# Patient Record
Sex: MALE | Race: WHITE | Employment: OTHER | URBAN - METROPOLITAN AREA
[De-identification: names, ages, dates, MRNs, and addresses within clinical notes are randomized per-mention and may not be internally consistent; named-entity substitution may affect disease eponyms.]

---

## 2017-10-25 ENCOUNTER — HOSPITAL ENCOUNTER (OUTPATIENT)
Dept: CT IMAGING | Age: 67
Discharge: HOME | End: 2017-10-25
Attending: UROLOGY | Admitting: UROLOGY

## 2017-10-25 DIAGNOSIS — R97.20 ELEVATED PROSTATE SPECIFIC ANTIGEN (PSA): ICD-10-CM

## 2017-10-25 LAB
BUN BLDV-MCNC: 21 MG/DL (ref 7–20)
CREAT SERPL-MCNC: 1 MG/DL (ref 0.8–1.3)
GFR AFRICAN AMERICAN: >60
GFR NON-AFRICAN AMERICAN: >60

## 2018-09-23 ENCOUNTER — HOSPITAL ENCOUNTER (INPATIENT)
Age: 68
LOS: 7 days | Discharge: NURSING, REHAB OR LONG-TERM CARE | DRG: 871 | End: 2018-10-01
Attending: EMERGENCY MEDICINE | Admitting: HOSPITALIST
Payer: COMMERCIAL

## 2018-09-23 ENCOUNTER — APPOINTMENT (OUTPATIENT)
Dept: GENERAL RADIOLOGY | Age: 68
DRG: 871 | End: 2018-09-23
Payer: COMMERCIAL

## 2018-09-23 DIAGNOSIS — E86.0 DEHYDRATION: ICD-10-CM

## 2018-09-23 DIAGNOSIS — R79.1 SUBTHERAPEUTIC INTERNATIONAL NORMALIZED RATIO (INR): ICD-10-CM

## 2018-09-23 DIAGNOSIS — I50.9 CONGESTIVE HEART FAILURE, UNSPECIFIED HF CHRONICITY, UNSPECIFIED HEART FAILURE TYPE (HCC): ICD-10-CM

## 2018-09-23 DIAGNOSIS — N39.0 URINARY TRACT INFECTION WITHOUT HEMATURIA, SITE UNSPECIFIED: ICD-10-CM

## 2018-09-23 DIAGNOSIS — A41.9 SEPSIS, DUE TO UNSPECIFIED ORGANISM: Primary | ICD-10-CM

## 2018-09-23 DIAGNOSIS — R77.8 ELEVATED TROPONIN: ICD-10-CM

## 2018-09-23 DIAGNOSIS — D64.9 ANEMIA, UNSPECIFIED TYPE: ICD-10-CM

## 2018-09-23 DIAGNOSIS — E87.6 HYPOKALEMIA: ICD-10-CM

## 2018-09-23 DIAGNOSIS — E83.42 HYPOMAGNESEMIA: ICD-10-CM

## 2018-09-23 DIAGNOSIS — J96.01 ACUTE RESPIRATORY FAILURE WITH HYPOXIA (HCC): ICD-10-CM

## 2018-09-23 LAB
A/G RATIO: 0.8 (ref 1.1–2.2)
ALBUMIN SERPL-MCNC: 2.8 G/DL (ref 3.4–5)
ALP BLD-CCNC: 92 U/L (ref 40–129)
ALT SERPL-CCNC: 10 U/L (ref 10–40)
ANION GAP SERPL CALCULATED.3IONS-SCNC: 12 MMOL/L (ref 3–16)
ANISOCYTOSIS: ABNORMAL
AST SERPL-CCNC: 14 U/L (ref 15–37)
BACTERIA: ABNORMAL /HPF
BASOPHILS ABSOLUTE: 0.1 K/UL (ref 0–0.2)
BASOPHILS RELATIVE PERCENT: 1.2 %
BILIRUB SERPL-MCNC: 1.6 MG/DL (ref 0–1)
BILIRUBIN URINE: NEGATIVE
BLOOD, URINE: ABNORMAL
BUN BLDV-MCNC: 22 MG/DL (ref 7–20)
CALCIUM SERPL-MCNC: 8.2 MG/DL (ref 8.3–10.6)
CHLORIDE BLD-SCNC: 94 MMOL/L (ref 99–110)
CLARITY: CLEAR
CO2: 26 MMOL/L (ref 21–32)
COLOR: YELLOW
CREAT SERPL-MCNC: 1.4 MG/DL (ref 0.8–1.3)
EOSINOPHILS ABSOLUTE: 0 K/UL (ref 0–0.6)
EOSINOPHILS RELATIVE PERCENT: 0.2 %
EPITHELIAL CELLS, UA: ABNORMAL /HPF
GFR AFRICAN AMERICAN: >60
GFR NON-AFRICAN AMERICAN: 50
GLOBULIN: 3.7 G/DL
GLUCOSE BLD-MCNC: 109 MG/DL (ref 70–99)
GLUCOSE URINE: NEGATIVE MG/DL
HCT VFR BLD CALC: 26.4 % (ref 40.5–52.5)
HEMOGLOBIN: 7.9 G/DL (ref 13.5–17.5)
HYPOCHROMIA: ABNORMAL
INR BLD: 1.94 (ref 0.86–1.14)
KETONES, URINE: NEGATIVE MG/DL
LACTIC ACID: 2.1 MMOL/L (ref 0.4–2)
LEUKOCYTE ESTERASE, URINE: NEGATIVE
LIPASE: 12 U/L (ref 13–60)
LYMPHOCYTES ABSOLUTE: 0.1 K/UL (ref 1–5.1)
LYMPHOCYTES RELATIVE PERCENT: 1.1 %
MAGNESIUM: 1.3 MG/DL (ref 1.8–2.4)
MCH RBC QN AUTO: 21 PG (ref 26–34)
MCHC RBC AUTO-ENTMCNC: 29.9 G/DL (ref 31–36)
MCV RBC AUTO: 70.2 FL (ref 80–100)
MICROCYTES: ABNORMAL
MICROSCOPIC EXAMINATION: YES
MONOCYTES ABSOLUTE: 0.1 K/UL (ref 0–1.3)
MONOCYTES RELATIVE PERCENT: 0.7 %
NEUTROPHILS ABSOLUTE: 11.1 K/UL (ref 1.7–7.7)
NEUTROPHILS RELATIVE PERCENT: 96.8 %
NITRITE, URINE: NEGATIVE
PDW BLD-RTO: 22.9 % (ref 12.4–15.4)
PH UA: 5.5
PLATELET # BLD: 285 K/UL (ref 135–450)
PLATELET SLIDE REVIEW: ADEQUATE
PMV BLD AUTO: 8.3 FL (ref 5–10.5)
POTASSIUM SERPL-SCNC: 3.1 MMOL/L (ref 3.5–5.1)
PRO-BNP: 6351 PG/ML (ref 0–124)
PROTEIN UA: 30 MG/DL
PROTHROMBIN TIME: 22.1 SEC (ref 9.8–13)
RAPID INFLUENZA  B AGN: NEGATIVE
RAPID INFLUENZA A AGN: NEGATIVE
RBC # BLD: 3.76 M/UL (ref 4.2–5.9)
RBC UA: ABNORMAL /HPF (ref 0–2)
SLIDE REVIEW: ABNORMAL
SODIUM BLD-SCNC: 132 MMOL/L (ref 136–145)
SPECIFIC GRAVITY UA: 1.01
TOTAL PROTEIN: 6.5 G/DL (ref 6.4–8.2)
TROPONIN: 0.04 NG/ML
URINE TYPE: ABNORMAL
UROBILINOGEN, URINE: 0.2 E.U./DL
WBC # BLD: 11.5 K/UL (ref 4–11)
WBC UA: ABNORMAL /HPF (ref 0–5)

## 2018-09-23 PROCEDURE — 84133 ASSAY OF URINE POTASSIUM: CPT

## 2018-09-23 PROCEDURE — 82436 ASSAY OF URINE CHLORIDE: CPT

## 2018-09-23 PROCEDURE — 84540 ASSAY OF URINE/UREA-N: CPT

## 2018-09-23 PROCEDURE — 85610 PROTHROMBIN TIME: CPT

## 2018-09-23 PROCEDURE — 6370000000 HC RX 637 (ALT 250 FOR IP): Performed by: PHYSICIAN ASSISTANT

## 2018-09-23 PROCEDURE — 93005 ELECTROCARDIOGRAM TRACING: CPT | Performed by: EMERGENCY MEDICINE

## 2018-09-23 PROCEDURE — 6360000002 HC RX W HCPCS: Performed by: PHYSICIAN ASSISTANT

## 2018-09-23 PROCEDURE — 80053 COMPREHEN METABOLIC PANEL: CPT

## 2018-09-23 PROCEDURE — 71045 X-RAY EXAM CHEST 1 VIEW: CPT

## 2018-09-23 PROCEDURE — 36415 COLL VENOUS BLD VENIPUNCTURE: CPT

## 2018-09-23 PROCEDURE — 87040 BLOOD CULTURE FOR BACTERIA: CPT

## 2018-09-23 PROCEDURE — 83605 ASSAY OF LACTIC ACID: CPT

## 2018-09-23 PROCEDURE — 85025 COMPLETE CBC W/AUTO DIFF WBC: CPT

## 2018-09-23 PROCEDURE — 82043 UR ALBUMIN QUANTITATIVE: CPT

## 2018-09-23 PROCEDURE — 83690 ASSAY OF LIPASE: CPT

## 2018-09-23 PROCEDURE — 84300 ASSAY OF URINE SODIUM: CPT

## 2018-09-23 PROCEDURE — 51702 INSERT TEMP BLADDER CATH: CPT

## 2018-09-23 PROCEDURE — 87449 NOS EACH ORGANISM AG IA: CPT

## 2018-09-23 PROCEDURE — 81001 URINALYSIS AUTO W/SCOPE: CPT

## 2018-09-23 PROCEDURE — 83735 ASSAY OF MAGNESIUM: CPT

## 2018-09-23 PROCEDURE — 84484 ASSAY OF TROPONIN QUANT: CPT

## 2018-09-23 PROCEDURE — 99285 EMERGENCY DEPT VISIT HI MDM: CPT

## 2018-09-23 PROCEDURE — 83880 ASSAY OF NATRIURETIC PEPTIDE: CPT

## 2018-09-23 PROCEDURE — 87804 INFLUENZA ASSAY W/OPTIC: CPT

## 2018-09-23 PROCEDURE — 96365 THER/PROPH/DIAG IV INF INIT: CPT

## 2018-09-23 PROCEDURE — 82570 ASSAY OF URINE CREATININE: CPT

## 2018-09-23 PROCEDURE — 87086 URINE CULTURE/COLONY COUNT: CPT

## 2018-09-23 PROCEDURE — 2580000003 HC RX 258: Performed by: PHYSICIAN ASSISTANT

## 2018-09-23 PROCEDURE — 96375 TX/PRO/DX INJ NEW DRUG ADDON: CPT

## 2018-09-23 RX ORDER — ASPIRIN 81 MG/1
324 TABLET, CHEWABLE ORAL ONCE
Status: DISCONTINUED | OUTPATIENT
Start: 2018-09-23 | End: 2018-09-30

## 2018-09-23 RX ORDER — ACETAMINOPHEN 325 MG/1
650 TABLET ORAL ONCE
Status: COMPLETED | OUTPATIENT
Start: 2018-09-23 | End: 2018-09-23

## 2018-09-23 RX ORDER — PANTOPRAZOLE SODIUM 40 MG/1
TABLET, DELAYED RELEASE ORAL
Refills: 3 | COMMUNITY
Start: 2018-09-06 | End: 2019-04-06

## 2018-09-23 RX ORDER — FUROSEMIDE 10 MG/ML
20 INJECTION INTRAMUSCULAR; INTRAVENOUS ONCE
Status: COMPLETED | OUTPATIENT
Start: 2018-09-23 | End: 2018-09-23

## 2018-09-23 RX ORDER — DOXYCYCLINE HYCLATE 100 MG
100 TABLET ORAL
COMMUNITY
Start: 2018-09-13 | End: 2018-09-23 | Stop reason: ALTCHOICE

## 2018-09-23 RX ORDER — POTASSIUM CHLORIDE 750 MG/1
40 TABLET, FILM COATED, EXTENDED RELEASE ORAL ONCE
Status: COMPLETED | OUTPATIENT
Start: 2018-09-23 | End: 2018-09-23

## 2018-09-23 RX ORDER — PANTOPRAZOLE SODIUM 40 MG/1
40 TABLET, DELAYED RELEASE ORAL
COMMUNITY
Start: 2018-09-06 | End: 2018-09-23

## 2018-09-23 RX ORDER — LISINOPRIL AND HYDROCHLOROTHIAZIDE 25; 20 MG/1; MG/1
1 TABLET ORAL DAILY
Status: ON HOLD | COMMUNITY
End: 2018-09-30 | Stop reason: HOSPADM

## 2018-09-23 RX ORDER — DILTIAZEM HYDROCHLORIDE 5 MG/ML
10 INJECTION INTRAVENOUS ONCE
Status: DISCONTINUED | OUTPATIENT
Start: 2018-09-23 | End: 2018-09-23

## 2018-09-23 RX ORDER — 0.9 % SODIUM CHLORIDE 0.9 %
30 INTRAVENOUS SOLUTION INTRAVENOUS ONCE
Status: COMPLETED | OUTPATIENT
Start: 2018-09-23 | End: 2018-09-23

## 2018-09-23 RX ADMIN — SODIUM CHLORIDE 5457 ML: 9 INJECTION, SOLUTION INTRAVENOUS at 21:22

## 2018-09-23 RX ADMIN — POTASSIUM CHLORIDE 40 MEQ: 750 TABLET, EXTENDED RELEASE ORAL at 23:55

## 2018-09-23 RX ADMIN — FUROSEMIDE 20 MG: 10 INJECTION, SOLUTION INTRAMUSCULAR; INTRAVENOUS at 23:19

## 2018-09-23 RX ADMIN — CEFTRIAXONE 1 G: 1 INJECTION, POWDER, FOR SOLUTION INTRAMUSCULAR; INTRAVENOUS at 23:22

## 2018-09-23 RX ADMIN — ACETAMINOPHEN 650 MG: 325 TABLET ORAL at 21:22

## 2018-09-23 ASSESSMENT — PAIN SCALES - GENERAL: PAINLEVEL_OUTOF10: 0

## 2018-09-24 ENCOUNTER — APPOINTMENT (OUTPATIENT)
Dept: CT IMAGING | Age: 68
DRG: 871 | End: 2018-09-24
Payer: COMMERCIAL

## 2018-09-24 PROBLEM — K80.50 CHOLEDOCHOLITHIASIS: Chronic | Status: ACTIVE | Noted: 2018-09-24

## 2018-09-24 PROBLEM — A41.9 SEVERE SEPSIS (HCC): Status: ACTIVE | Noted: 2018-09-24

## 2018-09-24 PROBLEM — K43.9 VENTRAL HERNIA: Chronic | Status: ACTIVE | Noted: 2018-07-14

## 2018-09-24 PROBLEM — R65.20 SEVERE SEPSIS (HCC): Status: ACTIVE | Noted: 2018-09-24

## 2018-09-24 PROBLEM — E83.42 HYPOMAGNESEMIA: Status: ACTIVE | Noted: 2018-09-24

## 2018-09-24 PROBLEM — Z79.01 ANTICOAGULATION GOAL OF INR 1.5 TO 2.5: Chronic | Status: ACTIVE | Noted: 2017-03-09

## 2018-09-24 PROBLEM — D50.9 MICROCYTIC ANEMIA: Chronic | Status: ACTIVE | Noted: 2018-09-24

## 2018-09-24 PROBLEM — I10 BENIGN HYPERTENSION: Status: ACTIVE | Noted: 2018-09-24

## 2018-09-24 PROBLEM — Z86.718 PERSONAL HISTORY OF DVT (DEEP VEIN THROMBOSIS): Chronic | Status: ACTIVE | Noted: 2018-07-14

## 2018-09-24 PROBLEM — Z51.81 ANTICOAGULATION GOAL OF INR 1.5 TO 2.5: Chronic | Status: ACTIVE | Noted: 2017-03-09

## 2018-09-24 PROBLEM — N39.0 UTI (URINARY TRACT INFECTION): Status: ACTIVE | Noted: 2018-09-24

## 2018-09-24 PROBLEM — E87.8 HYPOCHLOREMIA: Status: ACTIVE | Noted: 2018-09-24

## 2018-09-24 PROBLEM — Z51.81 ANTICOAGULATION GOAL OF INR 1.5 TO 2.5: Status: ACTIVE | Noted: 2017-03-09

## 2018-09-24 PROBLEM — K92.2 GI BLEED: Status: ACTIVE | Noted: 2018-08-04

## 2018-09-24 PROBLEM — K81.0 ACUTE CHOLECYSTITIS: Status: ACTIVE | Noted: 2018-09-24

## 2018-09-24 PROBLEM — N17.9 AKI (ACUTE KIDNEY INJURY) (HCC): Status: ACTIVE | Noted: 2018-09-24

## 2018-09-24 PROBLEM — K43.9 VENTRAL HERNIA: Status: ACTIVE | Noted: 2018-07-14

## 2018-09-24 PROBLEM — R77.8 ELEVATED TROPONIN: Status: ACTIVE | Noted: 2018-09-24

## 2018-09-24 PROBLEM — Z86.718 PERSONAL HISTORY OF DVT (DEEP VEIN THROMBOSIS): Status: ACTIVE | Noted: 2018-07-14

## 2018-09-24 PROBLEM — R79.89 ELEVATED BRAIN NATRIURETIC PEPTIDE (BNP) LEVEL: Status: ACTIVE | Noted: 2018-09-24

## 2018-09-24 PROBLEM — E87.1 HYPONATREMIA: Status: ACTIVE | Noted: 2018-09-24

## 2018-09-24 PROBLEM — J96.01 ACUTE HYPOXEMIC RESPIRATORY FAILURE (HCC): Status: ACTIVE | Noted: 2018-09-24

## 2018-09-24 PROBLEM — I10 BENIGN HYPERTENSION: Chronic | Status: ACTIVE | Noted: 2018-09-24

## 2018-09-24 PROBLEM — E87.6 HYPOKALEMIA: Status: ACTIVE | Noted: 2018-09-24

## 2018-09-24 PROBLEM — R09.89 PULMONARY VASCULAR CONGESTION: Status: ACTIVE | Noted: 2018-09-24

## 2018-09-24 PROBLEM — D50.9 MICROCYTIC ANEMIA: Status: ACTIVE | Noted: 2018-09-24

## 2018-09-24 PROBLEM — K92.2 GI BLEED: Chronic | Status: ACTIVE | Noted: 2018-08-04

## 2018-09-24 PROBLEM — L30.4 INTERTRIGO: Status: ACTIVE | Noted: 2018-09-24

## 2018-09-24 PROBLEM — K80.50 CHOLEDOCHOLITHIASIS: Status: ACTIVE | Noted: 2018-09-24

## 2018-09-24 PROBLEM — Z79.01 ANTICOAGULATION GOAL OF INR 1.5 TO 2.5: Status: ACTIVE | Noted: 2017-03-09

## 2018-09-24 PROBLEM — R79.89 ELEVATED TROPONIN: Status: ACTIVE | Noted: 2018-09-24

## 2018-09-24 LAB
ABO/RH: NORMAL
ABO/RH: NORMAL
ALBUMIN SERPL-MCNC: 2.7 G/DL (ref 3.4–5)
ALP BLD-CCNC: 77 U/L (ref 40–129)
ALT SERPL-CCNC: 9 U/L (ref 10–40)
ANION GAP SERPL CALCULATED.3IONS-SCNC: 12 MMOL/L (ref 3–16)
ANTIBODY SCREEN: NORMAL
AST SERPL-CCNC: 13 U/L (ref 15–37)
BILIRUB SERPL-MCNC: 1.2 MG/DL (ref 0–1)
BILIRUBIN DIRECT: 0.5 MG/DL (ref 0–0.3)
BILIRUBIN, INDIRECT: 0.7 MG/DL (ref 0–1)
BLOOD SMEAR REVIEW: NORMAL
BUN BLDV-MCNC: 24 MG/DL (ref 7–20)
CALCIUM SERPL-MCNC: 7.9 MG/DL (ref 8.3–10.6)
CHLORIDE BLD-SCNC: 98 MMOL/L (ref 99–110)
CHLORIDE URINE RANDOM: 72 MMOL/L
CO2: 28 MMOL/L (ref 21–32)
CREAT SERPL-MCNC: 1.4 MG/DL (ref 0.8–1.3)
CREATININE URINE: 140.2 MG/DL (ref 39–259)
D DIMER: 542 NG/ML DDU (ref 0–229)
FERRITIN: 30.5 NG/ML (ref 30–400)
FOLATE: 8.95 NG/ML (ref 4.78–24.2)
GFR AFRICAN AMERICAN: >60
GFR NON-AFRICAN AMERICAN: 50
GLUCOSE BLD-MCNC: 111 MG/DL (ref 70–99)
HAPTOGLOBIN: 183 MG/DL (ref 30–200)
HCT VFR BLD CALC: 23.1 % (ref 40.5–52.5)
HCT VFR BLD CALC: 23.6 % (ref 40.5–52.5)
HCT VFR BLD CALC: 23.9 % (ref 40.5–52.5)
HEMOGLOBIN: 7.1 G/DL (ref 13.5–17.5)
HEMOGLOBIN: 7.2 G/DL (ref 13.5–17.5)
HEMOGLOBIN: 7.4 G/DL (ref 13.5–17.5)
HOMOCYSTEINE: 16 UMOL/L (ref 0–10)
IMMATURE RETIC FRACT: 0.49 (ref 0.21–0.37)
INR BLD: 2.1 (ref 0.86–1.14)
INR BLD: 2.18 (ref 0.86–1.14)
IRON SATURATION: 3 % (ref 20–50)
IRON: 8 UG/DL (ref 59–158)
L. PNEUMOPHILA SEROGP 1 UR AG: NORMAL
LACTATE DEHYDROGENASE: 136 U/L (ref 100–190)
LACTIC ACID: 1 MMOL/L (ref 0.4–2)
LACTIC ACID: 1.2 MMOL/L (ref 0.4–2)
LV EF: 58 %
LVEF MODALITY: NORMAL
MAGNESIUM URINE: 2.4 MG/DL (ref 4.1–13.8)
MICROALBUMIN UR-MCNC: 20.1 MG/DL
MICROALBUMIN/CREAT UR-RTO: 143.4 MG/G (ref 0–30)
POTASSIUM SERPL-SCNC: 3.4 MMOL/L (ref 3.5–5.1)
POTASSIUM, UR: 40.2 MMOL/L
PRO-BNP: ABNORMAL PG/ML (ref 0–124)
PROCALCITONIN: 8.66 NG/ML (ref 0–0.15)
PROTHROMBIN TIME: 23.9 SEC (ref 9.8–13)
PROTHROMBIN TIME: 24.9 SEC (ref 9.8–13)
REPORT: NORMAL
RESPIRATORY PANEL PCR: NORMAL
RETICULOCYTE ABSOLUTE COUNT: 0.08 M/UL
RETICULOCYTE COUNT PCT: 2.48 % (ref 0.5–2.18)
SODIUM BLD-SCNC: 138 MMOL/L (ref 136–145)
SODIUM URINE: 62 MMOL/L
STREP PNEUMONIAE ANTIGEN, URINE: NORMAL
TOTAL CK: 44 U/L (ref 39–308)
TOTAL CK: 47 U/L (ref 39–308)
TOTAL CK: 49 U/L (ref 39–308)
TOTAL IRON BINDING CAPACITY: 236 UG/DL (ref 260–445)
TOTAL PROTEIN: 6 G/DL (ref 6.4–8.2)
TROPONIN: 0.03 NG/ML
TROPONIN: 0.04 NG/ML
TROPONIN: 0.05 NG/ML
UREA NITROGEN, UR: 440.6 MG/DL (ref 800–1666)
VITAMIN B-12: 371 PG/ML (ref 211–911)

## 2018-09-24 PROCEDURE — 85379 FIBRIN DEGRADATION QUANT: CPT

## 2018-09-24 PROCEDURE — 80048 BASIC METABOLIC PNL TOTAL CA: CPT

## 2018-09-24 PROCEDURE — 84484 ASSAY OF TROPONIN QUANT: CPT

## 2018-09-24 PROCEDURE — 82607 VITAMIN B-12: CPT

## 2018-09-24 PROCEDURE — 36415 COLL VENOUS BLD VENIPUNCTURE: CPT

## 2018-09-24 PROCEDURE — 2580000003 HC RX 258: Performed by: NURSE PRACTITIONER

## 2018-09-24 PROCEDURE — 85014 HEMATOCRIT: CPT

## 2018-09-24 PROCEDURE — 86148 ANTI-PHOSPHOLIPID ANTIBODY: CPT

## 2018-09-24 PROCEDURE — 6360000002 HC RX W HCPCS: Performed by: HOSPITALIST

## 2018-09-24 PROCEDURE — 6360000002 HC RX W HCPCS: Performed by: PHYSICIAN ASSISTANT

## 2018-09-24 PROCEDURE — G8987 SELF CARE CURRENT STATUS: HCPCS

## 2018-09-24 PROCEDURE — 6370000000 HC RX 637 (ALT 250 FOR IP): Performed by: HOSPITALIST

## 2018-09-24 PROCEDURE — 85610 PROTHROMBIN TIME: CPT

## 2018-09-24 PROCEDURE — 86738 MYCOPLASMA ANTIBODY: CPT

## 2018-09-24 PROCEDURE — 99254 IP/OBS CNSLTJ NEW/EST MOD 60: CPT | Performed by: INTERNAL MEDICINE

## 2018-09-24 PROCEDURE — 97166 OT EVAL MOD COMPLEX 45 MIN: CPT

## 2018-09-24 PROCEDURE — 97535 SELF CARE MNGMENT TRAINING: CPT

## 2018-09-24 PROCEDURE — 81241 F5 GENE: CPT

## 2018-09-24 PROCEDURE — 87633 RESP VIRUS 12-25 TARGETS: CPT

## 2018-09-24 PROCEDURE — 83550 IRON BINDING TEST: CPT

## 2018-09-24 PROCEDURE — C8929 TTE W OR WO FOL WCON,DOPPLER: HCPCS | Performed by: INTERNAL MEDICINE

## 2018-09-24 PROCEDURE — 71250 CT THORAX DX C-: CPT

## 2018-09-24 PROCEDURE — 2060000000 HC ICU INTERMEDIATE R&B

## 2018-09-24 PROCEDURE — 83010 ASSAY OF HAPTOGLOBIN QUANT: CPT

## 2018-09-24 PROCEDURE — 80076 HEPATIC FUNCTION PANEL: CPT

## 2018-09-24 PROCEDURE — 83605 ASSAY OF LACTIC ACID: CPT

## 2018-09-24 PROCEDURE — 93010 ELECTROCARDIOGRAM REPORT: CPT | Performed by: INTERNAL MEDICINE

## 2018-09-24 PROCEDURE — 85302 CLOT INHIBIT PROT C ANTIGEN: CPT

## 2018-09-24 PROCEDURE — 86147 CARDIOLIPIN ANTIBODY EA IG: CPT

## 2018-09-24 PROCEDURE — 87486 CHLMYD PNEUM DNA AMP PROBE: CPT

## 2018-09-24 PROCEDURE — 85045 AUTOMATED RETICULOCYTE COUNT: CPT

## 2018-09-24 PROCEDURE — 86901 BLOOD TYPING SEROLOGIC RH(D): CPT

## 2018-09-24 PROCEDURE — 83090 ASSAY OF HOMOCYSTEINE: CPT

## 2018-09-24 PROCEDURE — 86900 BLOOD TYPING SEROLOGIC ABO: CPT

## 2018-09-24 PROCEDURE — 99223 1ST HOSP IP/OBS HIGH 75: CPT | Performed by: INTERNAL MEDICINE

## 2018-09-24 PROCEDURE — 82728 ASSAY OF FERRITIN: CPT

## 2018-09-24 PROCEDURE — 85306 CLOT INHIBIT PROT S FREE: CPT

## 2018-09-24 PROCEDURE — 74176 CT ABD & PELVIS W/O CONTRAST: CPT

## 2018-09-24 PROCEDURE — 82550 ASSAY OF CK (CPK): CPT

## 2018-09-24 PROCEDURE — 83615 LACTATE (LD) (LDH) ENZYME: CPT

## 2018-09-24 PROCEDURE — 81291 MTHFR GENE: CPT

## 2018-09-24 PROCEDURE — 97162 PT EVAL MOD COMPLEX 30 MIN: CPT

## 2018-09-24 PROCEDURE — 2500000003 HC RX 250 WO HCPCS: Performed by: HOSPITALIST

## 2018-09-24 PROCEDURE — 84145 PROCALCITONIN (PCT): CPT

## 2018-09-24 PROCEDURE — G8979 MOBILITY GOAL STATUS: HCPCS

## 2018-09-24 PROCEDURE — 2700000000 HC OXYGEN THERAPY PER DAY

## 2018-09-24 PROCEDURE — 82746 ASSAY OF FOLIC ACID SERUM: CPT

## 2018-09-24 PROCEDURE — 6370000000 HC RX 637 (ALT 250 FOR IP): Performed by: NURSE PRACTITIONER

## 2018-09-24 PROCEDURE — 94761 N-INVAS EAR/PLS OXIMETRY MLT: CPT

## 2018-09-24 PROCEDURE — 86146 BETA-2 GLYCOPROTEIN ANTIBODY: CPT

## 2018-09-24 PROCEDURE — 85305 CLOT INHIBIT PROT S TOTAL: CPT

## 2018-09-24 PROCEDURE — 99254 IP/OBS CNSLTJ NEW/EST MOD 60: CPT | Performed by: SURGERY

## 2018-09-24 PROCEDURE — 86850 RBC ANTIBODY SCREEN: CPT

## 2018-09-24 PROCEDURE — 6370000000 HC RX 637 (ALT 250 FOR IP): Performed by: INTERNAL MEDICINE

## 2018-09-24 PROCEDURE — G8988 SELF CARE GOAL STATUS: HCPCS

## 2018-09-24 PROCEDURE — 84238 ASSAY NONENDOCRINE RECEPTOR: CPT

## 2018-09-24 PROCEDURE — 87581 M.PNEUMON DNA AMP PROBE: CPT

## 2018-09-24 PROCEDURE — 2580000003 HC RX 258: Performed by: HOSPITALIST

## 2018-09-24 PROCEDURE — 97110 THERAPEUTIC EXERCISES: CPT

## 2018-09-24 PROCEDURE — 85303 CLOT INHIBIT PROT C ACTIVITY: CPT

## 2018-09-24 PROCEDURE — 83880 ASSAY OF NATRIURETIC PEPTIDE: CPT

## 2018-09-24 PROCEDURE — 85018 HEMOGLOBIN: CPT

## 2018-09-24 PROCEDURE — G8978 MOBILITY CURRENT STATUS: HCPCS

## 2018-09-24 PROCEDURE — 87798 DETECT AGENT NOS DNA AMP: CPT

## 2018-09-24 PROCEDURE — 83540 ASSAY OF IRON: CPT

## 2018-09-24 PROCEDURE — 87641 MR-STAPH DNA AMP PROBE: CPT

## 2018-09-24 RX ORDER — POTASSIUM CHLORIDE 20 MEQ/1
40 TABLET, EXTENDED RELEASE ORAL PRN
Status: DISCONTINUED | OUTPATIENT
Start: 2018-09-24 | End: 2018-09-24

## 2018-09-24 RX ORDER — MAGNESIUM SULFATE HEPTAHYDRATE 500 MG/ML
2 INJECTION, SOLUTION INTRAMUSCULAR; INTRAVENOUS ONCE
Status: DISCONTINUED | OUTPATIENT
Start: 2018-09-24 | End: 2018-09-24

## 2018-09-24 RX ORDER — POTASSIUM CHLORIDE 20MEQ/15ML
40 LIQUID (ML) ORAL PRN
Status: DISCONTINUED | OUTPATIENT
Start: 2018-09-24 | End: 2018-09-24

## 2018-09-24 RX ORDER — SODIUM CHLORIDE 0.9 % (FLUSH) 0.9 %
10 SYRINGE (ML) INJECTION EVERY 12 HOURS SCHEDULED
Status: DISCONTINUED | OUTPATIENT
Start: 2018-09-24 | End: 2018-10-01 | Stop reason: HOSPADM

## 2018-09-24 RX ORDER — ONDANSETRON 2 MG/ML
4 INJECTION INTRAMUSCULAR; INTRAVENOUS EVERY 6 HOURS PRN
Status: DISCONTINUED | OUTPATIENT
Start: 2018-09-24 | End: 2018-10-01 | Stop reason: HOSPADM

## 2018-09-24 RX ORDER — SODIUM CHLORIDE AND POTASSIUM CHLORIDE .9; .15 G/100ML; G/100ML
SOLUTION INTRAVENOUS CONTINUOUS
Status: DISCONTINUED | OUTPATIENT
Start: 2018-09-24 | End: 2018-09-24

## 2018-09-24 RX ORDER — FUROSEMIDE 10 MG/ML
40 INJECTION INTRAMUSCULAR; INTRAVENOUS ONCE
Status: DISCONTINUED | OUTPATIENT
Start: 2018-09-24 | End: 2018-09-24

## 2018-09-24 RX ORDER — POTASSIUM CHLORIDE 20 MEQ/1
40 TABLET, EXTENDED RELEASE ORAL ONCE
Status: COMPLETED | OUTPATIENT
Start: 2018-09-24 | End: 2018-09-24

## 2018-09-24 RX ORDER — MAGNESIUM SULFATE IN WATER 40 MG/ML
2 INJECTION, SOLUTION INTRAVENOUS ONCE
Status: COMPLETED | OUTPATIENT
Start: 2018-09-24 | End: 2018-09-24

## 2018-09-24 RX ORDER — POTASSIUM CHLORIDE 7.45 MG/ML
10 INJECTION INTRAVENOUS PRN
Status: DISCONTINUED | OUTPATIENT
Start: 2018-09-24 | End: 2018-09-24

## 2018-09-24 RX ORDER — MAGNESIUM SULFATE 1 G/100ML
1 INJECTION INTRAVENOUS PRN
Status: DISCONTINUED | OUTPATIENT
Start: 2018-09-24 | End: 2018-09-24

## 2018-09-24 RX ORDER — SODIUM CHLORIDE 0.9 % (FLUSH) 0.9 %
10 SYRINGE (ML) INJECTION PRN
Status: DISCONTINUED | OUTPATIENT
Start: 2018-09-24 | End: 2018-10-01 | Stop reason: HOSPADM

## 2018-09-24 RX ORDER — SODIUM CHLORIDE 9 MG/ML
INJECTION, SOLUTION INTRAVENOUS CONTINUOUS
Status: DISCONTINUED | OUTPATIENT
Start: 2018-09-24 | End: 2018-09-25

## 2018-09-24 RX ORDER — ACETAMINOPHEN 325 MG/1
650 TABLET ORAL EVERY 4 HOURS PRN
Status: DISCONTINUED | OUTPATIENT
Start: 2018-09-24 | End: 2018-10-01 | Stop reason: HOSPADM

## 2018-09-24 RX ORDER — PANTOPRAZOLE SODIUM 40 MG/1
40 TABLET, DELAYED RELEASE ORAL 2 TIMES DAILY
Status: DISCONTINUED | OUTPATIENT
Start: 2018-09-24 | End: 2018-09-25

## 2018-09-24 RX ADMIN — CEFEPIME HYDROCHLORIDE 2 G: 2 INJECTION, POWDER, FOR SOLUTION INTRAVENOUS at 15:43

## 2018-09-24 RX ADMIN — POTASSIUM CHLORIDE 40 MEQ: 20 TABLET, EXTENDED RELEASE ORAL at 11:54

## 2018-09-24 RX ADMIN — PANTOPRAZOLE SODIUM 40 MG: 40 TABLET, DELAYED RELEASE ORAL at 08:41

## 2018-09-24 RX ADMIN — MICONAZOLE NITRATE: 2 POWDER TOPICAL at 05:46

## 2018-09-24 RX ADMIN — WARFARIN SODIUM 3 MG: 2 TABLET ORAL at 17:53

## 2018-09-24 RX ADMIN — PANTOPRAZOLE SODIUM 40 MG: 40 TABLET, DELAYED RELEASE ORAL at 04:14

## 2018-09-24 RX ADMIN — POTASSIUM CHLORIDE AND SODIUM CHLORIDE: 900; 150 INJECTION, SOLUTION INTRAVENOUS at 04:14

## 2018-09-24 RX ADMIN — PANTOPRAZOLE SODIUM 40 MG: 40 TABLET, DELAYED RELEASE ORAL at 21:04

## 2018-09-24 RX ADMIN — MAGNESIUM SULFATE HEPTAHYDRATE 2 G: 40 INJECTION, SOLUTION INTRAVENOUS at 00:18

## 2018-09-24 RX ADMIN — MICONAZOLE NITRATE: 2 POWDER TOPICAL at 21:04

## 2018-09-24 RX ADMIN — CEFEPIME HYDROCHLORIDE 2 G: 2 INJECTION, POWDER, FOR SOLUTION INTRAVENOUS at 04:14

## 2018-09-24 RX ADMIN — ACETAMINOPHEN 650 MG: 325 TABLET ORAL at 15:39

## 2018-09-24 RX ADMIN — SODIUM CHLORIDE: 9 INJECTION, SOLUTION INTRAVENOUS at 11:54

## 2018-09-24 ASSESSMENT — ENCOUNTER SYMPTOMS
BLURRED VISION: 0
NAUSEA: 0
ABDOMINAL PAIN: 0
SHORTNESS OF BREATH: 1
BACK PAIN: 0
PHOTOPHOBIA: 0
DOUBLE VISION: 0
COUGH: 1
VOMITING: 0
HEARTBURN: 0
SPUTUM PRODUCTION: 0
ORTHOPNEA: 0

## 2018-09-24 ASSESSMENT — PAIN SCALES - GENERAL
PAINLEVEL_OUTOF10: 0

## 2018-09-24 NOTE — PROGRESS NOTES
10 reps of B digit, elbow, and shoulder flex/ext while supine after education. Pt and wife educated to complete at least 3x/day, 10 reps increasing resistance and repetitions as able. Pt and wife stated and demonstrated understanding. Written handout provided. Education: Role of OT, safe t/f training, safe use of DME, awareness of deficits, discharge planning, ADL as therapeutic exercise, importance of OOB, therex    Assessment   Performance deficits / Impairments: Decreased functional mobility ; Decreased ADL status; Decreased strength;Decreased endurance;Decreased safe awareness;Decreased balance;Decreased high-level IADLs  After evaluation, pt found to be presenting with the above mentioned deficits. Pt would benefit from continued skilled occupational therapy to address these deficits, increasing safety and independence with ADL and functional mobility. Pt has good potential to meet therapy goals. Will continue to assess for discharge needs. Rec SNF. Prognosis: Good  Decision Making: Medium Complexity  REQUIRES OT FOLLOW UP: Yes  Activity Tolerance  Activity Tolerance: Treatment limited secondary to medical complications (free text)  Activity Tolerance: Vitals supine at rest: 93%, 127-132bpm, SOB noted. RN aware. Safety Devices  Safety Devices in place: Yes  Type of devices: Bed alarm in place; Left in bed;Call light within reach;Nurse notified         Plan   Plan  Times per week: 3-5    G-Code  OT G-codes  Functional Assessment Tool Used: AM-PAC  Score: 11  Functional Limitation: Self care  Self Care Current Status (): At least 60 percent but less than 80 percent impaired, limited or restricted  Self Care Goal Status ():  At least 40 percent but less than 60 percent impaired, limited or restricted    AM-PAC Score   AM-[facility] Inpatient Daily Activity Raw Score: 11  AM-PAC Inpatient ADL T-Scale Score : 29.04  ADL Inpatient CMS 0-100% Score: 70.42  ADL Inpatient CMS G-Code Modifier : CL    Goals  Short term goals  Time Frame for Short term goals: 1 week unless otherwise specified  Short term goal 1: Tolerate 5 mins EOB with min A for sitting balance in prep for ADL  Short term goal 2: Supine to sit with min A x2  Short term goal 3: Complete 20 reps of BUE therex  Short term goal 4: Sit to  prep for functional t/f with max A x2. Patient Goals   Patient goals : \"I want to get moving again. \"       Therapy Time   Individual Concurrent Group Co-treatment   Time In 1320         Time Out 7492         Minutes 27         Timed Code Treatment Minutes: 16 Minutes     If patient is discharged prior to next treatment session, this note will serve as the discharge summary.   Chelo Hinson, OTR/L #[id number]

## 2018-09-24 NOTE — H&P
Hospital Medicine History & Physical      PCP: Ermelinda Sebastian MD    Date of Admission: 9/23/2018    Date of Service: Pt seen/examined on 9/24/18 and Admitted to Inpatient with expected LOS greater than two midnights due to medical therapy. Chief Complaint:  Chief Complaint   Patient presents with    Fever     fever 103; glucose 111; 72% RA upon EMS arrival     Shortness of Breath     History Of Present Illness:    79 y.o. male with a hx of morbid obesity, recent choledocholithiasis s/p ERCP in July 2018, with plans (per Dr. Rhiannon Mcgrath) for a cholecystectomy, which was apparently delayed by a recent bout of PNA as well as a GI bleed in Aug 2018 (EGD reportedly showed a duodenal ulcer), as well as a hx of a large ventral hernia, DVT, anticoagulated on Coumadin (apparent goal 1.5-2.5), and other comorbidities, who presents to the [facility], apparently from home, via EMS, due to a sudden-onset high fever (his wife said to 80 F), as well as SOB. EMS reportedly found him to be hypoxemic to 74% and placed him on supplemental O2 via NC. When he arrived to the ED he was satting 84%. Fever was 102.9 F. He was tachypneic to 25 and tachycardic to 139. He had mild leukocytosis to 11,500. CXR was read as pulmonary vascular congestion. UA was positive for a UTI. Assuming an infection, he met severe sepsis criteria. Other labs showed elevated troponin, elevated BNP, hypomagnesemia, hypokalemia, hyponatremia, hypochloremia, and apparent KEVIN. I ordered CT c/a/p without IV contrast, which showed apparent acute (or perhaps unresolved) cholecystitis, with a large gallstone in the neck of the gallbladder, as well as small bilateral pleural effusions. When I saw him on the floor, his RN showed me several skin lesions as well as intertrigo.     Past Medical History:          Diagnosis Date    Cancer [facility])     kidney    Edema of both legs     Hypertension        Past Surgical History:          Procedure Laterality Date    KIDNEY SURGERY  4/7/2014       Medications Prior to Admission:      Prior to Admission medications    Medication Sig Start Date End Date Taking? Authorizing Provider   lisinopril-hydrochlorothiazide (PRINZIDE;ZESTORETIC) 20-25 MG per tablet Take by mouth   Yes Historical Provider, MD   pantoprazole (PROTONIX) 40 MG tablet TAKE 1 TABLET BY MOUTH TWO TIMES A DAY 9/6/18  Yes Historical Provider, MD   warfarin (COUMADIN) 5 MG tablet  2/1/14  Yes Historical Provider, MD       Allergies:  Patient has no known allergies. Social History:      The patient currently lives at home. TOBACCO:   reports that he has never smoked. He has never used smokeless tobacco.  ETOH:   reports that he does not drink alcohol. Family History:      History reviewed. No pertinent family history. REVIEW OF SYSTEMS:   Pertinent positives as noted in the HPI. All other systems reviewed and negative. PHYSICAL EXAM PERFORMED:    BP 99/63   Pulse 96   Temp 98.5 °F (36.9 °C) (Oral)   Resp 20   Ht 5' 4\" (1.626 m)   Wt (!) 376 lb 1.6 oz (170.6 kg) Comment: minus 3.1lbs for sling  SpO2 97%   BMI 64.56 kg/m²     General appearance: Morbidly obese late-middle-aged WM sitting up in bed in NAD. Looks chronically unhealthy/unfit-appearing. HEENT:  Normal cephalic, atraumatic without obvious deformity. Pupils equal, round, and reactive to light. Extra ocular muscles intact. Conjunctivae/corneas clear. Neck: Supple, with full range of motion. No jugular venous distention. Trachea midline. Respiratory: On 2 L O2 NC. No respiratory distress. Cardiovascular:  Regular rate and rhythm with normal S1/S2 without murmurs, rubs or gallops. Abdomen: Very large abd fat pannus with ventral hernia as well as suprapubic fat pannus. Musculoskeletal/Skin:  Chronic-appearing lymphedema to bilateral LE's. Neurologic:  Neurovascularly intact without any focal sensory/motor deficits.  Cranial nerves: II-XII intact, grossly non-focal.  Psychiatric: Alert and oriented, thought content appropriate, normal insight  Capillary Refill: Brisk,< 3 seconds   Peripheral Pulses: +2 palpable, equal bilaterally       Labs:     Recent Labs      09/23/18 2113   WBC  11.5*   HGB  7.9*   HCT  26.4*   PLT  285     Recent Labs      09/23/18 2113   NA  132*   K  3.1*   CL  94*   CO2  26   BUN  22*   CREATININE  1.4*   CALCIUM  8.2*     Recent Labs      09/23/18 2113   AST  14*   ALT  10   BILITOT  1.6*   ALKPHOS  92     Recent Labs      09/23/18 2113   INR  1.94*     Recent Labs      09/23/18 2113   TROPONINI  0.04*       Urinalysis:      Lab Results   Component Value Date    NITRU Negative 09/23/2018    WBCUA 6-10 09/23/2018    BACTERIA Rare 09/23/2018    RBCUA None seen 09/23/2018    BLOODU SMALL 09/23/2018    SPECGRAV 1.015 09/23/2018    GLUCOSEU Negative 09/23/2018       Radiology:     CT ABDOMEN PELVIS WO CONTRAST Additional Contrast? None   Final Result   ABDOMEN/PELVIS      1. Hydropic gallbladder with large gallstone at the gallbladder neck and   features of acute cholecystitis. 2. Gas within the biliary system is presumably from reported recent ERCP. 3. Large ventral abdominal hernia containing intra-abdominal fat and   nonobstructed loops of bowel. Soft tissue edema along the inferior pannus. CHEST      1. Small bilateral pleural effusions. 2. 2 cm hypodensity in the right thyroid gland may be further evaluated with   ultrasound (reference: J Am Saul Radiol. 2015 Feb;12(2): 143-50). CT CHEST WO CONTRAST   Final Result   ABDOMEN/PELVIS      1. Hydropic gallbladder with large gallstone at the gallbladder neck and   features of acute cholecystitis. 2. Gas within the biliary system is presumably from reported recent ERCP. 3. Large ventral abdominal hernia containing intra-abdominal fat and   nonobstructed loops of bowel. Soft tissue edema along the inferior pannus. CHEST      1. Small bilateral pleural effusions.    2. 2 cm hypodensity in the

## 2018-09-24 NOTE — CONSULTS
Department of General Surgery Consult    PATIENT NAME: Lorne Gonzalez   YOB: 1950    ADMISSION DATE: 9/23/2018  9:00 PM      TODAY'S DATE: 9/24/2018    Reason for Consult:  cholecystitis    Chief Complaint: fevers    Requesting Physician:  Johan Pfeiffer    HISTORY OF PRESENT ILLNESS:              The patient is a 79 y.o. male who presents with fevers and chills. Also with some SOB. Denies any abdominal pain or nausea, but does have lower appetite. Past Medical History:        Diagnosis Date    Cancer [facility])     kidney    Edema of both legs     Hx of blood clots     DVT    Hypertension        Past Surgical History:        Procedure Laterality Date    ABDOMEN SURGERY      Right Kidney Cancer abd. surgery 2014    COLONOSCOPY      flexsigmoidoscopy 40yrs ago     ENDOSCOPY, COLON, DIAGNOSTIC      8/2018    KIDNEY SURGERY  4/7/2014       Current Medications:   Current Facility-Administered Medications: pantoprazole (PROTONIX) tablet 40 mg, 40 mg, Oral, BID  cefepime (MAXIPIME) 2 g IVPB minibag, 2 g, Intravenous, Q12H  sodium chloride flush 0.9 % injection 10 mL, 10 mL, Intravenous, 2 times per day  sodium chloride flush 0.9 % injection 10 mL, 10 mL, Intravenous, PRN  magnesium hydroxide (MILK OF MAGNESIA) 400 MG/5ML suspension 30 mL, 30 mL, Oral, Daily PRN  ondansetron (ZOFRAN) injection 4 mg, 4 mg, Intravenous, Q6H PRN  acetaminophen (TYLENOL) tablet 650 mg, 650 mg, Oral, Q4H PRN  warfarin (COUMADIN) daily dosing (placeholder), , Other, RX Placeholder  miconazole (MICOTIN) 2 % powder, , Topical, BID  warfarin (COUMADIN) tablet 3 mg, 3 mg, Oral, Daily  0.9 % sodium chloride infusion, , Intravenous, Continuous  aspirin chewable tablet 324 mg, 324 mg, Oral, Once  Prior to Admission medications    Medication Sig Start Date End Date Taking?  Authorizing Provider   vitamin D (CHOLECALCIFEROL) 1000 UNIT TABS tablet Take 1,000 Units by mouth 2 times daily at 0800 and 1400   Yes Historical Provider, MD

## 2018-09-24 NOTE — CONSULTS
Pharmacy to Dose Warfarin    Dx:   Hx DVT. Goal is apparently 1.5-2.5 (presumably due to recent upper GI              bleed from duodenal ulcer). Goal INR range 1.5 -2.5  Home Warfarin dose: 5 mg daily. Recent Labs      09/23/18 2113   INR  1.94*     Daily INR ordered. Rx will continue to manage therapy per consult order.   Cirilo Crowell, Pharm D.9/24/2018 2:34 AM

## 2018-09-24 NOTE — PLAN OF CARE
Problem: Musculor/Skeletal Functional Status  Intervention: PT Evaluation/treatment  Progress functional mobility

## 2018-09-24 NOTE — CONSULTS
Last Rate Last Dose    pantoprazole (PROTONIX) tablet 40 mg  40 mg Oral BID Martinez Thakur MD   40 mg at 09/24/18 0841    cefepime (MAXIPIME) 2 g IVPB minibag  2 g Intravenous Q12H Martinez Thakur MD   Stopped at 09/24/18 0444    sodium chloride flush 0.9 % injection 10 mL  10 mL Intravenous 2 times per day Martinez Thakur MD        sodium chloride flush 0.9 % injection 10 mL  10 mL Intravenous PRN Martinez Thakur MD        magnesium hydroxide (MILK OF MAGNESIA) 400 MG/5ML suspension 30 mL  30 mL Oral Daily PRN Martinez Thakur MD        ondansetron [facility]) injection 4 mg  4 mg Intravenous Q6H PRN Martinez Thakur MD        acetaminophen (TYLENOL) tablet 650 mg  650 mg Oral Q4H PRN Martinez Thakur MD        warfarin (COUMADIN) daily dosing (placeholder)   Other RX Placeholder Martinez Thakur MD        miconazole (MICOTIN) 2 % powder   Topical BID Martinez Thakur MD        warfarin (COUMADIN) tablet 3 mg  3 mg Oral Daily Lady Ceja MD        0.9 % sodium chloride infusion   Intravenous Continuous JOEY Abdullahi - CNP 75 mL/hr at 09/24/18 1154      aspirin chewable tablet 324 mg  324 mg Oral Once [facility]           Allergies:     No Known Allergies    Social History:     Social History     Social History    Marital status:      Spouse name: N/A    Number of children: N/A    Years of education: N/A     Occupational History    Not on file.      Social History Main Topics    Smoking status: Never Smoker    Smokeless tobacco: Never Used    Alcohol use No    Drug use: No    Sexual activity: Not on file     Other Topics Concern    Not on file     Social History Narrative    No narrative on file     History   Drug Use No     History   Alcohol Use No     History   Sexual Activity    Sexual activity: Not on file     History   Smoking Status    Never Smoker   Smokeless Tobacco    Never Used       Family History:     Family

## 2018-09-24 NOTE — CONSULTS
Nutrition Assessment    Type and Reason for Visit: Initial, Consult    Nutrition Recommendations:   · Continue clear liquids, advance as tolerated per MD  · Monitor and encourage PO intake  · Monitor need for ongoing education  · Monitor nutrition adequacy, weights, pertinent labs, BMs and clinical progress    Malnutrition Assessment:  · Malnutrition Status: No malnutrition    Nutrition Diagnosis:   · Problem: Inadequate oral intake  · Etiology: related to Early satiety     Signs and symptoms:  as evidenced by Diet history of poor intake, Patient report of, Intake 0-25% (clear liquids)    Nutrition Assessment:  · Subjective Assessment: Consult received for BMI. Patient is a 79 y.o. male admitted for sepsis. PMHx of kidney cancer and HTN. BMI is 67, CBW is 376 lb, no recent weights per EMR. Currently on clear liquids with carb control. Pt seen in room this date, wife at bedside. Pt reports decreased appetite the last few days. Denies N/V/D. Normal BMs. Encouraged clear liquids as able to promote adequate intake. Pt reports trying to lose weight for upcoming gallbladder surgery. He has been following a general healthy diet, educated on general healthful nutrition, low fat and warfarin education. Pt and wife verbalized understanding. No nutrition related questions at this time.   · Nutrition-Focused Physical Findings: open wounds- abdomen  · Wound Type: Open Wounds  · Current Nutrition Therapies:  · Oral Diet Orders: Clear Liquid, Carb Control 5 Carbs/Meal   · Oral Diet intake: Unable to assess  · Oral Nutrition Supplement (ONS) Orders: None  · Anthropometric Measures:  · Ht: 5' 4\" (162.6 cm)   · Current Body Wt: 376 lb (170.6 kg)  · Ideal Body Wt: 130 lb (59 kg)  · BMI Classification: BMI > or equal to 40.0 Obese Class III  · Comparative Standards (Estimated Nutrition Needs):  · Estimated Daily Total Kcal: 4154-3498  · Estimated Daily Protein (g): 71-83    Estimated Intake vs Estimated Needs: Intake Meets

## 2018-09-24 NOTE — CONSULTS
eval as outpt. R>>B for cath lab at this time  3. Anticoagulation per Hospital medicine  4. Replete K to >4  5. After acute medical issues resolved, suggest pt undergo eval and tx for severe lymphedema          Thank you for allowing us to participate in the care of Delia Cantu. Please call me with any questions [phone]. Brandon Boston MD, [address] [facility] Cardiologist  [place] [address]  [phone] [facility]  [phone] [address] [place] [address]  9/24/2018 10:43 AM    I will address the patient's cardiac risk factors and adjusted pharmacologic treatment as needed. In addition, I have reinforced the need for patient directed risk factor modification. Tobacco use was discussed with the patient and educated on the negative effects and was asked not to use. All questions and concerns were addressed to the patient/family. Alternatives to my treatment were discussed. The note was completed using EMR. Every effort was made to ensure accuracy; however, inadvertent computerized transcription errors may be present.

## 2018-09-24 NOTE — CARE COORDINATION
[facility]    Referral received from CM. Writer made aware pt's wife might want to use another home care agency. Will follow until wife has decided.     Abiola Cooley RN CTN with Kathi [address]  AOZ:[phone]

## 2018-09-24 NOTE — PROGRESS NOTES
Dr. Nadege Valenzuela notified that Radiology called and stated that pt's CT scan needed to be evaluated by Hospitalist, Mainly impression #1.

## 2018-09-24 NOTE — ED PROVIDER NOTES
Medications   Medication Dose Route Frequency Provider Last Rate Last Dose    0.9 % sodium chloride bolus  30 mL/kg Intravenous Once Eddie Nunez        acetaminophen (TYLENOL) tablet 650 mg  650 mg Oral Once Eddie Nunez         Current Outpatient Prescriptions   Medication Sig Dispense Refill    lisinopril-hydrochlorothiazide (PRINZIDE;ZESTORETIC) 20-25 MG per tablet Take by mouth      pantoprazole (PROTONIX) 40 MG tablet TAKE 1 TABLET BY MOUTH TWO TIMES A DAY  3    warfarin (COUMADIN) 5 MG tablet        No Known Allergies    REVIEW OF SYSTEMS  10 systems reviewed, pertinent positives per HPI otherwise noted to be negative    PHYSICAL EXAM  /63   Pulse 130   Temp 102.9 °F (39.4 °C) (Oral)   Resp (!) 31   Ht 5' 1\" (1.549 m)   Wt (!) 401 lb (181.9 kg)   SpO2 98%   BMI 75.77 kg/m²   GENERAL APPEARANCE: Awake and alert. Cooperative. No acute distress. HEAD: Normocephalic. Atraumatic. EYES: PERRL. EOM's grossly intact. ENT: Mucous membranes are moist.   NECK: Supple. No JVD. No tracheal tenderness or deviation. No crepitus. HEART: RRR. No murmurs. No chest wall tenderness. LUNGS: Respirations unlabored. CTAB. Good air exchange. Diminished lung sounds at bases. Rhonchi without wheezing or rales. ABDOMEN: Soft. Non-distended. Non-tender. No guarding or rebound. Extremities large body habitus without tenderness. No obvious midline pulsatile mass. EXTREMITIES: No peripheral edema. Patient has what appears to be chronic lymphedema. Without Erythema. No Swelling. No Palpable Cords or Masses. No Significant Pitting Edema. Moves all extremities equally. All extremities neurovascularly intact. SKIN: Warm and dry. No acute rashes. NEUROLOGICAL: Alert and oriented. CN's 2-12 intact. No gross facial drooping. Strength 5/5, sensation intact. PSYCHIATRIC: Normal mood and affect.     RADIOLOGY  Xr Chest Portable    Result Date: 9/23/2018  EXAMINATION: SINGLE XRAY VIEW OF THE

## 2018-09-24 NOTE — CONSULTS
(1.626 m)   Wt (!) 376 lb 1.6 oz (170.6 kg) Comment: minus 3.1lbs for sling  SpO2 97%   BMI 64.56 kg/m²    Physical Exam   Constitutional: He appears well-developed and well-nourished. No distress. HENT:   Head: Normocephalic and atraumatic. Mouth/Throat: Oropharynx is clear and moist. No oropharyngeal exudate. Eyes: Pupils are equal, round, and reactive to light. EOM are normal.   Neck: Neck supple. No JVD present. Cardiovascular: Normal heart sounds. Exam reveals no gallop and no friction rub. No murmur heard. Pulmonary/Chest: Effort normal. He has no wheezes. He has rales. Equal chest rise and expansion bilaterally   Abdominal: Soft. Bowel sounds are normal. He exhibits no distension. There is no tenderness. hernia   Musculoskeletal: Normal range of motion. He exhibits edema. Lymphadenopathy:     He has no cervical adenopathy. Neurological: He is alert. No cranial nerve deficit. CN 2-12 grossly intact   Skin: Skin is warm and dry. No rash noted. He is not diaphoretic. Data Reviewed:   LABS:  CBC:   Recent Labs      09/23/18 2113 09/24/18   0525   WBC  11.5*   --    HGB  7.9*  7.1*   HCT  26.4*  23.6*   MCV  70.2*   --    PLT  285   --      BMP:   Recent Labs      09/23/18 2113   NA  132*   K  3.1*   CL  94*   CO2  26   BUN  22*   CREATININE  1.4*     LIVER PROFILE:   Recent Labs      09/23/18 2113 09/24/18   0525   AST  14*  13*   ALT  10  9*   LIPASE  12.0*   --    BILIDIR   --   0.5*   BILITOT  1.6*  1.2*   ALKPHOS  92  77     PT/INR:   Recent Labs      09/23/18 2113 09/24/18   0525   PROTIME  22.1*  23.9*   INR  1.94*  2.10*     APTT: No results for input(s): APTT in the last 72 hours.   UA:  Recent Labs      09/23/18 2147   COLORU  Yellow   PHUR  5.5   WBCUA  6-10*   RBCUA  None seen   BACTERIA  Rare*   CLARITYU  Clear   SPECGRAV  1.015   LEUKOCYTESUR  Negative   UROBILINOGEN  0.2   BILIRUBINUR  Negative   BLOODU  SMALL*   GLUCOSEU  Negative     No results for input(s): PHART, TOB2AZT, PO2ART in the last 72 hours. CT chest personally reviewed, moderate bilateral pleural effusions. Some minor areas of basilar atelectasis but no focal infiltrate           Assessment:     1. Acute respiratory failure, hypoxic   -volume overload/pleural effusions  2. Sepsis of unclear source   -possible biliary infection, less likely pneumonia  3. Chronic anticoagulation for prior DVTs  4. Suspected acute CHF  5. KEVIN with hyponatremia/hypokalemia    Plan:      -Based on imaging I am less suspicious for an acute respiratory infection, would still continue empiric antibiotics with cefepime until the abdominal CT findings can be clarified and cultures return. Surgery was consulted to assist with this  -Check echo given elevated BNP and pleural effusions. The effusions are bilateral and symmetric and he is on warfarin so will hold off on pleural intervention for now and follow serial chest imaging with plan to pursue thoracentesis if worsening  -Trend renal function and electrolytes  -Support respiratory status with supplemental oxygen, minimize fluid intake since he is hemodynamically stable and lactate normalized.      Will follow, thank you for this consultation    Alberto Bashir MD

## 2018-09-25 ENCOUNTER — APPOINTMENT (OUTPATIENT)
Dept: GENERAL RADIOLOGY | Age: 68
DRG: 871 | End: 2018-09-25
Payer: COMMERCIAL

## 2018-09-25 ENCOUNTER — APPOINTMENT (OUTPATIENT)
Dept: ULTRASOUND IMAGING | Age: 68
DRG: 871 | End: 2018-09-25
Payer: COMMERCIAL

## 2018-09-25 DIAGNOSIS — R77.8 ELEVATED TROPONIN: Primary | ICD-10-CM

## 2018-09-25 LAB
ALBUMIN SERPL-MCNC: 2.4 G/DL (ref 3.4–5)
ALP BLD-CCNC: 82 U/L (ref 40–129)
ALT SERPL-CCNC: 10 U/L (ref 10–40)
ANION GAP SERPL CALCULATED.3IONS-SCNC: 8 MMOL/L (ref 3–16)
AST SERPL-CCNC: 15 U/L (ref 15–37)
BASOPHILS ABSOLUTE: 0.1 K/UL (ref 0–0.2)
BASOPHILS RELATIVE PERCENT: 0.5 %
BILIRUB SERPL-MCNC: 0.8 MG/DL (ref 0–1)
BILIRUBIN DIRECT: 0.4 MG/DL (ref 0–0.3)
BILIRUBIN, INDIRECT: 0.4 MG/DL (ref 0–1)
BLOOD BANK DISPENSE STATUS: NORMAL
BLOOD BANK DISPENSE STATUS: NORMAL
BLOOD BANK PRODUCT CODE: NORMAL
BLOOD BANK PRODUCT CODE: NORMAL
BPU ID: NORMAL
BPU ID: NORMAL
BUN BLDV-MCNC: 30 MG/DL (ref 7–20)
CALCIUM SERPL-MCNC: 7.6 MG/DL (ref 8.3–10.6)
CHLORIDE BLD-SCNC: 102 MMOL/L (ref 99–110)
CO2: 28 MMOL/L (ref 21–32)
CREAT SERPL-MCNC: 1.8 MG/DL (ref 0.8–1.3)
DESCRIPTION BLOOD BANK: NORMAL
DESCRIPTION BLOOD BANK: NORMAL
EOSINOPHILS ABSOLUTE: 0.1 K/UL (ref 0–0.6)
EOSINOPHILS RELATIVE PERCENT: 0.3 %
GFR AFRICAN AMERICAN: 46
GFR NON-AFRICAN AMERICAN: 38
GLUCOSE BLD-MCNC: 94 MG/DL (ref 70–99)
HCT VFR BLD CALC: 22.6 % (ref 40.5–52.5)
HEMOGLOBIN: 6.8 G/DL (ref 13.5–17.5)
INR BLD: 2.93 (ref 0.86–1.14)
LYMPHOCYTES ABSOLUTE: 0.4 K/UL (ref 1–5.1)
LYMPHOCYTES RELATIVE PERCENT: 2.3 %
MCH RBC QN AUTO: 21.4 PG (ref 26–34)
MCHC RBC AUTO-ENTMCNC: 30 G/DL (ref 31–36)
MCV RBC AUTO: 71.5 FL (ref 80–100)
MONOCYTES ABSOLUTE: 1.2 K/UL (ref 0–1.3)
MONOCYTES RELATIVE PERCENT: 7.8 %
MRSA SCREEN RT-PCR: NORMAL
NEUTROPHILS ABSOLUTE: 13.8 K/UL (ref 1.7–7.7)
NEUTROPHILS RELATIVE PERCENT: 89.1 %
PDW BLD-RTO: 22.5 % (ref 12.4–15.4)
PLATELET # BLD: 206 K/UL (ref 135–450)
PMV BLD AUTO: 8.8 FL (ref 5–10.5)
POTASSIUM SERPL-SCNC: 4.2 MMOL/L (ref 3.5–5.1)
PROTHROMBIN TIME: 33.4 SEC (ref 9.8–13)
RBC # BLD: 3.15 M/UL (ref 4.2–5.9)
SODIUM BLD-SCNC: 138 MMOL/L (ref 136–145)
SOLUBLE TRANSFERRIN RECEPT: 18 MG/L (ref 2.2–5)
TOTAL PROTEIN: 5.6 G/DL (ref 6.4–8.2)
URINE CULTURE, ROUTINE: NORMAL
WBC # BLD: 15.5 K/UL (ref 4–11)

## 2018-09-25 PROCEDURE — 85610 PROTHROMBIN TIME: CPT

## 2018-09-25 PROCEDURE — 99233 SBSQ HOSP IP/OBS HIGH 50: CPT | Performed by: INTERNAL MEDICINE

## 2018-09-25 PROCEDURE — 36415 COLL VENOUS BLD VENIPUNCTURE: CPT

## 2018-09-25 PROCEDURE — 86923 COMPATIBILITY TEST ELECTRIC: CPT

## 2018-09-25 PROCEDURE — 6370000000 HC RX 637 (ALT 250 FOR IP): Performed by: HOSPITALIST

## 2018-09-25 PROCEDURE — 36430 TRANSFUSION BLD/BLD COMPNT: CPT

## 2018-09-25 PROCEDURE — 6360000002 HC RX W HCPCS: Performed by: HOSPITALIST

## 2018-09-25 PROCEDURE — 80048 BASIC METABOLIC PNL TOTAL CA: CPT

## 2018-09-25 PROCEDURE — 2580000003 HC RX 258: Performed by: HOSPITALIST

## 2018-09-25 PROCEDURE — P9016 RBC LEUKOCYTES REDUCED: HCPCS

## 2018-09-25 PROCEDURE — 99232 SBSQ HOSP IP/OBS MODERATE 35: CPT | Performed by: SURGERY

## 2018-09-25 PROCEDURE — 2580000003 HC RX 258: Performed by: NURSE PRACTITIONER

## 2018-09-25 PROCEDURE — 76705 ECHO EXAM OF ABDOMEN: CPT

## 2018-09-25 PROCEDURE — 85025 COMPLETE CBC W/AUTO DIFF WBC: CPT

## 2018-09-25 PROCEDURE — 71045 X-RAY EXAM CHEST 1 VIEW: CPT

## 2018-09-25 PROCEDURE — 80076 HEPATIC FUNCTION PANEL: CPT

## 2018-09-25 PROCEDURE — 2060000000 HC ICU INTERMEDIATE R&B

## 2018-09-25 PROCEDURE — 94761 N-INVAS EAR/PLS OXIMETRY MLT: CPT

## 2018-09-25 PROCEDURE — 2700000000 HC OXYGEN THERAPY PER DAY

## 2018-09-25 RX ORDER — SODIUM CHLORIDE 9 MG/ML
INJECTION, SOLUTION INTRAVENOUS
Status: DISPENSED
Start: 2018-09-25 | End: 2018-09-26

## 2018-09-25 RX ORDER — 0.9 % SODIUM CHLORIDE 0.9 %
250 INTRAVENOUS SOLUTION INTRAVENOUS ONCE
Status: COMPLETED | OUTPATIENT
Start: 2018-09-25 | End: 2018-09-25

## 2018-09-25 RX ORDER — PANTOPRAZOLE SODIUM 40 MG/10ML
40 INJECTION, POWDER, LYOPHILIZED, FOR SOLUTION INTRAVENOUS 2 TIMES DAILY
Status: DISCONTINUED | OUTPATIENT
Start: 2018-09-25 | End: 2018-09-29

## 2018-09-25 RX ORDER — 0.9 % SODIUM CHLORIDE 0.9 %
1000 INTRAVENOUS SOLUTION INTRAVENOUS ONCE
Status: COMPLETED | OUTPATIENT
Start: 2018-09-25 | End: 2018-09-25

## 2018-09-25 RX ORDER — MIDODRINE HYDROCHLORIDE 5 MG/1
10 TABLET ORAL
Status: DISCONTINUED | OUTPATIENT
Start: 2018-09-25 | End: 2018-09-28

## 2018-09-25 RX ADMIN — SODIUM CHLORIDE 250 ML: 9 INJECTION, SOLUTION INTRAVENOUS at 08:00

## 2018-09-25 RX ADMIN — SODIUM CHLORIDE 1000 ML: 9 INJECTION, SOLUTION INTRAVENOUS at 03:58

## 2018-09-25 RX ADMIN — SODIUM CHLORIDE: 9 INJECTION, SOLUTION INTRAVENOUS at 03:01

## 2018-09-25 RX ADMIN — MICONAZOLE NITRATE: 2 POWDER TOPICAL at 21:00

## 2018-09-25 RX ADMIN — CALCIUM GLUCONATE 2 G: 98 INJECTION, SOLUTION INTRAVENOUS at 10:18

## 2018-09-25 RX ADMIN — PANTOPRAZOLE SODIUM 40 MG: 40 TABLET, DELAYED RELEASE ORAL at 11:20

## 2018-09-25 RX ADMIN — Medication 10 ML: at 11:20

## 2018-09-25 RX ADMIN — MICONAZOLE NITRATE: 2 POWDER TOPICAL at 10:18

## 2018-09-25 RX ADMIN — CEFEPIME HYDROCHLORIDE 2 G: 2 INJECTION, POWDER, FOR SOLUTION INTRAVENOUS at 03:01

## 2018-09-25 ASSESSMENT — PAIN SCALES - GENERAL
PAINLEVEL_OUTOF10: 0

## 2018-09-25 NOTE — CARE COORDINATION
[facility]    Referral received from CM, request for S3 Program.  I will continue to follow patient for needs, and arrange [facility] services prior to DC. Should pt dc over the weekend please fax facesheet, order, SOLITARIO, and H&P to [facility].    HOME HEALTH CARE: LEVEL 3 SAFETY     Home health agency to establish plan of care for patient over 60 day period   Nursing  Initial home SN evaluation visit to occur within 24-48 hours for:  medication management  VS and clinical assessment  S&S chronic disease exacerbation education + when to contact MD / NP  care coordination  Medication Reconciliation during 1st SN visit    PT/OT  Evaluations in home within 24-48 hours of discharge to include DME and home safety   Frontload therapy 5 days, then 3x a week   OT to evaluate if patient has [address] needs for personal care      evaluation within 24-48 hours to evaluate resources & insurance for potential [place], [place], LTC, and Medicaid options     PCP Visit scheduled within 3 - 7 days of hospital discharge        Binh Lockwood  [address] with [address]

## 2018-09-25 NOTE — PROGRESS NOTES
Occupational Therapy    Chart reviewed this date; pt on hold due to low H & H & getting blood transfusion.   Gen Foote

## 2018-09-25 NOTE — PROGRESS NOTES
ondansetron, acetaminophen      Intake/Output Summary (Last 24 hours) at 09/25/18 1736  Last data filed at 09/25/18 1717   Gross per 24 hour   Intake           4473.8 ml   Output              325 ml   Net           4148.8 ml       Physical Exam Performed:    /66   Pulse 89   Temp 99.5 °F (37.5 °C) (Oral)   Resp 14   Ht 5' 4\" (1.626 m)   Wt (!) 373 lb 14.4 oz (169.6 kg)   SpO2 98%   BMI 64.18 kg/m²     General appearance: No apparent distress, appears stated age and cooperative. HEENT: Pupils equal, round, and reactive to light. Conjunctivae/corneas clear. Neck: Supple, with full range of motion. No jugular venous distention. Trachea midline. Respiratory:  Normal respiratory effort. Decreased but clear, bilaterally without Rales/Wheezes/Rhonchi. On 2L. Cardiovascular: Regular rate and rhythm with normal S1/S2 without murmurs, rubs or gallops. Abdomen: Obese, soft, non-tender, non-distended with normal bowel sounds. Large ventral hernia noted. Musculoskeletal: No clubbing, cyanosis or edema bilaterally. Full range of motion without deformity. Limited mobility due to obesity. Skin: Skin color normal.  Severe lymphedema bilateral lower extremities. Neurologic:  Neurovascularly intact without any focal sensory/motor deficits. Cranial nerves: II-XII intact, grossly non-focal.  Psychiatric: Alert and oriented, thought content appropriate, normal insight  Capillary Refill: Brisk,< 3 seconds   Peripheral Pulses: +2 palpable, equal bilaterally       Labs:   Recent Labs      09/23/18 2113 09/24/18   1135  09/24/18   1746  09/25/18   0504   WBC  11.5*   --    --    --   15.5*   HGB  7.9*   < >  7.2*  7.4*  6.8*   HCT  26.4*   < >  23.9*  23.1*  22.6*   PLT  285   --    --    --   206    < > = values in this interval not displayed.      Recent Labs      09/23/18 2113 09/24/18   0525  09/25/18   0504   NA  132*  138  138   K  3.1*  3.4*  4.2   CL  94*  98*  102   CO2  26  28  28   BUN  22*  24*  30* good balance between the risk of bleeding and the risk of clotting.    - coumadin is the best drug of choice given morbid obesity. Elevated troponin and Pro-BNP. - likely due to sepsis. - ECHO completed - EF 55-60%.   Left ventricular diastolic filling pressure is normal.  - cardiology consulted - will need an outpatient stress test.    DVT Prophylaxis: warfarin  Diet: DIET LOW FAT;  Code Status: Full Code    PT/OT Eval Status: recommend SNF - patient and spouse prefer home care     Dispo - unclear - likely 2-3 days    JOEY Rubalcava - CNP

## 2018-09-25 NOTE — PROGRESS NOTES
 UTI (urinary tract infection)    Pulmonary vascular congestion on CXR    Sepsis ([address]    Hypomagnesemia    Hypokalemia    Hyponatremia    Hypochloremia    KEVIN (acute kidney injury) ([address]    Subacute microcytic anemia (July 2018)    Hx choledocholithiasis s/p ERCP (July 2018)    Acute hypoxemic respiratory failure (HCC)    Acute cholecystitis    Intertrigo    Lymphedema       ASSESSMENT AND PLAN    GI bleed:  -I agree with holding Coumadin for now  -Endoscopy did not reveal a definite source  -I do think he needs a colonoscopy discussed this with the patient and his wife    DVT ×2:  -Traditionally we continue anticoagulation for life  -Given that he is so sedentary, you can almost guarantee that he will have another blood clot. -We will also send a clotting workup, but this may not be back for about a week  -I think it is imperative that he has a full GI workup to make sure that we are not missing an area of bleed. -There is no good balance between bleeding and clotting in a patient with this type of morbidity. I explained this to the wife and patient at great length.  -Coumadin is still his safest drug.     Iron deficiency:  -He will need Venofer once he is not infected  -This will likely need to be completed as an outpatient    Shirline Frankel, MD  Please contact through [facility]

## 2018-09-25 NOTE — CONSULTS
[facility] Continence Nurse  Consult Note       NAME:  Eun Buckley  MEDICAL RECORD NUMBER:  [id number]  AGE: 79 y.o. GENDER: male  : 1950  TODAY'S DATE:  2018    Subjective   Reason for WOCN Evaluation and Assessment: Patient with multiple medical and skin issues. Wayne Dowling Sr. is a 79 y.o. male referred by:   [x] Physician  [x] Nursing  [] Other:     Wound Identification:  Wound Type: venous, lymphedema and Perirectal venous congestion from sitting most of time. Contributing Factors: edema, venous stasis, lymphedema, chronic pressure, decreased mobility, shear force, obesity, decreased tissue oxygenation, anticoagulation therapy, incontinence of stool, incontinence of urine and ventral hernia,cholecystitis, anemia    Wound History: Per wife, patient has had BLE venous stasis ulcerations that are currently resolved. Lymphedema - has not had treatment. Small scabbed areas,< 2cm diameter on abdomen/herniated area which appear sporadically, scab, and resolve. Perirectal and perineal areas are intact, purple, and blanchable. Most likely due to venous congestion/pooling as patient sits on this area majority of time. Abdominal pannus and groin folds with intact skin. Current Wound Care Treatment: BLE elevation with heels off-loaded. Foam dressings to open areas on Abdomen. Moisture barrier wipes and ointment to perirectal and perineal areas. InterDryAg and Nystatin to groin and pannus folds. Discussed importance of turning and repositioning off of sacral area, wedge positioner in room. Use of danielle Lift to position patient in bed. Pressure Injury Prevention and wound treatments in place per Protocols.     Patient Goal of Care:  [x] Wound Healing  [] Odor Control  [] Palliative Care  [] Pain Control   [x] Other: Maintenance of skin integrity, wound prevention        PAST MEDICAL HISTORY        Diagnosis Date    Cancer [facility])     kidney    Edema of both legs     Hx of blood with BMI of 60.0-69.9, adult ([facility] E66.01, Z68.44    Anticoagulation goal of INR 1.5 to 2.5 Z51.81, Z79.01    Benign hypertension I10    Hx GI bleed due to duodenal ulcer (Aug 2018) K92.2    History of DVT (deep vein thrombosis) Z86.718    Ventral hernia K43.9    Elevated brain natriuretic peptide (BNP) level R79.89    Elevated troponin R74.8    UTI (urinary tract infection) N39.0    Pulmonary vascular congestion on CXR R09.89    Sepsis (HCC) A41.9    Hypomagnesemia E83.42    Hypokalemia E87.6    Hyponatremia E87.1    Hypochloremia E87.8    KEVIN (acute kidney injury) ([facility] N17.9    Subacute microcytic anemia (July 2018) D50.9    Hx choledocholithiasis s/p ERCP (July 2018) K80.50    Acute hypoxemic respiratory failure (HCC) J96.01    Acute cholecystitis K81.0    Intertrigo L30.4    Lymphedema I89.0       Measurements:  Wound 09/24/18 Buttocks Left;Right DTI to bilat buttocks (Active)   Wound Image   9/25/2018  1:15 PM   Wound Type Wound 9/25/2018  1:15 PM   Wound Other 9/25/2018  1:15 PM   Dressing Status Other (Comment) 9/25/2018  1:15 PM   Dressing Changed Changed/New 9/24/2018  2:23 AM   Dressing/Treatment Foam 9/25/2018 12:05 PM   Wound Cleansed Wound cleanser 9/25/2018  1:15 PM   Wound Assessment Intact; Other (Comment) 9/25/2018  1:15 PM   Drainage Amount None 9/25/2018  1:15 PM   Jenna-wound Assessment Intact 9/25/2018  1:15 PM   Purple%Wound Bed 100 9/25/2018  1:15 PM   Number of days: 1       Wound 09/24/18 Other (Comment) Abdomen Left open wounds to abd. hernia area  (Active)   Wound Type Wound 9/25/2018 12:05 PM   Dressing Status Clean;Dry; Intact 9/25/2018 12:05 PM   Dressing Changed Changed/New 9/24/2018  2:23 AM   Dressing/Treatment Foam 9/25/2018 12:05 PM   Wound Cleansed Rinsed/Irrigated with saline 9/24/2018  2:23 AM   Number of days: 1       Response to treatment:  Well tolerated by patient.          Plan   Plan of Care: Wound 09/24/18 Buttocks Left;Right DTI to alma

## 2018-09-26 LAB
ALBUMIN SERPL-MCNC: 2.4 G/DL (ref 3.4–5)
ALP BLD-CCNC: 91 U/L (ref 40–129)
ALT SERPL-CCNC: 11 U/L (ref 10–40)
ANION GAP SERPL CALCULATED.3IONS-SCNC: 8 MMOL/L (ref 3–16)
AST SERPL-CCNC: 12 U/L (ref 15–37)
BASOPHILS ABSOLUTE: 0 K/UL (ref 0–0.2)
BASOPHILS RELATIVE PERCENT: 0.3 %
BETA-2 GLYCOPROTEIN 1 IGG ANTIBODY: 0 SGU (ref 0–20)
BETA-2 GLYCOPROTEIN 1 IGM ANTIBODY: 1 SMU (ref 0–20)
BILIRUB SERPL-MCNC: 0.5 MG/DL (ref 0–1)
BILIRUBIN DIRECT: 0.3 MG/DL (ref 0–0.3)
BILIRUBIN, INDIRECT: 0.2 MG/DL (ref 0–1)
BUN BLDV-MCNC: 32 MG/DL (ref 7–20)
CALCIUM SERPL-MCNC: 7.9 MG/DL (ref 8.3–10.6)
CHLORIDE BLD-SCNC: 101 MMOL/L (ref 99–110)
CO2: 28 MMOL/L (ref 21–32)
CREAT SERPL-MCNC: 1.7 MG/DL (ref 0.8–1.3)
EOSINOPHILS ABSOLUTE: 0.2 K/UL (ref 0–0.6)
EOSINOPHILS RELATIVE PERCENT: 2.1 %
FACTOR V LEIDEN: NEGATIVE
GFR AFRICAN AMERICAN: 49
GFR NON-AFRICAN AMERICAN: 40
GLUCOSE BLD-MCNC: 102 MG/DL (ref 70–99)
HCT VFR BLD CALC: 25.3 % (ref 40.5–52.5)
HCT VFR BLD CALC: 27 % (ref 40.5–52.5)
HCT VFR BLD CALC: 27.4 % (ref 40.5–52.5)
HEMOGLOBIN: 7.9 G/DL (ref 13.5–17.5)
HEMOGLOBIN: 8.1 G/DL (ref 13.5–17.5)
HEMOGLOBIN: 8.1 G/DL (ref 13.5–17.5)
INR BLD: 2.96 (ref 0.86–1.14)
LYMPHOCYTES ABSOLUTE: 0.4 K/UL (ref 1–5.1)
LYMPHOCYTES RELATIVE PERCENT: 3.8 %
MAGNESIUM: 1.9 MG/DL (ref 1.8–2.4)
MCH RBC QN AUTO: 21.8 PG (ref 26–34)
MCHC RBC AUTO-ENTMCNC: 29.9 G/DL (ref 31–36)
MCV RBC AUTO: 72.9 FL (ref 80–100)
MONOCYTES ABSOLUTE: 1 K/UL (ref 0–1.3)
MONOCYTES RELATIVE PERCENT: 10.2 %
NEUTROPHILS ABSOLUTE: 8.2 K/UL (ref 1.7–7.7)
NEUTROPHILS RELATIVE PERCENT: 83.6 %
PDW BLD-RTO: 22.5 % (ref 12.4–15.4)
PHOSPHATIDYLSERINE IGA ANTIBODY: 3 U/ML (ref 0–19)
PHOSPHATIDYLSERINE IGG ANTIBODY: 3 U/ML (ref 0–10)
PHOSPHATIDYLSERINE IGM ANTIBODY: 4 U/ML (ref 0–24)
PHOSPHORUS: 3.8 MG/DL (ref 2.5–4.9)
PLATELET # BLD: 215 K/UL (ref 135–450)
PMV BLD AUTO: 9.2 FL (ref 5–10.5)
POTASSIUM SERPL-SCNC: 4.2 MMOL/L (ref 3.5–5.1)
PROTEIN S ANTIGEN, TOTAL: 77 % (ref 84–134)
PROTHROMBIN TIME: 33.8 SEC (ref 9.8–13)
RBC # BLD: 3.71 M/UL (ref 4.2–5.9)
SODIUM BLD-SCNC: 137 MMOL/L (ref 136–145)
SPECIMEN: NORMAL
TOTAL PROTEIN: 5.8 G/DL (ref 6.4–8.2)
WBC # BLD: 9.8 K/UL (ref 4–11)

## 2018-09-26 PROCEDURE — 6370000000 HC RX 637 (ALT 250 FOR IP): Performed by: INTERNAL MEDICINE

## 2018-09-26 PROCEDURE — 99233 SBSQ HOSP IP/OBS HIGH 50: CPT | Performed by: INTERNAL MEDICINE

## 2018-09-26 PROCEDURE — 85014 HEMATOCRIT: CPT

## 2018-09-26 PROCEDURE — 97530 THERAPEUTIC ACTIVITIES: CPT

## 2018-09-26 PROCEDURE — 85025 COMPLETE CBC W/AUTO DIFF WBC: CPT

## 2018-09-26 PROCEDURE — C9113 INJ PANTOPRAZOLE SODIUM, VIA: HCPCS | Performed by: NURSE PRACTITIONER

## 2018-09-26 PROCEDURE — 2060000000 HC ICU INTERMEDIATE R&B

## 2018-09-26 PROCEDURE — 85610 PROTHROMBIN TIME: CPT

## 2018-09-26 PROCEDURE — 6360000002 HC RX W HCPCS: Performed by: NURSE PRACTITIONER

## 2018-09-26 PROCEDURE — 2700000000 HC OXYGEN THERAPY PER DAY

## 2018-09-26 PROCEDURE — 80076 HEPATIC FUNCTION PANEL: CPT

## 2018-09-26 PROCEDURE — 97110 THERAPEUTIC EXERCISES: CPT

## 2018-09-26 PROCEDURE — 80048 BASIC METABOLIC PNL TOTAL CA: CPT

## 2018-09-26 PROCEDURE — 6360000002 HC RX W HCPCS: Performed by: HOSPITALIST

## 2018-09-26 PROCEDURE — 6370000000 HC RX 637 (ALT 250 FOR IP): Performed by: NURSE PRACTITIONER

## 2018-09-26 PROCEDURE — G8987 SELF CARE CURRENT STATUS: HCPCS

## 2018-09-26 PROCEDURE — 99232 SBSQ HOSP IP/OBS MODERATE 35: CPT | Performed by: SURGERY

## 2018-09-26 PROCEDURE — 36415 COLL VENOUS BLD VENIPUNCTURE: CPT

## 2018-09-26 PROCEDURE — 85018 HEMOGLOBIN: CPT

## 2018-09-26 PROCEDURE — 94761 N-INVAS EAR/PLS OXIMETRY MLT: CPT

## 2018-09-26 PROCEDURE — 2580000003 HC RX 258: Performed by: HOSPITALIST

## 2018-09-26 PROCEDURE — 83735 ASSAY OF MAGNESIUM: CPT

## 2018-09-26 PROCEDURE — 84100 ASSAY OF PHOSPHORUS: CPT

## 2018-09-26 RX ORDER — FOLIC ACID 1 MG/1
1 TABLET ORAL DAILY
Status: DISCONTINUED | OUTPATIENT
Start: 2018-09-26 | End: 2018-10-01 | Stop reason: HOSPADM

## 2018-09-26 RX ADMIN — MIDODRINE HYDROCHLORIDE 10 MG: 5 TABLET ORAL at 16:44

## 2018-09-26 RX ADMIN — MICONAZOLE NITRATE: 2 POWDER TOPICAL at 10:21

## 2018-09-26 RX ADMIN — PANTOPRAZOLE SODIUM 40 MG: 40 INJECTION, POWDER, FOR SOLUTION INTRAVENOUS at 10:20

## 2018-09-26 RX ADMIN — Medication 10 ML: at 10:20

## 2018-09-26 RX ADMIN — MICONAZOLE NITRATE: 2 POWDER TOPICAL at 21:01

## 2018-09-26 RX ADMIN — PANTOPRAZOLE SODIUM 40 MG: 40 INJECTION, POWDER, FOR SOLUTION INTRAVENOUS at 20:50

## 2018-09-26 RX ADMIN — MIDODRINE HYDROCHLORIDE 10 MG: 5 TABLET ORAL at 08:35

## 2018-09-26 RX ADMIN — CEFEPIME HYDROCHLORIDE 2 G: 2 INJECTION, POWDER, FOR SOLUTION INTRAVENOUS at 14:34

## 2018-09-26 RX ADMIN — Medication 10 ML: at 20:50

## 2018-09-26 RX ADMIN — FOLIC ACID 1 MG: 1 TABLET ORAL at 10:20

## 2018-09-26 RX ADMIN — CEFEPIME HYDROCHLORIDE 2 G: 2 INJECTION, POWDER, FOR SOLUTION INTRAVENOUS at 20:50

## 2018-09-26 ASSESSMENT — PAIN SCALES - GENERAL: PAINLEVEL_OUTOF10: 0

## 2018-09-26 NOTE — PROGRESS NOTES
lymphadenopathy  LUNGS:  No increased work of breathing, good air exchange, clear to auscultation bilaterally, no crackles or wheezing  CARDIOVASCULAR:  , regular rate and rhythm, normal S1 and S2, no S3 or S4, and no murmur noted  ABDOMEN:  No scars, normal bowel sounds, soft, non-distended, non-tender, no masses palpated, no hepatosplenomegally  MUSCULOSKELETAL:  There is no redness, warmth, or swelling of the joints. EXTREMETIES: No clubbing cynosis or edema  NEUROLOGIC:  Awake, alert, oriented to name, place and time. Cranial nerves II-XII are grossly intact. Motor is 5 out of 5 bilaterally. SKIN:  no bruising or bleeding      Data      Recent Labs      09/23/18   2113   09/24/18   1746  09/25/18   0504  09/26/18   0507   WBC  11.5*   --    --   15.5*  9.8   HGB  7.9*   < >  7.4*  6.8*  8.1*   HCT  26.4*   < >  23.1*  22.6*  27.0*   PLT  285   --    --   206  215   MCV  70.2*   --    --   71.5*  72.9*    < > = values in this interval not displayed.         Recent Labs      09/24/18   0525  09/25/18   0504  09/26/18   0508   NA  138  138  137   K  3.4*  4.2  4.2   CL  98*  102  101   CO2  28  28  28   PHOS   --    --   3.8   BUN  24*  30*  32*   CREATININE  1.4*  1.8*  1.7*     Recent Labs      09/24/18   0525  09/25/18   0504  09/26/18   0508   AST  13*  15  12*   ALT  9*  10  11   BILIDIR  0.5*  0.4*  0.3   BILITOT  1.2*  0.8  0.5   ALKPHOS  77  82  91       Magnesium:    Lab Results   Component Value Date    MG 1.90 09/26/2018    MG 1.30 09/23/2018         Problem List  Patient Active Problem List   Diagnosis    Morbid obesity with BMI of 60.0-69.9, adult ([facility]    Anticoagulation goal of INR 1.5 to 2.5    Benign hypertension    Hx GI bleed due to duodenal ulcer (Aug 2018)    History of DVT (deep vein thrombosis)    Ventral hernia    Elevated brain natriuretic peptide (BNP) level    Elevated troponin    UTI (urinary tract infection)    Pulmonary vascular congestion on CXR    Sepsis ([address]   

## 2018-09-26 NOTE — PROGRESS NOTES
Progress Note    HISTORY     CC:  Fever      Subjective/     HPI:  S/p blood transfusion. Started on midodrine. BP stable. He seems to be doing better. He was able to eat. Urine output picked up and his Scr was 1.7.      ROS:  Constitutional:  No fevers, No Chills, + weakness  Cardiovascular:  No palpations, + edema  Respiratory:  No wheezing, no cough  Skin:  No rash, no itching  :  No hematuria, no dysuria, has devine and urine volume is picking up    Social Hx: Wife at bedside     Past Medical and Surgical History:  - Reviewed, no changes     EXAM       Objective/     Vitals:    09/26/18 0100 09/26/18 0450 09/26/18 0831 09/26/18 1328   BP: 102/61 118/72 105/62 104/65   Pulse: 95 92 87 76   Resp:  16 16 16   Temp:  98 °F (36.7 °C) 98.7 °F (37.1 °C) 98.5 °F (36.9 °C)   TempSrc:  Oral Oral Oral   SpO2:  97% 96% 97%   Weight:  (!) 381 lb 11.2 oz (173.1 kg)     Height:         24HR INTAKE/OUTPUT:    Intake/Output Summary (Last 24 hours) at 09/26/18 1519  Last data filed at 09/26/18 1410   Gross per 24 hour   Intake             1340 ml   Output             1025 ml   Net              315 ml     Constitutional:  Alert, awake, no apparent distress  Eyes:  Pupils reactive, sclera clear   Neck:  Normal thyroid, no masses   Cardiovascular:  Regular, no rub  Respiratory:  No distress, no wheezing  Psychiatry:  Appropriate mood/affect, alert  Abdomen: +bs, soft, nt, no masses   Musculoskeletal: ++ LE lymphedema, no clubbing   Lymphatics:  No LAD in neck, no supraclavicular nodes       MEDICAL DECISION MAKING       Data/  Recent Labs      09/23/18   2113   09/24/18   1746  09/25/18   0504  09/26/18   0507   WBC  11.5*   --    --   15.5*  9.8   HGB  7.9*   < >  7.4*  6.8*  8.1*   HCT  26.4*   < >  23.1*  22.6*  27.0*   MCV  70.2*   --    --   71.5*  72.9*   PLT  285   --    --   206  215    < > = values in this interval not displayed.      Recent Labs      09/23/18   2113  09/24/18   0525  09/25/18   4332

## 2018-09-26 NOTE — PROGRESS NOTES
09/25/18   0504  09/26/18   0507   WBC  11.5*   --    --   15.5*  9.8   HGB  7.9*   < >  7.4*  6.8*  8.1*   HCT  26.4*   < >  23.1*  22.6*  27.0*   MCV  70.2*   --    --   71.5*  72.9*   PLT  285   --    --   206  215    < > = values in this interval not displayed. BMP:   Recent Labs      09/24/18   0525  09/25/18   0504  09/26/18   0508   NA  138  138  137   K  3.4*  4.2  4.2   CL  98*  102  101   CO2  28  28  28   PHOS   --    --   3.8   BUN  24*  30*  32*   CREATININE  1.4*  1.8*  1.7*     LIVER PROFILE:   Recent Labs      09/23/18 2113 09/24/18   0525  09/25/18   0504  09/26/18   0508   AST  14*  13*  15  12*   ALT  10  9*  10  11   LIPASE  12.0*   --    --    --    BILIDIR   --   0.5*  0.4*  0.3   BILITOT  1.6*  1.2*  0.8  0.5   ALKPHOS  92  77  82  91     PT/INR:   Recent Labs      09/24/18   0955  09/25/18   0504  09/26/18   0508   PROTIME  24.9*  33.4*  33.8*   INR  2.18*  2.93*  2.96*     APTT: No results for input(s): APTT in the last 72 hours. UA:  Recent Labs      09/23/18 2147   COLORU  Yellow   PHUR  5.5   WBCUA  6-10*   RBCUA  None seen   BACTERIA  Rare*   CLARITYU  Clear   SPECGRAV  1.015   LEUKOCYTESUR  Negative   UROBILINOGEN  0.2   BILIRUBINUR  Negative   BLOODU  SMALL*   GLUCOSEU  Negative     No results for input(s): PHART, XPB2EBG, PO2ART in the last 72 hours. CXR personally reviewed, increased bilateral effusions and congestion          Assessment:     1. Acute respiratory failure, hypoxic   -mild pulm edema and some basilar atelectasis  2. Sepsis of unclear source              -possible biliary infection, less likely pneumonia  3. Chronic anticoagulation for prior DVTs  4. Suspected acute CHF  5. KEVIN improved  6. Anasarca due to hypoalbuminema/low oncotic pressure   7. Acute on chronic anemia, microcytic    Plan:      -Fortunately for his respiratory status fluid seems to be third spacing in his extremities instead of his lungs, unchanged oxygen requirements.  Anticipate

## 2018-09-26 NOTE — PROGRESS NOTES
Hospitalist Progress Note      PCP: Marco Matias MD    Date of Admission: 9/23/2018    Chief Complaint: Fever, SOB    Hospital Course: 79 y.o. male with a hx of morbid obesity, recent choledocholithiasis s/p ERCP in July 2018, with plans (per Dr. Stella Cummings) for a cholecystectomy, which was apparently delayed by a recent bout of PNA as well as a GI bleed in Aug 2018 (EGD reportedly showed a duodenal ulcer), as well as a hx of a large ventral hernia, DVT, anticoagulated on Coumadin (apparent goal 1.5-2.5), and other comorbidities, who presents to the [facility], apparently from home, via EMS, due to a sudden-onset high fever (his wife said to Magdi SPEAR), as well as SOB. EMS reportedly found him to be hypoxemic to 74% and placed him on supplemental O2 via NC. When he arrived to the ED he was satting 84%. Fever was 102.9 F. He was tachypneic to 25 and tachycardic to 139. He had mild leukocytosis to 11,500. CXR was read as pulmonary vascular congestion. UA was positive for a UTI. Assuming an infection, he met severe sepsis criteria. Other labs showed elevated troponin, elevated BNP, hypomagnesemia, hypokalemia, hyponatremia, hypochloremia, and apparent KEVIN. CT c/a/p without IV contrast, which showed apparent acute (or perhaps unresolved) cholecystitis, with a large gallstone in the neck of the gallbladder, as well as small bilateral pleural effusions. Subjective: No real complaints. BP still a little soft. S/p blood transfusion. Cr 1.7.  RA sat 87%. WBC normalized. Afebrile.     Medications:  Reviewed    Infusion Medications     Scheduled Medications    folic acid  1 mg Oral Daily    pantoprazole  40 mg Intravenous BID    midodrine  10 mg Oral TID WC    cefepime  2 g Intravenous Q12H    sodium chloride flush  10 mL Intravenous 2 times per day    warfarin (COUMADIN) daily dosing (placeholder)   Other RX Placeholder    miconazole   Topical BID    aspirin  324 mg Oral Once     PRN Meds: sodium chloride 28  28  28   BUN  24*  30*  32*   CREATININE  1.4*  1.8*  1.7*   CALCIUM  7.9*  7.6*  7.9*   PHOS   --    --   3.8     Recent Labs      09/24/18   0525  09/25/18   0504  09/26/18   0508   AST  13*  15  12*   ALT  9*  10  11   BILIDIR  0.5*  0.4*  0.3   BILITOT  1.2*  0.8  0.5   ALKPHOS  77  82  91     Recent Labs      09/24/18   0955  09/25/18   0504  09/26/18   0508   INR  2.18*  2.93*  2.96*     Recent Labs      09/24/18   0525  09/24/18   0955  09/24/18   1135   CKTOTAL  49  47  44   TROPONINI  0.05*  0.04*  0.03*       Urinalysis:      Lab Results   Component Value Date    NITRU Negative 09/23/2018    WBCUA 6-10 09/23/2018    BACTERIA Rare 09/23/2018    RBCUA None seen 09/23/2018    BLOODU SMALL 09/23/2018    SPECGRAV 1.015 09/23/2018    GLUCOSEU Negative 09/23/2018       Radiology:  US GALLBLADDER RUQ   Final Result   Gallbladder sludge and gallbladder distension. There is thickening of   portions of the gallbladder wall though ancillary findings of cholecystitis   are not otherwise seen. As developing cholecystitis cannot be excluded, HIDA   scan could be considered for further assessment as warranted. Mild prominence of common bile duct for patient age. XR CHEST PORTABLE   Final Result   Increased airspace disease at the lung bases         CT ABDOMEN PELVIS WO CONTRAST Additional Contrast? None   Final Result   ABDOMEN/PELVIS      1. Hydropic gallbladder with large gallstone at the gallbladder neck and   features of acute cholecystitis. 2. Gas within the biliary system is presumably from reported recent ERCP. 3. Large ventral abdominal hernia containing intra-abdominal fat and   nonobstructed loops of bowel. Soft tissue edema along the inferior pannus. CHEST      1. Small bilateral pleural effusions. 2. 2 cm hypodensity in the right thyroid gland may be further evaluated with   ultrasound (reference: J Am Saul Radiol. 2015 Feb;12(2): 143-50).          CT CHEST WO CONTRAST   Final Result

## 2018-09-26 NOTE — PROGRESS NOTES
BLE  Ankle Pumps: x15 BLE  Upper Extremity: shoulder flexion x10, elbow flex/ext x10, finger flex/ext x10      Assessment   Body structures, Functions, Activity limitations: Decreased functional mobility   Assessment: Pt demonstrating limited activity tolerance secondary to fatigue. Pt able to tolerate exercises with rest breaks. Deferred out of bed mobility secondary to fatigue. Will continue to progress mobility as pt tolerates. Treatment Diagnosis: decreased mobility   Prognosis: Good  Patient Education: Educated on ther ex and rolling/positioning technique  REQUIRES PT FOLLOW UP: Yes  Activity Tolerance  Activity Tolerance: Patient limited by fatigue;Patient limited by endurance  Activity Tolerance: SpO2 98% on 4L, HR 78   AM-PAC Score  AM-PAC Inpatient Mobility Raw Score : 7  AM-PAC Inpatient T-Scale Score : 26.42  Mobility Inpatient CMS 0-100% Score: 92.36  Mobility Inpatient CMS G-Code Modifier : CM        Goals  Short term goals  Time Frame for Short term goals: 1 week  Short term goal 1: Pt will roll with min assist  Short term goal 2: Pt will transfer to wit EOB with mod assist  Short term goal 3: Pt will SPT to chair with min assist and cane  Short term goal 4: Pt will ambulate to/from the bathroom with min assist and cane  Patient Goals   Patient goals : none expressed    Plan    Plan  Times per week: 3-5x/week   Current Treatment Recommendations: Strengthening, Balance Training, Functional Mobility Training, Endurance Training, Transfer Training, Gait Training, Home Exercise Program  Safety Devices  Type of devices:  All fall risk precautions in place, Bed alarm in place, Call light within reach, Nurse notified, Left in bed  Restraints  Initially in place: No     Therapy Time   Individual Concurrent Group Co-treatment   Time In 1139         Time Out 1203         Minutes 24         Timed Code Treatment Minutes: [address] [place],   [facility]

## 2018-09-27 ENCOUNTER — ANESTHESIA (OUTPATIENT)
Dept: ENDOSCOPY | Age: 68
DRG: 871 | End: 2018-09-27
Payer: COMMERCIAL

## 2018-09-27 ENCOUNTER — ANESTHESIA EVENT (OUTPATIENT)
Dept: ENDOSCOPY | Age: 68
DRG: 871 | End: 2018-09-27
Payer: COMMERCIAL

## 2018-09-27 LAB
ALBUMIN SERPL-MCNC: 2.2 G/DL (ref 3.4–5)
ALP BLD-CCNC: 84 U/L (ref 40–129)
ALT SERPL-CCNC: 9 U/L (ref 10–40)
ANION GAP SERPL CALCULATED.3IONS-SCNC: 7 MMOL/L (ref 3–16)
ANTICARDIOLIPIN IGG ANTIBODY: 4 GPL (ref 0–14)
AST SERPL-CCNC: 9 U/L (ref 15–37)
BASE EXCESS ARTERIAL: 0.3 MMOL/L (ref -3–3)
BASE EXCESS ARTERIAL: 1.8 MMOL/L (ref -3–3)
BASOPHILS ABSOLUTE: 0.1 K/UL (ref 0–0.2)
BASOPHILS RELATIVE PERCENT: 1 %
BILIRUB SERPL-MCNC: 0.4 MG/DL (ref 0–1)
BILIRUBIN DIRECT: <0.2 MG/DL (ref 0–0.3)
BILIRUBIN, INDIRECT: ABNORMAL MG/DL (ref 0–1)
BUN BLDV-MCNC: 33 MG/DL (ref 7–20)
CALCIUM SERPL-MCNC: 8.1 MG/DL (ref 8.3–10.6)
CARBOXYHEMOGLOBIN ARTERIAL: 0.3 % (ref 0–1.5)
CARBOXYHEMOGLOBIN ARTERIAL: 1.1 % (ref 0–1.5)
CARDIOLIPIN AB IGM: 0 MPL (ref 0–12)
CHLORIDE BLD-SCNC: 103 MMOL/L (ref 99–110)
CO2: 28 MMOL/L (ref 21–32)
CREAT SERPL-MCNC: 1.6 MG/DL (ref 0.8–1.3)
EOSINOPHILS ABSOLUTE: 0.2 K/UL (ref 0–0.6)
EOSINOPHILS RELATIVE PERCENT: 2.6 %
GFR AFRICAN AMERICAN: 52
GFR NON-AFRICAN AMERICAN: 43
GLUCOSE BLD-MCNC: 102 MG/DL (ref 70–99)
HCO3 ARTERIAL: 28.8 MMOL/L (ref 21–29)
HCO3 ARTERIAL: 29.7 MMOL/L (ref 21–29)
HCT VFR BLD CALC: 26.3 % (ref 40.5–52.5)
HEMOGLOBIN, ART, EXTENDED: 8.7 G/DL (ref 13.5–17.5)
HEMOGLOBIN, ART, EXTENDED: 9 G/DL (ref 13.5–17.5)
HEMOGLOBIN: 8 G/DL (ref 13.5–17.5)
INR BLD: 2.95 (ref 0.86–1.14)
LYMPHOCYTES ABSOLUTE: 0.4 K/UL (ref 1–5.1)
LYMPHOCYTES RELATIVE PERCENT: 5.9 %
MAGNESIUM: 2 MG/DL (ref 1.8–2.4)
MCH RBC QN AUTO: 22.4 PG (ref 26–34)
MCHC RBC AUTO-ENTMCNC: 30.5 G/DL (ref 31–36)
MCV RBC AUTO: 73.5 FL (ref 80–100)
METHEMOGLOBIN ARTERIAL: 0.6 %
METHEMOGLOBIN ARTERIAL: 0.8 %
MONOCYTES ABSOLUTE: 0.6 K/UL (ref 0–1.3)
MONOCYTES RELATIVE PERCENT: 9 %
MTHFR BY PCR SPECIMEN: ABNORMAL
MTHFR INTERPRETATION: ABNORMAL
MTHFR MUTATION A1298C: ABNORMAL
MTHFR MUTATION C677T: ABNORMAL
NEUTROPHILS ABSOLUTE: 5.5 K/UL (ref 1.7–7.7)
NEUTROPHILS RELATIVE PERCENT: 81.5 %
O2 CONTENT ARTERIAL: 12 ML/DL
O2 CONTENT ARTERIAL: 12 ML/DL
O2 SAT, ARTERIAL: 97.4 %
O2 SAT, ARTERIAL: 98.8 %
O2 THERAPY: ABNORMAL
O2 THERAPY: ABNORMAL
PCO2 ARTERIAL: 67.9 MMHG (ref 35–45)
PCO2 ARTERIAL: 71.1 MMHG (ref 35–45)
PDW BLD-RTO: 22.7 % (ref 12.4–15.4)
PH ARTERIAL: 7.22 (ref 7.35–7.45)
PH ARTERIAL: 7.26 (ref 7.35–7.45)
PHOSPHORUS: 3.4 MG/DL (ref 2.5–4.9)
PLATELET # BLD: 217 K/UL (ref 135–450)
PMV BLD AUTO: 9 FL (ref 5–10.5)
PO2 ARTERIAL: 112.9 MMHG (ref 75–108)
PO2 ARTERIAL: 162 MMHG (ref 75–108)
POTASSIUM SERPL-SCNC: 4.2 MMOL/L (ref 3.5–5.1)
PROTEIN C ANTIGEN: 41 % (ref 63–153)
PROTEIN C FUNCTIONAL: 26 % (ref 83–168)
PROTEIN S, FUNCTIONAL: 16 % (ref 66–143)
PROTHROMBIN TIME: 33.6 SEC (ref 9.8–13)
RBC # BLD: 3.57 M/UL (ref 4.2–5.9)
SODIUM BLD-SCNC: 138 MMOL/L (ref 136–145)
TCO2 ARTERIAL: 31 MMOL/L
TCO2 ARTERIAL: 31.8 MMOL/L
TOTAL PROTEIN: 5.7 G/DL (ref 6.4–8.2)
WBC # BLD: 6.8 K/UL (ref 4–11)

## 2018-09-27 PROCEDURE — C9113 INJ PANTOPRAZOLE SODIUM, VIA: HCPCS | Performed by: NURSE PRACTITIONER

## 2018-09-27 PROCEDURE — 94761 N-INVAS EAR/PLS OXIMETRY MLT: CPT

## 2018-09-27 PROCEDURE — 99233 SBSQ HOSP IP/OBS HIGH 50: CPT | Performed by: INTERNAL MEDICINE

## 2018-09-27 PROCEDURE — 85025 COMPLETE CBC W/AUTO DIFF WBC: CPT

## 2018-09-27 PROCEDURE — 94660 CPAP INITIATION&MGMT: CPT

## 2018-09-27 PROCEDURE — 2700000000 HC OXYGEN THERAPY PER DAY

## 2018-09-27 PROCEDURE — 6360000002 HC RX W HCPCS: Performed by: HOSPITALIST

## 2018-09-27 PROCEDURE — 2060000000 HC ICU INTERMEDIATE R&B

## 2018-09-27 PROCEDURE — 85610 PROTHROMBIN TIME: CPT

## 2018-09-27 PROCEDURE — 36415 COLL VENOUS BLD VENIPUNCTURE: CPT

## 2018-09-27 PROCEDURE — 80048 BASIC METABOLIC PNL TOTAL CA: CPT

## 2018-09-27 PROCEDURE — 6360000002 HC RX W HCPCS: Performed by: NURSE PRACTITIONER

## 2018-09-27 PROCEDURE — 2580000003 HC RX 258: Performed by: HOSPITALIST

## 2018-09-27 PROCEDURE — 97110 THERAPEUTIC EXERCISES: CPT

## 2018-09-27 PROCEDURE — 81240 F2 GENE: CPT

## 2018-09-27 PROCEDURE — 83735 ASSAY OF MAGNESIUM: CPT

## 2018-09-27 PROCEDURE — 82803 BLOOD GASES ANY COMBINATION: CPT

## 2018-09-27 PROCEDURE — 80076 HEPATIC FUNCTION PANEL: CPT

## 2018-09-27 PROCEDURE — 84100 ASSAY OF PHOSPHORUS: CPT

## 2018-09-27 PROCEDURE — 36600 WITHDRAWAL OF ARTERIAL BLOOD: CPT

## 2018-09-27 RX ADMIN — PANTOPRAZOLE SODIUM 40 MG: 40 INJECTION, POWDER, FOR SOLUTION INTRAVENOUS at 20:29

## 2018-09-27 RX ADMIN — MICONAZOLE NITRATE: 2 POWDER TOPICAL at 20:41

## 2018-09-27 RX ADMIN — Medication 10 ML: at 09:05

## 2018-09-27 RX ADMIN — MICONAZOLE NITRATE: 2 POWDER TOPICAL at 08:58

## 2018-09-27 RX ADMIN — PANTOPRAZOLE SODIUM 40 MG: 40 INJECTION, POWDER, FOR SOLUTION INTRAVENOUS at 08:58

## 2018-09-27 RX ADMIN — CEFEPIME HYDROCHLORIDE 2 G: 2 INJECTION, POWDER, FOR SOLUTION INTRAVENOUS at 08:58

## 2018-09-27 RX ADMIN — CEFEPIME HYDROCHLORIDE 2 G: 2 INJECTION, POWDER, FOR SOLUTION INTRAVENOUS at 20:29

## 2018-09-27 RX ADMIN — Medication 10 ML: at 20:30

## 2018-09-27 NOTE — PROGRESS NOTES
Pts status is much improved from this AM after use of bi-pap. Pt is alert and awake. Pt states that he is feeling much better at this time.

## 2018-09-27 NOTE — PROGRESS NOTES
kg)  · Ideal Body Wt: 130 lb (59 kg), % Ideal Body    · BMI Classification: BMI > or equal to 40.0 Obese Class III  · Comparative Standards (Estimated Nutrition Needs):  · Estimated Daily Total Kcal: 8517-0322  · Estimated Daily Protein (g): 71-83    Estimated Intake vs Estimated Needs: Intake Meets Needs    Nutrition Risk Level: Moderate    Nutrition Interventions:   Continue current diet, Start ONS  Continued Inpatient Monitoring    Nutrition Evaluation:   · Evaluation: Progressing toward goals   · Goals: Pt will have PO intakes of 50% or more this admission    · Monitoring: Meal Intake, Supplement Intake, Pertinent Labs, Patient/Family Education    See Adult Nutrition Doc Flowsheet for more detail.      Electronically signed by Kary Tobias, [address],  on 9/27/18 at 5:27 PM    Contact Number: Office: [phone]; [address]: [id number]

## 2018-09-27 NOTE — FLOWSHEET NOTE
Sacrum   Assessed this shift? Yes   Dressing Site Abdominal pannus   Treatment Pharmaceutical   Date applied? 09/26/18   Assessed this shift Red   Skin Integrity Site 2   Skin Integrity Location 2 Redness   Location 2 abd/leg folds   Skin Integrity Site 4   Skin Integrity Location 4 Bruising   Location 4 LUE   Musculoskeletal   Musculoskeletal (WDL) X   RUE Weakness   LUE Weakness   RL Extremity Weakness   LL Extremity Weakness   Genitourinary   Genitourinary (WDL) X  (devine)   Flank Tenderness No   Suprapubic Tenderness No   Dysuria POLI   Anus/Rectum   Anus/Rectum (WDL) WDL   Wound 09/24/18 Buttocks Left;Right DTI to bilat buttocks   Date First Assessed/Time First Assessed: 09/24/18 0230   Wound Event: Pressure  Location: Buttocks  Wound Location Orientation: Left;Right  Wound Description (Comments): DTI to bilat buttocks  Pre-existing: Yes  Photo Taken: No   Wound Type Wound   Dressing/Treatment Foam   Wound 09/24/18 Other (Comment) Abdomen Left open wounds to abd. hernia area    Date First Assessed/Time First Assessed: 09/24/18 0230   Wound Type: (c) Other (Comment)  Location: Abdomen  Wound Location Orientation: Left  Wound Description (Comments): open wounds to abd. hernia area   Pre-existing: Yes  Photo Taken: No   Wound Type Wound   Dressing Status Clean;Dry; Intact   Dressing Changed Dressing reinforced   Dressing/Treatment Foam   Urethral Catheter Temperature probe 16 fr   Placement Date/Time: 09/23/18 2148   Urethral Catheter Timeout: Patient;Procedure;Site/Side  Inserted by: BIBI  Catheter Type: Temperature probe  Tube Size (fr): 16 fr  Catheter Balloon Size: 10 mL  Collection Container: Standard  Securement Method: Sec. ..    Catheter Indications Need for fluid management in critically ill patients in a critical care setting not able to be managed by other means such as BSC with hat, bedpan, urinal, condom catheter, or short term intermittent urethral catherization   Psychosocial   Psychosocial (WDL) X

## 2018-09-28 ENCOUNTER — APPOINTMENT (OUTPATIENT)
Dept: GENERAL RADIOLOGY | Age: 68
DRG: 871 | End: 2018-09-28
Payer: COMMERCIAL

## 2018-09-28 VITALS — DIASTOLIC BLOOD PRESSURE: 75 MMHG | OXYGEN SATURATION: 100 % | SYSTOLIC BLOOD PRESSURE: 146 MMHG

## 2018-09-28 LAB
ALBUMIN SERPL-MCNC: 2.2 G/DL (ref 3.4–5)
ALP BLD-CCNC: 80 U/L (ref 40–129)
ALT SERPL-CCNC: 9 U/L (ref 10–40)
ANION GAP SERPL CALCULATED.3IONS-SCNC: 6 MMOL/L (ref 3–16)
AST SERPL-CCNC: 13 U/L (ref 15–37)
BASE EXCESS ARTERIAL: 2.2 MMOL/L (ref -3–3)
BASOPHILS ABSOLUTE: 0 K/UL (ref 0–0.2)
BASOPHILS RELATIVE PERCENT: 0.6 %
BILIRUB SERPL-MCNC: 0.4 MG/DL (ref 0–1)
BILIRUBIN DIRECT: <0.2 MG/DL (ref 0–0.3)
BILIRUBIN, INDIRECT: ABNORMAL MG/DL (ref 0–1)
BLOOD CULTURE, ROUTINE: NORMAL
BUN BLDV-MCNC: 33 MG/DL (ref 7–20)
CALCIUM SERPL-MCNC: 8.1 MG/DL (ref 8.3–10.6)
CARBOXYHEMOGLOBIN ARTERIAL: 0.1 % (ref 0–1.5)
CHLORIDE BLD-SCNC: 102 MMOL/L (ref 99–110)
CO2: 29 MMOL/L (ref 21–32)
CREAT SERPL-MCNC: 1.4 MG/DL (ref 0.8–1.3)
CULTURE, BLOOD 2: NORMAL
EOSINOPHILS ABSOLUTE: 0.2 K/UL (ref 0–0.6)
EOSINOPHILS RELATIVE PERCENT: 3.8 %
GFR AFRICAN AMERICAN: >60
GFR NON-AFRICAN AMERICAN: 50
GLUCOSE BLD-MCNC: 113 MG/DL (ref 70–99)
HCO3 ARTERIAL: 29.2 MMOL/L (ref 21–29)
HCT VFR BLD CALC: 24.9 % (ref 40.5–52.5)
HEMOGLOBIN, ART, EXTENDED: 7.9 G/DL (ref 13.5–17.5)
HEMOGLOBIN: 7.7 G/DL (ref 13.5–17.5)
INR BLD: 3.16 (ref 0.86–1.14)
LYMPHOCYTES ABSOLUTE: 0.3 K/UL (ref 1–5.1)
LYMPHOCYTES RELATIVE PERCENT: 6 %
MAGNESIUM: 1.9 MG/DL (ref 1.8–2.4)
MCH RBC QN AUTO: 22.2 PG (ref 26–34)
MCHC RBC AUTO-ENTMCNC: 30.7 G/DL (ref 31–36)
MCV RBC AUTO: 72.2 FL (ref 80–100)
METHEMOGLOBIN ARTERIAL: 0.7 %
MONOCYTES ABSOLUTE: 0.5 K/UL (ref 0–1.3)
MONOCYTES RELATIVE PERCENT: 9.3 %
MYCOPLASMA PNEUMONIAE IGM: 0.63
NEUTROPHILS ABSOLUTE: 4.5 K/UL (ref 1.7–7.7)
NEUTROPHILS RELATIVE PERCENT: 80.3 %
O2 CONTENT ARTERIAL: 11 ML/DL
O2 SAT, ARTERIAL: 98.1 %
O2 THERAPY: ABNORMAL
PCO2 ARTERIAL: 61 MMHG (ref 35–45)
PDW BLD-RTO: 22.1 % (ref 12.4–15.4)
PH ARTERIAL: 7.3 (ref 7.35–7.45)
PHOSPHORUS: 2.4 MG/DL (ref 2.5–4.9)
PLATELET # BLD: 174 K/UL (ref 135–450)
PMV BLD AUTO: 8.9 FL (ref 5–10.5)
PO2 ARTERIAL: 134.9 MMHG (ref 75–108)
POTASSIUM SERPL-SCNC: 4.3 MMOL/L (ref 3.5–5.1)
PROTHROMBIN TIME: 36 SEC (ref 9.8–13)
RBC # BLD: 3.45 M/UL (ref 4.2–5.9)
SODIUM BLD-SCNC: 137 MMOL/L (ref 136–145)
TCO2 ARTERIAL: 31.1 MMOL/L
TOTAL PROTEIN: 5.5 G/DL (ref 6.4–8.2)
WBC # BLD: 5.6 K/UL (ref 4–11)

## 2018-09-28 PROCEDURE — 84100 ASSAY OF PHOSPHORUS: CPT

## 2018-09-28 PROCEDURE — 80048 BASIC METABOLIC PNL TOTAL CA: CPT

## 2018-09-28 PROCEDURE — G8988 SELF CARE GOAL STATUS: HCPCS

## 2018-09-28 PROCEDURE — C9113 INJ PANTOPRAZOLE SODIUM, VIA: HCPCS | Performed by: NURSE PRACTITIONER

## 2018-09-28 PROCEDURE — 97110 THERAPEUTIC EXERCISES: CPT

## 2018-09-28 PROCEDURE — 6360000002 HC RX W HCPCS: Performed by: NURSE PRACTITIONER

## 2018-09-28 PROCEDURE — G8987 SELF CARE CURRENT STATUS: HCPCS

## 2018-09-28 PROCEDURE — 6360000002 HC RX W HCPCS: Performed by: NURSE ANESTHETIST, CERTIFIED REGISTERED

## 2018-09-28 PROCEDURE — 97530 THERAPEUTIC ACTIVITIES: CPT

## 2018-09-28 PROCEDURE — 82803 BLOOD GASES ANY COMBINATION: CPT

## 2018-09-28 PROCEDURE — 2060000000 HC ICU INTERMEDIATE R&B

## 2018-09-28 PROCEDURE — 0DB68ZX EXCISION OF STOMACH, VIA NATURAL OR ARTIFICIAL OPENING ENDOSCOPIC, DIAGNOSTIC: ICD-10-PCS | Performed by: INTERNAL MEDICINE

## 2018-09-28 PROCEDURE — 88305 TISSUE EXAM BY PATHOLOGIST: CPT

## 2018-09-28 PROCEDURE — 71045 X-RAY EXAM CHEST 1 VIEW: CPT

## 2018-09-28 PROCEDURE — 36600 WITHDRAWAL OF ARTERIAL BLOOD: CPT

## 2018-09-28 PROCEDURE — 99233 SBSQ HOSP IP/OBS HIGH 50: CPT | Performed by: INTERNAL MEDICINE

## 2018-09-28 PROCEDURE — 2580000003 HC RX 258: Performed by: INTERNAL MEDICINE

## 2018-09-28 PROCEDURE — 83735 ASSAY OF MAGNESIUM: CPT

## 2018-09-28 PROCEDURE — 85025 COMPLETE CBC W/AUTO DIFF WBC: CPT

## 2018-09-28 PROCEDURE — 6360000002 HC RX W HCPCS: Performed by: INTERNAL MEDICINE

## 2018-09-28 PROCEDURE — 3609012400 HC EGD TRANSORAL BIOPSY SINGLE/MULTIPLE: Performed by: INTERNAL MEDICINE

## 2018-09-28 PROCEDURE — 2580000003 HC RX 258: Performed by: HOSPITALIST

## 2018-09-28 PROCEDURE — 6360000002 HC RX W HCPCS: Performed by: HOSPITALIST

## 2018-09-28 PROCEDURE — 36415 COLL VENOUS BLD VENIPUNCTURE: CPT

## 2018-09-28 PROCEDURE — 85610 PROTHROMBIN TIME: CPT

## 2018-09-28 PROCEDURE — 2580000003 HC RX 258: Performed by: NURSE ANESTHETIST, CERTIFIED REGISTERED

## 2018-09-28 PROCEDURE — 7100000011 HC PHASE II RECOVERY - ADDTL 15 MIN: Performed by: INTERNAL MEDICINE

## 2018-09-28 PROCEDURE — 7100000010 HC PHASE II RECOVERY - FIRST 15 MIN: Performed by: INTERNAL MEDICINE

## 2018-09-28 PROCEDURE — 3700000000 HC ANESTHESIA ATTENDED CARE: Performed by: INTERNAL MEDICINE

## 2018-09-28 PROCEDURE — 2709999900 HC NON-CHARGEABLE SUPPLY: Performed by: INTERNAL MEDICINE

## 2018-09-28 PROCEDURE — 94660 CPAP INITIATION&MGMT: CPT

## 2018-09-28 PROCEDURE — 80076 HEPATIC FUNCTION PANEL: CPT

## 2018-09-28 RX ORDER — PROPOFOL 10 MG/ML
INJECTION, EMULSION INTRAVENOUS PRN
Status: DISCONTINUED | OUTPATIENT
Start: 2018-09-28 | End: 2018-09-28 | Stop reason: SDUPTHER

## 2018-09-28 RX ORDER — SODIUM CHLORIDE, SODIUM LACTATE, POTASSIUM CHLORIDE, CALCIUM CHLORIDE 600; 310; 30; 20 MG/100ML; MG/100ML; MG/100ML; MG/100ML
INJECTION, SOLUTION INTRAVENOUS CONTINUOUS PRN
Status: DISCONTINUED | OUTPATIENT
Start: 2018-09-28 | End: 2018-09-28 | Stop reason: SDUPTHER

## 2018-09-28 RX ORDER — SODIUM CHLORIDE 9 MG/ML
INJECTION, SOLUTION INTRAVENOUS
Status: DISPENSED
Start: 2018-09-28 | End: 2018-09-29

## 2018-09-28 RX ADMIN — SODIUM CHLORIDE, POTASSIUM CHLORIDE, SODIUM LACTATE AND CALCIUM CHLORIDE: 600; 310; 30; 20 INJECTION, SOLUTION INTRAVENOUS at 11:56

## 2018-09-28 RX ADMIN — PANTOPRAZOLE SODIUM 40 MG: 40 INJECTION, POWDER, FOR SOLUTION INTRAVENOUS at 09:13

## 2018-09-28 RX ADMIN — Medication 10 ML: at 22:50

## 2018-09-28 RX ADMIN — CEFEPIME HYDROCHLORIDE 2 G: 2 INJECTION, POWDER, FOR SOLUTION INTRAVENOUS at 09:13

## 2018-09-28 RX ADMIN — MICONAZOLE NITRATE: 2 POWDER TOPICAL at 09:17

## 2018-09-28 RX ADMIN — IRON SUCROSE 200 MG: 20 INJECTION, SOLUTION INTRAVENOUS at 16:14

## 2018-09-28 RX ADMIN — PANTOPRAZOLE SODIUM 40 MG: 40 INJECTION, POWDER, FOR SOLUTION INTRAVENOUS at 22:50

## 2018-09-28 RX ADMIN — MICONAZOLE NITRATE: 2 POWDER TOPICAL at 22:51

## 2018-09-28 RX ADMIN — PROPOFOL 80 MG: 10 INJECTION, EMULSION INTRAVENOUS at 12:01

## 2018-09-28 RX ADMIN — Medication 10 ML: at 09:13

## 2018-09-28 RX ADMIN — CEFEPIME HYDROCHLORIDE 2 G: 2 INJECTION, POWDER, FOR SOLUTION INTRAVENOUS at 22:50

## 2018-09-28 ASSESSMENT — PAIN SCALES - GENERAL
PAINLEVEL_OUTOF10: 0
PAINLEVEL_OUTOF10: 0

## 2018-09-28 NOTE — PLAN OF CARE
Problem: Falls - Risk of:  Goal: Will remain free from falls  Will remain free from falls   Outcome: Ongoing  Patient will be free of falls this shift. Instructed to use call light before getting out of bed. Call light within reach. Bed in low position. Bed alarm on. Will continue to monitor.

## 2018-09-28 NOTE — PROGRESS NOTES
Occupational Therapy  Facility/Department: [address] [place] [address] PCU  Daily Treatment Note  NAME: Jasmin Mcginnis  : 1950  MRN: [id number]    Date of Service: 2018    Discharge Recommendations:  [address]       Patient Diagnosis(es): The primary encounter diagnosis was Sepsis, due to unspecified organism [facility]). Diagnoses of Acute respiratory failure with hypoxia ([address] Urinary tract infection without hematuria, site unspecified, Anemia, unspecified type, Dehydration, Hypokalemia, Elevated troponin, Congestive heart failure, unspecified HF chronicity, unspecified heart failure type ([address] Hypomagnesemia, and Subtherapeutic international normalized ratio (INR) were also pertinent to this visit. has a past medical history of Cancer ([address] Edema of both legs; GI bleed; Hx of blood clots; and Hypertension. has a past surgical history that includes Kidney surgery (2014); Abdomen surgery; Colonoscopy; Endoscopy, colon, diagnostic; and pr egd transoral biopsy single/multiple (2018).     Restrictions  Restrictions/Precautions  Restrictions/Precautions: General Precautions, Fall Risk  Position Activity Restriction  Other position/activity restrictions: up as tolerated, devine  Subjective   General  Chart Reviewed: Yes  Patient assessed for rehabilitation services?: Yes  Family / Caregiver Present: Yes (wife)  Referring Practitioner: JOEY Almanzar CNP  Diagnosis: Acute cholecystitis and GI bleed  Subjective  Subjective: Pt resting in bed at OT arrival.   General Comment  Comments: pt resting in bed, mod encouragement to participate in EOB activity      Orientation  Orientation  Overall Orientation Status: Within Functional Limits  Objective    ADL  Feeding: Setup  Toileting: Dependent/Total (devine catheter)        Balance  Sitting Balance: Contact guard assistance (EOB 10 minutes)  Standing Balance: Unable to assess(comment) (Not safe to stand side of bed from air mattress & David. LE weakness)        Cognition  Overall Cognitive Status: Exceptions  Arousal/Alertness: Appropriate responses to stimuli  Following Commands: Follows one step commands consistently  Attention Span: Appears intact  Memory: Decreased recall of recent events  Safety Judgement: Decreased awareness of need for assistance;Decreased awareness of need for safety  Insights: Decreased awareness of deficits  Initiation: Requires cues for some  Sequencing: Does not require cues       Type of ROM/Therapeutic Exercise: BUE AROM (sitting EOB)  Hand flex/ext: x   12 Reps  Elbow flex/ext:  x  12  Reps  Forearm sup/pron:  x  12  Reps       Assessment   Performance deficits / Impairments: Decreased functional mobility ; Decreased ADL status; Decreased strength;Decreased endurance;Decreased safe awareness;Decreased balance;Decreased high-level IADLs  REQUIRES OT FOLLOW UP: Yes  Activity Tolerance  Activity Tolerance: Patient Tolerated treatment well  Safety Devices  Safety Devices in place: Yes  Type of devices: Call light within reach; Left in bed;Nurse notified          Plan   Plan  Times per week: 3-5  Current Treatment Recommendations: ROM, Strengthening, Balance Training, Safety Education & Training, Self-Care / ADL, Functional Mobility Training, Endurance Training, Positioning  G-Code  OT G-codes  Functional Assessment Tool Used: -PAC  Score: raw score = 11; G-code = CL  Functional Limitation: Self care  Self Care Current Status (): At least 60 percent but less than 80 percent impaired, limited or restricted  Self Care Goal Status ():  At least 40 percent but less than 60 percent impaired, limited or restricted    -PAC Score        AM-Located within [facility] Inpatient Daily Activity Raw Score: 11  AM-PAC Inpatient ADL T-Scale Score : 29.04  ADL Inpatient CMS 0-100% Score: 70.42  ADL Inpatient CMS G-Code Modifier : CL    Goals  Short term goals  Time Frame for Short term goals: 1 week unless otherwise specified  Short term goal 1: Tolerate 5

## 2018-09-28 NOTE — PROGRESS NOTES
[facility]  Progress Note      SUBJECTIVE:     The patient continues to have lack of motivation and refuses to get out of bed other than occasionally going to the chair or the bathroom. ROS:   He is morbidly obese    OBJECTIVE      Medications    Current Facility-Administered Medications: folic acid (FOLVITE) tablet 1 mg, 1 mg, Oral, Daily  pantoprazole (PROTONIX) injection 40 mg, 40 mg, Intravenous, BID  midodrine (PROAMATINE) tablet 10 mg, 10 mg, Oral, TID WC  cefepime (MAXIPIME) 2 g IVPB minibag, 2 g, Intravenous, Q12H  sodium chloride flush 0.9 % injection 10 mL, 10 mL, Intravenous, 2 times per day  sodium chloride flush 0.9 % injection 10 mL, 10 mL, Intravenous, PRN  magnesium hydroxide (MILK OF MAGNESIA) 400 MG/5ML suspension 30 mL, 30 mL, Oral, Daily PRN  ondansetron (ZOFRAN) injection 4 mg, 4 mg, Intravenous, Q6H PRN  acetaminophen (TYLENOL) tablet 650 mg, 650 mg, Oral, Q4H PRN  warfarin (COUMADIN) daily dosing (placeholder), , Other, RX Placeholder  miconazole (MICOTIN) 2 % powder, , Topical, BID  aspirin chewable tablet 324 mg, 324 mg, Oral, Once  Physical    VITALS:  BP (!) 144/79   Pulse 75   Temp 97.9 °F (36.6 °C) (Oral)   Resp 14   Ht 5' 4\" (1.626 m)   Wt (!) 385 lb (174.6 kg)   SpO2 98%   BMI 66.09 kg/m²   TEMPERATURE:  Current - Temp: 97.9 °F (36.6 °C); Max - Temp  Av.4 °F (36.9 °C)  Min: 97.9 °F (36.6 °C)  Max: 98.8 °F (37.1 °C)  PULSE OXIMETRY RANGE: SpO2  Av.2 %  Min: 76 %  Max: 99 %  24HR INTAKE/OUTPUT:      Intake/Output Summary (Last 24 hours) at 18 0989  Last data filed at 18 7067   Gross per 24 hour   Intake              360 ml   Output             1075 ml   Net             -715 ml       CONSTITUTIONAL:  He is morbidly obese.   HEMATOLOGIC/LYMPHATICS:  no cervical lymphadenopathy, no supraclavicular lymphadenopathy, no axillary lymphadenopathy and no inguinal lymphadenopathy  LUNGS:  No increased work of breathing, good air exchange, clear KEVIN (acute kidney injury) ([facility]    Subacute microcytic anemia (July 2018)    Hx choledocholithiasis s/p ERCP (July 2018)    Acute hypoxemic respiratory failure (HCC)    Acute cholecystitis    Intertrigo    Lymphedema       ASSESSMENT AND PLAN    GI bleed:  -Coumadin on hold for now, await repeat upper EGD results to rule out ulcer   - He is getting an endoscopy today  -His wife is not sure that he will consent to a colonoscopy  -I explained in detail why this is needed    DVT ×2:  -Traditionally we continue anticoagulation for life  -Given that he is so sedentary, you can almost guarantee that he will have another blood clot. -We will also send a clotting workup, but this may not be back for about a week  - Homocysteine is elevated (inflammation), start Folic acid 1 mg   -I think it is imperative that he has a full GI workup to make sure that we are not missing an area of bleed. If EGD and CSCOPE negative, consider pill cam study   -Coumadin is still his safest drug. Eliquis has less bleeding episodes than Coumadin, but the Eliquis is not particularly effective in patients with this high of a BMI. Iron deficiency:  -Because of his recurring transfusion needs, we will likely go ahead and start him on iron sucrose  -He can receive iron sucrose today and receive the rest as an outpatient. He does not need to stay in the hospital for this.         Beatriz Pillai MD  May be reached through Mirad[facility] Serve  Please contact through [facility]

## 2018-09-28 NOTE — PROGRESS NOTES
Dx: Hx DVT  Goal INR range 1.5-2.5  Home Warfarin dose: 5mg daily        Date                 INR                  Warfarin  9/23 2113       1.94  9/24                2.18                 3mg  9/25                2.93                 Hold  9/26                2.96                 Hold  9/27                2.95                 Hold  9/28                3.16                 Hold     Recommend Warfarin be held tonight x1, pt has tight INR goal range. Daily INR ordered. Rx will continue to manage therapy per consult order.     Urvashi Renteria, PharmD Candidate 2019  [facility] Intern  9/28/2018 8:23 AM      Shaquille Packer, PharmD, . [address]ier[address] St[address] [phone]  [place] [phone]

## 2018-09-28 NOTE — H&P
Pre-sedation Assessment    History and Physical / Pre-Sedation Assessment  Patient:  Reshma Light Sr.   :   1950     Intended Procedure: EGD      HPI: 79 y. o. male with a hx of morbid obesity, HTN, kidney CA, DVT on Couamdin and recent choledocholithiasis s/p ERCP in 2018, with plans for a cholecystectomy. He had a GI bleed in Aug 2018 (EGD at [facility] reportedly showed a duodenal ulcer)  Is admitted for a sudden high fever and resp failure w pl effusions and elevated Troponin. I was consulted for his Fe def anemia and to evaluate for a colonoscopy. H/H , MCV 72, Fe 3%.     Plan:   1. Before colonoscopy, he needs repeat EGD    Nurses notes reviewed and agreed. Medications reviewed  Allergies: No Known Allergies        Physical Exam:  Vital Signs: BP (!) 144/79   Pulse 75   Temp 97.9 °F (36.6 °C) (Oral)   Resp 14   Ht 5' 4\" (1.626 m)   Wt (!) 385 lb (174.6 kg)   SpO2 98%   BMI 66.09 kg/m²  Body mass index is 66.09 kg/m². Airway:Normal  Cardiac:Normal  Pulmonary:Normal  Abdomen:Normal  Specific to procedure: none      Pre-Procedure Assessment/Plan:  ASA 3 - Patient with moderate systemic disease with functional limitations  Malampati class III  Level of Sedation Plan:Deep sedation    Post Procedure plan: Return to same level of care    I assessed the patient and find that the patient is in satisfactory condition to proceed with the planned procedure and sedation plan. I have explained the risk, benefits, and alternatives to the procedure. The patient understands and agrees to proceed.   Yes    Andrew Bond MD       (O) [phone]        Andrew Bond  11:56 AM 2018

## 2018-09-28 NOTE — ANESTHESIA POSTPROCEDURE EVALUATION
RENAL  Lab Results       Component                Value               Date/Time                  NA                       137                 09/28/2018 04:48 AM        K                        4.3                 09/28/2018 04:48 AM        CL                       102                 09/28/2018 04:48 AM        CO2                      29                  09/28/2018 04:48 AM        BUN                      33 (H)              09/28/2018 04:48 AM        CREATININE               1.4 (H)             09/28/2018 04:48 AM        GLUCOSE                  113 (H)             09/28/2018 04:48 AM   COAGS  Lab Results       Component                Value               Date/Time                  PROTIME                  36.0 (H)            09/28/2018 04:48 AM        INR                      3.16 (H)            09/28/2018 04:48 AM     Intake & Output:  @19KHMW@    Nausea & Vomiting:  No    Level of Consciousness:  Awake    Pain Assessment:  Adequate analgesia    Anesthesia Complications:  No apparent anesthetic complications    SUMMARY      Vital signs stable  OK to discharge from Stage I post anesthesia care.   Care transferred from Anesthesiology department on discharge from perioperative area

## 2018-09-28 NOTE — PROGRESS NOTES
09/28/18 0425   NIV Type   Equipment Type v60   Mode Biphasic   Mask Type Full face mask   Mask Size Medium   Settings/Measurements   Comfort Level Good   Using Accessory Muscles No   IPAP 18 cmH20   EPAP 6 cmH2O   Rate Ordered 14   Resp 14   SpO2 98   FiO2  35 %   Vt Exhaled 594 mL   Mask Leak (lpm) 0 lpm   Skin Protection for O2 Device Yes   Breath Sounds   Right Upper Lobe Diminished   Right Middle Lobe Diminished   Right Lower Lobe Diminished   Left Upper Lobe Diminished   Left Lower Lobe Diminished   Alarm Settings   Alarms On Y

## 2018-09-28 NOTE — PROGRESS NOTES
Physical Therapy  Facility/Department: [facility]  PCU  Daily Treatment Note  NAME: Ro Jara  : 1950  MRN: [id number]    Date of Service: 2018    Discharge Recommendations:  Subacute/Skilled Nursing Facility   PT Equipment Recommendations  Equipment Needed:  (defer)    Patient Diagnosis(es): The primary encounter diagnosis was Sepsis, due to unspecified organism [facility]). Diagnoses of Acute respiratory failure with hypoxia ([facility] Urinary tract infection without hematuria, site unspecified, Anemia, unspecified type, Dehydration, Hypokalemia, Elevated troponin, Congestive heart failure, unspecified HF chronicity, unspecified heart failure type ([facility] Hypomagnesemia, and Subtherapeutic international normalized ratio (INR) were also pertinent to this visit. has a past medical history of Cancer ([facility] Edema of both legs; GI bleed; Hx of blood clots; and Hypertension. has a past surgical history that includes Kidney surgery (2014); Abdomen surgery; Colonoscopy; Endoscopy, colon, diagnostic; and pr egd transoral biopsy single/multiple (2018). Restrictions  Restrictions/Precautions  Restrictions/Precautions: General Precautions, Fall Risk  Position Activity Restriction  Other position/activity restrictions: up as tolerated, devine  Subjective   General  Chart Reviewed: Yes  Family / Caregiver Present: Yes (wife)  Referring Practitioner: JOEY Benítez CNP  Subjective  Subjective: Pt supine in bed. Requires max encouragement to participate. Agreeable to supine exercises & sitting at EOB. Refuses attempts at standing or transfer OOB. General Comment  Comments: OK to work with per RN. Returned from EGD & awake. Pain Screening  Patient Currently in Pain: Yes (\"I hurt all over. \" not specified or rated.)  Vital Signs  Patient Currently in Pain: Yes (\"I hurt all over. \" not specified or rated.)          Objective   Bed mobility  Supine to Sit: Dependent/Total (mod A x 2.  HOB elevated, pt using

## 2018-09-28 NOTE — PROGRESS NOTES
Hospitalist Progress Note      PCP: Wicho Luis MD    Date of Admission: 9/23/2018    Chief Complaint: Fever, SOB    Hospital Course: 79 y.o. male with a hx of morbid obesity, recent choledocholithiasis s/p ERCP in July 2018, with plans (per Dr. Frank Garcia) for a cholecystectomy, which was apparently delayed by a recent bout of PNA as well as a GI bleed in Aug 2018 (EGD reportedly showed a duodenal ulcer), as well as a hx of a large ventral hernia, DVT, anticoagulated on Coumadin (apparent goal 1.5-2.5), and other comorbidities, who presents to the [facility], apparently from home, via EMS, due to a sudden-onset high fever (his wife said to 80 F), as well as SOB. EMS reportedly found him to be hypoxemic to 74% and placed him on supplemental O2 via NC. When he arrived to the ED he was satting 84%. Fever was 102.9 F. He was tachypneic to 25 and tachycardic to 139. He had mild leukocytosis to 11,500. CXR was read as pulmonary vascular congestion. UA was positive for a UTI. Assuming an infection, he met severe sepsis criteria. Other labs showed elevated troponin, elevated BNP, hypomagnesemia, hypokalemia, hyponatremia, hypochloremia, and apparent KEVIN. CT c/a/p without IV contrast, which showed apparent acute (or perhaps unresolved) cholecystitis, with a large gallstone in the neck of the gallbladder, as well as small bilateral pleural effusions. Subjective: Looks better this AM.  Hypercarbia and mentation improved with BIPAP. Planning for EGD today. Cr and BP improved.       Medications:  Reviewed    Infusion Medications     Scheduled Medications    iron sucrose (VENOFER) iv piggyback 100 mL (Admin over 60 minutes)  200 mg Intravenous A21P    folic acid  1 mg Oral Daily    pantoprazole  40 mg Intravenous BID    midodrine  10 mg Oral TID WC    cefepime  2 g Intravenous Q12H    sodium chloride flush  10 mL Intravenous 2 times per day    warfarin (COUMADIN) daily dosing (placeholder)   Other RX

## 2018-09-28 NOTE — ANESTHESIA PRE PROCEDURE
injection 4 mg  4 mg Intravenous Q6H PRN Rakan Banerjee MD        acetaminophen (TYLENOL) tablet 650 mg  650 mg Oral Q4H PRN Rakan Banerjee MD   650 mg at 09/24/18 1539    warfarin (COUMADIN) daily dosing (placeholder)   Other RX Placeholder Rakan Banerjee MD        miconazole (MICOTIN) 2 % powder   Topical BID Rakan Banerjee MD        aspirin chewable tablet 324 mg  324 mg Oral Once Yenifer Bhandari           Allergies:  No Known Allergies    Problem List:    Patient Active Problem List   Diagnosis Code    Morbid obesity with BMI of 60.0-69.9, adult ([facility] E66.01, Z68.44    Anticoagulation goal of INR 1.5 to 2.5 Z51.81, Z79.01    Benign hypertension I10    Hx GI bleed due to duodenal ulcer (Aug 2018) K92.2    History of DVT (deep vein thrombosis) Z86.718    Ventral hernia K43.9    Elevated brain natriuretic peptide (BNP) level R79.89    Elevated troponin R74.8    UTI (urinary tract infection) N39.0    Pulmonary vascular congestion on CXR R09.89    Sepsis ([facility] A41.9    Hypomagnesemia E83.42    Hypokalemia E87.6    Hyponatremia E87.1    Hypochloremia E87.8    KEIVN (acute kidney injury) ([facility] N17.9    Subacute microcytic anemia (July 2018) D50.9    Hx choledocholithiasis s/p ERCP (July 2018) K80.50    Acute hypoxemic respiratory failure (HCC) J96.01    Acute cholecystitis K81.0    Intertrigo L30.4    Lymphedema I89.0       Past Medical History:        Diagnosis Date    Cancer ([facility]     kidney    Edema of both legs     GI bleed     Hx of blood clots     DVT    Hypertension        Past Surgical History:        Procedure Laterality Date    ABDOMEN SURGERY      Right Kidney Cancer abd. surgery 2014    COLONOSCOPY      flexsigmoidoscopy 40yrs ago     ENDOSCOPY, COLON, DIAGNOSTIC      8/2018    KIDNEY SURGERY  4/7/2014       Social History:    Social History   Substance Use Topics    Smoking status: Never Smoker    Smokeless tobacco: Never Used    Alcohol use No 09/28/2018    FYS5ABJ 61.0 09/28/2018    TYH7MNS 29.2 09/28/2018    BEART 2.2 09/28/2018    E1LQUGMG 98.1 09/28/2018        Type & Screen (If Applicable):  No results found for: LABABO, LABRH    Anesthesia Evaluation    Airway: Mallampati: IV  TM distance: >3 FB   Neck ROM: full  Mouth opening: > = 3 FB Dental:          Pulmonary:                              Cardiovascular:    (+) hypertension:,                   Neuro/Psych:               GI/Hepatic/Renal:             Endo/Other:                     Abdominal:           Vascular:                                        Anesthesia Plan      general     ASA 3     (I discussed with the patient the risks and benefits of PIV, general anesthesia, IV Narcotics, PACU. All questions were answered the patient agrees with the plan.)  Induction: intravenous. Anesthetic plan and risks discussed with patient.                       Lakeshia España MD   9/28/2018

## 2018-09-29 LAB
ALBUMIN SERPL-MCNC: 2.1 G/DL (ref 3.4–5)
ANION GAP SERPL CALCULATED.3IONS-SCNC: 7 MMOL/L (ref 3–16)
BUN BLDV-MCNC: 27 MG/DL (ref 7–20)
CALCIUM SERPL-MCNC: 8 MG/DL (ref 8.3–10.6)
CHLORIDE BLD-SCNC: 106 MMOL/L (ref 99–110)
CO2: 29 MMOL/L (ref 21–32)
CREAT SERPL-MCNC: 1.1 MG/DL (ref 0.8–1.3)
GFR AFRICAN AMERICAN: >60
GFR NON-AFRICAN AMERICAN: >60
GLUCOSE BLD-MCNC: 96 MG/DL (ref 70–99)
INR BLD: 3.04 (ref 0.86–1.14)
OCCULT BLOOD DIAGNOSTIC: NORMAL
PHOSPHORUS: 2 MG/DL (ref 2.5–4.9)
POTASSIUM SERPL-SCNC: 4.1 MMOL/L (ref 3.5–5.1)
PROTHROMBIN G20210A MUTATION: NEGATIVE
PROTHROMBIN TIME: 34.6 SEC (ref 9.8–13)
PT PCR SPECIMEN: NORMAL
SODIUM BLD-SCNC: 142 MMOL/L (ref 136–145)

## 2018-09-29 PROCEDURE — 94660 CPAP INITIATION&MGMT: CPT

## 2018-09-29 PROCEDURE — 2580000003 HC RX 258: Performed by: HOSPITALIST

## 2018-09-29 PROCEDURE — 97530 THERAPEUTIC ACTIVITIES: CPT

## 2018-09-29 PROCEDURE — 2060000000 HC ICU INTERMEDIATE R&B

## 2018-09-29 PROCEDURE — 6360000002 HC RX W HCPCS: Performed by: INTERNAL MEDICINE

## 2018-09-29 PROCEDURE — 80069 RENAL FUNCTION PANEL: CPT

## 2018-09-29 PROCEDURE — 6370000000 HC RX 637 (ALT 250 FOR IP): Performed by: NURSE PRACTITIONER

## 2018-09-29 PROCEDURE — 2700000000 HC OXYGEN THERAPY PER DAY

## 2018-09-29 PROCEDURE — 94761 N-INVAS EAR/PLS OXIMETRY MLT: CPT

## 2018-09-29 PROCEDURE — 99233 SBSQ HOSP IP/OBS HIGH 50: CPT | Performed by: INTERNAL MEDICINE

## 2018-09-29 PROCEDURE — 36415 COLL VENOUS BLD VENIPUNCTURE: CPT

## 2018-09-29 PROCEDURE — 2580000003 HC RX 258: Performed by: INTERNAL MEDICINE

## 2018-09-29 PROCEDURE — 85610 PROTHROMBIN TIME: CPT

## 2018-09-29 RX ORDER — SACCHAROMYCES BOULARDII 250 MG
250 CAPSULE ORAL 2 TIMES DAILY
Status: DISCONTINUED | OUTPATIENT
Start: 2018-09-29 | End: 2018-10-01 | Stop reason: HOSPADM

## 2018-09-29 RX ORDER — PANTOPRAZOLE SODIUM 40 MG/1
40 TABLET, DELAYED RELEASE ORAL
Status: DISCONTINUED | OUTPATIENT
Start: 2018-09-29 | End: 2018-10-01 | Stop reason: HOSPADM

## 2018-09-29 RX ORDER — HYDROCHLOROTHIAZIDE 25 MG/1
25 TABLET ORAL DAILY
Status: DISCONTINUED | OUTPATIENT
Start: 2018-09-30 | End: 2018-09-30

## 2018-09-29 RX ORDER — AMOXICILLIN AND CLAVULANATE POTASSIUM 875; 125 MG/1; MG/1
1 TABLET, FILM COATED ORAL EVERY 12 HOURS SCHEDULED
Status: DISCONTINUED | OUTPATIENT
Start: 2018-09-29 | End: 2018-10-01 | Stop reason: HOSPADM

## 2018-09-29 RX ORDER — FUROSEMIDE 10 MG/ML
40 INJECTION INTRAMUSCULAR; INTRAVENOUS ONCE
Status: COMPLETED | OUTPATIENT
Start: 2018-09-29 | End: 2018-09-29

## 2018-09-29 RX ADMIN — Medication 10 ML: at 10:10

## 2018-09-29 RX ADMIN — AMOXICILLIN AND CLAVULANATE POTASSIUM 1 TABLET: 875; 125 TABLET, FILM COATED ORAL at 22:17

## 2018-09-29 RX ADMIN — PANTOPRAZOLE SODIUM 40 MG: 40 TABLET, DELAYED RELEASE ORAL at 10:14

## 2018-09-29 RX ADMIN — MICONAZOLE NITRATE: 2 POWDER TOPICAL at 10:10

## 2018-09-29 RX ADMIN — FUROSEMIDE 40 MG: 10 INJECTION, SOLUTION INTRAMUSCULAR; INTRAVENOUS at 12:39

## 2018-09-29 RX ADMIN — Medication 250 MG: at 10:09

## 2018-09-29 RX ADMIN — AMOXICILLIN AND CLAVULANATE POTASSIUM 1 TABLET: 875; 125 TABLET, FILM COATED ORAL at 10:09

## 2018-09-29 RX ADMIN — Medication 10 ML: at 22:18

## 2018-09-29 RX ADMIN — PANTOPRAZOLE SODIUM 40 MG: 40 TABLET, DELAYED RELEASE ORAL at 17:51

## 2018-09-29 RX ADMIN — FOLIC ACID 1 MG: 1 TABLET ORAL at 10:09

## 2018-09-29 RX ADMIN — IRON SUCROSE 200 MG: 20 INJECTION, SOLUTION INTRAVENOUS at 13:04

## 2018-09-29 RX ADMIN — MICONAZOLE NITRATE: 2 POWDER TOPICAL at 22:20

## 2018-09-29 RX ADMIN — Medication 250 MG: at 22:17

## 2018-09-29 ASSESSMENT — PAIN SCALES - GENERAL: PAINLEVEL_OUTOF10: 0

## 2018-09-29 NOTE — PLAN OF CARE
Problem: Falls - Risk of:  Goal: Absence of physical injury  Absence of physical injury   Pt was out of bed today. Walked to room door and to the chair in the room. Pt was up for about 50 min. Pt was too short to get up into the bariatric bed. Took several staff members to assist. Pt did very well standing and walking. Problem: Risk for Impaired Skin Integrity  Goal: Tissue integrity - skin and mucous membranes  Structural intactness and normal physiological function of skin and  mucous membranes. Intervention: SKIN ASSESSMENT  Pt did get a scratch on his leg from the transfer. Pt has blood blisters on bilateral butt cheeks. And scabs and bruising throughout. Pt is kept clean dry and then creamed. Pt has nystatin powder to all folds. No irritation noted. Scrotum is a bit red. Cleansed and creamed. powdered      Problem: Pain:  Goal: Pain level will decrease  Pain level will decrease   Pt has not complained of pain today.

## 2018-09-29 NOTE — PROGRESS NOTES
Physical Therapy  Facility/Department: [place] [address] [facility]  Daily Treatment Note  NAME: Vicente Colón  : 1950  MRN: [id number]    Date of Service: 2018    Discharge Recommendations:  [address] spouse currently refusing placement. Therapist provided patient & spouse with extensive education in regards to PT recommendation. PT Equipment Recommendations  Equipment Needed: Yes  Mobility Devices: Anahi Lennox (Bariatric )    Patient Diagnosis(es): The primary encounter diagnosis was Sepsis, due to unspecified organism ([address] Diagnoses of Acute respiratory failure with hypoxia ([address] Urinary tract infection without hematuria, site unspecified, Anemia, unspecified type, Dehydration, Hypokalemia, Elevated troponin, Congestive heart failure, unspecified HF chronicity, unspecified heart failure type ([address] Hypomagnesemia, and Subtherapeutic international normalized ratio (INR) were also pertinent to this visit. has a past medical history of Cancer ([facility] Edema of both legs; GI bleed; Hx of blood clots; and Hypertension. has a past surgical history that includes Kidney surgery (2014); Abdomen surgery; Colonoscopy; Endoscopy, colon, diagnostic; and pr egd transoral biopsy single/multiple (2018). Restrictions  Restrictions/Precautions  Restrictions/Precautions: Fall Risk, General Precautions  Position Activity Restriction  Other position/activity restrictions: Up as tolerated, 2L O2 via nilson PLATA   Subjective   General  Chart Reviewed: Yes  Family / Caregiver Present: Yes  Referring Practitioner: JOEY Shook CNP  Subjective  Subjective: Pt supine in bed upon PT arrival with all lines intact & NAD. Pt agreeable to PT tx this date. General Comment  Comments: Pt's spouse present throughout tx & r/o no questions and/or concerns in regards to d/c home with some(NOT) 24 hr. S/A despite therapist's recommendation of SNF level of care with skilled PT services at d/c.    Pain Screening  Patient Currently in Pain: No  Vital Signs  Patient Currently in Pain: No  Oxygen Therapy  SpO2: 93 %  Pulse Oximeter Device Mode: Continuous  O2 Device: Nasal cannula (2L O2 )       Orientation  Orientation  Overall Orientation Status: Within Normal Limits  Objective   Bed mobility  Supine to Sit: Minimal assistance (with 2 person. +HOB elevated & bed railing use (pt r/o sleeping in lift chair at home) )  Transfers  Sit to Stand: Minimal Assistance (up to SW. +VC for safety & foot placement prior to transition. )  Stand to sit: Minimal Assistance (cues for positioning & maintaining a slow controlled descent to chair. )  Ambulation  Ambulation?: Yes  Ambulation 1  Surface: level tile  Device: Standard Walker (bariatric )  Assistance: Contact guard assistance  Quality of Gait: Pt ambulates with a wide STEFAN and significant dec'd B step length/stride length, dec'd B foot clearance with a slow step to gait pattern. No overt LOB. +frequent standing therapeutic rest breaks 2/2 fatigue. Distance: 13'   Comments: Spouse present during tx & r/o pt typically ambulates \"better\" at home. Stairs/Curb  Stairs?: No (deferred 2/2 safety concerns. )     Balance  Sitting - Static: Good  Sitting - Dynamic: Good  Standing - Static: Good  Standing - Dynamic: Good                           Assessment   Body structures, Functions, Activity limitations: Decreased functional mobility ; Decreased endurance;Decreased strength;Decreased safe awareness  Assessment: Pt with improved motivation/willingness to participate in therapy today however pt & spouse continue to lack insight to pt's current deficits in mobility, strength, activity tolerance & need for continued skilled PT servics at SNF level of care. Pt & pt's spouse were educated on PT d/c recommendation & safety concerns in regards to pt's declining placement.    Treatment Diagnosis: decreased mobility   Prognosis: Good  Decision Making: Medium Complexity  Patient Education: Pt & spouse educated on role of PT, importance of mobility, txf training, gait training, DME recommendations, d/c recommendation. Pt & pt's spouse v.u however continue to lack insight. Barriers to Learning: impaired safety awareness   REQUIRES PT FOLLOW UP: Yes  Activity Tolerance  Activity Tolerance: Patient limited by fatigue;Patient limited by endurance  Activity Tolerance: SpO2 stable throughout therapy session; however pt endorses fatigue while OOB. Requires frequent therapeutic rest breaks. G-Code     OutComes Score                                                    AM-PAC Score  AM-PAC Inpatient Mobility Raw Score : 16  AM-PAC Inpatient T-Scale Score : 40.78  Mobility Inpatient CMS 0-100% Score: 54.16  Mobility Inpatient CMS G-Code Modifier : CK          Goals  Short term goals  Time Frame for Short term goals: To be met in 1 week (10/6/18). Short term goal 1: Pt will roll with min assist.   Short term goal 2: Pt will transfer to wit EOB with mod assist--Goal met this date. New goal established-->Pt will txf sit<>stand with S up to SW with no cues for hand placement. Short term goal 3: Pt will SPT to chair with min assist and cane--Goal addressed this date. New goal established-->Pt will txf bed<>chair with S & SW with no LOB. Short term goal 4: Pt will ambulate to/from the bathroom with min assist and cane--Goal met this date. New goal established-->Pt will ambulate 27' with CGA, SW with x 1 standing therapeutic rest break. Long term goals  Time Frame for Long term goals : To be met in 2 weeks (10/13/18)  Long term goal 1: Pt will tolerate stair assessment. Patient Goals   Patient goals :  \"To go home\"     Plan    Plan  Times per week: 3-5x/week   Current Treatment Recommendations: Strengthening, Balance Training, Functional Mobility Training, Endurance Training, Transfer Training, Gait Training, Home Exercise Program, Patient/Caregiver Education & Training, Safety Education & Training, Equipment Evaluation, Education, & procurement, Stair training  Safety Devices  Type of devices: Left in chair, Nurse notified, Call light within reach, Gait belt (Spouse & RN present in room upon PT exit)  Restraints  Initially in place: No     Therapy Time   Individual Concurrent Group Co-treatment   Time In 1218         Time Out 1303         Minutes 45         Timed Code Treatment Minutes: [address] Ana De La Cruz, PT

## 2018-09-29 NOTE — PROGRESS NOTES
33*  33*  27*   CREATININE  1.6*  1.4*  1.1   CALCIUM  8.1*  8.1*  8.0*   PHOS  3.4  2.4*  2.0*     Recent Labs      09/27/18   0448  09/28/18   0448   AST  9*  13*   ALT  9*  9*   BILIDIR  <0.2  <0.2   BILITOT  0.4  0.4   ALKPHOS  84  80     Recent Labs      09/27/18   0447  09/28/18   0448  09/29/18   0532   INR  2.95*  3.16*  3.04*     No results for input(s): CKTOTAL, TROPONINI in the last 72 hours. Urinalysis:      Lab Results   Component Value Date    NITRU Negative 09/23/2018    WBCUA 6-10 09/23/2018    BACTERIA Rare 09/23/2018    RBCUA None seen 09/23/2018    BLOODU SMALL 09/23/2018    SPECGRAV 1.015 09/23/2018    GLUCOSEU Negative 09/23/2018       Radiology:  XR CHEST PORTABLE   Final Result   Improved aeration of the lungs         US GALLBLADDER RUQ   Final Result   Gallbladder sludge and gallbladder distension. There is thickening of   portions of the gallbladder wall though ancillary findings of cholecystitis   are not otherwise seen. As developing cholecystitis cannot be excluded, HIDA   scan could be considered for further assessment as warranted. Mild prominence of common bile duct for patient age. XR CHEST PORTABLE   Final Result   Increased airspace disease at the lung bases         CT ABDOMEN PELVIS WO CONTRAST Additional Contrast? None   Final Result   ABDOMEN/PELVIS      1. Hydropic gallbladder with large gallstone at the gallbladder neck and   features of acute cholecystitis. 2. Gas within the biliary system is presumably from reported recent ERCP. 3. Large ventral abdominal hernia containing intra-abdominal fat and   nonobstructed loops of bowel. Soft tissue edema along the inferior pannus. CHEST      1. Small bilateral pleural effusions. 2. 2 cm hypodensity in the right thyroid gland may be further evaluated with   ultrasound (reference: J Am Saul Radiol. 2015 Feb;12(2): 143-50). CT CHEST WO CONTRAST   Final Result   ABDOMEN/PELVIS      1.  Hydropic gallbladder

## 2018-09-29 NOTE — PROGRESS NOTES
natriuretic peptide (BNP) level    Elevated troponin    Pulmonary vascular congestion on CXR    Hypomagnesemia    Hypokalemia    Hyponatremia    Hypochloremia    KEVIN (acute kidney injury) ([address]    Subacute microcytic anemia (July 2018)    Acute hypoxemic respiratory failure (HCC)    Acute cholecystitis    Intertrigo    Lymphedema  Resolved Problems:    * No resolved hospital problems. *    Blood pressure (!) 151/77, pulse 89, temperature 97.9 °F (36.6 °C), resp. rate 18, height 5' 4\" (1.626 m), weight (!) 384 lb 4.8 oz (174.3 kg), SpO2 93 %. Subjective:  Symptoms:  Stable. Pain:  He reports no pain. Objective:  General Appearance: In no acute distress and not in pain. Vital signs: (most recent): Blood pressure (!) 151/77, pulse 89, temperature 97.9 °F (36.6 °C), resp. rate 18, height 5' 4\" (1.626 m), weight (!) 384 lb 4.8 oz (174.3 kg), SpO2 93 %. Abdomen: Abdomen is soft. Bowel sounds are normal.   There is no abdominal tenderness. Assessment & Plan  79 y. o. male with a hx of morbid obesity, HTN, kidney CA, DVT on Couamdin and recent choledocholithiasis s/p ERCP in July 2018, with plans for a cholecystectomy. He had a GI bleed in Aug 2018 (EGD at [facility] reportedly showed a duodenal ulcer). Is admitted for a sudden high fever and resp failure w pl effusions and elevated Troponin. I was consulted for his Fe def anemia and to evaluate for a colonoscopy. H/H 7/23, MCV 72, Fe 3%. Repeat EGD 9/28 w mild gastritis, biopsied. No ulcer.     Plan:   1. Needs outpatient colonoscopy at the hospital  2. F/U daily H/H  3.  Will follow   MD Iwona Adhikari MD  9/29/2018

## 2018-09-29 NOTE — PROGRESS NOTES
Called for pt several times. No answer. Called c5 to check on physical therapist. Reviewed last note from physical therapy and pt declined. Awaiting for pt to be located. No luck on c5. Will check the aru. Called over to aru and located the pt team. Updated them with md request to get back oob and up to the chair. Pt is to be discharged home tomorrow and we must be sure he can get up.

## 2018-09-29 NOTE — PROGRESS NOTES
Dx: Hx DVT  Goal INR range 1.5-2.5  Home Warfarin dose: 5mg daily        Date                 INR                  Warfarin  9/23 2113       1.94  9/24                2.18                 3mg  9/25                2.93                 Hold  9/26                2.96                 Hold  9/27                2.95                 Hold  9/28                3.16                 Hold  9/29            3.04            Hold     Recommend Warfarin be held tonight x1, pt has tight INR goal range. Daily INR ordered. Rx will continue to manage therapy per consult order.   Alicia Renae PharmD 9/29/2018 7:47 AM

## 2018-09-29 NOTE — PROGRESS NOTES
1. 6*  1.4*  1.1   LABGLOM  43*  50*  >60   GFRAA  52*  >60  >60       Assessment/     Acute Kidney Injury:  KDIGO stage 1  - Data:  CT with no hydronephrosis. Albumin was 2.8 upon presentation. U/P creatinine was 75. Low grade pyuria on UA. No hematuria. Spot ACR was 143 mg/g. Low BP. Home medications with lisinopril and HCTZ  - Etiology:  Pre-renal factors ( low BP, infection, and had been on lisinopril )  - Clinical:  Scr is better, down to 1.1     Anemia:  Hypoproliferative with severe iron deficiency. S/p blood transfusion. S/p EGD. On IV iron. Drifting with frequent lab draws. Also unclear if anticoagulation should be held      Hypoxia:  Mild pulmonary edema on CXR with small bilateral pleural effusions.        Fever:  Covering for cholecystitis with 2 weeks antibiotics and planning for surgery as outpatient. Looks better.   BP stable     Plan/     IV lasix 40 mg x 1  Ok to restart HCTZ tomorrow  Would hold on start of ACEi for the time being until after surgery  Follow labs while here  Discharge planning is ok from nephrology standpoint   -----------------------------  Forrest Sharpe M.D.   [facility]

## 2018-09-30 LAB
ALBUMIN SERPL-MCNC: 2.4 G/DL (ref 3.4–5)
ANION GAP SERPL CALCULATED.3IONS-SCNC: 6 MMOL/L (ref 3–16)
BANDED NEUTROPHILS RELATIVE PERCENT: 13 % (ref 0–7)
BASOPHILS ABSOLUTE: 0 K/UL (ref 0–0.2)
BASOPHILS RELATIVE PERCENT: 0 %
BILIRUBIN URINE: NEGATIVE
BLOOD, URINE: ABNORMAL
BUN BLDV-MCNC: 21 MG/DL (ref 7–20)
CALCIUM SERPL-MCNC: 8.2 MG/DL (ref 8.3–10.6)
CHLORIDE BLD-SCNC: 105 MMOL/L (ref 99–110)
CLARITY: CLEAR
CO2: 32 MMOL/L (ref 21–32)
COLOR: YELLOW
CREAT SERPL-MCNC: 1 MG/DL (ref 0.8–1.3)
CRENATED RBC'S: ABNORMAL
EOSINOPHILS ABSOLUTE: 0.1 K/UL (ref 0–0.6)
EOSINOPHILS RELATIVE PERCENT: 3 %
EPITHELIAL CELLS, UA: ABNORMAL /HPF
GFR AFRICAN AMERICAN: >60
GFR NON-AFRICAN AMERICAN: >60
GLUCOSE BLD-MCNC: 94 MG/DL (ref 70–99)
GLUCOSE URINE: NEGATIVE MG/DL
HCT VFR BLD CALC: 27.2 % (ref 40.5–52.5)
HEMOGLOBIN: 8.4 G/DL (ref 13.5–17.5)
HYPOCHROMIA: ABNORMAL
INR BLD: 2.91 (ref 0.86–1.14)
KETONES, URINE: NEGATIVE MG/DL
LEUKOCYTE ESTERASE, URINE: ABNORMAL
LYMPHOCYTES ABSOLUTE: 0.5 K/UL (ref 1–5.1)
LYMPHOCYTES RELATIVE PERCENT: 12 %
MCH RBC QN AUTO: 22.2 PG (ref 26–34)
MCHC RBC AUTO-ENTMCNC: 30.9 G/DL (ref 31–36)
MCV RBC AUTO: 72 FL (ref 80–100)
METAMYELOCYTES RELATIVE PERCENT: 3 %
MICROCYTES: ABNORMAL
MICROSCOPIC EXAMINATION: YES
MONOCYTES ABSOLUTE: 0.1 K/UL (ref 0–1.3)
MONOCYTES RELATIVE PERCENT: 2 %
MYELOCYTE PERCENT: 1 %
NEUTROPHILS ABSOLUTE: 3.3 K/UL (ref 1.7–7.7)
NEUTROPHILS RELATIVE PERCENT: 66 %
NITRITE, URINE: NEGATIVE
PDW BLD-RTO: 22.8 % (ref 12.4–15.4)
PH UA: 5.5
PHOSPHORUS: 1.9 MG/DL (ref 2.5–4.9)
PLATELET # BLD: 203 K/UL (ref 135–450)
PMV BLD AUTO: 8.6 FL (ref 5–10.5)
POTASSIUM SERPL-SCNC: 3.8 MMOL/L (ref 3.5–5.1)
PROTEIN UA: NEGATIVE MG/DL
PROTHROMBIN TIME: 33.2 SEC (ref 9.8–13)
RBC # BLD: 3.78 M/UL (ref 4.2–5.9)
RBC UA: >100 /HPF (ref 0–2)
SLIDE REVIEW: ABNORMAL
SODIUM BLD-SCNC: 143 MMOL/L (ref 136–145)
SPECIFIC GRAVITY UA: 1.01
SPHEROCYTES: ABNORMAL
URINE TYPE: ABNORMAL
UROBILINOGEN, URINE: 0.2 E.U./DL
WBC # BLD: 4 K/UL (ref 4–11)
WBC UA: ABNORMAL /HPF (ref 0–5)

## 2018-09-30 PROCEDURE — 81001 URINALYSIS AUTO W/SCOPE: CPT

## 2018-09-30 PROCEDURE — 6370000000 HC RX 637 (ALT 250 FOR IP): Performed by: NURSE PRACTITIONER

## 2018-09-30 PROCEDURE — 51798 US URINE CAPACITY MEASURE: CPT

## 2018-09-30 PROCEDURE — 87086 URINE CULTURE/COLONY COUNT: CPT

## 2018-09-30 PROCEDURE — 85025 COMPLETE CBC W/AUTO DIFF WBC: CPT

## 2018-09-30 PROCEDURE — 2580000003 HC RX 258: Performed by: HOSPITALIST

## 2018-09-30 PROCEDURE — 2060000000 HC ICU INTERMEDIATE R&B

## 2018-09-30 PROCEDURE — 36415 COLL VENOUS BLD VENIPUNCTURE: CPT

## 2018-09-30 PROCEDURE — 2500000003 HC RX 250 WO HCPCS: Performed by: INTERNAL MEDICINE

## 2018-09-30 PROCEDURE — 2580000003 HC RX 258: Performed by: INTERNAL MEDICINE

## 2018-09-30 PROCEDURE — 80069 RENAL FUNCTION PANEL: CPT

## 2018-09-30 PROCEDURE — 6360000002 HC RX W HCPCS: Performed by: INTERNAL MEDICINE

## 2018-09-30 PROCEDURE — 94660 CPAP INITIATION&MGMT: CPT

## 2018-09-30 PROCEDURE — 2700000000 HC OXYGEN THERAPY PER DAY

## 2018-09-30 PROCEDURE — 99232 SBSQ HOSP IP/OBS MODERATE 35: CPT | Performed by: INTERNAL MEDICINE

## 2018-09-30 PROCEDURE — 94761 N-INVAS EAR/PLS OXIMETRY MLT: CPT

## 2018-09-30 PROCEDURE — 85610 PROTHROMBIN TIME: CPT

## 2018-09-30 RX ORDER — AMOXICILLIN AND CLAVULANATE POTASSIUM 875; 125 MG/1; MG/1
1 TABLET, FILM COATED ORAL EVERY 12 HOURS SCHEDULED
Qty: 16 TABLET | Refills: 0 | Status: SHIPPED | OUTPATIENT
Start: 2018-09-30 | End: 2018-10-08

## 2018-09-30 RX ORDER — HYDROCHLOROTHIAZIDE 25 MG/1
25 TABLET ORAL DAILY
Qty: 30 TABLET | Refills: 3 | Status: SHIPPED | OUTPATIENT
Start: 2018-10-01 | End: 2019-04-06

## 2018-09-30 RX ORDER — TAMSULOSIN HYDROCHLORIDE 0.4 MG/1
0.4 CAPSULE ORAL DAILY
Status: DISCONTINUED | OUTPATIENT
Start: 2018-09-30 | End: 2018-10-01 | Stop reason: HOSPADM

## 2018-09-30 RX ORDER — SACCHAROMYCES BOULARDII 250 MG
250 CAPSULE ORAL 2 TIMES DAILY
Qty: 42 CAPSULE | Refills: 0 | Status: SHIPPED | OUTPATIENT
Start: 2018-09-30 | End: 2018-10-21

## 2018-09-30 RX ORDER — HYDROCHLOROTHIAZIDE 25 MG/1
25 TABLET ORAL DAILY
Status: DISCONTINUED | OUTPATIENT
Start: 2018-10-01 | End: 2018-10-01 | Stop reason: HOSPADM

## 2018-09-30 RX ORDER — FUROSEMIDE 10 MG/ML
60 INJECTION INTRAMUSCULAR; INTRAVENOUS ONCE
Status: COMPLETED | OUTPATIENT
Start: 2018-09-30 | End: 2018-09-30

## 2018-09-30 RX ORDER — FOLIC ACID 1 MG/1
1 TABLET ORAL DAILY
Qty: 30 TABLET | Refills: 3 | Status: SHIPPED | OUTPATIENT
Start: 2018-10-01

## 2018-09-30 RX ORDER — WARFARIN SODIUM 2.5 MG/1
2.5 TABLET ORAL
Status: DISCONTINUED | OUTPATIENT
Start: 2018-09-30 | End: 2018-09-30

## 2018-09-30 RX ADMIN — PANTOPRAZOLE SODIUM 40 MG: 40 TABLET, DELAYED RELEASE ORAL at 18:16

## 2018-09-30 RX ADMIN — PANTOPRAZOLE SODIUM 40 MG: 40 TABLET, DELAYED RELEASE ORAL at 06:24

## 2018-09-30 RX ADMIN — FUROSEMIDE 60 MG: 10 INJECTION, SOLUTION INTRAMUSCULAR; INTRAVENOUS at 12:12

## 2018-09-30 RX ADMIN — HYDROCHLOROTHIAZIDE 25 MG: 25 TABLET ORAL at 10:23

## 2018-09-30 RX ADMIN — SODIUM CHLORIDE, PRESERVATIVE FREE 10 ML: 5 INJECTION INTRAVENOUS at 12:13

## 2018-09-30 RX ADMIN — Medication 10 ML: at 22:21

## 2018-09-30 RX ADMIN — AMOXICILLIN AND CLAVULANATE POTASSIUM 1 TABLET: 875; 125 TABLET, FILM COATED ORAL at 09:23

## 2018-09-30 RX ADMIN — Medication 250 MG: at 09:23

## 2018-09-30 RX ADMIN — AMOXICILLIN AND CLAVULANATE POTASSIUM 1 TABLET: 875; 125 TABLET, FILM COATED ORAL at 20:00

## 2018-09-30 RX ADMIN — IRON SUCROSE 200 MG: 20 INJECTION, SOLUTION INTRAVENOUS at 09:23

## 2018-09-30 RX ADMIN — TAMSULOSIN HYDROCHLORIDE 0.4 MG: 0.4 CAPSULE ORAL at 18:16

## 2018-09-30 RX ADMIN — Medication 250 MG: at 20:00

## 2018-09-30 RX ADMIN — POTASSIUM PHOSPHATE, MONOBASIC AND POTASSIUM PHOSPHATE, DIBASIC 15 MMOL: 224; 236 INJECTION, SOLUTION, CONCENTRATE INTRAVENOUS at 12:12

## 2018-09-30 RX ADMIN — Medication 10 ML: at 09:23

## 2018-09-30 RX ADMIN — FOLIC ACID 1 MG: 1 TABLET ORAL at 09:28

## 2018-09-30 RX ADMIN — MICONAZOLE NITRATE: 2 POWDER TOPICAL at 09:29

## 2018-09-30 ASSESSMENT — PAIN SCALES - GENERAL: PAINLEVEL_OUTOF10: 0

## 2018-09-30 NOTE — PLAN OF CARE
Problem: Nutrition  Goal: Optimal nutrition therapy  Outcome: Ongoing  Pt appetite not adequate.  Refused dinner this evening and HS snack

## 2018-10-01 VITALS
HEART RATE: 79 BPM | TEMPERATURE: 98.4 F | OXYGEN SATURATION: 97 % | BODY MASS INDEX: 53.78 KG/M2 | SYSTOLIC BLOOD PRESSURE: 155 MMHG | DIASTOLIC BLOOD PRESSURE: 77 MMHG | RESPIRATION RATE: 14 BRPM | WEIGHT: 315 LBS | HEIGHT: 64 IN

## 2018-10-01 PROBLEM — K80.00 CALCULUS OF GALLBLADDER WITH ACUTE CHOLECYSTITIS WITHOUT OBSTRUCTION: Status: ACTIVE | Noted: 2018-09-24

## 2018-10-01 PROBLEM — J96.02 ACUTE RESPIRATORY FAILURE WITH HYPOXIA AND HYPERCAPNIA (HCC): Status: ACTIVE | Noted: 2018-09-24

## 2018-10-01 PROBLEM — I51.7 BIATRIAL ENLARGEMENT: Status: ACTIVE | Noted: 2018-10-01

## 2018-10-01 PROBLEM — R29.818 SUSPECTED SLEEP APNEA: Status: ACTIVE | Noted: 2018-10-01

## 2018-10-01 LAB
ALBUMIN SERPL-MCNC: 2.5 G/DL (ref 3.4–5)
ANION GAP SERPL CALCULATED.3IONS-SCNC: 6 MMOL/L (ref 3–16)
ANISOCYTOSIS: ABNORMAL
BANDED NEUTROPHILS RELATIVE PERCENT: 17 % (ref 0–7)
BASOPHILS ABSOLUTE: 0 K/UL (ref 0–0.2)
BASOPHILS RELATIVE PERCENT: 0 %
BUN BLDV-MCNC: 18 MG/DL (ref 7–20)
CALCIUM SERPL-MCNC: 8.5 MG/DL (ref 8.3–10.6)
CHLORIDE BLD-SCNC: 102 MMOL/L (ref 99–110)
CO2: 35 MMOL/L (ref 21–32)
CREAT SERPL-MCNC: 1.1 MG/DL (ref 0.8–1.3)
EOSINOPHILS ABSOLUTE: 0.2 K/UL (ref 0–0.6)
EOSINOPHILS RELATIVE PERCENT: 3 %
GFR AFRICAN AMERICAN: >60
GFR NON-AFRICAN AMERICAN: >60
GLUCOSE BLD-MCNC: 96 MG/DL (ref 70–99)
HCT VFR BLD CALC: 30.3 % (ref 40.5–52.5)
HEMOGLOBIN: 9.3 G/DL (ref 13.5–17.5)
HYPOCHROMIA: ABNORMAL
INR BLD: 2.96 (ref 0.86–1.14)
LYMPHOCYTES ABSOLUTE: 0.7 K/UL (ref 1–5.1)
LYMPHOCYTES RELATIVE PERCENT: 13 %
MCH RBC QN AUTO: 22.1 PG (ref 26–34)
MCHC RBC AUTO-ENTMCNC: 30.7 G/DL (ref 31–36)
MCV RBC AUTO: 71.9 FL (ref 80–100)
MONOCYTES ABSOLUTE: 0.3 K/UL (ref 0–1.3)
MONOCYTES RELATIVE PERCENT: 5 %
NEUTROPHILS ABSOLUTE: 4.4 K/UL (ref 1.7–7.7)
NEUTROPHILS RELATIVE PERCENT: 62 %
OVALOCYTES: ABNORMAL
PDW BLD-RTO: 23.3 % (ref 12.4–15.4)
PHOSPHORUS: 2.3 MG/DL (ref 2.5–4.9)
PLATELET # BLD: 244 K/UL (ref 135–450)
PMV BLD AUTO: 8.6 FL (ref 5–10.5)
POLYCHROMASIA: ABNORMAL
POTASSIUM SERPL-SCNC: 3.7 MMOL/L (ref 3.5–5.1)
PROTHROMBIN TIME: 33.7 SEC (ref 9.8–13)
RBC # BLD: 4.22 M/UL (ref 4.2–5.9)
SODIUM BLD-SCNC: 143 MMOL/L (ref 136–145)
WBC # BLD: 5.6 K/UL (ref 4–11)

## 2018-10-01 PROCEDURE — 85025 COMPLETE CBC W/AUTO DIFF WBC: CPT

## 2018-10-01 PROCEDURE — 80069 RENAL FUNCTION PANEL: CPT

## 2018-10-01 PROCEDURE — 36415 COLL VENOUS BLD VENIPUNCTURE: CPT

## 2018-10-01 PROCEDURE — 94660 CPAP INITIATION&MGMT: CPT

## 2018-10-01 PROCEDURE — 6370000000 HC RX 637 (ALT 250 FOR IP): Performed by: NURSE PRACTITIONER

## 2018-10-01 PROCEDURE — 2700000000 HC OXYGEN THERAPY PER DAY

## 2018-10-01 PROCEDURE — 99232 SBSQ HOSP IP/OBS MODERATE 35: CPT | Performed by: INTERNAL MEDICINE

## 2018-10-01 PROCEDURE — 94761 N-INVAS EAR/PLS OXIMETRY MLT: CPT

## 2018-10-01 PROCEDURE — 85610 PROTHROMBIN TIME: CPT

## 2018-10-01 PROCEDURE — 2580000003 HC RX 258: Performed by: HOSPITALIST

## 2018-10-01 RX ORDER — TAMSULOSIN HYDROCHLORIDE 0.4 MG/1
0.4 CAPSULE ORAL DAILY
Qty: 30 CAPSULE | Refills: 3 | Status: ON HOLD | OUTPATIENT
Start: 2018-10-02 | End: 2020-10-22 | Stop reason: HOSPADM

## 2018-10-01 RX ADMIN — Medication 10 ML: at 10:04

## 2018-10-01 RX ADMIN — FOLIC ACID 1 MG: 1 TABLET ORAL at 10:04

## 2018-10-01 RX ADMIN — AMOXICILLIN AND CLAVULANATE POTASSIUM 1 TABLET: 875; 125 TABLET, FILM COATED ORAL at 10:04

## 2018-10-01 RX ADMIN — Medication 250 MG: at 10:04

## 2018-10-01 RX ADMIN — TAMSULOSIN HYDROCHLORIDE 0.4 MG: 0.4 CAPSULE ORAL at 10:04

## 2018-10-01 RX ADMIN — HYDROCHLOROTHIAZIDE 25 MG: 25 TABLET ORAL at 10:03

## 2018-10-01 RX ADMIN — PANTOPRAZOLE SODIUM 40 MG: 40 TABLET, DELAYED RELEASE ORAL at 05:33

## 2018-10-01 ASSESSMENT — PAIN SCALES - GENERAL: PAINLEVEL_OUTOF10: 0

## 2018-10-01 NOTE — PROGRESS NOTES
monitor and replete.     Hypomagnesemia. Resolved. - monitor and replete.     Super Morbid Obesity.  - body mass index is 64.3 kg/m². Complicating assessment and treatment. Placing patient at risk for multiple co-morbidities as well as early death and contributing to the patient's presentation. Counseled on weight loss    Chronic Ventral Hernia. - general surgery consult. - no plans for surgical repair. Long term AC due to hx of DVT x 2.  - hypercoag work-up in process. - hematology consulted. - patient is high risk for repeat DVT given obesity and limited mobility. There is no good balance between the risk of bleeding and the risk of clotting.    - coumadin is the best drug of choice given morbid obesity.  - INR is still on the high side of therapeutic. Discussed with pharmacy. Planning to hold warfarin at discharge and have home care check INR Tuesday, 10/2. Elevated troponin and Pro-BNP. - likely due to sepsis. - ECHO completed - EF 55-60%. Left ventricular diastolic filling pressure is normal.  - cardiology consulted - will need an outpatient stress test.    Acute urinary retention following devine removal   - likely related to obesity and immobility. - replace devine 9/30. Send UA/UCx.  - start Flomax. - consult urology - pending.  - will likely need to be discharged with devine until mobility improves.     DVT Prophylaxis: warfarin - on hold last 6 days - INR 2.91  Diet: Dietary Nutrition Supplements: Low Calorie High Protein Supplement  DIET CARDIAC;  Code Status: Full Code    PT/OT Eval Status: recommend SNF - patient and spouse prefer home care     Dispo - hopefully tomorrow    JOEY Webb - CNP

## 2018-10-01 NOTE — PROGRESS NOTES
10/01/18 0054   NIV Type   $NIV $Daily Charge   Equipment Type V60   Mode BIPAP   Mask Type Full face mask   Mask Size Medium   Settings/Measurements   Comfort Level Good   Using Accessory Muscles No   IPAP 18 cmH20   EPAP 8 cmH2O   Resp 18   SpO2 96   FiO2  35 %   Vt Exhaled 627 mL   Mask Leak (lpm) 0 lpm   Skin Protection for O2 Device Yes   Breath Sounds   Right Upper Lobe Diminished   Right Middle Lobe Diminished   Right Lower Lobe Diminished   Left Upper Lobe Diminished   Left Lower Lobe Diminished   Alarm Settings   Alarms On Y

## 2018-10-01 NOTE — FLOWSHEET NOTE
Musculoskeletal   Musculoskeletal (WDL) X   RUE Full movement   LUE Full movement   RL Extremity Full movement;Weakness   LL Extremity Full movement;Weakness   Genitourinary   Genitourinary (WDL) X  (devine)   Flank Tenderness No   Suprapubic Tenderness No   Dysuria POLI   Anus/Rectum   Anus/Rectum (WDL) WDL   Wound 09/24/18 Buttocks Left;Right DTI to bilat buttocks   Date First Assessed/Time First Assessed: 09/24/18 0230   Wound Event: Pressure  Location: Buttocks  Wound Location Orientation: Left;Right  Wound Description (Comments): DTI to bilat buttocks  Pre-existing: Yes  Photo Taken: No   Wound Type Wound   Dressing Status Other (Comment)  (barrier wipes)   Wound 09/24/18 Other (Comment) Abdomen Left open wounds to abd. hernia area    Date First Assessed/Time First Assessed: 09/24/18 0230   Wound Type: (c) Other (Comment)  Location: Abdomen  Wound Location Orientation: Left  Wound Description (Comments): open wounds to abd. hernia area   Pre-existing: Yes  Photo Taken: No   Wound Type Wound   Dressing Status Clean;Dry; Intact   Dressing/Treatment Foam  (mepilex)   Psychosocial   Psychosocial (WDL) WDL   Patient Behaviors Appropriate for age   Rest/Sleep for Patient [address]

## 2018-10-01 NOTE — CARE COORDINATION
10/1/18 [address] [place] [address],[address] will accept this pt and obtain notification when we gets to their building. Transportation set up tentatively for 5 pm if okay with MD for pt to d/c today.  [facility],[address] aware that pt will need BiPap at the facility and that will not be an issue

## 2018-10-01 NOTE — PROGRESS NOTES
Report received from [facility]. Patient alert and oriented resting in bed. VSS and assessment completed. RN will continue to monitor.

## 2018-10-01 NOTE — DISCHARGE SUMMARY
at discharge. - pulmonary consulted and following.  - planning for outpatient sleep study. - BiPap at rehab     KEVIN - likely due to sepsis, hypotension. Resolved. - urine chemistry. - IVF stopped 9/25.  - trend renal panel.  - nephrology consulted - discussed case with Dr. Kwame Arreaga.  - support BP with midodrine. Avoid hypotension.  - planning to hold home lisinopril at discharge until after cholecystectomy.     Anemia, acute on chronic, in the setting of recent duodenal ulcer [facility] 8/2018). - serial H/H. Transfuse for Hgb < 7.  - s/p 2 units pRBC 9/25.  - GI consulted for possible endoscopy. - repeat EGD in AM 9/28 to re-evaluate previous duodenal ulcer noted in 8/2018 at [facility] - revealed gastritis. Patho pending.  - Continue Protonix PO BID. - Iron deficiency noted - received IV venofer x 3 doses. - will need outpatient colonoscopy.     Volume overload with bilateral pleural effusions. - likely multifactorial given poor nutritional status and hypoalbuminemia. - stop IVF 9/25.  - nephrology following.    - Lasix 40 mg IV x 1 9/29 and 60 mg IV x 1 9/30.  - needs repeat CXR in 2 weeks.     Hyponatremia and Hypochloremia. Resolved. - trend renal panel.     Hypokalemia. Resolved. - monitor and repleted.     Hypomagnesemia. Resolved. - monitor and repleted.     Super Morbid Obesity.  - body mass index is 93.2 kg/m². Complicating assessment and treatment. Placing patient at risk for multiple co-morbidities as well as early death and contributing to the patient's presentation. Counseled on weight loss     Chronic Ventral Hernia. - general surgery consult. - no plans for surgical repair.     Long term AC due to hx of DVT x 2.  - hypercoag work-up in process. - hematology consulted. - patient is high risk for repeat DVT given obesity and limited mobility.   There is no good balance between the risk of bleeding and the risk of clotting.    - coumadin is the best drug of choice given morbid obesity.  - INR is still on the high side of therapeutic. Discussed with pharmacy. Planning to hold warfarin at discharge and have SNF check INR Tuesday, 10/2.     Elevated troponin and Pro-BNP. - likely due to sepsis. - ECHO completed - EF 55-60%. Left ventricular diastolic filling pressure is normal.  - cardiology consulted - will need an outpatient stress test.     Acute urinary retention following devine removal   - likely related to obesity and immobility. - replace devine 9/30. Send UA/UCx.  - start Flomax. - consult urology - DC with devine and voiding trial as outpt. - no voiding trial until mobility improves.     DVT Prophylaxis: warfarin - on hold last 6 days - INR 2.9  Diet: Dietary Nutrition Supplements: Low Calorie High Protein Supplement  DIET CARDIAC;  Code Status: Full Code     [facility]     Physical Exam Performed:     BP (!) 155/77   Pulse 79   Temp 98.4 °F (36.9 °C) (Oral)   Resp 14   Ht 5' 4\" (1.626 m)   Wt (!) 364 lb 11.2 oz (165.4 kg)   SpO2 97%   BMI 62.60 kg/m²          General appearance: Pleasant male in no apparent distress, appears stated age and cooperative. HEENT: Pupils equal, round, and reactive to light. Conjunctivae/corneas clear. Neck: Supple, with full range of motion. No jugular venous distention. Trachea midline. Respiratory:  Normal respiratory effort. Decreased but clear, bilaterally without Rales/Wheezes/Rhonchi. Cardiovascular: Regular rate and rhythm with normal S1/S2 without murmurs, rubs or gallops. Abdomen: Obese, soft, non-tender, non-distended with normal bowel sounds. Large ventral hernia noted. Musculoskeletal: No clubbing, cyanosis bilaterally. Full range of motion without deformity. Limited mobility due to obesity. Skin: Skin color normal.  Severe lymphedema bilateral lower extremities. Neurologic:  Neurovascularly intact without any focal sensory/motor deficits. Cranial nerves: II-XII intact, grossly non-focal.  Psychiatric: Alert and oriented.   Normal insight. Capillary Refill: Brisk,< 3 seconds   Peripheral Pulses: +2 palpable, equal bilaterally     Labs: For convenience and continuity at follow-up the following most recent labs are provided:      CBC:    Lab Results   Component Value Date    WBC 5.6 10/01/2018    HGB 9.3 10/01/2018    HCT 30.3 10/01/2018     10/01/2018       Renal:    Lab Results   Component Value Date     10/01/2018    K 3.7 10/01/2018     10/01/2018    CO2 35 10/01/2018    BUN 18 10/01/2018    CREATININE 1.1 10/01/2018    CALCIUM 8.5 10/01/2018    PHOS 2.3 10/01/2018         Significant Diagnostic Studies    Radiology:   XR CHEST PORTABLE   Final Result   Improved aeration of the lungs         US GALLBLADDER RUQ   Final Result   Gallbladder sludge and gallbladder distension. There is thickening of   portions of the gallbladder wall though ancillary findings of cholecystitis   are not otherwise seen. As developing cholecystitis cannot be excluded, HIDA   scan could be considered for further assessment as warranted. Mild prominence of common bile duct for patient age. XR CHEST PORTABLE   Final Result   Increased airspace disease at the lung bases         CT ABDOMEN PELVIS WO CONTRAST Additional Contrast? None   Final Result   ABDOMEN/PELVIS      1. Hydropic gallbladder with large gallstone at the gallbladder neck and   features of acute cholecystitis. 2. Gas within the biliary system is presumably from reported recent ERCP. 3. Large ventral abdominal hernia containing intra-abdominal fat and   nonobstructed loops of bowel. Soft tissue edema along the inferior pannus. CHEST      1. Small bilateral pleural effusions. 2. 2 cm hypodensity in the right thyroid gland may be further evaluated with   ultrasound (reference: J Am Saul Radiol. 2015 Feb;12(2): 143-50). CT CHEST WO CONTRAST   Final Result   ABDOMEN/PELVIS      1.  Hydropic gallbladder with large gallstone at the gallbladder neck and MOUTH TWO TIMES A DAY  Refills: 3      warfarin (COUMADIN) 5 MG tablet              Time Spent on discharge is more than 30 minutes in the examination, evaluation, counseling and review of medications and discharge plan. Signed:    Teresita Rodriguez MD   10/1/2018      Thank you Renzo Steele MD for the opportunity to be involved in this patient's care. If you have any questions or concerns please feel free to contact me at [phone].

## 2018-10-02 LAB
EKG ATRIAL RATE: 131 BPM
EKG DIAGNOSIS: NORMAL
EKG P AXIS: 11 DEGREES
EKG P-R INTERVAL: 128 MS
EKG Q-T INTERVAL: 350 MS
EKG QRS DURATION: 134 MS
EKG QTC CALCULATION (BAZETT): 516 MS
EKG R AXIS: 83 DEGREES
EKG T AXIS: 16 DEGREES
EKG VENTRICULAR RATE: 131 BPM
ORGANISM: ABNORMAL
URINE CULTURE, ROUTINE: ABNORMAL
URINE CULTURE, ROUTINE: ABNORMAL

## 2018-10-24 PROBLEM — R79.89 ELEVATED TROPONIN: Status: RESOLVED | Noted: 2018-09-24 | Resolved: 2018-10-24

## 2018-10-24 PROBLEM — R77.8 ELEVATED TROPONIN: Status: RESOLVED | Noted: 2018-09-24 | Resolved: 2018-10-24

## 2018-10-25 PROBLEM — N39.0 UTI (URINARY TRACT INFECTION): Status: RESOLVED | Noted: 2018-09-24 | Resolved: 2018-10-25

## 2018-10-26 ENCOUNTER — HOSPITAL ENCOUNTER (OUTPATIENT)
Age: 68
Discharge: HOME | End: 2018-10-26
Payer: COMMERCIAL

## 2018-10-26 ENCOUNTER — HOSPITAL ENCOUNTER (OUTPATIENT)
Dept: CT IMAGING | Age: 68
Discharge: HOME | End: 2018-10-26
Payer: COMMERCIAL

## 2018-10-26 DIAGNOSIS — C64.1 MALIGNANT NEOPLASM OF RIGHT KIDNEY, EXCEPT RENAL PELVIS (HCC): ICD-10-CM

## 2018-10-26 PROCEDURE — 6360000004 HC RX CONTRAST MEDICATION: Performed by: UROLOGY

## 2018-10-26 PROCEDURE — 74170 CT ABD WO CNTRST FLWD CNTRST: CPT

## 2018-10-26 RX ADMIN — IOPAMIDOL 75 ML: 755 INJECTION, SOLUTION INTRAVENOUS at 14:27

## 2018-11-08 ENCOUNTER — HOSPITAL ENCOUNTER (OUTPATIENT)
Age: 68
Discharge: HOME | End: 2018-11-08
Payer: COMMERCIAL

## 2018-11-08 LAB
INR BLD: 1.51 (ref 0.86–1.14)
PROTHROMBIN TIME: 17.2 SEC (ref 9.8–13)

## 2018-11-08 PROCEDURE — 36415 COLL VENOUS BLD VENIPUNCTURE: CPT

## 2018-11-08 PROCEDURE — 85610 PROTHROMBIN TIME: CPT

## 2018-11-21 ENCOUNTER — HOSPITAL ENCOUNTER (OUTPATIENT)
Age: 68
Setting detail: SPECIMEN
Discharge: HOME | End: 2018-11-21
Payer: COMMERCIAL

## 2018-11-21 LAB
INR BLD: 2.13 (ref 0.86–1.14)
PROTHROMBIN TIME: 24.3 SEC (ref 9.8–13)

## 2018-11-21 PROCEDURE — 36415 COLL VENOUS BLD VENIPUNCTURE: CPT

## 2018-11-21 PROCEDURE — 85610 PROTHROMBIN TIME: CPT

## 2018-11-30 ENCOUNTER — HOSPITAL ENCOUNTER (OUTPATIENT)
Age: 68
Setting detail: SPECIMEN
Discharge: HOME | End: 2018-11-30
Payer: COMMERCIAL

## 2018-11-30 LAB
BASOPHILS ABSOLUTE: 0 K/UL (ref 0–0.2)
BASOPHILS RELATIVE PERCENT: 0.6 %
EOSINOPHILS ABSOLUTE: 0.2 K/UL (ref 0–0.6)
EOSINOPHILS RELATIVE PERCENT: 3.7 %
HCT VFR BLD CALC: 29.2 % (ref 40.5–52.5)
HEMOGLOBIN: 9.3 G/DL (ref 13.5–17.5)
LYMPHOCYTES ABSOLUTE: 0.5 K/UL (ref 1–5.1)
LYMPHOCYTES RELATIVE PERCENT: 8.9 %
MCH RBC QN AUTO: 24.3 PG (ref 26–34)
MCHC RBC AUTO-ENTMCNC: 31.7 G/DL (ref 31–36)
MCV RBC AUTO: 76.6 FL (ref 80–100)
MONOCYTES ABSOLUTE: 0.5 K/UL (ref 0–1.3)
MONOCYTES RELATIVE PERCENT: 8.2 %
NEUTROPHILS ABSOLUTE: 4.8 K/UL (ref 1.7–7.7)
NEUTROPHILS RELATIVE PERCENT: 78.6 %
PDW BLD-RTO: 21.1 % (ref 12.4–15.4)
PLATELET # BLD: 187 K/UL (ref 135–450)
PMV BLD AUTO: 9.1 FL (ref 5–10.5)
RBC # BLD: 3.81 M/UL (ref 4.2–5.9)
WBC # BLD: 6.1 K/UL (ref 4–11)

## 2018-11-30 PROCEDURE — 85025 COMPLETE CBC W/AUTO DIFF WBC: CPT

## 2018-11-30 PROCEDURE — 36415 COLL VENOUS BLD VENIPUNCTURE: CPT

## 2018-12-05 ENCOUNTER — HOSPITAL ENCOUNTER (OUTPATIENT)
Age: 68
Setting detail: SPECIMEN
Discharge: HOME | End: 2018-12-05
Payer: COMMERCIAL

## 2018-12-05 LAB
ANION GAP SERPL CALCULATED.3IONS-SCNC: 11 MMOL/L (ref 3–16)
BUN BLDV-MCNC: 22 MG/DL (ref 7–20)
CALCIUM SERPL-MCNC: 8.4 MG/DL (ref 8.3–10.6)
CHLORIDE BLD-SCNC: 106 MMOL/L (ref 99–110)
CO2: 29 MMOL/L (ref 21–32)
CREAT SERPL-MCNC: 1.2 MG/DL (ref 0.8–1.3)
GFR AFRICAN AMERICAN: >60
GFR NON-AFRICAN AMERICAN: >60
GLUCOSE BLD-MCNC: 88 MG/DL (ref 70–99)
INR BLD: 3.13 (ref 0.86–1.14)
POTASSIUM SERPL-SCNC: 3.7 MMOL/L (ref 3.5–5.1)
PROTHROMBIN TIME: 35.7 SEC (ref 9.8–13)
SODIUM BLD-SCNC: 146 MMOL/L (ref 136–145)

## 2018-12-05 PROCEDURE — 80048 BASIC METABOLIC PNL TOTAL CA: CPT

## 2018-12-05 PROCEDURE — 85610 PROTHROMBIN TIME: CPT

## 2018-12-05 PROCEDURE — 36415 COLL VENOUS BLD VENIPUNCTURE: CPT

## 2018-12-27 ENCOUNTER — HOSPITAL ENCOUNTER (OUTPATIENT)
Age: 68
Setting detail: SPECIMEN
Discharge: HOME | End: 2018-12-27
Payer: COMMERCIAL

## 2018-12-27 LAB
INR BLD: 2.63 (ref 0.86–1.14)
PROTHROMBIN TIME: 30 SEC (ref 9.8–13)

## 2018-12-27 PROCEDURE — 85610 PROTHROMBIN TIME: CPT

## 2018-12-27 PROCEDURE — 36415 COLL VENOUS BLD VENIPUNCTURE: CPT

## 2019-04-06 ENCOUNTER — APPOINTMENT (OUTPATIENT)
Dept: GENERAL RADIOLOGY | Age: 69
DRG: 813 | End: 2019-04-06
Payer: COMMERCIAL

## 2019-04-06 ENCOUNTER — HOSPITAL ENCOUNTER (INPATIENT)
Age: 69
LOS: 14 days | Discharge: NURSING, REHAB OR LONG-TERM CARE | DRG: 813 | End: 2019-04-20
Attending: EMERGENCY MEDICINE | Admitting: FAMILY MEDICINE
Payer: COMMERCIAL

## 2019-04-06 DIAGNOSIS — K92.2 GASTROINTESTINAL HEMORRHAGE, UNSPECIFIED GASTROINTESTINAL HEMORRHAGE TYPE: Primary | ICD-10-CM

## 2019-04-06 DIAGNOSIS — D64.9 ANEMIA REQUIRING TRANSFUSIONS: ICD-10-CM

## 2019-04-06 DIAGNOSIS — R79.1 SUPRATHERAPEUTIC INR: ICD-10-CM

## 2019-04-06 LAB
A/G RATIO: 1.1 (ref 1.1–2.2)
ABO/RH: NORMAL
ALBUMIN SERPL-MCNC: 2.9 G/DL (ref 3.4–5)
ALP BLD-CCNC: 59 U/L (ref 40–129)
ALT SERPL-CCNC: <5 U/L (ref 10–40)
ANION GAP SERPL CALCULATED.3IONS-SCNC: 10 MMOL/L (ref 3–16)
ANTIBODY SCREEN: NORMAL
AST SERPL-CCNC: 8 U/L (ref 15–37)
BASOPHILS ABSOLUTE: 0 K/UL (ref 0–0.2)
BASOPHILS RELATIVE PERCENT: 0.4 %
BILIRUB SERPL-MCNC: 0.5 MG/DL (ref 0–1)
BLOOD BANK DISPENSE STATUS: NORMAL
BLOOD BANK DISPENSE STATUS: NORMAL
BLOOD BANK PRODUCT CODE: NORMAL
BLOOD BANK PRODUCT CODE: NORMAL
BPU ID: NORMAL
BPU ID: NORMAL
BUN BLDV-MCNC: 44 MG/DL (ref 7–20)
C DIFFICILE TOXIN, EIA: NORMAL
CALCIUM SERPL-MCNC: 7.8 MG/DL (ref 8.3–10.6)
CHLORIDE BLD-SCNC: 108 MMOL/L (ref 99–110)
CO2: 25 MMOL/L (ref 21–32)
CREAT SERPL-MCNC: 1 MG/DL (ref 0.8–1.3)
DESCRIPTION BLOOD BANK: NORMAL
DESCRIPTION BLOOD BANK: NORMAL
EOSINOPHILS ABSOLUTE: 0.1 K/UL (ref 0–0.6)
EOSINOPHILS RELATIVE PERCENT: 0.9 %
GFR AFRICAN AMERICAN: >60
GFR NON-AFRICAN AMERICAN: >60
GLOBULIN: 2.7 G/DL
GLUCOSE BLD-MCNC: 107 MG/DL (ref 70–99)
HCT VFR BLD CALC: 21.4 % (ref 40.5–52.5)
HCT VFR BLD CALC: 22.8 % (ref 40.5–52.5)
HEMOGLOBIN: 6.5 G/DL (ref 13.5–17.5)
HEMOGLOBIN: 7.1 G/DL (ref 13.5–17.5)
INR BLD: 5.14 (ref 0.86–1.14)
LYMPHOCYTES ABSOLUTE: 0.6 K/UL (ref 1–5.1)
LYMPHOCYTES RELATIVE PERCENT: 5 %
MAGNESIUM: 1.6 MG/DL (ref 1.8–2.4)
MCH RBC QN AUTO: 21.8 PG (ref 26–34)
MCHC RBC AUTO-ENTMCNC: 31.1 G/DL (ref 31–36)
MCV RBC AUTO: 70.3 FL (ref 80–100)
MONOCYTES ABSOLUTE: 0.5 K/UL (ref 0–1.3)
MONOCYTES RELATIVE PERCENT: 4.7 %
NEUTROPHILS ABSOLUTE: 10 K/UL (ref 1.7–7.7)
NEUTROPHILS RELATIVE PERCENT: 89 %
OCCULT BLOOD SCREENING: ABNORMAL
PDW BLD-RTO: 18.9 % (ref 12.4–15.4)
PLATELET # BLD: 233 K/UL (ref 135–450)
PMV BLD AUTO: 7.6 FL (ref 5–10.5)
POTASSIUM SERPL-SCNC: 4.3 MMOL/L (ref 3.5–5.1)
PROTHROMBIN TIME: 58.6 SEC (ref 9.8–13)
RBC # BLD: 3.24 M/UL (ref 4.2–5.9)
SODIUM BLD-SCNC: 143 MMOL/L (ref 136–145)
TOTAL PROTEIN: 5.6 G/DL (ref 6.4–8.2)
WBC # BLD: 11.3 K/UL (ref 4–11)

## 2019-04-06 PROCEDURE — 80053 COMPREHEN METABOLIC PANEL: CPT

## 2019-04-06 PROCEDURE — 85610 PROTHROMBIN TIME: CPT

## 2019-04-06 PROCEDURE — 6360000002 HC RX W HCPCS: Performed by: FAMILY MEDICINE

## 2019-04-06 PROCEDURE — 6360000002 HC RX W HCPCS: Performed by: NURSE PRACTITIONER

## 2019-04-06 PROCEDURE — 87324 CLOSTRIDIUM AG IA: CPT

## 2019-04-06 PROCEDURE — 99284 EMERGENCY DEPT VISIT MOD MDM: CPT

## 2019-04-06 PROCEDURE — 96372 THER/PROPH/DIAG INJ SC/IM: CPT

## 2019-04-06 PROCEDURE — 71045 X-RAY EXAM CHEST 1 VIEW: CPT

## 2019-04-06 PROCEDURE — 36415 COLL VENOUS BLD VENIPUNCTURE: CPT

## 2019-04-06 PROCEDURE — 86850 RBC ANTIBODY SCREEN: CPT

## 2019-04-06 PROCEDURE — P9016 RBC LEUKOCYTES REDUCED: HCPCS

## 2019-04-06 PROCEDURE — 2580000003 HC RX 258: Performed by: NURSE PRACTITIONER

## 2019-04-06 PROCEDURE — 1200000000 HC SEMI PRIVATE

## 2019-04-06 PROCEDURE — G0328 FECAL BLOOD SCRN IMMUNOASSAY: HCPCS

## 2019-04-06 PROCEDURE — C9113 INJ PANTOPRAZOLE SODIUM, VIA: HCPCS | Performed by: FAMILY MEDICINE

## 2019-04-06 PROCEDURE — P9017 PLASMA 1 DONOR FRZ W/IN 8 HR: HCPCS

## 2019-04-06 PROCEDURE — 86923 COMPATIBILITY TEST ELECTRIC: CPT

## 2019-04-06 PROCEDURE — 36430 TRANSFUSION BLD/BLD COMPNT: CPT

## 2019-04-06 PROCEDURE — 85018 HEMOGLOBIN: CPT

## 2019-04-06 PROCEDURE — 2580000003 HC RX 258: Performed by: FAMILY MEDICINE

## 2019-04-06 PROCEDURE — 85014 HEMATOCRIT: CPT

## 2019-04-06 PROCEDURE — 86900 BLOOD TYPING SEROLOGIC ABO: CPT

## 2019-04-06 PROCEDURE — 87449 NOS EACH ORGANISM AG IA: CPT

## 2019-04-06 PROCEDURE — 86901 BLOOD TYPING SEROLOGIC RH(D): CPT

## 2019-04-06 PROCEDURE — 85025 COMPLETE CBC W/AUTO DIFF WBC: CPT

## 2019-04-06 PROCEDURE — 83735 ASSAY OF MAGNESIUM: CPT

## 2019-04-06 PROCEDURE — 6370000000 HC RX 637 (ALT 250 FOR IP): Performed by: FAMILY MEDICINE

## 2019-04-06 RX ORDER — ONDANSETRON 2 MG/ML
4 INJECTION INTRAMUSCULAR; INTRAVENOUS EVERY 6 HOURS PRN
Status: DISCONTINUED | OUTPATIENT
Start: 2019-04-06 | End: 2019-04-20 | Stop reason: HOSPADM

## 2019-04-06 RX ORDER — 0.9 % SODIUM CHLORIDE 0.9 %
250 INTRAVENOUS SOLUTION INTRAVENOUS ONCE
Status: COMPLETED | OUTPATIENT
Start: 2019-04-06 | End: 2019-04-07

## 2019-04-06 RX ORDER — 0.9 % SODIUM CHLORIDE 0.9 %
250 INTRAVENOUS SOLUTION INTRAVENOUS ONCE
Status: COMPLETED | OUTPATIENT
Start: 2019-04-06 | End: 2019-04-06

## 2019-04-06 RX ORDER — SODIUM CHLORIDE 9 MG/ML
INJECTION, SOLUTION INTRAVENOUS CONTINUOUS
Status: DISCONTINUED | OUTPATIENT
Start: 2019-04-06 | End: 2019-04-09

## 2019-04-06 RX ORDER — 0.9 % SODIUM CHLORIDE 0.9 %
250 INTRAVENOUS SOLUTION INTRAVENOUS ONCE
Status: DISCONTINUED | OUTPATIENT
Start: 2019-04-06 | End: 2019-04-08

## 2019-04-06 RX ORDER — SODIUM CHLORIDE 0.9 % (FLUSH) 0.9 %
10 SYRINGE (ML) INJECTION EVERY 12 HOURS SCHEDULED
Status: DISCONTINUED | OUTPATIENT
Start: 2019-04-06 | End: 2019-04-20 | Stop reason: HOSPADM

## 2019-04-06 RX ORDER — FOLIC ACID 1 MG/1
1 TABLET ORAL DAILY
Status: DISCONTINUED | OUTPATIENT
Start: 2019-04-06 | End: 2019-04-20 | Stop reason: HOSPADM

## 2019-04-06 RX ORDER — TAMSULOSIN HYDROCHLORIDE 0.4 MG/1
0.4 CAPSULE ORAL DAILY
Status: DISCONTINUED | OUTPATIENT
Start: 2019-04-06 | End: 2019-04-20 | Stop reason: HOSPADM

## 2019-04-06 RX ORDER — PANTOPRAZOLE SODIUM 40 MG/10ML
INJECTION, POWDER, LYOPHILIZED, FOR SOLUTION INTRAVENOUS
Status: DISPENSED
Start: 2019-04-06 | End: 2019-04-07

## 2019-04-06 RX ORDER — PANTOPRAZOLE SODIUM 40 MG/10ML
40 INJECTION, POWDER, LYOPHILIZED, FOR SOLUTION INTRAVENOUS 2 TIMES DAILY
Status: DISCONTINUED | OUTPATIENT
Start: 2019-04-06 | End: 2019-04-07

## 2019-04-06 RX ORDER — MAGNESIUM SULFATE IN WATER 40 MG/ML
2 INJECTION, SOLUTION INTRAVENOUS ONCE
Status: COMPLETED | OUTPATIENT
Start: 2019-04-06 | End: 2019-04-07

## 2019-04-06 RX ORDER — PHYTONADIONE 10 MG/ML
10 INJECTION, EMULSION INTRAMUSCULAR; INTRAVENOUS; SUBCUTANEOUS ONCE
Status: COMPLETED | OUTPATIENT
Start: 2019-04-06 | End: 2019-04-06

## 2019-04-06 RX ORDER — PANTOPRAZOLE SODIUM 40 MG/10ML
40 INJECTION, POWDER, LYOPHILIZED, FOR SOLUTION INTRAVENOUS DAILY
Status: DISCONTINUED | OUTPATIENT
Start: 2019-04-06 | End: 2019-04-06 | Stop reason: DRUGHIGH

## 2019-04-06 RX ORDER — SODIUM CHLORIDE 0.9 % (FLUSH) 0.9 %
10 SYRINGE (ML) INJECTION PRN
Status: DISCONTINUED | OUTPATIENT
Start: 2019-04-06 | End: 2019-04-20 | Stop reason: HOSPADM

## 2019-04-06 RX ADMIN — TAMSULOSIN HYDROCHLORIDE 0.4 MG: 0.4 CAPSULE ORAL at 20:10

## 2019-04-06 RX ADMIN — MAGNESIUM SULFATE HEPTAHYDRATE 2 G: 40 INJECTION, SOLUTION INTRAVENOUS at 22:20

## 2019-04-06 RX ADMIN — Medication 10 ML: at 20:10

## 2019-04-06 RX ADMIN — PHYTONADIONE 10 MG: 10 INJECTION, EMULSION INTRAMUSCULAR; INTRAVENOUS; SUBCUTANEOUS at 15:43

## 2019-04-06 RX ADMIN — SODIUM CHLORIDE: 9 INJECTION, SOLUTION INTRAVENOUS at 23:06

## 2019-04-06 RX ADMIN — SODIUM CHLORIDE: 9 INJECTION, SOLUTION INTRAVENOUS at 19:25

## 2019-04-06 RX ADMIN — PANTOPRAZOLE SODIUM 40 MG: 40 INJECTION, POWDER, FOR SOLUTION INTRAVENOUS at 20:10

## 2019-04-06 RX ADMIN — SODIUM CHLORIDE 250 ML: 9 INJECTION, SOLUTION INTRAVENOUS at 20:10

## 2019-04-06 RX ADMIN — SODIUM CHLORIDE 250 ML: 9 INJECTION, SOLUTION INTRAVENOUS at 21:28

## 2019-04-06 NOTE — H&P
Recent Labs     04/06/19  1443   WBC 11.3*   HGB 7.1*   HCT 22.8*        Recent Labs     04/06/19  1443      K 4.3      CO2 25   BUN 44*   CREATININE 1.0   CALCIUM 7.8*     Recent Labs     04/06/19  1443   AST 8*   ALT <5*   BILITOT 0.5   ALKPHOS 59     Recent Labs     04/06/19  1443   INR 5.14*     No results for input(s): Linda Lust in the last 72 hours. Urinalysis:      Lab Results   Component Value Date    NITRU Negative 09/30/2018    WBCUA 0-2 09/30/2018    BACTERIA Rare 09/23/2018    RBCUA >100 09/30/2018    BLOODU LARGE 09/30/2018    SPECGRAV 1.010 09/30/2018    GLUCOSEU Negative 09/30/2018       Radiology:     CXR: I have reviewed the CXR with the following interpretation: no acute changes   EKG:  I have reviewed the EKG with the following interpretation:     XR CHEST PORTABLE   Final Result   Low lung volume exam with no focal consolidation. Mild prominence of the heart size accentuated by portable technique. ASSESSMENT:    Active Hospital Problems    Diagnosis Date Noted    Hx GI bleed due to duodenal ulcer (Aug 2018) [K92.2] 08/04/2018         PLAN:    Acute blood loss anemia   GI bleed  Warfarin induced coagulopathy  H/o PUD, gastritis     INR is 5/1 and pt has received vit K, FFP in ED ,   Baseline hb is abt 9.3, anfd is 7.1 now . Pt is symptomatic , received 1 Unit PRBC   Cont IV PPI, f/u with serial hb and hcts  Cont NPO, IVF   GI consulted     BPH - cont flomax    HTN- holding HCTZ     Morbid Obesity -  With Body mass index is 62.48 kg/m². Complicating assessment and treatment. Placing patient at risk for multiple co-morbidities as well as early death and contributing to the patient's presentation. Counseled on weight loss.        DVT Prophylaxis: SCD   Diet: Diet NPO Effective Now  Code Status: Full Code    PT/OT Eval Status:     Dispo - 3 days        Traci Bolanos MD    Thank you Mark Sadler MD for the opportunity to be involved in

## 2019-04-07 PROBLEM — K92.2 GI BLEED: Status: ACTIVE | Noted: 2019-04-07

## 2019-04-07 LAB
ANION GAP SERPL CALCULATED.3IONS-SCNC: 10 MMOL/L (ref 3–16)
BASOPHILS ABSOLUTE: 0 K/UL (ref 0–0.2)
BASOPHILS RELATIVE PERCENT: 0.4 %
BLOOD BANK DISPENSE STATUS: NORMAL
BLOOD BANK PRODUCT CODE: NORMAL
BPU ID: NORMAL
BUN BLDV-MCNC: 42 MG/DL (ref 7–20)
CALCIUM SERPL-MCNC: 7.8 MG/DL (ref 8.3–10.6)
CHLORIDE BLD-SCNC: 111 MMOL/L (ref 99–110)
CO2: 24 MMOL/L (ref 21–32)
CREAT SERPL-MCNC: 1 MG/DL (ref 0.8–1.3)
DESCRIPTION BLOOD BANK: NORMAL
EOSINOPHILS ABSOLUTE: 0 K/UL (ref 0–0.6)
EOSINOPHILS RELATIVE PERCENT: 0.4 %
GFR AFRICAN AMERICAN: >60
GFR NON-AFRICAN AMERICAN: >60
GLUCOSE BLD-MCNC: 133 MG/DL (ref 70–99)
HCT VFR BLD CALC: 12.9 % (ref 40.5–52.5)
HCT VFR BLD CALC: 17.1 % (ref 40.5–52.5)
HCT VFR BLD CALC: 21.6 % (ref 40.5–52.5)
HCT VFR BLD CALC: 22.8 % (ref 40.5–52.5)
HCT VFR BLD CALC: 23.2 % (ref 40.5–52.5)
HEMOGLOBIN: 4.2 G/DL (ref 13.5–17.5)
HEMOGLOBIN: 5.4 G/DL (ref 13.5–17.5)
HEMOGLOBIN: 6.9 G/DL (ref 13.5–17.5)
HEMOGLOBIN: 7.5 G/DL (ref 13.5–17.5)
HEMOGLOBIN: 7.5 G/DL (ref 13.5–17.5)
INR BLD: 2.15 (ref 0.86–1.14)
INR BLD: 3.37 (ref 0.86–1.14)
LYMPHOCYTES ABSOLUTE: 0.5 K/UL (ref 1–5.1)
LYMPHOCYTES RELATIVE PERCENT: 3.6 %
MAGNESIUM: 1.7 MG/DL (ref 1.8–2.4)
MCH RBC QN AUTO: 24.2 PG (ref 26–34)
MCHC RBC AUTO-ENTMCNC: 32.1 G/DL (ref 31–36)
MCV RBC AUTO: 75.4 FL (ref 80–100)
MONOCYTES ABSOLUTE: 0.6 K/UL (ref 0–1.3)
MONOCYTES RELATIVE PERCENT: 4.5 %
NEUTROPHILS ABSOLUTE: 12 K/UL (ref 1.7–7.7)
NEUTROPHILS RELATIVE PERCENT: 91.1 %
PDW BLD-RTO: 22.4 % (ref 12.4–15.4)
PLATELET # BLD: 220 K/UL (ref 135–450)
PMV BLD AUTO: 8.3 FL (ref 5–10.5)
POTASSIUM REFLEX MAGNESIUM: 4.1 MMOL/L (ref 3.5–5.1)
PROTHROMBIN TIME: 24.5 SEC (ref 9.8–13)
PROTHROMBIN TIME: 38.4 SEC (ref 9.8–13)
RBC # BLD: 3.07 M/UL (ref 4.2–5.9)
SODIUM BLD-SCNC: 145 MMOL/L (ref 136–145)
SPECIMEN STATUS: NORMAL
WBC # BLD: 13.1 K/UL (ref 4–11)

## 2019-04-07 PROCEDURE — 83735 ASSAY OF MAGNESIUM: CPT

## 2019-04-07 PROCEDURE — 2580000003 HC RX 258: Performed by: NURSE PRACTITIONER

## 2019-04-07 PROCEDURE — 86923 COMPATIBILITY TEST ELECTRIC: CPT

## 2019-04-07 PROCEDURE — 80048 BASIC METABOLIC PNL TOTAL CA: CPT

## 2019-04-07 PROCEDURE — 36415 COLL VENOUS BLD VENIPUNCTURE: CPT

## 2019-04-07 PROCEDURE — 85018 HEMOGLOBIN: CPT

## 2019-04-07 PROCEDURE — C9113 INJ PANTOPRAZOLE SODIUM, VIA: HCPCS | Performed by: FAMILY MEDICINE

## 2019-04-07 PROCEDURE — 6370000000 HC RX 637 (ALT 250 FOR IP): Performed by: FAMILY MEDICINE

## 2019-04-07 PROCEDURE — P9017 PLASMA 1 DONOR FRZ W/IN 8 HR: HCPCS

## 2019-04-07 PROCEDURE — P9035 PLATELET PHERES LEUKOREDUCED: HCPCS

## 2019-04-07 PROCEDURE — P9016 RBC LEUKOCYTES REDUCED: HCPCS

## 2019-04-07 PROCEDURE — C9113 INJ PANTOPRAZOLE SODIUM, VIA: HCPCS | Performed by: NURSE PRACTITIONER

## 2019-04-07 PROCEDURE — 85610 PROTHROMBIN TIME: CPT

## 2019-04-07 PROCEDURE — 2000000000 HC ICU R&B

## 2019-04-07 PROCEDURE — 36430 TRANSFUSION BLD/BLD COMPNT: CPT

## 2019-04-07 PROCEDURE — 6360000002 HC RX W HCPCS: Performed by: NURSE PRACTITIONER

## 2019-04-07 PROCEDURE — 2580000003 HC RX 258: Performed by: FAMILY MEDICINE

## 2019-04-07 PROCEDURE — 94660 CPAP INITIATION&MGMT: CPT

## 2019-04-07 PROCEDURE — 85025 COMPLETE CBC W/AUTO DIFF WBC: CPT

## 2019-04-07 PROCEDURE — 51702 INSERT TEMP BLADDER CATH: CPT

## 2019-04-07 PROCEDURE — 6360000002 HC RX W HCPCS: Performed by: FAMILY MEDICINE

## 2019-04-07 PROCEDURE — 85014 HEMATOCRIT: CPT

## 2019-04-07 PROCEDURE — 99291 CRITICAL CARE FIRST HOUR: CPT | Performed by: INTERNAL MEDICINE

## 2019-04-07 RX ORDER — 0.9 % SODIUM CHLORIDE 0.9 %
250 INTRAVENOUS SOLUTION INTRAVENOUS ONCE
Status: DISCONTINUED | OUTPATIENT
Start: 2019-04-07 | End: 2019-04-08

## 2019-04-07 RX ORDER — 0.9 % SODIUM CHLORIDE 0.9 %
250 INTRAVENOUS SOLUTION INTRAVENOUS ONCE
Status: COMPLETED | OUTPATIENT
Start: 2019-04-07 | End: 2019-04-08

## 2019-04-07 RX ORDER — MAGNESIUM SULFATE IN WATER 40 MG/ML
2 INJECTION, SOLUTION INTRAVENOUS ONCE
Status: COMPLETED | OUTPATIENT
Start: 2019-04-07 | End: 2019-04-08

## 2019-04-07 RX ORDER — PHYTONADIONE 10 MG/ML
10 INJECTION, EMULSION INTRAMUSCULAR; INTRAVENOUS; SUBCUTANEOUS ONCE
Status: COMPLETED | OUTPATIENT
Start: 2019-04-07 | End: 2019-04-07

## 2019-04-07 RX ORDER — 0.9 % SODIUM CHLORIDE 0.9 %
250 INTRAVENOUS SOLUTION INTRAVENOUS ONCE
Status: COMPLETED | OUTPATIENT
Start: 2019-04-07 | End: 2019-04-07

## 2019-04-07 RX ADMIN — TAMSULOSIN HYDROCHLORIDE 0.4 MG: 0.4 CAPSULE ORAL at 10:13

## 2019-04-07 RX ADMIN — SODIUM CHLORIDE, PRESERVATIVE FREE 10 ML: 5 INJECTION INTRAVENOUS at 01:34

## 2019-04-07 RX ADMIN — Medication 10 ML: at 21:07

## 2019-04-07 RX ADMIN — PANTOPRAZOLE SODIUM 40 MG: 40 INJECTION, POWDER, FOR SOLUTION INTRAVENOUS at 10:13

## 2019-04-07 RX ADMIN — Medication 10 ML: at 10:14

## 2019-04-07 RX ADMIN — SODIUM CHLORIDE 250 ML: 9 INJECTION, SOLUTION INTRAVENOUS at 01:34

## 2019-04-07 RX ADMIN — SODIUM CHLORIDE 250 ML: 9 INJECTION, SOLUTION INTRAVENOUS at 13:34

## 2019-04-07 RX ADMIN — MAGNESIUM SULFATE HEPTAHYDRATE 2 G: 40 INJECTION, SOLUTION INTRAVENOUS at 22:38

## 2019-04-07 RX ADMIN — SODIUM CHLORIDE 8 MG/HR: 9 INJECTION, SOLUTION INTRAVENOUS at 21:31

## 2019-04-07 RX ADMIN — SODIUM CHLORIDE 8 MG/HR: 9 INJECTION, SOLUTION INTRAVENOUS at 14:45

## 2019-04-07 RX ADMIN — PHYTONADIONE 10 MG: 10 INJECTION, EMULSION INTRAMUSCULAR; INTRAVENOUS; SUBCUTANEOUS at 13:12

## 2019-04-07 RX ADMIN — SODIUM CHLORIDE 250 ML: 9 INJECTION, SOLUTION INTRAVENOUS at 16:01

## 2019-04-07 RX ADMIN — FOLIC ACID 1 MG: 1 TABLET ORAL at 10:13

## 2019-04-07 ASSESSMENT — PAIN SCALES - GENERAL
PAINLEVEL_OUTOF10: 0
PAINLEVEL_OUTOF10: 0

## 2019-04-07 NOTE — PROGRESS NOTES
.4 Eyes Skin Assessment     The patient is being assess for  Transfer to New Unit    I agree that 2 RN's have performed a thorough Head to Toe Skin Assessment on the patient. ALL assessment sites listed below have been assessed. Areas assessed by both nurses: Kymberly and Amparo  [x]   Head, Face, and Ears   [x]   Shoulders, Back, and Chest  [x]   Arms, Elbows, and Hands   [x]   Coccyx, Sacrum, and IschIum  [x]   Legs, Feet, and Heels        Does the Patient have Skin Breakdown? Yes LDA WOUND CARE was Initiated documentation include the Jenna-wound, Wound Assessment, Measurements, Dressing Treatment, Drainage, and Color\",         Gopi Prevention initiated:  Yes   Wound Care Orders initiated:  Yes      [id number] nurse consulted for Pressure Injury (Stage 3,4, Unstageable, DTI, NWPT, and Complex wounds), New and Established Ostomies:  Yes      Nurse 1 eSignature: . Giorgi Mcbride     **SHARE this note so that the co-signing nurse is able to place an eSignature**    Nurse 2 eSignatarnulfo

## 2019-04-07 NOTE — PROGRESS NOTES
PM NP made aware of Hgb 6/9 after 2 units PRBCs. 3rd unit of PRBCs ordered and started. Call light within reach, will continue to monitor.

## 2019-04-07 NOTE — CONSULTS
 sodium chloride  250 mL Intravenous Once    sodium chloride  250 mL Intravenous Once    sodium chloride  250 mL Intravenous Once    sodium chloride  250 mL Intravenous Once    folic acid  1 mg Oral Daily    tamsulosin  0.4 mg Oral Daily    sodium chloride flush  10 mL Intravenous 2 times per day       Continuous Infusions:   pantoprozole (PROTONIX) infusion 8 mg/hr (04/07/19 1445)    sodium chloride 100 mL/hr at 04/06/19 2306       PRN Meds:   sodium chloride flush, magnesium hydroxide, ondansetron   Inpatient consult to Critical Care  Consult performed by: Demetrius Lozaon MD  Consult ordered by: JOEY Warner - CNP        ALLERGIES:  Patient has No Known Allergies. REVIEW OF SYSTEMS:  Review of Systems   Unable to perform ROS: Acuity of condition       Objective:   PHYSICAL EXAM:  BP (!) 110/51   Pulse 89   Temp 98.2 °F (36.8 °C) (Oral)   Resp 24   Ht 5' 4\" (1.626 m)   Wt (!) 364 lb (165.1 kg)   SpO2 100%   BMI 62.48 kg/m²    Physical Exam   Constitutional: He appears well-developed. He appears distressed. HENT:   Head: Normocephalic and atraumatic. Mouth/Throat: Oropharynx is clear and moist. No oropharyngeal exudate. Eyes: Pupils are equal, round, and reactive to light. EOM are normal.   Neck: Neck supple. No JVD present. Cardiovascular: Normal heart sounds. Exam reveals no gallop and no friction rub. No murmur heard. Pulmonary/Chest: He has no wheezes. He has no rales. Equal chest rise and expansion bilaterally   Abdominal: Soft. Bowel sounds are normal. He exhibits no distension. There is no tenderness. Musculoskeletal: He exhibits no edema. Lymphadenopathy:     He has no cervical adenopathy. Neurological: He is alert. No cranial nerve deficit. CN 2-12 grossly intact   Skin: Skin is dry. No rash noted. He is not diaphoretic. There is pallor.           Data Reviewed:   LABS:  CBC:   Recent Labs     04/06/19  1443  04/07/19  0040 04/07/19  0518 04/07/19  1247   WBC

## 2019-04-07 NOTE — PROGRESS NOTES
motion without deformity. Skin: Skin color pale, texture, turgor normal.  No rashes or lesions. Neurologic:  Neurovascularly intact without any focal sensory/motor deficits. Cranial nerves: II-XII intact, grossly non-focal.  Psychiatric: Alert and oriented, thought content appropriate, normal insight  Capillary Refill: Brisk,< 3 seconds   Peripheral Pulses: +2 palpable, equal bilaterally       Labs:   Recent Labs     04/06/19  1443  04/07/19  0040 04/07/19  0518 04/07/19  1247   WBC 11.3*  --   --  13.1*  --    HGB 7.1*   < > 6.9* 7.5*  7.5* 4.2*   HCT 22.8*   < > 21.6* 23.2*  22.8* 12.9*     --   --  220  --     < > = values in this interval not displayed. Recent Labs     04/06/19  1443 04/07/19  0518    145   K 4.3 4.1    111*   CO2 25 24   BUN 44* 42*   CREATININE 1.0 1.0   CALCIUM 7.8* 7.8*     Recent Labs     04/06/19  1443   AST 8*   ALT <5*   BILITOT 0.5   ALKPHOS 59     Recent Labs     04/06/19  1443 04/07/19  0518   INR 5.14* 3.37*     No results for input(s): Saravanan Overton in the last 72 hours. Urinalysis:      Lab Results   Component Value Date    NITRU Negative 09/30/2018    WBCUA 0-2 09/30/2018    BACTERIA Rare 09/23/2018    RBCUA >100 09/30/2018    BLOODU LARGE 09/30/2018    SPECGRAV 1.010 09/30/2018    GLUCOSEU Negative 09/30/2018       Radiology:  XR CHEST PORTABLE   Final Result   Low lung volume exam with no focal consolidation. Mild prominence of the heart size accentuated by portable technique. Assessment/Plan:    Active Hospital Problems    Diagnosis Date Noted    GI bleed [K92.2] 04/07/2019    Suspected sleep apnea [R29.818] 10/01/2018    Benign hypertension [I10] 09/24/2018    History of DVT (deep vein thrombosis) [Z86.718] 07/14/2018    Morbid obesity with BMI of 60.0-69.9, adult ([facility] [address] [E66.01, Z68.44] 02/16/2014     Acute blood loss anemia - likely UGIB, in the setting of supratherapeutic INR. Hx of duodenal ulcer.   Hemoccult

## 2019-04-08 ENCOUNTER — APPOINTMENT (OUTPATIENT)
Dept: GENERAL RADIOLOGY | Age: 69
DRG: 813 | End: 2019-04-08
Payer: COMMERCIAL

## 2019-04-08 ENCOUNTER — APPOINTMENT (OUTPATIENT)
Dept: NUCLEAR MEDICINE | Age: 69
DRG: 813 | End: 2019-04-08
Payer: COMMERCIAL

## 2019-04-08 LAB
A/G RATIO: 1.2 (ref 1.1–2.2)
ALBUMIN SERPL-MCNC: 2.1 G/DL (ref 3.4–5)
ALP BLD-CCNC: 36 U/L (ref 40–129)
ALT SERPL-CCNC: <5 U/L (ref 10–40)
ANION GAP SERPL CALCULATED.3IONS-SCNC: 6 MMOL/L (ref 3–16)
AST SERPL-CCNC: 9 U/L (ref 15–37)
BILIRUB SERPL-MCNC: 1.5 MG/DL (ref 0–1)
BLOOD BANK DISPENSE STATUS: NORMAL
BLOOD BANK PRODUCT CODE: NORMAL
BPU ID: NORMAL
BUN BLDV-MCNC: 46 MG/DL (ref 7–20)
CALCIUM SERPL-MCNC: 7.1 MG/DL (ref 8.3–10.6)
CHLORIDE BLD-SCNC: 115 MMOL/L (ref 99–110)
CO2: 27 MMOL/L (ref 21–32)
CREAT SERPL-MCNC: 1.3 MG/DL (ref 0.8–1.3)
DESCRIPTION BLOOD BANK: NORMAL
GFR AFRICAN AMERICAN: >60
GFR NON-AFRICAN AMERICAN: 55
GLOBULIN: 1.7 G/DL
GLUCOSE BLD-MCNC: 108 MG/DL (ref 70–99)
HCT VFR BLD CALC: 16 % (ref 40.5–52.5)
HCT VFR BLD CALC: 19 % (ref 40.5–52.5)
HCT VFR BLD CALC: 22.2 % (ref 40.5–52.5)
HCT VFR BLD CALC: 25.1 % (ref 40.5–52.5)
HEMOGLOBIN: 5.2 G/DL (ref 13.5–17.5)
HEMOGLOBIN: 6.3 G/DL (ref 13.5–17.5)
HEMOGLOBIN: 7.3 G/DL (ref 13.5–17.5)
HEMOGLOBIN: 8.2 G/DL (ref 13.5–17.5)
INR BLD: 1.88 (ref 0.86–1.14)
MAGNESIUM: 2.2 MG/DL (ref 1.8–2.4)
MCH RBC QN AUTO: 27.7 PG (ref 26–34)
MCHC RBC AUTO-ENTMCNC: 33.4 G/DL (ref 31–36)
MCV RBC AUTO: 83 FL (ref 80–100)
PDW BLD-RTO: 19.2 % (ref 12.4–15.4)
PLATELET # BLD: 165 K/UL (ref 135–450)
PMV BLD AUTO: 8.3 FL (ref 5–10.5)
POTASSIUM SERPL-SCNC: 3.9 MMOL/L (ref 3.5–5.1)
PROTHROMBIN TIME: 21.4 SEC (ref 9.8–13)
RBC # BLD: 2.29 M/UL (ref 4.2–5.9)
SODIUM BLD-SCNC: 148 MMOL/L (ref 136–145)
TOTAL PROTEIN: 3.8 G/DL (ref 6.4–8.2)
WBC # BLD: 11.2 K/UL (ref 4–11)

## 2019-04-08 PROCEDURE — 6360000002 HC RX W HCPCS: Performed by: INTERNAL MEDICINE

## 2019-04-08 PROCEDURE — 2580000003 HC RX 258: Performed by: NURSE PRACTITIONER

## 2019-04-08 PROCEDURE — 6370000000 HC RX 637 (ALT 250 FOR IP): Performed by: INTERNAL MEDICINE

## 2019-04-08 PROCEDURE — 80053 COMPREHEN METABOLIC PANEL: CPT

## 2019-04-08 PROCEDURE — 3609017100 HC EGD: Performed by: INTERNAL MEDICINE

## 2019-04-08 PROCEDURE — 85027 COMPLETE CBC AUTOMATED: CPT

## 2019-04-08 PROCEDURE — 36430 TRANSFUSION BLD/BLD COMPNT: CPT

## 2019-04-08 PROCEDURE — P9016 RBC LEUKOCYTES REDUCED: HCPCS

## 2019-04-08 PROCEDURE — 85018 HEMOGLOBIN: CPT

## 2019-04-08 PROCEDURE — 94761 N-INVAS EAR/PLS OXIMETRY MLT: CPT

## 2019-04-08 PROCEDURE — 2700000000 HC OXYGEN THERAPY PER DAY

## 2019-04-08 PROCEDURE — 2060000000 HC ICU INTERMEDIATE R&B

## 2019-04-08 PROCEDURE — 99291 CRITICAL CARE FIRST HOUR: CPT | Performed by: INTERNAL MEDICINE

## 2019-04-08 PROCEDURE — 2580000003 HC RX 258: Performed by: FAMILY MEDICINE

## 2019-04-08 PROCEDURE — 3430000000 HC RX DIAGNOSTIC RADIOPHARMACEUTICAL: Performed by: INTERNAL MEDICINE

## 2019-04-08 PROCEDURE — 6360000002 HC RX W HCPCS: Performed by: NURSE PRACTITIONER

## 2019-04-08 PROCEDURE — 0DJ08ZZ INSPECTION OF UPPER INTESTINAL TRACT, VIA NATURAL OR ARTIFICIAL OPENING ENDOSCOPIC: ICD-10-PCS | Performed by: INTERNAL MEDICINE

## 2019-04-08 PROCEDURE — A9560 TC99M LABELED RBC: HCPCS | Performed by: INTERNAL MEDICINE

## 2019-04-08 PROCEDURE — 2580000003 HC RX 258: Performed by: INTERNAL MEDICINE

## 2019-04-08 PROCEDURE — 85014 HEMATOCRIT: CPT

## 2019-04-08 PROCEDURE — 6370000000 HC RX 637 (ALT 250 FOR IP): Performed by: FAMILY MEDICINE

## 2019-04-08 PROCEDURE — 71045 X-RAY EXAM CHEST 1 VIEW: CPT

## 2019-04-08 PROCEDURE — 85610 PROTHROMBIN TIME: CPT

## 2019-04-08 PROCEDURE — C9113 INJ PANTOPRAZOLE SODIUM, VIA: HCPCS | Performed by: NURSE PRACTITIONER

## 2019-04-08 PROCEDURE — 86923 COMPATIBILITY TEST ELECTRIC: CPT

## 2019-04-08 PROCEDURE — 83735 ASSAY OF MAGNESIUM: CPT

## 2019-04-08 PROCEDURE — 2709999900 HC NON-CHARGEABLE SUPPLY: Performed by: INTERNAL MEDICINE

## 2019-04-08 PROCEDURE — P9012 CRYOPRECIPITATE EACH UNIT: HCPCS

## 2019-04-08 PROCEDURE — 78278 ACUTE GI BLOOD LOSS IMAGING: CPT

## 2019-04-08 PROCEDURE — 36415 COLL VENOUS BLD VENIPUNCTURE: CPT

## 2019-04-08 RX ORDER — 0.9 % SODIUM CHLORIDE 0.9 %
250 INTRAVENOUS SOLUTION INTRAVENOUS ONCE
Status: COMPLETED | OUTPATIENT
Start: 2019-04-08 | End: 2019-04-08

## 2019-04-08 RX ORDER — SODIUM CHLORIDE 9 MG/ML
INJECTION, SOLUTION INTRAVENOUS
Status: DISPENSED
Start: 2019-04-08 | End: 2019-04-08

## 2019-04-08 RX ORDER — MIDAZOLAM HYDROCHLORIDE 5 MG/ML
INJECTION INTRAMUSCULAR; INTRAVENOUS PRN
Status: DISCONTINUED | OUTPATIENT
Start: 2019-04-08 | End: 2019-04-08 | Stop reason: ALTCHOICE

## 2019-04-08 RX ADMIN — POLYETHYLENE GLYCOL 3350, SODIUM SULFATE ANHYDROUS, SODIUM BICARBONATE, SODIUM CHLORIDE, POTASSIUM CHLORIDE 2000 ML: 236; 22.74; 6.74; 5.86; 2.97 POWDER, FOR SOLUTION ORAL at 19:30

## 2019-04-08 RX ADMIN — FOLIC ACID 1 MG: 1 TABLET ORAL at 13:21

## 2019-04-08 RX ADMIN — Medication 10 ML: at 09:00

## 2019-04-08 RX ADMIN — BISACODYL 20 MG: 5 TABLET, COATED ORAL at 13:21

## 2019-04-08 RX ADMIN — SODIUM CHLORIDE 250 ML: 9 INJECTION, SOLUTION INTRAVENOUS at 01:40

## 2019-04-08 RX ADMIN — Medication 25.6 MILLICURIE: at 16:06

## 2019-04-08 RX ADMIN — SODIUM CHLORIDE 250 ML: 9 INJECTION, SOLUTION INTRAVENOUS at 05:52

## 2019-04-08 RX ADMIN — MUPIROCIN: 20 OINTMENT TOPICAL at 09:00

## 2019-04-08 RX ADMIN — TAMSULOSIN HYDROCHLORIDE 0.4 MG: 0.4 CAPSULE ORAL at 13:22

## 2019-04-08 RX ADMIN — SODIUM CHLORIDE 8 MG/HR: 9 INJECTION, SOLUTION INTRAVENOUS at 21:17

## 2019-04-08 RX ADMIN — Medication 10 ML: at 20:34

## 2019-04-08 RX ADMIN — SODIUM CHLORIDE 250 ML: 9 INJECTION, SOLUTION INTRAVENOUS at 02:02

## 2019-04-08 RX ADMIN — SODIUM CHLORIDE 8 MG/HR: 9 INJECTION, SOLUTION INTRAVENOUS at 08:24

## 2019-04-08 ASSESSMENT — PAIN SCALES - GENERAL
PAINLEVEL_OUTOF10: 0

## 2019-04-08 NOTE — PROGRESS NOTES
Consult for DTI on buttocks. Patient is lying in bloody maroon with clots liquid stool. Patient having difficulty turning on side. Cleaning up patient with bath wipes and barrier wipes. Deep creavus in buttocks. Does not appear to be DTI, more briusing noted. Unable to photo due to bloody stool and paitent SOB with turning. Moisture associated skin damage under abd and groin skin folds. Large abd hernia. Bariatric bed with trapeze ordered. Clean after incontinence and apply moisture barrier. Wound care to sign off.       04/08/19 1420   Wound 04/07/19 Buttocks   Date First Assessed/Time First Assessed: 04/07/19 1000   Primary Wound Type: Pressure Injury  Location: Buttocks   Wound Image   (unable to photo)   Wound Other  (none bruising rignt and left buttocks)   Dressing Status Intact; Other (Comment)   Dressing/Treatment Moisture barrier   Drainage Amount None   Odor None   Purple%Wound Bed   (bruising)   Culture Taken No        04/08/19 1420   Wound 04/07/19 Buttocks   Date First Assessed/Time First Assessed: 04/07/19 1000   Primary Wound Type: Pressure Injury  Location: Buttocks   Wound Image   (unable to photo)   Wound Other  (none bruising rignt and left buttocks)   Dressing Status Intact; Other (Comment)   Dressing/Treatment Moisture barrier   Drainage Amount None   Odor None   Purple%Wound Bed   (bruising)   Culture Taken No

## 2019-04-08 NOTE — OP NOTE
Esophagogastroduodenoscopy Note    Patient:   Stone Fonseca    YOB: 1950    Facility:   [address] [place] [address] [Inpatient]   Referring/PCP: Sarahy Hoover MD    Procedure:   Esophagogastroduodenoscopy --diagnostic  Date:     4/8/2019   Endoscopist:  Alexandra Rodriguez     Preoperative Diagnosis: Melena and anemia H/H 4/13, INR 5    Postoperative Diagnosis:  normal    Anesthesia:  Versed 2 mg IV  Estimated blood loss: None  Complications: None    Description of Procedure:  Informed consent was obtained from the patient after explanation of the procedure including indications, description of the procedure,  benefits and possible risks and complications of the procedure, and alternatives. Questions were answered. The patient's history was reviewed and a directed physical examination was performed prior to the procedure. Patient was monitored throughout the procedure with pulse oximetry and periodic assessment of vital signs. Patient was sedated as noted above. The Nursing staff and I performed a time out. With the patient in the left lateral decubitus position, the Olympus videoendoscope was placed in the patient's mouth and under direct visualization passed into the esophagus. The scope was ultimately passed to the third portion of the duodenum. Visualization was performed during both introduction and withdrawal of the endoscope and retroflexed view of the proximal stomach was obtained. Findings[de-identified]   Esophagus: normal. The findings do not support a diagnosis of Willams's Esophagus.   Stomach: normal  Duodenum: normal    Recommendations: -Colonoscopy in     Alexandra Rodriguez MD       (O) [phone]        Alexandra Rodriguez MD

## 2019-04-08 NOTE — PROGRESS NOTES
Endo at bedside for EGD. Pt stable. 2 L02. BP stable. 2mg Versed given for sedation. EGD performed by Dr. Doe Arellano. Pt tolerated well. Will continue to monitor.

## 2019-04-09 ENCOUNTER — APPOINTMENT (OUTPATIENT)
Dept: CT IMAGING | Age: 69
DRG: 813 | End: 2019-04-09
Payer: COMMERCIAL

## 2019-04-09 LAB
ANION GAP SERPL CALCULATED.3IONS-SCNC: 6 MMOL/L (ref 3–16)
BASOPHILS ABSOLUTE: 0.1 K/UL (ref 0–0.2)
BASOPHILS RELATIVE PERCENT: 0.6 %
BUN BLDV-MCNC: 39 MG/DL (ref 7–20)
CALCIUM SERPL-MCNC: 7.4 MG/DL (ref 8.3–10.6)
CHLORIDE BLD-SCNC: 111 MMOL/L (ref 99–110)
CO2: 27 MMOL/L (ref 21–32)
CREAT SERPL-MCNC: 1.3 MG/DL (ref 0.8–1.3)
EOSINOPHILS ABSOLUTE: 0.2 K/UL (ref 0–0.6)
EOSINOPHILS RELATIVE PERCENT: 2.4 %
GFR AFRICAN AMERICAN: >60
GFR NON-AFRICAN AMERICAN: 55
GLUCOSE BLD-MCNC: 103 MG/DL (ref 70–99)
HCT VFR BLD CALC: 21.6 % (ref 40.5–52.5)
HCT VFR BLD CALC: 21.8 % (ref 40.5–52.5)
HCT VFR BLD CALC: 21.9 % (ref 40.5–52.5)
HCT VFR BLD CALC: 22 % (ref 40.5–52.5)
HCT VFR BLD CALC: 22.3 % (ref 40.5–52.5)
HEMOGLOBIN: 7.1 G/DL (ref 13.5–17.5)
HEMOGLOBIN: 7.2 G/DL (ref 13.5–17.5)
INR BLD: 1.5 (ref 0.86–1.14)
LYMPHOCYTES ABSOLUTE: 0.6 K/UL (ref 1–5.1)
LYMPHOCYTES RELATIVE PERCENT: 6 %
MAGNESIUM: 2.2 MG/DL (ref 1.8–2.4)
MCH RBC QN AUTO: 28.2 PG (ref 26–34)
MCHC RBC AUTO-ENTMCNC: 33 G/DL (ref 31–36)
MCV RBC AUTO: 85.4 FL (ref 80–100)
MONOCYTES ABSOLUTE: 0.6 K/UL (ref 0–1.3)
MONOCYTES RELATIVE PERCENT: 6.1 %
NEUTROPHILS ABSOLUTE: 8.5 K/UL (ref 1.7–7.7)
NEUTROPHILS RELATIVE PERCENT: 84.9 %
PDW BLD-RTO: 19.2 % (ref 12.4–15.4)
PLATELET # BLD: 153 K/UL (ref 135–450)
PMV BLD AUTO: 8.4 FL (ref 5–10.5)
POTASSIUM REFLEX MAGNESIUM: 3.4 MMOL/L (ref 3.5–5.1)
PROTHROMBIN TIME: 17.1 SEC (ref 9.8–13)
RBC # BLD: 2.56 M/UL (ref 4.2–5.9)
SODIUM BLD-SCNC: 144 MMOL/L (ref 136–145)
WBC # BLD: 10 K/UL (ref 4–11)

## 2019-04-09 PROCEDURE — 85025 COMPLETE CBC W/AUTO DIFF WBC: CPT

## 2019-04-09 PROCEDURE — 85014 HEMATOCRIT: CPT

## 2019-04-09 PROCEDURE — 2060000000 HC ICU INTERMEDIATE R&B

## 2019-04-09 PROCEDURE — 2709999900 HC NON-CHARGEABLE SUPPLY: Performed by: INTERNAL MEDICINE

## 2019-04-09 PROCEDURE — 88305 TISSUE EXAM BY PATHOLOGIST: CPT

## 2019-04-09 PROCEDURE — 94660 CPAP INITIATION&MGMT: CPT

## 2019-04-09 PROCEDURE — 3609010300 HC COLONOSCOPY W/BIOPSY SINGLE/MULTIPLE: Performed by: INTERNAL MEDICINE

## 2019-04-09 PROCEDURE — 85610 PROTHROMBIN TIME: CPT

## 2019-04-09 PROCEDURE — 0DBN8ZX EXCISION OF SIGMOID COLON, VIA NATURAL OR ARTIFICIAL OPENING ENDOSCOPIC, DIAGNOSTIC: ICD-10-PCS | Performed by: INTERNAL MEDICINE

## 2019-04-09 PROCEDURE — 99232 SBSQ HOSP IP/OBS MODERATE 35: CPT | Performed by: INTERNAL MEDICINE

## 2019-04-09 PROCEDURE — 80048 BASIC METABOLIC PNL TOTAL CA: CPT

## 2019-04-09 PROCEDURE — 83735 ASSAY OF MAGNESIUM: CPT

## 2019-04-09 PROCEDURE — 6360000002 HC RX W HCPCS: Performed by: INTERNAL MEDICINE

## 2019-04-09 PROCEDURE — 7100000010 HC PHASE II RECOVERY - FIRST 15 MIN: Performed by: INTERNAL MEDICINE

## 2019-04-09 PROCEDURE — 7100000011 HC PHASE II RECOVERY - ADDTL 15 MIN: Performed by: INTERNAL MEDICINE

## 2019-04-09 PROCEDURE — 36415 COLL VENOUS BLD VENIPUNCTURE: CPT

## 2019-04-09 PROCEDURE — 6360000004 HC RX CONTRAST MEDICATION: Performed by: HOSPITALIST

## 2019-04-09 PROCEDURE — 74178 CT ABD&PLV WO CNTR FLWD CNTR: CPT

## 2019-04-09 PROCEDURE — 99152 MOD SED SAME PHYS/QHP 5/>YRS: CPT | Performed by: INTERNAL MEDICINE

## 2019-04-09 PROCEDURE — 85018 HEMOGLOBIN: CPT

## 2019-04-09 RX ORDER — PANTOPRAZOLE SODIUM 40 MG/10ML
40 INJECTION, POWDER, LYOPHILIZED, FOR SOLUTION INTRAVENOUS 2 TIMES DAILY
Status: DISCONTINUED | OUTPATIENT
Start: 2019-04-09 | End: 2019-04-20 | Stop reason: HOSPADM

## 2019-04-09 RX ORDER — MIDAZOLAM HYDROCHLORIDE 5 MG/ML
INJECTION INTRAMUSCULAR; INTRAVENOUS PRN
Status: DISCONTINUED | OUTPATIENT
Start: 2019-04-09 | End: 2019-04-09 | Stop reason: ALTCHOICE

## 2019-04-09 RX ORDER — FENTANYL CITRATE 50 UG/ML
INJECTION, SOLUTION INTRAMUSCULAR; INTRAVENOUS PRN
Status: DISCONTINUED | OUTPATIENT
Start: 2019-04-09 | End: 2019-04-09 | Stop reason: ALTCHOICE

## 2019-04-09 RX ADMIN — IOPAMIDOL 75 ML: 755 INJECTION, SOLUTION INTRAVENOUS at 22:02

## 2019-04-09 ASSESSMENT — PAIN SCALES - GENERAL
PAINLEVEL_OUTOF10: 0

## 2019-04-09 NOTE — PROGRESS NOTES
non-focal.  Psychiatric: Alert and oriented, thought content appropriate, normal insight  Capillary Refill: Brisk,< 3 seconds   Peripheral Pulses: +2 palpable, equal bilaterally       Labs:   Recent Labs     04/07/19 0518 04/08/19 0411 04/08/19 1931 04/09/19 0121 04/09/19 0458   WBC 13.1*  --  11.2*  --   --   --  10.0   HGB 7.5*  7.5*   < > 6.3*   < > 8.2* 7.2* 7.2*   HCT 23.2*  22.8*   < > 19.0*   < > 25.1* 21.6* 21.9*     --  165  --   --   --  153    < > = values in this interval not displayed. Recent Labs     04/07/19 0518 04/08/19 0411 04/09/19 0458    148* 144   K 4.1 3.9 3.4*   * 115* 111*   CO2 24 27 27   BUN 42* 46* 39*   CREATININE 1.0 1.3 1.3   CALCIUM 7.8* 7.1* 7.4*     Recent Labs     04/06/19  1443 04/08/19  0411   AST 8* 9*   ALT <5* <5*   BILITOT 0.5 1.5*   ALKPHOS 59 36*     Recent Labs     04/07/19 1933 04/08/19 0411 04/09/19 0458   INR 2.15* 1.88* 1.50*     No results for input(s): Ardyce Cane in the last 72 hours. Urinalysis:      Lab Results   Component Value Date    NITRU Negative 09/30/2018    WBCUA 0-2 09/30/2018    BACTERIA Rare 09/23/2018    RBCUA >100 09/30/2018    BLOODU LARGE 09/30/2018    SPECGRAV 1.010 09/30/2018    GLUCOSEU Negative 09/30/2018       Radiology:  NM GI BLOOD LOSS   Final Result   No abnormal activity adequate to localize active bleeding. RECOMMENDATIONS:   If the patient shows hemodynamic signs of an active bleed in the next 20   hours, additional images can be acquired. XR CHEST PORTABLE   Final Result   No change in the appearance of the chest.  No evidence of acute   cardiopulmonary disease. XR CHEST PORTABLE   Final Result   Low lung volume exam with no focal consolidation. Mild prominence of the heart size accentuated by portable technique.                  Assessment/Plan:    Active Hospital Problems    Diagnosis Date Noted    GI bleed [K92.2] 04/07/2019    Suspected sleep apnea [R29.818]

## 2019-04-09 NOTE — CONSULTS
Relationships    Social connections:     Talks on phone: Not on file     Gets together: Not on file     Attends Baptism service: Not on file     Active member of club or organization: Not on file     Attends meetings of clubs or organizations: Not on file     Relationship status: Not on file    Intimate partner violence:     Fear of current or ex partner: Not on file     Emotionally abused: Not on file     Physically abused: Not on file     Forced sexual activity: Not on file   Other Topics Concern    Not on file   Social History Narrative    Not on file     Social History     Substance and Sexual Activity   Drug Use No     Social History     Substance and Sexual Activity   Alcohol Use No     Social History     Substance and Sexual Activity   Sexual Activity Not on file     Social History     Tobacco Use   Smoking Status Never Smoker   Smokeless Tobacco Never Used       Family History:     Family History   Problem Relation Age of Onset    Arthritis Mother     Atrial Fibrillation Mother     Breast Cancer Mother     High Blood Pressure Mother     Anemia Father     Arthritis Father     Cancer Father 80        Ear CA/ tumor removed     Hearing Loss Father     Arthritis Sister     Diabetes Maternal Aunt     Arthritis Maternal Aunt     Early Death Maternal Uncle          in early 42's     Depression Maternal Uncle     Other Maternal Uncle         Suicide    Heart Disease Paternal Aunt     Arthritis Maternal Grandmother        Review of Systems:     10 point ROS obtained and in HPI as above     Physical Exam:     BP (!) 107/56   Pulse 102   Temp 98.9 °F (37.2 °C) (Oral)   Resp 20   Ht 5' 4\" (1.626 m)   Wt (!) 386 lb 7.5 oz (175.3 kg)   SpO2 100%   BMI 66.34 kg/m²     CONSTITUTIONAL: awake, alert, cooperative, no apparent distress.  Super morbid obesity   EYES:  Pupils equal, round and reactive to light, sclera non-icteric, conjunctiva normal  ENT:  Normocephalic, without obvious found for: [employer] [facility]    Lab Results   Component Value Date    INR 1.50 04/09/2019       PTT:    No results found for: APTT    U/A:    Lab Results   Component Value Date    COLORU Yellow 09/30/2018    PHUR 5.5 09/30/2018    WBCUA 0-2 09/30/2018    RBCUA >100 09/30/2018    BACTERIA Rare 09/23/2018    CLARITYU Clear 09/30/2018    SPECGRAV 1.010 09/30/2018    LEUKOCYTESUR SMALL 09/30/2018    UROBILINOGEN 0.2 09/30/2018    BILIRUBINUR Negative 09/30/2018    BLOODU LARGE 09/30/2018    GLUCOSEU Negative 09/30/2018       Imaging:     10/26/19 CT KIDNEY     1. Unchanged 1.6 cm enhancing left renal mass concerning for renal cell   carcinoma. 2. Acute on chronic cholecystitis with new pneumobilia. 3. Trace right pleural effusion. Impression:     GI Bleed   Coagulopathy   DVT - on Coumadin lifelong due to numerous clots   Super Morbid Obesity     Plan:     GI bleed, anemia   - recurrent issue, was to get iron infusions in the office 6 months ago, did not follow up   -   Check iron levels   -   S/p colonoscopy 4-9 with path pending    - PPI   - Transfuse to keep above 7 (8 for cardiac concern)   -  Await EGD   - check epo levels   - defer pill cam need to GI     Coagulopathy   - secondary to Coumadin   - INR over 5 on admit   - INR now 1.5   - Resume coumadin when cleared by GI   - Needs lifelong AC- however, if he cont to have repeat GI bleeding, need to consider IVC filter     Probable L Renal Cell carcinoma  - CT from 10- with 1.6 cm mass   - urology to follow   - hx of right partial nephrectomy     DVT X 2, super morbid and sedentary   - Lifelong therapy, recurrent bleeds, consider IVC filter- will discuss with Dr. Roger Gibson   - Cont folic acid for elevated homocysteine   - Hyper coag work up from 9/24/18 reviewed. Protein S and C def- likely due to Coumadin on board. Factor 5 Leiden negative. MTHFR heterozygote is clinically insignificant.      Dr. Roger Gibson to see in the AM.       Thank you very much for allowing

## 2019-04-09 NOTE — DISCHARGE INSTR - COC
Continuity of Care Form    Patient Name: Taylor Cunningham   :  1950  MRN:  [id number]    Admit date:  2019  Discharge date:  19      Code Status Order: Full Code   Advance Directives:   [address] [facility] Documentation     Date/Time Healthcare Directive Type of Healthcare Directive Copy in [address] Agent's Name Healthcare Agent's Phone Number    19 [phone]  No, patient does not have an advance directive for healthcare treatment -- -- -- -- --    19 1730  No, patient does not have an advance directive for healthcare treatment -- -- -- -- --          Admitting Physician:  Willard Fisher MD  PCP: Chloé White MD    Discharging Nurse: [address] [place] [address] Unit/Room#: 8238/0258-01  Discharging Unit Phone Number: [phone]    Emergency Contact:   Extended Emergency Contact Information  Primary Emergency Contact: [employer]  Address: [address] [employer], [address] [facility] [facility] [address] [facility] Phone: [phone]  Mobile Phone: [phone]  Relation: Spouse    Past Surgical History:  Past Surgical History:   Procedure Laterality Date    ABDOMEN SURGERY      Right Kidney Cancer abd. surgery     COLONOSCOPY      flexsigmoidoscopy 40yrs ago     COLONOSCOPY N/A 2019    COLONOSCOPY WITH BIOPSY performed by Ivonne Hernandez MD at [address] [facility], COLON, DIAGNOSTIC      2018    KIDNEY SURGERY  2014    [place] EGD TRANSORAL BIOPSY SINGLE/MULTIPLE  2018    EGD BIOPSY performed by Ivonne Hernandez MD at [address] [facility] 2019    EGD WITH ANESTHESIA performed by Ivonne Hernandez MD at [address] [place] [address]       Immunization History: There is no immunization history on file for this patient.     Active Problems:  Patient Active Problem List   Diagnosis Code    Morbid obesity with BMI of 60.0-69.9, adult ([facility] [address] E66.01, Z68.44    Anticoagulation [facility]  · Address:  · Phone: [phone]  · Fax: [phone]    Dialysis Facility (if applicable)   · Name:  · Address:  · Dialysis Schedule:  · Phone:  · Fax:    / signature: Electronically signed by Frantz Oneil RN on 4/20/19 at 10:02 AM    PHYSICIAN SECTION    Prognosis: Good    Condition at Discharge: Stable    Rehab Potential (if transferring to Rehab): Good    Recommended Labs or Other Treatments After Discharge: PT/OT, Skilled nursing. On 1L NC continuously with BIPAP 18/8 30% FiO2 nightly    Physician Certification: I certify the above information and transfer of Florentin Vanegas  is necessary for the continuing treatment of the diagnosis listed and that he requires [facility] for less 30 days.      Update Admission H&P: No change in H&P    PHYSICIAN SIGNATURE:  Electronically signed by Luis Hobbs MD on 4/20/19 at 9:59 AM

## 2019-04-10 LAB
ABO/RH: NORMAL
ANTIBODY SCREEN: NORMAL
BLOOD BANK DISPENSE STATUS: NORMAL
BLOOD BANK PRODUCT CODE: NORMAL
BPU ID: NORMAL
DESCRIPTION BLOOD BANK: NORMAL
FERRITIN: 37.5 NG/ML (ref 30–400)
FERRITIN: 37.6 NG/ML (ref 30–400)
HCT VFR BLD CALC: 20.6 % (ref 40.5–52.5)
HCT VFR BLD CALC: 22.3 % (ref 40.5–52.5)
HCT VFR BLD CALC: 23.4 % (ref 40.5–52.5)
HEMOGLOBIN: 6.7 G/DL (ref 13.5–17.5)
HEMOGLOBIN: 7.2 G/DL (ref 13.5–17.5)
HEMOGLOBIN: 7.7 G/DL (ref 13.5–17.5)
INR BLD: 1.34 (ref 0.86–1.14)
IRON SATURATION: 5 % (ref 20–50)
IRON SATURATION: 7 % (ref 20–50)
IRON: 11 UG/DL (ref 59–158)
IRON: 15 UG/DL (ref 59–158)
PROTHROMBIN TIME: 15.3 SEC (ref 9.8–13)
TOTAL IRON BINDING CAPACITY: 213 UG/DL (ref 260–445)
TOTAL IRON BINDING CAPACITY: 219 UG/DL (ref 260–445)

## 2019-04-10 PROCEDURE — 6360000002 HC RX W HCPCS: Performed by: NURSE PRACTITIONER

## 2019-04-10 PROCEDURE — 86923 COMPATIBILITY TEST ELECTRIC: CPT

## 2019-04-10 PROCEDURE — 36415 COLL VENOUS BLD VENIPUNCTURE: CPT

## 2019-04-10 PROCEDURE — 85610 PROTHROMBIN TIME: CPT

## 2019-04-10 PROCEDURE — 85018 HEMOGLOBIN: CPT

## 2019-04-10 PROCEDURE — 2700000000 HC OXYGEN THERAPY PER DAY

## 2019-04-10 PROCEDURE — 85014 HEMATOCRIT: CPT

## 2019-04-10 PROCEDURE — 86901 BLOOD TYPING SEROLOGIC RH(D): CPT

## 2019-04-10 PROCEDURE — 94761 N-INVAS EAR/PLS OXIMETRY MLT: CPT

## 2019-04-10 PROCEDURE — 86850 RBC ANTIBODY SCREEN: CPT

## 2019-04-10 PROCEDURE — 84238 ASSAY NONENDOCRINE RECEPTOR: CPT

## 2019-04-10 PROCEDURE — 2580000003 HC RX 258: Performed by: INTERNAL MEDICINE

## 2019-04-10 PROCEDURE — 85303 CLOT INHIBIT PROT C ACTIVITY: CPT

## 2019-04-10 PROCEDURE — 86900 BLOOD TYPING SEROLOGIC ABO: CPT

## 2019-04-10 PROCEDURE — C9113 INJ PANTOPRAZOLE SODIUM, VIA: HCPCS | Performed by: NURSE PRACTITIONER

## 2019-04-10 PROCEDURE — P9016 RBC LEUKOCYTES REDUCED: HCPCS

## 2019-04-10 PROCEDURE — 2580000003 HC RX 258: Performed by: NURSE PRACTITIONER

## 2019-04-10 PROCEDURE — 82728 ASSAY OF FERRITIN: CPT

## 2019-04-10 PROCEDURE — 83550 IRON BINDING TEST: CPT

## 2019-04-10 PROCEDURE — 6370000000 HC RX 637 (ALT 250 FOR IP): Performed by: INTERNAL MEDICINE

## 2019-04-10 PROCEDURE — 6370000000 HC RX 637 (ALT 250 FOR IP): Performed by: NURSE PRACTITIONER

## 2019-04-10 PROCEDURE — 85306 CLOT INHIBIT PROT S FREE: CPT

## 2019-04-10 PROCEDURE — 83540 ASSAY OF IRON: CPT

## 2019-04-10 PROCEDURE — 82668 ASSAY OF ERYTHROPOIETIN: CPT

## 2019-04-10 PROCEDURE — 2060000000 HC ICU INTERMEDIATE R&B

## 2019-04-10 RX ORDER — POTASSIUM CHLORIDE 20 MEQ/1
40 TABLET, EXTENDED RELEASE ORAL ONCE
Status: COMPLETED | OUTPATIENT
Start: 2019-04-10 | End: 2019-04-10

## 2019-04-10 RX ORDER — GUAIFENESIN 600 MG/1
600 TABLET, EXTENDED RELEASE ORAL 2 TIMES DAILY
Status: DISCONTINUED | OUTPATIENT
Start: 2019-04-10 | End: 2019-04-20 | Stop reason: HOSPADM

## 2019-04-10 RX ORDER — WARFARIN SODIUM 1 MG/1
1.5 TABLET ORAL DAILY
Status: COMPLETED | OUTPATIENT
Start: 2019-04-10 | End: 2019-04-10

## 2019-04-10 RX ORDER — 0.9 % SODIUM CHLORIDE 0.9 %
250 INTRAVENOUS SOLUTION INTRAVENOUS ONCE
Status: COMPLETED | OUTPATIENT
Start: 2019-04-10 | End: 2019-04-10

## 2019-04-10 RX ADMIN — TAMSULOSIN HYDROCHLORIDE 0.4 MG: 0.4 CAPSULE ORAL at 13:46

## 2019-04-10 RX ADMIN — GUAIFENESIN 600 MG: 600 TABLET, EXTENDED RELEASE ORAL at 21:20

## 2019-04-10 RX ADMIN — Medication 10 ML: at 01:14

## 2019-04-10 RX ADMIN — PANTOPRAZOLE SODIUM 40 MG: 40 INJECTION, POWDER, FOR SOLUTION INTRAVENOUS at 10:40

## 2019-04-10 RX ADMIN — WARFARIN SODIUM 1.5 MG: 1 TABLET ORAL at 18:06

## 2019-04-10 RX ADMIN — SODIUM CHLORIDE 250 ML: 900 INJECTION, SOLUTION INTRAVENOUS at 09:25

## 2019-04-10 RX ADMIN — FOLIC ACID 1 MG: 1 TABLET ORAL at 10:40

## 2019-04-10 RX ADMIN — PANTOPRAZOLE SODIUM 40 MG: 40 INJECTION, POWDER, FOR SOLUTION INTRAVENOUS at 21:20

## 2019-04-10 RX ADMIN — IRON SUCROSE 100 MG: 20 INJECTION, SOLUTION INTRAVENOUS at 16:55

## 2019-04-10 RX ADMIN — POTASSIUM CHLORIDE 40 MEQ: 20 TABLET, EXTENDED RELEASE ORAL at 16:55

## 2019-04-10 RX ADMIN — Medication 10 ML: at 21:20

## 2019-04-10 RX ADMIN — Medication 10 ML: at 10:41

## 2019-04-10 RX ADMIN — GUAIFENESIN 600 MG: 600 TABLET, EXTENDED RELEASE ORAL at 16:57

## 2019-04-10 ASSESSMENT — PAIN SCALES - GENERAL
PAINLEVEL_OUTOF10: 0

## 2019-04-10 NOTE — PROGRESS NOTES
morbid obesity.   - No plans for filter per Dr. Carey Baumgarten- not a long term solution     Renal Cell CA hx   - 1.6 cm mass has been detected on scans and stable since 2017   - Urology on board - no surgical plans due to no change in size- he is high risk for surgery due to BMI of 66 , also tumors less than 2-3 cm are not excised due to LOW growth rate     Hx of DVT X 2   - last DVT was very remote   - We would recc stopping AC if it weren't for his weight and immobility   - Cont Coumadin- restart at half home dose and follow INR closely   - NOAC such as Eliquis and Xarelto will not be absorbed at his weight     Seen and examined with Dr. Carey Baumgarten       Thank you for asking me to see the patient. Morgan Mckee CNP   [facility]   Please Contact Through [employer]    Hem/Onc Dx: The patient was seen and examined. The patient's care was discussed with Morgan Mckee NP. I agree with the assessment and plan    . The patient is morbidly obese. HEENT: unremarkable  CHEST: Clear  CARD: rrr S2, S2, no murmur, gallop, or rub  ABDO: BS +, non-tender, no mass or organomegaly  EXTREMS: without cyanosis, clubbing, or edema.  Pulses 2+  Neuro: grossly normal      A/P:    Anemia:  -More than likely this is from a GI bleed  -If endoscopy and colonoscopy are negative he needs a capsule endoscopy  -If this is unremarkable he may need a nuclear medicine bleeding scan    History of iron deficiency:  -He did not follow-up in the office for iron  -Iron studies are pending    DVT:  -His wife states that he has not had a DVT for about 20 years  -He is at very high risk due to his previous DVTs and his morbid obesity  -I do not think a filter will serve him well for very long  -Coumadin is likely the safest choice, but he needs to get his INR checked at least every other week rather than once a month    Probable renal cell carcinoma  -Management per urology    Alfie Rico MD  May be reached through [employer]

## 2019-04-10 NOTE — CONSULTS
portion a midline large lower   abdominal wall hernia/ laxity.  Hernia of the abdominal wall contains   multiple small and large bowel loops as well as the appendix.  No bowel   obstruction is identified at this time.       Distal colonic diverticulosis without radiographic evidence of active   inflammation.       Diffuse subcutaneous edema of the visualized body wall, greatest of the left   perineum.  Correlation for cellulitis is recommended.       Unchanged anterior left renal cortical lesion which shows enhancement on   prior studies.          Impression/Plan:   No  source of anemia  Left renal lesion-probable RCCa, unchanged in size  He will need f/u imaging in a few months   Will sign off    Hien Lala PA-C

## 2019-04-10 NOTE — PROGRESS NOTES
Shon Brandt is a 76 y.o. male patient. Current Facility-Administered Medications   Medication Dose Route Frequency Provider Last Rate Last Dose    0.9 % sodium chloride bolus  250 mL Intravenous Once Teodora Mendieta MD 20 mL/hr at 04/10/19 0925 250 mL at 04/10/19 0925    pantoprazole (PROTONIX) injection 40 mg  40 mg Intravenous BID JOEY Alexander - CNP        iohexol (OMNIPAQUE 240) injection 50 mL  50 mL Oral ONCE PRN Alisia Gonzales MD        folic acid (FOLVITE) tablet 1 mg  1 mg Oral Daily Holden Mcmahon MD   1 mg at 04/08/19 1321    tamsulosin (FLOMAX) capsule 0.4 mg  0.4 mg Oral Daily Holden Mcmahon MD   0.4 mg at 04/08/19 1322    sodium chloride flush 0.9 % injection 10 mL  10 mL Intravenous 2 times per day Holden Mcmahon MD   10 mL at 04/10/19 0114    sodium chloride flush 0.9 % injection 10 mL  10 mL Intravenous PRN Holden Mcmahon MD   10 mL at 04/07/19 0134    magnesium hydroxide (MILK OF MAGNESIA) 400 MG/5ML suspension 30 mL  30 mL Oral Daily PRN Holden Mcmahon MD        ondansetron [facility]) injection 4 mg  4 mg Intravenous Q6H PRN Holden Mcmahon MD         No Known Allergies  Principal Problem:    GI bleed  Active Problems: Morbid obesity with BMI of 60.0-69.9, adult (HCC)    Benign hypertension    History of DVT (deep vein thrombosis)    Suspected sleep apnea  Resolved Problems:    * No resolved hospital problems. *    Blood pressure 108/63, pulse 88, temperature 99 °F (37.2 °C), temperature source Oral, resp. rate 16, height 5' 4\" (1.626 m), weight (!) 382 lb 8 oz (173.5 kg), SpO2 96 %. Subjective:  Symptoms:  Stable. Pain:  He reports no pain. Objective:  General Appearance: In no acute distress and not in pain. Vital signs: (most recent): Blood pressure 108/63, pulse 88, temperature 99 °F (37.2 °C), temperature source Oral, resp. rate 16, height 5' 4\" (1.626 m), weight (!) 382 lb 8 oz (173.5 kg), SpO2 96 %. Heart: Normal rate. Regular rhythm.   S1 normal and S2

## 2019-04-10 NOTE — PROGRESS NOTES
Vitals:    04/10/19 0733   BP: 107/63   Pulse: 86   Resp: 18   Temp: 99.1 °F (37.3 °C)   SpO2: 96%     Pt resting quietly in bed alert and oriented. Pt had no acute events overnight. Pt denies having pain at this time and denies having further needs. Patient on 2L O2 at this time. Rectal tube in place. Mills cathter in place. Bed locked in lowest position, call light within reach, bedside table within reach.  Will continue to monitor

## 2019-04-10 NOTE — PROGRESS NOTES
04/09/19 2323   NIV Type   Equipment Type V60   Mode BIPAP   Mask Type Full face mask   Mask Size Medium   Settings/Measurements   Comfort Level Good   Using Accessory Muscles No   IPAP 18 cmH20   EPAP 8 cmH2O   Resp 20   SpO2 96   FiO2  35 %   Vt Exhaled 401 mL   Mask Leak (lpm) 0 lpm   Skin Protection for O2 Device Yes

## 2019-04-10 NOTE — PROGRESS NOTES
Notified [facility] office, [place], [place] [address] 4/10/19 re: urology consult. -[address] [facility]

## 2019-04-11 LAB
A/G RATIO: 1 (ref 1.1–2.2)
ALBUMIN SERPL-MCNC: 2.4 G/DL (ref 3.4–5)
ALP BLD-CCNC: 45 U/L (ref 40–129)
ALT SERPL-CCNC: 6 U/L (ref 10–40)
ANION GAP SERPL CALCULATED.3IONS-SCNC: 6 MMOL/L (ref 3–16)
AST SERPL-CCNC: 7 U/L (ref 15–37)
BASOPHILS ABSOLUTE: 0 K/UL (ref 0–0.2)
BASOPHILS RELATIVE PERCENT: 0.9 %
BILIRUB SERPL-MCNC: 0.6 MG/DL (ref 0–1)
BUN BLDV-MCNC: 26 MG/DL (ref 7–20)
CALCIUM SERPL-MCNC: 7.3 MG/DL (ref 8.3–10.6)
CHLORIDE BLD-SCNC: 108 MMOL/L (ref 99–110)
CO2: 26 MMOL/L (ref 21–32)
CREAT SERPL-MCNC: 1.3 MG/DL (ref 0.8–1.3)
EOSINOPHILS ABSOLUTE: 0.1 K/UL (ref 0–0.6)
EOSINOPHILS RELATIVE PERCENT: 2.8 %
ERYTHROPOIETIN: 68 MU/ML (ref 4–27)
GFR AFRICAN AMERICAN: >60
GFR NON-AFRICAN AMERICAN: 55
GLOBULIN: 2.4 G/DL
GLUCOSE BLD-MCNC: 121 MG/DL (ref 70–99)
HCT VFR BLD CALC: 22.8 % (ref 40.5–52.5)
HCT VFR BLD CALC: 23.2 % (ref 40.5–52.5)
HCT VFR BLD CALC: 23.4 % (ref 40.5–52.5)
HCT VFR BLD CALC: 23.5 % (ref 40.5–52.5)
HEMOGLOBIN: 7.2 G/DL (ref 13.5–17.5)
HEMOGLOBIN: 7.3 G/DL (ref 13.5–17.5)
HEMOGLOBIN: 7.6 G/DL (ref 13.5–17.5)
HEMOGLOBIN: 7.6 G/DL (ref 13.5–17.5)
INR BLD: 1.21 (ref 0.86–1.14)
LYMPHOCYTES ABSOLUTE: 0.3 K/UL (ref 1–5.1)
LYMPHOCYTES RELATIVE PERCENT: 6.4 %
MCH RBC QN AUTO: 27.9 PG (ref 26–34)
MCHC RBC AUTO-ENTMCNC: 31.2 G/DL (ref 31–36)
MCV RBC AUTO: 89.4 FL (ref 80–100)
MONOCYTES ABSOLUTE: 0.6 K/UL (ref 0–1.3)
MONOCYTES RELATIVE PERCENT: 11.9 %
NEUTROPHILS ABSOLUTE: 4.1 K/UL (ref 1.7–7.7)
NEUTROPHILS RELATIVE PERCENT: 78 %
OCCULT BLOOD DIAGNOSTIC: NORMAL
PDW BLD-RTO: 19.5 % (ref 12.4–15.4)
PLATELET # BLD: 113 K/UL (ref 135–450)
PMV BLD AUTO: 8.5 FL (ref 5–10.5)
POTASSIUM SERPL-SCNC: 4 MMOL/L (ref 3.5–5.1)
PRO-BNP: 5466 PG/ML (ref 0–124)
PROTHROMBIN TIME: 13.8 SEC (ref 9.8–13)
RBC # BLD: 2.59 M/UL (ref 4.2–5.9)
SODIUM BLD-SCNC: 140 MMOL/L (ref 136–145)
SOLUBLE TRANSFERRIN RECEPT: 6.9 MG/L (ref 2.2–5)
TOTAL PROTEIN: 4.8 G/DL (ref 6.4–8.2)
WBC # BLD: 5.2 K/UL (ref 4–11)

## 2019-04-11 PROCEDURE — 85014 HEMATOCRIT: CPT

## 2019-04-11 PROCEDURE — 6360000002 HC RX W HCPCS: Performed by: NURSE PRACTITIONER

## 2019-04-11 PROCEDURE — 85025 COMPLETE CBC W/AUTO DIFF WBC: CPT

## 2019-04-11 PROCEDURE — 85610 PROTHROMBIN TIME: CPT

## 2019-04-11 PROCEDURE — C9113 INJ PANTOPRAZOLE SODIUM, VIA: HCPCS | Performed by: NURSE PRACTITIONER

## 2019-04-11 PROCEDURE — 94660 CPAP INITIATION&MGMT: CPT

## 2019-04-11 PROCEDURE — 6370000000 HC RX 637 (ALT 250 FOR IP): Performed by: NURSE PRACTITIONER

## 2019-04-11 PROCEDURE — 94761 N-INVAS EAR/PLS OXIMETRY MLT: CPT

## 2019-04-11 PROCEDURE — 80053 COMPREHEN METABOLIC PANEL: CPT

## 2019-04-11 PROCEDURE — 2580000003 HC RX 258: Performed by: NURSE PRACTITIONER

## 2019-04-11 PROCEDURE — 2700000000 HC OXYGEN THERAPY PER DAY

## 2019-04-11 PROCEDURE — 85018 HEMOGLOBIN: CPT

## 2019-04-11 PROCEDURE — 83880 ASSAY OF NATRIURETIC PEPTIDE: CPT

## 2019-04-11 PROCEDURE — 6370000000 HC RX 637 (ALT 250 FOR IP): Performed by: INTERNAL MEDICINE

## 2019-04-11 PROCEDURE — 2060000000 HC ICU INTERMEDIATE R&B

## 2019-04-11 PROCEDURE — 36415 COLL VENOUS BLD VENIPUNCTURE: CPT

## 2019-04-11 PROCEDURE — 2580000003 HC RX 258: Performed by: INTERNAL MEDICINE

## 2019-04-11 PROCEDURE — G0328 FECAL BLOOD SCRN IMMUNOASSAY: HCPCS

## 2019-04-11 RX ORDER — WARFARIN SODIUM 1 MG/1
1.5 TABLET ORAL DAILY
Status: COMPLETED | OUTPATIENT
Start: 2019-04-11 | End: 2019-04-11

## 2019-04-11 RX ADMIN — Medication 10 ML: at 10:09

## 2019-04-11 RX ADMIN — GUAIFENESIN 600 MG: 600 TABLET, EXTENDED RELEASE ORAL at 22:06

## 2019-04-11 RX ADMIN — PANTOPRAZOLE SODIUM 40 MG: 40 INJECTION, POWDER, FOR SOLUTION INTRAVENOUS at 10:09

## 2019-04-11 RX ADMIN — Medication 10 ML: at 22:06

## 2019-04-11 RX ADMIN — TAMSULOSIN HYDROCHLORIDE 0.4 MG: 0.4 CAPSULE ORAL at 10:09

## 2019-04-11 RX ADMIN — FOLIC ACID 1 MG: 1 TABLET ORAL at 10:09

## 2019-04-11 RX ADMIN — IRON SUCROSE 200 MG: 20 INJECTION, SOLUTION INTRAVENOUS at 10:10

## 2019-04-11 RX ADMIN — PANTOPRAZOLE SODIUM 40 MG: 40 INJECTION, POWDER, FOR SOLUTION INTRAVENOUS at 22:06

## 2019-04-11 RX ADMIN — GUAIFENESIN 600 MG: 600 TABLET, EXTENDED RELEASE ORAL at 10:08

## 2019-04-11 RX ADMIN — WARFARIN SODIUM 1.5 MG: 1 TABLET ORAL at 17:31

## 2019-04-11 ASSESSMENT — PAIN SCALES - GENERAL
PAINLEVEL_OUTOF10: 0

## 2019-04-11 NOTE — PROGRESS NOTES
distress and not in pain. Vital signs: (most recent): Blood pressure 113/64, pulse 96, temperature 98.8 °F (37.1 °C), temperature source Oral, resp. rate 19, height 5' 4\" (1.626 m), weight (!) 392 lb 3.2 oz (177.9 kg), SpO2 97 %. Heart: Normal rate. Regular rhythm. S1 normal and S2 normal.    Abdomen: Abdomen is soft. Bowel sounds are normal.   There is no abdominal tenderness. Assessment & Plan  75 yo w obesity, HTN, Fe def anemia, h/o DU bleed in 8/2019 at [facility] (repeat EGD by me in 9/2019 was neg. w neg gastric Bx), kidney CA, DVT on Couamdin and recent choledocholithiasis s/p ERCP in July 2018, presents with melena (black heme-pos diarrhea) and occ bright red blood in his stools associated w INR of 5 and H/H 6/21. CT, EGD and colonoscopy are neg. Except diverticulosis and polyp.     Plan:   1. Agree w PPI  2. F/U H/H and transfuse as needed  3. Would D/C Coumadin if possible. But if necessary, then would place on half dose, and will order a Capsule Endoscopy.   4. Will follow     Araseli Palm MD       (O) [phone]        Araesli Palm MD  4/11/2019

## 2019-04-11 NOTE — PROGRESS NOTES
[facility] AT [facility]) and 10/2018 ([facility]) with anemia/GIB. - hematology consulted and recommended patient stay on lifelong AC given morbid obesity and limited mobility. IVC filter is not a viable option.  - INR supratherapeutic on admission - 5.5  - received 3 units FFP and 20 mg of Vit K subq. - restarted warfarin 4/10 - at half dose per Oncology recommendation  - daily INR    HTN - BP soft in the setting of GIB. - hold home HCTZ for now. Suspected sleep apnea  - BiPAP 18/8 ordered nightly. Hypomagnesemia/hypokalemia - likely due to diarrhea. Replaced IV/PO. Follow BMP. Morbid obesity - body mass index is 62.6 kg/m². Complicating assessment and treatment. Placing patient at risk for multiple co-morbidities as well as early death and contributing to the patient's presentation. Counseled on weight loss    Left renal cell lesion - probable Renal Cell Carcinoma  - previously noted/known left 1.6 cm lesion on the left kidney   - hx of right partial nephrectomy for renal mass 2014  - urology consulted - recommend outpatient survielliance    Acute hypoxic respiratory failure - possibly due to immobility vs mild iatrogenic volume overload from multiple blood products  - supplemental O2 to keep sats > 92%. Wean as tolerated. - check BNP in AM - consider Lasix if hypoxia persists. Elevated bilirubin in the setting of known cholelithiasis  - cholelithiasis with possible edema noted on CT abd/pelvis. - follow bili.  - no clinical signs of cholecystitis, no abd pain. Hx of urinary retention post Devine cath  - continue Flomax  - remove devine when patient more mobile - possibly 4/11. DVT Prophylaxis: warfarin (NO BRIDGING due to GIB)  Diet: DIET LOW FAT;  Code Status: Full Code    PT/OT Eval Status: ordered    Dispo - ? 2 days pending further GI evaluation and stable Hb.      Toi Rivera MD

## 2019-04-11 NOTE — PROGRESS NOTES
Spoke with Barrera Cordon in endo about patients potential capsule study. Unable to do today, endo will contact GI for orders.

## 2019-04-12 LAB
HCT VFR BLD CALC: 23.4 % (ref 40.5–52.5)
HCT VFR BLD CALC: 24.3 % (ref 40.5–52.5)
HCT VFR BLD CALC: 24.8 % (ref 40.5–52.5)
HCT VFR BLD CALC: 25 % (ref 40.5–52.5)
HEMOGLOBIN: 7.6 G/DL (ref 13.5–17.5)
HEMOGLOBIN: 7.8 G/DL (ref 13.5–17.5)
HEMOGLOBIN: 7.8 G/DL (ref 13.5–17.5)
HEMOGLOBIN: 8 G/DL (ref 13.5–17.5)
INR BLD: 1.21 (ref 0.86–1.14)
PROTEIN C FUNCTIONAL: 53 % (ref 83–168)
PROTEIN S, FUNCTIONAL: 36 % (ref 66–143)
PROTHROMBIN TIME: 13.8 SEC (ref 9.8–13)

## 2019-04-12 PROCEDURE — C9113 INJ PANTOPRAZOLE SODIUM, VIA: HCPCS | Performed by: NURSE PRACTITIONER

## 2019-04-12 PROCEDURE — 3609019000 HC EGD CAPSULE ENDOSCOPY: Performed by: INTERNAL MEDICINE

## 2019-04-12 PROCEDURE — 6360000002 HC RX W HCPCS: Performed by: NURSE PRACTITIONER

## 2019-04-12 PROCEDURE — 94761 N-INVAS EAR/PLS OXIMETRY MLT: CPT

## 2019-04-12 PROCEDURE — 2580000003 HC RX 258: Performed by: INTERNAL MEDICINE

## 2019-04-12 PROCEDURE — 2060000000 HC ICU INTERMEDIATE R&B

## 2019-04-12 PROCEDURE — 85018 HEMOGLOBIN: CPT

## 2019-04-12 PROCEDURE — 2700000000 HC OXYGEN THERAPY PER DAY

## 2019-04-12 PROCEDURE — 85610 PROTHROMBIN TIME: CPT

## 2019-04-12 PROCEDURE — 2580000003 HC RX 258: Performed by: NURSE PRACTITIONER

## 2019-04-12 PROCEDURE — 6370000000 HC RX 637 (ALT 250 FOR IP): Performed by: INTERNAL MEDICINE

## 2019-04-12 PROCEDURE — 94660 CPAP INITIATION&MGMT: CPT

## 2019-04-12 PROCEDURE — 36415 COLL VENOUS BLD VENIPUNCTURE: CPT

## 2019-04-12 PROCEDURE — 2720000010 HC SURG SUPPLY STERILE: Performed by: INTERNAL MEDICINE

## 2019-04-12 PROCEDURE — 85014 HEMATOCRIT: CPT

## 2019-04-12 PROCEDURE — 0DJ07ZZ INSPECTION OF UPPER INTESTINAL TRACT, VIA NATURAL OR ARTIFICIAL OPENING: ICD-10-PCS | Performed by: INTERNAL MEDICINE

## 2019-04-12 PROCEDURE — 6370000000 HC RX 637 (ALT 250 FOR IP): Performed by: NURSE PRACTITIONER

## 2019-04-12 RX ORDER — WARFARIN SODIUM 1 MG/1
1.5 TABLET ORAL DAILY
Status: COMPLETED | OUTPATIENT
Start: 2019-04-12 | End: 2019-04-12

## 2019-04-12 RX ADMIN — GUAIFENESIN 600 MG: 600 TABLET, EXTENDED RELEASE ORAL at 09:37

## 2019-04-12 RX ADMIN — PANTOPRAZOLE SODIUM 40 MG: 40 INJECTION, POWDER, FOR SOLUTION INTRAVENOUS at 09:37

## 2019-04-12 RX ADMIN — WARFARIN SODIUM 1.5 MG: 1 TABLET ORAL at 18:57

## 2019-04-12 RX ADMIN — Medication 10 ML: at 09:37

## 2019-04-12 RX ADMIN — Medication 10 ML: at 21:04

## 2019-04-12 RX ADMIN — IRON SUCROSE 200 MG: 20 INJECTION, SOLUTION INTRAVENOUS at 09:37

## 2019-04-12 RX ADMIN — FOLIC ACID 1 MG: 1 TABLET ORAL at 09:37

## 2019-04-12 RX ADMIN — TAMSULOSIN HYDROCHLORIDE 0.4 MG: 0.4 CAPSULE ORAL at 09:37

## 2019-04-12 RX ADMIN — PANTOPRAZOLE SODIUM 40 MG: 40 INJECTION, POWDER, FOR SOLUTION INTRAVENOUS at 21:04

## 2019-04-12 RX ADMIN — GUAIFENESIN 600 MG: 600 TABLET, EXTENDED RELEASE ORAL at 21:04

## 2019-04-12 ASSESSMENT — PAIN SCALES - GENERAL
PAINLEVEL_OUTOF10: 0

## 2019-04-12 NOTE — PROGRESS NOTES
bowel   obstruction is identified at this time. Distal colonic diverticulosis without radiographic evidence of active   inflammation. Diffuse subcutaneous edema of the visualized body wall, greatest of the left   perineum. Correlation for cellulitis is recommended. Unchanged anterior left renal cortical lesion which shows enhancement on   prior studies. NM GI BLOOD LOSS   Final Result   No abnormal activity adequate to localize active bleeding. RECOMMENDATIONS:   If the patient shows hemodynamic signs of an active bleed in the next 20   hours, additional images can be acquired. XR CHEST PORTABLE   Final Result   No change in the appearance of the chest.  No evidence of acute   cardiopulmonary disease. XR CHEST PORTABLE   Final Result   Low lung volume exam with no focal consolidation. Mild prominence of the heart size accentuated by portable technique. Assessment/Plan:    Active Hospital Problems    Diagnosis Date Noted    GI bleed [K92.2] 04/07/2019    Suspected sleep apnea [R29.818] 10/01/2018    Benign hypertension [I10] 09/24/2018    History of DVT (deep vein thrombosis) [Z86.718] 07/14/2018    Morbid [facility] with BMI of 60.0-69.9, [facility] [E66.01, Z68.44] 02/16/2014     Acute blood loss anemia likely UGIB, in the setting of supratherapeutic INR and iron deficiency. Hx of duodenal ulcer. Hemoccult stool positive. Known hx of Fe def anemia that in the past he required iron infusion  - EGD/colonoscopy completed - neg for acute bleeding source. - NM bleeding scan completed and negative. - GI recommends capsule endoscopy (completed 4/12; results pending). - received 9 units pRBC, 3 units of FFP, 1 pack of platelets, 1 unit cryo thus far.   - transfuse for Hgb < 7.  - serial H/H   - Back on low dose Coumadin per Oncology recommendation; low threshold to hold if any further bleeding.   - Protonix IV BID  - Started IV Venofer x 5 doses

## 2019-04-12 NOTE — PROGRESS NOTES
Pharmacy to Dose Warfarin    Dx:  Hx DVT, Hx GIB and Renal Carcinoma (s/p nephrectomy)  Goal INR range 2-3   Home Warfarin dose:  3mg daily except 6mg Sunday. Date  INR  Warfarin  4/10                 1.34                 1.5mg (dosed at half dose per consult instructions)   4/11  1.21   1.5mg  4/12                 1.21                 1.5mg    Recommend Warfarin 1.5mg tonight x1. Daily INR ordered. Rx will continue to manage therapy per [facility] AT [facility] consult order. [facility] notes from 4/11  Coagulopathy   - Needs closer follow up of INR  - He was having INR checked only monthly. Needs at least weekly or every 2 week draws- patient and wife are aware. - The benefit of continued AC outweighs the risk. He is very high risk for deadly clot due to super morbid obesity.      Hx of DVT X 2   - last DVT was very remote   - We would recc stopping AC if it weren't for his weight and immobility   - Cont Coumadin- restart at half home dose and follow INR closely   - NOAC such as Eliquis and Xarelto will not be absorbed at his weight      Discussed with Jass Majano, HUSSAIN Ledesma, PharmD, [address] [employer] [phone]

## 2019-04-12 NOTE — PROGRESS NOTES
Pt and visitor educated on placement, purpose, and plan of care. Educated on diet while monitoring taking place.  Capsule [id number].

## 2019-04-12 NOTE — PROGRESS NOTES
Monitor placed in abdominal holster, blue light indicator flashing prior to pt swallowing capsule without difficulty. Pt, visitor, PCA, nurse educated on monitoring blue flashing indicator and to notify Endo nurse if any change. Nurse educated on diet restrictions during monitoring.

## 2019-04-12 NOTE — PROGRESS NOTES
118/75, pulse 81, temperature 97.8 °F (36.6 °C), temperature source Oral, resp. rate 17, height 5' 4\" (1.626 m), weight (!) 390 lb 10.5 oz (177.2 kg), SpO2 97 %. Abdomen: Abdomen is soft. Bowel sounds are normal.   There is no abdominal tenderness. Assessment & Plan  75 yo w obesity, HTN, Fe def anemia, h/o DU bleed in 8/2019 at [facility] (repeat EGD by me in 9/2019 was neg. w neg gastric Bx), kidney CA, DVT on Couamdin and recent choledocholithiasis s/p ERCP in July 2018, presents with melena (black heme-pos diarrhea) and occ bright red blood in his stools associated w INR of 5 and H/H 6/21. CT, EGD and colonoscopy are neg. Except diverticulosis and polyp.     Plan:   1. Agree w PPI  2. F/U H/H and transfuse as needed  3. Would D/C Coumadin if possible. But if necessary, would place on half dose, and will order a Capsule Endoscopy.   4. Will follow     Ector Rodas MD       (O) [phone]        Ector Rodas MD  4/12/2019

## 2019-04-13 LAB
HCT VFR BLD CALC: 23 % (ref 40.5–52.5)
HCT VFR BLD CALC: 23.8 % (ref 40.5–52.5)
HCT VFR BLD CALC: 25.1 % (ref 40.5–52.5)
HEMOGLOBIN: 7.5 G/DL (ref 13.5–17.5)
HEMOGLOBIN: 7.6 G/DL (ref 13.5–17.5)
HEMOGLOBIN: 8.1 G/DL (ref 13.5–17.5)
INR BLD: 1.25 (ref 0.86–1.14)
PROTHROMBIN TIME: 14.2 SEC (ref 9.8–13)

## 2019-04-13 PROCEDURE — 2060000000 HC ICU INTERMEDIATE R&B

## 2019-04-13 PROCEDURE — 6370000000 HC RX 637 (ALT 250 FOR IP): Performed by: INTERNAL MEDICINE

## 2019-04-13 PROCEDURE — 85610 PROTHROMBIN TIME: CPT

## 2019-04-13 PROCEDURE — 97162 PT EVAL MOD COMPLEX 30 MIN: CPT

## 2019-04-13 PROCEDURE — 94761 N-INVAS EAR/PLS OXIMETRY MLT: CPT

## 2019-04-13 PROCEDURE — 97166 OT EVAL MOD COMPLEX 45 MIN: CPT

## 2019-04-13 PROCEDURE — 94660 CPAP INITIATION&MGMT: CPT

## 2019-04-13 PROCEDURE — 2700000000 HC OXYGEN THERAPY PER DAY

## 2019-04-13 PROCEDURE — 2580000003 HC RX 258: Performed by: INTERNAL MEDICINE

## 2019-04-13 PROCEDURE — 85014 HEMATOCRIT: CPT

## 2019-04-13 PROCEDURE — 6360000002 HC RX W HCPCS: Performed by: NURSE PRACTITIONER

## 2019-04-13 PROCEDURE — 97530 THERAPEUTIC ACTIVITIES: CPT

## 2019-04-13 PROCEDURE — 85018 HEMOGLOBIN: CPT

## 2019-04-13 PROCEDURE — 51798 US URINE CAPACITY MEASURE: CPT

## 2019-04-13 PROCEDURE — C9113 INJ PANTOPRAZOLE SODIUM, VIA: HCPCS | Performed by: NURSE PRACTITIONER

## 2019-04-13 PROCEDURE — 6360000002 HC RX W HCPCS: Performed by: INTERNAL MEDICINE

## 2019-04-13 PROCEDURE — 36415 COLL VENOUS BLD VENIPUNCTURE: CPT

## 2019-04-13 PROCEDURE — 6370000000 HC RX 637 (ALT 250 FOR IP): Performed by: NURSE PRACTITIONER

## 2019-04-13 RX ORDER — WARFARIN SODIUM 1 MG/1
1.5 TABLET ORAL DAILY
Status: COMPLETED | OUTPATIENT
Start: 2019-04-13 | End: 2019-04-13

## 2019-04-13 RX ADMIN — TAMSULOSIN HYDROCHLORIDE 0.4 MG: 0.4 CAPSULE ORAL at 09:26

## 2019-04-13 RX ADMIN — GUAIFENESIN 600 MG: 600 TABLET, EXTENDED RELEASE ORAL at 09:26

## 2019-04-13 RX ADMIN — IRON SUCROSE 200 MG: 20 INJECTION, SOLUTION INTRAVENOUS at 16:43

## 2019-04-13 RX ADMIN — FOLIC ACID 1 MG: 1 TABLET ORAL at 09:26

## 2019-04-13 RX ADMIN — Medication 10 ML: at 20:31

## 2019-04-13 RX ADMIN — PANTOPRAZOLE SODIUM 40 MG: 40 INJECTION, POWDER, FOR SOLUTION INTRAVENOUS at 09:25

## 2019-04-13 RX ADMIN — GUAIFENESIN 600 MG: 600 TABLET, EXTENDED RELEASE ORAL at 20:31

## 2019-04-13 RX ADMIN — WARFARIN SODIUM 1.5 MG: 1 TABLET ORAL at 18:07

## 2019-04-13 RX ADMIN — Medication 10 ML: at 09:26

## 2019-04-13 RX ADMIN — PANTOPRAZOLE SODIUM 40 MG: 40 INJECTION, POWDER, FOR SOLUTION INTRAVENOUS at 20:31

## 2019-04-13 ASSESSMENT — PAIN SCALES - GENERAL
PAINLEVEL_OUTOF10: 0

## 2019-04-13 NOTE — PROGRESS NOTES
Physical Therapy    Facility/Department: [facility] C4 PCU  Initial Assessment    NAME: Trenton Neal  : 1950  MRN: [id number]    Date of Service: 2019    Discharge Recommendations:  Subacute/Skilled Nursing Facility   PT Equipment Recommendations  Equipment Needed: No(TBD at SNF)    Assessment   Body structures, Functions, Activity limitations: Decreased functional mobility ; Decreased endurance;Decreased ROM; Decreased strength;Decreased balance  Assessment: Pt referred for PT evaluation during current hospital stay with dx of GIB. Pt currently weak and deconditioned following prolonged bedrest while in hospital and ICU, today requiring mod-maxA x 2 for bed mobility and transfers. Despite weakness, vitals remained WNL with activity -- while on 4L O2. Pt will require SNF at [place] given current deficits and assist requirements. Treatment Diagnosis: Decreased endurance and (I) with mobility  Specific instructions for Next Treatment: Progress ther ex and mobility as tolerated, progress to amb when endurance/strength improve  Prognosis: Good  Decision Making: Medium Complexity  Patient Education: Role of PT, benefits of mobility, safety in transfers, monitoring of vitals with activity - pt verbalizes understanding  REQUIRES PT FOLLOW UP: Yes  Activity Tolerance: Patient Tolerated treatment well;Patient limited by fatigue;Patient limited by endurance  Activity Tolerance: O2 sats ranged from % during entire therapy session while on 4L O2. BP within normal range while seated EOB. Patient Diagnosis(es): The primary encounter diagnosis was Gastrointestinal hemorrhage, unspecified gastrointestinal hemorrhage type. Diagnoses of Anemia requiring transfusions and Supratherapeutic INR were also pertinent to this visit. has a past medical history of Cancer ([address] Edema of both legs, GI bleed, Hx of blood clots, and Hypertension. has a past surgical history that includes Kidney surgery (2014);  Abdomen surgery; Colonoscopy; Endoscopy, colon, diagnostic; pr egd transoral biopsy single/multiple (9/28/2018); Upper gastrointestinal endoscopy (N/A, 4/8/2019); and Colonoscopy (N/A, 4/9/2019). Restrictions  Restrictions/Precautions  Restrictions/Precautions: Up as Tolerated, General Precautions, Fall Risk  Position Activity Restriction  Other position/activity restrictions: Medium fall risk per nursing assessment, 4L O2  Vision/Hearing  Vision: Impaired  Vision Exceptions: Wears glasses for reading  Hearing: Exceptions to [facility]  Hearing Exceptions: Hard of hearing/hearing concerns     Subjective  General  Chart Reviewed: Yes  Patient assessed for rehabilitation services?: Yes  Family / Caregiver Present: Yes(wife)  Referring Practitioner: Dr. Florence Guerrero  Referral Date : 04/12/19  Diagnosis: Acute blood-loss anemia, likely UGIB  Follows Commands: Within Functional Limits  General Comment  Comments: Pt resting in bed upon entry of therapy staff  Subjective  Subjective: Pt agreeable to work with PT/OT this morning. States he hasn't been OOB during entire acute hospital stay. Willing to try to get into the chair with min encouragement.   Pain Screening  Patient Currently in Pain: Denies  Intervention List: Patient able to continue with treatment    Orientation  Orientation  Overall Orientation Status: Within Functional Limits  Social/Functional History  Social/Functional History  Lives With: Spouse  Type of Home: House  Home Layout: One level  Home Access: Stairs to enter without rails  Entrance Stairs - Number of Steps: 1  Bathroom Shower/Tub: Tub/Shower unit(completes sponge baths)  Bathroom Toilet: Standard(uses \"towel rack\" to pull up)  Home Equipment: Rolling walker, Cane  ADL Assistance: Independent  Homemaking Responsibilities: Yes(works with wife)  Ambulation Assistance: Independent(SPC)  Transfer Assistance: Independent  Active : No  Patient's  Info: has not driven since September 2018  Occupation:

## 2019-04-13 NOTE — PROGRESS NOTES
Precautions, Fall Risk  Position Activity Restriction  Other position/activity restrictions: Medium fall risk per nursing assessment, 4L O2    Subjective   General  Chart Reviewed: Yes  Patient assessed for rehabilitation services?: Yes  Family / Caregiver Present: Yes(spouse)  Referring Practitioner: Herminio Gonzalez MD  Subjective  Subjective: Pt asleep upon arrival, agreeable to therapy  General Comment  Comments: RN approved evaluation  Pain Assessment  Pain Assessment: 0-10  Pain Level: 0  Oxygen Therapy  SpO2: 100 %  Pulse Oximeter Device Mode: Intermittent  Pulse Oximeter Device Location: Right;Hand;Finger  O2 Device: Nasal cannula  O2 Flow Rate (L/min): 2 L/min  Social/Functional History  Social/Functional History  Lives With: Spouse  Type of Home: House  Home Layout: One level  Home Access: Stairs to enter without rails  Entrance Stairs - Number of Steps: 1  Bathroom Shower/Tub: Tub/Shower unit(completes sponge baths)  Bathroom Toilet: Standard(uses \"towel rack\" to pull up)  Home Equipment: Rolling walker, Cane  ADL Assistance: Independent  Homemaking Responsibilities: Yes(works with wife)  Ambulation Assistance: Independent(SPC)  Transfer Assistance: Independent  Active : No  Patient's  Info: has not driven since September 2018  Occupation: Retired  Type of occupation:        Objective   Vision: Impaired  Vision Exceptions: Wears glasses for reading  Hearing: Exceptions to [facility]  Hearing Exceptions: Hard of hearing/hearing concerns    Orientation  Overall Orientation Status: Impaired  Orientation Level: Disoriented to time;Oriented to situation;Oriented to person;Oriented to place  Observation/Palpation  Posture: Fair  Observation: Pt with lymphedema in BLE (pt's wife states this is present at baseline), protruding lower abdomen 2* hernia  Balance  Sitting Balance: Contact guard assistance  Standing Balance: Dependent/Total(mod A x2)  Standing Balance  Time: ~10, ~1-2 minutes

## 2019-04-14 LAB
ANION GAP SERPL CALCULATED.3IONS-SCNC: 4 MMOL/L (ref 3–16)
ANISOCYTOSIS: ABNORMAL
BACTERIA: ABNORMAL /HPF
BANDED NEUTROPHILS RELATIVE PERCENT: 19 % (ref 0–7)
BASOPHILIC STIPPLING: ABNORMAL
BASOPHILS ABSOLUTE: 0.1 K/UL (ref 0–0.2)
BASOPHILS RELATIVE PERCENT: 1 %
BILIRUBIN URINE: NEGATIVE
BLOOD, URINE: ABNORMAL
BUN BLDV-MCNC: 17 MG/DL (ref 7–20)
CALCIUM SERPL-MCNC: 7.6 MG/DL (ref 8.3–10.6)
CHLORIDE BLD-SCNC: 108 MMOL/L (ref 99–110)
CLARITY: ABNORMAL
CO2: 31 MMOL/L (ref 21–32)
COLOR: YELLOW
CREAT SERPL-MCNC: 1 MG/DL (ref 0.8–1.3)
CRENATED RBC'S: ABNORMAL
EOSINOPHILS ABSOLUTE: 0.1 K/UL (ref 0–0.6)
EOSINOPHILS RELATIVE PERCENT: 2 %
GFR AFRICAN AMERICAN: >60
GFR NON-AFRICAN AMERICAN: >60
GLUCOSE BLD-MCNC: 118 MG/DL (ref 70–99)
GLUCOSE URINE: NEGATIVE MG/DL
HCT VFR BLD CALC: 24.2 % (ref 40.5–52.5)
HEMOGLOBIN: 7.7 G/DL (ref 13.5–17.5)
HYPOCHROMIA: ABNORMAL
INR BLD: 1.3 (ref 0.86–1.14)
KETONES, URINE: NEGATIVE MG/DL
LEUKOCYTE ESTERASE, URINE: ABNORMAL
LYMPHOCYTES ABSOLUTE: 0.2 K/UL (ref 1–5.1)
LYMPHOCYTES RELATIVE PERCENT: 3 %
MACROCYTES: ABNORMAL
MCH RBC QN AUTO: 28.4 PG (ref 26–34)
MCHC RBC AUTO-ENTMCNC: 32.1 G/DL (ref 31–36)
MCV RBC AUTO: 88.6 FL (ref 80–100)
MICROCYTES: ABNORMAL
MICROSCOPIC EXAMINATION: YES
MONOCYTES ABSOLUTE: 0.6 K/UL (ref 0–1.3)
MONOCYTES RELATIVE PERCENT: 9 %
NEUTROPHILS ABSOLUTE: 5.5 K/UL (ref 1.7–7.7)
NEUTROPHILS RELATIVE PERCENT: 66 %
NITRITE, URINE: POSITIVE
OVALOCYTES: ABNORMAL
PDW BLD-RTO: 20.5 % (ref 12.4–15.4)
PH UA: 8.5 (ref 5–8)
PLATELET # BLD: 159 K/UL (ref 135–450)
PMV BLD AUTO: 8.4 FL (ref 5–10.5)
POIKILOCYTES: ABNORMAL
POLYCHROMASIA: ABNORMAL
POTASSIUM REFLEX MAGNESIUM: 4.1 MMOL/L (ref 3.5–5.1)
PROTEIN UA: 100 MG/DL
PROTHROMBIN TIME: 14.8 SEC (ref 9.8–13)
RBC # BLD: 2.73 M/UL (ref 4.2–5.9)
RBC UA: ABNORMAL /HPF (ref 0–2)
SODIUM BLD-SCNC: 143 MMOL/L (ref 136–145)
SPECIFIC GRAVITY UA: 1.01 (ref 1–1.03)
STOMATOCYTES: ABNORMAL
URINE TYPE: ABNORMAL
UROBILINOGEN, URINE: 0.2 E.U./DL
WBC # BLD: 6.5 K/UL (ref 4–11)
WBC UA: ABNORMAL /HPF (ref 0–5)

## 2019-04-14 PROCEDURE — 87086 URINE CULTURE/COLONY COUNT: CPT

## 2019-04-14 PROCEDURE — 2580000003 HC RX 258: Performed by: INTERNAL MEDICINE

## 2019-04-14 PROCEDURE — 2700000000 HC OXYGEN THERAPY PER DAY

## 2019-04-14 PROCEDURE — 85610 PROTHROMBIN TIME: CPT

## 2019-04-14 PROCEDURE — 94660 CPAP INITIATION&MGMT: CPT

## 2019-04-14 PROCEDURE — 87077 CULTURE AEROBIC IDENTIFY: CPT

## 2019-04-14 PROCEDURE — 85025 COMPLETE CBC W/AUTO DIFF WBC: CPT

## 2019-04-14 PROCEDURE — 87186 SC STD MICRODIL/AGAR DIL: CPT

## 2019-04-14 PROCEDURE — 36415 COLL VENOUS BLD VENIPUNCTURE: CPT

## 2019-04-14 PROCEDURE — 81001 URINALYSIS AUTO W/SCOPE: CPT

## 2019-04-14 PROCEDURE — 6360000002 HC RX W HCPCS: Performed by: NURSE PRACTITIONER

## 2019-04-14 PROCEDURE — 51702 INSERT TEMP BLADDER CATH: CPT

## 2019-04-14 PROCEDURE — 2060000000 HC ICU INTERMEDIATE R&B

## 2019-04-14 PROCEDURE — 80048 BASIC METABOLIC PNL TOTAL CA: CPT

## 2019-04-14 PROCEDURE — 51798 US URINE CAPACITY MEASURE: CPT

## 2019-04-14 PROCEDURE — 6370000000 HC RX 637 (ALT 250 FOR IP): Performed by: INTERNAL MEDICINE

## 2019-04-14 PROCEDURE — 6360000002 HC RX W HCPCS: Performed by: INTERNAL MEDICINE

## 2019-04-14 PROCEDURE — 6370000000 HC RX 637 (ALT 250 FOR IP): Performed by: NURSE PRACTITIONER

## 2019-04-14 PROCEDURE — 94761 N-INVAS EAR/PLS OXIMETRY MLT: CPT

## 2019-04-14 PROCEDURE — C9113 INJ PANTOPRAZOLE SODIUM, VIA: HCPCS | Performed by: NURSE PRACTITIONER

## 2019-04-14 RX ORDER — WARFARIN SODIUM 2 MG/1
2 TABLET ORAL
Status: COMPLETED | OUTPATIENT
Start: 2019-04-14 | End: 2019-04-14

## 2019-04-14 RX ADMIN — TAMSULOSIN HYDROCHLORIDE 0.4 MG: 0.4 CAPSULE ORAL at 09:49

## 2019-04-14 RX ADMIN — FOLIC ACID 1 MG: 1 TABLET ORAL at 09:50

## 2019-04-14 RX ADMIN — PANTOPRAZOLE SODIUM 40 MG: 40 INJECTION, POWDER, FOR SOLUTION INTRAVENOUS at 21:36

## 2019-04-14 RX ADMIN — WARFARIN SODIUM 2 MG: 2 TABLET ORAL at 18:07

## 2019-04-14 RX ADMIN — CEFTRIAXONE SODIUM 1 G: 1 INJECTION, POWDER, FOR SOLUTION INTRAMUSCULAR; INTRAVENOUS at 16:47

## 2019-04-14 RX ADMIN — Medication 10 ML: at 09:49

## 2019-04-14 RX ADMIN — GUAIFENESIN 600 MG: 600 TABLET, EXTENDED RELEASE ORAL at 09:49

## 2019-04-14 RX ADMIN — PANTOPRAZOLE SODIUM 40 MG: 40 INJECTION, POWDER, FOR SOLUTION INTRAVENOUS at 09:49

## 2019-04-14 RX ADMIN — GUAIFENESIN 600 MG: 600 TABLET, EXTENDED RELEASE ORAL at 21:36

## 2019-04-14 RX ADMIN — IRON SUCROSE 200 MG: 20 INJECTION, SOLUTION INTRAVENOUS at 15:51

## 2019-04-14 RX ADMIN — Medication 10 ML: at 21:36

## 2019-04-14 ASSESSMENT — PAIN SCALES - GENERAL
PAINLEVEL_OUTOF10: 0

## 2019-04-14 NOTE — PROGRESS NOTES
organomegaly  Extremities: very large amount of venous stasis with edema, neurovascular intact  Neurologic: No obvious focal neurologic deficits. Assessment and Plan:   1. Acute blood loss anemia due to upper GI bleed on supratherapeutic INR with need for continued warfarin. Required vitamin K and FFP to reverse. Stable hemoglobin per GI. 2. Acute hypoxic respirator failure due to iatrogenic volume overload in setting of immobility and morbid obesity. Supplemental oxygen   3. Iron deficiency (7% iron saturation):  IV Venofer 200 mg x 5 days (started 4/10). Low TIBC and iron level despite normal ferritin (acute phase reactant). 4. Coagulopathy and history of DVT on lifelong warfarin. Hematology consulted and confirms needs for warfarin given NOAC such as Eliquis and Xarelto will not be absorbed at patient's BMI. The benefit of anticoagulation outweighs the risk per Hem/Onc. He is very high risk for deadly clot due to class III morbid obesity. 5. History of Renal Cell Carcinoma:  1.6 cm mass has been detected on scans and stable since 2017. Urology following and no surgical plans. 6. Suspected sleep apnea:  BiPAP 19/8 ordered nightly. 7. Hypokalemia / hypomagnesemia secondary to diarrhea. Replaced and normal BMP. 8. History of urinary retention post Mills catheter continued on Flomax. Urinary catheter reinserted overnight with evidence of urinary tract infection. Treat with IV ceftriaxone. Urology also following.     Advance Directive: Full Code  DVT prophylaxis with warfarin (dose per pharmacy)  Discharge planning:  Next 1-2 days (initially planned for today). He planned to return home in [facility] with home health. However, PT/OT recommends skilled nursing facility. He was in [facility] in September 2018 after sepsis. That facility was amenable to the family. Uses walker and cane at home.         Gonzales Royal MD  Rounding Hospitalist

## 2019-04-14 NOTE — PLAN OF CARE
Problem: Falls - Risk of:  Goal: Will remain free from falls  Description  Will remain free from falls  4/14/2019 1115 by Danna Mascorro RN  Outcome: Ongoing  4/14/2019 1112 by Danna Mascorro RN  Outcome: Ongoing  4/14/2019 0432 by Bakari Tamez RN  Outcome: Met This Shift  Goal: Absence of physical injury  Description  Absence of physical injury  4/14/2019 1115 by Danna Mascorro RN  Outcome: Ongoing  4/14/2019 1112 by Danna Mascorro RN  Outcome: Ongoing  4/14/2019 0432 by Bakari Tamez RN  Outcome: Met This Shift     Problem: Risk for Impaired Skin Integrity  Goal: Tissue integrity - skin and mucous membranes  Description  Structural intactness and normal physiological function of skin and  mucous membranes.   4/14/2019 1115 by Danna Mascorro RN  Outcome: Ongoing  4/14/2019 1112 by Danna Mascorro RN  Outcome: Ongoing  4/14/2019 0432 by Bakari Tamez RN  Outcome: Ongoing     Problem: SAFETY  Goal: Free from accidental physical injury  4/14/2019 1115 by Danna Mascorro RN  Outcome: Ongoing  4/14/2019 1112 by Danna Mascorro RN  Outcome: Ongoing  4/14/2019 0432 by Bakari Tamez RN  Outcome: Ongoing  Goal: Free from intentional harm  4/14/2019 1115 by Danna Mascorro RN  Outcome: Ongoing  4/14/2019 1112 by Danna Mascorro RN  Outcome: Ongoing  4/14/2019 0432 by Bakari Tamez RN  Outcome: Ongoing     Problem: DAILY CARE  Goal: Daily care needs are met  4/14/2019 1115 by Danna Mascorro RN  Outcome: Ongoing  4/14/2019 1112 by Danna Mascorro RN  Outcome: Ongoing  4/14/2019 0432 by Bakari Tamez RN  Outcome: Ongoing     Problem: PAIN  Goal: Patient's pain/discomfort is manageable  4/14/2019 1115 by Danna Mascorro RN  Outcome: Ongoing  4/14/2019 1112 by Danna Mascorro RN  Outcome: Ongoing  4/14/2019 0432 by Bakari Tamez RN  Outcome: Ongoing     Problem: SKIN INTEGRITY  Goal: Skin integrity is maintained or improved  4/14/2019 1115 by Jorge Barba

## 2019-04-14 NOTE — PROGRESS NOTES
Keri Jackson is a 76 y.o. male patient. Current Facility-Administered Medications   Medication Dose Route Frequency Provider Last Rate Last Dose    warfarin (COUMADIN) tablet 2 mg  2 mg Oral Once JOEY Baltazar CNP        iron sucrose (VENOFER) 200 mg in sodium chloride 0.9 % 100 mL IVPB  200 mg Intravenous Q24H Laurel Galvez MD   Stopped at 04/13/19 1806    guaiFENesin ([address]) extended release tablet 600 mg  600 mg Oral BID Kathryn Laird APRN - CNP   600 mg at 04/14/19 4731    warfarin (COUMADIN) daily dosing (placeholder)   Other RX Placeholder Kathryn Sisi APRN - KEV        pantoprazole (PROTONIX) injection 40 mg  40 mg Intravenous BID Sanjana GiraldoJOEY CNP   40 mg at 04/14/19 0949    iohexol (OMNIPAQUE 240) injection 50 mL  50 mL Oral ONCE PRN Autumn Gosselin, MD        folic acid (FOLVITE) tablet 1 mg  1 mg Oral Daily Ivy Parekh MD   1 mg at 04/14/19 0950    tamsulosin (FLOMAX) capsule 0.4 mg  0.4 mg Oral Daily Ivy Parekh MD   0.4 mg at 04/14/19 0949    sodium chloride flush 0.9 % injection 10 mL  10 mL Intravenous 2 times per day Ivy Parekh MD   10 mL at 04/14/19 0949    sodium chloride flush 0.9 % injection 10 mL  10 mL Intravenous PRN Ivy Parekh MD   10 mL at 04/07/19 0134    magnesium hydroxide (MILK OF MAGNESIA) 400 MG/5ML suspension 30 mL  30 mL Oral Daily PRN Ivy Parekh MD        ondansetron [facility]) injection 4 mg  4 mg Intravenous Q6H PRN Ivy Parekh MD         No Known Allergies  Principal Problem:    GI bleed  Active Problems: Morbid obesity with BMI of 60.0-69.9, adult (HCC)    Benign hypertension    History of DVT (deep vein thrombosis)    Suspected sleep apnea  Resolved Problems:    * No resolved hospital problems. *    Blood pressure 119/66, pulse 82, temperature 98.9 °F (37.2 °C), temperature source Oral, resp. rate 18, height 5' 4\" (1.626 m), weight (!) 395 lb 11.6 oz (179.5 kg), SpO2 97 %. Subjective:  Symptoms:  Stable.

## 2019-04-15 LAB
ANION GAP SERPL CALCULATED.3IONS-SCNC: 4 MMOL/L (ref 3–16)
BASOPHILS ABSOLUTE: 0 K/UL (ref 0–0.2)
BASOPHILS RELATIVE PERCENT: 0.7 %
BUN BLDV-MCNC: 15 MG/DL (ref 7–20)
CALCIUM SERPL-MCNC: 7.9 MG/DL (ref 8.3–10.6)
CHLORIDE BLD-SCNC: 107 MMOL/L (ref 99–110)
CO2: 32 MMOL/L (ref 21–32)
CREAT SERPL-MCNC: 0.9 MG/DL (ref 0.8–1.3)
EOSINOPHILS ABSOLUTE: 0.2 K/UL (ref 0–0.6)
EOSINOPHILS RELATIVE PERCENT: 4.2 %
GFR AFRICAN AMERICAN: >60
GFR NON-AFRICAN AMERICAN: >60
GLUCOSE BLD-MCNC: 98 MG/DL (ref 70–99)
HCT VFR BLD CALC: 23 % (ref 40.5–52.5)
HEMOGLOBIN: 7.5 G/DL (ref 13.5–17.5)
INR BLD: 1.52 (ref 0.86–1.14)
LYMPHOCYTES ABSOLUTE: 0.4 K/UL (ref 1–5.1)
LYMPHOCYTES RELATIVE PERCENT: 8.2 %
MCH RBC QN AUTO: 28.9 PG (ref 26–34)
MCHC RBC AUTO-ENTMCNC: 32.5 G/DL (ref 31–36)
MCV RBC AUTO: 88.9 FL (ref 80–100)
MONOCYTES ABSOLUTE: 0.4 K/UL (ref 0–1.3)
MONOCYTES RELATIVE PERCENT: 9.5 %
NEUTROPHILS ABSOLUTE: 3.6 K/UL (ref 1.7–7.7)
NEUTROPHILS RELATIVE PERCENT: 77.4 %
PDW BLD-RTO: 21.3 % (ref 12.4–15.4)
PLATELET # BLD: 162 K/UL (ref 135–450)
PMV BLD AUTO: 8.7 FL (ref 5–10.5)
POTASSIUM REFLEX MAGNESIUM: 4.2 MMOL/L (ref 3.5–5.1)
PROTHROMBIN TIME: 17.3 SEC (ref 9.8–13)
RBC # BLD: 2.59 M/UL (ref 4.2–5.9)
SODIUM BLD-SCNC: 143 MMOL/L (ref 136–145)
WBC # BLD: 4.6 K/UL (ref 4–11)

## 2019-04-15 PROCEDURE — C9113 INJ PANTOPRAZOLE SODIUM, VIA: HCPCS | Performed by: NURSE PRACTITIONER

## 2019-04-15 PROCEDURE — 6360000002 HC RX W HCPCS: Performed by: INTERNAL MEDICINE

## 2019-04-15 PROCEDURE — 2580000003 HC RX 258: Performed by: INTERNAL MEDICINE

## 2019-04-15 PROCEDURE — 97110 THERAPEUTIC EXERCISES: CPT

## 2019-04-15 PROCEDURE — 97530 THERAPEUTIC ACTIVITIES: CPT

## 2019-04-15 PROCEDURE — 6370000000 HC RX 637 (ALT 250 FOR IP): Performed by: INTERNAL MEDICINE

## 2019-04-15 PROCEDURE — 2700000000 HC OXYGEN THERAPY PER DAY

## 2019-04-15 PROCEDURE — 94761 N-INVAS EAR/PLS OXIMETRY MLT: CPT

## 2019-04-15 PROCEDURE — 80048 BASIC METABOLIC PNL TOTAL CA: CPT

## 2019-04-15 PROCEDURE — 6360000002 HC RX W HCPCS: Performed by: NURSE PRACTITIONER

## 2019-04-15 PROCEDURE — 36415 COLL VENOUS BLD VENIPUNCTURE: CPT

## 2019-04-15 PROCEDURE — 94660 CPAP INITIATION&MGMT: CPT

## 2019-04-15 PROCEDURE — 85025 COMPLETE CBC W/AUTO DIFF WBC: CPT

## 2019-04-15 PROCEDURE — 85610 PROTHROMBIN TIME: CPT

## 2019-04-15 PROCEDURE — 6370000000 HC RX 637 (ALT 250 FOR IP): Performed by: NURSE PRACTITIONER

## 2019-04-15 PROCEDURE — 2060000000 HC ICU INTERMEDIATE R&B

## 2019-04-15 RX ORDER — WARFARIN SODIUM 2 MG/1
2 TABLET ORAL
Status: COMPLETED | OUTPATIENT
Start: 2019-04-15 | End: 2019-04-15

## 2019-04-15 RX ADMIN — GUAIFENESIN 600 MG: 600 TABLET, EXTENDED RELEASE ORAL at 08:24

## 2019-04-15 RX ADMIN — Medication 10 ML: at 08:25

## 2019-04-15 RX ADMIN — IRON SUCROSE 200 MG: 20 INJECTION, SOLUTION INTRAVENOUS at 15:39

## 2019-04-15 RX ADMIN — TAMSULOSIN HYDROCHLORIDE 0.4 MG: 0.4 CAPSULE ORAL at 08:24

## 2019-04-15 RX ADMIN — PANTOPRAZOLE SODIUM 40 MG: 40 INJECTION, POWDER, FOR SOLUTION INTRAVENOUS at 08:24

## 2019-04-15 RX ADMIN — FOLIC ACID 1 MG: 1 TABLET ORAL at 08:24

## 2019-04-15 RX ADMIN — GUAIFENESIN 600 MG: 600 TABLET, EXTENDED RELEASE ORAL at 20:57

## 2019-04-15 RX ADMIN — PANTOPRAZOLE SODIUM 40 MG: 40 INJECTION, POWDER, FOR SOLUTION INTRAVENOUS at 20:56

## 2019-04-15 RX ADMIN — WARFARIN SODIUM 2 MG: 2 TABLET ORAL at 18:00

## 2019-04-15 RX ADMIN — Medication 10 ML: at 22:33

## 2019-04-15 RX ADMIN — CEFTRIAXONE SODIUM 1 G: 1 INJECTION, POWDER, FOR SOLUTION INTRAMUSCULAR; INTRAVENOUS at 15:39

## 2019-04-15 ASSESSMENT — PAIN SCALES - GENERAL: PAINLEVEL_OUTOF10: 0

## 2019-04-15 NOTE — PLAN OF CARE
Problem: Falls - Risk of:  Goal: Will remain free from falls  Description  Will remain free from falls  4/15/2019 1327 by Marianela Dinero RN  Outcome: Ongoing  4/15/2019 0023 by Quincy Meehan RN  Outcome: Met This Shift  Goal: Absence of physical injury  Description  Absence of physical injury  4/15/2019 1327 by Marianela Dinreo RN  Outcome: Ongoing  4/15/2019 0023 by Quincy Meehan RN  Outcome: Met This Shift     Problem: Risk for Impaired Skin Integrity  Goal: Tissue integrity - skin and mucous membranes  Description  Structural intactness and normal physiological function of skin and  mucous membranes. 4/15/2019 1327 by Marianela Dinero RN  Outcome: Ongoing  4/15/2019 0023 by Quincy Meehan RN  Outcome: Ongoing     Problem: SAFETY  Goal: Free from accidental physical injury  4/15/2019 1327 by Marianela Dinero RN  Outcome: Ongoing  4/15/2019 0023 by Quincy Meehan RN  Outcome: Met This Shift  Goal: Free from intentional harm  4/15/2019 1327 by Marianela Dinero RN  Outcome: Ongoing  4/15/2019 0023 by Quincy Meehan RN  Outcome: Met This Shift     Problem: DAILY CARE  Goal: Daily care needs are met  4/15/2019 1327 by Marianela Dinero RN  Outcome: Ongoing  4/15/2019 0023 by Quincy Meehan RN  Outcome: Ongoing     Problem: PAIN  Goal: Patient's pain/discomfort is manageable  4/15/2019 1327 by Marianela Dinero RN  Outcome: Ongoing  4/15/2019 0023 by Quincy Meehan RN  Outcome: Met This Shift     Problem: SKIN INTEGRITY  Goal: Skin integrity is maintained or improved  4/15/2019 1327 by Marianela Dinero RN  Outcome: Ongoing  4/15/2019 0023 by Quincy Meehan RN  Outcome: Ongoing     Problem: KNOWLEDGE DEFICIT  Goal: Patient/S.O. demonstrates understanding of disease process, treatment plan, medications, and discharge instructions.   4/15/2019 1327 by Marianela Dinero RN  Outcome: Ongoing  4/15/2019 0023 by Quincy Meehan RN  Outcome: Ongoing     Problem: DISCHARGE BARRIERS  Goal: Patient's continuum of care needs are met  Outcome: Ongoing     Problem: Nutrition  Goal: Optimal nutrition therapy  4/15/2019 1327 by Rosita Zuñiga RN  Outcome: Ongoing  4/15/2019 0023 by Aleisha Rosales RN  Outcome: Ongoing

## 2019-04-15 NOTE — PROGRESS NOTES
Physical and Occupational Therapies: Attempted x 2 this morning. 1st attempt pt declined stating he wants to eat breakfast 1st (offered to get pt up in chair to eat but he declined). On 2nd attempt after breakfast pt declined stating he wanted to rest. Wife present. Will  Make 3rd attempt later if schedule permits.  Cary Chan PT, [facility] O[facility]/L

## 2019-04-15 NOTE — PROGRESS NOTES
attempts  Ambulation?: No        Exercises  Quad Sets: 15 x B  Heelslides: 10 x B  Gluteal Sets: 15 x B  Knee Passive Range of Motion: Hip ER/IR: 10 x B  Ankle Pumps: 10 x B   Ankle circles 10 x B clockwise and counterclockwise  Diaphragmatic breathing: practiced 15 x with visual/tactile/Auditory cues- very difficult for pt  Comments: Rest breaks between ex. This time used for detailed education regarding use of ex to promote circulation /decrease LE Edema. Wife present and asked questions, took notes regarding ex sequence and performance. Assessment   Body structures, Functions, Activity limitations: Decreased functional mobility ; Decreased endurance;Decreased ROM; Decreased strength;Decreased balance  Assessment: Most of todays session spent on LE ex to promote circulation and strength. Upon 4 attempts at supine to sit with assist of 2, we were unable to achieve sitting EOB. Pt educated on benefits of LE Ex to help decrease LE Edema as well as strengthen legs and imporve mobility. Pt would benefit from continued skilled PT to address deficits. Anticipate further need for PT at SNF post discharge from acute care. Treatment Diagnosis: Decreased endurance and (I) with mobility  Specific instructions for Next Treatment: Progress ther ex and mobility as tolerated, progress to amb when endurance/strength improve  Prognosis: Good  Patient Education: role of therapy, importance of ex and mobility , proper breathing, HEP .  Wife present and took notes during education  Barriers to Learning: pt and wife voice understanding  REQUIRES PT FOLLOW UP: Yes  Activity Tolerance: Patient Tolerated treatment well;Patient limited by fatigue;Patient limited by endurance    AM-PAC Score     AM-PAC Inpatient Mobility without Stair Climbing Raw Score : 8  AM-PAC Inpatient without Stair Climbing T-Scale Score : 30.65  Mobility Inpatient CMS 0-100% Score: 80.91  Mobility Inpatient without Stair CMS G-Code Modifier : CM       Goals  Short

## 2019-04-15 NOTE — PROGRESS NOTES
°F (37.1 °C), resp. rate 20, height 5' 4\" (1.626 m), weight (!) 389 lb 1.8 oz (176.5 kg), SpO2 95 %. Subjective:  Symptoms:  Stable. Pain:  He reports no pain. Objective:  General Appearance: In no acute distress and not in pain. Vital signs: (most recent): Blood pressure 127/70, pulse 81, temperature 98.8 °F (37.1 °C), resp. rate 20, height 5' 4\" (1.626 m), weight (!) 389 lb 1.8 oz (176.5 kg), SpO2 95 %. Abdomen: Abdomen is soft. Bowel sounds are normal.   There is no abdominal tenderness. Assessment & Plan  75 yo w obesity, HTN, Fe def anemia, h/o DU bleed in 8/2019 at [facility] (repeat EGD by me in 9/2019 was neg. w neg gastric Bx), kidney CA, DVT on Couamdin and recent choledocholithiasis s/p ERCP in July 2018, presents with melena (black heme-pos diarrhea) and occ bright red blood in his stools associated w INR of 5 and H/H 6/21. CT, EGD and colonoscopy are neg. Except diverticulosis and polyp. Capsule Endoscopy 4/14/19 was neg. So most likely bleeding was caused by Coumadin toxicity.     Plan:   1. Agree w PPI  2. F/U daily H/H   3. Is now on half dose Coumadin  4. OK to D/C from GI standpoint though.   5. Will follow     Bria Villegas MD       O) [phone]        Bria Villegas MD  4/15/2019

## 2019-04-15 NOTE — PROGRESS NOTES
04/15/19 0018   NIV Type   $NIV $Daily Charge   Equipment Type v60   Mode BIPAP   Mask Type Full face mask   Mask Size Medium   Settings/Measurements   Comfort Level Good   Using Accessory Muscles No   IPAP 18 cmH20   EPAP 8 cmH2O   Rate Ordered 12   Resp 19   SpO2 97   FiO2  35 %   Vt Exhaled 533 mL   Mask Leak (lpm) 0 lpm   Skin Protection for O2 Device Yes   Breath Sounds   Right Upper Lobe Diminished   Right Middle Lobe Diminished   Right Lower Lobe Diminished   Left Upper Lobe Diminished   Left Lower Lobe Diminished   Patient Observation   Observations mepilex on nose   Alarm Settings   Alarms On Y   Press Low Alarm 6 cmH2O   High Pressure Alarm 30 cmH2O   Apnea (secs) 20 secs   Resp Rate Low Alarm 6   High Respiratory Rate 45 br/min

## 2019-04-16 LAB
ANISOCYTOSIS: ABNORMAL
BANDED NEUTROPHILS RELATIVE PERCENT: 12 % (ref 0–7)
BASOPHILIC STIPPLING: ABNORMAL
BASOPHILS ABSOLUTE: 0 K/UL (ref 0–0.2)
BASOPHILS RELATIVE PERCENT: 0 %
EOSINOPHILS ABSOLUTE: 0.2 K/UL (ref 0–0.6)
EOSINOPHILS RELATIVE PERCENT: 4 %
HCT VFR BLD CALC: 25.8 % (ref 40.5–52.5)
HEMOGLOBIN: 8.1 G/DL (ref 13.5–17.5)
HYPOCHROMIA: ABNORMAL
INR BLD: 1.6 (ref 0.86–1.14)
LYMPHOCYTES ABSOLUTE: 0.2 K/UL (ref 1–5.1)
LYMPHOCYTES RELATIVE PERCENT: 4 %
MACROCYTES: ABNORMAL
MCH RBC QN AUTO: 28.5 PG (ref 26–34)
MCHC RBC AUTO-ENTMCNC: 31.4 G/DL (ref 31–36)
MCV RBC AUTO: 90.7 FL (ref 80–100)
MICROCYTES: ABNORMAL
MONOCYTES ABSOLUTE: 0.8 K/UL (ref 0–1.3)
MONOCYTES RELATIVE PERCENT: 16 %
NEUTROPHILS ABSOLUTE: 3.9 K/UL (ref 1.7–7.7)
NEUTROPHILS RELATIVE PERCENT: 64 %
ORGANISM: ABNORMAL
OVALOCYTES: ABNORMAL
PDW BLD-RTO: 21.7 % (ref 12.4–15.4)
PLATELET # BLD: 177 K/UL (ref 135–450)
PMV BLD AUTO: 9.2 FL (ref 5–10.5)
POIKILOCYTES: ABNORMAL
POLYCHROMASIA: ABNORMAL
PROTHROMBIN TIME: 18.2 SEC (ref 9.8–13)
RBC # BLD: 2.85 M/UL (ref 4.2–5.9)
TEAR DROP CELLS: ABNORMAL
URINE CULTURE, ROUTINE: ABNORMAL
URINE CULTURE, ROUTINE: ABNORMAL
WBC # BLD: 5.1 K/UL (ref 4–11)

## 2019-04-16 PROCEDURE — 2580000003 HC RX 258: Performed by: INTERNAL MEDICINE

## 2019-04-16 PROCEDURE — 6370000000 HC RX 637 (ALT 250 FOR IP): Performed by: INTERNAL MEDICINE

## 2019-04-16 PROCEDURE — 2060000000 HC ICU INTERMEDIATE R&B

## 2019-04-16 PROCEDURE — 6360000002 HC RX W HCPCS: Performed by: NURSE PRACTITIONER

## 2019-04-16 PROCEDURE — 6360000002 HC RX W HCPCS: Performed by: INTERNAL MEDICINE

## 2019-04-16 PROCEDURE — 97110 THERAPEUTIC EXERCISES: CPT

## 2019-04-16 PROCEDURE — C9113 INJ PANTOPRAZOLE SODIUM, VIA: HCPCS | Performed by: NURSE PRACTITIONER

## 2019-04-16 PROCEDURE — 85025 COMPLETE CBC W/AUTO DIFF WBC: CPT

## 2019-04-16 PROCEDURE — 6370000000 HC RX 637 (ALT 250 FOR IP)

## 2019-04-16 PROCEDURE — 36415 COLL VENOUS BLD VENIPUNCTURE: CPT

## 2019-04-16 PROCEDURE — 2580000003 HC RX 258

## 2019-04-16 PROCEDURE — 94761 N-INVAS EAR/PLS OXIMETRY MLT: CPT

## 2019-04-16 PROCEDURE — 85610 PROTHROMBIN TIME: CPT

## 2019-04-16 PROCEDURE — 94640 AIRWAY INHALATION TREATMENT: CPT

## 2019-04-16 PROCEDURE — 97535 SELF CARE MNGMENT TRAINING: CPT

## 2019-04-16 PROCEDURE — 93971 EXTREMITY STUDY: CPT

## 2019-04-16 PROCEDURE — 6370000000 HC RX 637 (ALT 250 FOR IP): Performed by: NURSE PRACTITIONER

## 2019-04-16 PROCEDURE — 2700000000 HC OXYGEN THERAPY PER DAY

## 2019-04-16 PROCEDURE — 94660 CPAP INITIATION&MGMT: CPT

## 2019-04-16 RX ORDER — IPRATROPIUM BROMIDE AND ALBUTEROL SULFATE 2.5; .5 MG/3ML; MG/3ML
1 SOLUTION RESPIRATORY (INHALATION)
Status: DISCONTINUED | OUTPATIENT
Start: 2019-04-16 | End: 2019-04-20 | Stop reason: HOSPADM

## 2019-04-16 RX ORDER — WARFARIN SODIUM 2 MG/1
2 TABLET ORAL
Status: COMPLETED | OUTPATIENT
Start: 2019-04-16 | End: 2019-04-16

## 2019-04-16 RX ORDER — SODIUM CHLORIDE 9 MG/ML
INJECTION, SOLUTION INTRAVENOUS
Status: COMPLETED
Start: 2019-04-16 | End: 2019-04-16

## 2019-04-16 RX ADMIN — Medication 10 ML: at 19:44

## 2019-04-16 RX ADMIN — IPRATROPIUM BROMIDE AND ALBUTEROL SULFATE 1 AMPULE: .5; 3 SOLUTION RESPIRATORY (INHALATION) at 16:27

## 2019-04-16 RX ADMIN — ENOXAPARIN SODIUM 180 MG: 100 INJECTION SUBCUTANEOUS at 19:58

## 2019-04-16 RX ADMIN — PANTOPRAZOLE SODIUM 40 MG: 40 INJECTION, POWDER, FOR SOLUTION INTRAVENOUS at 10:21

## 2019-04-16 RX ADMIN — SODIUM CHLORIDE: 900 INJECTION, SOLUTION INTRAVENOUS at 16:36

## 2019-04-16 RX ADMIN — FOLIC ACID 1 MG: 1 TABLET ORAL at 10:21

## 2019-04-16 RX ADMIN — CEFTRIAXONE SODIUM 1 G: 1 INJECTION, POWDER, FOR SOLUTION INTRAMUSCULAR; INTRAVENOUS at 16:34

## 2019-04-16 RX ADMIN — Medication 10 ML: at 10:22

## 2019-04-16 RX ADMIN — PANTOPRAZOLE SODIUM 40 MG: 40 INJECTION, POWDER, FOR SOLUTION INTRAVENOUS at 19:44

## 2019-04-16 RX ADMIN — IPRATROPIUM BROMIDE AND ALBUTEROL SULFATE 1 AMPULE: .5; 3 SOLUTION RESPIRATORY (INHALATION) at 20:39

## 2019-04-16 RX ADMIN — GUAIFENESIN 600 MG: 600 TABLET, EXTENDED RELEASE ORAL at 10:21

## 2019-04-16 RX ADMIN — WARFARIN SODIUM 2 MG: 2 TABLET ORAL at 18:28

## 2019-04-16 RX ADMIN — IRON SUCROSE 200 MG: 20 INJECTION, SOLUTION INTRAVENOUS at 16:35

## 2019-04-16 RX ADMIN — GUAIFENESIN 600 MG: 600 TABLET, EXTENDED RELEASE ORAL at 19:44

## 2019-04-16 RX ADMIN — IPRATROPIUM BROMIDE AND ALBUTEROL SULFATE 1 AMPULE: .5; 3 SOLUTION RESPIRATORY (INHALATION) at 12:35

## 2019-04-16 RX ADMIN — ENOXAPARIN SODIUM 180 MG: 100 INJECTION SUBCUTANEOUS at 12:28

## 2019-04-16 RX ADMIN — TAMSULOSIN HYDROCHLORIDE 0.4 MG: 0.4 CAPSULE ORAL at 10:21

## 2019-04-16 ASSESSMENT — PAIN SCALES - GENERAL: PAINLEVEL_OUTOF10: 0

## 2019-04-16 NOTE — PROGRESS NOTES
despite encouragement from PT)     Exercises  Quad Sets: x 15 BLE  Heelslides: x 15 BLE with Quyen (through minimal ROM 2* limb girth)  Gluteal Sets: x 20 bilat  Hip Abduction: x 15 BLE with Quyen  Knee Short Arc Quad: x 15 BLE with Quyen on RLE 2* knee pain  Ankle Pumps: x 20 BLE      Assessment   Body structures, Functions, Activity limitations: Decreased functional mobility ; Decreased endurance;Decreased ROM; Decreased strength;Decreased balance  Assessment: Pt only agreeable to bed exercise today 2* c/o fatigue and nausea, having just ate ~1 hr prior to therapy. Participated well in supine LE ther ex with assist needed for certain exercises 2* muscular weakness and decreased endurance. Pt requiring less supplemental O2 to maintain sats at or near 90% today vs. previous PT sessions. Recommend SNF at D/C given current deficits in strength and mobility. Treatment Diagnosis: Decreased endurance and (I) with mobility  Specific instructions for Next Treatment: Progress ther ex and mobility as tolerated, progress to amb when endurance/strength improve  Prognosis: Good;Fair  Decision Making: Medium Complexity  Patient Education: Role of therapy, importance of ex and mobility, proper breathing, HEP - pt verbalizes understanding  REQUIRES PT FOLLOW UP: Yes  Activity Tolerance: Patient Tolerated treatment well;Patient limited by fatigue  Activity Tolerance: Pt self-limited by c/o nausea in bed. O2 sat = 89% at rest on 1L O2, increasing to 91% with cues for pursed-lip breathing.     AM-PAC Score  AM-PAC Inpatient Mobility without Stair Climbing Raw Score : 8  AM-PAC Inpatient without Stair Climbing T-Scale Score : 30.65  Mobility Inpatient CMS 0-100% Score: 80.91  Mobility Inpatient without Stair CMS G-Code Modifier : CM    Goals  Short term goals  Time Frame for Short term goals: 1 week, 4/20/19 (unless otherwise specified)  Short term goal 1: Pt will transfer supine <-> sit with Quyen x 1  Short term goal 2: Pt will

## 2019-04-16 NOTE — PROGRESS NOTES
Recent Labs     04/14/19  0430 04/15/19  0437    143   K 4.1 4.2    107   CO2 31 32   BUN 17 15   CREATININE 1.0 0.9     No results for input(s): AST, ALT, ALB, BILIDIR, BILITOT, ALKPHOS in the last 72 hours. Magnesium:    Lab Results   Component Value Date    MG 2.20 04/09/2019    MG 2.20 04/08/2019    MG 1.70 04/07/2019         Problem List  Patient Active Problem List   Diagnosis    Morbid obesity with BMI of 60.0-69.9, adult ([facility]    Anticoagulation goal of INR 1.5 to 2.5    Benign hypertension    Hx GI bleed due to duodenal ulcer (Aug 2018)    History of DVT (deep vein thrombosis)    Ventral hernia    Elevated brain natriuretic peptide (BNP) level    Pulmonary vascular congestion on CXR    Sepsis (HCC)    Hypomagnesemia    Hypokalemia    Hyponatremia    Hypochloremia    KEVIN (acute kidney injury) ([facility]    Subacute microcytic anemia (July 2018)    Hx choledocholithiasis s/p ERCP (July 2018)    Acute respiratory failure with hypoxia and hypercapnia (HCC)    Calculus of gallbladder with acute cholecystitis without obstruction    Intertrigo    Lymphedema    Suspected sleep apnea    Biatrial enlargement    Congestive heart failure (HCC)    GI bleed       ASSESSMENT AND PLAN:    Remote hx of DVT/super morbid obesity/immobility   - Resumed Coumadin, now on 2 mg. Pharmacy dosing. INR is 1.6. Currently on 2 mg. - Add Lovenox 1 mg/kg BID until INR above 2. He is high risk for thrombosis due to low protein C and S levels. He needs dual AC until INR above 2. This will likely happen in the next 2 days. Creat stable at 0.9.   - Dr. Ruddy Engel to manage INR dosing outpatient.    - Protein C 53 ( percent) - off Coumadin results 4/10/19   - Protein S 36 ( percent) - off coumadin results 4/10     RUE swelling   - Doppler   - Do not elevated or wrap until results confirmed, rule out DVT     ANA  - GI work up neg  - Venofer s/p 5 doses   - Will likely need additional doses in the

## 2019-04-16 NOTE — PROGRESS NOTES
RESPIRATORY THERAPY ASSESSMENT    Name:  Jeffy Lopez Record Number:  [id number]  Age: 76 y.o. Gender: male  : 1950  Today's Date:  2019  Room:  [address] [place] [address]    Assessment     Is the patient being admitted for a COPD or Asthma exacerbation? No   (If yes the patient will be seen every 4 hours for the first 24 hours and then reassessed)    Patient Admission Diagnosis      Allergies  No Known Allergies    Minimum Predicted Vital Capacity:     n/a          Actual Vital Capacity:      n/a              Pulmonary History:No history  Home Oxygen Therapy:  room air  Home Respiratory Therapy:None   Current Respiratory Therapy:  duoneb q4h w/a          Respiratory Severity Index(RSI)   Patients with orders for inhalation medications, oxygen, or any therapeutic treatment modality will be placed on Respiratory Protocol. They will be assessed with the first treatment and at least every 72 hours thereafter. The following severity scale will be used to determine frequency of treatment intervention.     Smoking History: No Smoking History = 0    Social History  Social History     Tobacco Use    Smoking status: Never Smoker    Smokeless tobacco: Never Used   Substance Use Topics    Alcohol use: No    Drug use: No       Recent Surgical History: None = 0  Past Surgical History  Past Surgical History:   Procedure Laterality Date    ABDOMEN SURGERY      Right Kidney Cancer abd. surgery     COLONOSCOPY      flexsigmoidoscopy 40yrs ago     COLONOSCOPY N/A 2019    COLONOSCOPY WITH BIOPSY performed by Alexandria Gan MD at [address] [facility] [place], DIAGNOSTIC      2018    KIDNEY SURGERY  2014    MO EGD TRANSORAL BIOPSY SINGLE/MULTIPLE  2018    EGD BIOPSY performed by Alexandria Gan MD at [facility] N/A 2019    EGD WITH ANESTHESIA performed by Alexandria Gan MD at [address] [place] [address] N/A 2019

## 2019-04-16 NOTE — PROGRESS NOTES
97 %.    Subjective:  Symptoms:  Stable. Pain:  He reports no pain. Objective:  General Appearance: In no acute distress and not in pain. Vital signs: (most recent): Blood pressure 135/68, pulse 88, temperature 98.4 °F (36.9 °C), temperature source Oral, resp. rate 16, height 5' 4\" (1.626 m), weight (!) 389 lb 1.8 oz (176.5 kg), SpO2 97 %. Abdomen: Abdomen is soft. Bowel sounds are normal.   There is no abdominal tenderness. Assessment & Plan  77 yo w obesity, HTN, Fe def anemia, h/o DU bleed in 8/2019 at [facility] (repeat EGD by me in 9/2019 was neg. w neg gastric Bx), kidney CA, DVT on Couamdin and recent choledocholithiasis s/p ERCP in July 2018, presents with melena (black heme-pos diarrhea) and occ bright red blood in his stools associated w INR of 5 and H/H 6/21. CT, EGD and colonoscopy are neg. Except diverticulosis and polyp. Capsule Endoscopy 4/14/19 was neg. So most likely bleeding was caused by Coumadin toxicity.     Plan:   1. Agree w PPI  2. F/U daily H/H   3. Is now on half dose Coumadin  4. OK to D/C from GI standpoint though.   5. Will follow     Cece Zhong MD       (O) [phone]        Cece Zhong MD  4/16/2019

## 2019-04-16 NOTE — PROGRESS NOTES
04/15/19 2211   NIV Type   NIV Started Yes   Equipment Type v60   Mode BIPAP   Mask Type Full face mask   Mask Size Medium   Settings/Measurements   Comfort Level Good   Using Accessory Muscles No   IPAP 18 cmH20   EPAP 8 cmH2O   Rate Ordered 12   Resp 23   SpO2 98   FiO2  35 %   Vt Exhaled 511 mL   Mask Leak (lpm) 1 lpm   Skin Protection for O2 Device Yes   Breath Sounds   Right Upper Lobe Diminished   Right Middle Lobe Diminished   Right Lower Lobe Diminished   Left Upper Lobe Diminished   Left Lower Lobe Diminished   Patient Observation   Observations mepilex on nose   Alarm Settings   Alarms On Y   Press Low Alarm 6 cmH2O   High Pressure Alarm 30 cmH2O   Delay Alarm 20 sec(s)   Apnea (secs) 20 secs   Resp Rate Low Alarm 6   High Respiratory Rate 45 br/min

## 2019-04-16 NOTE — PROGRESS NOTES
Occupational Therapy  Facility/Department: [facility] C4 PCU  Daily Treatment Note  NAME: Ramon Larios  : 1950  MRN: [id number]    Date of Service: 2019    Discharge Recommendations:  Subacute/Skilled Nursing Facility  OT Equipment Recommendations  Equipment Needed: No  Other: defer to next level care    Assessment   Performance deficits / Impairments: Decreased functional mobility ; Decreased balance;Decreased coordination;Decreased endurance;Decreased strength;Decreased high-level IADLs  Assessment: Pt agreeable to therapy. Pt required total A for rolling in bed in prep for functional transfers and seated ADLs. Pt limited this session 2/2 deconditioning and stomach discomfort. Pt refused all EOB and OOB mobility this session despite maximum encouragement and education on importance of mobility. Pt educated on and demonstrated UE AROM at bed level. Continue skilled OT per POC. Prognosis: Fair  Decision Making: Medium Complexity  Patient Education: role of OT, discharge planning, importance of mobility, UE AROM,  and safety  REQUIRES OT FOLLOW UP: Yes  Activity Tolerance  Activity Tolerance: Patient Tolerated treatment well;Treatment limited secondary to medical complications (free text)  Activity Tolerance: pt limited by stomach discomfort following eating  Safety Devices  Safety Devices in place: Yes  Type of devices: Call light within reach; Left in chair;Chair alarm in place;Gait belt       Patient Diagnosis(es): The primary encounter diagnosis was Gastrointestinal hemorrhage, unspecified gastrointestinal hemorrhage type. Diagnoses of Anemia requiring transfusions and Supratherapeutic INR were also pertinent to this visit. has a past medical history of Cancer ([facility] [address] Edema of both legs, GI bleed, Hx of blood clots, and Hypertension. has a past surgical history that includes Kidney surgery (2014); Abdomen surgery;  Colonoscopy; Endoscopy, colon, diagnostic; pr egd transoral biopsy single/multiple

## 2019-04-17 LAB
HCT VFR BLD CALC: 23.3 % (ref 40.5–52.5)
HEMOGLOBIN: 7.4 G/DL (ref 13.5–17.5)
INR BLD: 1.7 (ref 0.86–1.14)
MCH RBC QN AUTO: 28.5 PG (ref 26–34)
MCHC RBC AUTO-ENTMCNC: 31.9 G/DL (ref 31–36)
MCV RBC AUTO: 89.3 FL (ref 80–100)
PDW BLD-RTO: 21.9 % (ref 12.4–15.4)
PLATELET # BLD: 167 K/UL (ref 135–450)
PMV BLD AUTO: 8 FL (ref 5–10.5)
PROTHROMBIN TIME: 19.4 SEC (ref 9.8–13)
RBC # BLD: 2.61 M/UL (ref 4.2–5.9)
WBC # BLD: 4.9 K/UL (ref 4–11)

## 2019-04-17 PROCEDURE — 85610 PROTHROMBIN TIME: CPT

## 2019-04-17 PROCEDURE — 6360000002 HC RX W HCPCS: Performed by: NURSE PRACTITIONER

## 2019-04-17 PROCEDURE — 6370000000 HC RX 637 (ALT 250 FOR IP): Performed by: INTERNAL MEDICINE

## 2019-04-17 PROCEDURE — 97110 THERAPEUTIC EXERCISES: CPT

## 2019-04-17 PROCEDURE — 6370000000 HC RX 637 (ALT 250 FOR IP): Performed by: NURSE PRACTITIONER

## 2019-04-17 PROCEDURE — 6360000002 HC RX W HCPCS: Performed by: INTERNAL MEDICINE

## 2019-04-17 PROCEDURE — 2580000003 HC RX 258: Performed by: INTERNAL MEDICINE

## 2019-04-17 PROCEDURE — 2580000003 HC RX 258

## 2019-04-17 PROCEDURE — 2060000000 HC ICU INTERMEDIATE R&B

## 2019-04-17 PROCEDURE — 6370000000 HC RX 637 (ALT 250 FOR IP)

## 2019-04-17 PROCEDURE — 85027 COMPLETE CBC AUTOMATED: CPT

## 2019-04-17 PROCEDURE — 2700000000 HC OXYGEN THERAPY PER DAY

## 2019-04-17 PROCEDURE — 94660 CPAP INITIATION&MGMT: CPT

## 2019-04-17 PROCEDURE — 94761 N-INVAS EAR/PLS OXIMETRY MLT: CPT

## 2019-04-17 PROCEDURE — 94640 AIRWAY INHALATION TREATMENT: CPT

## 2019-04-17 PROCEDURE — C9113 INJ PANTOPRAZOLE SODIUM, VIA: HCPCS | Performed by: NURSE PRACTITIONER

## 2019-04-17 PROCEDURE — 36415 COLL VENOUS BLD VENIPUNCTURE: CPT

## 2019-04-17 RX ORDER — ACETAMINOPHEN 325 MG/1
650 TABLET ORAL EVERY 4 HOURS PRN
Status: DISCONTINUED | OUTPATIENT
Start: 2019-04-17 | End: 2019-04-20 | Stop reason: HOSPADM

## 2019-04-17 RX ORDER — WARFARIN SODIUM 2 MG/1
2 TABLET ORAL
Status: COMPLETED | OUTPATIENT
Start: 2019-04-17 | End: 2019-04-17

## 2019-04-17 RX ORDER — SODIUM CHLORIDE 9 MG/ML
INJECTION, SOLUTION INTRAVENOUS
Status: COMPLETED
Start: 2019-04-17 | End: 2019-04-17

## 2019-04-17 RX ADMIN — ENOXAPARIN SODIUM 180 MG: 100 INJECTION SUBCUTANEOUS at 10:33

## 2019-04-17 RX ADMIN — CEFTRIAXONE SODIUM 1 G: 1 INJECTION, POWDER, FOR SOLUTION INTRAMUSCULAR; INTRAVENOUS at 17:04

## 2019-04-17 RX ADMIN — PANTOPRAZOLE SODIUM 40 MG: 40 INJECTION, POWDER, FOR SOLUTION INTRAVENOUS at 10:33

## 2019-04-17 RX ADMIN — IPRATROPIUM BROMIDE AND ALBUTEROL SULFATE 1 AMPULE: .5; 3 SOLUTION RESPIRATORY (INHALATION) at 20:01

## 2019-04-17 RX ADMIN — SODIUM CHLORIDE 250 ML: 9 INJECTION, SOLUTION INTRAVENOUS at 17:04

## 2019-04-17 RX ADMIN — IPRATROPIUM BROMIDE AND ALBUTEROL SULFATE 1 AMPULE: .5; 3 SOLUTION RESPIRATORY (INHALATION) at 16:47

## 2019-04-17 RX ADMIN — FOLIC ACID 1 MG: 1 TABLET ORAL at 10:34

## 2019-04-17 RX ADMIN — ENOXAPARIN SODIUM 180 MG: 100 INJECTION SUBCUTANEOUS at 22:17

## 2019-04-17 RX ADMIN — Medication 10 ML: at 10:33

## 2019-04-17 RX ADMIN — IRON SUCROSE 200 MG: 20 INJECTION, SOLUTION INTRAVENOUS at 18:10

## 2019-04-17 RX ADMIN — WARFARIN SODIUM 2 MG: 2 TABLET ORAL at 18:10

## 2019-04-17 RX ADMIN — Medication 10 ML: at 22:18

## 2019-04-17 RX ADMIN — IPRATROPIUM BROMIDE AND ALBUTEROL SULFATE 1 AMPULE: .5; 3 SOLUTION RESPIRATORY (INHALATION) at 08:13

## 2019-04-17 RX ADMIN — IPRATROPIUM BROMIDE AND ALBUTEROL SULFATE 1 AMPULE: .5; 3 SOLUTION RESPIRATORY (INHALATION) at 12:30

## 2019-04-17 RX ADMIN — GUAIFENESIN 600 MG: 600 TABLET, EXTENDED RELEASE ORAL at 10:34

## 2019-04-17 RX ADMIN — GUAIFENESIN 600 MG: 600 TABLET, EXTENDED RELEASE ORAL at 22:17

## 2019-04-17 RX ADMIN — TAMSULOSIN HYDROCHLORIDE 0.4 MG: 0.4 CAPSULE ORAL at 10:34

## 2019-04-17 RX ADMIN — PANTOPRAZOLE SODIUM 40 MG: 40 INJECTION, POWDER, FOR SOLUTION INTRAVENOUS at 22:17

## 2019-04-17 ASSESSMENT — PAIN SCALES - GENERAL
PAINLEVEL_OUTOF10: 0

## 2019-04-17 ASSESSMENT — PAIN DESCRIPTION - LOCATION: LOCATION: KNEE

## 2019-04-17 ASSESSMENT — PAIN DESCRIPTION - PAIN TYPE: TYPE: CHRONIC PAIN

## 2019-04-17 ASSESSMENT — PAIN DESCRIPTION - ORIENTATION: ORIENTATION: RIGHT

## 2019-04-17 NOTE — PROGRESS NOTES
Hospitalist Progress Note      PCP: Erin Pantoja MD    Date of Admission: 4/6/2019    Chief Complaint: Melena    Hospital Course:  76 y.o. male with PMH of GI bleed, PUD, DVT on AC with warfarin presents with c/o melena and occ bright red blood in his stool x 2 days. Subjective: Reportedly had some melena overnight according to nursing staff, although his wife reports his stool from last evening around 10 pm was brown. Capsule Endoscopy given. Medications:  Reviewed    Infusion Medications     Scheduled Medications    warfarin  2 mg Oral Once    ipratropium-albuterol  1 ampule Inhalation Q4H WA    enoxaparin  1 mg/kg Subcutaneous BID    cefTRIAXone (ROCEPHIN) IV  1 g Intravenous Q24H    iron sucrose (VENOFER) iv piggyback 100 mL (Admin over 60 minutes)  200 mg Intravenous Q24H    guaiFENesin  600 mg Oral BID    warfarin (COUMADIN) daily dosing (placeholder)   Other RX Placeholder    pantoprazole  40 mg Intravenous BID    folic acid  1 mg Oral Daily    tamsulosin  0.4 mg Oral Daily    sodium chloride flush  10 mL Intravenous 2 times per day     PRN Meds: acetaminophen, iohexol, sodium chloride flush, magnesium hydroxide, ondansetron      Intake/Output Summary (Last 24 hours) at 4/17/2019 1212  Last data filed at 4/17/2019 0534  Gross per 24 hour   Intake 530 ml   Output 1275 ml   Net -745 ml       Physical Exam Performed:    /77   Pulse 78   Temp 99 °F (37.2 °C) (Oral)   Resp 16   Ht 5' 4\" (1.626 m)   Wt (!) 389 lb 1.8 oz (176.5 kg)   SpO2 97%   BMI 66.79 kg/m²     General appearance: Pleasant male in no apparent distress, appears stated age and cooperative. HEENT: Pupils equal, round, and reactive to light. Conjunctivae/corneas clear. Neck: Supple, with full range of motion. No jugular venous distention. Trachea midline. Respiratory:  Normal respiratory effort. Clear to auscultation, bilaterally without Rales/Wheezes/Rhonchi.    Cardiovascular: Regular rate and rhythm

## 2019-04-17 NOTE — PROGRESS NOTES
04/16/19 2335   NIV Type   $NIV $Daily Charge   NIV Started Yes   Equipment Type v60   Mode BIPAP   Mask Type Full face mask   Mask Size Medium   Settings/Measurements   Comfort Level Good   Using Accessory Muscles No   IPAP 18 cmH20   EPAP 8 cmH2O   Rate Ordered 12   Resp 22   SpO2 97   FiO2  35 %   Vt Exhaled 666 mL   Mask Leak (lpm) 5 lpm   Skin Protection for O2 Device Yes   Breath Sounds   Right Upper Lobe Diminished   Right Middle Lobe Diminished   Right Lower Lobe Diminished   Left Upper Lobe Diminished   Left Lower Lobe Diminished   Patient Observation   Observations mepilex on nose   Alarm Settings   Alarms On Y   Press Low Alarm 6 cmH2O   High Pressure Alarm 30 cmH2O   Delay Alarm 20 sec(s)   Apnea (secs) 20 secs   Resp Rate Low Alarm 6   High Respiratory Rate 45 br/min

## 2019-04-17 NOTE — PLAN OF CARE
Patient educated on risk of falls. Call light within reach, bedside table within reach, bed in lowest position, wheels locked, 2/4 rails raised, phone within patient's reach, non-skid socks on. Patient instructed to call out if in need of assistance. Patient verbalized understanding. RN will continue to monitor.

## 2019-04-17 NOTE — PROGRESS NOTES
Physical Therapy  Facility/Department: [place] [address] PCU  Daily Treatment Note  NAME: Susan Vega  : 1950  MRN: [id number]    Date of Service: 2019    Discharge Recommendations:  Subacute/Skilled Nursing Facility   PT Equipment Recommendations  Equipment Needed: No    Patient Diagnosis(es): The primary encounter diagnosis was Gastrointestinal hemorrhage, unspecified gastrointestinal hemorrhage type. Diagnoses of Anemia requiring transfusions and Supratherapeutic INR were also pertinent to this visit. has a past medical history of Cancer ([address] Edema of both legs, GI bleed, Hx of blood clots, and Hypertension. has a past surgical history that includes Kidney surgery (2014); Abdomen surgery; Colonoscopy; Endoscopy, colon, diagnostic; pr egd transoral biopsy single/multiple (2018); Upper gastrointestinal endoscopy (N/A, 2019); Colonoscopy (N/A, 2019); and Upper gastrointestinal endoscopy (N/A, 2019). Restrictions  Restrictions/Precautions  Restrictions/Precautions: Up as Tolerated, General Precautions, Fall Risk  Position Activity Restriction  Other position/activity restrictions: Medium fall risk per nursing assessment, 1L O2  Subjective   General  Chart Reviewed: Yes  Response To Previous Treatment: Patient with no complaints from previous session.   Family / Caregiver Present: No  Referring Practitioner: Dr. Elizabeth Ontiveros: Pt agreeable to work with PT   General Comment  Comments: Pt resting in bed upon entry of therapy staff  Pain Screening  Patient Currently in Pain: Denies  Vital Signs  Patient Currently in Pain: Denies       Orientation  Orientation  Overall Orientation Status: Within Normal Limits  Cognition      Objective   Bed mobility  Comment: Unable to atempt   Exercises  Quad Sets: x 15 BLE  Heelslides: x 15 LLE with Quyen (through minimal ROM 2* limb girth) (Pt states his R knee is sore from movement done this am)  Gluteal Sets: x 20 bilat  Hip

## 2019-04-18 LAB
HCT VFR BLD CALC: 25.6 % (ref 40.5–52.5)
HEMOGLOBIN: 8.4 G/DL (ref 13.5–17.5)
INR BLD: 1.64 (ref 0.86–1.14)
MCH RBC QN AUTO: 29.1 PG (ref 26–34)
MCHC RBC AUTO-ENTMCNC: 32.7 G/DL (ref 31–36)
MCV RBC AUTO: 88.9 FL (ref 80–100)
PDW BLD-RTO: 21.9 % (ref 12.4–15.4)
PLATELET # BLD: 167 K/UL (ref 135–450)
PMV BLD AUTO: 8.5 FL (ref 5–10.5)
PROTHROMBIN TIME: 18.7 SEC (ref 9.8–13)
RBC # BLD: 2.88 M/UL (ref 4.2–5.9)
WBC # BLD: 5 K/UL (ref 4–11)

## 2019-04-18 PROCEDURE — 6360000002 HC RX W HCPCS: Performed by: NURSE PRACTITIONER

## 2019-04-18 PROCEDURE — 2580000003 HC RX 258: Performed by: INTERNAL MEDICINE

## 2019-04-18 PROCEDURE — 94761 N-INVAS EAR/PLS OXIMETRY MLT: CPT

## 2019-04-18 PROCEDURE — C9113 INJ PANTOPRAZOLE SODIUM, VIA: HCPCS | Performed by: NURSE PRACTITIONER

## 2019-04-18 PROCEDURE — 85027 COMPLETE CBC AUTOMATED: CPT

## 2019-04-18 PROCEDURE — 2580000003 HC RX 258

## 2019-04-18 PROCEDURE — 94660 CPAP INITIATION&MGMT: CPT

## 2019-04-18 PROCEDURE — 6370000000 HC RX 637 (ALT 250 FOR IP)

## 2019-04-18 PROCEDURE — 6360000002 HC RX W HCPCS: Performed by: INTERNAL MEDICINE

## 2019-04-18 PROCEDURE — 36415 COLL VENOUS BLD VENIPUNCTURE: CPT

## 2019-04-18 PROCEDURE — 6370000000 HC RX 637 (ALT 250 FOR IP): Performed by: NURSE PRACTITIONER

## 2019-04-18 PROCEDURE — 2060000000 HC ICU INTERMEDIATE R&B

## 2019-04-18 PROCEDURE — 6370000000 HC RX 637 (ALT 250 FOR IP): Performed by: INTERNAL MEDICINE

## 2019-04-18 PROCEDURE — 2700000000 HC OXYGEN THERAPY PER DAY

## 2019-04-18 PROCEDURE — 85610 PROTHROMBIN TIME: CPT

## 2019-04-18 PROCEDURE — 94640 AIRWAY INHALATION TREATMENT: CPT

## 2019-04-18 RX ORDER — WARFARIN SODIUM 2.5 MG/1
2.5 TABLET ORAL
Status: COMPLETED | OUTPATIENT
Start: 2019-04-18 | End: 2019-04-18

## 2019-04-18 RX ORDER — SODIUM CHLORIDE 9 MG/ML
INJECTION, SOLUTION INTRAVENOUS
Status: COMPLETED
Start: 2019-04-18 | End: 2019-04-18

## 2019-04-18 RX ADMIN — Medication 10 ML: at 09:56

## 2019-04-18 RX ADMIN — IPRATROPIUM BROMIDE AND ALBUTEROL SULFATE 1 AMPULE: .5; 3 SOLUTION RESPIRATORY (INHALATION) at 11:36

## 2019-04-18 RX ADMIN — ENOXAPARIN SODIUM 180 MG: 100 INJECTION SUBCUTANEOUS at 09:56

## 2019-04-18 RX ADMIN — Medication 10 ML: at 20:44

## 2019-04-18 RX ADMIN — TAMSULOSIN HYDROCHLORIDE 0.4 MG: 0.4 CAPSULE ORAL at 09:56

## 2019-04-18 RX ADMIN — IRON SUCROSE 200 MG: 20 INJECTION, SOLUTION INTRAVENOUS at 18:31

## 2019-04-18 RX ADMIN — CEFTRIAXONE SODIUM 1 G: 1 INJECTION, POWDER, FOR SOLUTION INTRAMUSCULAR; INTRAVENOUS at 17:31

## 2019-04-18 RX ADMIN — FOLIC ACID 1 MG: 1 TABLET ORAL at 09:56

## 2019-04-18 RX ADMIN — IPRATROPIUM BROMIDE AND ALBUTEROL SULFATE 1 AMPULE: .5; 3 SOLUTION RESPIRATORY (INHALATION) at 15:38

## 2019-04-18 RX ADMIN — SODIUM CHLORIDE 250 ML: 9 INJECTION, SOLUTION INTRAVENOUS at 17:34

## 2019-04-18 RX ADMIN — GUAIFENESIN 600 MG: 600 TABLET, EXTENDED RELEASE ORAL at 20:44

## 2019-04-18 RX ADMIN — GUAIFENESIN 600 MG: 600 TABLET, EXTENDED RELEASE ORAL at 09:56

## 2019-04-18 RX ADMIN — PANTOPRAZOLE SODIUM 40 MG: 40 INJECTION, POWDER, FOR SOLUTION INTRAVENOUS at 09:56

## 2019-04-18 RX ADMIN — IPRATROPIUM BROMIDE AND ALBUTEROL SULFATE 1 AMPULE: .5; 3 SOLUTION RESPIRATORY (INHALATION) at 19:14

## 2019-04-18 RX ADMIN — WARFARIN SODIUM 2.5 MG: 2.5 TABLET ORAL at 17:32

## 2019-04-18 RX ADMIN — ENOXAPARIN SODIUM 180 MG: 100 INJECTION SUBCUTANEOUS at 20:43

## 2019-04-18 RX ADMIN — IPRATROPIUM BROMIDE AND ALBUTEROL SULFATE 1 AMPULE: .5; 3 SOLUTION RESPIRATORY (INHALATION) at 08:00

## 2019-04-18 RX ADMIN — PANTOPRAZOLE SODIUM 40 MG: 40 INJECTION, POWDER, FOR SOLUTION INTRAVENOUS at 20:44

## 2019-04-18 ASSESSMENT — PAIN SCALES - GENERAL
PAINLEVEL_OUTOF10: 0

## 2019-04-18 NOTE — PROGRESS NOTES
Pharmacy to Dose Warfarin     Dx:  Hx DVT, Hx GIB and Renal Carcinoma (s/p nephrectomy)  Goal INR range 2-3   Home Warfarin dose:  3mg daily except 6mg Sunday. Date                 INR                  Warfarin  4/10                 1.34                 1.5 mg (dosed at half dose per consult instructions)   4/11                 1.21                  1.5 mg  4/12                 1.21                 1.5 mg  4/13                 1.25                 1.5 mg  4/14                 1.3                   2 mg  4/15                 1.52                 2mg  4/16                 1.60                 2mg  4/17                 1.70                 2mg + Lovenox 180mg BID bridge added by ONC NP  4/18                 1.64                2.5 mg + Lovenox 180mg BID bridge added by ONC NP     Recommend Warfarin 2 mg tonight x1. Daily INR ordered. Rx will continue to manage therapy per [facility] AT [address] [place] [address] consult order. [facility] notes from 4/11  Coagulopathy   - Needs closer follow up of INR  - He was having INR checked only monthly. Needs at least weekly or every 2 week draws- patient and wife are aware. - The benefit of continued AC outweighs the risk. He is very high risk for deadly clot due to super morbid obesity.       Hx of DVT X 2   - last DVT was very remote   - We would recc stopping AC if it weren't for his weight and immobility   - Cont Coumadin- restart at half home dose and follow INR closely   - NOAC such as Eliquis and Xarelto will not be absorbed at his weight      Discussed with HUSSAIN Cody, PharmD 4/18/2019 7:55 AM

## 2019-04-18 NOTE — PROGRESS NOTES
Cardiovascular: Regular rate and rhythm with normal S1/S2 without murmurs, rubs or gallops. Abdomen: Morbidly obese, soft, non-tender, non-distended with normal bowel sounds. Musculoskeletal: No clubbing, cyanosis bilaterally. Full range of motion without deformity. Skin: Skin color, texture, turgor normal.  No rashes or lesions. Neurologic:  Neurovascularly intact without any focal sensory/motor deficits. Cranial nerves: II-XII intact, grossly non-focal.  Psychiatric: Alert and oriented, thought content appropriate, normal insight  Capillary Refill: Brisk,< 3 seconds   Peripheral Pulses: +2 palpable, equal bilaterally       Labs:   Recent Labs     04/16/19  0422 04/17/19  0446 04/18/19  0458   WBC 5.1 4.9 5.0   HGB 8.1* 7.4* 8.4*   HCT 25.8* 23.3* 25.6*    167 167     No results for input(s): NA, K, CL, CO2, BUN, CREATININE, CALCIUM, PHOS in the last 72 hours. Invalid input(s): MAGNES  No results for input(s): AST, ALT, BILIDIR, BILITOT, ALKPHOS in the last 72 hours. Recent Labs     04/16/19  0423 04/17/19 0446 04/18/19  0458   INR 1.60* 1.70* 1.64*     No results for input(s): CKTOTAL, TROPONINI in the last 72 hours. Urinalysis:      Lab Results   Component Value Date    NITRU POSITIVE 04/14/2019    WBCUA 20-50 04/14/2019    BACTERIA 4+ 04/14/2019    RBCUA 10-20 04/14/2019    BLOODU LARGE 04/14/2019    SPECGRAV 1.010 04/14/2019    GLUCOSEU Negative 04/14/2019       Radiology:  VL Extremity Venous Right   Final Result      CT ABDOMEN PELVIS W WO CONTRAST Additional Contrast? Radiologist Recommendation   Final Result   Moderate bilateral pleural effusions with mostly dependent airspace disease,   atelectasis or pneumonia. Cholelithiasis. Edema is identified adjacent to a stone within the   gallbladder neck. Correlation for the possibility of inflammation is   recommended. The position of the gallbladder stone.       Mesenteric edema, fluid within the dependent portion a midline large

## 2019-04-18 NOTE — PROGRESS NOTES
04/18/19 0015   NIV Type   $NIV $Daily Charge   NIV Started Yes   Equipment Type v60   Mode BIPAP   Mask Type Full face mask   Mask Size Medium   Bonnet size Medium   Settings/Measurements   Comfort Level Good   Using Accessory Muscles No   IPAP 18 cmH20   EPAP 8 cmH2O   Rate Ordered 12   Resp 22   SpO2 97   FiO2  35 %   Vt Exhaled 625 mL   Mask Leak (lpm) 16 lpm   Skin Protection for O2 Device Yes   Breath Sounds   Right Upper Lobe Diminished   Right Middle Lobe Diminished   Right Lower Lobe Diminished   Left Upper Lobe Diminished   Left Lower Lobe Diminished   Patient Observation   Observations mepilex on nose   Alarm Settings   Alarms On Y   Press Low Alarm 6 cmH2O   High Pressure Alarm 30 cmH2O   Delay Alarm 20 sec(s)   Apnea (secs) 20 secs   Resp Rate Low Alarm 6   High Respiratory Rate 45 br/min

## 2019-04-19 LAB
HCT VFR BLD CALC: 24.5 % (ref 40.5–52.5)
HEMOGLOBIN: 7.8 G/DL (ref 13.5–17.5)
INR BLD: 1.79 (ref 0.86–1.14)
LV EF: 43 %
LVEF MODALITY: NORMAL
MCH RBC QN AUTO: 28.5 PG (ref 26–34)
MCHC RBC AUTO-ENTMCNC: 31.8 G/DL (ref 31–36)
MCV RBC AUTO: 89.7 FL (ref 80–100)
PDW BLD-RTO: 21.7 % (ref 12.4–15.4)
PLATELET # BLD: 152 K/UL (ref 135–450)
PMV BLD AUTO: 8.7 FL (ref 5–10.5)
PROTHROMBIN TIME: 20.4 SEC (ref 9.8–13)
RBC # BLD: 2.73 M/UL (ref 4.2–5.9)
WBC # BLD: 4 K/UL (ref 4–11)

## 2019-04-19 PROCEDURE — 97110 THERAPEUTIC EXERCISES: CPT

## 2019-04-19 PROCEDURE — 2580000003 HC RX 258: Performed by: INTERNAL MEDICINE

## 2019-04-19 PROCEDURE — 6360000002 HC RX W HCPCS: Performed by: NURSE PRACTITIONER

## 2019-04-19 PROCEDURE — 97530 THERAPEUTIC ACTIVITIES: CPT

## 2019-04-19 PROCEDURE — 6360000004 HC RX CONTRAST MEDICATION: Performed by: INTERNAL MEDICINE

## 2019-04-19 PROCEDURE — 6370000000 HC RX 637 (ALT 250 FOR IP): Performed by: NURSE PRACTITIONER

## 2019-04-19 PROCEDURE — C9113 INJ PANTOPRAZOLE SODIUM, VIA: HCPCS | Performed by: NURSE PRACTITIONER

## 2019-04-19 PROCEDURE — 6360000002 HC RX W HCPCS: Performed by: INTERNAL MEDICINE

## 2019-04-19 PROCEDURE — 2700000000 HC OXYGEN THERAPY PER DAY

## 2019-04-19 PROCEDURE — 6370000000 HC RX 637 (ALT 250 FOR IP): Performed by: INTERNAL MEDICINE

## 2019-04-19 PROCEDURE — 94660 CPAP INITIATION&MGMT: CPT

## 2019-04-19 PROCEDURE — 6370000000 HC RX 637 (ALT 250 FOR IP)

## 2019-04-19 PROCEDURE — 94640 AIRWAY INHALATION TREATMENT: CPT

## 2019-04-19 PROCEDURE — 85027 COMPLETE CBC AUTOMATED: CPT

## 2019-04-19 PROCEDURE — C8929 TTE W OR WO FOL WCON,DOPPLER: HCPCS

## 2019-04-19 PROCEDURE — 2060000000 HC ICU INTERMEDIATE R&B

## 2019-04-19 PROCEDURE — 85610 PROTHROMBIN TIME: CPT

## 2019-04-19 PROCEDURE — 94761 N-INVAS EAR/PLS OXIMETRY MLT: CPT

## 2019-04-19 PROCEDURE — 36415 COLL VENOUS BLD VENIPUNCTURE: CPT

## 2019-04-19 PROCEDURE — 94150 VITAL CAPACITY TEST: CPT

## 2019-04-19 RX ORDER — WARFARIN SODIUM 2.5 MG/1
2.5 TABLET ORAL
Status: COMPLETED | OUTPATIENT
Start: 2019-04-19 | End: 2019-04-19

## 2019-04-19 RX ORDER — CEPHALEXIN 250 MG/1
500 CAPSULE ORAL EVERY 6 HOURS SCHEDULED
Status: DISCONTINUED | OUTPATIENT
Start: 2019-04-19 | End: 2019-04-20 | Stop reason: HOSPADM

## 2019-04-19 RX ADMIN — ENOXAPARIN SODIUM 180 MG: 100 INJECTION SUBCUTANEOUS at 09:14

## 2019-04-19 RX ADMIN — ENOXAPARIN SODIUM 180 MG: 100 INJECTION SUBCUTANEOUS at 20:20

## 2019-04-19 RX ADMIN — IPRATROPIUM BROMIDE AND ALBUTEROL SULFATE 1 AMPULE: .5; 3 SOLUTION RESPIRATORY (INHALATION) at 11:52

## 2019-04-19 RX ADMIN — IPRATROPIUM BROMIDE AND ALBUTEROL SULFATE 1 AMPULE: .5; 3 SOLUTION RESPIRATORY (INHALATION) at 16:05

## 2019-04-19 RX ADMIN — TAMSULOSIN HYDROCHLORIDE 0.4 MG: 0.4 CAPSULE ORAL at 09:13

## 2019-04-19 RX ADMIN — GUAIFENESIN 600 MG: 600 TABLET, EXTENDED RELEASE ORAL at 09:13

## 2019-04-19 RX ADMIN — Medication 10 ML: at 09:17

## 2019-04-19 RX ADMIN — PERFLUTREN 2.2 MG: 6.52 INJECTION, SUSPENSION INTRAVENOUS at 15:46

## 2019-04-19 RX ADMIN — PANTOPRAZOLE SODIUM 40 MG: 40 INJECTION, POWDER, FOR SOLUTION INTRAVENOUS at 20:20

## 2019-04-19 RX ADMIN — CEPHALEXIN 500 MG: 250 CAPSULE ORAL at 11:45

## 2019-04-19 RX ADMIN — IRON SUCROSE 200 MG: 20 INJECTION, SOLUTION INTRAVENOUS at 16:13

## 2019-04-19 RX ADMIN — IPRATROPIUM BROMIDE AND ALBUTEROL SULFATE 1 AMPULE: .5; 3 SOLUTION RESPIRATORY (INHALATION) at 07:57

## 2019-04-19 RX ADMIN — WARFARIN SODIUM 2.5 MG: 2.5 TABLET ORAL at 17:11

## 2019-04-19 RX ADMIN — Medication 10 ML: at 20:30

## 2019-04-19 RX ADMIN — CEPHALEXIN 500 MG: 250 CAPSULE ORAL at 17:11

## 2019-04-19 RX ADMIN — PANTOPRAZOLE SODIUM 40 MG: 40 INJECTION, POWDER, FOR SOLUTION INTRAVENOUS at 09:17

## 2019-04-19 RX ADMIN — IPRATROPIUM BROMIDE AND ALBUTEROL SULFATE 1 AMPULE: .5; 3 SOLUTION RESPIRATORY (INHALATION) at 19:52

## 2019-04-19 RX ADMIN — CEPHALEXIN 500 MG: 250 CAPSULE ORAL at 23:53

## 2019-04-19 RX ADMIN — GUAIFENESIN 600 MG: 600 TABLET, EXTENDED RELEASE ORAL at 20:20

## 2019-04-19 RX ADMIN — FOLIC ACID 1 MG: 1 TABLET ORAL at 09:13

## 2019-04-19 ASSESSMENT — PAIN SCALES - GENERAL
PAINLEVEL_OUTOF10: 0

## 2019-04-19 NOTE — CARE COORDINATION
Pt will be able to transfer to facility when ready, per Loretta at [facility] [place]. Will arrange upon d/c orders.

## 2019-04-19 NOTE — PROGRESS NOTES
Tolerated, General Precautions, Fall Risk  Position Activity Restriction  Other position/activity restrictions: Medium fall risk per nursing assessment, 1L O2  Subjective   General  Chart Reviewed: Yes  Patient assessed for rehabilitation services?: Yes  Family / Caregiver Present: Yes  Referring Practitioner: Vernie Klinefelter, MD  Diagnosis: GI bleed  Subjective  Subjective: Pt resting in bed at OT arrival.   General Comment  Comments: RN approved therapy   Pain Assessment  Pain Assessment: 0-10  Pain Level: 0  Vital Signs  Patient Currently in Pain: Denies   Orientation  Orientation  Overall Orientation Status: Within Functional Limits  Objective    ADL  Toileting: Maximum assistance(pt max x2 to roll to side for pericare completion following BM)     Balance  Sitting Balance: Dependent/Total(maximove)  Standing Balance: Dependent/Total(maximove)  Standing Balance  Comment: maximove used for mobility this session  Functional Mobility  Functional - Mobility Device: Other(maximove)  Activity: Other(bed>chair)  Assist Level: Dependent/Total(maximove)  Functional Mobility Comments: maximove used for bed to chair transition  Bed mobility  Rolling to Left: Maximum assistance;2 Person assistance  Rolling to Right: Maximum assistance;2 Person assistance  Comment: maximove used for bed to chair transfer  Transfers  Transfer Comments: maximove used for bed to chair transfer     Cognition  Overall Cognitive Status: WFL      Type of ROM/Therapeutic Exercise  Type of ROM/Therapeutic Exercise: AROM  Exercises  Shoulder Depression: x20  Shoulder Elevation: x20  Shoulder Flexion: x20  Elbow Flexion: x20  Elbow Extension: x20  Supination: x20  Pronation: x20  Wrist Flexion: x20  Wrist Extension: x20  Grasp/Release: x20  LUE AROM (degrees)  LUE AROM : WFL  RUE AROM (degrees)  RUE AROM : WFL     Plan   Plan  Times per week: 3-5x/wk  Current Treatment Recommendations: Strengthening, Endurance Training, Patient/Caregiver Education &

## 2019-04-19 NOTE — PROGRESS NOTES
K, CL, CO2, PHOS, BUN, CREATININE in the last 72 hours. Invalid input(s): CA  No results for input(s): AST, ALT, ALB, BILIDIR, BILITOT, ALKPHOS in the last 72 hours. Magnesium:    Lab Results   Component Value Date    MG 2.20 04/09/2019    MG 2.20 04/08/2019    MG 1.70 04/07/2019         Problem List  Patient Active Problem List   Diagnosis    Morbid obesity with BMI of 60.0-69.9, adult ([facility] [address]    Anticoagulation goal of INR 1.5 to 2.5    Benign hypertension    Hx GI bleed due to duodenal ulcer (Aug 2018)    History of DVT (deep vein thrombosis)    Ventral hernia    Elevated brain natriuretic peptide (BNP) level    Pulmonary vascular congestion on CXR    Sepsis (HCC)    Hypomagnesemia    Hypokalemia    Hyponatremia    Hypochloremia    KEVIN (acute kidney injury) ([facility] [address]    Subacute microcytic anemia (July 2018)    Hx choledocholithiasis s/p ERCP (July 2018)    Acute respiratory failure with hypoxia and hypercapnia (HCC)    Calculus of gallbladder with acute cholecystitis without obstruction    Intertrigo    Lymphedema    Suspected sleep apnea    Biatrial enlargement    Congestive heart failure (HCC)    GI bleed       ASSESSMENT AND PLAN:    Remote hx of DVT/super morbid obesity/immobility   - Resumed Coumadin, now on 2.5 mg. Pharmacy dosing. INR is 1.6.   - Add Lovenox 1 mg/kg BID until INR above 2. He is high risk for thrombosis due to low protein C and S levels. He needs dual AC until INR above 2.   - Dr. Tramaine Fine to manage INR dosing outpatient.    - Protein C 53 ( percent) - off Coumadin results 4/10/19   - Protein S 36 ( percent) - off coumadin results 4/10   -His INR is 1.79 today    RUE swelling   - Doppler negative 4/16     ANA  - GI work up neg  - Venofer s/p 5 doses   - I explained to the patient that he definitely needs to follow-up in the office and needs to be compliant with his follow-ups    CHF hx  - BNP 5000   - No signs of exacerbation     Hypoxia  -Improved with

## 2019-04-19 NOTE — PROGRESS NOTES
some changes in medications pending completion. Hx of urinary retention post Devine cath  - continue Flomax  - Unable to remove devine. Will likely need to continue on discharge  - recommend follow up as outpatient if still unable to remove at SNF. UTI  - noted on UA, urine culture with Proteus  - completed ceftriaxone    Cellulitis  - noted on RUE  - started keflex    DVT Prophylaxis: warfarin, bridged with lovenox  Diet: DIET LOW FAT;  Code Status: Full Code    PT/OT Eval Status: SNF    Dispo - SNF following completion of lovenox bridge.     David Valderrama MD

## 2019-04-20 VITALS
TEMPERATURE: 98.3 F | OXYGEN SATURATION: 93 % | SYSTOLIC BLOOD PRESSURE: 128 MMHG | HEART RATE: 83 BPM | RESPIRATION RATE: 18 BRPM | DIASTOLIC BLOOD PRESSURE: 87 MMHG | WEIGHT: 315 LBS | HEIGHT: 64 IN | BODY MASS INDEX: 53.78 KG/M2

## 2019-04-20 LAB
HCT VFR BLD CALC: 25.1 % (ref 40.5–52.5)
HEMOGLOBIN: 7.8 G/DL (ref 13.5–17.5)
INR BLD: 1.96 (ref 0.86–1.14)
MCH RBC QN AUTO: 28.4 PG (ref 26–34)
MCHC RBC AUTO-ENTMCNC: 31.3 G/DL (ref 31–36)
MCV RBC AUTO: 90.7 FL (ref 80–100)
PDW BLD-RTO: 21.8 % (ref 12.4–15.4)
PLATELET # BLD: 159 K/UL (ref 135–450)
PMV BLD AUTO: 8.5 FL (ref 5–10.5)
PROTHROMBIN TIME: 22.4 SEC (ref 9.8–13)
RBC # BLD: 2.76 M/UL (ref 4.2–5.9)
WBC # BLD: 4.2 K/UL (ref 4–11)

## 2019-04-20 PROCEDURE — 94761 N-INVAS EAR/PLS OXIMETRY MLT: CPT

## 2019-04-20 PROCEDURE — 94640 AIRWAY INHALATION TREATMENT: CPT

## 2019-04-20 PROCEDURE — 6370000000 HC RX 637 (ALT 250 FOR IP): Performed by: INTERNAL MEDICINE

## 2019-04-20 PROCEDURE — 85610 PROTHROMBIN TIME: CPT

## 2019-04-20 PROCEDURE — 94660 CPAP INITIATION&MGMT: CPT

## 2019-04-20 PROCEDURE — 6360000002 HC RX W HCPCS: Performed by: NURSE PRACTITIONER

## 2019-04-20 PROCEDURE — 2700000000 HC OXYGEN THERAPY PER DAY

## 2019-04-20 PROCEDURE — 85027 COMPLETE CBC AUTOMATED: CPT

## 2019-04-20 PROCEDURE — 36415 COLL VENOUS BLD VENIPUNCTURE: CPT

## 2019-04-20 PROCEDURE — 6370000000 HC RX 637 (ALT 250 FOR IP): Performed by: NURSE PRACTITIONER

## 2019-04-20 PROCEDURE — 2580000003 HC RX 258: Performed by: INTERNAL MEDICINE

## 2019-04-20 PROCEDURE — C9113 INJ PANTOPRAZOLE SODIUM, VIA: HCPCS | Performed by: NURSE PRACTITIONER

## 2019-04-20 PROCEDURE — 6370000000 HC RX 637 (ALT 250 FOR IP)

## 2019-04-20 RX ORDER — CEPHALEXIN 500 MG/1
500 CAPSULE ORAL 4 TIMES DAILY
Qty: 20 CAPSULE | Refills: 0
Start: 2019-04-20 | End: 2019-04-25

## 2019-04-20 RX ORDER — WARFARIN SODIUM 2.5 MG/1
2.5 TABLET ORAL
Status: DISCONTINUED | OUTPATIENT
Start: 2019-04-20 | End: 2019-04-20 | Stop reason: HOSPADM

## 2019-04-20 RX ORDER — WARFARIN SODIUM 2.5 MG/1
2.5 TABLET ORAL DAILY
Qty: 30 TABLET | Refills: 0 | Status: ON HOLD
Start: 2019-04-20 | End: 2020-10-22 | Stop reason: HOSPADM

## 2019-04-20 RX ADMIN — CEPHALEXIN 500 MG: 250 CAPSULE ORAL at 11:17

## 2019-04-20 RX ADMIN — IPRATROPIUM BROMIDE AND ALBUTEROL SULFATE 1 AMPULE: .5; 3 SOLUTION RESPIRATORY (INHALATION) at 11:42

## 2019-04-20 RX ADMIN — Medication 10 ML: at 08:52

## 2019-04-20 RX ADMIN — TAMSULOSIN HYDROCHLORIDE 0.4 MG: 0.4 CAPSULE ORAL at 08:51

## 2019-04-20 RX ADMIN — FOLIC ACID 1 MG: 1 TABLET ORAL at 08:51

## 2019-04-20 RX ADMIN — IPRATROPIUM BROMIDE AND ALBUTEROL SULFATE 1 AMPULE: .5; 3 SOLUTION RESPIRATORY (INHALATION) at 08:04

## 2019-04-20 RX ADMIN — CEPHALEXIN 500 MG: 250 CAPSULE ORAL at 05:57

## 2019-04-20 RX ADMIN — ENOXAPARIN SODIUM 180 MG: 100 INJECTION SUBCUTANEOUS at 08:53

## 2019-04-20 RX ADMIN — PANTOPRAZOLE SODIUM 40 MG: 40 INJECTION, POWDER, FOR SOLUTION INTRAVENOUS at 08:52

## 2019-04-20 RX ADMIN — GUAIFENESIN 600 MG: 600 TABLET, EXTENDED RELEASE ORAL at 08:51

## 2019-04-20 ASSESSMENT — PAIN SCALES - GENERAL: PAINLEVEL_OUTOF10: 0

## 2019-04-20 NOTE — PROGRESS NOTES
04/19/19 2327   NIV Type   Equipment Type V60   Mode BIPAP   Mask Type Full face mask   Mask Size Medium   Settings/Measurements   Comfort Level Good   Using Accessory Muscles No   IPAP 18 cmH20   EPAP 8 cmH2O   Resp 24   SpO2 97   FiO2  30 %   Vt Exhaled 573 mL   Mask Leak (lpm) 0 lpm   Skin Protection for O2 Device Yes

## 2019-04-20 NOTE — PROGRESS NOTES
input(s): NA, K, CL, CO2, PHOS, BUN, CREATININE in the last 72 hours. Invalid input(s): CA  No results for input(s): AST, ALT, ALB, BILIDIR, BILITOT, ALKPHOS in the last 72 hours. Magnesium:    Lab Results   Component Value Date    MG 2.20 04/09/2019    MG 2.20 04/08/2019    MG 1.70 04/07/2019         Problem List  Patient Active Problem List   Diagnosis    Morbid obesity with BMI of 60.0-69.9, adult ([facility] [address]    Anticoagulation goal of INR 1.5 to 2.5    Benign hypertension    Hx GI bleed due to duodenal ulcer (Aug 2018)    History of DVT (deep vein thrombosis)    Ventral hernia    Elevated brain natriuretic peptide (BNP) level    Pulmonary vascular congestion on CXR    Sepsis (HCC)    Hypomagnesemia    Hypokalemia    Hyponatremia    Hypochloremia    KEVIN (acute kidney injury) ([facility] [address]    Subacute microcytic anemia (July 2018)    Hx choledocholithiasis s/p ERCP (July 2018)    Acute respiratory failure with hypoxia and hypercapnia (HCC)    Calculus of gallbladder with acute cholecystitis without obstruction    Intertrigo    Lymphedema    Suspected sleep apnea    Biatrial enlargement    Congestive heart failure (HCC)    GI bleed       ASSESSMENT AND PLAN:    Remote hx of DVT/super morbid obesity/immobility   -His INR is 1.96 today  -I think this is satisfactory for discharge  -He can have his morning Lovenox dose prior to discharge    RUE swelling   - Doppler negative 4/16     ANA  - GI work up neg  - Venofer s/p 5 doses   -His wife assures me that they have an appointment for the office and will follow up    CHF hx  - BNP 5000   - No signs of exacerbation     Hypoxia  -Improved with Duonebs   - Restrictive airway disease due to body habitus   - BNP over 5000 with known CHF. Does not need diuretics at present, diuresing well. He is down 5 liters since admit and weight at baseline at 388 lbs.       ONCOLOGIC DISPOSITION:   -He can be discharged today from the hematology/oncology

## 2019-04-20 NOTE — PROGRESS NOTES
PROGRESS NOTE  S:68 yrs Patient  admitted on 4/6/2019 with GI bleed [K92.2]  GI bleed [K92.2] . Today he feels oK. No further bleeding    Exam:   Vitals:    04/20/19 0832   BP: 128/87   Pulse: 83   Resp: 18   Temp: 98.3 °F (36.8 °C)   SpO2: 93%      General appearance: alert, appears stated age and cooperative  HEENT: Oropharynx clear, no lesions  Neck: no adenopathy and supple, symmetrical, trachea midline  Lungs: clear to auscultation bilaterally  Heart: regular rate and rhythm, S1, S2 normal, no murmur, click, rub or gallop  Abdomen: soft, non-tender; bowel sounds normal; no masses,  no organomegaly  Extremities: extremities normal, atraumatic, no cyanosis or edema     Medications: Reviewed    Labs:  CBC:   Recent Labs     04/18/19 0458 04/19/19 0457 04/20/19 0427   WBC 5.0 4.0 4.2   HGB 8.4* 7.8* 7.8*   HCT 25.6* 24.5* 25.1*   MCV 88.9 89.7 90.7    152 159     BMP: No results for input(s): NA, K, CL, CO2, PHOS, BUN, CREATININE in the last 72 hours. Invalid input(s): CA  LIVER PROFILE: No results for input(s): AST, ALT, LIPASE, PROT, BILIDIR, BILITOT, ALKPHOS in the last 72 hours. Invalid input(s): AMYLASE,  ALB  PT/INR:   Recent Labs     04/18/19 0458 04/19/19 0456 04/20/19 0427   INR 1.64* 1.79* 1.96*       IMAGING:      Impression:76year old male with history of HTn, HLD, CHF, DVT, admitted with anemia. Extensive workup with EGD/Colon and capsule negative    Recommendation:  1. Continue supportive care  2. Monitor Hgb  3. Resume anticoagulation with bridging  4. PPI daily  5. Observe for signs of bleeding  6.  Ok to d/c from [facility] Jessica Quesada MD  10:31 AM 4/20/2019

## 2019-05-10 ENCOUNTER — HOSPITAL ENCOUNTER (OUTPATIENT)
Age: 69
Setting detail: SPECIMEN
Discharge: HOME | End: 2019-05-10
Payer: COMMERCIAL

## 2019-05-10 LAB
INR BLD: 2.64 (ref 0.86–1.14)
PROTHROMBIN TIME: 30.1 SEC (ref 9.8–13)

## 2019-05-10 PROCEDURE — 85610 PROTHROMBIN TIME: CPT

## 2019-05-10 PROCEDURE — 36415 COLL VENOUS BLD VENIPUNCTURE: CPT

## 2019-05-15 NOTE — PROGRESS NOTES
[facility]   Cardiovascular Evaluation    PATIENT: Airam Elkins  DATE: 2019  MRN: [id number]  CSN: [id number]  : 1950      Primary Care Doctor: Syl Adams MD  Reason for evaluation:   Follow-up (Dr. Zoë Thompson saw pt in hospital 2018 for a consult. PAtient was admitted at that time for sepsis.) and Cardiomyopathy (Echo 19 EF 40-45%)      Subjective:   History of present illness on initial date of evaluation:   Airam Elkins is a 76 y.o. patient who presents for hospital follow up. He states he has had 3 GI bleeds over the last year. Most recent in 2019. Echo done at that time shows Mildly reduced ejection fraction visually estimated at 40-45%. He is taking warfarin for Hx of DVT's. He states they were not able to find the source of the GI bleed. Patient Active Problem List   Diagnosis    Morbid obesity with BMI of 60.0-69.9, adult ([address]    Anticoagulation goal of INR 1.5 to 2.5    Benign hypertension    Hx GI bleed due to duodenal ulcer (Aug 2018)    History of DVT (deep vein thrombosis)    Ventral hernia    Elevated brain natriuretic peptide (BNP) level    Pulmonary vascular congestion on CXR    Sepsis (HCC)    Hypomagnesemia    Hypokalemia    Hyponatremia    Hypochloremia    KEVIN (acute kidney injury) ([address]    Subacute microcytic anemia (2018)    Hx choledocholithiasis s/p ERCP (2018)    Acute respiratory failure with hypoxia and hypercapnia (HCC)    Calculus of gallbladder with acute cholecystitis without obstruction    Intertrigo    Lymphedema    Suspected sleep apnea    Biatrial enlargement    Congestive heart failure (HCC)    GI bleed         Past Medical History:   has a past medical history of Cancer ([address] Edema of both legs, GI bleed, Hx of blood clots, and Hypertension. Surgical History:   has a past surgical history that includes Kidney surgery (2014); Abdomen surgery;  Colonoscopy; Endoscopy, colon, diagnostic; pr egd transoral biopsy single/multiple (9/28/2018); Upper gastrointestinal endoscopy (N/A, 4/8/2019); Colonoscopy (N/A, 4/9/2019); and Upper gastrointestinal endoscopy (N/A, 4/12/2019). Social History:   reports that he has never smoked. He has never used smokeless tobacco. He reports that he does not drink alcohol or use drugs. Family History:  No evidence for sudden cardiac death or premature CAD    Home Medications:  Reviewed and are listed in nursing record. and/or listed below  Current Outpatient Medications   Medication Sig Dispense Refill    furosemide (LASIX) 40 MG tablet TK 1 T PO BID  0    potassium chloride (KLOR-CON M) 20 MEQ TBCR extended release tablet TK 1 T PO BID  0    warfarin (COUMADIN) 2.5 MG tablet Take 1 tablet by mouth daily (Patient taking differently: Take 5 mg by mouth daily ) 30 tablet 0    vitamin D (CHOLECALCIFEROL) 1000 UNIT TABS tablet Take 1,000 Units by mouth 2 times daily      tamsulosin (FLOMAX) 0.4 MG capsule Take 1 capsule by mouth daily 30 capsule 3    folic acid (FOLVITE) 1 MG tablet Take 1 tablet by mouth daily 30 tablet 3     No current facility-administered medications for this visit. Allergies:  Patient has no known allergies. Review of Systems:   A 14 point review of symptoms completed. Pertinent positives identified in the HPI, all other review of symptoms negative as below.     Objective:   PHYSICAL EXAM:    Vitals:    05/16/19 1124   BP: 114/62   Pulse: 78   SpO2: 98%    Weight: 299 lb (135.6 kg)(pt could not get on the scale, he insists this is the correc)     Wt Readings from Last 3 Encounters:   05/16/19 299 lb (135.6 kg)   04/20/19 (!) 383 lb 6.1 oz (173.9 kg)   10/01/18 (!) 364 lb 11.2 oz (165.4 kg)       General Appearance:  Alert, cooperative, no distress, appears stated age, morbidly obese   Head:  Normocephalic, atraumatic   Eyes:  PERRL, conjunctiva/corneas clear   Nose: Nares normal, no drainage or sinus tenderness Throat: Lips, mucosa, and tongue normal   Neck: Supple, symmetrical, trachea midline, NL thyroid no carotid bruit or JVD   Lungs:   CTAB, respirations unlabored   Chest Wall:  No tenderness or deformity   Heart:  Regular rhythm and normal rate; S1, S2 are normal;   no murmur noted; no rub or gallop   Abdomen:   Soft, non-tender, +BS x 4, no masses, no organomegaly   Extremities: Extremities normal, atraumatic, no cyanosis 4+ nonopitting edema to ankles   Pulses: 2+ and symmetric   Skin: Skin color, texture, turgor normal, no rashes or lesions   Pysch: Normal mood and affect   Neurologic: Normal gross motor and sensory exam.         LABS   CBC:      Lab Results   Component Value Date    WBC 4.2 2019    RBC 2.76 2019    HGB 7.8 2019    HCT 25.1 2019    MCV 90.7 2019    RDW 21.8 2019     2019     CMP:  Lab Results   Component Value Date     04/15/2019    K 4.2 04/15/2019     04/15/2019    CO2 32 04/15/2019    BUN 15 04/15/2019    CREATININE 0.9 04/15/2019    GFRAA >60 04/15/2019    AGRATIO 1.0 2019    LABGLOM >60 04/15/2019    GLUCOSE 98 04/15/2019    PROT 4.8 2019    CALCIUM 7.9 04/15/2019    BILITOT 0.6 2019    ALKPHOS 45 2019    AST 7 2019    ALT 6 2019     PT/INR:   No results found for: PTINR  Liver:  No components found for: CHLPL  Lab Results   Component Value Date    ALT 6 (L) 2019    AST 7 (L) 2019    ALKPHOS 45 2019    BILITOT 0.6 2019     No results found for: LABA1C  Lipids:       No results found for: TRIG       No results found for: HDL       No results found for: LDLCALC       No results found for: LABVLDL      CARDIAC DATA   EK2018 sinus tach with RBBB    ECHO/MUGA: 19  Summary   Technically difficult examination due to body habitus. Definity® used for   myocardial border enhancement.    Mildly reduced ejection fraction visually estimated at 40-45% with EF   calculated at 41% by Menendez's method. Hypokinesis infero-septal wall. The left ventricle is mildly dilated with normal wall thickness. There is a large left pleural effusion. IVC size is dilated (>2.1 cm) and collapses < 50% with respiration   consistent with elevated RA pressure (15 mmHg). Echo 9/23/18  Summary   Technically difficult study due to morbid obesity. Normal left ventricular systolic function with ejection fraction of 55-60%. No regional wall motion abnormalites are seen. Left ventricular diastolic filling pressure is normal.   Moderate bi-atrial enlargement. Right ventricle is moderately enlarged. STRESS TEST:    CARDIAC CATH:    VASCULAR/OTHER IMAGING:      Assessment and Plan   Annmarie Page is a 76 y.o. male who presents today for the following problems:      1. Cardiomyopathy: New acute drop in LVEF to 40-45% with + WMA  2. Hx of Elevated trops: in setting of KEVIN/anemia   - pt did not f/u for stress testing  3. Lymphedema  4. Hx of DVT: on anticoagulation for life per Hem/onc   - numerous GIB recently w/o source identification             - Hematoma of left psoas region to anticoagulant therapy 5/28/09   5. Hx of Renal cell carcinoma   - s/p partial RT nephrectomy  6. Microcytic anemia   - resolved per patient       MD PLAN:  1. Pt with no CP (but not active) but new drop in LVEF with + wall motion. Due to pt GIB hx, very concerned about cardiac cath and need for DAPT in pt already on DOAC in additional pt needing lifelong anticoagulation   - plan for luc-myoview to eval ischemia burden  2. Start low dose coreg to assess tolerability   -  HOLD ACE/ARB for now but start in future as BP allows  3. Continue home lasix and KCL for now. Weight good at 288lbs (was 383lbs) earlier this month   - given body habitus, hard to tell fluid status   - Renal and BNP today  4. Refer to Wound clinic for lymphedema- obesity and hx of renal cancer  5.  Update echo in 3-6 months           Patient Active Problem List   Diagnosis    Morbid obesity with BMI of 60.0-69.9, adult ([facility] [address]    Anticoagulation goal of INR 1.5 to 2.5    Benign hypertension    Hx GI bleed due to duodenal ulcer (Aug 2018)    History of DVT (deep vein thrombosis)    Ventral hernia    Elevated brain natriuretic peptide (BNP) level    Pulmonary vascular congestion on CXR    Sepsis (HCC)    Hypomagnesemia    Hypokalemia    Hyponatremia    Hypochloremia    KEVIN (acute kidney injury) ([facility] [address]    Subacute microcytic anemia (July 2018)    Hx choledocholithiasis s/p ERCP (July 2018)    Acute respiratory failure with hypoxia and hypercapnia (HCC)    Calculus of gallbladder with acute cholecystitis without obstruction    Intertrigo    Lymphedema    Suspected sleep apnea    Biatrial enlargement    Congestive heart failure ([facility] [address]    GI bleed       Patient Plan:  1. Start Coreg (carvedilol) 3.125 mg twice a day  2. Let us know if this does not work for you  3. Lexiscan stress test   4. We will call you with the results  5. Referral to [address] [place] [address] for lymphedema  6. Labs - BNP and BMP  7. Follow up in 4-6 weeks       It is a pleasure to assist in the care of Aspen Gore. Please call with any questions. This note was scribed in the presence of Dr. Camila Rodriguez MD by Cameron Rondon RN. The scribes documentation has been prepared under my direction and personally reviewed by me in its entirety. I confirm that the note above accurately reflects all work, treatment, procedures, and medical decision making performed by me. I, Dr. Katherine Pardo MD, personally performed the services described in this documentation as scribed by Lisa Taylor in my presence, and it is both accurate and complete to the best of our ability.        Katherine Pardo MD, [address] [facility] Cardiologist  [facility]  [phone] [facility]  [phone] [address] [place] [address]

## 2019-05-16 ENCOUNTER — OFFICE VISIT (OUTPATIENT)
Dept: CARDIOLOGY CLINIC | Age: 69
End: 2019-05-16
Payer: COMMERCIAL

## 2019-05-16 VITALS
BODY MASS INDEX: 51.04 KG/M2 | HEART RATE: 78 BPM | OXYGEN SATURATION: 98 % | DIASTOLIC BLOOD PRESSURE: 62 MMHG | WEIGHT: 299 LBS | HEIGHT: 64 IN | SYSTOLIC BLOOD PRESSURE: 114 MMHG

## 2019-05-16 DIAGNOSIS — R93.1 ABNORMAL ECHOCARDIOGRAM: ICD-10-CM

## 2019-05-16 DIAGNOSIS — I42.2 CARDIOMYOPATHIC MITOCHONDRIAL DNA DEPLETION SYNDROME TYPE 10 (HCC): ICD-10-CM

## 2019-05-16 DIAGNOSIS — R06.02 SOB (SHORTNESS OF BREATH): ICD-10-CM

## 2019-05-16 DIAGNOSIS — Q87.89 CARDIOMYOPATHIC MITOCHONDRIAL DNA DEPLETION SYNDROME TYPE 10 (HCC): ICD-10-CM

## 2019-05-16 DIAGNOSIS — Z86.718 HISTORY OF DVT (DEEP VEIN THROMBOSIS): ICD-10-CM

## 2019-05-16 DIAGNOSIS — I89.0 LYMPHEDEMA: ICD-10-CM

## 2019-05-16 DIAGNOSIS — Q12.0 CARDIOMYOPATHIC MITOCHONDRIAL DNA DEPLETION SYNDROME TYPE 10 (HCC): ICD-10-CM

## 2019-05-16 DIAGNOSIS — G71.3 CARDIOMYOPATHIC MITOCHONDRIAL DNA DEPLETION SYNDROME TYPE 10 (HCC): ICD-10-CM

## 2019-05-16 DIAGNOSIS — I42.9 CARDIOMYOPATHY, UNSPECIFIED TYPE (HCC): Primary | ICD-10-CM

## 2019-05-16 PROCEDURE — 99214 OFFICE O/P EST MOD 30 MIN: CPT | Performed by: INTERNAL MEDICINE

## 2019-05-16 RX ORDER — FUROSEMIDE 40 MG/1
TABLET ORAL
Refills: 0 | COMMUNITY
Start: 2019-05-14 | End: 2019-08-13 | Stop reason: SDUPTHER

## 2019-05-16 RX ORDER — CARVEDILOL 3.12 MG/1
3.12 TABLET ORAL 2 TIMES DAILY
Qty: 60 TABLET | Refills: 5 | Status: SHIPPED | OUTPATIENT
Start: 2019-05-16 | End: 2019-05-16 | Stop reason: SDUPTHER

## 2019-05-16 RX ORDER — POTASSIUM CHLORIDE 1500 MG/1
TABLET, FILM COATED, EXTENDED RELEASE ORAL
Refills: 0 | Status: ON HOLD | COMMUNITY
Start: 2019-05-14 | End: 2020-10-22 | Stop reason: HOSPADM

## 2019-05-16 NOTE — PATIENT INSTRUCTIONS
Patient Plan:  1. Start Coreg (carvedilol) 3.125 mg twice a day  2. Let us know if this does not work for you  3. Lexiscan stress test   4. We will call you with the results  5. Referral to [address] [place] [address] for lymphedema  6. Labs - BNP and BMP  7.  Follow up in 4-6 weeks

## 2019-05-16 NOTE — LETTER
[facility]   Cardiovascular Evaluation    PATIENT: Nikky Poe  DATE: 2019  MRN: [id number]  CSN: [id number]  : 1950      Primary Care Doctor: Jose Eduardo Che MD  Reason for evaluation:   Follow-up (Dr. Colin Kim saw pt in hospital 2018 for a consult. PAtient was admitted at that time for sepsis.) and Cardiomyopathy (Echo 19 EF 40-45%)      Subjective:   History of present illness on initial date of evaluation:   Nikky Poe is a 76 y.o. patient who presents for hospital follow up. He states he has had 3 GI bleeds over the last year. Most recent in 2019. Echo done at that time shows Mildly reduced ejection fraction visually estimated at 40-45%. He is taking warfarin for Hx of DVT's. He states they were not able to find the source of the GI bleed. Patient Active Problem List   Diagnosis    Morbid obesity with BMI of 60.0-69.9, adult ([address]    Anticoagulation goal of INR 1.5 to 2.5    Benign hypertension    Hx GI bleed due to duodenal ulcer (Aug 2018)    History of DVT (deep vein thrombosis)    Ventral hernia    Elevated brain natriuretic peptide (BNP) level    Pulmonary vascular congestion on CXR    Sepsis (HCC)    Hypomagnesemia    Hypokalemia    Hyponatremia    Hypochloremia    KEVIN (acute kidney injury) ([address]    Subacute microcytic anemia (2018)    Hx choledocholithiasis s/p ERCP (2018)    Acute respiratory failure with hypoxia and hypercapnia (HCC)    Calculus of gallbladder with acute cholecystitis without obstruction    Intertrigo    Lymphedema    Suspected sleep apnea    Biatrial enlargement    Congestive heart failure (HCC)    GI bleed         Past Medical History:   has a past medical history of Cancer ([address] Edema of both legs, GI bleed, Hx of blood clots, and Hypertension.     Surgical History:   has a past surgical history that includes Kidney surgery (2014); Abdomen surgery; Colonoscopy; Endoscopy, colon, diagnostic; pr egd transoral biopsy single/multiple (9/28/2018); Upper gastrointestinal endoscopy (N/A, 4/8/2019); Colonoscopy (N/A, 4/9/2019); and Upper gastrointestinal endoscopy (N/A, 4/12/2019). Social History:   reports that he has never smoked. He has never used smokeless tobacco. He reports that he does not drink alcohol or use drugs. Family History:  No evidence for sudden cardiac death or premature CAD    Home Medications:  Reviewed and are listed in nursing record. and/or listed below  Current Outpatient Medications   Medication Sig Dispense Refill    furosemide (LASIX) 40 MG tablet TK 1 T PO BID  0    potassium chloride (KLOR-CON M) 20 MEQ TBCR extended release tablet TK 1 T PO BID  0    warfarin (COUMADIN) 2.5 MG tablet Take 1 tablet by mouth daily (Patient taking differently: Take 5 mg by mouth daily ) 30 tablet 0    vitamin D (CHOLECALCIFEROL) 1000 UNIT TABS tablet Take 1,000 Units by mouth 2 times daily      tamsulosin (FLOMAX) 0.4 MG capsule Take 1 capsule by mouth daily 30 capsule 3    folic acid (FOLVITE) 1 MG tablet Take 1 tablet by mouth daily 30 tablet 3     No current facility-administered medications for this visit. Allergies:  Patient has no known allergies. Review of Systems:   A 14 point review of symptoms completed. Pertinent positives identified in the HPI, all other review of symptoms negative as below.     Objective:   PHYSICAL EXAM:    Vitals:    05/16/19 1124   BP: 114/62   Pulse: 78   SpO2: 98%    Weight: 299 lb (135.6 kg)(pt could not get on the scale, he insists this is the correc)     Wt Readings from Last 3 Encounters:   05/16/19 299 lb (135.6 kg)   04/20/19 (!) 383 lb 6.1 oz (173.9 kg)   10/01/18 (!) 364 lb 11.2 oz (165.4 kg)       General Appearance:  Alert, cooperative, no distress, appears stated age, morbidly obese   Head:  Normocephalic, atraumatic   Eyes:  PERRL, conjunctiva/corneas clear Nose: Nares normal, no drainage or sinus tenderness   Throat: Lips, mucosa, and tongue normal   Neck: Supple, symmetrical, trachea midline, NL thyroid no carotid bruit or JVD   Lungs:   CTAB, respirations unlabored   Chest Wall:  No tenderness or deformity   Heart:  Regular rhythm and normal rate; S1, S2 are normal;   no murmur noted; no rub or gallop   Abdomen:   Soft, non-tender, +BS x 4, no masses, no organomegaly   Extremities: Extremities normal, atraumatic, no cyanosis 4+ nonopitting edema to ankles   Pulses: 2+ and symmetric   Skin: Skin color, texture, turgor normal, no rashes or lesions   Pysch: Normal mood and affect   Neurologic: Normal gross motor and sensory exam.         LABS   CBC:      Lab Results   Component Value Date    WBC 4.2 2019    RBC 2.76 2019    HGB 7.8 2019    HCT 25.1 2019    MCV 90.7 2019    RDW 21.8 2019     2019     CMP:  Lab Results   Component Value Date     04/15/2019    K 4.2 04/15/2019     04/15/2019    CO2 32 04/15/2019    BUN 15 04/15/2019    CREATININE 0.9 04/15/2019    GFRAA >60 04/15/2019    AGRATIO 1.0 2019    LABGLOM >60 04/15/2019    GLUCOSE 98 04/15/2019    PROT 4.8 2019    CALCIUM 7.9 04/15/2019    BILITOT 0.6 2019    ALKPHOS 45 2019    AST 7 2019    ALT 6 2019     PT/INR:   No results found for: PTINR  Liver:  No components found for: CHLPL  Lab Results   Component Value Date    ALT 6 (L) 2019    AST 7 (L) 2019    ALKPHOS 45 2019    BILITOT 0.6 2019     No results found for: LABA1C  Lipids:       No results found for: TRIG       No results found for: HDL       No results found for: LDLCALC       No results found for: LABVLDL      CARDIAC DATA   EK2018 sinus tach with RBBB    ECHO/MUGA: 19  Summary   Technically difficult examination due to body habitus. Definity® used for   myocardial border enhancement. Mildly reduced ejection fraction visually estimated at 40-45% with EF   calculated at 41% by Menendez's method. Hypokinesis infero-septal wall. The left ventricle is mildly dilated with normal wall thickness. There is a large left pleural effusion. IVC size is dilated (>2.1 cm) and collapses < 50% with respiration   consistent with elevated RA pressure (15 mmHg). Echo 9/23/18  Summary   Technically difficult study due to morbid obesity. Normal left ventricular systolic function with ejection fraction of 55-60%. No regional wall motion abnormalites are seen. Left ventricular diastolic filling pressure is normal.   Moderate bi-atrial enlargement. Right ventricle is moderately enlarged. STRESS TEST:    CARDIAC CATH:    VASCULAR/OTHER IMAGING:      Assessment and Plan   Pete Ramos is a 76 y.o. male who presents today for the following problems:      1. Cardiomyopathy: New acute drop in LVEF to 40-45% with + WMA  2. Hx of Elevated trops: in setting of KEVIN/anemia   - pt did not f/u for stress testing  3. Lymphedema  4. Hx of DVT: on anticoagulation for life per Hem/onc   - numerous GIB recently w/o source identification             - Hematoma of left psoas region to anticoagulant therapy 5/28/09   5. Hx of Renal cell carcinoma   - s/p partial RT nephrectomy  6. Microcytic anemia   - resolved per patient       MD PLAN:  1. Pt with no CP (but not active) but new drop in LVEF with + wall motion. Due to pt GIB hx, very concerned about cardiac cath and need for DAPT in pt already on DOAC in additional pt needing lifelong anticoagulation   - plan for luc-myoview to eval ischemia burden  2. Start low dose coreg to assess tolerability   -  HOLD ACE/ARB for now but start in future as BP allows  3. Continue home lasix and KCL for now.  Weight good at 288lbs (was 383lbs) earlier this month   - given body habitus, hard to tell fluid status   - Renal and BNP today 4. Refer to Wound clinic for lymphedema- obesity and hx of renal cancer  5. Update echo in 3-6 months           Patient Active Problem List   Diagnosis    Morbid obesity with BMI of 60.0-69.9, adult ([facility]    Anticoagulation goal of INR 1.5 to 2.5    Benign hypertension    Hx GI bleed due to duodenal ulcer (Aug 2018)    History of DVT (deep vein thrombosis)    Ventral hernia    Elevated brain natriuretic peptide (BNP) level    Pulmonary vascular congestion on CXR    Sepsis (HCC)    Hypomagnesemia    Hypokalemia    Hyponatremia    Hypochloremia    KEVIN (acute kidney injury) ([facility]    Subacute microcytic anemia (July 2018)    Hx choledocholithiasis s/p ERCP (July 2018)    Acute respiratory failure with hypoxia and hypercapnia (HCC)    Calculus of gallbladder with acute cholecystitis without obstruction    Intertrigo    Lymphedema    Suspected sleep apnea    Biatrial enlargement    Congestive heart failure ([facility]    GI bleed       Patient Plan:  1. Start Coreg (carvedilol) 3.125 mg twice a day  2. Let us know if this does not work for you  3. Lexiscan stress test   4. We will call you with the results  5. Referral to [address] [place] [address] for lymphedema  6. Labs - BNP and BMP  7. Follow up in 4-6 weeks       It is a pleasure to assist in the care of Langlois Cools. Please call with any questions. This note was scribed in the presence of Dr. Juan A Bettencourt MD by Philip Stevenson RN. The scribes documentation has been prepared under my direction and personally reviewed by me in its entirety. I confirm that the note above accurately reflects all work, treatment, procedures, and medical decision making performed by me. I, Dr. Siddhartha Gill MD, personally performed the services described in this documentation as scribed by Alberta Worthington in my presence, and it is both accurate and complete to the best of our ability.        Siddhartha Gill MD, [address] [facility] Cardiologist  [facility] [phone] [facility]  [phone] [address] [place] [address]

## 2019-05-17 RX ORDER — CARVEDILOL 3.12 MG/1
TABLET ORAL
Qty: 180 TABLET | Refills: 1 | Status: SHIPPED | OUTPATIENT
Start: 2019-05-17 | End: 2020-01-13

## 2019-05-17 NOTE — TELEPHONE ENCOUNTER
5/16/19 Dr. Jcarlos Warren  Patient Plan:  1. Start Coreg (carvedilol) 3.125 mg twice a day  2. Let us know if this does not work for you  3. Lexiscan stress test   4. We will call you with the results  5. Referral to [address] [place] [address] for lymphedema  6. Labs - BNP and BMP  7.  Follow up in 4-6 weeks       Need auth for 90 day supply

## 2019-06-17 ENCOUNTER — HOSPITAL ENCOUNTER (OUTPATIENT)
Age: 69
Discharge: HOME | End: 2019-06-17
Payer: COMMERCIAL

## 2019-06-17 DIAGNOSIS — R06.02 SOB (SHORTNESS OF BREATH): ICD-10-CM

## 2019-06-17 DIAGNOSIS — I89.0 LYMPHEDEMA: ICD-10-CM

## 2019-06-17 DIAGNOSIS — R93.1 ABNORMAL ECHOCARDIOGRAM: ICD-10-CM

## 2019-06-17 LAB
ANION GAP SERPL CALCULATED.3IONS-SCNC: 13 MMOL/L (ref 3–16)
BUN BLDV-MCNC: 36 MG/DL (ref 7–20)
CALCIUM SERPL-MCNC: 9.2 MG/DL (ref 8.3–10.6)
CHLORIDE BLD-SCNC: 103 MMOL/L (ref 99–110)
CO2: 27 MMOL/L (ref 21–32)
CREAT SERPL-MCNC: 1.4 MG/DL (ref 0.8–1.3)
GFR AFRICAN AMERICAN: >60
GFR NON-AFRICAN AMERICAN: 50
GLUCOSE BLD-MCNC: 90 MG/DL (ref 70–99)
POTASSIUM SERPL-SCNC: 4.3 MMOL/L (ref 3.5–5.1)
PRO-BNP: 1067 PG/ML (ref 0–124)
SODIUM BLD-SCNC: 143 MMOL/L (ref 136–145)

## 2019-06-17 PROCEDURE — 83880 ASSAY OF NATRIURETIC PEPTIDE: CPT

## 2019-06-17 PROCEDURE — 36415 COLL VENOUS BLD VENIPUNCTURE: CPT

## 2019-06-17 PROCEDURE — 80048 BASIC METABOLIC PNL TOTAL CA: CPT

## 2019-06-20 ENCOUNTER — TELEPHONE (OUTPATIENT)
Dept: CARDIOLOGY CLINIC | Age: 69
End: 2019-06-20

## 2019-06-20 NOTE — TELEPHONE ENCOUNTER
----- Message from Mavis Kern MD sent at 6/19/2019  5:37 PM EDT -----  Pt got labs 1 month after appt. Showing that CHf appears better but renal fxn slightly worse. I would decrease lasix by 1/2 and continue for now.  Only go back if weight goes back up or more SOB

## 2019-06-20 NOTE — TELEPHONE ENCOUNTER
Created telephone encounter. [facility] requesting a call back to the office. Will relay lab results per Isai Gilmore once pt returns call.

## 2019-06-21 ENCOUNTER — HOSPITAL ENCOUNTER (OUTPATIENT)
Age: 69
Setting detail: SPECIMEN
Discharge: HOME | End: 2019-06-21
Payer: COMMERCIAL

## 2019-06-21 LAB
INR BLD: 3.76 (ref 0.86–1.14)
PROTHROMBIN TIME: 42.9 SEC (ref 9.8–13)

## 2019-06-21 PROCEDURE — 85610 PROTHROMBIN TIME: CPT

## 2019-06-21 PROCEDURE — 36415 COLL VENOUS BLD VENIPUNCTURE: CPT

## 2019-06-28 ENCOUNTER — HOSPITAL ENCOUNTER (OUTPATIENT)
Age: 69
Setting detail: SPECIMEN
Discharge: HOME | End: 2019-06-28
Payer: COMMERCIAL

## 2019-06-28 LAB
INR BLD: 1.29 (ref 0.86–1.14)
PROTHROMBIN TIME: 14.7 SEC (ref 9.8–13)

## 2019-06-28 PROCEDURE — 85610 PROTHROMBIN TIME: CPT

## 2019-06-28 PROCEDURE — 36415 COLL VENOUS BLD VENIPUNCTURE: CPT

## 2019-07-05 ENCOUNTER — HOSPITAL ENCOUNTER (OUTPATIENT)
Age: 69
Setting detail: SPECIMEN
Discharge: HOME | End: 2019-07-05
Payer: COMMERCIAL

## 2019-07-05 LAB
INR BLD: 1.28 (ref 0.86–1.14)
PROTHROMBIN TIME: 14.6 SEC (ref 9.8–13)

## 2019-07-05 PROCEDURE — 85610 PROTHROMBIN TIME: CPT

## 2019-07-05 PROCEDURE — 36415 COLL VENOUS BLD VENIPUNCTURE: CPT

## 2019-07-08 ENCOUNTER — TELEPHONE (OUTPATIENT)
Dept: CARDIOLOGY CLINIC | Age: 69
End: 2019-07-08

## 2019-07-08 NOTE — TELEPHONE ENCOUNTER
Spoke with Rosalina Tavera,  Reviewed codes with her. States she figured it out. Typo on the coding end. All fixed and they will get patient scheduled.

## 2019-07-12 ENCOUNTER — HOSPITAL ENCOUNTER (OUTPATIENT)
Age: 69
Setting detail: SPECIMEN
Discharge: HOME | End: 2019-07-12
Payer: COMMERCIAL

## 2019-07-12 LAB
INR BLD: 1.44 (ref 0.86–1.14)
PROTHROMBIN TIME: 16.4 SEC (ref 9.8–13)

## 2019-07-12 PROCEDURE — 85610 PROTHROMBIN TIME: CPT

## 2019-07-12 PROCEDURE — 36415 COLL VENOUS BLD VENIPUNCTURE: CPT

## 2019-07-19 ENCOUNTER — HOSPITAL ENCOUNTER (OUTPATIENT)
Age: 69
Setting detail: SPECIMEN
Discharge: HOME | End: 2019-07-19
Payer: COMMERCIAL

## 2019-07-19 LAB
INR BLD: 1.56 (ref 0.86–1.14)
PROTHROMBIN TIME: 17.8 SEC (ref 9.8–13)

## 2019-07-19 PROCEDURE — 85610 PROTHROMBIN TIME: CPT

## 2019-07-19 PROCEDURE — 36415 COLL VENOUS BLD VENIPUNCTURE: CPT

## 2019-07-23 ENCOUNTER — HOSPITAL ENCOUNTER (OUTPATIENT)
Dept: CARDIOLOGY | Age: 69
Discharge: HOME | End: 2019-07-23
Payer: COMMERCIAL

## 2019-07-23 DIAGNOSIS — R93.1 ABNORMAL ECHOCARDIOGRAM: ICD-10-CM

## 2019-07-23 LAB
LV EF: 51 %
LVEF MODALITY: NORMAL

## 2019-07-23 PROCEDURE — 93017 CV STRESS TEST TRACING ONLY: CPT

## 2019-07-23 PROCEDURE — A9502 TC99M TETROFOSMIN: HCPCS | Performed by: INTERNAL MEDICINE

## 2019-07-23 PROCEDURE — 6360000002 HC RX W HCPCS: Performed by: INTERNAL MEDICINE

## 2019-07-23 PROCEDURE — 78452 HT MUSCLE IMAGE SPECT MULT: CPT

## 2019-07-23 PROCEDURE — 3430000000 HC RX DIAGNOSTIC RADIOPHARMACEUTICAL: Performed by: INTERNAL MEDICINE

## 2019-07-23 RX ADMIN — REGADENOSON 0.4 MG: 0.08 INJECTION, SOLUTION INTRAVENOUS at 08:30

## 2019-07-23 RX ADMIN — TETROFOSMIN 34 MILLICURIE: 1.38 INJECTION, POWDER, LYOPHILIZED, FOR SOLUTION INTRAVENOUS at 08:30

## 2019-07-24 ENCOUNTER — HOSPITAL ENCOUNTER (OUTPATIENT)
Dept: CARDIOLOGY | Age: 69
Discharge: HOME | End: 2019-07-24
Payer: COMMERCIAL

## 2019-07-24 PROCEDURE — 3430000000 HC RX DIAGNOSTIC RADIOPHARMACEUTICAL: Performed by: INTERNAL MEDICINE

## 2019-07-24 PROCEDURE — A9502 TC99M TETROFOSMIN: HCPCS | Performed by: INTERNAL MEDICINE

## 2019-07-24 RX ADMIN — TETROFOSMIN 30.6 MILLICURIE: 1.38 INJECTION, POWDER, LYOPHILIZED, FOR SOLUTION INTRAVENOUS at 08:10

## 2019-07-25 ENCOUNTER — TELEPHONE (OUTPATIENT)
Dept: CARDIOLOGY CLINIC | Age: 69
End: 2019-07-25

## 2019-07-25 NOTE — TELEPHONE ENCOUNTER
Result Notes for NM Cardiac Stress Test Nuclear Imaging     Notes recorded by Lacy Lanza on 7/25/2019 at 2:13 PM EDT  LMOVM please return call for results. ------    Notes recorded by Monique Reddy MD on 7/24/2019 at 8:30 PM EDT  Let patient know their stress test is abnormal, schedule this if patient willing to proceed, would schedule w/ dr Lavonia Alpers when he is available. Thanks. Inform patient that stress test abn and SRJ would like patient to proceed with C if patient is willing per Patrick Scott.

## 2019-07-30 ENCOUNTER — HOSPITAL ENCOUNTER (OUTPATIENT)
Age: 69
Setting detail: SPECIMEN
Discharge: HOME | End: 2019-07-30
Payer: COMMERCIAL

## 2019-07-30 LAB
INR BLD: 2.84 (ref 0.86–1.14)
PROTHROMBIN TIME: 32.4 SEC (ref 9.8–13)

## 2019-07-30 PROCEDURE — 85610 PROTHROMBIN TIME: CPT

## 2019-07-30 PROCEDURE — 36415 COLL VENOUS BLD VENIPUNCTURE: CPT

## 2019-08-14 RX ORDER — FUROSEMIDE 40 MG/1
TABLET ORAL
Qty: 180 TABLET | Refills: 0 | Status: SHIPPED | OUTPATIENT
Start: 2019-08-14 | End: 2019-11-07 | Stop reason: SDUPTHER

## 2019-08-20 ENCOUNTER — TELEPHONE (OUTPATIENT)
Dept: CARDIOLOGY CLINIC | Age: 69
End: 2019-08-20

## 2019-08-20 NOTE — TELEPHONE ENCOUNTER
Patient called in to schedule LHC. Patient is scheduled with Dr. Marcy Ferreira for Left Heart Cath on 9/6/19 at [facility], arrival time of 6:30am to the Cath Lab. Please have patient arrive to the main entrance of [facility] at 6:15am and check in with the registration desk. Please call patient regarding medication instructions. Remind patient to be NPO after midnight (8 hours prior). Do not apply lotions/creams on skin the day of procedure.

## 2019-08-20 NOTE — TELEPHONE ENCOUNTER
Tried calling patient. Just rang. Patient will need to hold lasix the morning of his procedure. Any other meds may be taken with sip of water the morning of the procedure.

## 2019-08-30 ENCOUNTER — HOSPITAL ENCOUNTER (OUTPATIENT)
Age: 69
Setting detail: SPECIMEN
Discharge: HOME | End: 2019-08-30
Payer: COMMERCIAL

## 2019-08-30 LAB
INR BLD: 1.82 (ref 0.86–1.14)
POTASSIUM SERPL-SCNC: 3.8 MMOL/L (ref 3.5–5.1)
PROTHROMBIN TIME: 20.8 SEC (ref 9.8–13)

## 2019-08-30 PROCEDURE — 84132 ASSAY OF SERUM POTASSIUM: CPT

## 2019-08-30 PROCEDURE — 85610 PROTHROMBIN TIME: CPT

## 2019-08-30 PROCEDURE — 36415 COLL VENOUS BLD VENIPUNCTURE: CPT

## 2019-09-05 ENCOUNTER — TELEPHONE (OUTPATIENT)
Dept: CARDIOLOGY CLINIC | Age: 69
End: 2019-09-05

## 2019-09-05 NOTE — TELEPHONE ENCOUNTER
Patient called stating he received a sheet in the mail regarding his procedure with luiz on 9/6 but still has questions

## 2019-09-06 ENCOUNTER — HOSPITAL ENCOUNTER (OUTPATIENT)
Dept: CARDIAC CATH/INVASIVE PROCEDURES | Age: 69
Discharge: HOME | End: 2019-09-06
Attending: INTERNAL MEDICINE | Admitting: INTERNAL MEDICINE
Payer: COMMERCIAL

## 2019-09-06 VITALS — WEIGHT: 299 LBS | BODY MASS INDEX: 51.04 KG/M2 | HEIGHT: 64 IN

## 2019-09-06 LAB
ANION GAP SERPL CALCULATED.3IONS-SCNC: 11 MMOL/L (ref 3–16)
BUN BLDV-MCNC: 29 MG/DL (ref 7–20)
CALCIUM SERPL-MCNC: 9 MG/DL (ref 8.3–10.6)
CHLORIDE BLD-SCNC: 102 MMOL/L (ref 99–110)
CO2: 28 MMOL/L (ref 21–32)
CREAT SERPL-MCNC: 1.1 MG/DL (ref 0.8–1.3)
EKG ATRIAL RATE: 78 BPM
EKG DIAGNOSIS: NORMAL
EKG P AXIS: -16 DEGREES
EKG P-R INTERVAL: 230 MS
EKG Q-T INTERVAL: 424 MS
EKG QRS DURATION: 126 MS
EKG QTC CALCULATION (BAZETT): 483 MS
EKG R AXIS: 65 DEGREES
EKG T AXIS: -2 DEGREES
EKG VENTRICULAR RATE: 78 BPM
FERRITIN: 85.2 NG/ML (ref 30–400)
GFR AFRICAN AMERICAN: >60
GFR NON-AFRICAN AMERICAN: >60
GLUCOSE BLD-MCNC: 109 MG/DL (ref 70–99)
HCT VFR BLD CALC: 40.6 % (ref 40.5–52.5)
HEMOGLOBIN: 14 G/DL (ref 13.5–17.5)
INR BLD: 1.77 (ref 0.86–1.14)
IRON SATURATION: 22 % (ref 20–50)
IRON: 57 UG/DL (ref 59–158)
MCH RBC QN AUTO: 30.1 PG (ref 26–34)
MCHC RBC AUTO-ENTMCNC: 34.5 G/DL (ref 31–36)
MCV RBC AUTO: 87.4 FL (ref 80–100)
PDW BLD-RTO: 16.1 % (ref 12.4–15.4)
PLATELET # BLD: 153 K/UL (ref 135–450)
PMV BLD AUTO: 8.4 FL (ref 5–10.5)
POC ACT LR: 296 SEC
POTASSIUM SERPL-SCNC: 3.8 MMOL/L (ref 3.5–5.1)
PROTHROMBIN TIME: 20.2 SEC (ref 9.8–13)
RBC # BLD: 4.64 M/UL (ref 4.2–5.9)
SODIUM BLD-SCNC: 141 MMOL/L (ref 136–145)
TOTAL IRON BINDING CAPACITY: 262 UG/DL (ref 260–445)
TSH REFLEX FT4: 4.05 UIU/ML (ref 0.27–4.2)
WBC # BLD: 4.9 K/UL (ref 4–11)

## 2019-09-06 PROCEDURE — 93005 ELECTROCARDIOGRAM TRACING: CPT | Performed by: INTERNAL MEDICINE

## 2019-09-06 PROCEDURE — 84443 ASSAY THYROID STIM HORMONE: CPT

## 2019-09-06 PROCEDURE — 6360000002 HC RX W HCPCS

## 2019-09-06 PROCEDURE — 93458 L HRT ARTERY/VENTRICLE ANGIO: CPT

## 2019-09-06 PROCEDURE — 80048 BASIC METABOLIC PNL TOTAL CA: CPT

## 2019-09-06 PROCEDURE — 86038 ANTINUCLEAR ANTIBODIES: CPT

## 2019-09-06 PROCEDURE — 84155 ASSAY OF PROTEIN SERUM: CPT

## 2019-09-06 PROCEDURE — 6360000004 HC RX CONTRAST MEDICATION

## 2019-09-06 PROCEDURE — 84425 ASSAY OF VITAMIN B-1: CPT

## 2019-09-06 PROCEDURE — C1894 INTRO/SHEATH, NON-LASER: HCPCS

## 2019-09-06 PROCEDURE — 99152 MOD SED SAME PHYS/QHP 5/>YRS: CPT

## 2019-09-06 PROCEDURE — 2580000003 HC RX 258

## 2019-09-06 PROCEDURE — 83540 ASSAY OF IRON: CPT

## 2019-09-06 PROCEDURE — 99152 MOD SED SAME PHYS/QHP 5/>YRS: CPT | Performed by: INTERNAL MEDICINE

## 2019-09-06 PROCEDURE — 83883 ASSAY NEPHELOMETRY NOT SPEC: CPT

## 2019-09-06 PROCEDURE — 83550 IRON BINDING TEST: CPT

## 2019-09-06 PROCEDURE — 86658 ENTEROVIRUS ANTIBODY: CPT

## 2019-09-06 PROCEDURE — 93458 L HRT ARTERY/VENTRICLE ANGIO: CPT | Performed by: INTERNAL MEDICINE

## 2019-09-06 PROCEDURE — 2500000003 HC RX 250 WO HCPCS

## 2019-09-06 PROCEDURE — 85610 PROTHROMBIN TIME: CPT

## 2019-09-06 PROCEDURE — 85347 COAGULATION TIME ACTIVATED: CPT

## 2019-09-06 PROCEDURE — 93010 ELECTROCARDIOGRAM REPORT: CPT | Performed by: INTERNAL MEDICINE

## 2019-09-06 PROCEDURE — C1769 GUIDE WIRE: HCPCS

## 2019-09-06 PROCEDURE — 86255 FLUORESCENT ANTIBODY SCREEN: CPT

## 2019-09-06 PROCEDURE — 85027 COMPLETE CBC AUTOMATED: CPT

## 2019-09-06 PROCEDURE — 84165 PROTEIN E-PHORESIS SERUM: CPT

## 2019-09-06 PROCEDURE — 82728 ASSAY OF FERRITIN: CPT

## 2019-09-06 RX ORDER — FENTANYL CITRATE 50 UG/ML
INJECTION, SOLUTION INTRAMUSCULAR; INTRAVENOUS
Status: COMPLETED | OUTPATIENT
Start: 2019-09-06 | End: 2019-09-06

## 2019-09-06 RX ORDER — SODIUM CHLORIDE 9 MG/ML
1000 INJECTION, SOLUTION INTRAVENOUS CONTINUOUS
Status: DISCONTINUED | OUTPATIENT
Start: 2019-09-06 | End: 2019-09-06 | Stop reason: HOSPADM

## 2019-09-06 RX ORDER — MIDAZOLAM HYDROCHLORIDE 5 MG/ML
INJECTION INTRAMUSCULAR; INTRAVENOUS
Status: COMPLETED | OUTPATIENT
Start: 2019-09-06 | End: 2019-09-06

## 2019-09-06 RX ORDER — HEPARIN SODIUM 1000 [USP'U]/ML
INJECTION, SOLUTION INTRAVENOUS; SUBCUTANEOUS
Status: COMPLETED | OUTPATIENT
Start: 2019-09-06 | End: 2019-09-06

## 2019-09-06 RX ADMIN — SODIUM CHLORIDE 1000 ML: 9 INJECTION, SOLUTION INTRAVENOUS at 07:28

## 2019-09-06 RX ADMIN — MIDAZOLAM HYDROCHLORIDE 1 MG: 5 INJECTION INTRAMUSCULAR; INTRAVENOUS at 08:29

## 2019-09-06 RX ADMIN — HEPARIN SODIUM 6800 UNITS: 1000 INJECTION, SOLUTION INTRAVENOUS; SUBCUTANEOUS at 08:41

## 2019-09-06 RX ADMIN — FENTANYL CITRATE 25 MCG: 50 INJECTION, SOLUTION INTRAMUSCULAR; INTRAVENOUS at 08:29

## 2019-09-06 RX ADMIN — MIDAZOLAM HYDROCHLORIDE 1 MG: 5 INJECTION INTRAMUSCULAR; INTRAVENOUS at 08:35

## 2019-09-06 NOTE — H&P
Brief Pre-Op Note/Sedation Assessment      Diana Gonzalez  1950  Cath Pool Rm/NONE      [id number]  7:49 AM    Planned Procedure: Cardiac Catheterization Procedure    Post Procedure Plan: Return to same level of care    Consent: I have discussed with the patient and/or the patient representative the indication, alternatives, and the possible risks and/or complications of the planned procedure and the anesthesia methods. The patient and/or patient representative appear to understand and agree to proceed. Chief Complaint: Anginal Equivalent      Indications for Cath Procedure:  Cardiomyopathy  Anginal Classification within 2 weeks:  No symptoms  NYHA Heart Failure Class within 2 weeks: Class III - Symptoms of HF on less-than-ordinary exertion, Newly Diagnosed?  Yes, Heart Failure Type: Systolic  Is Cath Lab Visit Valve-related?: No  Surgical Risk: Low  Functional Type: < 4 METS    Anti- Anginal Meds within 2 weeks:   Yes: Beta Blockers and Aspirin    Stress or Imaging Studies Performed:  Stress Test with SPECT Result: Positive:  lateral Risk/Extent of Ischemia:  Intermediate     Vital Signs:  Ht 5' 4\" (1.626 m)   Wt 299 lb (135.6 kg)   BMI 51.32 kg/m²     Allergies:  No Known Allergies    Past Medical History:  Past Medical History:   Diagnosis Date    Cancer ([address]     kidney    Edema of both legs     GI bleed     Hx of blood clots     DVT    Hypertension          Surgical History:  Past Surgical History:   Procedure Laterality Date    ABDOMEN SURGERY      Right Kidney Cancer abd. surgery 2014    COLONOSCOPY      flexsigmoidoscopy 40yrs ago     COLONOSCOPY N/A 4/9/2019    COLONOSCOPY WITH BIOPSY performed by Nancy Acosta MD at [address], COLON, DIAGNOSTIC      8/2018    KIDNEY SURGERY  4/7/2014    TX EGD TRANSORAL BIOPSY SINGLE/MULTIPLE  9/28/2018    EGD BIOPSY performed by Nancy Acosta MD at [address] 4/8/2019    EGD WITH ANESTHESIA performed by Ivonne Hernandez MD at [address] N/A 4/12/2019    ESOPHAGEAL CAPSULE ENDOSCOPY performed by Ivonne Hernandez MD at [facility]         Medications:  Current Facility-Administered Medications   Medication Dose Route Frequency Provider Last Rate Last Dose    0.9 % sodium chloride infusion  1,000 mL Intravenous Continuous Ryley Dias MD 30 mL/hr at 09/06/19 0728 1,000 mL at 09/06/19 9491           Pre-Sedation:    Pre-Sedation Documentation and Exam:  I have assessed the patient and agree with the H&P present on the chart. Prior History of Anesthesia Complications:   none    Modified Mallampati:  II (soft palate, uvula, fauces visible)    ASA Classification:  Class 3 - A patient with severe systemic disease that limits activity but is not incapacitating      Monroe Scale: Activity:  2 - Able to move 4 extremities voluntarily on command  Respiration:  2 - Able to breathe deeply and cough freely  Circulation:  2 - BP+/- 20mmHg of normal  Consciousness:  2 - Fully awake  Oxygen Saturation (color):  2 - Able to maintain oxygen saturation >92% on room air    Sedation/Anesthesia Plan:  Guard the patient's safety and welfare. Minimize physical discomfort and pain. Minimize negative psychological responses to treatment by providing sedation and analgesia and maximize the potential amnesia. Patient to meet pre-procedure discharge plan.     Medication Planned:  midazolam intravenously and fentanyl intravenously    Patient is an appropriate candidate for plan of sedation: yes      Electronically signed by Linda Bauer MD on 9/6/2019 at 7:49 AM

## 2019-09-07 LAB
ANTI-NUCLEAR ANTIBODY (ANA): NEGATIVE
KAPPA, FREE LIGHT CHAINS, SERUM: 61.05 MG/L (ref 3.3–19.4)
KAPPA/LAMBDA RATIO: 3.4 (ref 0.26–1.65)
KAPPA/LAMBDA TEST COMMENT: ABNORMAL
LAMBDA, FREE LIGHT CHAINS, SERUM: 17.93 MG/L (ref 5.71–26.3)

## 2019-09-08 LAB — ANCA IFA: NORMAL

## 2019-09-09 LAB
ALBUMIN SERPL-MCNC: 3.4 G/DL (ref 3.1–4.9)
ALPHA-1-GLOBULIN: 0.2 G/DL (ref 0.2–0.4)
ALPHA-2-GLOBULIN: 0.5 G/DL (ref 0.4–1.1)
BETA GLOBULIN: 1.5 G/DL (ref 0.9–1.6)
GAMMA GLOBULIN: 0.9 G/DL (ref 0.6–1.8)
SPE/IFE INTERPRETATION: NORMAL
TOTAL PROTEIN: 6.6 G/DL (ref 6.4–8.2)
VITAMIN B1, PLASMA: 8 NMOL/L (ref 4–15)

## 2019-09-14 LAB
COXSACKIE B1 ANTIBODY: NORMAL
COXSACKIE B2 ANTIBODY: NORMAL
COXSACKIE B3 ANTIBODY: NORMAL
COXSACKIE B4 ANTIBODY: NORMAL
COXSACKIE B5 ANTIBODY: NORMAL
COXSACKIE B6 ANTIBODY: NORMAL

## 2019-09-17 ENCOUNTER — TELEPHONE (OUTPATIENT)
Dept: CARDIOLOGY CLINIC | Age: 69
End: 2019-09-17

## 2019-10-15 ENCOUNTER — OFFICE VISIT (OUTPATIENT)
Dept: CARDIOLOGY CLINIC | Age: 69
End: 2019-10-15
Payer: COMMERCIAL

## 2019-10-15 VITALS
SYSTOLIC BLOOD PRESSURE: 130 MMHG | HEART RATE: 82 BPM | BODY MASS INDEX: 53.78 KG/M2 | OXYGEN SATURATION: 92 % | DIASTOLIC BLOOD PRESSURE: 64 MMHG | WEIGHT: 315 LBS | HEIGHT: 64 IN

## 2019-10-15 DIAGNOSIS — I42.8 NONISCHEMIC CARDIOMYOPATHY (HCC): Primary | ICD-10-CM

## 2019-10-15 PROCEDURE — 99215 OFFICE O/P EST HI 40 MIN: CPT | Performed by: NURSE PRACTITIONER

## 2019-10-15 RX ORDER — LISINOPRIL 2.5 MG/1
2.5 TABLET ORAL DAILY
Qty: 30 TABLET | Refills: 6 | Status: SHIPPED | OUTPATIENT
Start: 2019-10-15 | End: 2019-10-15 | Stop reason: SDUPTHER

## 2019-10-16 RX ORDER — LISINOPRIL 2.5 MG/1
2.5 TABLET ORAL DAILY
Qty: 90 TABLET | Refills: 3 | Status: SHIPPED | OUTPATIENT
Start: 2019-10-16 | End: 2020-04-27

## 2019-10-16 ASSESSMENT — ENCOUNTER SYMPTOMS
RESPIRATORY NEGATIVE: 1
GASTROINTESTINAL NEGATIVE: 1

## 2019-11-01 ENCOUNTER — HOSPITAL ENCOUNTER (OUTPATIENT)
Age: 69
Discharge: HOME | End: 2019-11-01
Payer: COMMERCIAL

## 2019-11-01 DIAGNOSIS — I42.8 NONISCHEMIC CARDIOMYOPATHY (HCC): ICD-10-CM

## 2019-11-01 LAB
ANION GAP SERPL CALCULATED.3IONS-SCNC: 13 MMOL/L (ref 3–16)
BUN BLDV-MCNC: 26 MG/DL (ref 7–20)
CALCIUM SERPL-MCNC: 9.4 MG/DL (ref 8.3–10.6)
CHLORIDE BLD-SCNC: 101 MMOL/L (ref 99–110)
CO2: 27 MMOL/L (ref 21–32)
CREAT SERPL-MCNC: 1 MG/DL (ref 0.8–1.3)
GFR AFRICAN AMERICAN: >60
GFR NON-AFRICAN AMERICAN: >60
GLUCOSE BLD-MCNC: 103 MG/DL (ref 70–99)
POTASSIUM SERPL-SCNC: 4.3 MMOL/L (ref 3.5–5.1)
SODIUM BLD-SCNC: 141 MMOL/L (ref 136–145)

## 2019-11-01 PROCEDURE — 80048 BASIC METABOLIC PNL TOTAL CA: CPT

## 2019-11-01 PROCEDURE — 36415 COLL VENOUS BLD VENIPUNCTURE: CPT

## 2019-11-05 ENCOUNTER — TELEPHONE (OUTPATIENT)
Dept: CARDIOLOGY CLINIC | Age: 69
End: 2019-11-05

## 2019-11-07 RX ORDER — FUROSEMIDE 40 MG/1
TABLET ORAL
Qty: 180 TABLET | Refills: 1 | Status: SHIPPED | OUTPATIENT
Start: 2019-11-07 | End: 2020-05-04

## 2020-01-06 ENCOUNTER — OFFICE VISIT (OUTPATIENT)
Dept: CARDIOLOGY CLINIC | Age: 70
End: 2020-01-06

## 2020-01-06 VITALS
BODY MASS INDEX: 53.78 KG/M2 | HEART RATE: 71 BPM | OXYGEN SATURATION: 97 % | HEIGHT: 64 IN | WEIGHT: 315 LBS | DIASTOLIC BLOOD PRESSURE: 56 MMHG | SYSTOLIC BLOOD PRESSURE: 108 MMHG

## 2020-01-06 PROCEDURE — 99214 OFFICE O/P EST MOD 30 MIN: CPT | Performed by: NURSE PRACTITIONER

## 2020-01-06 ASSESSMENT — ENCOUNTER SYMPTOMS
GASTROINTESTINAL NEGATIVE: 1
RESPIRATORY NEGATIVE: 1

## 2020-01-06 NOTE — LETTER
[facility]   Cardiology Note              Date:  January 6, 2020  Patientname: Andrew Reynoso  YOB: 1950    Primary Care physician: Nyasia Camarillo MD    HISTORY OF PRESENT ILLNESS: Andrew Reynoso is a 71 y.o. male with a history of nonischemic cardiomyopathy, HTN, DVT, GI bleed. He has a history of DVTs 20 years ago and is on coumadin lifelong per hematology. He was admitted in 4/2019 with INR 5, anemia. EGD and colonoscopy normal. Echo showed EF 40-45%. Stress test abnormal. [facility] 9/6/2019 showed no significant CAD. Today he presents for follow up for cardiomyopathy. He has no complaints. Denies chest pain, shortness of breath, palpitations and dizziness. He thinks he gained weight over the holidays. Does not check weight or BP at home. He is sedentary. Past Medical History:   has a past medical history of Cancer ([facility] Cardiomyopathy ([facility] CHF (congestive heart failure) ([facility] Edema of both legs, GI bleed, Hx of blood clots, and Hypertension. Past Surgical History:   has a past surgical history that includes Kidney surgery (4/7/2014); Abdomen surgery; Colonoscopy; Endoscopy, colon, diagnostic; pr egd transoral biopsy single/multiple (9/28/2018); Upper gastrointestinal endoscopy (N/A, 4/8/2019); Colonoscopy (N/A, 4/9/2019); Upper gastrointestinal endoscopy (N/A, 4/12/2019); and Cardiac catheterization (09/06/2019). Home Medications:    Prior to Admission medications    Medication Sig Start Date End Date Taking?  Authorizing Provider   furosemide (LASIX) 40 MG tablet TAKE 1 TABLET BY MOUTH TWICE DAILY 11/7/19   Jennifer Rocha APRN - CNP   lisinopril (PRINIVIL;ZESTRIL) 2.5 MG tablet TAKE 1 TABLET BY MOUTH DAILY 10/16/19   JOEY Duenas - CNP   carvedilol (COREG) 3.125 MG tablet TAKE 1 TABLET BY MOUTH TWICE DAILY 5/17/19   Supa Whipple MD   potassium chloride (KLOR-CON M) 20 MEQ TBCR extended release tablet TK 1 T PO BID 5/14/19   Historical Provider, MD Neuro: alert and oriented, moves legs and arms equally, normal mood and affect    Data Reviewed:      Echo 4/19/2019:  Technically difficult examination due to body habitus. Definity® used for   myocardial border enhancement.   Mildly reduced ejection fraction visually estimated at 40-45% with EF   calculated at 41% by Menendez's method.   Hypokinesis infero-septal wall.   The left ventricle is mildly dilated with normal wall thickness.   There is a large left pleural effusion.  IVC size is dilated (>2.1 cm) and collapses < 50% with respiration   consistent with elevated RA pressure (15 mmHg). Coronary angiogram 9/6/2019:  Due to large pt habitus- unable to position on table to fully pan camera  LM: luminals  LAD: luminals  LCX: large vessel, luminals  RCA: dominant, luminals with sluggish MALORIE-2 flow  LVEDP: 19  LVEF: 35%  1. Nonischemic cardiomyopathy: LVEF 35%  2. No significant CAD                - eval for other causes of cardiomyopathy    Cardiology Labs Reviewed:   CBC: No results for input(s): WBC, HGB, HCT, PLT in the last 72 hours. BMP:No results for input(s): NA, K, CO2, BUN, CREATININE, LABGLOM, GLUCOSE in the last 72 hours. PT/INR: No results for input(s): PROTIME, INR in the last 72 hours. APTT:No results for input(s): APTT in the last 72 hours. FASTING LIPID PANEL:No results found for: HDL, LDLDIRECT, LDLCALC, TRIG  LIVER PROFILE:No results for input(s): AST, ALT, ALB in the last 72 hours.   BNP:   Lab Results   Component Value Date    PROBNP 1,067 06/17/2019    PROBNP 5,466 04/11/2019    PROBNP 10,255 09/24/2018    PROBNP 6,351 09/23/2018     Reviewed all labs and imaging today    Assessment:   Nonischemic cardiomyopathy: EF 40-45% on echo 4/2019  CAD: stable, no angina; luminal irregularities on angiogram 9/6/2019  HTN: controlled  Lymphedema  History of renal cancer: s/p right partial nephrectomy, left renal lesion noted on CT 2019  History of DVT: lifelong coumadin due to numerous clots History of GI bleed  Morbid obesity    Plan:   1. Continue carvedilol and lisinopril, unable to up titrate today due to lower BP  2. Repeat limited echo to re assess EF  3. Stay active along with a healthy diet  4. Follow up with PCP  5.  Follow up in 2 months with Dr. Wilfredo Downing, APRN-CNP  [facility]  [phone]

## 2020-01-06 NOTE — PROGRESS NOTES
[address]   Cardiology Note              Date:  January 6, 2020  Patientname: All Fisher  YOB: 1950    Primary Care physician: Ervin Munoz MD    HISTORY OF PRESENT ILLNESS: All Fisher is a 71 y.o. male with a history of nonischemic cardiomyopathy, HTN, DVT, GI bleed. He has a history of DVTs 20 years ago and is on coumadin lifelong per hematology. He was admitted in 4/2019 with INR 5, anemia. EGD and colonoscopy normal. Echo showed EF 40-45%. Stress test abnormal. [facility] 9/6/2019 showed no significant CAD. Today he presents for follow up for cardiomyopathy. He has no complaints. Denies chest pain, shortness of breath, palpitations and dizziness. He thinks he gained weight over the holidays. Does not check weight or BP at home. He is sedentary. Past Medical History:   has a past medical history of Cancer ([facility] Cardiomyopathy ([facility] CHF (congestive heart failure) ([facility] Edema of both legs, GI bleed, Hx of blood clots, and Hypertension. Past Surgical History:   has a past surgical history that includes Kidney surgery (4/7/2014); Abdomen surgery; Colonoscopy; Endoscopy, colon, diagnostic; pr egd transoral biopsy single/multiple (9/28/2018); Upper gastrointestinal endoscopy (N/A, 4/8/2019); Colonoscopy (N/A, 4/9/2019); Upper gastrointestinal endoscopy (N/A, 4/12/2019); and Cardiac catheterization (09/06/2019). Home Medications:    Prior to Admission medications    Medication Sig Start Date End Date Taking?  Authorizing Provider   furosemide (LASIX) 40 MG tablet TAKE 1 TABLET BY MOUTH TWICE DAILY 11/7/19   JOEY Mayer CNP   lisinopril (PRINIVIL;ZESTRIL) 2.5 MG tablet TAKE 1 TABLET BY MOUTH DAILY 10/16/19   JOEY Mayer CNP   carvedilol (COREG) 3.125 MG tablet TAKE 1 TABLET BY MOUTH TWICE DAILY 5/17/19   Alexsandra Islas MD   potassium chloride (KLOR-CON M) 20 MEQ TBCR extended release tablet TK 1 T PO BID 5/14/19   Historical Provider, MD   warfarin Continue carvedilol and lisinopril, unable to up titrate today due to lower BP  2. Repeat limited echo to re assess EF  3. Stay active along with a healthy diet  4. Follow up with PCP  5.  Follow up in 2 months with Dr. Issa , APRN-CNP  [address]  [phone]

## 2020-01-06 NOTE — PATIENT INSTRUCTIONS
Continue current medications  Stay active along with a healthy diet  Follow up with Dr. Jerry Narvaez and a heart ultrasound in 2 months  Call [address] [place] [address] to schedule ultrasound

## 2020-01-13 RX ORDER — CARVEDILOL 3.12 MG/1
TABLET ORAL
Qty: 180 TABLET | Refills: 1 | Status: SHIPPED | OUTPATIENT
Start: 2020-01-13 | End: 2020-06-29

## 2020-03-25 PROBLEM — K92.2 GI BLEED: Status: RESOLVED | Noted: 2018-08-04 | Resolved: 2020-03-24

## 2020-04-27 RX ORDER — LISINOPRIL 2.5 MG/1
TABLET ORAL
Qty: 90 TABLET | Refills: 1 | Status: SHIPPED | OUTPATIENT
Start: 2020-04-27 | End: 2020-04-30

## 2020-04-30 RX ORDER — LISINOPRIL 2.5 MG/1
TABLET ORAL
Qty: 90 TABLET | Refills: 1 | Status: ON HOLD
Start: 2020-04-30 | End: 2020-10-22 | Stop reason: HOSPADM

## 2020-04-30 NOTE — TELEPHONE ENCOUNTER
1/6/2020    Instructions     Continue current medications  Stay active along with a healthy diet  Follow up with Dr. Whit Starks and a heart ultrasound in 2 months  Call [address] [place] [address] to schedule ultrasound

## 2020-06-29 RX ORDER — CARVEDILOL 3.12 MG/1
TABLET ORAL
Qty: 180 TABLET | Refills: 1 | Status: ON HOLD
Start: 2020-06-29 | End: 2020-10-22 | Stop reason: HOSPADM

## 2020-09-25 ENCOUNTER — HOSPITAL ENCOUNTER (EMERGENCY)
Age: 70
Discharge: HOME | End: 2020-09-25
Payer: MEDICARE

## 2020-09-25 VITALS
HEART RATE: 98 BPM | OXYGEN SATURATION: 97 % | TEMPERATURE: 98.2 F | RESPIRATION RATE: 20 BRPM | SYSTOLIC BLOOD PRESSURE: 167 MMHG | DIASTOLIC BLOOD PRESSURE: 115 MMHG

## 2020-09-25 LAB
A/G RATIO: 1.1 (ref 1.1–2.2)
ALBUMIN SERPL-MCNC: 3.8 G/DL (ref 3.4–5)
ALP BLD-CCNC: 90 U/L (ref 40–129)
ALT SERPL-CCNC: 11 U/L (ref 10–40)
ANION GAP SERPL CALCULATED.3IONS-SCNC: 10 MMOL/L (ref 3–16)
AST SERPL-CCNC: 14 U/L (ref 15–37)
BASOPHILS ABSOLUTE: 0 K/UL (ref 0–0.2)
BASOPHILS RELATIVE PERCENT: 0.5 %
BILIRUB SERPL-MCNC: 1.3 MG/DL (ref 0–1)
BUN BLDV-MCNC: 23 MG/DL (ref 7–20)
CALCIUM SERPL-MCNC: 9 MG/DL (ref 8.3–10.6)
CHLORIDE BLD-SCNC: 98 MMOL/L (ref 99–110)
CO2: 29 MMOL/L (ref 21–32)
CREAT SERPL-MCNC: 1 MG/DL (ref 0.8–1.3)
EOSINOPHILS ABSOLUTE: 0.2 K/UL (ref 0–0.6)
EOSINOPHILS RELATIVE PERCENT: 3.1 %
GFR AFRICAN AMERICAN: >60
GFR NON-AFRICAN AMERICAN: >60
GLOBULIN: 3.4 G/DL
GLUCOSE BLD-MCNC: 128 MG/DL (ref 70–99)
HCT VFR BLD CALC: 46.9 % (ref 40.5–52.5)
HEMOGLOBIN: 15.9 G/DL (ref 13.5–17.5)
INR BLD: 2.34 (ref 0.86–1.14)
LYMPHOCYTES ABSOLUTE: 0.7 K/UL (ref 1–5.1)
LYMPHOCYTES RELATIVE PERCENT: 9.2 %
MCH RBC QN AUTO: 29.8 PG (ref 26–34)
MCHC RBC AUTO-ENTMCNC: 34 G/DL (ref 31–36)
MCV RBC AUTO: 87.8 FL (ref 80–100)
MONOCYTES ABSOLUTE: 0.5 K/UL (ref 0–1.3)
MONOCYTES RELATIVE PERCENT: 6.7 %
NEUTROPHILS ABSOLUTE: 6.2 K/UL (ref 1.7–7.7)
NEUTROPHILS RELATIVE PERCENT: 80.5 %
PDW BLD-RTO: 15.1 % (ref 12.4–15.4)
PLATELET # BLD: 175 K/UL (ref 135–450)
PMV BLD AUTO: 8.9 FL (ref 5–10.5)
POTASSIUM SERPL-SCNC: 3.8 MMOL/L (ref 3.5–5.1)
PROTHROMBIN TIME: 27.4 SEC (ref 10–13.2)
RBC # BLD: 5.34 M/UL (ref 4.2–5.9)
SODIUM BLD-SCNC: 137 MMOL/L (ref 136–145)
SPECIMEN STATUS: NORMAL
TOTAL PROTEIN: 7.2 G/DL (ref 6.4–8.2)
WBC # BLD: 7.7 K/UL (ref 4–11)

## 2020-09-25 PROCEDURE — 85025 COMPLETE CBC W/AUTO DIFF WBC: CPT

## 2020-09-25 PROCEDURE — 99283 EMERGENCY DEPT VISIT LOW MDM: CPT

## 2020-09-25 PROCEDURE — 85610 PROTHROMBIN TIME: CPT

## 2020-09-25 PROCEDURE — 80053 COMPREHEN METABOLIC PANEL: CPT

## 2020-09-25 NOTE — ED NOTES
PT WENT HOME WITH IV IN ARM DUE TO RN NOT SEEING IV WHEN PT LEFT. RN DID NOT SEE THE IV DUE TO PT DRESSING HIMSELF AND RN DID NOT SEE THE IV WHEN PT LEFT. RN ATTEMPTED TO CALL PT X 5. LEFT VOICEMAIL.  [address] NOTIFIED AND THEY ARE TO CALL BACK AFTER LOCATING PT.      Winston Callejas RN  09/25/20 0369

## 2020-09-25 NOTE — ED NOTES
Fall risk screening completed.  Fall risk bracelet applied to patient.  Non-skid socks provided and placed on patient. Elio Hernadez fall risk is indicated using  dome light .  Based on score, a bed alarm was indicated and applied.  The call light is within the patient's reach, and instructions for use were provided.  The bed is in the lowest position with wheels locked.  The patient has been advised to notify staff, using the call light, if there is a need to get up or use restroom.  The patient verbalized understanding of safety precautions and how to contact staff for assistance.          Eve Huitron RN  09/25/20 [id number]

## 2020-09-25 NOTE — ED PROVIDER NOTES
Evaluated by [id number] [facility] Provider    [address] [facility]  ED    CHIEF COMPLAINT  Wound Check (\"thing on butt, i itched it and now it wont stop bleeding\")    HISTORY OF PRESENT ILLNESS  Mago Sierra Sr. is a 71 y.o. male who presents to the ED complaining of a blister on his buttocks that he scratched this morning and it won't stop bleeding. Has been bleeding for about 2 hours and wife can't get it to stop. He is on coumadin for history of blood clots. Patient denies any other complaints. No CP, SOB, abdominal pain, nausea, vomiting, diarrhea. No fevers, chills, sweats. The patient is currently rating their pain as 0/10. The patient denies other complaints, modifying factors or associated symptoms. The patient arrived to the ED via private car. Patient is accompanied by wife who is/are bedside for the visit. Nursing notes reviewed.    Past Medical History:   Diagnosis Date    Cancer ([address]     kidney    Cardiomyopathy ([address] 08/2019    CHF (congestive heart failure) ([address] 08/2019    Edema of both legs     GI bleed     Hx of blood clots     DVT    Hypertension      Past Surgical History:   Procedure Laterality Date    ABDOMEN SURGERY      Right Kidney Cancer abd. surgery 2014    CARDIAC CATHETERIZATION  09/06/2019    COLONOSCOPY      flexsigmoidoscopy 40yrs ago     COLONOSCOPY N/A 4/9/2019    COLONOSCOPY WITH BIOPSY performed by Agnes Solano MD at [facility], COLON, DIAGNOSTIC      8/2018    KIDNEY SURGERY  4/7/2014    NH EGD TRANSORAL BIOPSY SINGLE/MULTIPLE  9/28/2018    EGD [place] [facility] [place] [facility] N/A 4/8/2019    EGD WITH ANESTHESIA performed by Agnes Solano MD [place] [facility] [place] [facility] N/A 4/12/2019    ESOPHAGEAL CAPSULE ENDOSCOPY performed by Agnes Solano MD at [address] [place] [address]     Family History   Problem Relation Age of Onset    Arthritis Sig Dispense Refill    carvedilol (COREG) 3.125 MG tablet TAKE ONE TABLET BY MOUTH TWICE A  tablet 1    furosemide (LASIX) 40 MG tablet TAKE 1 TABLET BY MOUTH TWICE DAILY 180 tablet 0    lisinopril (PRINIVIL;ZESTRIL) 2.5 MG tablet TAKE ONE TABLET BY MOUTH DAILY 90 tablet 1    potassium chloride (KLOR-CON M) 20 MEQ TBCR extended release tablet TK 1 T PO BID  0    warfarin (COUMADIN) 2.5 MG tablet Take 1 tablet by mouth daily (Patient taking differently: Take 5 mg by mouth daily ) 30 tablet 0    vitamin D (CHOLECALCIFEROL) 1000 UNIT TABS tablet Take 1,000 Units by mouth 2 times daily      tamsulosin (FLOMAX) 0.4 MG capsule Take 1 capsule by mouth daily 30 capsule 3    folic acid (FOLVITE) 1 MG tablet Take 1 tablet by mouth daily 30 tablet 3     No Known Allergies    REVIEW OF SYSTEMS  10 systems reviewed, pertinent positives per HPI otherwise noted to be negative    PHYSICAL EXAM  BP (!) 167/115   Pulse 98   Temp 98.2 °F (36.8 °C) (Oral)   Resp 20   SpO2 97%     GENERAL APPEARANCE: Well developed, morbidly obese. Awake and alert. Cooperative. Observed resting in bed. No acute distress. HEAD: Normocephalic. Atraumatic. EYES: Sclera is non-icteric. Conjunctiva normal.  ENT: External ears are normal. Mucous membranes are moist.   NECK: Supple. Normal ROM. Trachea mid-line. Cardiac: Regular rate and rhythm. Capillary refill is brisk in bilateral upper extremities. LUNGS: Breathing is unlabored. Speaking comfortably in full sentences. Equal and symmetric chest rise. Abdomen: Large abdominal hernia. Musculoskeletal: Normal ROM. No gross deformities or trauma noted. Moving all extremities equally and appropriately. NEUROLOGICAL: Alert and oriented x3. Strength is normal. No focal motor or sensory deficits. SKIN: Warm and dry. Skin is with good color. To right buttock there are 2 blisters that appear fluid filled with a small abrasion proximal to the blisters that appears to be bleeding.  No surrounding erythema, induration or fluctuance. To the left buttock there are two similar blisters that are not weeping or open. Psychiatric: Mood and affect appropriate. Speech is clear and articulate. PROCEDURES  None    RADIOLOGY  None      LABS  Results for orders placed or performed during the hospital encounter of 09/25/20   CBC auto differential   Result Value Ref Range    WBC 7.7 4.0 - 11.0 K/uL    RBC 5.34 4.20 - 5.90 M/uL    Hemoglobin 15.9 13.5 - 17.5 g/dL    Hematocrit 46.9 40.5 - 52.5 %    MCV 87.8 80.0 - 100.0 fL    MCH 29.8 26.0 - 34.0 pg    MCHC 34.0 31.0 - 36.0 g/dL    RDW 15.1 12.4 - 15.4 %    Platelets 966 922 - 244 K/uL    MPV 8.9 5.0 - 10.5 fL    Neutrophils % 80.5 %    Lymphocytes % 9.2 %    Monocytes % 6.7 %    Eosinophils % 3.1 %    Basophils % 0.5 %    Neutrophils Absolute 6.2 1.7 - 7.7 K/uL    Lymphocytes Absolute 0.7 (L) 1.0 - 5.1 K/uL    Monocytes Absolute 0.5 0.0 - 1.3 K/uL    Eosinophils Absolute 0.2 0.0 - 0.6 K/uL    Basophils Absolute 0.0 0.0 - 0.2 K/uL   Comprehensive metabolic panel   Result Value Ref Range    Sodium 137 136 - 145 mmol/L    Potassium 3.8 3.5 - 5.1 mmol/L    Chloride 98 (L) 99 - 110 mmol/L    CO2 29 21 - 32 mmol/L    Anion Gap 10 3 - 16    Glucose 128 (H) 70 - 99 mg/dL    BUN 23 (H) 7 - 20 mg/dL    CREATININE 1.0 0.8 - 1.3 mg/dL    GFR Non-African American >60 >60    GFR African American >60 >60    Calcium 9.0 8.3 - 10.6 mg/dL    Total Protein 7.2 6.4 - 8.2 g/dL    Alb 3.8 3.4 - 5.0 g/dL    Albumin/Globulin Ratio 1.1 1.1 - 2.2    Total Bilirubin 1.3 (H) 0.0 - 1.0 mg/dL    Alkaline Phosphatase 90 40 - 129 U/L    ALT 11 10 - 40 U/L    AST 14 (L) 15 - 37 U/L    Globulin 3.4 g/dL   Protime-INR   Result Value Ref Range    Protime 27.4 (H) 10.0 - 13.2 sec    INR 2.34 (H) 0.86 - 1.14   Sample possible blood bank testing   Result Value Ref Range    Specimen Status ALBERTO      ED COURSE/MDM  Patient seen and evaluated. Old records reviewed.   Diagnostic testing results reviewed and discussed. I have evaluated this patient. My supervising physician was available for consultation. Jaylin Copeland Sr. presented to the ED today with above noted complaints. Physical exam did reveal a small area to the buttock where there is an abrasion that was bleeding. Patient also has what appears to be blood blisters to the bilateral buttocks. He is on Coumadin for history of blood clots. They are reporting that they cannot get the bleeding to stop for approximately 2 hours prior to arrival.  Surgifoam was placed to the area with a Mepilex dressing over top. This did get the bleeding to stop. Patient's INR is therapeutic at 2.34. As his bleeding is resolved I will discharge the patient. At this point I do not feel the patient requires further work up and it is reasonable to discharge the patient. Please refer to AVS for further details regarding discharge instructions. A discussion was had with the patient regarding diagnosis, diagnostic testing results, treatment/ plan of care, and follow up. All questions were answered. Patient will follow up as directed for further evaluation/treatment. The patient was given strict return precautions as we discussed symptoms that would necessitate return to the ED. Patient will return to ED for new/worsening symptoms. The patient verbalized their understanding and agreement with the above plan. I estimate there is LOW risk for CELLULITIS, COMPARTMENT SYNDROME, NECROTIZING FASCIITIS, TENDON OR NEUROVASCULAR INJURY, or FOREIGN BODY, thus I consider the discharge disposition reasonable. Also, there is no evidence or peritonitis, sepsis, or toxicity. Jaylin Copeland Sr. and I have discussed the diagnosis and risks, and we agree with discharging home to follow-up with their primary doctor. We also discussed returning to the Emergency Department immediately if new or worsening symptoms occur.  We have discussed the symptoms which are most concerning (e.g., changing or worsening pain, fever, numbness, weakness, cool or painful digits) that necessitate immediate return. Final Impression    1. Abrasion of buttock, initial encounter    2. Chronic anticoagulation        Discharge Vital Signs:  Blood pressure (!) 167/115, pulse 98, temperature 98.2 °F (36.8 °C), temperature source Oral, resp. rate 20, SpO2 97 %. Patient was sent home with a prescription for below medication/s. I did [place] patient on appropriate use of these medication. New Prescriptions    No medications on file       FOLLOW [address], [place]  [address]  [phone]    Call   As needed    [facility]  ED  [facility]  Go to   If symptoms worsen      DISPOSITION  Patient was discharged to home in good condition. Comment: Please note this report has been produced using speech recognition software and may contain errors related to that system including errors in grammar, punctuation, and spelling, as well as words and phrases that may be inappropriate. If there are any questions or concerns please feel free to contact the dictating provider for clarification.         JOEY San - CNP  09/25/20 9301

## 2020-10-09 ENCOUNTER — HOSPITAL ENCOUNTER (INPATIENT)
Age: 70
LOS: 13 days | Discharge: NURSING, REHAB OR LONG-TERM CARE | DRG: 853 | End: 2020-10-22
Attending: EMERGENCY MEDICINE | Admitting: INTERNAL MEDICINE
Payer: MEDICARE

## 2020-10-09 ENCOUNTER — APPOINTMENT (OUTPATIENT)
Dept: CT IMAGING | Age: 70
DRG: 853 | End: 2020-10-09
Payer: MEDICARE

## 2020-10-09 ENCOUNTER — APPOINTMENT (OUTPATIENT)
Dept: GENERAL RADIOLOGY | Age: 70
DRG: 853 | End: 2020-10-09
Payer: MEDICARE

## 2020-10-09 PROBLEM — R58 RETROPERITONEAL HEMORRHAGE: Status: ACTIVE | Noted: 2020-10-09

## 2020-10-09 PROBLEM — R57.9 SHOCK (HCC): Status: ACTIVE | Noted: 2020-10-09

## 2020-10-09 PROBLEM — R79.1 SUPRATHERAPEUTIC INR: Status: ACTIVE | Noted: 2020-10-09

## 2020-10-09 PROBLEM — I42.8 NON-ISCHEMIC CARDIOMYOPATHY (HCC): Status: ACTIVE | Noted: 2020-10-09

## 2020-10-09 LAB
A/G RATIO: 0.9 (ref 1.1–2.2)
ABO/RH: NORMAL
ALBUMIN SERPL-MCNC: 2.9 G/DL (ref 3.4–5)
ALP BLD-CCNC: 87 U/L (ref 40–129)
ALT SERPL-CCNC: 13 U/L (ref 10–40)
AMORPHOUS: ABNORMAL /HPF
ANION GAP SERPL CALCULATED.3IONS-SCNC: 15 MMOL/L (ref 3–16)
ANTIBODY SCREEN: NORMAL
AST SERPL-CCNC: 18 U/L (ref 15–37)
BACTERIA: ABNORMAL /HPF
BASOPHILS ABSOLUTE: 0.1 K/UL (ref 0–0.2)
BASOPHILS RELATIVE PERCENT: 0.4 %
BILIRUB SERPL-MCNC: 0.9 MG/DL (ref 0–1)
BILIRUBIN URINE: NEGATIVE
BLOOD, URINE: ABNORMAL
BUN BLDV-MCNC: 77 MG/DL (ref 7–20)
CALCIUM SERPL-MCNC: 8.4 MG/DL (ref 8.3–10.6)
CHLORIDE BLD-SCNC: 101 MMOL/L (ref 99–110)
CLARITY: CLEAR
CO2: 19 MMOL/L (ref 21–32)
COLOR: YELLOW
CREAT SERPL-MCNC: 2.7 MG/DL (ref 0.8–1.3)
EKG ATRIAL RATE: 84 BPM
EKG DIAGNOSIS: NORMAL
EKG P AXIS: 68 DEGREES
EKG P-R INTERVAL: 216 MS
EKG Q-T INTERVAL: 418 MS
EKG QRS DURATION: 138 MS
EKG QTC CALCULATION (BAZETT): 493 MS
EKG R AXIS: 102 DEGREES
EKG T AXIS: -10 DEGREES
EKG VENTRICULAR RATE: 84 BPM
EOSINOPHILS ABSOLUTE: 0.3 K/UL (ref 0–0.6)
EOSINOPHILS RELATIVE PERCENT: 1.3 %
EPITHELIAL CELLS, UA: ABNORMAL /HPF (ref 0–5)
GFR AFRICAN AMERICAN: 28
GFR NON-AFRICAN AMERICAN: 23
GLOBULIN: 3.4 G/DL
GLUCOSE BLD-MCNC: 152 MG/DL (ref 70–99)
GLUCOSE URINE: NEGATIVE MG/DL
HCT VFR BLD CALC: 26.5 % (ref 40.5–52.5)
HCT VFR BLD CALC: 36.4 % (ref 40.5–52.5)
HEMOGLOBIN: 12.3 G/DL (ref 13.5–17.5)
HEMOGLOBIN: 8.5 G/DL (ref 13.5–17.5)
INR BLD: 1.93 (ref 0.86–1.14)
INR BLD: 9.24 (ref 0.86–1.14)
KETONES, URINE: NEGATIVE MG/DL
LACTIC ACID: 1.6 MMOL/L (ref 0.4–2)
LACTIC ACID: 2.3 MMOL/L (ref 0.4–2)
LEUKOCYTE ESTERASE, URINE: ABNORMAL
LYMPHOCYTES ABSOLUTE: 0.5 K/UL (ref 1–5.1)
LYMPHOCYTES RELATIVE PERCENT: 2.2 %
MCH RBC QN AUTO: 29.4 PG (ref 26–34)
MCHC RBC AUTO-ENTMCNC: 33.7 G/DL (ref 31–36)
MCV RBC AUTO: 87.3 FL (ref 80–100)
MICROSCOPIC EXAMINATION: YES
MONOCYTES ABSOLUTE: 0.8 K/UL (ref 0–1.3)
MONOCYTES RELATIVE PERCENT: 4.1 %
NEUTROPHILS ABSOLUTE: 19 K/UL (ref 1.7–7.7)
NEUTROPHILS RELATIVE PERCENT: 92 %
NITRITE, URINE: NEGATIVE
OCCULT BLOOD SCREENING: ABNORMAL
PDW BLD-RTO: 15.2 % (ref 12.4–15.4)
PH UA: 6 (ref 5–8)
PLATELET # BLD: 425 K/UL (ref 135–450)
PMV BLD AUTO: 8.3 FL (ref 5–10.5)
POTASSIUM SERPL-SCNC: 5.1 MMOL/L (ref 3.5–5.1)
PRO-BNP: 5869 PG/ML (ref 0–124)
PROTEIN UA: ABNORMAL MG/DL
PROTHROMBIN TIME: 109.6 SEC (ref 10–13.2)
PROTHROMBIN TIME: 22.5 SEC (ref 10–13.2)
RBC # BLD: 4.17 M/UL (ref 4.2–5.9)
RBC UA: ABNORMAL /HPF (ref 0–4)
SODIUM BLD-SCNC: 135 MMOL/L (ref 136–145)
SPECIFIC GRAVITY UA: 1.01 (ref 1–1.03)
SPECIMEN STATUS: NORMAL
TOTAL PROTEIN: 6.3 G/DL (ref 6.4–8.2)
TROPONIN: 0.05 NG/ML
URINE REFLEX TO CULTURE: YES
URINE TYPE: ABNORMAL
UROBILINOGEN, URINE: 0.2 E.U./DL
WBC # BLD: 20.6 K/UL (ref 4–11)
WBC UA: ABNORMAL /HPF (ref 0–5)

## 2020-10-09 PROCEDURE — P9016 RBC LEUKOCYTES REDUCED: HCPCS

## 2020-10-09 PROCEDURE — 96366 THER/PROPH/DIAG IV INF ADDON: CPT

## 2020-10-09 PROCEDURE — 70450 CT HEAD/BRAIN W/O DYE: CPT

## 2020-10-09 PROCEDURE — 87077 CULTURE AEROBIC IDENTIFY: CPT

## 2020-10-09 PROCEDURE — 83605 ASSAY OF LACTIC ACID: CPT

## 2020-10-09 PROCEDURE — C9132 KCENTRA, PER I.U.: HCPCS | Performed by: EMERGENCY MEDICINE

## 2020-10-09 PROCEDURE — 96368 THER/DIAG CONCURRENT INF: CPT

## 2020-10-09 PROCEDURE — 2580000003 HC RX 258: Performed by: EMERGENCY MEDICINE

## 2020-10-09 PROCEDURE — 96367 TX/PROPH/DG ADDL SEQ IV INF: CPT

## 2020-10-09 PROCEDURE — 86900 BLOOD TYPING SEROLOGIC ABO: CPT

## 2020-10-09 PROCEDURE — 85610 PROTHROMBIN TIME: CPT

## 2020-10-09 PROCEDURE — 02HV33Z INSERTION OF INFUSION DEVICE INTO SUPERIOR VENA CAVA, PERCUTANEOUS APPROACH: ICD-10-PCS | Performed by: EMERGENCY MEDICINE

## 2020-10-09 PROCEDURE — 80053 COMPREHEN METABOLIC PANEL: CPT

## 2020-10-09 PROCEDURE — 85014 HEMATOCRIT: CPT

## 2020-10-09 PROCEDURE — 6360000002 HC RX W HCPCS: Performed by: PHYSICIAN ASSISTANT

## 2020-10-09 PROCEDURE — 2500000003 HC RX 250 WO HCPCS: Performed by: EMERGENCY MEDICINE

## 2020-10-09 PROCEDURE — 6360000002 HC RX W HCPCS: Performed by: EMERGENCY MEDICINE

## 2020-10-09 PROCEDURE — 96375 TX/PRO/DX INJ NEW DRUG ADDON: CPT

## 2020-10-09 PROCEDURE — 74176 CT ABD & PELVIS W/O CONTRAST: CPT

## 2020-10-09 PROCEDURE — 86901 BLOOD TYPING SEROLOGIC RH(D): CPT

## 2020-10-09 PROCEDURE — 81001 URINALYSIS AUTO W/SCOPE: CPT

## 2020-10-09 PROCEDURE — 83880 ASSAY OF NATRIURETIC PEPTIDE: CPT

## 2020-10-09 PROCEDURE — 87040 BLOOD CULTURE FOR BACTERIA: CPT

## 2020-10-09 PROCEDURE — 36415 COLL VENOUS BLD VENIPUNCTURE: CPT

## 2020-10-09 PROCEDURE — 99291 CRITICAL CARE FIRST HOUR: CPT

## 2020-10-09 PROCEDURE — 85025 COMPLETE CBC W/AUTO DIFF WBC: CPT

## 2020-10-09 PROCEDURE — 6360000002 HC RX W HCPCS: Performed by: INTERNAL MEDICINE

## 2020-10-09 PROCEDURE — 87186 SC STD MICRODIL/AGAR DIL: CPT

## 2020-10-09 PROCEDURE — 2580000003 HC RX 258: Performed by: PHYSICIAN ASSISTANT

## 2020-10-09 PROCEDURE — 1200000000 HC SEMI PRIVATE

## 2020-10-09 PROCEDURE — 86923 COMPATIBILITY TEST ELECTRIC: CPT

## 2020-10-09 PROCEDURE — 36430 TRANSFUSION BLD/BLD COMPNT: CPT

## 2020-10-09 PROCEDURE — 2580000003 HC RX 258: Performed by: INTERNAL MEDICINE

## 2020-10-09 PROCEDURE — 85018 HEMOGLOBIN: CPT

## 2020-10-09 PROCEDURE — 87086 URINE CULTURE/COLONY COUNT: CPT

## 2020-10-09 PROCEDURE — G0328 FECAL BLOOD SCRN IMMUNOASSAY: HCPCS

## 2020-10-09 PROCEDURE — 87150 DNA/RNA AMPLIFIED PROBE: CPT

## 2020-10-09 PROCEDURE — 96365 THER/PROPH/DIAG IV INF INIT: CPT

## 2020-10-09 PROCEDURE — 93010 ELECTROCARDIOGRAM REPORT: CPT | Performed by: INTERNAL MEDICINE

## 2020-10-09 PROCEDURE — 84484 ASSAY OF TROPONIN QUANT: CPT

## 2020-10-09 PROCEDURE — 71045 X-RAY EXAM CHEST 1 VIEW: CPT

## 2020-10-09 PROCEDURE — 87088 URINE BACTERIA CULTURE: CPT

## 2020-10-09 PROCEDURE — 93005 ELECTROCARDIOGRAM TRACING: CPT | Performed by: EMERGENCY MEDICINE

## 2020-10-09 PROCEDURE — C9113 INJ PANTOPRAZOLE SODIUM, VIA: HCPCS | Performed by: INTERNAL MEDICINE

## 2020-10-09 PROCEDURE — 86850 RBC ANTIBODY SCREEN: CPT

## 2020-10-09 RX ORDER — 0.9 % SODIUM CHLORIDE 0.9 %
1000 INTRAVENOUS SOLUTION INTRAVENOUS ONCE
Status: COMPLETED | OUTPATIENT
Start: 2020-10-09 | End: 2020-10-09

## 2020-10-09 RX ORDER — POLYETHYLENE GLYCOL 3350 17 G/17G
17 POWDER, FOR SOLUTION ORAL DAILY PRN
Status: DISCONTINUED | OUTPATIENT
Start: 2020-10-09 | End: 2020-10-19

## 2020-10-09 RX ORDER — 0.9 % SODIUM CHLORIDE 0.9 %
30 INTRAVENOUS SOLUTION INTRAVENOUS ONCE
Status: COMPLETED | OUTPATIENT
Start: 2020-10-09 | End: 2020-10-09

## 2020-10-09 RX ORDER — SODIUM CHLORIDE 0.9 % (FLUSH) 0.9 %
10 SYRINGE (ML) INJECTION EVERY 12 HOURS SCHEDULED
Status: DISCONTINUED | OUTPATIENT
Start: 2020-10-09 | End: 2020-10-22 | Stop reason: HOSPADM

## 2020-10-09 RX ORDER — POTASSIUM CHLORIDE 29.8 MG/ML
20 INJECTION INTRAVENOUS PRN
Status: DISCONTINUED | OUTPATIENT
Start: 2020-10-09 | End: 2020-10-10

## 2020-10-09 RX ORDER — 0.9 % SODIUM CHLORIDE 0.9 %
250 INTRAVENOUS SOLUTION INTRAVENOUS ONCE
Status: COMPLETED | OUTPATIENT
Start: 2020-10-09 | End: 2020-10-09

## 2020-10-09 RX ORDER — SODIUM CHLORIDE 0.9 % (FLUSH) 0.9 %
10 SYRINGE (ML) INJECTION PRN
Status: DISCONTINUED | OUTPATIENT
Start: 2020-10-09 | End: 2020-10-22 | Stop reason: HOSPADM

## 2020-10-09 RX ORDER — FOLIC ACID 1 MG/1
1 TABLET ORAL DAILY
Status: DISCONTINUED | OUTPATIENT
Start: 2020-10-10 | End: 2020-10-22 | Stop reason: HOSPADM

## 2020-10-09 RX ORDER — MAGNESIUM SULFATE IN WATER 40 MG/ML
2 INJECTION, SOLUTION INTRAVENOUS PRN
Status: DISCONTINUED | OUTPATIENT
Start: 2020-10-09 | End: 2020-10-10

## 2020-10-09 RX ORDER — PANTOPRAZOLE SODIUM 40 MG/10ML
40 INJECTION, POWDER, LYOPHILIZED, FOR SOLUTION INTRAVENOUS 2 TIMES DAILY
Status: DISCONTINUED | OUTPATIENT
Start: 2020-10-09 | End: 2020-10-22 | Stop reason: HOSPADM

## 2020-10-09 RX ORDER — PROMETHAZINE HYDROCHLORIDE 25 MG/1
12.5 TABLET ORAL EVERY 6 HOURS PRN
Status: DISCONTINUED | OUTPATIENT
Start: 2020-10-09 | End: 2020-10-22 | Stop reason: HOSPADM

## 2020-10-09 RX ORDER — ACETAMINOPHEN 325 MG/1
650 TABLET ORAL EVERY 6 HOURS PRN
Status: DISCONTINUED | OUTPATIENT
Start: 2020-10-09 | End: 2020-10-22 | Stop reason: HOSPADM

## 2020-10-09 RX ORDER — 0.9 % SODIUM CHLORIDE 0.9 %
1000 INTRAVENOUS SOLUTION INTRAVENOUS ONCE
Status: DISCONTINUED | OUTPATIENT
Start: 2020-10-09 | End: 2020-10-09

## 2020-10-09 RX ORDER — SODIUM CHLORIDE 9 MG/ML
50 INJECTION, SOLUTION INTRAVENOUS ONCE
Status: COMPLETED | OUTPATIENT
Start: 2020-10-09 | End: 2020-10-09

## 2020-10-09 RX ORDER — ONDANSETRON 2 MG/ML
4 INJECTION INTRAMUSCULAR; INTRAVENOUS EVERY 6 HOURS PRN
Status: DISCONTINUED | OUTPATIENT
Start: 2020-10-09 | End: 2020-10-22 | Stop reason: HOSPADM

## 2020-10-09 RX ORDER — ACETAMINOPHEN 650 MG/1
650 SUPPOSITORY RECTAL EVERY 6 HOURS PRN
Status: DISCONTINUED | OUTPATIENT
Start: 2020-10-09 | End: 2020-10-22 | Stop reason: HOSPADM

## 2020-10-09 RX ADMIN — PANTOPRAZOLE SODIUM 40 MG: 40 INJECTION, POWDER, FOR SOLUTION INTRAVENOUS at 22:39

## 2020-10-09 RX ADMIN — PROTHROMBIN, COAGULATION FACTOR VII HUMAN, COAGULATION FACTOR IX HUMAN, COAGULATION FACTOR X HUMAN, PROTEIN C, PROTEIN S HUMAN, AND WATER 1000 UNITS: KIT at 17:27

## 2020-10-09 RX ADMIN — DEXTROSE MONOHYDRATE 2 MCG/MIN: 50 INJECTION, SOLUTION INTRAVENOUS at 21:12

## 2020-10-09 RX ADMIN — SODIUM CHLORIDE 1000 ML: 9 INJECTION, SOLUTION INTRAVENOUS at 13:02

## 2020-10-09 RX ADMIN — SODIUM CHLORIDE 50 ML: 9 INJECTION, SOLUTION INTRAVENOUS at 17:31

## 2020-10-09 RX ADMIN — SODIUM CHLORIDE 250 ML: 9 INJECTION, SOLUTION INTRAVENOUS at 22:11

## 2020-10-09 RX ADMIN — VANCOMYCIN HYDROCHLORIDE 3000 MG: 1 INJECTION, POWDER, LYOPHILIZED, FOR SOLUTION INTRAVENOUS at 16:25

## 2020-10-09 RX ADMIN — SODIUM CHLORIDE 1776 ML: 9 INJECTION, SOLUTION INTRAVENOUS at 14:37

## 2020-10-09 RX ADMIN — PHYTONADIONE 10 MG: 10 INJECTION, EMULSION INTRAMUSCULAR; INTRAVENOUS; SUBCUTANEOUS at 17:07

## 2020-10-09 RX ADMIN — CEFEPIME HYDROCHLORIDE 2 G: 2 INJECTION, POWDER, FOR SOLUTION INTRAVENOUS at 14:37

## 2020-10-09 RX ADMIN — SODIUM CHLORIDE, PRESERVATIVE FREE 10 ML: 5 INJECTION INTRAVENOUS at 22:16

## 2020-10-09 ASSESSMENT — PAIN SCALES - GENERAL: PAINLEVEL_OUTOF10: 0

## 2020-10-09 NOTE — ED NOTES
Blood culture set #1 drawn from [facility] with angio. Bottle tops scrubbed with alcohol pads. Site prepped with Prevantics swab, 15 seconds per side, and allowed to dry for 30 seconds prior to venipuncture. Red waste tube drawn prior to collection of specimen.          Gerardo Tirado RN  10/09/20 3757

## 2020-10-09 NOTE — ED PROVIDER NOTES
I independently evaluated and obtained a history and physical on [address]. .    All diagnostic, treatment, and disposition assistants were made to myself in conjunction the advanced practice provider. For further details of this patient's emergency department encounter, please see the advanced practice provider's documentation. History: 71 y.o. M wheel chair bound at baseline who presents for fatigue for the past 2 weeks, decreased appetite for 1 week, and lightheadedness causing him to fall out of his wheelchair today. Of note the patient is on chronic anticoagulation with Coumadin. He also has morbid obesity, CHF, and a known hernia. Physician Exam: Chronically ill-appearing elderly  male. Regular rate and rhythm. Intact distal pulses. A large inferior abdominal hernia. Diffuse abdominal tenderness. Patient is alert and oriented to person, place, time. Patient intact all 4 extremities. No facial droop. MDM:    The Ekg interpreted by me shows  First-degree AV block with normal sinus rhythm, rate of 84  Axis is   Normal  QTc is  493ms  Intervals and Durations are unremarkable. ST Segments: nonspecific changes to lateral leads  Worsening of right bundle branch block and new changes to lateral leads from previous EKG on 9/6/2019    Patient is noted to be hypotensive. Work-up is concerning for sepsis with leukocytosis and urinalysis concerning for infection. Patient is given 30 mL/kg of IV fluid and broad-spectrum antibiotics after cultures are obtained. The patient does have a very supratherapeutic INR of 9.24. Prior to consultations the patient is given vitamin K and Kcentra. Repeat hemoglobin is concerning for a four-point drop from earlier today. The patient is given emergent blood. Given the patient's abdominal tenderness a CT of the abdomen is obtained and shows a retroperitoneal hematoma. I have consulted general surgery, urology, and interventional radiology. The patient initially responds to IV fluids however becomes hypotensive again and therefore central line is placed. He is started on pressors. He is admitted to the ICU for further care with multiple specialty services consulting on. The patient is wife expressed understanding and agreement with this plan. Critical Care  Performed by: Jem Kemp MD  Authorized by: Jem Kemp MD     Critical care provider statement:     Critical care time (minutes):  75    Critical care time was exclusive of:  Separately billable procedures and treating other patients and teaching time    Critical care was necessary to treat or prevent imminent or life-threatening deterioration of the following conditions:  Sepsis, shock, circulatory failure and renal failure    Critical care was time spent personally by me on the following activities:  Ordering and performing treatments and interventions, development of treatment plan with patient or surrogate, ordering and review of laboratory studies, discussions with consultants, ordering and review of radiographic studies, evaluation of patient's response to treatment, re-evaluation of patient's condition, examination of patient and obtaining history from patient or surrogate  Central Line    Date/Time: 10/9/2020 7:10 PM  Performed by: Jem Kemp MD  Authorized by: Jem Kemp MD     Consent:     Consent obtained:  Verbal    Consent given by:  Patient and spouse    Risks discussed:  Incorrect placement, nerve damage, arterial puncture, bleeding, infection and pneumothorax    Alternatives discussed:  Observation and referral  Pre-procedure details:     Hand hygiene: Hand hygiene performed prior to insertion      Sterile barrier technique:  All elements of maximal sterile technique followed      Skin preparation:  2% chlorhexidine    Skin preparation agent: Skin preparation agent completely dried prior to procedure    Anesthesia (see MAR for exact dosages):

## 2020-10-09 NOTE — ED NOTES
Called radiology consult @[phone]  Re: interventional radiology consult per Angel Carlin spoke to [facility] Williemae Saunas Naegele  10/09/20 [phone]

## 2020-10-09 NOTE — ED NOTES
Dr. Maurice Ledesma in to re-evaluate patient and speak with wife regarding plan of care.       Kalli Polk RN  10/09/20 [id number]

## 2020-10-09 NOTE — ED NOTES
Blood culture set #2 drawn from [facility] with angio. Bottle tops scrubbed with alcohol pads. Site prepped with Prevantics swab, 15 seconds per side, and allowed to dry for 30 seconds prior to venipuncture. Red waste tube drawn prior to collection of specimen.          Esperanza Loyd RN  10/09/20 MAILE Raymundo  10/09/20 [id number]

## 2020-10-09 NOTE — ED NOTES
Dr. Rocio Shankar in, rolled patient, dark, tarry stool noted. Stool GUIAC collected and sent to lab.       Leonard Goltz, RN  10/09/20 5366

## 2020-10-09 NOTE — ED NOTES
PS UROLOGY @ 1906  Per Quique Mckeon  RE  Renal hemorrhage    @0050       Itzel Connor  10/09/20 1916

## 2020-10-09 NOTE — ED NOTES
Called lab to run Type and Screen specimens that were sent down when patient first arrived to ED. Spoke to [employer], , running now.       Viri Vásquez RN  10/09/20 1192

## 2020-10-09 NOTE — ED PROVIDER NOTES
for level 5)     Review of Systems   Unable to perform ROS: Acuity of condition       Positives and Pertinent negatives as per HPI. Except as noted above in the ROS, all other systems were reviewed and negative.        PAST MEDICAL HISTORY     Past Medical History:   Diagnosis Date    Cancer [facility])     kidney    Cardiomyopathy ([facility] 08/2019    CHF (congestive heart failure) ([facility] 08/2019    Edema of both legs     GI bleed     Hx of blood clots     DVT    Hypertension          SURGICAL HISTORY     Past Surgical History:   Procedure Laterality Date    ABDOMEN SURGERY      Right Kidney Cancer abd. surgery 2014    CARDIAC CATHETERIZATION  09/06/2019    COLONOSCOPY      flexsigmoidoscopy 40yrs ago     COLONOSCOPY N/A 4/9/2019    COLONOSCOPY WITH BIOPSY performed by Vickie Bartlett MD at [facility]      8/2018    KIDNEY SURGERY  4/7/2014    CT EGD TRANSORAL BIOPSY SINGLE/MULTIPLE  9/28/2018    EGD BIOPSY performed by Vickie Bartlett MD at [address] [place] [address] 4/8/2019    EGD WITH ANESTHESIA performed by Vickie Bartlett MD at [address] [place] [address] 4/12/2019    ESOPHAGEAL CAPSULE ENDOSCOPY performed by Vickie Bartlett MD at [address] [place] [address]       Previous Medications    CARVEDILOL (COREG) 3.125 MG TABLET    TAKE ONE TABLET BY MOUTH TWICE A DAY    FOLIC ACID (FOLVITE) 1 MG TABLET    Take 1 tablet by mouth daily    FUROSEMIDE (LASIX) 40 MG TABLET    TAKE 1 TABLET BY MOUTH TWICE DAILY    LISINOPRIL (PRINIVIL;ZESTRIL) 2.5 MG TABLET    TAKE ONE TABLET BY MOUTH DAILY    POTASSIUM CHLORIDE (KLOR-CON M) 20 MEQ TBCR EXTENDED RELEASE TABLET    TK 1 T PO BID    TAMSULOSIN (FLOMAX) 0.4 MG CAPSULE    Take 1 capsule by mouth daily    VITAMIN D (CHOLECALCIFEROL) 1000 UNIT TABS TABLET    Take 1,000 Units by mouth 2 times daily    WARFARIN (COUMADIN) 2.5 MG TABLET    Take 1 tablet by mouth daily ALLERGIES     Patient has no known allergies. FAMILYHISTORY       Family History   Problem Relation Age of Onset    Arthritis Mother     Atrial Fibrillation Mother     Breast Cancer Mother     High Blood Pressure Mother     Anemia Father     Arthritis Father     Cancer Father 80        Ear CA/ tumor removed     Hearing Loss Father     Arthritis Sister     Diabetes Maternal Aunt     Arthritis Maternal Aunt     Early Death Maternal Uncle          in early 44's     Depression Maternal Uncle     Other Maternal Uncle         Suicide    Heart Disease Paternal Aunt     Arthritis Maternal Grandmother           SOCIAL HISTORY       Social History     Tobacco Use    Smoking status: Never Smoker    Smokeless tobacco: Never Used   Substance Use Topics    Alcohol use: No    Drug use: No       SCREENINGS             PHYSICAL EXAM    (up to 7 for level 4, 8 or more for level 5)     ED Triage Vitals [10/09/20 1232]   BP Temp Temp Source Pulse Resp SpO2 Height Weight   (!) 60/49 97.5 °F (36.4 °C) Oral 88 22 -- 5' 4\" (1.626 m) (!) 350 lb (158.8 kg)       Physical Exam  Vitals signs and nursing note reviewed. Constitutional:       General: He is awake. He is not in acute distress. Appearance: He is well-developed. He is morbidly obese. He is ill-appearing and toxic-appearing. He is not diaphoretic. HENT:      Head: Normocephalic and atraumatic. Nose: Nose normal.   Eyes:      General:         Right eye: No discharge. Left eye: No discharge. Neck:      Musculoskeletal: Normal range of motion and neck supple. Cardiovascular:      Rate and Rhythm: Normal rate and regular rhythm. Pulses:           Radial pulses are 2+ on the right side and 2+ on the left side. Heart sounds: Normal heart sounds. No murmur. No gallop. Pulmonary:      Effort: Pulmonary effort is normal. No respiratory distress. Breath sounds: Normal breath sounds.  No decreased breath sounds, wheezing, rhonchi or rales. Chest:      Chest wall: No tenderness. Abdominal:      General: Abdomen is flat. Bowel sounds are normal.      Palpations: Abdomen is soft. Tenderness: There is generalized abdominal tenderness. There is no right CVA tenderness, left CVA tenderness, guarding or rebound. Genitourinary:     Rectum: Guaiac result positive. Musculoskeletal: Normal range of motion. General: No deformity. Skin:     General: Skin is warm and dry. Neurological:      General: No focal deficit present. Mental Status: He is alert and oriented to person, place, and time. GCS: GCS eye subscore is 4. GCS verbal subscore is 5. GCS motor subscore is 6. Psychiatric:         Behavior: Behavior normal. Behavior is cooperative.          DIAGNOSTIC RESULTS   LABS:    Labs Reviewed   CBC WITH AUTO DIFFERENTIAL - Abnormal; Notable for the following components:       Result Value    WBC 20.6 (*)     RBC 4.17 (*)     Hemoglobin 12.3 (*)     Hematocrit 36.4 (*)     Neutrophils Absolute 19.0 (*)     Lymphocytes Absolute 0.5 (*)     All other components within normal limits    Narrative:     Performed at:  [address],  [place], [address]   Phone [phone]   COMPREHENSIVE METABOLIC PANEL - Abnormal; Notable for the following components:    Sodium 135 (*)     CO2 19 (*)     Glucose 152 (*)     BUN 77 (*)     CREATININE 2.7 (*)     GFR Non- 23 (*)     GFR  28 (*)     Total Protein 6.3 (*)     Alb 2.9 (*)     Albumin/Globulin Ratio 0.9 (*)     All other components within normal limits    Narrative:     Performed at:  [address],  [place], [address]   Phone [phone]   TROPONIN - Abnormal; Notable for the following components:    Troponin 0.05 (*)     All other components within normal limits    Narrative:     Performed at:  [facility] Phone [phone] - Abnormal; Notable for the following components:    Protime 22.5 (*)     INR 1.93 (*)     All other components within normal limits    Narrative:     Performed at:  [address],  [address] [place] [address] Drive, [address]   Phone [phone]   MICROSCOPIC URINALYSIS - Abnormal; Notable for the following components:    WBC, UA 21-50 (*)     RBC, UA 11-20 (*)     Bacteria, UA 2+ (*)     All other components within normal limits    Narrative:     Performed at:  [address],  [address] [place] [address] Drive, [address]   Phone [phone]   HEMOGLOBIN AND HEMATOCRIT, BLOOD - Abnormal; Notable for the following components:    Hemoglobin 8.5 (*)     Hematocrit 26.5 (*)     All other components within normal limits    Narrative:     Performed at:  [address],  [address] [place] [address], [address]   Phone [phone]   CULTURE, BLOOD 1   CULTURE, BLOOD 2   CULTURE, URINE   SAMPLE POSSIBLE BLOOD BANK TESTING    Narrative:     Performed at:  [address],  [address] [place] [address] Drive, [address]   Phone [phone]   LACTIC ACID, PLASMA    Narrative:     Performed at:  [address],  [address] [place] [address] Drive, [address]   Phone [phone] (CROSSMATCH)   TYPE AND SCREEN    Narrative:     Performed at:  [address],  [address] [place] [address] Drive, [address]   Phone [phone]   TYPE AND SCREEN       All other labs were within normal range or not returned as of this dictation. EKG: All EKG's are interpreted by the Emergency Department Physician in the absence of a cardiologist.  Please see their note for interpretation of EKG.       RADIOLOGY:   Non-plain film images such as CT, Ultrasound and MRI are read by the radiologist. Plain radiographic images are visualized and preliminarily interpreted by the ED Provider with the below findings:        Interpretation per the Radiologist below, if available at the time of this note:    XR CHEST PORTABLE   Final Result   No acute process. CT ABDOMEN PELVIS WO CONTRAST Additional Contrast? None   Final Result   Large left retroperitoneal hematoma from presumed spontaneous hemorrhage   particularly given the elevated INR. Prior left nephrectomy. Pneumobilia with apparent moderate contraction of the gallbladder surrounding   the gallstone. This could be the result of prior stone passage and resulting   in incompetence of the ampulla of Vater. No ductal stones seen currently. Large panniculus and abdominal wall hernia. CT HEAD WO CONTRAST   Final Result   1. No acute intracranial abnormality. XR CHEST PORTABLE   Final Result   No acute cardiopulmonary disease. No results found. PROCEDURES   Unless otherwise noted below, none     Procedures    CRITICAL CARE TIME   Total Critical Care time was 30 minutes, excluding separately reportable procedures. There was a high probability of clinically significant/life threatening deterioration in the patient's condition which required my urgent intervention.         CONSULTS:  PHARMACY TO DOSE VANCOMYCIN  IP CONSULT TO RADIOLOGY  IP CONSULT TO GENERAL SURGERY  IP CONSULT TO UROLOGY  IP CONSULT TO HOSPITALIST      EMERGENCY DEPARTMENT COURSE and DIFFERENTIAL DIAGNOSIS/MDM:   Vitals:    Vitals:    10/09/20 1905 10/09/20 1921 10/09/20 1934 10/09/20 1949   BP: (!) 79/58 (!) 81/54  129/81   Pulse: 87 92 86 83   Resp: (!) 32 (!) 36 28 (!) 32   Temp:    99.1 °F (37.3 °C)   TempSrc:    Oral   SpO2: 90% (!) 83%  95%   Weight:       Height:           Patient was given the following medications:  Medications   cefepime (MAXIPIME) 2 g IVPB minibag (0 g Intravenous Stopped 10/9/20 8029)   0.9 % sodium chloride infusion 250 mL (has no administration in time range) norepinephrine (LEVOPHED) 16 mg in dextrose 5 % 250 mL infusion (0 mcg/min Intravenous Held 10/9/20 1951)   0.9 % sodium chloride bolus (0 mLs Intravenous Stopped 10/9/20 1434)   0.9 % sodium chloride IV bolus 1,776 mL (0 mL/kg × 59.2 kg (Ideal) Intravenous Stopped 10/9/20 1700)   vancomycin (VANCOCIN) 3,000 mg in dextrose 5 % 500 mL IVPB (0 mg Intravenous Stopped 10/9/20 1943)   prothrombin complex concentrate (human) (KCENTRA) infusion 1,000 Units (1,000 Units Intravenous Given 10/9/20 1727)   0.9 % sodium chloride infusion (0 mLs Intravenous Stopped 10/9/20 1738)   phytonadione (ADULT) (VITAMIN K) 10 mg in dextrose 5 % 100 mL IVPB (0 mg Intravenous Stopped 10/9/20 1731)           Patient brought in today for evaluation of increased fatigue and lightheadedness. Patient is a rather poor historian therefore history obtained by the wife at bedside. Patient appears ill and toxic. Hypotensive and afebrile breathing on room air satting at 95%. Patient received fluids upon arrival given hypotension. NAD. Patient seen by myself and my attending Dr. Stacy Ceballos. Old labs and records reviewed. CBC reveals a white count of 20.6. Hemoglobin of 12.3. BUN of 77 with a creatinine of 2.7. Patient does have chronic kidney disease. No electrolyte abnormalities. Troponin of 0.05. However this appears to be baseline. Lactic acid of 2.3. Patient received fluids and prophylactic antibiotics at 1408. Patient received cefepime as well as vancomycin. At this time sepsis was initiated. Sepis started at 1408. INR of 9.24. Patient also received Kcentra as well as vitamin K. BNP of 5869. Hemoglobin dropped while here in the ED at 8.5. Given the drop of hemoglobin patient also received packed red blood cells while here in the ED. Urine reveals a urinary tract infection. Blood occult stool is positive. Troponin of 0.05. EKG was reviewed by my attending please see note for dictation. Chest x-ray is unremarkable.   CT abdomen and pelvis reveals a large left retroperitoneal hematoma from presumed spontaneous hemorrhage given elevated INR. Plan at this time will be to consult IR, surgery and urology given CT abdomen findings. My attending consulted the various specialties please see his note for his discussion with various specialties. CT head reveals no acute intracranial abnormality. Patient also received a central line while here in the ED. Please see my attendings note for description of procedure. Plan at this time will be to admit to the ICU. My attending spoke to the hospitalist and patient was admitted at this time. FINAL IMPRESSION      1. Retroperitoneal bleed    2. Elevated INR    3. Septic shock [facility])          DISPOSITION/PLAN   DISPOSITION Admitted 10/09/2020 07:46:50 PM      PATIENT REFERREDTO:  No follow-up provider specified.     DISCHARGE MEDICATIONS:  New Prescriptions    No medications on file       DISCONTINUED MEDICATIONS:  Discontinued Medications    No medications on file              (Please note that portions of this note were completed with a voice recognition program.  Efforts were made to edit the dictations but occasionally words are mis-transcribed.)    Roberto Marvin PA-C (electronically signed)            Roberto Marvin PA-C  10/09/20 2034

## 2020-10-09 NOTE — ED NOTES
Pt c/o dizziness and feeling lightheaded. Pt is NSR on monitor, HR 84. Pt c/o left abdominal pain, heat packs given per request due to no pain medication being used due to hypotension.       Segundo Hallman RN  10/09/20 8723

## 2020-10-09 NOTE — ED NOTES
Ps general surgery consult @0036  Re: admit.  retroperitoneal hematoma per Uche Sample  Dr. Neli Vilchis [address] [place] [address]  10/09/20 2017

## 2020-10-09 NOTE — ED NOTES
HOSP [id number]  Per   RE  retroperitoneal bleed, elevated INR, Sepsis, General surgery, urology and general surgery all consulted  Dr. Alpa Pena  10/09/20 1957

## 2020-10-09 NOTE — ED NOTES
Dr. Didi Villarreal at bedside to re-evaluate patient and speak to wife regarding CT results and plan of care. Consent obtained for central line placement.       Barbara Ocampo RN  10/09/20 0457

## 2020-10-09 NOTE — ED NOTES

## 2020-10-09 NOTE — ED NOTES
[facility] looking for urinalysis results, states she will run now.       Frantz Pickett RN  10/09/20 8322

## 2020-10-10 ENCOUNTER — APPOINTMENT (OUTPATIENT)
Dept: GENERAL RADIOLOGY | Age: 70
DRG: 853 | End: 2020-10-10
Payer: MEDICARE

## 2020-10-10 LAB
A/G RATIO: 0.8 (ref 1.1–2.2)
ALBUMIN SERPL-MCNC: 2.3 G/DL (ref 3.4–5)
ALBUMIN SERPL-MCNC: 2.5 G/DL (ref 3.4–5)
ALP BLD-CCNC: 67 U/L (ref 40–129)
ALT SERPL-CCNC: 20 U/L (ref 10–40)
ANION GAP SERPL CALCULATED.3IONS-SCNC: 11 MMOL/L (ref 3–16)
ANION GAP SERPL CALCULATED.3IONS-SCNC: 12 MMOL/L (ref 3–16)
ANION GAP SERPL CALCULATED.3IONS-SCNC: 12 MMOL/L (ref 3–16)
AST SERPL-CCNC: 26 U/L (ref 15–37)
BANDED NEUTROPHILS RELATIVE PERCENT: 16 % (ref 0–7)
BANDED NEUTROPHILS RELATIVE PERCENT: 19 % (ref 0–7)
BASOPHILS ABSOLUTE: 0 K/UL (ref 0–0.2)
BASOPHILS ABSOLUTE: 0 K/UL (ref 0–0.2)
BASOPHILS RELATIVE PERCENT: 0 %
BASOPHILS RELATIVE PERCENT: 0 %
BILIRUB SERPL-MCNC: 1.6 MG/DL (ref 0–1)
BUN BLDV-MCNC: 70 MG/DL (ref 7–20)
BUN BLDV-MCNC: 84 MG/DL (ref 7–20)
BUN BLDV-MCNC: 86 MG/DL (ref 7–20)
CALCIUM IONIZED: 1.03 MMOL/L (ref 1.12–1.32)
CALCIUM SERPL-MCNC: 7.4 MG/DL (ref 8.3–10.6)
CALCIUM SERPL-MCNC: 7.5 MG/DL (ref 8.3–10.6)
CALCIUM SERPL-MCNC: 7.6 MG/DL (ref 8.3–10.6)
CHLORIDE BLD-SCNC: 105 MMOL/L (ref 99–110)
CHLORIDE BLD-SCNC: 109 MMOL/L (ref 99–110)
CHLORIDE BLD-SCNC: 109 MMOL/L (ref 99–110)
CO2: 16 MMOL/L (ref 21–32)
CO2: 17 MMOL/L (ref 21–32)
CO2: 18 MMOL/L (ref 21–32)
CREAT SERPL-MCNC: 3.3 MG/DL (ref 0.8–1.3)
CREAT SERPL-MCNC: 3.5 MG/DL (ref 0.8–1.3)
CREAT SERPL-MCNC: 4.1 MG/DL (ref 0.8–1.3)
EOSINOPHILS ABSOLUTE: 0 K/UL (ref 0–0.6)
EOSINOPHILS ABSOLUTE: 0 K/UL (ref 0–0.6)
EOSINOPHILS RELATIVE PERCENT: 0 %
EOSINOPHILS RELATIVE PERCENT: 0 %
GFR AFRICAN AMERICAN: 18
GFR AFRICAN AMERICAN: 21
GFR AFRICAN AMERICAN: 23
GFR NON-AFRICAN AMERICAN: 15
GFR NON-AFRICAN AMERICAN: 17
GFR NON-AFRICAN AMERICAN: 19
GLOBULIN: 2.8 G/DL
GLUCOSE BLD-MCNC: 143 MG/DL (ref 70–99)
GLUCOSE BLD-MCNC: 159 MG/DL (ref 70–99)
GLUCOSE BLD-MCNC: 163 MG/DL (ref 70–99)
HCT VFR BLD CALC: 23.4 % (ref 40.5–52.5)
HCT VFR BLD CALC: 24.5 % (ref 40.5–52.5)
HCT VFR BLD CALC: 25.2 % (ref 40.5–52.5)
HCT VFR BLD CALC: 26.8 % (ref 40.5–52.5)
HEMOGLOBIN: 7.5 G/DL (ref 13.5–17.5)
HEMOGLOBIN: 8.2 G/DL (ref 13.5–17.5)
HEMOGLOBIN: 8.5 G/DL (ref 13.5–17.5)
HEMOGLOBIN: 9 G/DL (ref 13.5–17.5)
INR BLD: 1.42 (ref 0.86–1.14)
LACTIC ACID: 1.1 MMOL/L (ref 0.4–2)
LYMPHOCYTES ABSOLUTE: 0.4 K/UL (ref 1–5.1)
LYMPHOCYTES ABSOLUTE: 1.5 K/UL (ref 1–5.1)
LYMPHOCYTES RELATIVE PERCENT: 1 %
LYMPHOCYTES RELATIVE PERCENT: 4 %
MAGNESIUM: 1.9 MG/DL (ref 1.8–2.4)
MAGNESIUM: 1.9 MG/DL (ref 1.8–2.4)
MCH RBC QN AUTO: 29.4 PG (ref 26–34)
MCH RBC QN AUTO: 29.6 PG (ref 26–34)
MCHC RBC AUTO-ENTMCNC: 33.5 G/DL (ref 31–36)
MCHC RBC AUTO-ENTMCNC: 33.6 G/DL (ref 31–36)
MCV RBC AUTO: 87.7 FL (ref 80–100)
MCV RBC AUTO: 88.2 FL (ref 80–100)
MONOCYTES ABSOLUTE: 0.7 K/UL (ref 0–1.3)
MONOCYTES ABSOLUTE: 2.2 K/UL (ref 0–1.3)
MONOCYTES RELATIVE PERCENT: 2 %
MONOCYTES RELATIVE PERCENT: 6 %
MYELOCYTE PERCENT: 1 %
NEUTROPHILS ABSOLUTE: 33.2 K/UL (ref 1.7–7.7)
NEUTROPHILS ABSOLUTE: 35.4 K/UL (ref 1.7–7.7)
NEUTROPHILS RELATIVE PERCENT: 71 %
NEUTROPHILS RELATIVE PERCENT: 80 %
PDW BLD-RTO: 14.8 % (ref 12.4–15.4)
PDW BLD-RTO: 15 % (ref 12.4–15.4)
PH VENOUS: 7.32 (ref 7.35–7.45)
PHOSPHORUS: 4.5 MG/DL (ref 2.5–4.9)
PHOSPHORUS: 4.8 MG/DL (ref 2.5–4.9)
PHOSPHORUS: 5.4 MG/DL (ref 2.5–4.9)
PLATELET # BLD: 308 K/UL (ref 135–450)
PLATELET # BLD: 354 K/UL (ref 135–450)
PMV BLD AUTO: 8.3 FL (ref 5–10.5)
PMV BLD AUTO: 8.6 FL (ref 5–10.5)
POLYCHROMASIA: ABNORMAL
POTASSIUM REFLEX MAGNESIUM: 5 MMOL/L (ref 3.5–5.1)
POTASSIUM SERPL-SCNC: 4.7 MMOL/L (ref 3.5–5.1)
POTASSIUM SERPL-SCNC: 5 MMOL/L (ref 3.5–5.1)
PROCALCITONIN: 1 NG/ML (ref 0–0.15)
PROTHROMBIN TIME: 16.5 SEC (ref 10–13.2)
RBC # BLD: 2.86 M/UL (ref 4.2–5.9)
RBC # BLD: 3.05 M/UL (ref 4.2–5.9)
RBC # BLD: NORMAL 10*6/UL
SODIUM BLD-SCNC: 135 MMOL/L (ref 136–145)
SODIUM BLD-SCNC: 137 MMOL/L (ref 136–145)
SODIUM BLD-SCNC: 137 MMOL/L (ref 136–145)
TOTAL PROTEIN: 5.1 G/DL (ref 6.4–8.2)
TOXIC GRANULATION: PRESENT
TOXIC GRANULATION: PRESENT
WBC # BLD: 36.5 K/UL (ref 4–11)
WBC # BLD: 36.9 K/UL (ref 4–11)

## 2020-10-10 PROCEDURE — 6360000002 HC RX W HCPCS: Performed by: INTERNAL MEDICINE

## 2020-10-10 PROCEDURE — 99222 1ST HOSP IP/OBS MODERATE 55: CPT | Performed by: SURGERY

## 2020-10-10 PROCEDURE — 2580000003 HC RX 258: Performed by: INTERNAL MEDICINE

## 2020-10-10 PROCEDURE — 2500000003 HC RX 250 WO HCPCS: Performed by: INTERNAL MEDICINE

## 2020-10-10 PROCEDURE — 36415 COLL VENOUS BLD VENIPUNCTURE: CPT

## 2020-10-10 PROCEDURE — 3E1M39Z IRRIGATION OF PERITONEAL CAVITY USING DIALYSATE, PERCUTANEOUS APPROACH: ICD-10-PCS | Performed by: INTERNAL MEDICINE

## 2020-10-10 PROCEDURE — 71045 X-RAY EXAM CHEST 1 VIEW: CPT

## 2020-10-10 PROCEDURE — 6360000002 HC RX W HCPCS

## 2020-10-10 PROCEDURE — 83605 ASSAY OF LACTIC ACID: CPT

## 2020-10-10 PROCEDURE — 85610 PROTHROMBIN TIME: CPT

## 2020-10-10 PROCEDURE — 80053 COMPREHEN METABOLIC PANEL: CPT

## 2020-10-10 PROCEDURE — 6360000002 HC RX W HCPCS: Performed by: NURSE PRACTITIONER

## 2020-10-10 PROCEDURE — 99292 CRITICAL CARE ADDL 30 MIN: CPT | Performed by: INTERNAL MEDICINE

## 2020-10-10 PROCEDURE — 36556 INSERT NON-TUNNEL CV CATH: CPT | Performed by: INTERNAL MEDICINE

## 2020-10-10 PROCEDURE — 84145 PROCALCITONIN (PCT): CPT

## 2020-10-10 PROCEDURE — 82330 ASSAY OF CALCIUM: CPT

## 2020-10-10 PROCEDURE — 36556 INSERT NON-TUNNEL CV CATH: CPT

## 2020-10-10 PROCEDURE — 05HN33Z INSERTION OF INFUSION DEVICE INTO LEFT INTERNAL JUGULAR VEIN, PERCUTANEOUS APPROACH: ICD-10-PCS | Performed by: INTERNAL MEDICINE

## 2020-10-10 PROCEDURE — 85014 HEMATOCRIT: CPT

## 2020-10-10 PROCEDURE — C9113 INJ PANTOPRAZOLE SODIUM, VIA: HCPCS | Performed by: INTERNAL MEDICINE

## 2020-10-10 PROCEDURE — 83735 ASSAY OF MAGNESIUM: CPT

## 2020-10-10 PROCEDURE — 2000000000 HC ICU R&B

## 2020-10-10 PROCEDURE — B544ZZA ULTRASONOGRAPHY OF LEFT JUGULAR VEINS, GUIDANCE: ICD-10-PCS | Performed by: INTERNAL MEDICINE

## 2020-10-10 PROCEDURE — 36592 COLLECT BLOOD FROM PICC: CPT

## 2020-10-10 PROCEDURE — 2700000000 HC OXYGEN THERAPY PER DAY

## 2020-10-10 PROCEDURE — 36620 INSERTION CATHETER ARTERY: CPT

## 2020-10-10 PROCEDURE — 84100 ASSAY OF PHOSPHORUS: CPT

## 2020-10-10 PROCEDURE — 94761 N-INVAS EAR/PLS OXIMETRY MLT: CPT

## 2020-10-10 PROCEDURE — 85025 COMPLETE CBC W/AUTO DIFF WBC: CPT

## 2020-10-10 PROCEDURE — 2580000003 HC RX 258

## 2020-10-10 PROCEDURE — 85018 HEMOGLOBIN: CPT

## 2020-10-10 PROCEDURE — 37799 UNLISTED PX VASCULAR SURGERY: CPT

## 2020-10-10 PROCEDURE — 90945 DIALYSIS ONE EVALUATION: CPT

## 2020-10-10 PROCEDURE — 99291 CRITICAL CARE FIRST HOUR: CPT | Performed by: INTERNAL MEDICINE

## 2020-10-10 RX ORDER — MORPHINE SULFATE 2 MG/ML
2 INJECTION, SOLUTION INTRAMUSCULAR; INTRAVENOUS
Status: DISCONTINUED | OUTPATIENT
Start: 2020-10-10 | End: 2020-10-16

## 2020-10-10 RX ORDER — CALCIUM GLUCONATE 20 MG/ML
1 INJECTION, SOLUTION INTRAVENOUS PRN
Status: DISCONTINUED | OUTPATIENT
Start: 2020-10-10 | End: 2020-10-16

## 2020-10-10 RX ORDER — SODIUM CHLORIDE 9 MG/ML
INJECTION INTRAVENOUS
Status: COMPLETED
Start: 2020-10-10 | End: 2020-10-10

## 2020-10-10 RX ORDER — 0.9 % SODIUM CHLORIDE 0.9 %
1000 INTRAVENOUS SOLUTION INTRAVENOUS ONCE
Status: COMPLETED | OUTPATIENT
Start: 2020-10-10 | End: 2020-10-10

## 2020-10-10 RX ORDER — MAGNESIUM SULFATE 1 G/100ML
1 INJECTION INTRAVENOUS PRN
Status: DISCONTINUED | OUTPATIENT
Start: 2020-10-10 | End: 2020-10-16

## 2020-10-10 RX ORDER — MORPHINE SULFATE 2 MG/ML
INJECTION, SOLUTION INTRAMUSCULAR; INTRAVENOUS
Status: COMPLETED
Start: 2020-10-10 | End: 2020-10-10

## 2020-10-10 RX ADMIN — Medication 2000 ML/HR: at 16:27

## 2020-10-10 RX ADMIN — SODIUM CHLORIDE 1000 ML: 9 INJECTION, SOLUTION INTRAVENOUS at 11:31

## 2020-10-10 RX ADMIN — Medication 500 ML/HR: at 16:27

## 2020-10-10 RX ADMIN — CALCIUM GLUCONATE 1 G: 20 INJECTION, SOLUTION INTRAVENOUS at 20:25

## 2020-10-10 RX ADMIN — CEFEPIME HYDROCHLORIDE 2 G: 2 INJECTION, POWDER, FOR SOLUTION INTRAVENOUS at 14:16

## 2020-10-10 RX ADMIN — DEXTROSE MONOHYDRATE 2 MCG/MIN: 50 INJECTION, SOLUTION INTRAVENOUS at 13:08

## 2020-10-10 RX ADMIN — PANTOPRAZOLE SODIUM 40 MG: 40 INJECTION, POWDER, FOR SOLUTION INTRAVENOUS at 08:21

## 2020-10-10 RX ADMIN — Medication 2000 ML/HR: at 21:24

## 2020-10-10 RX ADMIN — SODIUM CHLORIDE, PRESERVATIVE FREE 10 ML: 5 INJECTION INTRAVENOUS at 08:23

## 2020-10-10 RX ADMIN — Medication 1000 ML/HR: at 16:27

## 2020-10-10 RX ADMIN — SODIUM BICARBONATE: 84 INJECTION, SOLUTION INTRAVENOUS at 12:42

## 2020-10-10 RX ADMIN — DEXTROSE MONOHYDRATE 35 MCG/MIN: 50 INJECTION, SOLUTION INTRAVENOUS at 21:58

## 2020-10-10 RX ADMIN — MORPHINE SULFATE 2 MG: 2 INJECTION, SOLUTION INTRAMUSCULAR; INTRAVENOUS at 09:17

## 2020-10-10 RX ADMIN — MORPHINE SULFATE 2 MG: 2 INJECTION, SOLUTION INTRAMUSCULAR; INTRAVENOUS at 19:47

## 2020-10-10 RX ADMIN — Medication 1000 ML/HR: at 21:25

## 2020-10-10 RX ADMIN — SODIUM CHLORIDE 20 ML: 9 INJECTION, SOLUTION INTRAMUSCULAR; INTRAVENOUS; SUBCUTANEOUS at 16:27

## 2020-10-10 RX ADMIN — SODIUM BICARBONATE: 84 INJECTION, SOLUTION INTRAVENOUS at 22:59

## 2020-10-10 RX ADMIN — CEFEPIME HYDROCHLORIDE 2 G: 2 INJECTION, POWDER, FOR SOLUTION INTRAVENOUS at 02:03

## 2020-10-10 RX ADMIN — Medication 2000 ML/HR: at 18:56

## 2020-10-10 RX ADMIN — PANTOPRAZOLE SODIUM 40 MG: 40 INJECTION, POWDER, FOR SOLUTION INTRAVENOUS at 19:47

## 2020-10-10 RX ADMIN — SODIUM CHLORIDE 1000 ML: 9 INJECTION, SOLUTION INTRAVENOUS at 09:28

## 2020-10-10 ASSESSMENT — PAIN SCALES - GENERAL
PAINLEVEL_OUTOF10: 10
PAINLEVEL_OUTOF10: 4
PAINLEVEL_OUTOF10: 0
PAINLEVEL_OUTOF10: 5
PAINLEVEL_OUTOF10: 7

## 2020-10-10 ASSESSMENT — ENCOUNTER SYMPTOMS
ALLERGIC/IMMUNOLOGIC NEGATIVE: 1
ABDOMINAL DISTENTION: 1
EYES NEGATIVE: 1
SHORTNESS OF BREATH: 1

## 2020-10-10 NOTE — CONSULTS
Department of General Surgery Consult    PATIENT NAME: Astrid Kauffman Sr. YOB: 1950    ADMISSION DATE: 10/9/2020 12:23 PM      TODAY'S DATE: 10/10/2020    Reason for Consult:  Left retroperitoneal bleed    Chief Complaint: pain    Requesting Physician:  Matias Stoner    HISTORY OF PRESENT ILLNESS:              The patient is a 71 y.o. male who presents with abd pain and dizziness. Reports several weeks of low appetite and loose bowel movements. Yesterday developed left sided abd pain and dizziness. Denies fall or LOC. In ER was hypotensive and anemic with hypovolemic shock and left sided retroperitoneal bleeding on CT. Patient with prior history of partial right nephrectomy. Denies left nephrectomy.     Past Medical History:        Diagnosis Date    Cancer ([address]     kidney    Cardiomyopathy ([address] 08/2019    CHF (congestive heart failure) ([address] 08/2019    Edema of both legs     GI bleed     Hx of blood clots     DVT    Hypertension        Past Surgical History:        Procedure Laterality Date    ABDOMEN SURGERY      Right Kidney Cancer abd. surgery 2014    CARDIAC CATHETERIZATION  09/06/2019    COLONOSCOPY      flexsigmoidoscopy 40yrs ago     COLONOSCOPY N/A 4/9/2019    COL[facility]OPSY [facility]      8/2018    KIDNEY SURGERY  4/7/2014    IN EGD TRANSORAL BIOPSY SINGLE/MULTIPLE  9/28/2018    EGD BIOPSY performed by Estephania Ha MD at [facility] 4/8/2019    EGD WITH ANESTHESIA performed by Estephania Ha MD at [address] N/A 4/12/2019    ESOPHAGEAL CAPSULE ENDOSCOPY performed by Estephania Ha MD at [facility]       Current Medications:   Current Facility-Administered Medications: morphine (PF) injection 2 mg, 2 mg, Intravenous, Q3H PRN  0.9 % sodium chloride bolus, 1,000 mL, Intravenous, Once  0.9 % sodium chloride bolus, 1,000 mL, Intravenous, Once  cefepime (MAXIPIME) 2 g IVPB minibag, 2 g, Intravenous, Q12H  norepinephrine (LEVOPHED) 16 mg in dextrose 5 % 250 mL infusion, 5 mcg/min, Intravenous, Continuous  folic acid (FOLVITE) tablet 1 mg, 1 mg, Oral, Daily  sodium chloride flush 0.9 % injection 10 mL, 10 mL, Intravenous, 2 times per day  sodium chloride flush 0.9 % injection 10 mL, 10 mL, Intravenous, PRN  acetaminophen (TYLENOL) tablet 650 mg, 650 mg, Oral, Q6H PRN **OR** acetaminophen (TYLENOL) suppository 650 mg, 650 mg, Rectal, Q6H PRN  polyethylene glycol (GLYCOLAX) packet 17 g, 17 g, Oral, Daily PRN  promethazine (PHENERGAN) tablet 12.5 mg, 12.5 mg, Oral, Q6H PRN **OR** ondansetron (ZOFRAN) injection 4 mg, 4 mg, Intravenous, Q6H PRN  perflutren lipid microspheres (DEFINITY) injection 1.65 mg, 1.5 mL, Intravenous, ONCE PRN  pantoprazole (PROTONIX) injection 40 mg, 40 mg, Intravenous, BID  Prior to Admission medications    Medication Sig Start Date End Date Taking?  Authorizing Provider   carvedilol (COREG) 3.125 MG tablet TAKE ONE TABLET BY MOUTH TWICE A DAY 6/29/20  Yes JOEY Ramos CNP   furosemide (LASIX) 40 MG tablet TAKE 1 TABLET BY MOUTH TWICE DAILY 5/4/20  Yes JOEY Romo CNP   lisinopril (PRINIVIL;ZESTRIL) 2.5 MG tablet TAKE ONE TABLET BY MOUTH DAILY 4/30/20  Yes JOEY Ramos CNP   potassium chloride (KLOR-CON M) 20 MEQ TBCR extended release tablet TK 1 T PO BID 5/14/19  Yes Historical Provider, MD   warfarin (COUMADIN) 2.5 MG tablet Take 1 tablet by mouth daily  Patient taking differently: Take 5 mg by mouth daily  4/20/19  Yes Norah Hewitt MD   vitamin D (CHOLECALCIFEROL) 1000 UNIT TABS tablet Take 1,000 Units by mouth 2 times daily   Yes Historical Provider, MD   tamsulosin (FLOMAX) 0.4 MG capsule Take 1 capsule by mouth daily 10/2/18  Yes Genevieve Amezcua MD   folic acid (FOLVITE) 1 MG tablet Take 1 tablet by mouth daily 10/1/18  Yes JOEY Nuñez - CNP Allergies:  Patient has no known allergies. Social History:   TOBACCO:  no  ETOH:  no    Family History:        Problem Relation Age of Onset    Arthritis Mother     Atrial Fibrillation Mother     Breast Cancer Mother     High Blood Pressure Mother     Anemia Father     Arthritis Father     Cancer Father 80        Ear CA/ tumor removed     Hearing Loss Father     Arthritis Sister     Diabetes Maternal Aunt     Arthritis Maternal Aunt     Early Death Maternal Uncle          in early 44's     Depression Maternal Uncle     Other Maternal Uncle         Suicide    Heart Disease Paternal Aunt     Arthritis Maternal Grandmother        REVIEW OF SYSTEMS:  CONSTITUTIONAL:  positive for  fatigue  HEENT:  negative  RESPIRATORY:  negative  CARDIOVASCULAR:  negative  GASTROINTESTINAL:  negative except for diarrhea and abdominal pain  GENITOURINARY:  negative  HEMATOLOGIC/LYMPHATIC:  negative  NEUROLOGICAL:  Negative  * All other ROS reviewed and negative. PHYSICAL EXAM:  VITALS:  BP (!) 124/98   Pulse 88   Temp 99.4 °F (37.4 °C) (Bladder)   Resp (!) 36   Ht 5' 4\" (1.626 m)   Wt (!) 350 lb 1.5 oz (158.8 kg)   SpO2 95%   BMI 60.09 kg/m²   24HR INTAKE/OUTPUT:    I/O last 3 completed shifts: In: 5570 [I.V.:2754; Blood:301; IV Piggyback:1650]  Out: 1155 [PABEK:8845]  I/O this shift:   In: 0   Out: 10 [Urine:10]    CONSTITUTIONAL:  alert, no apparent distress and morbidly obese  EYES:  PERRL, sclera clear  ENT:  Normocephalic,atraumatic, without obvious abnormality  NECK:  supple, symmetrical, trachea midline  LUNGS: Resp effort easy and unlabored, no crackles or wheezing  CARDIOVASCULAR:  NO JVD, regular rate   ABDOMEN:  Large incisional hernia, normal bowel sounds, soft, non-distended, tender left side and flank, voluntary guarding absent  MUSCULOSKELETAL: No clubbing or cyanosis, lymphedema lower extremities  NEUROLOGIC:  Mental Status Exam:  Level of Alertness:   awake  PSYCHIATRIC: person, place, time  SKIN:  no rashes    DATA:    CBC:   Recent Labs     10/09/20  1247 10/09/20  1707 10/09/20  2350 10/10/20  0528   WBC 20.6*  --  36.5* 36.9*   HGB 12.3* 8.5* 9.0* 8.5*   HCT 36.4* 26.5* 26.8* 25.2*     --  354 308     BMP:    Recent Labs     10/09/20  1247 10/10/20  0528   * 137   K 5.1 5.0    109   CO2 19* 17*   BUN 77* 84*   CREATININE 2.7* 3.5*   GLUCOSE 152* 163*     Hepatic:   Recent Labs     10/09/20  1247 10/10/20  0528   AST 18 26   ALT 13 20   BILITOT 0.9 1.6*   ALKPHOS 87 67     Mag:      Recent Labs     10/10/20  0528   MG 1.90      Phos:     Recent Labs     10/10/20  0528   PHOS 4.8      INR:   Recent Labs     10/09/20  1247 10/09/20  1811 10/10/20  0528   INR 9.24* 1.93* 1.42*       Radiology Review: Images personally reviewed by me. CT -   Large left retroperitoneal hematoma from presumed spontaneous hemorrhage    particularly given the elevated INR.         Prior left nephrectomy.         Pneumobilia with apparent moderate contraction of the gallbladder surrounding    the gallstone.  This could be the result of prior stone passage and resulting    in incompetence of the ampulla of Vater.  No ductal stones seen currently.         Large panniculus and abdominal wall hernia. IMPRESSION/RECOMMENDATIONS:    70 yo with left retroperitoneal bleed  1. No prior left nephrectomy. Reviewed with Radiology and source of bleeding likely from left kidney. 2.  Hgb stable since reversing INR. Continue serial labs.   3.  Pneumobilia normal given history of ERCP    Electronically signed by Poonam Hurd MD     [address] [place] [phone]

## 2020-10-10 NOTE — CONSULTS
medications on file prior to encounter. Current Outpatient Medications on File Prior to Encounter   Medication Sig Dispense Refill    carvedilol (COREG) 3.125 MG tablet TAKE ONE TABLET BY MOUTH TWICE A  tablet 1    furosemide (LASIX) 40 MG tablet TAKE 1 TABLET BY MOUTH TWICE DAILY 180 tablet 0    lisinopril (PRINIVIL;ZESTRIL) 2.5 MG tablet TAKE ONE TABLET BY MOUTH DAILY 90 tablet 1    potassium chloride (KLOR-CON M) 20 MEQ TBCR extended release tablet TK 1 T PO BID  0    warfarin (COUMADIN) 2.5 MG tablet Take 1 tablet by mouth daily (Patient taking differently: Take 5 mg by mouth daily ) 30 tablet 0    vitamin D (CHOLECALCIFEROL) 1000 UNIT TABS tablet Take 1,000 Units by mouth 2 times daily      tamsulosin (FLOMAX) 0.4 MG capsule Take 1 capsule by mouth daily 30 capsule 3    folic acid (FOLVITE) 1 MG tablet Take 1 tablet by mouth daily 30 tablet 3       Allergies:  Patient has no known allergies.     Social History:    Social History     Socioeconomic History    Marital status:      Spouse name: Not on file    Number of children: Not on file    Years of education: Not on file    Highest education level: Not on file   Occupational History    Not on file   Social Needs    Financial resource strain: Not on file    Food insecurity     Worry: Not on file     Inability: Not on file    Transportation needs     Medical: Not on file     Non-medical: Not on file   Tobacco Use    Smoking status: Never Smoker    Smokeless tobacco: Never Used   Substance and Sexual Activity    Alcohol use: No    Drug use: No    Sexual activity: Not on file   Lifestyle    Physical activity     Days per week: Not on file     Minutes per session: Not on file    Stress: Not on file   Relationships    Social connections     Talks on phone: Not on file     Gets together: Not on file     Attends Mu-ism service: Not on file     Active member of club or organization: Not on file     Attends meetings of clubs 10/09/20  1247 10/10/20  0528   * 137   K 5.1 5.0    109   CO2 19* 17*   GLUCOSE 152* 163*   PHOS  --  4.8   MG  --  1.90   BUN 77* 84*   CREATININE 2.7* 3.5*   LABGLOM 23* 17*   GFRAA 28* 21*     CT scan a/p wo contrast  Impression    Large left retroperitoneal hematoma from presumed spontaneous hemorrhage    particularly given the elevated INR.         Prior left nephrectomy.         Pneumobilia with apparent moderate contraction of the gallbladder surrounding    the gallstone.  This could be the result of prior stone passage and resulting    in incompetence of the ampulla of Vater.  No ductal stones seen currently.         Large panniculus and abdominal wall hernia. UA w micro w trace protein , 21-50 wbcs, 11-20 rbcs  Stool occult blood +    Assessment  -oliguric ATN from profound hypotension, possible sepsis  -left retroperitoneal hematoma; h/o open partial nephrectomy   High risk for page kd   -GI bleed w occult blood stool +  -Shock- hemorrhagic and likely sepsis  w elevated WBCs  -supra therapeutic INR   H/o DVT on coumadin  -CHFr EF    Plan  -will give additional 2l NS bolus over 2h to bring CVP to 8 and keep SBP>100  -MIVF w bicarb as ordered then after  -if no improvement in uop after above, will likely need CRRT  - follow pan cultures  - broad spectrum abx, renally dosed  -Serial renal panel  -daily wts and strict i/o  -renal dose medications   -avoid nephrotoxins    32 min of critical care time used reviewing the chart, and managing/coordinating the care of this patient. 1415-patient has developed hypotension back on Levophed currently at 24 mics per minute, urine output has not improved despite volume resuscitation. We will start CRRT. Appreciate Dr Lindsey Ontiveros  assistance for Vas-Cath    Thank you for the consultation. Please do not hesitate to call with questions.     Julieta Alex  [facility]  Office: [phone]  Fax:    [phone]

## 2020-10-10 NOTE — PLAN OF CARE
Despite being over 5 liters + patient's urine output and kidney labs have worsened.   CRRT started with goal of + 100 ml/hr

## 2020-10-10 NOTE — PROGRESS NOTES
Started CRRT via [address] with access & return reversed since unable to withdraw from red port. Both easily flushed.

## 2020-10-10 NOTE — CONSULTS
Gastroenterology Consult Note    Patient:   Connor Chappell Sr.   :    1950   Facility:     [facility] CARD TELEMETRY  [address] [address]   Referring/PCP: Esequiel Zhou MD  Date:     10/10/2020  Consultant:   Georgeanne Leventhal, DO    Subjective: This 71 y.o. male was admitted 10/9/2020 with a diagnosis of \"Retroperitoneal hemorrhage [R58]\" and is seen in consultation regarding gi bleeding    72 yo male patient of Dr. Jose Hussein with nonischemic cardiomyopathy, HTN, history of DVT on coumadin, history of GI bleeding who presented with fatigue and dizziness. Reports of black stool. Patient was coagulopathic on exam with an inr of 9.24. Had a bleeding episode in 2019. Colonoscopy relativel unremarkable at that time except for a tiny sigmoid polyp. EGD was normal.  CT demonstrated a 13x9 cm retroperitoneal bleed (thought combination of spontaneous hematoma and kidney tissue). Has had decreased appetite. Discussed risks and benefits of repeat endoscopy in light of clinical history. Family would like to hold for now.     Past Medical History:   Diagnosis Date    Cancer ([address]     kidney    Cardiomyopathy ([address] 2019    CHF (congestive heart failure) ([address] 2019    Edema of both legs     GI bleed     Hx of blood clots     DVT    Hypertension      Past Surgical History:   Procedure Laterality Date    ABDOMEN SURGERY      Right Kidney Cancer abd. surgery 2014    CARDIAC CATHETERIZATION  2019    COLONOSCOPY      flexsigmoidoscopy 40yrs ago     COLONOSCOPY N/A 2019    COLONOSCOPY WITH BIOPSY performed [facility] Stefano [facility]uit[facility]      2018    KIDNEY SURGERY  2014    IN EGD TRANSORAL BIOPSY SINGLE/MULTIPLE  2018    EGD BIOPSY performed by Stefano Huitron MD at [address] [place] [address] 2019    EGD WITH ANESTHESIA performed by Stefano Huitron MD at [facility] ENDOSCOPY    UPPER GASTROINTESTINAL ENDOSCOPY N/A 2019    ESOPHAGEAL CAPSULE ENDOSCOPY performed by Doroteo Brar MD at [address]:   Social History     Tobacco Use    Smoking status: Never Smoker    Smokeless tobacco: Never Used   Substance Use Topics    Alcohol use: No     Family:   Family History   Problem Relation Age of Onset    Arthritis Mother     Atrial Fibrillation Mother     Breast Cancer Mother     High Blood Pressure Mother     Anemia Father     Arthritis Father     Cancer Father 80        Ear CA/ tumor removed     Hearing Loss Father     Arthritis Sister     Diabetes Maternal Aunt     Arthritis Maternal Aunt     Early Death Maternal Uncle          in early 42's     Depression Maternal Uncle     Other Maternal Uncle         Suicide    Heart Disease Paternal Aunt     Arthritis Maternal Grandmother        Scheduled Medications:    cefepime  2 g Intravenous J45X    folic acid  1 mg Oral Daily    sodium chloride flush  10 mL Intravenous 2 times per day    pantoprazole  40 mg Intravenous BID     Infusions:    IV infusion builder 100 mL/hr at 10/10/20 1242    prismaSATE BGK 4/2.5 2,000 mL/hr (10/10/20 1856)    prismaSATE BGK 4/2.5 1,000 mL/hr (10/10/20 162)    prismaSATE BGK 4/2.5 500 mL/hr (10/10/20 1627)    norepinephrine 35 mcg/min (10/10/20 1644)     PRN Medications: morphine, magnesium sulfate, calcium gluconate **OR** calcium gluconate **OR** calcium gluconate **OR** calcium gluconate, sodium phosphate IVPB **OR** sodium phosphate IVPB **OR** sodium phosphate IVPB **OR** sodium phosphate IVPB, sodium chloride flush, acetaminophen **OR** acetaminophen, polyethylene glycol, promethazine **OR** ondansetron, perflutren lipid microspheres  Allergies: No Known Allergies    ROS:   Review of Systems   Constitutional: Negative. HENT: Negative. Eyes: Negative. Respiratory: Positive for shortness of breath. Cardiovascular: Negative. Gastrointestinal: Positive for abdominal distention. Endocrine: Negative. Genitourinary: Negative. Musculoskeletal: Negative. Skin: Negative. Allergic/Immunologic: Negative. Neurological: Negative. Hematological: Negative. Psychiatric/Behavioral: Negative. Objective:     Physical Exam:   Vitals:    10/10/20 1900   BP:    Pulse: 96   Resp: 25   Temp:    SpO2: 96%     I/O last 3 completed shifts: In: 46 [I.V.:4949; Blood:301; IV Piggyback:600]  Out: 1185 [Urine:1185]   General appearance: alert, appears stated age and cooperative  HEENT: PERRLA  Neck: no adenopathy, no carotid bruit, no JVD, supple, symmetrical, trachea midline and thyroid not enlarged, symmetric, no tenderness/mass/nodules  Lungs: clear to auscultation bilaterally  Heart: regular rate and rhythm, S1, S2 normal, no murmur, click, rub or gallop  Abdomen: soft, non-tender; bowel sounds normal; no masses,  no organomegaly  Extremities: extremities normal, atraumatic, no cyanosis or edema     Lab and Imaging Review   Labs:  CBC:   Recent Labs     10/09/20  1247  10/09/20  2350 10/10/20  0528 10/10/20  1525   WBC 20.6*  --  36.5* 36.9*  --    HGB 12.3*   < > 9.0* 8.5* 8.2*   HCT 36.4*   < > 26.8* 25.2* 24.5*   MCV 87.3  --  87.7 88.2  --      --  354 308  --     < > = values in this interval not displayed.      BMP:   Recent Labs     10/09/20  1247 10/10/20  0528 10/10/20  1325   * 137 137   K 5.1 5.0 5.0    109 109   CO2 19* 17* 16*   PHOS  --  4.8 5.4*   BUN 77* 84* 86*   CREATININE 2.7* 3.5* 4.1*     LIVER PROFILE:   Recent Labs     10/09/20  1247 10/10/20  0528   AST 18 26   ALT 13 20   PROT 6.3* 5.1*   BILITOT 0.9 1.6*   ALKPHOS 87 67     PT/INR:   Recent Labs     10/09/20  1247 10/09/20  1811 10/10/20  0528   INR 9.24* 1.93* 1.42*       IMAGING:  Ct Abdomen Pelvis Wo Contrast Additional Contrast? None    Addendum Date: 10/10/2020    ADDENDUM: Additional information, corrected report: HISTORY: The initial history was incorrect, patient did not have left side nephrectomy but partial nephrectomy on the right. Therefore the hematoma seen retroperitoneal on the left in the renal fossa likely obscures the entire left kidney. There is no normal renal parenchyma identified. The measured area, 13 x 9 cm therefore presumably include spontaneous hematoma and kidney tissue combined. This would also therefore indicate a lesser degree of hemorrhage present, since some of the measured area being occupied by the obscured kidney. Again the primary etiology is spontaneous hemorrhage, given the patient's grossly elevated INR, and likely arising within the obscured left kidney and extending left pararenal retroperitoneal space. Result Date: 10/10/2020  EXAMINATION: CT OF THE ABDOMEN AND PELVIS WITHOUT CONTRAST 10/9/2020 3:17 pm TECHNIQUE: CT of the abdomen and pelvis was performed without the administration of intravenous contrast. Multiplanar reformatted images are provided for review. Dose modulation, iterative reconstruction, and/or weight based adjustment of the mA/kV was utilized to reduce the radiation dose to as low as reasonably achievable. COMPARISON: Prior study(s) most recent 04/09/2019. HISTORY: ORDERING SYSTEM PROVIDED HISTORY: sepsis, left sided abdominal pain, hernia TECHNOLOGIST PROVIDED HISTORY: Reason for exam:->sepsis, left sided abdominal pain, hernia Additional Contrast?->None Reason for Exam: hx-renal ca with no tx   c/o left sided abd pain x 2 hrs  - known gallstones/hernia Acuity: Acute Type of Exam: Initial Relevant Medical/Surgical History: renal mass removed Lightheaded, chronic anticoagulation on Coumadin. INR 9.24 FINDINGS: Lower Chest: Lung bases are clear. Incidental calcified granuloma. Extrapleural fat seen posteriorly. Organs; 1. Liver: The density is unremarkable with no focal suspicious liver lesions on noncontrast images. Pneumobilia is seen new from the prior study.  2. Gallbladder: The gallbladder appears to be entirely contracted around a dense stone near the neck of the gallbladder which is also seen on prior study. However there is air within the small residual portion of fluid content non dependently. Air is seen in the common bile duct which is nondilated. Again pneumobilia is seen centrally. There is no biliary dilation. 3. Spleen: Normal. 4. Pancreas: Unremarkable on noncontrast imaging. 5. Kidneys: The left kidney has been removed. The left renal fossa is occupied by a large hematoma described below. The right kidney is unremarkable with cortical scarring. There is curvilinear density projecting from the lower anterior aspect of the right kidney with suspected prior surgery. This is unchanged. Some of the in capsulated fat could represent fat necrosis. Again this is unchanged. No hydronephrosis. 6. Adrenal glands: Normal. GI/Bowel: There is no distention, obstruction or significant inflammatory change. The bowel is mostly empty with no distention whatsoever and near complete contraction of small bowel and colon with only trace amount of stool in the rectosigmoid. There is a large panniculus containing protrusion of fat into the panniculus inferiorly containing unremarkable mesentery and nondistended bowel loops. Pelvis: Urinary bladder is unremarkable. There is no free pelvic fluid. Peritoneum/Retroperitoneum: A large hematoma is seen retroperitoneal on the left extending anterior from the left empty renal fossa. The more confined portion of the hematoma measures 9 x 13 cm transversely an almost 14 cm longitudinally. There is surrounding fat infiltration and or extension of blood inferiorly along the conal fascia. Medial extension is towards the psoas muscle and lower diaphragm jorge luis. The majority of the density is typical of recent hematoma, 64 Hounsfield units. Some of the density is lower, 35 Hounsfield units.   This could represent intermittent or stuttering Last Modified POA    * (Principal) Retroperitoneal bleed 10/10/2020 Yes    Morbid obesity with BMI of 60.0-69.9, adult ([address] (Chronic) 10/9/2020 Yes    Benign hypertension (Chronic) 10/9/2020 Yes    History of DVT (deep vein thrombosis) (Chronic) 10/9/2020 Yes    Supratherapeutic INR 10/9/2020 Yes    Shock ([address] 10/9/2020 Yes    Non-ischemic cardiomyopathy ([address] EF 40% 10/9/2020 Yes        Assessment:   72 yo male with morbid obesity, supratherapeutic INR complicated by retroperitoneal bleeding and reported melena. Plan:   1. Could consider upper endosocopy to rule out PUD given decreased appetite and reported dark stools but recent endoscopy in the past was normal and has several comorbidities which would increase his risk. Could consider using pediatric upper endsocope unsedated. Dr. Melissa Goodwin to reevaluate on Monday. Continue conservative therapy for now. Supratherapeutic inr can cause mucosal oozing resulting in melena.           Swapna López DO  7:12 PM 10/10/2020            [facility]    [address]      [address] [facility]     Phone: [phone]     Fax: [phone]

## 2020-10-10 NOTE — PLAN OF CARE
Nutrition Problem #1: Overweight/Obese  Intervention: Food and/or Nutrient Delivery: Continue NPO(Initiate oral diet when medically appropriate)  Nutritional Goals:  Tolerate diet and consume greater than 50% of meals and ONS this admisssion

## 2020-10-10 NOTE — PROGRESS NOTES
Admission assessment complete and documented on flowsheets. Four eyes skin assessment completed. Repositioned for comfort. Call light and over bed table within reach. Will continue to monitor.

## 2020-10-10 NOTE — CONSULTS
LE's   Lymph nodes are not palpated in the inguinal, neck, or axillary area. Male :   Penis appears normal and circumcised with indwelling devine   Urethral meatus is normal in size and location   Scrotum appears normal and both testicles appear normal in size and location      Labs:  WBC:    Lab Results   Component Value Date    WBC 36.9 10/10/2020     Hemoglobin/Hematocrit:    Lab Results   Component Value Date    HGB 8.5 10/10/2020    HCT 25.2 10/10/2020     BMP:    Lab Results   Component Value Date     10/10/2020    K 5.0 10/10/2020     10/10/2020    CO2 17 10/10/2020    BUN 84 10/10/2020    LABALBU 2.3 10/10/2020    CREATININE 3.5 10/10/2020    CALCIUM 7.6 10/10/2020    GFRAA 21 10/10/2020    LABGLOM 17 10/10/2020     PT/INR:    Lab Results   Component Value Date    PROTIME 16.5 10/10/2020    INR 1.42 10/10/2020     PTT:  No results found for: APTT[APTT      Imaging: CT scan:  Impression    Large left retroperitoneal hematoma from presumed spontaneous hemorrhage    particularly given the elevated INR.         Prior left nephrectomy.         Pneumobilia with apparent moderate contraction of the gallbladder surrounding    the gallstone.  This could be the result of prior stone passage and resulting    in incompetence of the ampulla of Vater.  No ductal stones seen currently.         Large panniculus and abdominal wall hernia.       Images were reviewed and compared with 2019 images      Impression/Plan: Coagulopathy with large left retroperitoneal bleed   ATN from hypotensive shock    Would ask nephrology to see  Will need repeat imaging down the road to follow hematoma and make sure left kidney does not have a new renal mass  Will follow      Abigail Hawkins MD

## 2020-10-10 NOTE — PROGRESS NOTES
Nutrition Assessment     Type and Reason for Visit: Initial, Positive Nutrition Screen(MST 3)    Nutrition Recommendations/Plan:   1. NPO for now. Monitor MD POC regarding oral diet progression. GI consult pending. 2. Recommend ongoing education on low calorie/ high protein diet. May also need further education on renal diet if needed long term. 3. Monitor nutrition adequacy, pertinent labs, bowel habits, wt changes, and clinical progress        Nutrition Assessment:  70 y/o male admitted with abd pain, found to have L sided retroperitoneal bleed. GI, Gen surg, Nephro, Pulm, and Urology consults placed. He remains NPO at this time. Pt seen in room with wife present. Pt with significant swelling to BLE and poor skin integrity. Also with large hernia present. BMI 60.09 per EMR. D/w wife regarding pts baseline diet. States he follows a low sodium diet at home. Appetite decreased lately, but usually has strong appetite. Discussed NPO status for now. Will monitor MD POC regarding oral diet progression. Discussed with pt and wife that pt may need renal diet along with low kcal/high protein to promote wound healing and fat loss after hospital admission. Will monitor. Malnutrition Assessment:  Malnutrition Status: No malnutrition    Estimated Daily Nutrient Needs:  Energy (kcal): 5586-4652; Weight Used for Energy Requirements:  Ideal(59 kg)     Protein (g):  g; Weight Used for Protein Requirements:  Ideal(1.2-2)        Fluid (ml/day): 1 mL/kcal; Weight Used for Fluid Requirements:  Current      Nutrition Related Findings: Large hernia, Swelling to BLE with poor skin integrity. Wound care consult pending.       Current Nutrition Therapies:    Diet NPO Effective Now    Anthropometric Measures:  · Height: 5' 4\" (162.6 cm)  · Current Body Wt: 350 lb (158.8 kg)   · BMI: 60    Nutrition Diagnosis:   · Overweight/Obese related to excessive energy intake as evidenced by BMI      Nutrition Interventions:   Food and/or Nutrient Delivery:  Continue NPO(Initiate oral diet when medically appropriate)  Nutrition Education/Counseling:  No recommendation at this time   Coordination of Nutrition Care:  No recommendation at this time    Goals: Tolerate diet and consume greater than 50% of meals and ONS this admisssion       Nutrition Monitoring and Evaluation:   Food/Nutrient Intake Outcomes:  Food and Nutrient Intake  Physical Signs/Symptoms Outcomes:  Weight, Skin, Biochemical Data     Discharge Planning: Too soon to determine     Electronically signed by Uzma Dunham.  Leanna Ghosh, RD, LD on 10/10/20 at 12:34 PM EDT    Contact: [phone]

## 2020-10-10 NOTE — PROGRESS NOTES
Received consult earlier for left retroperitoneal hematoma. Chart reviewed and discussed with ER physician. CT read as prior left nephrectomy but chart reveals only prior partial right nephrectomy several years ago. CT from 2019 showed left kidney with mass. ER physician asked patient if he had his left kidney removed which he denied. Given possibility of this actually being a bleeding left kidney I recommended Urology consult.     Segundo Ferguson MD

## 2020-10-10 NOTE — OP NOTE
Preoperative Diagnosis:  1. Acute renal failure  2. Shock    Postoperative Diagnosis:  1. Acute renal failure  2. Shock    Procedure Performed:  Insertion of left internal jugular vein central venous dialysis catheter with ultrasound guideance. Indications:  Need for dialysis access. Procedure Details: The patient was correctly identified as Priscila Degroot, a safety timeout was performed. After the site was prepped and draped using sterile technique, the jugular vein was identified on ultrasound imaging. 1% lidocaine solution was infiltrated into the subcutaneous tissues. Using a finder needle the vein was accessed, a guidewire was inserted. Using seldinger technique dilation was performed and a triple lumen central venous catheter was inserted. Guidewire was withdrawn. All three ports aspirated venous blood and flushed easily. The catheter was sutured in place. Dressing was applied. Drape was removed and the procedure was terminated. There was no immediate complications, the patient tolerated the procedure well. Estimated blood loss was less than 5 cc.

## 2020-10-10 NOTE — PROGRESS NOTES
4 Eyes Skin Assessment     The patient is being assess for   Shift Handoff    I agree that 2 RN's have performed a thorough Head to Toe Skin Assessment on the patient. ALL assessment sites listed below have been assessed. Areas assessed by both nurses:   [x]   Head, Face, and Ears   [x]   Shoulders, Back, and Chest, Abdomen  [x]   Arms, Elbows, and Hands   [x]   Coccyx, Sacrum, and Ischium  [x]   Legs, Feet, and Heels        Open area rt buttocks covered with thrombix covered with foam. BLE bindu. Interdry to abd panus. **SHARE this note so that the co-signing nurse is able to place an eSignature**    Co-signer eSignature: Electronically signed by Cris Dow RN on 10/10/20 at 10:17 PM EDT    Does the Patient have Skin Breakdown?   Yes [facility] WOUND CARE was Initiated documentation include the Jenna-wound, Wound Assessment, Measurements, Dressing Treatment, Drainage, and Color\",          Gopi Prevention initiated:  Yes   Wound Care Orders initiated:        [id number]  nurse consulted for Pressure Injury (Stage 3,4, Unstageable, DTI, NWPT, Complex wounds)and New or Established Ostomies:  Yes      Primary Nurse eSignature: Electronically signed by Jonh Smith RN on 10/10/20 at 6:25 PM EDT

## 2020-10-10 NOTE — CONSULTS
Pulmonary & Critical Care Consultation Note   Chaya Tan MD    REASONFOR CONSULTATION/CC: critical care management    Consult at request of  Reza Pichardo MD  PCP: Zena Serrano MD    HISTORYOF PRESENT ILLNESS:    71y.o. year old male on chronic anticoagulation for VTE, he presented to the ED with near syncope. Workup identified acute blood loss anemia from a RP bleed/hematoma. He had elevated INR which was reversed  Acute anemia and received transfusions  Required central line placement and initiation of vasopressor medications  Was admitted to the ICU  Urology, surgery, nephrology all involved in his care.         Past Medical History:   Diagnosis Date    Cancer ([address]     kidney    Cardiomyopathy ([address] 08/2019    CHF (congestive heart failure) ([address] 08/2019    Edema of both legs     GI bleed     Hx of blood clots     DVT    Hypertension        Past Surgical History:   Procedure Laterality Date    ABDOMEN SURGERY      Right Kidney Cancer abd. surgery 2014    CARDIAC CATHETERIZATION  09/06/2019    COLONOSCOPY      flexsigmoidoscopy 40yrs ago     COLONOSCOPY N/A 4/9/2019    COLONOSCOPY WITH BIOPSY performed by Terrence Aragon MD at [facility]      8/2018    KIDNEY SURGERY  4/7/2014    [place] EGD TRANSORAL BIOPSY SINGLE/MULTIPLE  9/28/2018    EGD BIOPSY performed by Terrence Aragon MD at  [facility] 4/8/2019    EGD WITH ANESTHESIA performed by Terrence Aragon MD at  [facility] N/A 4/12/2019    ESOPHAGEAL CAPSULE ENDOSCOPY performed by Terrence Aragon MD at [address] [place] [address]       family history includes Anemia in his father; Arthritis in his father, maternal aunt, maternal grandmother, mother, and sister; Atrial Fibrillation in his mother; Breast Cancer in his mother; Cancer (age of onset: 80) in his father; Depression in his maternal uncle; Diabetes in his maternal aunt; Early Death in his maternal uncle; Hearing Loss in his father; Heart Disease in his paternal aunt; High Blood Pressure in his mother; Other in his maternal uncle. Social History     Tobacco Use    Smoking status: Never Smoker    Smokeless tobacco: Never Used   Substance Use Topics    Alcohol use: No        Scheduled Meds:   sodium chloride  1,000 mL Intravenous Once    cefepime  2 g Intravenous K84D    folic acid  1 mg Oral Daily    sodium chloride flush  10 mL Intravenous 2 times per day    pantoprazole  40 mg Intravenous BID       Continuous Infusions:   IV infusion builder      norepinephrine Stopped (10/10/20 0732)       PRN Meds:   morphine, sodium chloride flush, acetaminophen **OR** acetaminophen, polyethylene glycol, promethazine **OR** ondansetron, perflutren lipid microspheres   Inpatient consult to Critical Care  Consult performed by: Rebeca Tsai MD  Consult ordered by: JOEY Almazan CNP        ALLERGIES:  Patient has No Known Allergies. REVIEW OF SYSTEMS:  Review of Systems   Unable to perform ROS: Acuity of condition       Objective:   PHYSICAL EXAM:  BP 99/77   Pulse 88   Temp 98.6 °F (37 °C) (Oral)   Resp 28   Ht 5' 4\" (1.626 m)   Wt (!) 350 lb 1.5 oz (158.8 kg)   SpO2 96%   BMI 60.09 kg/m²    Physical Exam  Constitutional:       General: He is not in acute distress. Appearance: He is well-developed. He is not diaphoretic. HENT:      Head: Normocephalic and atraumatic. Mouth/Throat:      Pharynx: No oropharyngeal exudate. Eyes:      Pupils: Pupils are equal, round, and reactive to light. Neck:      Musculoskeletal: Neck supple. Vascular: No JVD. Cardiovascular:      Heart sounds: Normal heart sounds. No murmur. No friction rub. No gallop. Pulmonary:      Effort: Pulmonary effort is normal.      Breath sounds: No wheezing or rales. Abdominal:      General: Bowel sounds are normal. There is no distension.       Palpations: Abdomen is soft.      Tenderness: There is no abdominal tenderness. Musculoskeletal: Normal range of motion. Lymphadenopathy:      Cervical: No cervical adenopathy. Skin:     General: Skin is warm and dry. Findings: No rash. Neurological:      Mental Status: He is alert. Cranial Nerves: No cranial nerve deficit. Comments: CN 2-12 grossly intact            Data Reviewed:   LABS:  CBC:   Recent Labs     10/09/20  1247 10/09/20  1707 10/09/20  2350 10/10/20  0528   WBC 20.6*  --  36.5* 36.9*   HGB 12.3* 8.5* 9.0* 8.5*   HCT 36.4* 26.5* 26.8* 25.2*   MCV 87.3  --  87.7 88.2     --  354 308     BMP:   Recent Labs     10/09/20  1247 10/10/20  0528   * 137   K 5.1 5.0    109   CO2 19* 17*   PHOS  --  4.8   BUN 77* 84*   CREATININE 2.7* 3.5*     LIVER PROFILE:   Recent Labs     10/09/20  1247 10/10/20  0528   AST 18 26   ALT 13 20   BILITOT 0.9 1.6*   ALKPHOS 87 67     PT/INR:   Recent Labs     10/09/20  1247 10/09/20  1811 10/10/20  0528   PROTIME 109.6* 22.5* 16.5*   INR 9.24* 1.93* 1.42*     APTT:No results for input(s): APTT in the last 72 hours. UA:  Recent Labs     10/09/20  1635   COLORU Yellow   PHUR 6.0   WBCUA 21-50*   RBCUA 11-20*   BACTERIA 2+*   CLARITYU Clear   SPECGRAV 1.010   LEUKOCYTESUR LARGE*   UROBILINOGEN 0.2   BILIRUBINUR Negative   BLOODU LARGE*   GLUCOSEU Negative   AMORPHOUS Rare     No results for input(s): PHART, MVV2RNO, PO2ART in the last 72 hours. Vent Information  SpO2: 96 %    CXR personally reviewed, bibasilar atelectasis           Assessment:     1. Acute RP hematoma, spontaneous  2. Acute blood loss anemia  3. Hemorrhagic shock  4. Elevated INR  5. Chronic warfarin anticoagulation for VTE  6.  KEVIN with acidosis    Plan:      -Monitor hemodynamics, titrate levophed for MAP>65  -Serial H/Hs, transfuse PRN  -Nephrology involved, requested HD catheter; will place at bedside later today  -Monitor I/O balance, at risk for fluid overload with CHF history  -Wean

## 2020-10-10 NOTE — PROGRESS NOTES
Notified physician of poor urine output. No new orders at this time d/t amount of fluid already given, EF 40%. Will continue to monitor.

## 2020-10-11 LAB
A/G RATIO: 1 (ref 1.1–2.2)
ALBUMIN SERPL-MCNC: 2.5 G/DL (ref 3.4–5)
ALP BLD-CCNC: 78 U/L (ref 40–129)
ALT SERPL-CCNC: 27 U/L (ref 10–40)
ANION GAP SERPL CALCULATED.3IONS-SCNC: 10 MMOL/L (ref 3–16)
ANION GAP SERPL CALCULATED.3IONS-SCNC: 6 MMOL/L (ref 3–16)
ANION GAP SERPL CALCULATED.3IONS-SCNC: 8 MMOL/L (ref 3–16)
ANION GAP SERPL CALCULATED.3IONS-SCNC: 8 MMOL/L (ref 3–16)
ANION GAP SERPL CALCULATED.3IONS-SCNC: 9 MMOL/L (ref 3–16)
ANISOCYTOSIS: ABNORMAL
AST SERPL-CCNC: 32 U/L (ref 15–37)
BANDED NEUTROPHILS RELATIVE PERCENT: 37 % (ref 0–7)
BASOPHILS ABSOLUTE: 0 K/UL (ref 0–0.2)
BASOPHILS RELATIVE PERCENT: 0 %
BILIRUB SERPL-MCNC: 0.9 MG/DL (ref 0–1)
BLOOD BANK DISPENSE STATUS: NORMAL
BLOOD BANK DISPENSE STATUS: NORMAL
BLOOD BANK PRODUCT CODE: NORMAL
BLOOD BANK PRODUCT CODE: NORMAL
BPU ID: NORMAL
BPU ID: NORMAL
BUN BLDV-MCNC: 27 MG/DL (ref 7–20)
BUN BLDV-MCNC: 35 MG/DL (ref 7–20)
BUN BLDV-MCNC: 40 MG/DL (ref 7–20)
BUN BLDV-MCNC: 47 MG/DL (ref 7–20)
BUN BLDV-MCNC: 54 MG/DL (ref 7–20)
CALCIUM IONIZED: 1.02 MMOL/L (ref 1.12–1.32)
CALCIUM IONIZED: 1.06 MMOL/L (ref 1.12–1.32)
CALCIUM IONIZED: 1.09 MMOL/L (ref 1.12–1.32)
CALCIUM IONIZED: 1.1 MMOL/L (ref 1.12–1.32)
CALCIUM SERPL-MCNC: 7.2 MG/DL (ref 8.3–10.6)
CALCIUM SERPL-MCNC: 7.4 MG/DL (ref 8.3–10.6)
CALCIUM SERPL-MCNC: 7.6 MG/DL (ref 8.3–10.6)
CALCIUM SERPL-MCNC: 7.7 MG/DL (ref 8.3–10.6)
CALCIUM SERPL-MCNC: 7.7 MG/DL (ref 8.3–10.6)
CHLORIDE BLD-SCNC: 103 MMOL/L (ref 99–110)
CHLORIDE BLD-SCNC: 104 MMOL/L (ref 99–110)
CHLORIDE BLD-SCNC: 104 MMOL/L (ref 99–110)
CHLORIDE BLD-SCNC: 105 MMOL/L (ref 99–110)
CHLORIDE BLD-SCNC: 106 MMOL/L (ref 99–110)
CO2: 21 MMOL/L (ref 21–32)
CO2: 23 MMOL/L (ref 21–32)
CO2: 24 MMOL/L (ref 21–32)
CO2: 24 MMOL/L (ref 21–32)
CO2: 26 MMOL/L (ref 21–32)
CREAT SERPL-MCNC: 1.4 MG/DL (ref 0.8–1.3)
CREAT SERPL-MCNC: 1.9 MG/DL (ref 0.8–1.3)
CREAT SERPL-MCNC: 2 MG/DL (ref 0.8–1.3)
CREAT SERPL-MCNC: 2.3 MG/DL (ref 0.8–1.3)
CREAT SERPL-MCNC: 2.5 MG/DL (ref 0.8–1.3)
DESCRIPTION BLOOD BANK: NORMAL
DESCRIPTION BLOOD BANK: NORMAL
EOSINOPHILS ABSOLUTE: 0 K/UL (ref 0–0.6)
EOSINOPHILS RELATIVE PERCENT: 0 %
GFR AFRICAN AMERICAN: 31
GFR AFRICAN AMERICAN: 34
GFR AFRICAN AMERICAN: 40
GFR AFRICAN AMERICAN: 43
GFR AFRICAN AMERICAN: >60
GFR NON-AFRICAN AMERICAN: 26
GFR NON-AFRICAN AMERICAN: 28
GFR NON-AFRICAN AMERICAN: 33
GFR NON-AFRICAN AMERICAN: 35
GFR NON-AFRICAN AMERICAN: 50
GLOBULIN: 2.6 G/DL
GLUCOSE BLD-MCNC: 148 MG/DL (ref 70–99)
GLUCOSE BLD-MCNC: 150 MG/DL (ref 70–99)
GLUCOSE BLD-MCNC: 153 MG/DL (ref 70–99)
GLUCOSE BLD-MCNC: 155 MG/DL (ref 70–99)
GLUCOSE BLD-MCNC: 159 MG/DL (ref 70–99)
HCT VFR BLD CALC: 20.3 % (ref 40.5–52.5)
HCT VFR BLD CALC: 21.3 % (ref 40.5–52.5)
HCT VFR BLD CALC: 21.6 % (ref 40.5–52.5)
HCT VFR BLD CALC: 22 % (ref 40.5–52.5)
HCT VFR BLD CALC: 22.1 % (ref 40.5–52.5)
HEMOGLOBIN: 6.7 G/DL (ref 13.5–17.5)
HEMOGLOBIN: 7 G/DL (ref 13.5–17.5)
HEMOGLOBIN: 7.1 G/DL (ref 13.5–17.5)
HEMOGLOBIN: 7.2 G/DL (ref 13.5–17.5)
HEMOGLOBIN: 7.3 G/DL (ref 13.5–17.5)
LACTIC ACID: 1.1 MMOL/L (ref 0.4–2)
LYMPHOCYTES ABSOLUTE: 1.7 K/UL (ref 1–5.1)
LYMPHOCYTES RELATIVE PERCENT: 4 %
MAGNESIUM: 2.1 MG/DL (ref 1.8–2.4)
MAGNESIUM: 2.3 MG/DL (ref 1.8–2.4)
MCH RBC QN AUTO: 29.3 PG (ref 26–34)
MCHC RBC AUTO-ENTMCNC: 33.2 G/DL (ref 31–36)
MCV RBC AUTO: 88.2 FL (ref 80–100)
METAMYELOCYTES RELATIVE PERCENT: 1 %
MONOCYTES ABSOLUTE: 3.1 K/UL (ref 0–1.3)
MONOCYTES RELATIVE PERCENT: 7 %
NEUTROPHILS ABSOLUTE: 38.9 K/UL (ref 1.7–7.7)
NEUTROPHILS RELATIVE PERCENT: 51 %
ORGANISM: ABNORMAL
PDW BLD-RTO: 15.2 % (ref 12.4–15.4)
PH VENOUS: 7.26 (ref 7.35–7.45)
PH VENOUS: 7.28 (ref 7.35–7.45)
PH VENOUS: 7.31 (ref 7.35–7.45)
PH VENOUS: 7.33 (ref 7.35–7.45)
PHOSPHORUS: 2.9 MG/DL (ref 2.5–4.9)
PHOSPHORUS: 3.2 MG/DL (ref 2.5–4.9)
PHOSPHORUS: 3.4 MG/DL (ref 2.5–4.9)
PHOSPHORUS: 3.7 MG/DL (ref 2.5–4.9)
PLATELET # BLD: 281 K/UL (ref 135–450)
PMV BLD AUTO: 8.2 FL (ref 5–10.5)
POLYCHROMASIA: ABNORMAL
POTASSIUM REFLEX MAGNESIUM: 4.9 MMOL/L (ref 3.5–5.1)
POTASSIUM SERPL-SCNC: 4.6 MMOL/L (ref 3.5–5.1)
POTASSIUM SERPL-SCNC: 4.6 MMOL/L (ref 3.5–5.1)
POTASSIUM SERPL-SCNC: 4.9 MMOL/L (ref 3.5–5.1)
POTASSIUM SERPL-SCNC: 5.1 MMOL/L (ref 3.5–5.1)
RBC # BLD: 2.41 M/UL (ref 4.2–5.9)
REPORT: NORMAL
SODIUM BLD-SCNC: 135 MMOL/L (ref 136–145)
SODIUM BLD-SCNC: 136 MMOL/L (ref 136–145)
SODIUM BLD-SCNC: 136 MMOL/L (ref 136–145)
SODIUM BLD-SCNC: 137 MMOL/L (ref 136–145)
SODIUM BLD-SCNC: 137 MMOL/L (ref 136–145)
TOTAL PROTEIN: 5.1 G/DL (ref 6.4–8.2)
TOXIC GRANULATION: PRESENT
URINE CULTURE, ROUTINE: ABNORMAL
WBC # BLD: 43.7 K/UL (ref 4–11)

## 2020-10-11 PROCEDURE — 36430 TRANSFUSION BLD/BLD COMPNT: CPT

## 2020-10-11 PROCEDURE — 80053 COMPREHEN METABOLIC PANEL: CPT

## 2020-10-11 PROCEDURE — 85014 HEMATOCRIT: CPT

## 2020-10-11 PROCEDURE — 83605 ASSAY OF LACTIC ACID: CPT

## 2020-10-11 PROCEDURE — 2000000000 HC ICU R&B

## 2020-10-11 PROCEDURE — 2500000003 HC RX 250 WO HCPCS: Performed by: INTERNAL MEDICINE

## 2020-10-11 PROCEDURE — C9113 INJ PANTOPRAZOLE SODIUM, VIA: HCPCS | Performed by: INTERNAL MEDICINE

## 2020-10-11 PROCEDURE — 84100 ASSAY OF PHOSPHORUS: CPT

## 2020-10-11 PROCEDURE — 2700000000 HC OXYGEN THERAPY PER DAY

## 2020-10-11 PROCEDURE — 94761 N-INVAS EAR/PLS OXIMETRY MLT: CPT

## 2020-10-11 PROCEDURE — 2580000003 HC RX 258: Performed by: INTERNAL MEDICINE

## 2020-10-11 PROCEDURE — 99292 CRITICAL CARE ADDL 30 MIN: CPT | Performed by: INTERNAL MEDICINE

## 2020-10-11 PROCEDURE — 99232 SBSQ HOSP IP/OBS MODERATE 35: CPT | Performed by: SURGERY

## 2020-10-11 PROCEDURE — 36592 COLLECT BLOOD FROM PICC: CPT

## 2020-10-11 PROCEDURE — 85025 COMPLETE CBC W/AUTO DIFF WBC: CPT

## 2020-10-11 PROCEDURE — 85018 HEMOGLOBIN: CPT

## 2020-10-11 PROCEDURE — 2580000003 HC RX 258: Performed by: NURSE PRACTITIONER

## 2020-10-11 PROCEDURE — P9016 RBC LEUKOCYTES REDUCED: HCPCS

## 2020-10-11 PROCEDURE — 90945 DIALYSIS ONE EVALUATION: CPT

## 2020-10-11 PROCEDURE — 82330 ASSAY OF CALCIUM: CPT

## 2020-10-11 PROCEDURE — 99291 CRITICAL CARE FIRST HOUR: CPT | Performed by: INTERNAL MEDICINE

## 2020-10-11 PROCEDURE — 6360000002 HC RX W HCPCS: Performed by: INTERNAL MEDICINE

## 2020-10-11 PROCEDURE — 83735 ASSAY OF MAGNESIUM: CPT

## 2020-10-11 RX ORDER — 0.9 % SODIUM CHLORIDE 0.9 %
20 INTRAVENOUS SOLUTION INTRAVENOUS ONCE
Status: COMPLETED | OUTPATIENT
Start: 2020-10-11 | End: 2020-10-11

## 2020-10-11 RX ADMIN — CEFEPIME HYDROCHLORIDE 2 G: 2 INJECTION, POWDER, FOR SOLUTION INTRAVENOUS at 15:24

## 2020-10-11 RX ADMIN — PANTOPRAZOLE SODIUM 40 MG: 40 INJECTION, POWDER, FOR SOLUTION INTRAVENOUS at 20:31

## 2020-10-11 RX ADMIN — CALCIUM GLUCONATE 1 G: 20 INJECTION, SOLUTION INTRAVENOUS at 09:38

## 2020-10-11 RX ADMIN — Medication 1000 ML/HR: at 07:59

## 2020-10-11 RX ADMIN — CEFEPIME HYDROCHLORIDE 2 G: 2 INJECTION, POWDER, FOR SOLUTION INTRAVENOUS at 02:07

## 2020-10-11 RX ADMIN — Medication 500 ML/HR: at 03:05

## 2020-10-11 RX ADMIN — Medication 2000 ML/HR: at 13:04

## 2020-10-11 RX ADMIN — DEXTROSE MONOHYDRATE 18 MCG/MIN: 50 INJECTION, SOLUTION INTRAVENOUS at 19:09

## 2020-10-11 RX ADMIN — Medication 2000 ML/HR: at 19:09

## 2020-10-11 RX ADMIN — Medication 1000 ML/HR: at 18:39

## 2020-10-11 RX ADMIN — SODIUM CHLORIDE, PRESERVATIVE FREE 10 ML: 5 INJECTION INTRAVENOUS at 20:31

## 2020-10-11 RX ADMIN — Medication 2000 ML/HR: at 03:06

## 2020-10-11 RX ADMIN — VASOPRESSIN 0.04 UNITS/MIN: 20 INJECTION INTRAVENOUS at 07:59

## 2020-10-11 RX ADMIN — Medication 2000 ML/HR: at 09:00

## 2020-10-11 RX ADMIN — SODIUM CHLORIDE, PRESERVATIVE FREE 10 ML: 5 INJECTION INTRAVENOUS at 09:39

## 2020-10-11 RX ADMIN — Medication 1000 ML/HR: at 13:04

## 2020-10-11 RX ADMIN — CALCIUM GLUCONATE 1 G: 20 INJECTION, SOLUTION INTRAVENOUS at 21:10

## 2020-10-11 RX ADMIN — DEXTROSE MONOHYDRATE 32 MCG/MIN: 50 INJECTION, SOLUTION INTRAVENOUS at 05:39

## 2020-10-11 RX ADMIN — Medication 2000 ML/HR: at 10:59

## 2020-10-11 RX ADMIN — SODIUM CHLORIDE: 9 INJECTION, SOLUTION INTRAVENOUS at 11:00

## 2020-10-11 RX ADMIN — Medication 2000 ML/HR: at 21:44

## 2020-10-11 RX ADMIN — Medication 500 ML/HR: at 18:39

## 2020-10-11 RX ADMIN — Medication 2000 ML/HR: at 15:30

## 2020-10-11 RX ADMIN — VASOPRESSIN 0.04 UNITS/MIN: 20 INJECTION INTRAVENOUS at 02:26

## 2020-10-11 RX ADMIN — Medication 2000 ML/HR: at 00:42

## 2020-10-11 RX ADMIN — CALCIUM GLUCONATE 1 G: 20 INJECTION, SOLUTION INTRAVENOUS at 03:12

## 2020-10-11 RX ADMIN — VASOPRESSIN 0.04 UNITS/MIN: 20 INJECTION INTRAVENOUS at 16:20

## 2020-10-11 RX ADMIN — Medication 1000 ML/HR: at 03:05

## 2020-10-11 RX ADMIN — Medication 500 ML/HR: at 13:04

## 2020-10-11 RX ADMIN — Medication 2000 ML/HR: at 05:45

## 2020-10-11 RX ADMIN — PANTOPRAZOLE SODIUM 40 MG: 40 INJECTION, POWDER, FOR SOLUTION INTRAVENOUS at 09:39

## 2020-10-11 RX ADMIN — CALCIUM GLUCONATE 1 G: 20 INJECTION, SOLUTION INTRAVENOUS at 16:30

## 2020-10-11 ASSESSMENT — PAIN SCALES - GENERAL
PAINLEVEL_OUTOF10: 0
PAINLEVEL_OUTOF10: 0

## 2020-10-11 NOTE — PROGRESS NOTES
Vascular    Consulted for IVC filter  Case discussed with Dr Ivonne Morales. IVC filter can be placed in future when more stable. CT reviewed- this is not a classic retroperitoneal bleed- it appears to be a left renal bleed. There is no history of left kidney surgery, per NP he has had a partial RIGHT nephrectomy. ? Possible bleeding renal tumor  If bleeding does not stop can consider angiogram with possible embolization.

## 2020-10-11 NOTE — PROGRESS NOTES
ID consult received. Chart reviewed. 71-year-old man, on chronic anticoagulation, admitted with retroperitoneal bleed, bandemia, hemorrhagic/septic shock, requiring vasopressors. Blood and urine culture growing E. coli. Urine culture E. coli isolate susceptibility reviewed. Patient currently on IV cefepime, continue IV cefepime at a renally adjusted dose. Can stop IV vancomycin. Leukocytosis may also be secondary to retroperitoneal bleed and related stress. Continue to watch for new fever. General surgery and urology following. Nephrology following for acute renal failure, patient on CRRT. Patient to be seen tomorrow by Dr. Hemant Raymond. Full ID consult to follow. Please call in the meanwhile with any questions.       Sofía Greenberg MD, MPH  10/11/2020, 11:14 AM  [facility] Infectious Disease   [address] [place] [address]  Office: [phone]  Fax: [phone]  Clinic days:  Tuesday & Thursday

## 2020-10-11 NOTE — PROGRESS NOTES
[facility]    PATIENT NAME: Daryl Alexander Sr.     TODAY'S DATE: 10/11/2020    CHIEF COMPLAINT: abd pain    INTERVAL HISTORY/HPI:    Pt with improved left sided pain, no nausea or emesis, no fevers, decreased hgb overnight. REVIEW OF SYSTEMS:  Pertinent positives and negatives as per interval history section    OBJECTIVE:  VITALS:  BP (!) 90/56   Pulse 97   Temp 97.7 °F (36.5 °C) (Bladder)   Resp 28   Ht 5' 4\" (1.626 m)   Wt (!) 355 lb 9.6 oz (161.3 kg)   SpO2 95%   BMI 61.04 kg/m²     INTAKE/OUTPUT:    I/O last 3 completed shifts: In: 9974 [I.V.:2471]  Out: 33 [Urine:33]  No intake/output data recorded. CONSTITUTIONAL:  awake and alert  LUNGS:  Respirations easy and unlabored,  CARD:  regular rate   ABDOMEN:  normal bowel sounds, soft, non-distended, minimal tender left side     Data:  CBC:   Recent Labs     10/09/20  2350 10/10/20  0528  10/10/20  1955 10/11/20  0220 10/11/20  0605   WBC 36.5* 36.9*  --   --   --  43.7*   HGB 9.0* 8.5*   < > 7.5* 7.0* 7.1*   HCT 26.8* 25.2*   < > 23.4* 21.6* 21.3*    308  --   --   --  281    < > = values in this interval not displayed. BMP:    Recent Labs     10/10/20  1955 10/11/20  0220 10/11/20  0605   * 137 137   K 4.7 4.6 4.9    106 105   CO2 18* 21 23   BUN 70* 54* 47*   CREATININE 3.3* 2.5* 2.3*   GLUCOSE 159* 159* 155*     Hepatic:   Recent Labs     10/09/20  1247 10/10/20  0528 10/11/20  0605   AST 18 26 32   ALT 13 20 27   BILITOT 0.9 1.6* 0.9   ALKPHOS 87 67 78     Mag:      Recent Labs     10/10/20  1955 10/11/20  0220 10/11/20  0605   MG 1.90 2.10 2.10      Phos:     Recent Labs     10/10/20  1325 10/10/20  1955 10/11/20  0220   PHOS 5.4* 4.5 3.7      INR:   Recent Labs     10/09/20  1247 10/09/20  1811 10/10/20  0528   INR 9.24* 1.93* 1.42*       Radiology Review:  *Imaging personally reviewed by me. NA      ASSESSMENT AND PLAN:  70 yo with left retroperitoneal bleed, likely from kidney  1.   Decreased hgb but also had clotting of CRRT filter overnight. Pressor support stable. 2.  Transfuse as needed and trend Hgb.      Electronically signed by Bernardino Lau, [address]

## 2020-10-11 NOTE — PLAN OF CARE
Problem: Infection - Central Venous Catheter-Associated Bloodstream Infection:  Goal: Will show no infection signs and symptoms  Will show no infection signs and symptoms   Outcome: Ongoing  Pt w/ RIJ CVC and LIJ vascath. Both dressings CDI, biopatch in place, no s/s of infection to site at this time. Will con't to monitor CVC sites closely. Problem: Falls - Risk of  Goal: Absence of falls  Outcome: Ongoing  Pt remains a fall risk. See Samra Flatten Fall Score. Fall risk bundle in place. Pt bed is in low position with bed alarm on, side rails up, fall risk bracelet and non-skid footwear in use. Will continue to monitor and reassess [place] fall risk. Problem: Risk for Impaired Skin Integrity  Goal: Tissue integrity - skin and mucous membranes  Structural intactness and normal physiological function of skin and  mucous membranes. Outcome: Ongoing  Pt remains at risk for skin breakdown- see Gopi score. Pt turned q2h, preventative sacral heart mepilex in place, and heels elevated off bed. Skin is kept clean and dry. Pt has devine and  in place to prevent skin breakdown.  Will continue to assess skin and monitor for any signs of breakdown.

## 2020-10-11 NOTE — PROGRESS NOTES
Hospitalist Progress Note      PCP: Justine Sanchez MD    Date of Admission: 10/9/2020    Chief Complaint: Fatigue, dizziness    Hospital Course: 71 y.o. male who presented to [facility] with complaints as stated above  Patient with PMH of nonischemic cardiomyopathy EF 40 to 45%, HTN, history of DVT on Coumadin, GI bleed in April 2019, presented to the ED today with complaints of fatigue and dizziness. Patient is pretty much bedbound at home. Wife reports when patient stood up he got lightheaded, and fell back in bed. Also reports patient has not been eating or drinking much. Wife also reports patient stool has been black this past week. Patient is on Coumadin for past history of DVT/PE. She contacted patient's PCP about this, who ordered stool sample for blood, they had trouble getting the test kit until the seventh. Today she called patient's PCP about the patient being very weak, unable to get out of bed, and they were advised to come to the ED. patient also reports diffuse dull aching abdominal pain that is intermittent.        Subjective: BP supported on Levophed. Hgb stable. INR down to 1.42. Cr worsening and urine output minimal.  Wife at bedside. Discussed plan of care.       Medications:  Reviewed    Infusion Medications    IV infusion builder 100 mL/hr at 10/10/20 1242    prismaSATE BGK 4/2.5 2,000 mL/hr (10/10/20 2124)    prismaSATE BGK 4/2.5 1,000 mL/hr (10/10/20 2125)    prismaSATE BGK 4/2.5 500 mL/hr (10/10/20 1627)    norepinephrine 35 mcg/min (10/10/20 2158)     Scheduled Medications    cefepime  2 g Intravenous B03S    folic acid  1 mg Oral Daily    sodium chloride flush  10 mL Intravenous 2 times per day    pantoprazole  40 mg Intravenous BID     PRN Meds: morphine, magnesium sulfate, calcium gluconate **OR** calcium gluconate **OR** calcium gluconate **OR** calcium gluconate, sodium phosphate IVPB **OR** sodium phosphate IVPB **OR** sodium phosphate IVPB **OR** sodium phosphate IVPB, sodium chloride flush, acetaminophen **OR** acetaminophen, polyethylene glycol, promethazine **OR** ondansetron, perflutren lipid microspheres      Intake/Output Summary (Last 24 hours) at 10/10/2020 2223  Last data filed at 10/10/2020 1629  Gross per 24 hour   Intake 2721 ml   Output 188 ml   Net 2533 ml       Physical Exam Performed:    BP (!) 121/54   Pulse 97   Temp 96.5 °F (35.8 °C) (Bladder)   Resp 27   Ht 5' 4\" (1.626 m)   Wt (!) 355 lb 9.6 oz (161.3 kg)   SpO2 (!) 84%   BMI 61.04 kg/m²     General appearance: No apparent distress, appears stated age and cooperative. HEENT: Pupils equal, round, and reactive to light. Conjunctivae/corneas clear. Neck: Supple, with full range of motion. No jugular venous distention. Trachea midline. Respiratory:  Normal respiratory effort. Clear to auscultation, bilaterally without Rales/Wheezes/Rhonchi. Cardiovascular: Regular rate and rhythm with normal S1/S2 without murmurs, rubs or gallops. Abdomen: Soft, non-tender, non-distended with normal bowel sounds. Large ventral hernia. Musculoskeletal: No clubbing, cyanosis or edema bilaterally. Full range of motion without deformity. Skin: Skin color, texture, turgor normal.  No rashes or lesions. Neurologic:  Neurovascularly intact without any focal sensory/motor deficits. Cranial nerves: II-XII intact, grossly non-focal.  Psychiatric: Alert and oriented, thought content appropriate, normal insight  Capillary Refill: Brisk,< 3 seconds   Peripheral Pulses: +2 palpable, equal bilaterally       Labs:   Recent Labs     10/09/20  1247  10/09/20  2350 10/10/20  0528 10/10/20  1525 10/10/20  1955   WBC 20.6*  --  36.5* 36.9*  --   --    HGB 12.3*   < > 9.0* 8.5* 8.2* 7.5*   HCT 36.4*   < > 26.8* 25.2* 24.5* 23.4*     --  354 308  --   --     < > = values in this interval not displayed.      Recent Labs     10/10/20  0528 10/10/20  1325 10/10/20  1955    137 135*   K 5.0 5.0 4.7   CL 109 109 105   CO2 17* 16* 18*   BUN 84* 86* 70*   CREATININE 3.5* 4.1* 3.3*   CALCIUM 7.6* 7.4* 7.5*   PHOS 4.8 5.4* 4.5     Recent Labs     10/09/20  1247 10/10/20  0528   AST 18 26   ALT 13 20   BILITOT 0.9 1.6*   ALKPHOS 87 67     Recent Labs     10/09/20  1247 10/09/20  1811 10/10/20  0528   INR 9.24* 1.93* 1.42*     Recent Labs     10/09/20  1247   TROPONINI 0.05*       Urinalysis:      Lab Results   Component Value Date    NITRU Negative 10/09/2020    WBCUA 21-50 10/09/2020    BACTERIA 2+ 10/09/2020    RBCUA 11-20 10/09/2020    BLOODU LARGE 10/09/2020    SPECGRAV 1.010 10/09/2020    GLUCOSEU Negative 10/09/2020       Radiology:  XR CHEST PORTABLE   Final Result   No evidence of pneumothorax following left IJ catheter placement. Improved   aeration in the left lung base. XR CHEST PORTABLE   Final Result   No acute process. CT ABDOMEN PELVIS WO CONTRAST Additional Contrast? None   Final Result   Addendum 1 of 1   ADDENDUM:   Additional information, corrected report:      HISTORY:   The initial history was incorrect, patient did not have left side    nephrectomy   but partial nephrectomy on the right. Therefore the hematoma seen retroperitoneal on the left in the renal fossa   likely obscures the entire left kidney. There is no normal renal    parenchyma   identified. The measured area, 13 x 9 cm therefore presumably include   spontaneous hematoma and kidney tissue combined. This would also    therefore   indicate a lesser degree of hemorrhage present, since some of the measured   area being occupied by the obscured kidney. Again the primary etiology is spontaneous hemorrhage, given the patient's   grossly elevated INR, and likely arising within the obscured left kidney    and   extending left pararenal retroperitoneal space. Final      CT HEAD WO CONTRAST   Final Result   1. No acute intracranial abnormality.          XR CHEST PORTABLE   Final Result   No acute cardiopulmonary disease. Assessment/Plan:    Active Hospital Problems    Diagnosis    Retroperitoneal bleed [R58]    Supratherapeutic INR [R79.1]    Shock ([address] [R57.9]    Non-ischemic cardiomyopathy (HCC) EF 40% [I42.8]    Benign hypertension [I10]    History of DVT (deep vein thrombosis) [Z86.718]    Morbid obesity with BMI of 60.0-69.9, adult (HCC) [E66.01, Z68.44]     Spontaneous retroperitoneal hemorrhage  Likely 2/2 supratherapeutic INR of 9.24. With reversal of coagulopathy, and with time, RPH usually tamponades and stops bleeding with conservative therapy not requiring intervention. CT abdomen/pelvis showed no active extravasation (no active bleeder)  -Reverse Coumadin with K Centra, vitamin K until INR <1.5  -Serial H&H, transfuse if Hb <7 or rapidly progressing bleed  -General surgery/interventional radiology consulted and aware - no surgical intervention at this time.  -serial CT to assess size of [facility]  -urology consulted and following.     Shock   Combination of hemorrhagic due to RPH/GI bleed and/or sepsis due to UTI  -Given IV fluid boluses without adequate response, continue MIVF  -CVC placed in ED, placed on Levophed to keep map >65  -We will hookup CBC to CVP to assess intravascular volume deficit  -s/p 1 unit PRBC 10/9.     Supratherapeutic INR   INR 9 with active hemorrhage.  -Reversal with vitamin K IV and KCentra  -INR improved.     -Hold Coumadin.  -consult Vascular surgery for possible IVC filter given this the patient's 3rd life threatening bleed.       Acute cystitis  -IV cefepime initiated, follow-up urine cultures     GI bleed - melena x 1 wk  Worsened due to supratherapeutic INR  -Hb dropped from 12 to 8.5 with fluids revealing true degree of anemia  -Hold Coumadin and antiplatelet agents  -Consult GI for possible EGD.  -continue IV PPI twice daily  -Reversal of Coumadin completed  -H&H every 6 hours and transfuse if Hb <7 or large-volume melena.     KEVIN  Baseline Cr 1.0, now 2.7. Likely ATN due to hypotension and hypovolemia. Avoid nephrotoxic meds  Volume replacement with IV crystalline, PRBC as indicated  Maintain map >65  Serial renal panel. Cr continues to worsen - nephrology consulted. Non-ischemic cardiomyopathy  EF 40%, CXR clear. Follow I&O closely and monitor for fluid overload.     Benign hypertension   - held bp meds due to shock     Remote history of DVT (deep vein thrombosis)   -coumadin held  -Follows up with heme-onc  -may need to consult hematology given recurrent bleed.     Morbid obesity with BMI of 58.3-12.7, adult   Complicating assessment and treatment, placing patient at high risk for multiple co-morbidities as well as early death and contributing to the patient's presentation.  Counseled on weight loss.       DVT Prophylaxis: SCDs  Diet: Diet NPO Effective Now  Code Status: Full Code    PT/OT Eval Status: hold for now    Dispo - likely inpatient for several days    JOEY Almazan - CNP

## 2020-10-11 NOTE — PROGRESS NOTES
Pt tolerated full turn. Able to visualize buttock wound. 3-4 areas of DTI Pt was unable to stay on side foe length of time for measure or pictures, 4 eye completed with Alhaji Parnell RN.

## 2020-10-11 NOTE — PROGRESS NOTES
Patient with hemoglobin drop. Discussed with nursing. No bowel movements or melena noted. Does not appear to be gastrointestinal source. Will continue to follow. Dr. Pedro Flaherty to evaluate on Monday. Please call if changes in clinical status.

## 2020-10-11 NOTE — PROGRESS NOTES
CRRT set changed and restarted at this time. Previous filter clotted at 2330. Blue an red lines flipped.

## 2020-10-11 NOTE — PROGRESS NOTES
[facility]    F/u Note           Reason for Consult:   MARS  Requesting Physician:  Dr. Romi Manjarrez history  CRRT started on 10/10   Seen and examined and pt tolerating well  Levo at 20 and coming down  Vaso  Hb dropped to 6.7     uop 15ml/h for the few hrs this am    ROS: No chest pain/shortness of breath/fever/nausea/vomiting  PSFH: + visitor    Scheduled Meds:   cefepime  2 g Intravenous K43A    folic acid  1 mg Oral Daily    sodium chloride flush  10 mL Intravenous 2 times per day    pantoprazole  40 mg Intravenous BID     Continuous Infusions:   vasopressin (Septic Shock) infusion 0.04 Units/min (10/11/20 0759)    IV infusion builder 100 mL/hr at 10/10/20 2259    prismaSATE BGK 4/2.5 2,000 mL/hr (10/11/20 0545)    prismaSATE BGK 4/2.5 1,000 mL/hr (10/11/20 0759)    prismaSATE BGK 4/2.5 500 mL/hr (10/11/20 0305)    norepinephrine 20 mcg/min (10/11/20 0745)     PRN Meds:.morphine, magnesium sulfate, calcium gluconate **OR** calcium gluconate **OR** calcium gluconate **OR** calcium gluconate, sodium phosphate IVPB **OR** sodium phosphate IVPB **OR** sodium phosphate IVPB **OR** sodium phosphate IVPB, sodium chloride flush, acetaminophen **OR** acetaminophen, polyethylene glycol, promethazine **OR** ondansetron, perflutren lipid microspheres    History of Present Illness on 10/10/20:    71 y.o. yo male with PMH of Morbid obesity, DVT on coumadin who is admitted for hemorrhagic shock and MARS after his INr was noted to be over 9  Pt was dizzy and fell back in bed when trying to get up this am  He was noted to have BP in the 60s and occasionally in 50s in the Ed; received 3.5 l NS bolus ; 1 unit of PRBC, 1000 units of kcentra; he was on levo which has been warned off this am  Nephrology consulted for oliguric Mars    Physical exam:   Constitutional:  VITALS:  BP (!) 90/56   Pulse 97   Temp 97.7 °F (36.5 °C) (Bladder)   Resp 28   Ht 5' 4\" (1.626 m)   Wt (!) 355 lb 9.6 oz likely sepsis  w elevated WBCs  -supra therapeutic INR   H/o DVT on coumadin  -CHFr EF  -Anemia    Plan  -cont CRRT - UF even from 10/11   - broad spectrum abx, renally dosed   Agree with blood transfusion on 10/11  -Serial renal panel  -daily wts and strict i/o  -renal dose medications   -avoid nephrotoxins    Thank you for the consultation. Please do not hesitate to call with questions.     Jose Rushing  [facility]  Office: [phone]  Fax:    [phone]

## 2020-10-12 PROBLEM — B96.20 BACTEREMIA DUE TO ESCHERICHIA COLI: Status: ACTIVE | Noted: 2020-10-12

## 2020-10-12 PROBLEM — D72.825 BANDEMIA: Status: ACTIVE | Noted: 2020-10-12

## 2020-10-12 PROBLEM — D62 ANEMIA DUE TO BLOOD LOSS, ACUTE: Status: ACTIVE | Noted: 2020-10-12

## 2020-10-12 PROBLEM — R82.81 PYURIA: Status: ACTIVE | Noted: 2020-10-12

## 2020-10-12 PROBLEM — R78.81 BACTEREMIA DUE TO ESCHERICHIA COLI: Status: ACTIVE | Noted: 2020-10-12

## 2020-10-12 PROBLEM — J98.11 ATELECTASIS: Status: ACTIVE | Noted: 2020-10-12

## 2020-10-12 LAB
A/G RATIO: 1 (ref 1.1–2.2)
ALBUMIN SERPL-MCNC: 2.5 G/DL (ref 3.4–5)
ALP BLD-CCNC: 79 U/L (ref 40–129)
ALT SERPL-CCNC: 25 U/L (ref 10–40)
ANION GAP SERPL CALCULATED.3IONS-SCNC: 5 MMOL/L (ref 3–16)
ANION GAP SERPL CALCULATED.3IONS-SCNC: 6 MMOL/L (ref 3–16)
ANION GAP SERPL CALCULATED.3IONS-SCNC: 6 MMOL/L (ref 3–16)
ANION GAP SERPL CALCULATED.3IONS-SCNC: 7 MMOL/L (ref 3–16)
AST SERPL-CCNC: 19 U/L (ref 15–37)
BASOPHILS ABSOLUTE: 0 K/UL (ref 0–0.2)
BASOPHILS RELATIVE PERCENT: 0.1 %
BILIRUB SERPL-MCNC: 0.9 MG/DL (ref 0–1)
BLOOD BANK DISPENSE STATUS: NORMAL
BLOOD BANK PRODUCT CODE: NORMAL
BPU ID: NORMAL
BUN BLDV-MCNC: 14 MG/DL (ref 7–20)
BUN BLDV-MCNC: 16 MG/DL (ref 7–20)
BUN BLDV-MCNC: 19 MG/DL (ref 7–20)
BUN BLDV-MCNC: 20 MG/DL (ref 7–20)
CALCIUM IONIZED: 1.09 MMOL/L (ref 1.12–1.32)
CALCIUM IONIZED: 1.11 MMOL/L (ref 1.12–1.32)
CALCIUM SERPL-MCNC: 4.9 MG/DL (ref 8.3–10.6)
CALCIUM SERPL-MCNC: 7.3 MG/DL (ref 8.3–10.6)
CALCIUM SERPL-MCNC: 7.6 MG/DL (ref 8.3–10.6)
CALCIUM SERPL-MCNC: 7.6 MG/DL (ref 8.3–10.6)
CHLORIDE BLD-SCNC: 101 MMOL/L (ref 99–110)
CHLORIDE BLD-SCNC: 102 MMOL/L (ref 99–110)
CHLORIDE BLD-SCNC: 105 MMOL/L (ref 99–110)
CHLORIDE BLD-SCNC: 115 MMOL/L (ref 99–110)
CO2: 17 MMOL/L (ref 21–32)
CO2: 26 MMOL/L (ref 21–32)
CO2: 26 MMOL/L (ref 21–32)
CO2: 27 MMOL/L (ref 21–32)
CREAT SERPL-MCNC: 0.8 MG/DL (ref 0.8–1.3)
CREAT SERPL-MCNC: 1.1 MG/DL (ref 0.8–1.3)
CREAT SERPL-MCNC: 1.2 MG/DL (ref 0.8–1.3)
CREAT SERPL-MCNC: 1.3 MG/DL (ref 0.8–1.3)
DESCRIPTION BLOOD BANK: NORMAL
EOSINOPHILS ABSOLUTE: 0.1 K/UL (ref 0–0.6)
EOSINOPHILS RELATIVE PERCENT: 0.5 %
FERRITIN: 631.5 NG/ML (ref 30–400)
GFR AFRICAN AMERICAN: >60
GFR NON-AFRICAN AMERICAN: 55
GFR NON-AFRICAN AMERICAN: 60
GFR NON-AFRICAN AMERICAN: >60
GFR NON-AFRICAN AMERICAN: >60
GLOBULIN: 2.6 G/DL
GLUCOSE BLD-MCNC: 114 MG/DL (ref 70–99)
GLUCOSE BLD-MCNC: 133 MG/DL (ref 70–99)
GLUCOSE BLD-MCNC: 143 MG/DL (ref 70–99)
GLUCOSE BLD-MCNC: 82 MG/DL (ref 70–99)
HCT VFR BLD CALC: 20.5 % (ref 40.5–52.5)
HCT VFR BLD CALC: 21.1 % (ref 40.5–52.5)
HCT VFR BLD CALC: 21.9 % (ref 40.5–52.5)
HCT VFR BLD CALC: 22 % (ref 40.5–52.5)
HCT VFR BLD CALC: 22.9 % (ref 40.5–52.5)
HEMOGLOBIN: 6.7 G/DL (ref 13.5–17.5)
HEMOGLOBIN: 7 G/DL (ref 13.5–17.5)
HEMOGLOBIN: 7.1 G/DL (ref 13.5–17.5)
HEMOGLOBIN: 7.4 G/DL (ref 13.5–17.5)
HEMOGLOBIN: 7.5 G/DL (ref 13.5–17.5)
IRON SATURATION: 36 % (ref 20–50)
IRON: 50 UG/DL (ref 59–158)
LACTIC ACID: 1 MMOL/L (ref 0.4–2)
LV EF: 50 %
LVEF MODALITY: NORMAL
LYMPHOCYTES ABSOLUTE: 0.6 K/UL (ref 1–5.1)
LYMPHOCYTES RELATIVE PERCENT: 2.3 %
MAGNESIUM: 1.6 MG/DL (ref 1.8–2.4)
MAGNESIUM: 2.3 MG/DL (ref 1.8–2.4)
MAGNESIUM: 2.3 MG/DL (ref 1.8–2.4)
MCH RBC QN AUTO: 28.8 PG (ref 26–34)
MCHC RBC AUTO-ENTMCNC: 32.6 G/DL (ref 31–36)
MCV RBC AUTO: 88.3 FL (ref 80–100)
MONOCYTES ABSOLUTE: 1.6 K/UL (ref 0–1.3)
MONOCYTES RELATIVE PERCENT: 6 %
NEUTROPHILS ABSOLUTE: 24.8 K/UL (ref 1.7–7.7)
NEUTROPHILS RELATIVE PERCENT: 91.1 %
PDW BLD-RTO: 15.2 % (ref 12.4–15.4)
PH VENOUS: 7.26 (ref 7.35–7.45)
PH VENOUS: 7.32 (ref 7.35–7.45)
PHOSPHORUS: 1.4 MG/DL (ref 2.5–4.9)
PHOSPHORUS: 1.8 MG/DL (ref 2.5–4.9)
PHOSPHORUS: 2.4 MG/DL (ref 2.5–4.9)
PLATELET # BLD: 163 K/UL (ref 135–450)
PMV BLD AUTO: 7.9 FL (ref 5–10.5)
POTASSIUM REFLEX MAGNESIUM: 4.9 MMOL/L (ref 3.5–5.1)
POTASSIUM SERPL-SCNC: 3.1 MMOL/L (ref 3.5–5.1)
POTASSIUM SERPL-SCNC: 4.4 MMOL/L (ref 3.5–5.1)
POTASSIUM SERPL-SCNC: 4.8 MMOL/L (ref 3.5–5.1)
RBC # BLD: 2.6 M/UL (ref 4.2–5.9)
REPORT: NORMAL
SODIUM BLD-SCNC: 133 MMOL/L (ref 136–145)
SODIUM BLD-SCNC: 134 MMOL/L (ref 136–145)
SODIUM BLD-SCNC: 137 MMOL/L (ref 136–145)
SODIUM BLD-SCNC: 139 MMOL/L (ref 136–145)
TOTAL IRON BINDING CAPACITY: 137 UG/DL (ref 260–445)
TOTAL PROTEIN: 5.1 G/DL (ref 6.4–8.2)
WBC # BLD: 27.2 K/UL (ref 4–11)

## 2020-10-12 PROCEDURE — 2500000003 HC RX 250 WO HCPCS: Performed by: INTERNAL MEDICINE

## 2020-10-12 PROCEDURE — 99231 SBSQ HOSP IP/OBS SF/LOW 25: CPT | Performed by: SURGERY

## 2020-10-12 PROCEDURE — 6360000002 HC RX W HCPCS: Performed by: INTERNAL MEDICINE

## 2020-10-12 PROCEDURE — 6370000000 HC RX 637 (ALT 250 FOR IP): Performed by: INTERNAL MEDICINE

## 2020-10-12 PROCEDURE — 2580000003 HC RX 258: Performed by: INTERNAL MEDICINE

## 2020-10-12 PROCEDURE — 83540 ASSAY OF IRON: CPT

## 2020-10-12 PROCEDURE — 36430 TRANSFUSION BLD/BLD COMPNT: CPT

## 2020-10-12 PROCEDURE — C9113 INJ PANTOPRAZOLE SODIUM, VIA: HCPCS | Performed by: INTERNAL MEDICINE

## 2020-10-12 PROCEDURE — P9016 RBC LEUKOCYTES REDUCED: HCPCS

## 2020-10-12 PROCEDURE — 83550 IRON BINDING TEST: CPT

## 2020-10-12 PROCEDURE — 85018 HEMOGLOBIN: CPT

## 2020-10-12 PROCEDURE — 84238 ASSAY NONENDOCRINE RECEPTOR: CPT

## 2020-10-12 PROCEDURE — 82330 ASSAY OF CALCIUM: CPT

## 2020-10-12 PROCEDURE — 99222 1ST HOSP IP/OBS MODERATE 55: CPT | Performed by: INTERNAL MEDICINE

## 2020-10-12 PROCEDURE — 94761 N-INVAS EAR/PLS OXIMETRY MLT: CPT

## 2020-10-12 PROCEDURE — 2000000000 HC ICU R&B

## 2020-10-12 PROCEDURE — 83735 ASSAY OF MAGNESIUM: CPT

## 2020-10-12 PROCEDURE — 90945 DIALYSIS ONE EVALUATION: CPT

## 2020-10-12 PROCEDURE — 83605 ASSAY OF LACTIC ACID: CPT

## 2020-10-12 PROCEDURE — C8929 TTE W OR WO FOL WCON,DOPPLER: HCPCS

## 2020-10-12 PROCEDURE — 84100 ASSAY OF PHOSPHORUS: CPT

## 2020-10-12 PROCEDURE — 80053 COMPREHEN METABOLIC PANEL: CPT

## 2020-10-12 PROCEDURE — 2700000000 HC OXYGEN THERAPY PER DAY

## 2020-10-12 PROCEDURE — 85025 COMPLETE CBC W/AUTO DIFF WBC: CPT

## 2020-10-12 PROCEDURE — 36592 COLLECT BLOOD FROM PICC: CPT

## 2020-10-12 PROCEDURE — 99291 CRITICAL CARE FIRST HOUR: CPT | Performed by: INTERNAL MEDICINE

## 2020-10-12 PROCEDURE — 85014 HEMATOCRIT: CPT

## 2020-10-12 PROCEDURE — 82728 ASSAY OF FERRITIN: CPT

## 2020-10-12 RX ADMIN — PANTOPRAZOLE SODIUM 40 MG: 40 INJECTION, POWDER, FOR SOLUTION INTRAVENOUS at 21:00

## 2020-10-12 RX ADMIN — DEXTROSE MONOHYDRATE 10 MCG/MIN: 50 INJECTION, SOLUTION INTRAVENOUS at 23:29

## 2020-10-12 RX ADMIN — Medication 2000 ML/HR: at 02:49

## 2020-10-12 RX ADMIN — CALCIUM GLUCONATE 1 G: 20 INJECTION, SOLUTION INTRAVENOUS at 19:47

## 2020-10-12 RX ADMIN — Medication 2000 ML/HR: at 23:04

## 2020-10-12 RX ADMIN — Medication 1000 ML/HR: at 00:04

## 2020-10-12 RX ADMIN — SODIUM CHLORIDE, PRESERVATIVE FREE 10 ML: 5 INJECTION INTRAVENOUS at 10:02

## 2020-10-12 RX ADMIN — Medication 500 ML/HR: at 00:05

## 2020-10-12 RX ADMIN — PANTOPRAZOLE SODIUM 40 MG: 40 INJECTION, POWDER, FOR SOLUTION INTRAVENOUS at 10:01

## 2020-10-12 RX ADMIN — Medication 1000 ML/HR: at 05:17

## 2020-10-12 RX ADMIN — Medication 2000 ML/HR: at 05:18

## 2020-10-12 RX ADMIN — Medication 2000 ML/HR: at 00:13

## 2020-10-12 RX ADMIN — Medication 1000 ML/HR: at 23:04

## 2020-10-12 RX ADMIN — SODIUM CHLORIDE, PRESERVATIVE FREE 10 ML: 5 INJECTION INTRAVENOUS at 21:01

## 2020-10-12 RX ADMIN — Medication 500 ML/HR: at 23:04

## 2020-10-12 RX ADMIN — Medication 2000 ML/HR: at 20:27

## 2020-10-12 RX ADMIN — VASOPRESSIN 0.04 UNITS/MIN: 20 INJECTION INTRAVENOUS at 01:02

## 2020-10-12 RX ADMIN — FOLIC ACID 1 MG: 1 TABLET ORAL at 10:01

## 2020-10-12 RX ADMIN — CEFEPIME HYDROCHLORIDE 2 G: 2 INJECTION, POWDER, FOR SOLUTION INTRAVENOUS at 02:09

## 2020-10-12 ASSESSMENT — PAIN SCALES - GENERAL
PAINLEVEL_OUTOF10: 0
PAINLEVEL_OUTOF10: 0

## 2020-10-12 NOTE — CONSULTS
Vascular Surgery Consultation    Date of Admission:  10/9/2020 12:23 PM  Date of Consultation:  10/12/2020    PCP:  Cynthia Olmstead MD       Reason for Consultation:  Kidney bleed and IVC filter     History of Present Illness: We are asked to see this patient in consultation regarding bleed for left kidney and need for IVC filter. Astrid Kauffman Sr. is a 71 y.o. male who has a history of DVT maintained on chronic Coumadin. He has had a GI bleed in past.  Now found to have a \"retroperitoneal\" bleed, which actually is a bleed from left kidney. It is unclear if there is an underlying renal tumor. Nevertheless patients anticoagulation has been held and no plans to restart. His INR was supra therapeutic on admission at 9. He has received several units of blood since admission- last being one unit last nigh for Hgb 6.7.        Past Medical History:  Past Medical History:   Diagnosis Date    Cancer ([address]     kidney    Cardiomyopathy ([address] 08/2019    CHF (congestive heart failure) ([address] 08/2019    Edema of both legs     GI bleed     Hx of blood clots     DVT    Hypertension        Past Surgical History:  Past Surgical History:   Procedure Laterality Date    ABDOMEN SURGERY      Right Kidney Cancer abd. surgery 2014    CARDIAC CATHETERIZATION  09/06/2019    COLONOSCOPY      flexsigmoidoscopy 40yrs ago     COLONOSCOPY N/A 4/9/2019    COLONOSCOPY WITH BIOPSY performed [facility] Ha [facility] at [facility]      8/2018    KIDNEY SURGERY  4/7/2014    GA EGD TRANSORAL BIOPSY SINGLE/MULTIPLE  9/28/2018    EGD BIOPSY performed by Estephania Ha MD at [facility] [place] 4/8/2019    EGD WITH ANESTHESIA performed by Estephania Ha MD at [address] N/A 4/12/2019    ESOPHAGEAL CAPSULE ENDOSCOPY performed by Estephania Ha MD at [address] Medications:   Prior to Admission medications    Medication Sig Start Date End Date Taking? Authorizing Provider   carvedilol (COREG) 3.125 MG tablet TAKE ONE TABLET BY MOUTH TWICE A DAY 6/29/20  Yes JOEY Silva CNP   furosemide (LASIX) 40 MG tablet TAKE 1 TABLET BY MOUTH TWICE DAILY 5/4/20  Yes JOEY Curry CNP   lisinopril (PRINIVIL;ZESTRIL) 2.5 MG tablet TAKE ONE TABLET BY MOUTH DAILY 4/30/20  Yes JOEY Silva CNP   potassium chloride (KLOR-CON M) 20 MEQ TBCR extended release tablet TK 1 T PO BID 5/14/19  Yes Historical Provider, MD   warfarin (COUMADIN) 2.5 MG tablet Take 1 tablet by mouth daily  Patient taking differently: Take 5 mg by mouth daily  4/20/19  Yes Crystal Navarrete MD   vitamin D (CHOLECALCIFEROL) 1000 UNIT TABS tablet Take 1,000 Units by mouth 2 times daily   Yes Historical Provider, MD   tamsulosin (FLOMAX) 0.4 MG capsule Take 1 capsule by mouth daily 10/2/18  Yes Pietro Liz MD   folic acid (FOLVITE) 1 MG tablet Take 1 tablet by mouth daily 10/1/18  Yes JOEY Watts CNP        Facility Administered Medications:    cefepime  2 g Intravenous L86I    folic acid  1 mg Oral Daily    sodium chloride flush  10 mL Intravenous 2 times per day    pantoprazole  40 mg Intravenous BID       Allergies:  Patient has no known allergies.      Social History:      Social History     Socioeconomic History    Marital status:      Spouse name: Not on file    Number of children: Not on file    Years of education: Not on file    Highest education level: Not on file   Occupational History    Not on file   Social Needs    Financial resource strain: Not on file    Food insecurity     Worry: Not on file     Inability: Not on file    Transportation needs     Medical: Not on file     Non-medical: Not on file   Tobacco Use    Smoking status: Never Smoker    Smokeless tobacco: Never Used   Substance and Sexual Activity    Alcohol use: No    Drug use: No    Sexual activity: Not on file Lifestyle    Physical activity     Days per week: Not on file     Minutes per session: Not on file    Stress: Not on file   Relationships    Social connections     Talks on phone: Not on file     Gets together: Not on file     Attends Denominational service: Not on file     Active member of club or organization: Not on file     Attends meetings of clubs or organizations: Not on file     Relationship status: Not on file    Intimate partner violence     Fear of current or ex partner: Not on file     Emotionally abused: Not on file     Physically abused: Not on file     Forced sexual activity: Not on file   Other Topics Concern    Not on file   Social History Narrative    Not on file       Family History:        Problem Relation Age of Onset    Arthritis Mother     Atrial Fibrillation Mother     Breast Cancer Mother     High Blood Pressure Mother     Anemia Father     Arthritis Father     Cancer Father 80        Ear CA/ tumor removed     Hearing Loss Father     Arthritis Sister     Diabetes Maternal Aunt     Arthritis Maternal Aunt     Early Death Maternal Uncle          in early 42's     Depression Maternal Uncle     Other Maternal Uncle         Suicide    Heart Disease Paternal Aunt     Arthritis Maternal Grandmother        Review of Systems:  A 14 point review of systems was completed. Pertinent positives identified in the HPI, all other review of systems negative. Physical Examination:    BP (!) 155/55   Pulse 96   Temp 97.6 °F (36.4 °C) (Bladder)   Resp (!) 31   Ht 5' 4\" (1.626 m)   Wt (!) 355 lb 9.6 oz (161.3 kg)   SpO2 94%   BMI 61.04 kg/m²        Admission Weight: (!) 350 lb (158.8 kg)       General appearance: NAD, morbidly obese  Eyes: PERRLA  Neck: no JVD, no lymphadenopathy. Respiratory: effort is unlabored, no crackles, wheezes or rubs. Cardiovascular: regular, no murmur. No carotid bruits. GI: abdomen soft, nondistended, no organomegaly.  Large ventral hernia  Musculoskeletal: strength and tone normal.  Extremities: edema  Neuro/psychiatric: grossly intact. MEDICAL DECISION MAKING/TESTING        CT: images personally reviewed. Large bleed mostly contained in Gerota's fascia on left. Labs:   CBC:   Recent Labs     10/10/20  0528  10/11/20  0605  10/12/20  0218 10/12/20  0620 10/12/20  1100   WBC 36.9*  --  43.7*  --   --  27.2*  --    HGB 8.5*   < > 7.1*   < > 6.7* 7.5* 7.4*   HCT 25.2*   < > 21.3*   < > 20.5* 22.9* 22.0*   MCV 88.2  --  88.2  --   --  88.3  --      --  281  --   --  163  --     < > = values in this interval not displayed. BMP:   Recent Labs     10/11/20  1430 10/11/20  2002 10/12/20  0218 10/12/20  0620    136 133* 137   K 4.9 5.1 4.8 4.9    104 101 105   CO2 24 26 26 27   PHOS 3.4 2.9 2.4*  --    BUN 35* 27* 20 19   CREATININE 1.9* 1.4* 1.3 1.2   CALCIUM 7.7* 7.6* 7.6* 7.6*   MG 2.10 2.30 2.30 2.30     Cardiac Enzymes: No results for input(s): CKTOTAL, CKMB, CKMBINDEX, TROPONINI in the last 72 hours. PT/INR:   Recent Labs     10/09/20  1811 10/10/20  0528   PROTIME 22.5* 16.5*   INR 1.93* 1.42*     APTT: No results for input(s): APTT in the last 72 hours.   Liver Profile:  Lab Results   Component Value Date    AST 19 10/12/2020    ALT 25 10/12/2020    BILIDIR <0.2 09/28/2018    BILITOT 0.9 10/12/2020    ALKPHOS 79 10/12/2020   No results found for: CHOL, HDL, TRIG  TSH:  No results found for: TSH  UA:   Lab Results   Component Value Date    COLORU Yellow 10/09/2020    PHUR 6.0 10/09/2020    WBCUA 21-50 10/09/2020    RBCUA 11-20 10/09/2020    BACTERIA 2+ 10/09/2020    CLARITYU Clear 10/09/2020    SPECGRAV 1.010 10/09/2020    LEUKOCYTESUR LARGE 10/09/2020    UROBILINOGEN 0.2 10/09/2020    BILIRUBINUR Negative 10/09/2020    BLOODU LARGE 10/09/2020    GLUCOSEU Negative 10/09/2020    AMORPHOUS Rare 10/09/2020           Assessment/Plan:  Bleed from left kidney- this appears to have at least slowed down with correction of coagulapathy. If continued evidence of ongoing bleeding could perform renal angio with partial vs complete embolization. Will place IVC filter in am, via Jugular approach, to prevent PE. Procedure, risks, benefits, and alternatives discussed with patient and wife. Risks of filter fracture, penetration, migration, embolization specifically discussed. They appear to understand and consent to proceed.        ASA 4 - Patient with severe systemic disease that is a constant threat to life    Mallampati Airway Assessment:  Mallampati Class III - (soft palate & base of uvula are visible)

## 2020-10-12 NOTE — PROGRESS NOTES
Hospitalist Progress Note      PCP: Destiny Thomas MD    Date of Admission: 10/9/2020    Chief Complaint: Fatigue, dizziness    Hospital Course:  \"Patient with PMH of nonischemic cardiomyopathy EF 40 to 45%, HTN, history of DVT on Coumadin, GI bleed in April 2019, presented to the ED today with complaints of fatigue and dizziness. Patient is pretty much bedbound at home. Wife reports when patient stood up he got lightheaded, and fell back in bed. Also reports patient has not been eating or drinking much. Wife also reports patient stool has been black this past week. Patient is on Coumadin for past history of DVT/PE. She contacted patient's PCP about this, who ordered stool sample for blood, they had trouble getting the test kit until the seventh. Today she called patient's PCP about the patient being very weak, unable to get out of bed, and they were advised to come to the ED. patient also reports diffuse dull aching abdominal pain that is intermittent. \"       Subjective:  Patient denies any further pain. In good spirits. Suprisingly alert, oriented, and energetic. Remains on pressor support. No UOP documented, little in devine. E.coli sensitivities have resulted, ID reviewing chart so will defer to them on abx changes.         Medications:  Reviewed    Infusion Medications    prismaSATE BGK 4/2.5 2,000 mL/hr (10/12/20 0518)    prismaSATE BGK 4/2.5 1,000 mL/hr (10/12/20 0517)    prismaSATE BGK 4/2.5 500 mL/hr (10/12/20 0005)    norepinephrine 5 mcg/min (10/12/20 0615)     Scheduled Medications    cefepime  2 g Intravenous O91K    folic acid  1 mg Oral Daily    sodium chloride flush  10 mL Intravenous 2 times per day    pantoprazole  40 mg Intravenous BID     PRN Meds: morphine, magnesium sulfate, calcium gluconate **OR** calcium gluconate **OR** calcium gluconate **OR** calcium gluconate, sodium phosphate IVPB **OR** sodium phosphate IVPB **OR** sodium phosphate IVPB **OR** sodium phosphate IVPB, sodium chloride flush, acetaminophen **OR** acetaminophen, polyethylene glycol, promethazine **OR** ondansetron, perflutren lipid microspheres      Intake/Output Summary (Last 24 hours) at 10/12/2020 1046  Last data filed at 10/11/2020 1600  Gross per 24 hour   Intake 2029 ml   Output --   Net 2029 ml       Physical Exam Performed:    BP (!) 155/55   Pulse 96   Temp 97.6 °F (36.4 °C) (Bladder)   Resp (!) 31   Ht 5' 4\" (1.626 m)   Wt (!) 355 lb 9.6 oz (161.3 kg)   SpO2 94%   BMI 61.04 kg/m²       General appearance: Pleasant male in no apparent distress, appears stated age and cooperative. HEENT: Pupils equal, round, and reactive to light. Conjunctivae/corneas clear. Neck: Supple, with full range of motion. No jugular venous distention. Trachea midline. Respiratory:  Normal respiratory effort. Clear to auscultation, bilaterally without Rales/Wheezes/Rhonchi. Cardiovascular: Regular rate and rhythm with normal S1/S2 without murmurs, rubs or gallops. Abdomen: Soft, non-tender, non-distended with normal bowel sounds. Large ventral hernia noted. Musculoskeletal: No clubbing, cyanosis or edema bilaterally. Full range of motion without deformity. Skin: Skin color with PVD changes BLE. No rashes or lesions. Neurologic:  Neurovascularly intact without any focal sensory/motor deficits. Cranial nerves: II-XII intact, grossly non-focal.  Psychiatric: Alert and oriented, thought content appropriate, normal insight. Capillary Refill: Brisk,< 3 seconds   Peripheral Pulses: +2 palpable, equal bilaterally       Labs:   Recent Labs     10/10/20  0528  10/11/20  0605  10/11/20  2002 10/12/20  0218 10/12/20  0620   WBC 36.9*  --  43.7*  --   --   --  27.2*   HGB 8.5*   < > 7.1*   < > 7.2* 6.7* 7.5*   HCT 25.2*   < > 21.3*   < > 22.1* 20.5* 22.9*     --  281  --   --   --  163    < > = values in this interval not displayed.      Recent Labs     10/11/20  1430 10/11/20  2002 10/12/20  0529 10/12/20  0620    136 133* 137   K 4.9 5.1 4.8 4.9    104 101 105   CO2 24 26 26 27   BUN 35* 27* 20 19   CREATININE 1.9* 1.4* 1.3 1.2   CALCIUM 7.7* 7.6* 7.6* 7.6*   PHOS 3.4 2.9 2.4*  --      Recent Labs     10/10/20  0528 10/11/20  0605 10/12/20  0620   AST 26 32 19   ALT 20 27 25   BILITOT 1.6* 0.9 0.9   ALKPHOS 67 78 79     Recent Labs     10/09/20  1247 10/09/20  1811 10/10/20  0528   INR 9.24* 1.93* 1.42*     Recent Labs     10/09/20  1247   TROPONINI 0.05*       Urinalysis:      Lab Results   Component Value Date    NITRU Negative 10/09/2020    WBCUA 21-50 10/09/2020    BACTERIA 2+ 10/09/2020    RBCUA 11-20 10/09/2020    BLOODU LARGE 10/09/2020    SPECGRAV 1.010 10/09/2020    GLUCOSEU Negative 10/09/2020       Radiology:  XR CHEST PORTABLE   Final Result   No evidence of pneumothorax following left IJ catheter placement. Improved   aeration in the left lung base. XR CHEST PORTABLE   Final Result   No acute process. CT ABDOMEN PELVIS WO CONTRAST Additional Contrast? None   Final Result   Addendum 1 of 1   ADDENDUM:   Additional information, corrected report:      HISTORY:   The initial history was incorrect, patient did not have left side    nephrectomy   but partial nephrectomy on the right. Therefore the hematoma seen retroperitoneal on the left in the renal fossa   likely obscures the entire left kidney. There is no normal renal    parenchyma   identified. The measured area, 13 x 9 cm therefore presumably include   spontaneous hematoma and kidney tissue combined. This would also    therefore   indicate a lesser degree of hemorrhage present, since some of the measured   area being occupied by the obscured kidney. Again the primary etiology is spontaneous hemorrhage, given the patient's   grossly elevated INR, and likely arising within the obscured left kidney    and   extending left pararenal retroperitoneal space.          Final      CT HEAD WO CONTRAST   Final Result   1. No acute intracranial abnormality. XR CHEST PORTABLE   Final Result   No acute cardiopulmonary disease. Assessment/Plan:    Active Hospital Problems    Diagnosis    Retroperitoneal bleed [R58]    Supratherapeutic INR [R79.1]    Shock ([address] [R57.9]    Non-ischemic cardiomyopathy (HCC) EF 40% [I42.8]    Benign hypertension [I10]    History of DVT (deep vein thrombosis) [Z86.718]    Morbid obesity with BMI of 60.0-69.9, adult (HCC) [E66.01, Z68.44]       Spontaneous L perinephric hemorrhage  Likely 2/2 supratherapeutic INR of 9.24, also possible underlying renal mass  -Discussed with Dr. Juan Pablo Foy and Dr. Joli Collet - suspect bleed could be from left renal lesion. CT of abd read as though patient had a left nephrectomy, but this in NOT accurate. He still has his left kidney. Per urology: \"possibly bleed from left renal tumor but had imaging a year ago only showing a stable 2 cm renal cortical lesion. Will ideally need repeat imaging with contrast when creatinine stable in a few weeks. \"  -Reversed Coumadin with K Centra, vitamin K.  - with ABLA present on admission. Received 1 unit pRBC on 10/9, 10/11, and 10/12.  - vascular surgery is considering embolization     Shock   Combination of hemorrhagic due to above issue and GI bleed, also suspect sepsis due to UTI present on arrival  -Given IV fluid boluses without adequate response, required pressor support  - held anithypertensives     H/o DVT  - Vascular surgery considering IVC filter in the future when he is more stable. This the patient's 3rd life threatening bleed. Last in 4/2019 - at that time had a GIB, which required 9 units of pRBC. - In 4/2019, hematology was consulted regarding possible cessation of warfarin, but given patient's morbid obesity and immobility, he was determined to be too high risk for recurrent DVT, so continued AC was recommended.   Unable to switch to DOAC, as they have not been adequately studied in morbidly obese patients. Patient follows with Dr. Lucero De Paz as outpatient, appreciate hematology consult.     E. Coli Bacteremia - secondary to acute cystitis.  -treated with IV cefepime. ID input appreciated. Suspected upper GI bleed   Worsened due to supratherapeutic INR  -Hb dropped from 12 to 8.5 with fluids revealing true degree of anemia  -Hold Coumadin and antiplatelet agents  -Consulted GI, who deferred EGD at this time due to patient's instability. They will reconsider if he has melena here.   -continue IV PPI twice daily     KEVIN  -Baseline Cr 1.0. Likely ATN due to hypotension and hypovolemia  - nephrology consulted, started on CRRT 10/9.  - he does have h/o partial RIGHT nephrectomy in 2015 due to renal cancer. Non-ischemic cardiomyopathy  -EF 40%, CXR clear. -f/u TTE.     Morbid obesity with BMI of 33.0-09.6, adult   Complicating assessment and treatment, placing patient at high risk for multiple co-morbidities as well as early death and contributing to the patient's presentation. Counseled on weight loss.       DVT Prophylaxis: SCDs  Diet: DIET RENAL;  Code Status: Full Code    PT/OT Eval Status:  Order when more stable. Dispo -unclear - perhaps 10/19. He came from home.        Reji Altman MD

## 2020-10-12 NOTE — PLAN OF CARE
LAST [facility] OFFICE NOTE FROM 2020       Patient Name: Mesfin Shepard  Patient : 1950  Patient MRN: [id number]    [facility]  Referring Physician: Jacques Schneider    Date of Service: 2020      Chief Complaint  History of DVT    Problem List  History of DVT  Hypokalemia  ANA  Intestinal malabsorption (disorder)  Morbid obesity    HPI       Previous Therapies    Current Therapy  Ferumoxytol (Feraheme) D1,8 Q14D (Intravenous Iron) Cycle Length: 14 Number Cycles: 2 Start: C1D1 on 2019 Assoc Dx: ANA LOT: Other 2019 C2 D8  Ferumoxytol IV,  mg    Interval History  Kylee Lehman returns for a follow-up visit. He is feeling well overall, has no new concerns. Denies shortness of breath, chest pain, headaches or bleeding. Review of Systems  Per interval history; otherwise 10 point ROS is negative     Vital Signs  Blood pressure: 128/78, R arm, Regular, Pulse: 90, Temperature: 97.8 F, Respirations: 18, O2 sat: , Pain Scale: 0, Height: 62 in, Weight: 357.2 lb, BSA: 2.66, BMI: 65.33 kg/m2    Physical Exam    CONSTITUTIONAL: He is morbidly obese. EYES: pupils equal, round and reactive to light, sclera clear and conjunctiva normal  ENT: Normocephalic, without obvious abnormality, atraumatic  NECK: supple, symmetrical, no jugular venous distension and no carotid bruits  HEMATOLOGIC/LYMPHATIC: no cervical, supraclavicular or axillary lymphadenopathy  LUNGS: no increased work of breathing and clear to auscultation  CARDIOVASCULAR: regular rate and rhythm, normal S1 and S2, no murmur noted  ABDOMEN: normal bowel sounds x 4, soft, non-distended, non-tender, no masses palpated, no hepatosplenomegaly. MUSCULOSKELETAL: full range of motion noted, tone is normal  NEUROLOGIC: awake, alert, oriented to name, place and time. Motor skills grossly intact. SKIN: Normal skin color, texture, turgor and no jaundice.  appears intact  EXTREMITIES: 1+ edema looks improved.           Labs  CBC  Lab Results 06/29/2020 06/01/2020 05/18/2020 05/04/2020 04/20/2020 04/06/2020    CBC                 WBC x 10^3/uL 7.4               RBC x 10^6/uL 5.38               HGB g/dL 15.8               HCT % 46.7               MCV fL 86.8               MCH pg 29.4               MCHC g/dL 33.8               RDW-CV, % 15.0 (H)               PLT x 10^3/uL 186.0               Mushtaq % 80.4 (H)               LY % 8.8 (L)               MO % 5.8               EO % 4.6               BA % 0.4               Mushtaq # (ANC) x 10^3/uL 5.95 (H)               LY # x 10^3/uL 0.65 (L)               MO # x 10^3/uL 0.43               EO # x 10^3/uL 0.34               BA # x 10^3/uL 0.03           CMP  Lab Results 06/29/2020 06/01/2020 05/18/2020 05/04/2020 04/20/2020 04/06/2020    Chemistries                 Glucose mg/dL 123 (H)               BUN mg/dL 26 (H)               Creatinine mg/dL 0.88               BUN/Creatinine ratio 29.5 (H)               Sodium mmol/L 141               Potassium mmol/L 3.7               Chloride mmol/L 104               CO2 mmol/L 26               Anion gap, mmol/L 11.0               Calcium mg/dL 9.0               Albumin g/dL 4.1               Total protein g/dL 7.9               A/G ratio 1.1               Bilirubin, total mg/dL 1.0               Alkaline phosphatase U/L 88               AST/SGOT U/L 24               ALT/SGPT U/L 14               GFR non-African American, estimated mL/min/1.73m2 >60.0               GFR African American, estimated mL/min/1.73m2 >60.0                Lab Results 06/29/2020 06/01/2020 05/18/2020 05/04/2020 04/20/2020 04/06/2020    Coags                 INR 2.1 (H) 2.1 (H) 1.8 (H) 1.7 (H) 2.0 (H) 3.0 (H)      Current Coumadin dose 7.5 MG ON TUE/THUR/SAT 5 MG ALL OTHER DAYS 5 mg for four days 7.5 for three days repeat 5 mg x5 days 7.5 mg x2 days 5 mg for 5 days 7.5 mg for 2 days 5 mg for 5 days 7.5 for 2 days 7.5 mg x 2 days then 5 mg the rest      New Coumadin dose . No change 7.5 mg M/W/F, 5 mg the rest 7.5 mg M/W/F, 5 mg the rest no change No change      Coags Lab-related dose change . PT INR lab result note . Repeat INR in one month Repeat INR in 2 weeks Repeat INR in 2 weeks Repeat INR in 2 weeks Repeat INR in 2 weeks      Coags Specimen source . . .        Lab Results 06/29/2020 06/01/2020 05/18/2020 05/04/2020 04/20/2020 04/06/2020    Anemia Labs               Imaging      OCM - Patient Care Management         ECOG Status 2 In bed <50% of the time. Ambulatory and capable of all self-care, but unable to carry out any work activities. Up and about more than 50% of waking hours. (Date: 06/29/2020)  Depression Status Was screened; Outcome positive: No; Screening Date: 03/09/2020; Screening Tool: Patient Health Questionnaire (PHQ9)  Psycho-social PHQ-9 Follow-up Plan (if applicable):     Research    Would you like this patient screened for clinical trial eligibility? If yes, please enter the order for \"Research: Screen for Eligibility\"    Assessment & Plan  DVT:  -He is maintained on Coumadin  -His INR is 2.1, he has been very stable  -We will recheck this in 4 weeks    History of GI bleeding:  -His hematocrit is stable  -There is been no bleeding for the last several months     Iron deficiency:  -Previous iron studies were stable  -These are drawn again today and will be repeated in 3 months  -His wife will call for results    Morbid obesity:  -I would not take him off Coumadin until he was down to a normal weight    Return to clinic in 3 months  -He will check his INR in 4 weeks      . Recent imaging and labs were reviewed and discussed with the patient. I have recommended that the patient follow CDC guidelines for prevention of COVID-19 infection. Con Cintron CNP    Note Recipient:           Electronically signed by [employer].  Nafisa NASH 06/29/2020 20:08 EDT

## 2020-10-12 NOTE — PROGRESS NOTES
Progress Note  Date:10/12/2020       Room:0234/0234-01  Patient Loree Copeland Sr. YOB: 1950     Age:69 y.o. Subjective    Subjective:  Symptoms:  Stable. Pain:  He reports no pain. Review of Systems  Objective         Vitals Last 24 Hours:  TEMPERATURE:  Temp  Av.7 °F (36.5 °C)  Min: 97.6 °F (36.4 °C)  Max: 98.7 °F (37.1 °C)  RESPIRATIONS RANGE: Resp  Av.1  Min: 18  Max: 34  PULSE OXIMETRY RANGE: SpO2  Av.9 %  Min: 91 %  Max: 97 %  PULSE RANGE: Pulse  Av.9  Min: 85  Max: 104  BLOOD PRESSURE RANGE: No data recorded.  ; No data recorded. I/O (24Hr): Intake/Output Summary (Last 24 hours) at 10/12/2020 1210  Last data filed at 10/11/2020 1600  Gross per 24 hour   Intake  ml   Output --   Net  ml     Objective:  General Appearance:  Not in pain. Vital signs: (most recent): Blood pressure (!) 155/55, pulse 96, temperature 97.6 °F (36.4 °C), temperature source Bladder, resp. rate (!) 31, height 5' 4\" (1.626 m), weight (!) 355 lb 9.6 oz (161.3 kg), SpO2 94 %. Heart: Normal rate. Regular rhythm. S1 normal and S2 normal.    Abdomen: Abdomen is soft. Bowel sounds are normal.   There is no abdominal tenderness. Labs/Imaging/Diagnostics    Labs:  CBC:  Recent Labs     10/10/20  0528  10/11/20  0605  10/12/20  0218 10/12/20  0620 10/12/20  1100   WBC 36.9*  --  43.7*  --   --  27.2*  --    RBC 2.86*  --  2.41*  --   --  2.60*  --    HGB 8.5*   < > 7.1*   < > 6.7* 7.5* 7.4*   HCT 25.2*   < > 21.3*   < > 20.5* 22.9* 22.0*   MCV 88.2  --  88.2  --   --  88.3  --    RDW 15.0  --  15.2  --   --  15.2  --      --  281  --   --  163  --     < > = values in this interval not displayed.      CHEMISTRIES:  Recent Labs     10/11/20  1430 10/11/20  2002 10/12/20  0218 10/12/20  0620    136 133* 137   K 4.9 5.1 4.8 4.9    104 101 105   CO2 24 26 26 27   BUN 35* 27* 20 19   CREATININE 1.9* 1.4* 1.3 1.2   GLUCOSE 153* 150* 143* 133*   PHOS 3.4 2.9 2.4*  --    MG 2.10 2.30 2.30 2.30     PT/INR:  Recent Labs     10/09/20  1247 10/09/20  1811 10/10/20  0528   PROTIME 109.6* 22.5* 16.5*   INR 9.24* 1.93* 1.42*     APTT:No results for input(s): APTT in the last 72 hours. LIVER PROFILE:  Recent Labs     10/10/20  0528 10/11/20  0605 10/12/20  0620   AST 26 32 19   ALT 20 27 25   BILITOT 1.6* 0.9 0.9   ALKPHOS 67 78 79       Imaging Last 24 Hours:  No results found. Assessment//Plan           Hospital Problems           Last Modified POA    * (Principal) Retroperitoneal bleed 10/10/2020 Yes    Morbid obesity with BMI of 60.0-69.9, adult ([facility] [address] (Chronic) 10/9/2020 Yes    Benign hypertension (Chronic) 10/9/2020 Yes    History of DVT (deep vein thrombosis) (Chronic) 10/9/2020 Yes    Supratherapeutic INR 10/9/2020 Yes    Shock ([facility] [address] 10/9/2020 Yes    Non-ischemic cardiomyopathy ([facility] [address] EF 40% 10/9/2020 Yes        Assessment & Plan   70 yo w obesity, HTN, Fe def anemia, h/o DU bleed in 8/2019 at [facility] (repeat EGD by me in 9/2019 was neg.), kidney CA, DVT on Couamdin and recent choledocholithiasis s/p ERCP in July 2018, presents with supratherapeutic INR complicated by retroperitoneal bleeding and reported melena that has now resolved. INR of 9.2 and H/H 6/20.   EGD and colonoscopy in 4/2019 are neg. Except diverticulosis and polyp. Capsule Endoscopy 4/2019 was neg. So most likely blood loss is due to retroperitoneal rather than GI bleed.     Plan:   1. Supportive care and agree w holding [facility] and plans for [employer]   2. F/U H/H  3. Consider EGD at some point  4. The ERCP in 2018 could explain his asymptomatic pneumobilia  5. Will follow    Tiburcio Bustillo MD       (O) [phone]        6.

## 2020-10-12 NOTE — PROGRESS NOTES
INPATIENT PULMONARY CRITICAL CARE PROGRESS NOTE      Reason for visit    Shock     SUBJECTIVE: Patient when seen this morning continues to be critically ill, patient continues to be on IV pressors to maintain hemodynamics, patient continues to be on CRRT, patient was not complaining of any increasing shortness of breath, patient's abdominal pain and tenderness has decreased as compared to the previous time, patient is making 10 to 15 mL/h of urine as per nursing, patient was alert and communicative when seen, patient was afebrile, patient had sinus rhythm on the monitor, patient was on 2 L of nasal cannula oxygen with saturation of 94% when seen, patient's glycemic control was acceptable, no other pertinent review of system of concern           Physical Exam:  Blood pressure (!) 155/55, pulse 96, temperature 97.6 °F (36.4 °C), temperature source Bladder, resp. rate (!) 31, height 5' 4\" (1.626 m), weight (!) 355 lb 9.6 oz (161.3 kg), SpO2 94 %.'   Constitutional:  No acute distress. HENT:  Oropharynx is clear and moist. No thyromegaly. Eyes:  Conjunctivae are normal. Pupils equal, round, and reactive to light. No scleral icterus. Neck: . No tracheal deviation present. No obvious thyroid mass. Short/enlarged neck   Cardiovascular: Normal rate, regular rhythm, normal heart sounds. No right ventricular heave. (+) lower extremity edema. Pulmonary/Chest: No wheezes. No rales. Chest wall is not dull to percussion. No accessory muscle usage or stridor. Abdominal: Soft. Bowel sounds present. No distension or hernia. No tenderness. Obese    Musculoskeletal: No cyanosis. No clubbing. No obvious joint deformity. Lymphadenopathy: No cervical or supraclavicular adenopathy. Skin: Skin is warm and dry. No rash or nodules on the exposed extremities. statis (+)  Psychiatric: Normal mood and affect. Behavior is normal.  No anxiety. Neurologic: Alert, awake and oriented. PERRL.   Speech fluent        Results:  CBC: therefore presumably include   spontaneous hematoma and kidney tissue combined. This would also    therefore   indicate a lesser degree of hemorrhage present, since some of the measured   area being occupied by the obscured kidney. Again the primary etiology is spontaneous hemorrhage, given the patient's   grossly elevated INR, and likely arising within the obscured left kidney    and   extending left pararenal retroperitoneal space. Final      CT HEAD WO CONTRAST   Final Result   1. No acute intracranial abnormality. XR CHEST PORTABLE   Final Result   No acute cardiopulmonary disease. Ct Abdomen Pelvis Wo Contrast Additional Contrast? None    Addendum Date: 10/10/2020    ADDENDUM: Additional information, corrected report: HISTORY: The initial history was incorrect, patient did not have left side nephrectomy but partial nephrectomy on the right. Therefore the hematoma seen retroperitoneal on the left in the renal fossa likely obscures the entire left kidney. There is no normal renal parenchyma identified. The measured area, 13 x 9 cm therefore presumably include spontaneous hematoma and kidney tissue combined. This would also therefore indicate a lesser degree of hemorrhage present, since some of the measured area being occupied by the obscured kidney. Again the primary etiology is spontaneous hemorrhage, given the patient's grossly elevated INR, and likely arising within the obscured left kidney and extending left pararenal retroperitoneal space. Result Date: 10/10/2020  EXAMINATION: CT OF THE ABDOMEN AND PELVIS WITHOUT CONTRAST 10/9/2020 3:17 pm TECHNIQUE: CT of the abdomen and pelvis was performed without the administration of intravenous contrast. Multiplanar reformatted images are provided for review. Dose modulation, iterative reconstruction, and/or weight based adjustment of the mA/kV was utilized to reduce the radiation dose to as low as reasonably achievable. COMPARISON: Prior study(s) most recent 04/09/2019. HISTORY: ORDERING SYSTEM PROVIDED HISTORY: sepsis, left sided abdominal pain, hernia TECHNOLOGIST PROVIDED HISTORY: Reason for exam:->sepsis, left sided abdominal pain, hernia Additional Contrast?->None Reason for Exam: hx-renal ca with no tx   c/o left sided abd pain x 2 hrs  - known gallstones/hernia Acuity: Acute Type of Exam: Initial Relevant Medical/Surgical History: renal mass removed Lightheaded, chronic anticoagulation on Coumadin. INR 9.24 FINDINGS: Lower Chest: Lung bases are clear. Incidental calcified granuloma. Extrapleural fat seen posteriorly. Organs; 1. Liver: The density is unremarkable with no focal suspicious liver lesions on noncontrast images. Pneumobilia is seen new from the prior study. 2. Gallbladder: The gallbladder appears to be entirely contracted around a dense stone near the neck of the gallbladder which is also seen on prior study. However there is air within the small residual portion of fluid content non dependently. Air is seen in the common bile duct which is nondilated. Again pneumobilia is seen centrally. There is no biliary dilation. 3. Spleen: Normal. 4. Pancreas: Unremarkable on noncontrast imaging. 5. Kidneys: The left kidney has been removed. The left renal fossa is occupied by a large hematoma described below. The right kidney is unremarkable with cortical scarring. There is curvilinear density projecting from the lower anterior aspect of the right kidney with suspected prior surgery. This is unchanged. Some of the in capsulated fat could represent fat necrosis. Again this is unchanged. No hydronephrosis. 6. Adrenal glands: Normal. GI/Bowel: There is no distention, obstruction or significant inflammatory change. The bowel is mostly empty with no distention whatsoever and near complete contraction of small bowel and colon with only trace amount of stool in the rectosigmoid.   There is a large panniculus containing protrusion of fat into the panniculus inferiorly containing unremarkable mesentery and nondistended bowel loops. Pelvis: Urinary bladder is unremarkable. There is no free pelvic fluid. Peritoneum/Retroperitoneum: A large hematoma is seen retroperitoneal on the left extending anterior from the left empty renal fossa. The more confined portion of the hematoma measures 9 x 13 cm transversely an almost 14 cm longitudinally. There is surrounding fat infiltration and or extension of blood inferiorly along the conal fascia. Medial extension is towards the psoas muscle and lower diaphragm jorge luis. The majority of the density is typical of recent hematoma, 64 Hounsfield units. Some of the density is lower, 35 Hounsfield units. This could represent intermittent or stuttering hemorrhage given the density measurements. Source not identified. Vasculature: Unremarkable with noncontrast imaging. Soft Tissues/Bones: Again large panniculus is seen with protrusion or herniation into the panniculus inferiorly. Large left retroperitoneal hematoma from presumed spontaneous hemorrhage particularly given the elevated INR. Prior left nephrectomy. Pneumobilia with apparent moderate contraction of the gallbladder surrounding the gallstone. This could be the result of prior stone passage and resulting in incompetence of the ampulla of Vater. No ductal stones seen currently. Large panniculus and abdominal wall hernia. Results for Tati Carlisle (MRN [id number]) as of 10/12/2020 13:02   Ref.  Range 10/11/2020 14:30 10/11/2020 20:02 10/12/2020 02:18 10/12/2020 06:20 10/12/2020 11:00   Sodium Latest Ref Range: 136 - 145 mmol/L 136 136 133 (L) 137    Potassium Latest Ref Range: 3.5 - 5.1 mmol/L 4.9 5.1 4.8 4.9    Chloride Latest Ref Range: 99 - 110 mmol/L 104 104 101 105    CO2 Latest Ref Range: 21 - 32 mmol/L 24 26 26 27    BUN Latest Ref Range: 7 - 20 mg/dL 35 (H) 27 (H) 20 19    Creatinine Latest Ref Range: 0.8 - 1.3 mg/dL 1.9 (H) 1.4 (H) 1.3 1.2    Anion Gap Latest Ref Range: 3 - 16  8 6 6 5    Calcium, Ion Latest Ref Range: 1.12 - 1.32 mmol/L 1.10 (L) 1.09 (L) 1.11 (L)     GFR Non- Latest Ref Range: >60  35 (A) 50 (A) 55 (A) 60 (A)    GFR  Latest Ref Range: >60  43 (A) >60 >60 >60    Magnesium Latest Ref Range: 1.80 - 2.40 mg/dL 2.10 2.30 2.30 2.30    Lactic Acid Latest Ref Range: 0.4 - 2.0 mmol/L    1.0    Glucose Latest Ref Range: 70 - 99 mg/dL 153 (H) 150 (H) 143 (H) 133 (H)    Calcium Latest Ref Range: 8.3 - 10.6 mg/dL 7.7 (L) 7.6 (L) 7.6 (L) 7.6 (L)    Phosphorus Latest Ref Range: 2.5 - 4.9 mg/dL 3.4 2.9 2.4 (L)     Total Protein Latest Ref Range: 6.4 - 8.2 g/dL    5.1 (L)    Albumin Latest Ref Range: 3.4 - 5.0 g/dL    2.5 (L)    Globulin Latest Units: g/dL    2.6    Albumin/Globulin Ratio Latest Ref Range: 1.1 - 2.2     1.0 (L)    Alk Phos Latest Ref Range: 40 - 129 U/L    79    ALT Latest Ref Range: 10 - 40 U/L    25    AST Latest Ref Range: 15 - 37 U/L    19    Bilirubin Latest Ref Range: 0.0 - 1.0 mg/dL    0.9    WBC Latest Ref Range: 4.0 - 11.0 K/uL    27.2 (H)    RBC Latest Ref Range: 4.20 - 5.90 M/uL    2.60 (L)    Hemoglobin Quant Latest Ref Range: 13.5 - 17.5 g/dL 7.3 (L) 7.2 (L) 6.7 (LL) 7.5 (L) 7.4 (L)   Hematocrit Latest Ref Range: 40.5 - 52.5 % 22.0 (L) 22.1 (L) 20.5 (LL) 22.9 (L) 22.0 (L)     Results for Kaur Cullen SR. (MRN [id number]) as of 10/12/2020 13:02   Ref.  Range 9/25/2020 11:30 10/9/2020 12:47 10/9/2020 18:11 10/10/2020 05:28   Prothrombin Time Latest Ref Range: 10.0 - 13.2 sec 27.4 (H) 109.6 (H) 22.5 (H) 16.5 (H)   INR Latest Ref Range: 0.86 - 1.14  2.34 (H) 9.24 (HH) 1.93 (H) 1.42 (H)     Value:  Escherichia coliAbnormal       *Additional information available - narrative    10/12/2020 10:42 AM - Thu Solomon Incoming Lab Results From Soft (Epic Adt)     Specimen Information:  Blood          Component  Collected  Lab    Blood Culture, Routine Abnormal    10/09/2020 12:47 PM [address] [facility]    Gram stain Anaerobic bottle:   Gram negative rods   Information to follow    Organism Abnormal    10/09/2020 12:47 PM  [address] [facility]    Escherichia coli DNA Detected    Blood Culture, Routine  10/09/2020 12:47 PM  [address] [facility]    See additional report for complete BCID panel. Organism Abnormal    10/09/2020 12:47 PM  [address] [facility]    Escherichia coli    Blood Culture, Routine  10/09/2020 12:47 PM  [address] for   Isolated one of two sets   Sensitivity to follow      ONE XRAY VIEW OF THE CHEST         10/10/2020 11:35 am         COMPARISON:    10/09/2020 radiograph         HISTORY:    ORDERING SYSTEM PROVIDED HISTORY: Consuelo gutierrez    TECHNOLOGIST PROVIDED HISTORY:    Reason for exam:->LJ VasCath placed    Reason for Exam: Consuelo Wolf placed         FINDINGS:    Right IJ central venous catheter stable.  Interval placement of left IJ    vascular catheter with tip at the level of the right atrium.  Imaging is    overpenetrated, but there is no obvious pneumothorax.  Heart is enlarged. Mild bibasilar atelectasis with improved aeration at the left base and    decreased left pleural fluid.              Impression    No evidence of pneumothorax following left IJ catheter placement.  Improved    aeration in the left lung base. Echocardiogram in 2019-Conclusions      Summary   Technically difficult examination due to body habitus. Definity® used for   myocardial border enhancement. Mildly reduced ejection fraction visually estimated at 40-45% with EF   calculated at 41% by Menendez's method. Hypokinesis infero-septal wall. The left ventricle is mildly dilated with normal wall thickness. There is a large left pleural effusion. IVC size is dilated (>2.1 cm) and collapses < 50% with respiration   consistent with elevated RA pressure (15 mmHg).          Assessment:  Principal Problem:    Retroperitoneal bleed  Active Problems: Morbid obesity with BMI of 60.0-69.9, adult (HCC)    Benign hypertension    History of DVT (deep vein thrombosis)    Suspected sleep apnea    Supratherapeutic INR    Shock ([address]    Non-ischemic cardiomyopathy (HCC) EF 40%    Bacteremia due to Escherichia coli    Bandemia    Pyuria    Anemia due to blood loss, acute    Atelectasis  Resolved Problems:    * No resolved hospital problems. *          Plan:   · Oxygen supplementation to keep saturation between 90 to 94%  · Patient clinically has COLT-may require CPAP/BiPAP on intermittent basis  · Patient continues to be on IV pressors to maintain mean arterial pressure more than 65 mmHg  · CRRT as per nephrology continue  · PRBC transfusions on intermittent basis  · IV cefepime for bacteremia/pyuria at this time-titration as per clinical status and cultures  · Vancomycin has been discontinued  · Trend WBC count  · No need for any bronchodilators on regular basis  · Incentive spirometry  · Not be given Coreg or lisinopril because of hypotension and shock  · No surgical intervention at this time  · Monitor for any hypoventilation and hypercarbia  · Correct electrolytes on whenever necessary basis  · Patient vascular surgery and there is a question of possible IVC filter placement  · SCDs  · PUD prophylaxis    Case discussed with patient, family and ICU team    Due to life threatening hemodynamic instability, renal failure, acute bleed with associated acute anemia this patient is critically ill.  Total critical care time involved in his care was 35 minutes        Electronically signed by:  Otoniel Strauss MD    10/12/2020    1:07 PM.

## 2020-10-12 NOTE — PROGRESS NOTES
Hospitalist Progress Note      PCP: Eugenia Braga MD    Date of Admission: 10/9/2020    Chief Complaint: Fatigue, dizziness    Hospital Course: 71 y.o. male who presented to [facility] with complaints as stated above  Patient with PMH of nonischemic cardiomyopathy EF 40 to 45%, HTN, history of DVT on Coumadin, GI bleed in April 2019, presented to the ED today with complaints of fatigue and dizziness. Patient is pretty much bedbound at home. Wife reports when patient stood up he got lightheaded, and fell back in bed. Also reports patient has not been eating or drinking much. Wife also reports patient stool has been black this past week. Patient is on Coumadin for past history of DVT/PE. She contacted patient's PCP about this, who ordered stool sample for blood, they had trouble getting the test kit until the seventh. Today she called patient's PCP about the patient being very weak, unable to get out of bed, and they were advised to come to the ED. patient also reports diffuse dull aching abdominal pain that is intermittent.        Subjective: Now on CRRT. UOP picked up ~ 15 cc/hr. Remains on Levophed. Hgb dropped to 6.7. Leukocytosis worsening despite the use of abx.       Medications:  Reviewed    Infusion Medications    vasopressin (Septic Shock) infusion 0.04 Units/min (10/11/20 1620)    prismaSATE BGK 4/2.5 2,000 mL/hr (10/11/20 1909)    prismaSATE BGK 4/2.5 1,000 mL/hr (10/11/20 1839)    prismaSATE BGK 4/2.5 500 mL/hr (10/11/20 1839)    norepinephrine 18 mcg/min (10/11/20 1909)     Scheduled Medications    cefepime  2 g Intravenous K41I    folic acid  1 mg Oral Daily    sodium chloride flush  10 mL Intravenous 2 times per day    pantoprazole  40 mg Intravenous BID     PRN Meds: morphine, magnesium sulfate, calcium gluconate **OR** calcium gluconate **OR** calcium gluconate **OR** calcium gluconate, sodium phosphate IVPB **OR** sodium phosphate IVPB **OR** sodium phosphate IVPB **OR** sodium phosphate IVPB, sodium chloride flush, acetaminophen **OR** acetaminophen, polyethylene glycol, promethazine **OR** ondansetron, perflutren lipid microspheres      Intake/Output Summary (Last 24 hours) at 10/11/2020 2020  Last data filed at 10/11/2020 1600  Gross per 24 hour   Intake 2029 ml   Output --   Net 2029 ml       Physical Exam Performed:    BP (!) 155/55   Pulse 85   Temp 98.7 °F (37.1 °C) (Bladder)   Resp 22   Ht 5' 4\" (1.626 m)   Wt (!) 355 lb 9.6 oz (161.3 kg)   SpO2 91%   BMI 61.04 kg/m²     General appearance: Pleasant male in no apparent distress, appears stated age and cooperative. HEENT: Pupils equal, round, and reactive to light. Conjunctivae/corneas clear. Neck: Supple, with full range of motion. No jugular venous distention. Trachea midline. Respiratory:  Normal respiratory effort. Clear to auscultation, bilaterally without Rales/Wheezes/Rhonchi. On 3L NC. Cardiovascular: Regular rate and rhythm with normal S1/S2 without murmurs, rubs or gallops. Right wrist arterial line. Left IJ vascath. RIJ TLC. Abdomen: Soft, left sided tenderness, non-distended with normal bowel sounds. Large ventral hernia noted. Musculoskeletal: No clubbing, cyanosis or edema bilaterally. Full range of motion without deformity. Skin: Skin color with PVD changes BLE. No rashes or lesions. Neurologic:  Neurovascularly intact without any focal sensory/motor deficits. Cranial nerves: II-XII intact, grossly non-focal.  Psychiatric: Alert and oriented, thought content appropriate, normal insight.   Capillary Refill: Brisk,< 3 seconds   Peripheral Pulses: +2 palpable, equal bilaterally       Labs:   Recent Labs     10/09/20  2350 10/10/20  0528  10/11/20  0605 10/11/20  0850 10/11/20  1430 10/11/20  2002   WBC 36.5* 36.9*  --  43.7*  --   --   --    HGB 9.0* 8.5*   < > 7.1* 6.7* 7.3* 7.2*   HCT 26.8* 25.2*   < > 21.3* 20.3* 22.0* 22.1*    308  --  281  --   --   --     < > = values in this interval not displayed. Recent Labs     10/11/20  0220 10/11/20  0605 10/11/20  0850 10/11/20  1430    137 135* 136   K 4.6 4.9 4.6 4.9    105 103 104   CO2 21 23 24 24   BUN 54* 47* 40* 35*   CREATININE 2.5* 2.3* 2.0* 1.9*   CALCIUM 7.2* 7.7* 7.4* 7.7*   PHOS 3.7  --  3.2 3.4     Recent Labs     10/09/20  1247 10/10/20  0528 10/11/20  0605   AST 18 26 32   ALT 13 20 27   BILITOT 0.9 1.6* 0.9   ALKPHOS 87 67 78     Recent Labs     10/09/20  1247 10/09/20  1811 10/10/20  0528   INR 9.24* 1.93* 1.42*     Recent Labs     10/09/20  1247   TROPONINI 0.05*       Urinalysis:      Lab Results   Component Value Date    NITRU Negative 10/09/2020    WBCUA 21-50 10/09/2020    BACTERIA 2+ 10/09/2020    RBCUA 11-20 10/09/2020    BLOODU LARGE 10/09/2020    SPECGRAV 1.010 10/09/2020    GLUCOSEU Negative 10/09/2020       Radiology:  XR CHEST PORTABLE   Final Result   No evidence of pneumothorax following left IJ catheter placement. Improved   aeration in the left lung base. XR CHEST PORTABLE   Final Result   No acute process. CT ABDOMEN PELVIS WO CONTRAST Additional Contrast? None   Final Result   Addendum 1 of 1   ADDENDUM:   Additional information, corrected report:      HISTORY:   The initial history was incorrect, patient did not have left side    nephrectomy   but partial nephrectomy on the right. Therefore the hematoma seen retroperitoneal on the left in the renal fossa   likely obscures the entire left kidney. There is no normal renal    parenchyma   identified. The measured area, 13 x 9 cm therefore presumably include   spontaneous hematoma and kidney tissue combined. This would also    therefore   indicate a lesser degree of hemorrhage present, since some of the measured   area being occupied by the obscured kidney.       Again the primary etiology is spontaneous hemorrhage, given the patient's   grossly elevated INR, and likely arising within the obscured left kidney and   extending left pararenal retroperitoneal space. Final      CT HEAD WO CONTRAST   Final Result   1. No acute intracranial abnormality. XR CHEST PORTABLE   Final Result   No acute cardiopulmonary disease. Assessment/Plan:    Active Hospital Problems    Diagnosis    Retroperitoneal bleed [R58]    Supratherapeutic INR [R79.1]    Shock ([address] [R57.9]    Non-ischemic cardiomyopathy (HCC) EF 40% [I42.8]    Benign hypertension [I10]    History of DVT (deep vein thrombosis) [Z86.718]    Morbid obesity with BMI of 60.0-69.9, adult (HCC) [E66.01, Z68.44]     Spontaneous retroperitoneal hemorrhage  Likely 2/2 supratherapeutic INR of 9.24.   -Discussed with Dr. Meghana Darnell and Dr. Kate Cuevas - suspect bleed could be from left renal lesion. CT of abd read as though patient had a left nephrectomy, but this in NOT accurate. He still has his left kidney.  -Reversed Coumadin with K Centra, vitamin K.  -Serial H&H, transfuse if Hb <7   -received 1 unit pRBC on 10/9 and 10/11.  -will need repeat CT to further evaluate left kidney, but Dr. Kate Ceuvas states that will be weeks from now.  -if suspect patient is still bleeding, could consider CT angio with embolization per vascular surgery.  -urology, general surgery, and vascular consulted.     Shock   Combination of hemorrhagic due to RPH/GI bleed and/or sepsis due to UTI  -Given IV fluid boluses without adequate response, continue MIVF and vasopressors.  -arterial line and TLC in place.     Supratherapeutic INR   INR 9 with active hemorrhage on arrival.  -Reversed with vitamin K IV and KCentra  -INR improved. -Hold Coumadin.  -consult Vascular surgery for possible IVC filter given this the patient's 3rd life threatening bleed. Last in 4/2019 - at that time had a GIB, which required 9 units of pRBC. -patient's wife would also like Dr. Cheng Waters consulted as patient follows with him as outpatient for recurrent DVT.      E.  Coli Bacteremia -

## 2020-10-12 NOTE — FLOWSHEET NOTE
2000 Pt assessed, see note for details. Pt verbalized understanding of plan of care. Bed in low locked positioned, SR up x2, call light in reach. 3110 Dr. Taylor Cruz paged the lab results. 7159 1 unit of PRBC's obtained from lab per MD order, see flow sheet for details.

## 2020-10-12 NOTE — CARE COORDINATION
CM called into patient room for initial assessment. No answer. CM attempted to contact wife Ryan Barrett at [phone] without answer. LM and awaiting returned call. Chart review completed and patient appears to be bedbound at home and it appears that wife provides much of patient care. CM will continue to attempt to reach out to patient/family for initial assessment information.   Jose Narayanan RN

## 2020-10-12 NOTE — PROGRESS NOTES
Nephrology Progress Note   [email or web address]      This patient is a 71year old male whom we are following for KEVIN. Subjective: The patient was seen and examined. CRRT down this morning. Good solute clearance. On low dose Levophed. Tolerated UF goal of even. Received 1 unit of pRBC last night. Family History: Family at bedside and questions were answered  ROS: (+) SOB no chest pain      Vitals:  BP (!) 155/55   Pulse 96   Temp 97.6 °F (36.4 °C) (Bladder)   Resp (!) 31   Ht 5' 4\" (1.626 m)   Wt (!) 355 lb 9.6 oz (161.3 kg)   SpO2 94%   BMI 61.04 kg/m²   I/O last 3 completed shifts: In: 2029 [Other:2029]  Out: -   No intake/output data recorded. Physical Exam:  Physical Exam  Vitals signs reviewed. Constitutional:       Appearance: Normal appearance. HENT:      Head: Normocephalic and atraumatic. Mouth/Throat:      Mouth: Mucous membranes are moist.   Eyes:      General: No scleral icterus. Conjunctiva/sclera: Conjunctivae normal.   Cardiovascular:      Rate and Rhythm: Normal rate. Heart sounds: No friction rub. Pulmonary:      Effort: No respiratory distress. Breath sounds: Rales present. No wheezing. Abdominal:      General: Bowel sounds are normal. There is no distension. Tenderness: There is no abdominal tenderness. Musculoskeletal:      Right lower leg: Edema present. Left lower leg: Edema present. Neurological:      Mental Status: He is alert.        Access: [facility] HD cath      Medications:   cefepime  2 g Intravenous I70R    folic acid  1 mg Oral Daily    sodium chloride flush  10 mL Intravenous 2 times per day    pantoprazole  40 mg Intravenous BID         Labs:  Recent Labs     10/10/20  0528  10/11/20  0605  10/11/20  2002 10/12/20  0218 10/12/20  0620   WBC 36.9*  --  43.7*  --   --   --  27.2*   HGB 8.5*   < > 7.1*   < > 7.2* 6.7* 7.5*   HCT 25.2*   < > 21.3*   < > 22.1* 20.5* 22.9*   MCV 88.2  --  88.2  --   --   --  88.3     -- 281  --   --   --  163    < > = values in this interval not displayed. Recent Labs     10/11/20  1430 10/11/20  2002 10/12/20  0218 10/12/20  0620    136 133* 137   K 4.9 5.1 4.8 4.9    104 101 105   CO2 24 26 26 27   GLUCOSE 153* 150* 143* 133*   PHOS 3.4 2.9 2.4*  --    MG 2.10 2.30 2.30 2.30   BUN 35* 27* 20 19   CREATININE 1.9* 1.4* 1.3 1.2   LABGLOM 35* 50* 55* 60*   GFRAA 43* >60 >60 >60       Assessment  -oliguric ATN from profound hypotension, sepsis  -left retroperitoneal hematoma; h/o open partial nephrectomy              High risk for page kidney   -GI bleed w occult blood stool +  -Shock- hemorrhagic and likely sepsis  w elevated WBCs  -supra therapeutic INR              H/o DVT on coumadin  -CHFr EF  -Anemia     Plan  -cont CRRT - UF goal 50-100cc/hr as tolerated  -broad spectrum abx, renally dosed  -Serial renal panel  -daily wts and strict i/o  -renal dose medications   -avoid nephrotoxins      Please do not hesitate to contact me at [phone] if with questions. Thank you!     Sarahy Melgar MD  10/12/2020  [facility]

## 2020-10-12 NOTE — CONSULTS
Infectious Diseases   Consult Note      Reason for Consult:  Sepsis, E coli UTI and bacteremia, retroperitoneal hemorrhage   Requesting Physician:  JOEY Thompson       Date of Admission: 10/9/2020  Subjective:   CHIEF COMPLAINT:  None given       HPI:    Aaliyah Lutz is a 63VMH with history of NICM, HTN, DVT on warfarin, GIB, kidney cancer s/p partial R nephrectomy                ED 10/9/20 - weakness, melena. INR was 9.2. KEVIN noted. Hgb was 8.5. CT with large retroperitoneal hemorrhage, ?from L kidney; kidney was obscured by the blood. Has been hypotensive on levophed. Has required CRRT since 10/10/20, now with /hour. Today he is afebrile on 2L NC. On levophed. WBC remains quite elevated, today is 27, down from peak of 43 on 10/11/20. No melena. He is without pain. Current abx:  Cefepime 2g q12       Past Surgical History:       Diagnosis Date    Cancer ([address]     kidney    Cardiomyopathy ([address] 08/2019    CHF (congestive heart failure) ([address] 08/2019    Edema of both legs     GI bleed     Hx of blood clots     DVT    Hypertension          Procedure Laterality Date    ABDOMEN SURGERY      Right Kidney Cancer abd. surgery 2014    CARDIAC CATHETERIZATION  09/06/2019    COLONOSCOPY      flexsigmoidoscopy 40yrs ago     COLONOSCOPY N/A 4/9/2019    COLONOSCOPY WITH BIOPSY performed by Keke Badillo MD at [facility], COLON, DIAGNOSTIC      8/2018    KIDNEY SURGERY  4/7/2014    MT EGD TRANSORAL BIOPSY SINGLE/MULTIPLE  9/28/2018    EGD BIOPSY performed by Keke Badillo MD at [place] [address] 4/8/2019    EGD WITH ANESTHESIA performed by Keke Badillo MD at [place] [address] N/A 4/12/2019    ESOPHAGEAL CAPSULE ENDOSCOPY performed by Keke Badillo MD at [facility]. [address] History:    TOBACCO:   reports that he has never smoked.  He has never used smokeless tobacco.  ETOH: reports no history of alcohol use. There is no history of illicit drug use or other significant epidemiologic exposures.       Family History:       Problem Relation Age of Onset    Arthritis Mother     Atrial Fibrillation Mother     Breast Cancer Mother     High Blood Pressure Mother     Anemia Father     Arthritis Father     Cancer Father 80        Ear CA/ tumor removed     Hearing Loss Father     Arthritis Sister     Diabetes Maternal Aunt     Arthritis Maternal Aunt     Early Death Maternal Uncle          in early 42's     Depression Maternal Uncle     Other Maternal Uncle         Suicide    Heart Disease Paternal Aunt     Arthritis Maternal Grandmother        Current Medications:    Current Facility-Administered Medications: morphine (PF) injection 2 mg, 2 mg, Intravenous, Q3H PRN  prismaSATE BGK 4/2.5 dialysis solution, , Dialysis, Continuous  prismaSATE BGK 4/2.5 dialysis solution, , Dialysis, Continuous  prismaSATE BGK 4/2.5 dialysis solution, , Dialysis, Continuous  magnesium sulfate 1 g in dextrose 5% 100 mL IVPB, 1 g, Intravenous, PRN  calcium gluconate 1 g in sodium chloride 50 mL, 1 g, Intravenous, PRN **OR** calcium gluconate 2 g in dextrose 5 % 100 mL IVPB, 2 g, Intravenous, PRN **OR** calcium gluconate 3 g in dextrose 5 % 100 mL IVPB, 3 g, Intravenous, PRN **OR** calcium gluconate 4 g in dextrose 5 % 100 mL IVPB, 4 g, Intravenous, PRN  sodium phosphate 6 mmol in dextrose 5 % 250 mL IVPB, 6 mmol, Intravenous, PRN **OR** sodium phosphate 12 mmol in dextrose 5 % 250 mL IVPB, 12 mmol, Intravenous, PRN **OR** sodium phosphate 18 mmol in dextrose 5 % 500 mL IVPB, 18 mmol, Intravenous, PRN **OR** sodium phosphate 24 mmol in dextrose 5 % 500 mL IVPB, 24 mmol, Intravenous, PRN  cefepime (MAXIPIME) 2 g IVPB minibag, 2 g, Intravenous, Q12H  norepinephrine (LEVOPHED) 16 mg in dextrose 5 % 250 mL infusion, 5 mcg/min, Intravenous, Continuous  folic acid (FOLVITE) tablet 1 mg, 1 mg, Oral, Objective:   PHYSICAL EXAM:      VITALS:  BP (!) 155/55   Pulse 96   Temp 97.6 °F (36.4 °C) (Bladder)   Resp (!) 31   Ht 5' 4\" (1.626 m)   Wt (!) 355 lb 9.6 oz (161.3 kg)   SpO2 94%   BMI 61.04 kg/m²      24HR INTAKE/OUTPUT:      Intake/Output Summary (Last 24 hours) at 10/12/2020 1039  Last data filed at 10/11/2020 1600  Gross per 24 hour   Intake 2029 ml   Output --   Net 2029 ml     CONSTITUTIONAL:  Awake, alert, cooperative, no apparent distress, and appears stated age  Morbidly obese. Dyspneic. Flat affect  HEENT: NCAT, PERRL, EOMI. Sclera white, conjunctiva pale. OP with moist mucosal membranes, no thrush, tongue protrudes midline  NECK:  Supple, symmetrical, trachea midline, no adenopathy  LUNGS:  no increased work of breathing, CTA upper lobes david   CARDIOVASCULAR:  Tachycardic, regular   ABDOMEN:  normal bowel sounds, soft, NT.  Large ventral hernia   MUSCULOSKELETAL: No obvious misalignment or effusion of the joints. No clubbing, cyanosis of the digits. SKIN:  normal skin color, texture, turgor and no redness, warmth, or swelling. No palpable nodules or stigmata of embolic phenomenon  Candidal intertrigo noted   NEUROLOGIC: nonfocal exam  ACCESS:  David IJ central lines       DATA:    Old records have been reviewed    CBC:  Recent Labs     10/09/20  2350 10/10/20  0528  10/11/20  0605  10/11/20  2002 10/12/20  0218 10/12/20  0620   WBC 36.5* 36.9*  --  43.7*  --   --   --  27.2*   RBC 3.05* 2.86*  --  2.41*  --   --   --  2.60*   HGB 9.0* 8.5*   < > 7.1*   < > 7.2* 6.7* 7.5*   HCT 26.8* 25.2*   < > 21.3*   < > 22.1* 20.5* 22.9*    308  --  281  --   --   --  163   MCV 87.7 88.2  --  88.2  --   --   --  88.3   MCH 29.4 29.6  --  29.3  --   --   --  28.8   MCHC 33.5 33.6  --  33.2  --   --   --  32.6   RDW 14.8 15.0  --  15.2  --   --   --  15.2   BANDSPCT 16* 19*  --  37*  --   --   --   --     < > = values in this interval not displayed.       BMP:  Recent Labs     10/11/20  2002 10/12/20  0218 10/12/20  0620    133* 137   K 5.1 4.8 4.9    101 105   CO2 26 26 27   BUN 27* 20 19   CREATININE 1.4* 1.3 1.2   CALCIUM 7.6* 7.6* 7.6*   GLUCOSE 150* 143* 133*        Cultures:   10/9 BC #1 E coli, JANE pending    BC #2 neg    UC Escherichia coli UA 21-50wbc   Antibiotic  Interpretation  JANE  Status     ampicillin  Resistant  >=32  mcg/mL      ceFAZolin  Sensitive  <=4  mcg/mL       NOTE: Cefazolin should only be used for uncomplicated UTI         for E.coli or Klebsiella pneumoniae. cefepime  Sensitive  <=0.12  mcg/mL      cefTRIAXone  Sensitive  <=0.25  mcg/mL      ciprofloxacin  Sensitive  <=0.25  mcg/mL      ertapenem  Sensitive  <=0.12  mcg/mL      gentamicin  Sensitive  <=1  mcg/mL      levofloxacin  Sensitive  <=0.12  mcg/mL      nitrofurantoin  Sensitive  <=16  mcg/mL      piperacillin-tazobactam  Sensitive  16  mcg/mL      trimethoprim-sulfamethoxazole  Resistant  >=320  mcg/mL            Radiology Review:  All pertinent images / reports were reviewed as a part of this visit.        Impression    Large left retroperitoneal hematoma from presumed spontaneous hemorrhage    particularly given the elevated INR.                 Pneumobilia with apparent moderate contraction of the gallbladder surrounding    the gallstone.  This could be the result of prior stone passage and resulting    in incompetence of the ampulla of Vater.  No ductal stones seen currently.         Large panniculus and abdominal wall hernia. ADDENDUM:  Additional information, corrected report:   HISTORY:  The initial history was incorrect, patient did not have left side nephrectomy  but partial nephrectomy on the right.   Therefore the hematoma seen retroperitoneal on the left in the renal fossa  likely obscures the entire left kidney. There is no normal renal parenchyma  identified. The measured area, 13 x 9 cm therefore presumably include  spontaneous hematoma and kidney tissue combined.   This would also therefore  indicate a lesser degree of hemorrhage present, since some of the measured  area being occupied by the obscured kidney.   Again the primary etiology is spontaneous hemorrhage, given the patient's  grossly elevated INR, and likely arising within the obscured left kidney and  extending left pararenal retroperitoneal space. Assessment:     Patient Active Problem List   Diagnosis    Morbid obesity with BMI of 60.0-69.9, adult ([address]    Anticoagulation goal of INR 1.5 to 2.5    Benign hypertension    History of DVT (deep vein thrombosis)    Ventral hernia    Elevated brain natriuretic peptide (BNP) level    Pulmonary vascular congestion on CXR    Sepsis ([address]    Hypomagnesemia    Hypokalemia    Hyponatremia    Hypochloremia    KEVIN (acute kidney injury) ([address]    Subacute microcytic anemia (July 2018)    Hx choledocholithiasis s/p ERCP (July 2018)    Acute respiratory failure with hypoxia and hypercapnia (HCC)    Calculus of gallbladder with acute cholecystitis without obstruction    Intertrigo    Lymphedema    Suspected sleep apnea    Biatrial enlargement    Congestive heart failure (HCC)    GI bleed    Retroperitoneal bleed    Supratherapeutic INR    Shock ([address]    Non-ischemic cardiomyopathy ([address] EF 40%       Autumn Velasco is a 49OGU who is evaluated for the following:    History of multiple medical problems, including DVT on warfarin, history of GIB  Renal carcinoma s/p partial R nephrectomy    Admitted 10/9/20 with coagulopathy and retroperitoneal hemorrhage   ? Hemorrhage from L kidney incompletely characterized by CT scan     Sepsis     E coli in blood and urine cultures, suspected primary kidney infection with bacteremia.     The isolate in urine is sensitive to cefepime currently prescribed     Marked leukocytosis   WBC is coming down     KEVIN on CRRT    Pneumobilia noted on CT  LFTs are wnl and abd exam is reassuring   Secondary to prior ERCP per GI       Recs:  Continue cefepime, change to 2g q24 for CRRT dosing   Follow trend of WBC. If it fails to continue to decline, would change abx to include anaerobic coverage such as with meropenem or Zosyn and get repeat [facility]     D/w patient and wife, all questions answered  D/w RN   We will follow        Grazyna Rivera M.D. Thank you for the opportunity to participate in the care of your patient.     Please do not hesitate to contact me:   [phone] office  [phone] mobile

## 2020-10-12 NOTE — PROGRESS NOTES
Attempted to assess pt for possible pressure injury; pt was having an ECHO done; will attempt to see pt later today or tomorrow. Updated primary RN.

## 2020-10-12 NOTE — CONSULTS
Hematology/Oncology Consultation         Patient:   Deven Hackett Sr.       Reason for Consult:  Anemia, hx of blood clots, now with bleed/coaguloathy and possible need for filter   Requesting Physician: No ref. provider found    Chief Complaint:     Chief Complaint   Patient presents with    Fatigue     in hospital for buttock wound x2 weeks ago, started feeling weakness, no appetite x1 week, gradually getting worse    Dizziness     started today when ambulating with cane, walker at home                  History Obtained from:     patient, electronic medical record    History of Present Illness:     Rox Rod is a 71 y.o. male  with significant past medical history of DVT/PE, GI bleed, morbid obesity, ANA who presents with anorexia and CT scans showing a large retroperitoneal bleed. He was on Coumadin PTA. INR was over 9 at that time. INR is now 1.4 post reversal. Hgb today is 7.5. Wife at bedside. He feels well. Now on CRRT. We are asked to see if patient would benefit from an IVC filter and indefinite hold of his AC.      Past Medical History:         Diagnosis Date    Cancer ([address]     kidney    Cardiomyopathy ([address] 08/2019    CHF (congestive heart failure) ([address] 08/2019    Edema of both legs     GI bleed     Hx of blood clots     DVT    Hypertension        Past Surgical History:         Procedure Laterality Date    ABDOMEN SURGERY      Right Kidney Cancer abd. surgery 2014    CARDIAC CATHETERIZATION  09/06/2019    COLONOSCOPY      flexsigmoidoscopy 40yrs ago     COLONOSCOPY N/A 4/9/2019    COLONOSCOPY WITH BIOPSY performed by Bronwyn Hartley MD at [facility]      8/2018    KIDNEY SURGERY  4/7/2014    [place] EGD TRANSORAL BIOPSY SINGLE/MULTIPLE  9/28/2018    EGD BIOPSY performed by Bronwyn Hartley MD at [address] [facility] 4/8/2019    EGD WITH ANESTHESIA performed by Bronwyn Hartley MD at [address] [place] [facility] [place] UPPER GASTROINTESTINAL ENDOSCOPY N/A 4/12/2019    ESOPHAGEAL CAPSULE ENDOSCOPY performed by Dominique Moore MD at [address] [place] [address]       Current Medications:     Current Facility-Administered Medications   Medication Dose Route Frequency Provider Last Rate Last Dose    morphine (PF) injection 2 mg  2 mg Intravenous Q3H PRN JOEY Gomez - CNP   2 mg at 10/10/20 1947    Stephanie Presume 4/2.5 dialysis solution   Dialysis Continuous Elana River MD 2,000 mL/hr at 10/12/20 0518 2,000 mL/hr at 10/12/20 0518    Stephanie Presumsharonda 4/2.5 dialysis solution   Dialysis Continuous Elana River MD 1,000 mL/hr at 10/12/20 0517 1,000 mL/hr at 10/12/20 [address] 4/2.5 dialysis solution   Dialysis Continuous Elana River  mL/hr at 10/12/20 0005 500 mL/hr at 10/12/20 0005    magnesium sulfate 1 g in dextrose 5% 100 mL IVPB  1 g Intravenous PRN Elana River MD        calcium gluconate 1 g in sodium chloride 50 mL  1 g Intravenous PRN Elana River MD   1 g at 10/11/20 2110    Or    calcium gluconate 2 g in dextrose 5 % 100 mL IVPB  2 g Intravenous PRN Elana River MD        Or    calcium gluconate 3 g in dextrose 5 % 100 mL IVPB  3 g Intravenous PRN Elana River MD        Or    calcium gluconate 4 g in dextrose 5 % 100 mL IVPB  4 g Intravenous PRN Elana River MD        sodium phosphate 6 mmol in dextrose 5 % 250 mL IVPB  6 mmol Intravenous PRN Elana River MD        Or    sodium phosphate 12 mmol in dextrose 5 % 250 mL IVPB  12 mmol Intravenous PRN Elana River MD        Or    sodium phosphate 18 mmol in dextrose 5 % 500 mL IVPB  18 mmol Intravenous PRN Elana River MD        Or    sodium phosphate 24 mmol in dextrose 5 % 500 mL IVPB  24 mmol Intravenous PRN Elana River MD        cefepime (MAXIPIME) 2 g IVPB minibag  2 g Intravenous Q12H Dayana Salinas MD   Stopped at 10/12/20 0239    norepinephrine (LEVOPHED) 16 mg in dextrose 5 % 250 mL infusion  5 session: Not on file    Stress: Not on file   Relationships    Social connections     Talks on phone: Not on file     Gets together: Not on file     Attends [employer] service: Not on file     Active member of club or organization: Not on file     Attends meetings of clubs or organizations: Not on file     Relationship status: Not on file    Intimate partner violence     Fear of current or ex partner: Not on file     Emotionally abused: Not on file     Physically abused: Not on file     Forced sexual activity: Not on file   Other Topics Concern    Not on file   Social History Narrative    Not on file     Social History     Substance and Sexual Activity   Drug Use No     Social History     Substance and Sexual Activity   Alcohol Use No     Social History     Substance and Sexual Activity   Sexual Activity Not on file     Social History     Tobacco Use   Smoking Status Never Smoker   Smokeless Tobacco Never Used       Family History:     Family History   Problem Relation Age of Onset    Arthritis Mother     Atrial Fibrillation Mother     Breast Cancer Mother     High Blood Pressure Mother     Anemia Father     Arthritis Father     Cancer Father 80        Ear CA/ tumor removed     Hearing Loss Father     Arthritis Sister     Diabetes Maternal Aunt     Arthritis Maternal Aunt     Early Death Maternal Uncle          in early 42's     Depression Maternal Uncle     Other Maternal Uncle         Suicide    Heart Disease Paternal Aunt     Arthritis Maternal Grandmother        Review of Systems:     Constitutional: Denies fever, sweats, weight loss. .     Eyes: No visual changes or diplopia. No scleral icterus. ENT: No Headaches, no hearing loss, no  vertigo. No mouth sores or sore throat. Cardiovascular: No chest pain, no dyspnea on exertion, no palpitations, no  loss of consciousness. Respiratory: No cough, no  wheezing, no dyspnea, no sputum production. No hemoptysis. .    Gastrointestinal: No abdominal pain, no appetite loss, no blood in stools. No change in bowel habits. Genitourinary: No dysuria, trouble voiding, or hematuria. Musculoskeletal:  Generalized weakness. No joint complaints. Integumentary: No rash or pruritis. Neurological: No headache, diplopia. No change in gait, balance, or coordination. No paresthesias. Endocrine: No temperature intolerance. No excessive thirst, fluid intake, or urination. Hematologic/Lymphatic: No abnormal bruising or ecchymoses, no blood clots or swollen lymph nodes. Allergic/Immunologic: No nasal congestion or hives. Physical Exam:     BP (!) 155/55   Pulse 96   Temp 97.6 °F (36.4 °C) (Bladder)   Resp (!) 31   Ht 5' 4\" (1.626 m)   Wt (!) 355 lb 9.6 oz (161.3 kg)   SpO2 94%   BMI 61.04 kg/m²     CONSTITUTIONAL: awake, alert, cooperative, no apparent distress. EYES:  Pupils equal, round and reactive to light, sclera non-icteric, conjunctiva normal  ENT:  Normocephalic, without obvious abnormality, atraumatic, sinuses nontender on palpation, external ears without lesions, oral pharynx with moist mucus membranes, no mucositis. NECK:  Supple, symmetrical, trachea midline, no adenopathy, thyroid symmetric, not enlarged and no tenderness, skin normal  HEMATOLOGIC/LYMPHATICS:  no cervical lymphadenopathy, no supraclavicular lymphadenopathy, no axillary lymphadenopathy and no inguinal lymphadenopathy  BACK:  Symmetric, no curvature, spinous processes are non-tender on palpation, paraspinous muscles are non-tender on palpation, no costal vertebral tenderness  LUNGS:  Clear to auscultation bilaterally, no crackles or wheezing  CARDIOVASCULAR:  Regular rate and rhythm, normal S1 and S2, no S3 or S4, and no murmur noted  ABDOMEN:  Normal bowel sounds, soft, non-distended, non-tender, no masses palpated, no hepatosplenomegally  MUSCULOSKELETAL:  There is no redness, warmth, or swelling of the joints  NEUROLOGIC:   No focal findings.    SKIN:  Scattered bruising and ecchymoses. EXT: without clubbing, cyanosis or edema. Data:     CBC:  Recent Labs     10/12/20  0620 10/12/20  0218 10/11/20  2002  10/11/20  0605  10/10/20  0528   WBC 27.2*  --   --   --  43.7*  --  36.9*   HGB 7.5* 6.7* 7.2*   < > 7.1*   < > 8.5*   HCT 22.9* 20.5* 22.1*   < > 21.3*   < > 25.2*   MCV 88.3  --   --   --  88.2  --  88.2     --   --   --  281  --  308    < > = values in this interval not displayed. BMP:  Recent Labs     10/12/20  0620 10/12/20  0218 10/11/20  2002    133* 136   K 4.9 4.8 5.1   CO2 27 26 26   BUN 19 20 27*   CREATININE 1.2 1.3 1.4*   MG 2.30 2.30 2.30       HEPATIC:  Recent Labs     10/12/20  0620 10/11/20  0605 10/10/20  0528   AST 19 32 26   ALT 25 27 20   ALKPHOS 79 78 67   PROT 5.1* 5.1* 5.1*   BILITOT 0.9 0.9 1.6*       TUMOR MARKERS:  No results for input(s): PSA, CEA, , UQ4038,  in the last 720 hours.       MAGNESIUM:    Lab Results   Component Value Date    MG 2.30 10/12/2020       PT/INR:    No results found for: PTINR    Lab Results   Component Value Date    INR 1.42 10/10/2020       PTT:    No results found for: APTT    U/A:    Lab Results   Component Value Date    COLORU Yellow 10/09/2020    PHUR 6.0 10/09/2020    WBCUA 21-50 10/09/2020    RBCUA 11-20 10/09/2020    BACTERIA 2+ 10/09/2020    CLARITYU Clear 10/09/2020    SPECGRAV 1.010 10/09/2020    LEUKOCYTESUR LARGE 10/09/2020    UROBILINOGEN 0.2 10/09/2020    BILIRUBINUR Negative 10/09/2020    BLOODU LARGE 10/09/2020    GLUCOSEU Negative 10/09/2020    AMORPHOUS Rare 10/09/2020       Imaging:     CT A/P     Large left retroperitoneal hematoma from presumed spontaneous hemorrhage    particularly given the elevated INR.         Prior left nephrectomy.         Pneumobilia with apparent moderate contraction of the gallbladder surrounding    the gallstone.  This could be the result of prior stone passage and resulting    in incompetence of the ampulla of Vater.  No ductal stones seen currently.         Large panniculus and abdominal wall hernia.               Impression:     Hx of DVT/PE   Morbid Obesity  Hx of ANA  Retroperitoneal Bleed     Plan:     Ask Vascular surgery to see patient for IVC filter placement given second major bleeding event. Hold all AC. Coagulopathy- INR now reversed and 1.42. Check iron stores and replace as needed. Discussed with Dr. Crescencio Alvarez this AM.     No longer bleeding- does not need additional FFP. Thank you very much for allowing me to participate in the care of this patient.     Stone Grajeda CNP   Medical Oncology/Hematology    [facility] [place] - [address] [place]   Phone: [phone]  Fax: [phone]

## 2020-10-12 NOTE — PROGRESS NOTES
Urology Attending Progress Note      Subjective: Patient states \"feels ok\"    71 y.o.male with hx of right RCC with partial nephrectomy, urinary retention, and with 2 cm renal cortical lesion a year ago. Consulted for left retroperitoneal bleed. Patient denies abdominal pain today. Patient has seen Dr. Lorna Helm in the past.     Vitals:  BP (!) 155/55   Pulse 96   Temp 97.6 °F (36.4 °C) (Bladder)   Resp (!) 31   Ht 5' 4\" (1.626 m)   Wt (!) 355 lb 9.6 oz (161.3 kg)   SpO2 94%   BMI 61.04 kg/m²   Temp  Av.8 °F (36.6 °C)  Min: 97.6 °F (36.4 °C)  Max: 99 °F (37.2 °C)    Intake/Output Summary (Last 24 hours) at 10/12/2020 1029  Last data filed at 10/11/2020 1600  Gross per 24 hour   Intake  ml   Output --   Net  ml       Exam:    Well developed, well nourished in no acute distress   Orientated to time and place   Neck is supple, trachea is midline   Respiratory effort is normal without distress   Cardiovascular show no extremity swelling   Abdomen distended, no guarding.    Skin warm and dry, exposed skin shows no abnormal lesions, rashes   Musculoskeletal no digital cyanosis, head normocephalic    Psych shows normal mood and affect, alert and appropriately answers questions    Dark, brown urine in devine catheter    Labs:  WBC:    Lab Results   Component Value Date    WBC 27.2 10/12/2020     Hemoglobin/Hematocrit:    Lab Results   Component Value Date    HGB 7.5 10/12/2020    HCT 22.9 10/12/2020     BMP:    Lab Results   Component Value Date     10/12/2020    K 4.9 10/12/2020     10/12/2020    CO2 27 10/12/2020    BUN 19 10/12/2020    LABALBU 2.5 10/12/2020    CREATININE 1.2 10/12/2020    CALCIUM 7.6 10/12/2020    GFRAA >60 10/12/2020    LABGLOM 60 10/12/2020     PT/INR:    Lab Results   Component Value Date    PROTIME 16.5 10/10/2020    INR 1.42 10/10/2020     PTT:  No results found for: APTT[APTT      Imaging: CT abdomen/pelvis--10/10/20  Impression    Large left retroperitoneal hematoma from presumed spontaneous hemorrhage    particularly given the elevated INR.         Prior left nephrectomy.         Pneumobilia with apparent moderate contraction of the gallbladder surrounding    the gallstone.  This could be the result of prior stone passage and resulting    in incompetence of the ampulla of Vater.  No ductal stones seen currently.         Large panniculus and abdominal wall hernia.           *Patient had right partial nephrectomy in the past, RCCa, with Dr. Lorna Helm*    Impression/Plan:   71 y.o.male with retroperitoneal bleed causing large left retroperitoneal hematoma. Patient had IR of 9 at admission. Patient had 2 cm renal cortical lesion seen about a year ago. He denies pain today. Urine output is stable, urine is dark brown today. His creatinine is 1.2 today. INR is 1.42.     -Will need repeat imaging w/iv contrast in 1-2 weeks, to assess previous 2 cm renal cortical lesion.        Odalys Luna PA-C

## 2020-10-12 NOTE — PLAN OF CARE
Problem: Falls - Risk of:  Goal: Will remain free from falls  Description: Will remain free from falls  Outcome: Ongoing     Problem: Falls - Risk of:  Goal: Absence of physical injury  Description: Absence of physical injury  Outcome: Ongoing     Problem: Skin Integrity:  Goal: Will show no infection signs and symptoms  Description: Will show no infection signs and symptoms  Outcome: Ongoing     Problem: Skin Integrity:  Goal: Absence of new skin breakdown  Description: Absence of new skin breakdown  Outcome: Ongoing     Problem: Activity:  Goal: Risk for activity intolerance will decrease  Description: Risk for activity intolerance will decrease  Outcome: Ongoing     Problem:  Bowel/Gastric:  Goal: Bowel function will improve  Description: Bowel function will improve  Outcome: Ongoing     Problem: Fluid Volume:  Goal: Maintenance of adequate hydration will improve  Description: Maintenance of adequate hydration will improve  Outcome: Ongoing

## 2020-10-13 ENCOUNTER — APPOINTMENT (OUTPATIENT)
Dept: CARDIAC CATH/INVASIVE PROCEDURES | Age: 70
DRG: 853 | End: 2020-10-13
Payer: MEDICARE

## 2020-10-13 LAB
A/G RATIO: 0.9 (ref 1.1–2.2)
ALBUMIN SERPL-MCNC: 2.4 G/DL (ref 3.4–5)
ALP BLD-CCNC: 76 U/L (ref 40–129)
ALT SERPL-CCNC: 24 U/L (ref 10–40)
ANION GAP SERPL CALCULATED.3IONS-SCNC: 5 MMOL/L (ref 3–16)
ANION GAP SERPL CALCULATED.3IONS-SCNC: 6 MMOL/L (ref 3–16)
ANION GAP SERPL CALCULATED.3IONS-SCNC: 6 MMOL/L (ref 3–16)
ANISOCYTOSIS: ABNORMAL
AST SERPL-CCNC: 14 U/L (ref 15–37)
BANDED NEUTROPHILS RELATIVE PERCENT: 18 % (ref 0–7)
BASOPHILIC STIPPLING: ABNORMAL
BASOPHILS ABSOLUTE: 0 K/UL (ref 0–0.2)
BASOPHILS RELATIVE PERCENT: 0 %
BILIRUB SERPL-MCNC: 0.7 MG/DL (ref 0–1)
BLOOD CULTURE, ROUTINE: ABNORMAL
BLOOD CULTURE, ROUTINE: ABNORMAL
BUN BLDV-MCNC: 10 MG/DL (ref 7–20)
BUN BLDV-MCNC: 12 MG/DL (ref 7–20)
BUN BLDV-MCNC: 12 MG/DL (ref 7–20)
CALCIUM IONIZED: 0.99 MMOL/L (ref 1.12–1.32)
CALCIUM IONIZED: 1.07 MMOL/L (ref 1.12–1.32)
CALCIUM IONIZED: 1.08 MMOL/L (ref 1.12–1.32)
CALCIUM SERPL-MCNC: 7.3 MG/DL (ref 8.3–10.6)
CALCIUM SERPL-MCNC: 7.5 MG/DL (ref 8.3–10.6)
CALCIUM SERPL-MCNC: 7.5 MG/DL (ref 8.3–10.6)
CHLORIDE BLD-SCNC: 101 MMOL/L (ref 99–110)
CHLORIDE BLD-SCNC: 101 MMOL/L (ref 99–110)
CHLORIDE BLD-SCNC: 103 MMOL/L (ref 99–110)
CO2: 26 MMOL/L (ref 21–32)
CO2: 27 MMOL/L (ref 21–32)
CO2: 28 MMOL/L (ref 21–32)
CREAT SERPL-MCNC: 1 MG/DL (ref 0.8–1.3)
CREAT SERPL-MCNC: 1.1 MG/DL (ref 0.8–1.3)
CREAT SERPL-MCNC: 1.1 MG/DL (ref 0.8–1.3)
CULTURE, BLOOD 2: ABNORMAL
EOSINOPHILS ABSOLUTE: 0.4 K/UL (ref 0–0.6)
EOSINOPHILS RELATIVE PERCENT: 2 %
GFR AFRICAN AMERICAN: >60
GFR NON-AFRICAN AMERICAN: >60
GLOBULIN: 2.6 G/DL
GLUCOSE BLD-MCNC: 111 MG/DL (ref 70–99)
GLUCOSE BLD-MCNC: 113 MG/DL (ref 70–99)
GLUCOSE BLD-MCNC: 129 MG/DL (ref 70–99)
HCT VFR BLD CALC: 22.1 % (ref 40.5–52.5)
HCT VFR BLD CALC: 22.2 % (ref 40.5–52.5)
HEMOGLOBIN: 7.2 G/DL (ref 13.5–17.5)
HEMOGLOBIN: 7.3 G/DL (ref 13.5–17.5)
LACTIC ACID: 0.7 MMOL/L (ref 0.4–2)
LYMPHOCYTES ABSOLUTE: 0.9 K/UL (ref 1–5.1)
LYMPHOCYTES RELATIVE PERCENT: 4 %
MACROCYTES: ABNORMAL
MAGNESIUM: 2.4 MG/DL (ref 1.8–2.4)
MCH RBC QN AUTO: 29.2 PG (ref 26–34)
MCHC RBC AUTO-ENTMCNC: 32.5 G/DL (ref 31–36)
MCV RBC AUTO: 89.7 FL (ref 80–100)
METAMYELOCYTES RELATIVE PERCENT: 1 %
MICROCYTES: ABNORMAL
MONOCYTES ABSOLUTE: 0.9 K/UL (ref 0–1.3)
MONOCYTES RELATIVE PERCENT: 4 %
NEUTROPHILS ABSOLUTE: 20 K/UL (ref 1.7–7.7)
NEUTROPHILS RELATIVE PERCENT: 71 %
ORGANISM: ABNORMAL
PDW BLD-RTO: 15.4 % (ref 12.4–15.4)
PH VENOUS: 7.27 (ref 7.35–7.45)
PH VENOUS: 7.29 (ref 7.35–7.45)
PH VENOUS: 7.35 (ref 7.35–7.45)
PHOSPHORUS: 1.6 MG/DL (ref 2.5–4.9)
PHOSPHORUS: 1.8 MG/DL (ref 2.5–4.9)
PHOSPHORUS: 1.8 MG/DL (ref 2.5–4.9)
PLATELET # BLD: 166 K/UL (ref 135–450)
PLATELET SLIDE REVIEW: ADEQUATE
PMV BLD AUTO: 8.3 FL (ref 5–10.5)
POLYCHROMASIA: ABNORMAL
POTASSIUM SERPL-SCNC: 4.4 MMOL/L (ref 3.5–5.1)
POTASSIUM SERPL-SCNC: 4.5 MMOL/L (ref 3.5–5.1)
POTASSIUM SERPL-SCNC: 4.5 MMOL/L (ref 3.5–5.1)
RBC # BLD: 2.47 M/UL (ref 4.2–5.9)
SLIDE REVIEW: ABNORMAL
SODIUM BLD-SCNC: 133 MMOL/L (ref 136–145)
SODIUM BLD-SCNC: 134 MMOL/L (ref 136–145)
SODIUM BLD-SCNC: 136 MMOL/L (ref 136–145)
SOLUBLE TRANSFERRIN RECEPT: 1.1 MG/L (ref 2.2–5)
TOTAL PROTEIN: 5 G/DL (ref 6.4–8.2)
TOXIC GRANULATION: PRESENT
WBC # BLD: 22.2 K/UL (ref 4–11)

## 2020-10-13 PROCEDURE — 2700000000 HC OXYGEN THERAPY PER DAY

## 2020-10-13 PROCEDURE — 99152 MOD SED SAME PHYS/QHP 5/>YRS: CPT

## 2020-10-13 PROCEDURE — B5191ZZ FLUOROSCOPY OF INFERIOR VENA CAVA USING LOW OSMOLAR CONTRAST: ICD-10-PCS | Performed by: SURGERY

## 2020-10-13 PROCEDURE — 2580000003 HC RX 258: Performed by: INTERNAL MEDICINE

## 2020-10-13 PROCEDURE — 6360000002 HC RX W HCPCS: Performed by: INTERNAL MEDICINE

## 2020-10-13 PROCEDURE — 85025 COMPLETE CBC W/AUTO DIFF WBC: CPT

## 2020-10-13 PROCEDURE — 6360000002 HC RX W HCPCS

## 2020-10-13 PROCEDURE — C1769 GUIDE WIRE: HCPCS

## 2020-10-13 PROCEDURE — 85014 HEMATOCRIT: CPT

## 2020-10-13 PROCEDURE — 82330 ASSAY OF CALCIUM: CPT

## 2020-10-13 PROCEDURE — 85018 HEMOGLOBIN: CPT

## 2020-10-13 PROCEDURE — 37191 INS ENDOVAS VENA CAVA FILTR: CPT

## 2020-10-13 PROCEDURE — 94761 N-INVAS EAR/PLS OXIMETRY MLT: CPT

## 2020-10-13 PROCEDURE — 90945 DIALYSIS ONE EVALUATION: CPT

## 2020-10-13 PROCEDURE — 2500000003 HC RX 250 WO HCPCS: Performed by: INTERNAL MEDICINE

## 2020-10-13 PROCEDURE — 83605 ASSAY OF LACTIC ACID: CPT

## 2020-10-13 PROCEDURE — 80053 COMPREHEN METABOLIC PANEL: CPT

## 2020-10-13 PROCEDURE — C9113 INJ PANTOPRAZOLE SODIUM, VIA: HCPCS | Performed by: INTERNAL MEDICINE

## 2020-10-13 PROCEDURE — 6370000000 HC RX 637 (ALT 250 FOR IP): Performed by: INTERNAL MEDICINE

## 2020-10-13 PROCEDURE — 37799 UNLISTED PX VASCULAR SURGERY: CPT

## 2020-10-13 PROCEDURE — 2500000003 HC RX 250 WO HCPCS

## 2020-10-13 PROCEDURE — 2000000000 HC ICU R&B

## 2020-10-13 PROCEDURE — C1880 VENA CAVA FILTER: HCPCS

## 2020-10-13 PROCEDURE — 6360000004 HC RX CONTRAST MEDICATION

## 2020-10-13 PROCEDURE — 84100 ASSAY OF PHOSPHORUS: CPT

## 2020-10-13 PROCEDURE — 99291 CRITICAL CARE FIRST HOUR: CPT | Performed by: INTERNAL MEDICINE

## 2020-10-13 PROCEDURE — 37191 INS ENDOVAS VENA CAVA FILTR: CPT | Performed by: SURGERY

## 2020-10-13 PROCEDURE — 06H03DZ INSERTION OF INTRALUMINAL DEVICE INTO INFERIOR VENA CAVA, PERCUTANEOUS APPROACH: ICD-10-PCS | Performed by: SURGERY

## 2020-10-13 PROCEDURE — 99152 MOD SED SAME PHYS/QHP 5/>YRS: CPT | Performed by: SURGERY

## 2020-10-13 PROCEDURE — 83735 ASSAY OF MAGNESIUM: CPT

## 2020-10-13 RX ORDER — FUROSEMIDE 10 MG/ML
80 INJECTION INTRAMUSCULAR; INTRAVENOUS ONCE
Status: COMPLETED | OUTPATIENT
Start: 2020-10-13 | End: 2020-10-13

## 2020-10-13 RX ORDER — CASTOR OIL AND BALSAM, PERU 788; 87 MG/G; MG/G
OINTMENT TOPICAL 2 TIMES DAILY
Status: DISCONTINUED | OUTPATIENT
Start: 2020-10-13 | End: 2020-10-22 | Stop reason: HOSPADM

## 2020-10-13 RX ORDER — FENTANYL CITRATE 50 UG/ML
INJECTION, SOLUTION INTRAMUSCULAR; INTRAVENOUS
Status: COMPLETED | OUTPATIENT
Start: 2020-10-13 | End: 2020-10-13

## 2020-10-13 RX ORDER — MIDAZOLAM HYDROCHLORIDE 5 MG/ML
INJECTION INTRAMUSCULAR; INTRAVENOUS
Status: COMPLETED | OUTPATIENT
Start: 2020-10-13 | End: 2020-10-13

## 2020-10-13 RX ADMIN — Medication 500 ML/HR: at 21:53

## 2020-10-13 RX ADMIN — Medication 2000 ML/HR: at 21:53

## 2020-10-13 RX ADMIN — CALCIUM GLUCONATE 1 G: 20 INJECTION, SOLUTION INTRAVENOUS at 02:54

## 2020-10-13 RX ADMIN — Medication 2000 ML/HR: at 06:33

## 2020-10-13 RX ADMIN — Medication 1000 ML/HR: at 21:53

## 2020-10-13 RX ADMIN — Medication 2000 ML/HR: at 01:39

## 2020-10-13 RX ADMIN — Medication 1000 ML/HR: at 04:10

## 2020-10-13 RX ADMIN — FENTANYL CITRATE 25 MCG: 50 INJECTION, SOLUTION INTRAMUSCULAR; INTRAVENOUS at 10:48

## 2020-10-13 RX ADMIN — CALCIUM GLUCONATE 1 G: 20 INJECTION, SOLUTION INTRAVENOUS at 19:23

## 2020-10-13 RX ADMIN — PANTOPRAZOLE SODIUM 40 MG: 40 INJECTION, POWDER, FOR SOLUTION INTRAVENOUS at 21:04

## 2020-10-13 RX ADMIN — CASTOR OIL AND BALSAM, PERU: 788; 87 OINTMENT TOPICAL at 21:03

## 2020-10-13 RX ADMIN — MUPIROCIN: 20 OINTMENT TOPICAL at 21:03

## 2020-10-13 RX ADMIN — MICONAZOLE NITRATE: 20 POWDER TOPICAL at 21:03

## 2020-10-13 RX ADMIN — MIDAZOLAM HYDROCHLORIDE 1 MG: 5 INJECTION INTRAMUSCULAR; INTRAVENOUS at 10:48

## 2020-10-13 RX ADMIN — SODIUM PHOSPHATE, MONOBASIC, MONOHYDRATE 12 MMOL: 276; 142 INJECTION, SOLUTION INTRAVENOUS at 20:44

## 2020-10-13 RX ADMIN — Medication 2000 ML/HR: at 04:09

## 2020-10-13 RX ADMIN — CEFEPIME HYDROCHLORIDE 2 G: 2 INJECTION, POWDER, FOR SOLUTION INTRAVENOUS at 15:16

## 2020-10-13 RX ADMIN — FUROSEMIDE 80 MG: 10 INJECTION, SOLUTION INTRAMUSCULAR; INTRAVENOUS at 15:16

## 2020-10-13 RX ADMIN — Medication 2000 ML/HR: at 19:20

## 2020-10-13 RX ADMIN — SODIUM CHLORIDE, PRESERVATIVE FREE 10 ML: 5 INJECTION INTRAVENOUS at 21:04

## 2020-10-13 RX ADMIN — SODIUM PHOSPHATE, MONOBASIC, MONOHYDRATE 12 MMOL: 276; 142 INJECTION, SOLUTION INTRAVENOUS at 05:20

## 2020-10-13 NOTE — PROGRESS NOTES
Urology Attending Progress Note      Subjective: Patient resting at time of visit    71 y.o.male with hx of right RCC with partial nephrectomy, urinary retention and 2 cm renal cortical lesion a year ago. Consulted for left flank pain, left retroperitoneal hematoma. Patient will have IVC filter placement today. Urology group doctors will discuss possible left radical nephrectomy for bleed from left renal mass, down the line. Vitals:  BP (!) 155/55   Pulse 88   Temp 97.5 °F (36.4 °C) (Bladder)   Resp 24   Ht 5' 4\" (1.626 m)   Wt (!) 357 lb 1.6 oz (162 kg)   SpO2 96%   BMI 61.30 kg/m²   Temp  Av.7 °F (36.5 °C)  Min: 97.2 °F (36.2 °C)  Max: 97.9 °F (36.6 °C)    Intake/Output Summary (Last 24 hours) at 10/13/2020 0801  Last data filed at 10/12/2020 1600  Gross per 24 hour   Intake --   Output 680 ml   Net -680 ml       Exam:    Well developed, well nourished   Neck is supple, trachea is midline   Respiratory effort is normal without distress   Abdomen  no guarding. No masses or hernias are palpated.     Skin warm and dry, exposed skin shows no abnormal lesions, rashes   Musculoskeletal no digital cyanosis, head normocephalic    Dark, brown urine in devine catheter    Labs:  WBC:    Lab Results   Component Value Date    WBC 22.2 10/13/2020     Hemoglobin/Hematocrit:    Lab Results   Component Value Date    HGB 7.3 10/13/2020    HCT 22.1 10/13/2020     BMP:    Lab Results   Component Value Date     10/13/2020    K 4.5 10/13/2020    K 4.9 10/12/2020     10/13/2020    CO2 26 10/13/2020    BUN 12 10/13/2020    LABALBU 2.4 10/13/2020    CREATININE 1.0 10/13/2020    CALCIUM 7.5 10/13/2020    GFRAA >60 10/13/2020    LABGLOM >60 10/13/2020     PT/INR:    Lab Results   Component Value Date    PROTIME 16.5 10/10/2020    INR 1.42 10/10/2020     PTT:  No results found for: APTT[APTT      Impression/Plan:   69 y.o.male hx of right partial nephrectomy/RCC, with retroperitoneal bleed causing large left RP hematoma. Patient had IR of 9 at admission. Pt had 2 cm renal cortical lesion seen about a year ago. Urine is dark, brown today. His creatinine is 1.0 today. Will evaluate for renal mass in future.  Having IVC filter placed today.     -Continue to follow    Darlene Truong PA-C

## 2020-10-13 NOTE — PROGRESS NOTES
ONCOLOGY HEMATOLOGY CARE PROGRESS NOTE      SUBJECTIVE:     The patient states that he feels a little better this morning. ROS:   The remaining 10 point review of symptoms is unremarkable. OBJECTIVE        Physical    VITALS:  BP (!) 155/55   Pulse 79   Temp 98.1 °F (36.7 °C) (Bladder)   Resp 20   Ht 5' 4\" (1.626 m)   Wt (!) 357 lb 1.6 oz (162 kg)   SpO2 92%   BMI 61.30 kg/m²   TEMPERATURE:  Current - Temp: 98.1 °F (36.7 °C); Max - Temp  Av.7 °F (36.5 °C)  Min: 97.2 °F (36.2 °C)  Max: 98.1 °F (36.7 °C)  PULSE OXIMETRY RANGE: SpO2  Av.9 %  Min: 88 %  Max: 100 %  24HR INTAKE/OUTPUT:      Intake/Output Summary (Last 24 hours) at 10/13/2020 0912  Last data filed at 10/12/2020 1600  Gross per 24 hour   Intake --   Output 680 ml   Net -680 ml       CONSTITUTIONAL:  He is morbidly obese  , HEENT oral pharynx , no scleral icterus  HEMATOLOGIC/LYMPHATICS:  no cervical lymphadenopathy, no supraclavicular lymphadenopathy, no axillary lymphadenopathy and no inguinal lymphadenopathy  LUNGS:  No increased work of breathing, good air exchange, clear to auscultation bilaterally, no crackles or wheezing  CARDIOVASCULAR:  , regular rate and rhythm, normal S1 and S2, no S3 or S4, and no murmur noted  ABDOMEN:  No scars, normal bowel sounds, soft, non-distended, non-tender, no masses palpated, no hepatosplenomegally  MUSCULOSKELETAL:  There is no redness, warmth, or swelling of the joints. EXTREMETIES: No clubbing cynosis or edema  NEUROLOGIC:  Awake, alert, oriented to name, place and time. Cranial nerves II-XII are grossly intact. Motor is 5 out of 5 bilaterally.    SKIN:  no bruising or bleeding      Data      Recent Labs     10/11/20  0605  10/12/20  0620  10/12/20  1730 10/12/20  1951 10/13/20  0228   WBC 43.7*  --  27.2*  --   --   --  22.2*   HGB 7.1*   < > 7.5*   < > 7.0* 7.1* 7.3*   HCT 21.3*   < > 22.9*   < > 21.1* 21.9* 22.1*     --  163  --   --   --  166

## 2020-10-13 NOTE — CONSULTS
[address] [address] Continence Nurse  Consult Note       NAME:  Manisha Tomlinson.  MEDICAL RECORD NUMBER:  [id number]  AGE: 71 y.o. GENDER: male  : 1950  TODAY'S DATE:  10/13/2020    Subjective   Reason for WOCN Evaluation and Assessment: wounds on buttocks      Yvette Gamez Sr. is a 71 y.o. male referred by:   [] Physician  [x] Nursing  [] Other:     Wound Identification:  Wound Type: pressure  Contributing Factors: edema, chronic pressure, decreased mobility, shear force, obesity and poor hygiene    Wound History: 71 y.o. male who presented to [facility] with complaints as stated above  Patient with PMH of nonischemic cardiomyopathy EF 40 to 45%, HTN, history of DVT on Coumadin, GI bleed in 2019, presented to the ED today with complaints of fatigue and dizziness. Patient is pretty much bedbound at home. Wife reports when patient stood up he got lightheaded, and fell back in bed. Also reports patient has not been eating or drinking much. Wife also reports patient stool has been black this past week. Patient is on Coumadin for past history of DVT/PE. She contacted patient's PCP about this, who ordered stool sample for blood, they had trouble getting the test kit until the seventh.   Today she called patient's PCP about the patient being very weak, unable to get out of bed, and they were advised to come to the ED. patient also reports diffuse dull aching abdominal pain that is intermittent  Current Wound Care Treatment: Sacrum, L and R Buttocks - Venelex    Patient Goal of Care:  [x] Wound Healing  [] Odor Control  [] Palliative Care  [] Pain Control   [] Other:         PAST MEDICAL HISTORY        Diagnosis Date    Cancer [facility])     kidney    Cardiomyopathy [facility] 2019    [facility]    Edema of both legs     GI bleed     Hx of blood clots     DVT    Hypertension        PAST SURGICAL HISTORY    Past Surgical History:   Procedure Laterality Date  ABDOMEN SURGERY      Right Kidney Cancer abd. surgery 2014    CARDIAC CATHETERIZATION  2019    COLONOSCOPY      flexsigmoidoscopy 40yrs ago     COLONOSCOPY N/A 2019    COLONOSCOPY WITH BIOPSY performed by Keke Badillo MD at [address] , COLON, DIAGNOSTIC      2018    KIDNEY SURGERY  2014    [place] EGD TRANSORAL BIOPSY SINGLE/MULTIPLE  2018    EGD BIOPSY performed by Keke Badillo MD at [address] N/A 2019    EGD WITH ANESTHESIA performed by Keke Badillo MD at [address] N/A 2019    ESOPHAGEAL CAPSULE ENDOSCOPY performed by Keke Badillo MD at [address]    Family History   Problem Relation Age of Onset    Arthritis Mother     Atrial Fibrillation Mother     Breast Cancer Mother     High Blood Pressure Mother     Anemia Father     Arthritis Father     Cancer Father 80        Ear CA/ tumor removed     Hearing Loss Father     Arthritis Sister     Diabetes Maternal Aunt     Arthritis Maternal Aunt     Early Death Maternal Uncle          in early 42's     Depression Maternal Uncle     Other Maternal Uncle         Suicide    Heart Disease Paternal Aunt     Arthritis Maternal Grandmother        SOCIAL HISTORY    Social History     Tobacco Use    Smoking status: Never Smoker    Smokeless tobacco: Never Used   Substance Use Topics    Alcohol use: No    Drug use: No       ALLERGIES    No Known Allergies    MEDICATIONS    No current facility-administered medications on file prior to encounter.       Current Outpatient Medications on File Prior to Encounter   Medication Sig Dispense Refill    carvedilol (COREG) 3.125 MG tablet TAKE ONE TABLET BY MOUTH TWICE A  tablet 1    furosemide (LASIX) 40 MG tablet TAKE 1 TABLET BY MOUTH TWICE DAILY 180 tablet 0    lisinopril (PRINIVIL;ZESTRIL) 2.5 MG tablet TAKE ONE TABLET BY MOUTH DAILY 90 tablet 1    potassium chloride (KLOR-CON M) 20 MEQ TBCR extended release tablet TK 1 T PO BID  0    warfarin (COUMADIN) 2.5 MG tablet Take 1 tablet by mouth daily (Patient taking differently: Take 5 mg by mouth daily ) 30 tablet 0    vitamin D (CHOLECALCIFEROL) 1000 UNIT TABS tablet Take 1,000 Units by mouth 2 times daily      tamsulosin (FLOMAX) 0.4 MG capsule Take 1 capsule by mouth daily 30 capsule 3    folic acid (FOLVITE) 1 MG tablet Take 1 tablet by mouth daily 30 tablet 3       Objective: CRRT; F/C; A/O    BP (!) 155/55   Pulse 79   Temp 98.1 °F (36.7 °C) (Bladder)   Resp 20   Ht 5' 4\" (1.626 m)   Wt (!) 357 lb 1.6 oz (162 kg)   SpO2 92%   BMI 61.30 kg/m²     LABS:  WBC:    Lab Results   Component Value Date    WBC 22.2 10/13/2020     H/H:    Lab Results   Component Value Date    HGB 7.3 10/13/2020    HCT 22.1 10/13/2020     PTT:  No results found for: APTT, PTT[APTT}  PT/INR:    Lab Results   Component Value Date    PROTIME 16.5 10/10/2020    INR 1.42 10/10/2020     HgBA1c:  No results found for: LABA1C    Assessment: Sacrum - small non blanching purple area  R Buttock - non blanching purple area and a blood filled bulla  L Buttock - non blanching purple area   Gopi Risk Score: Gopi Scale Score: 13    Patient Active Problem List   Diagnosis Code    Morbid obesity with BMI of 60.0-69.9, adult ([facility]) E66.01, Z68.44    Anticoagulation goal of INR 1.5 to 2.5 Z51.81, Z79.01    Benign hypertension I10    History of DVT (deep vein thrombosis) Z86.718    Ventral hernia K43.9    Elevated brain natriuretic peptide (BNP) level R79.89    Pulmonary vascular congestion on CXR R09.89    Sepsis ([facility]) A41.9    Hypomagnesemia E83.42    Hypokalemia E87.6    Hyponatremia E87.1    Hypochloremia E87.8    KEVIN (acute kidney injury) ([facility]) N17.9    Subacute microcytic anemia (July 2018) D50.9    Hx choledocholithiasis s/p ERCP (July 2018) K80.50    Acute respiratory failure with hypoxia and hypercapnia (HCC) J96.01, J96.02    Calculus of gallbladder with acute cholecystitis without obstruction K80.00    Intertrigo L30.4    Lymphedema I89.0    Suspected sleep apnea R29.818    Biatrial enlargement I51.7    Congestive heart failure (HCC) I50.9    GI bleed K92.2    Retroperitoneal bleed R58    Supratherapeutic INR R79.1    Shock ([address] R57.9    Non-ischemic cardiomyopathy (HCC) EF 40% I42.8    Bacteremia due to Escherichia coli R78.81, B96.20    Bandemia D72.825    Pyuria R82.81    Anemia due to blood loss, acute D62    Atelectasis J98.11       Measurements:  Wound 04/07/19 Buttocks Right (Active)   Wound Image   10/13/20 1546   Wound Etiology Deep tissue/Injury 10/13/20 1546   Dressing Status Dry; Intact 10/13/20 1546   Wound Cleansed Other (Comment) 10/13/20 1546   Dressing/Treatment Pharmaceutical agent (see MAR); Foam 10/13/20 1546   Dressing Change Due 10/13/20 10/13/20 1546   Wound Length (cm) 5.5 cm 10/13/20 1546   Wound Width (cm) 3 cm 10/13/20 1546   Wound Depth (cm) 0 cm 10/13/20 1546   Wound Surface Area (cm^2) 16.5 cm^2 10/13/20 1546   Wound Volume (cm^3) 0 cm^3 10/13/20 1546   Wound Assessment Blood filled blister 10/13/20 1546   Drainage Amount None 10/13/20 1546   Odor None 10/13/20 1546   Jenna-wound Assessment Fragile; Intact; Blanchable erythema 10/13/20 1546   Margins Attached edges 10/13/20 1546   Number of days: 555    R Buttocks:         Wound 10/13/20 Buttocks Left (Active)   Wound Image   10/13/20 1546   Wound Etiology Deep tissue/Injury 10/13/20 1546   Dressing Status Intact 10/13/20 1546   Wound Cleansed Other (Comment) 10/13/20 1546   Dressing/Treatment Pharmaceutical agent (see MAR); Foam 10/13/20 1546   Dressing Change Due 10/13/20 10/13/20 1546   Wound Length (cm) 3.5 cm 10/13/20 1546   Wound Width (cm) 2 cm 10/13/20 1546   Wound Depth (cm) 0 cm 10/13/20 1546   Wound Surface Area (cm^2) 7 cm^2 10/13/20 1546   Wound Volume (cm^3) 0 cm^3 10/13/20 1546   Wound Assessment Air Loss   [x] Pressure Redistribution  [] Fluid Immersion  [] Bariatric  [] Total Pressure Relief  [] Other:     Current Diet: DIET RENAL;  Dietician consult:  Yes    Discharge Plan:  Placement for patient upon discharge: unknown at this time    Patient appropriate for Outpatient [address]: No    Referrals:  [x]  following  [] [address] [facility]  [] Supplies  [] Other    Patient/Caregiver Teaching:  Level of patient/caregiver understanding able to:   [] Indicates understanding       [] Needs reinforcement  [] Unsuccessful      [] Verbal Understanding  [] Demonstrated understanding       [] No evidence of learning  [] Refused teaching         [] N/A       Electronically signed by Fiordaliza Miranda RN, Gaudencio Garcia on 10/13/2020 at 3:54 PM

## 2020-10-13 NOTE — CARE COORDINATION
Writer attempted to meet with patient to complete initial assessment at cath lab. Daryl Calvillo [facility]- Call placed to patient wife to attempt to complete initial assessment VM left at home ph number, cell phone with no voicemail set up. Will ct to attempt.  Daryl Calvillo [facility]

## 2020-10-13 NOTE — OP NOTE
[address] [address]                                OPERATIVE REPORT    PATIENT NAME: Delia Garcia                      :        1950  MED REC NO:   [id number]                          ROOM:       0234  ACCOUNT NO:   [de-identified]                           ADMIT DATE: 10/09/2020  PROVIDER:     Rogue Holstein, MD    DATE OF PROCEDURE:  10/13/2020    ANGIOGRAM PROCEDURE REPORT    PREOPERATIVE DIAGNOSES:  1. History of DVT. 2.  Bleeding left kidney with contraindication to anticoagulation. PROCEDURES PERFORMED:  1. Ultrasound-guided right internal jugular venous access. 2.  Inferior venacavogram with placement of Celect inferior vena cava  filter. SURGEON:  Rogue Holstein, MD    ANESTHESIA:  Local with moderate monitored sedation. INDICATIONS:  The patient is a 58-year-old male with a history of DVT  and pulmonary emboli on chronic anticoagulation. He came in with his  INR supratherapeutic and was noted to have bleeding in the bed of the  left kidney. Anticoagulation has been held and the patient obviously  cannot restart this, therefore, an IVC filter is being placed to prevent  future pulmonary emboli. PROCEDURE:  The patient was brought to the angio suite, placed in the  supine position. Under my direct supervision, Versed and fentanyl were  administered for moderate sedation. The patient was monitored by an  independent trained nurse observer using continuous blood pressure, EKG  and pulse oximetry. I spent 15 minutes face-to-face with the patient  providing and directing sedation. The right neck was prepped and draped  in sterile fashion. Ultrasound was used to identify the jugular vein. This was patent and compressible. Under direct visualization this was  accessed and a 5-St Helenian micropuncture sheath was placed. A digital copy  of the ultrasound image was saved and placed in the patient's record. Through the micropuncture sheath, Bentson wire was advanced into the  superior vena cava and then directed into the inferior vena cava. The  micropuncture sheath was removed. The dilator and introducer sheath for  the Celect inferior vena cava filter were then advanced into the  inferior vena cava. Inferior venacavogram was performed identifying the  level of renal veins. The diameter of the inferior vena cava was within  the IFU for placement of the filter, therefore, a Celect inferior vena  cava filter was placed immediately below the lower right renal vein. The dilator and introducer sheath were then removed. Pressure was held  in the right neck for approximately 10 minutes achieving excellent  hemostasis. The patient was then transferred to the recovery area and  the intensive care unit in stable condition.   Estimated blood loss minimal.        Nando Pearce MD    D: 10/13/2020 11:26:15       T: 10/13/2020 11:32:13     [email or web address]  Job#: [id number]     Doc#: [id number]    CC:

## 2020-10-13 NOTE — PROGRESS NOTES
Hospitalist Progress Note      PCP: Eugenia Braga MD    Date of Admission: 10/9/2020    Chief Complaint: Fatigue, dizziness    Hospital Course:  \"Patient with PMH of nonischemic cardiomyopathy EF 40 to 45%, HTN, history of DVT on Coumadin, GI bleed in April 2019, presented to the ED today with complaints of fatigue and dizziness. Patient is pretty much bedbound at home. Wife reports when patient stood up he got lightheaded, and fell back in bed. Also reports patient has not been eating or drinking much. Wife also reports patient stool has been black this past week. Patient is on Coumadin for past history of DVT/PE. She contacted patient's PCP about this, who ordered stool sample for blood, they had trouble getting the test kit until the seventh. Today she called patient's PCP about the patient being very weak, unable to get out of bed, and they were advised to come to the ED. patient also reports diffuse dull aching abdominal pain that is intermittent. \"       Subjective:  He remains on pressors. Hb seems to have stabilized. He feels fine, denies pain.       Medications:  Reviewed    Infusion Medications    prismaSATE BGK 4/2.5 2,000 mL/hr (10/13/20 9710)    prismaSATE BGK 4/2.5 1,000 mL/hr (10/13/20 9494)    prismaSATE BGK 4/2.5 500 mL/hr (10/12/20 1486)    norepinephrine 5 mcg/min (10/13/20 4449)     Scheduled Medications    mupirocin   Nasal BID    cefepime  2 g Intravenous F65V    folic acid  1 mg Oral Daily    sodium chloride flush  10 mL Intravenous 2 times per day    pantoprazole  40 mg Intravenous BID     PRN Meds: morphine, magnesium sulfate, calcium gluconate **OR** calcium gluconate **OR** calcium gluconate **OR** calcium gluconate, sodium phosphate IVPB **OR** sodium phosphate IVPB **OR** sodium phosphate IVPB **OR** sodium phosphate IVPB, sodium chloride flush, acetaminophen **OR** acetaminophen, polyethylene glycol, promethazine **OR** ondansetron, perflutren lipid microspheres      Intake/Output Summary (Last 24 hours) at 10/13/2020 1203  Last data filed at 10/12/2020 1600  Gross per 24 hour   Intake --   Output 680 ml   Net -680 ml       Physical Exam Performed:    BP (!) 155/55   Pulse 79   Temp 98.1 °F (36.7 °C) (Bladder)   Resp 20   Ht 5' 4\" (1.626 m)   Wt (!) 357 lb 1.6 oz (162 kg)   SpO2 92%   BMI 61.30 kg/m²       General appearance: Pleasant male in no apparent distress, appears stated age and cooperative. HEENT: Pupils equal, round, and reactive to light. Conjunctivae/corneas clear. Neck: Supple, with full range of motion. No jugular venous distention. Trachea midline. Respiratory:  Normal respiratory effort. Clear to auscultation, bilaterally without Rales/Wheezes/Rhonchi. Cardiovascular: Regular rate and rhythm with normal S1/S2 without murmurs, rubs or gallops. Abdomen: Soft, non-tender, non-distended with normal bowel sounds. Large ventral hernia noted. Musculoskeletal: No clubbing, cyanosis or edema bilaterally. Full range of motion without deformity. Skin: Skin color with PVD changes BLE. No rashes or lesions. Neurologic:  Neurovascularly intact without any focal sensory/motor deficits. Cranial nerves: II-XII intact, grossly non-focal.  Psychiatric: Alert and oriented, thought content appropriate, normal insight. Capillary Refill: Brisk,< 3 seconds   Peripheral Pulses: +2 palpable, equal bilaterally       Labs:   Recent Labs     10/11/20  0605  10/12/20  0620  10/12/20  1730 10/12/20  1951 10/13/20  0228   WBC 43.7*  --  27.2*  --   --   --  22.2*   HGB 7.1*   < > 7.5*   < > 7.0* 7.1* 7.3*   HCT 21.3*   < > 22.9*   < > 21.1* 21.9* 22.1*     --  163  --   --   --  166    < > = values in this interval not displayed.      Recent Labs     10/12/20  1100 10/12/20  1730 10/13/20  0228    134* 133*   K 3.1* 4.4 4.5   * 102 101   CO2 17* 26 26   BUN 14 16 12   CREATININE 0.8 1.1 1.0   CALCIUM 4.9* 7.3* 7.5*   PHOS 1.4* 1.8* 1.8*     Recent Labs     10/11/20  0605 10/12/20  0620 10/13/20  0228   AST 32 19 14*   ALT 27 25 24   BILITOT 0.9 0.9 0.7   ALKPHOS 78 79 76     No results for input(s): INR in the last 72 hours. No results for input(s): Brady Ric in the last 72 hours. Urinalysis:      Lab Results   Component Value Date    NITRU Negative 10/09/2020    WBCUA 21-50 10/09/2020    BACTERIA 2+ 10/09/2020    RBCUA 11-20 10/09/2020    BLOODU LARGE 10/09/2020    SPECGRAV 1.010 10/09/2020    GLUCOSEU Negative 10/09/2020       Radiology:  XR CHEST PORTABLE   Final Result   No evidence of pneumothorax following left IJ catheter placement. Improved   aeration in the left lung base. XR CHEST PORTABLE   Final Result   No acute process. CT ABDOMEN PELVIS WO CONTRAST Additional Contrast? None   Final Result   Addendum 1 of 1   ADDENDUM:   Additional information, corrected report:      HISTORY:   The initial history was incorrect, patient did not have left side    nephrectomy   but partial nephrectomy on the right. Therefore the hematoma seen retroperitoneal on the left in the renal fossa   likely obscures the entire left kidney. There is no normal renal    parenchyma   identified. The measured area, 13 x 9 cm therefore presumably include   spontaneous hematoma and kidney tissue combined. This would also    therefore   indicate a lesser degree of hemorrhage present, since some of the measured   area being occupied by the obscured kidney. Again the primary etiology is spontaneous hemorrhage, given the patient's   grossly elevated INR, and likely arising within the obscured left kidney    and   extending left pararenal retroperitoneal space. Final      CT HEAD WO CONTRAST   Final Result   1. No acute intracranial abnormality. XR CHEST PORTABLE   Final Result   No acute cardiopulmonary disease.                  Assessment/Plan:    Active Hospital Problems    Diagnosis    Bacteremia due to adequately studied in morbidly obese patients. Patient follows with Dr. Liliana Mansfield as outpatient, appreciate hematology consult.     E. Coli Bacteremia - secondary to acute cystitis.  -treated with IV cefepime. ID input appreciated. Suspected upper GI bleed   Worsened due to supratherapeutic INR  -Hb dropped from 12 to 8.5 with fluids revealing true degree of anemia  -Hold Coumadin and antiplatelet agents  -Consulted GI, who deferred EGD at this time due to patient's instability. They will reconsider if he has melena here.   -continue IV PPI twice daily     KEVIN  -Baseline Cr 1.0. Likely ATN due to hypotension and hypovolemia  - nephrology consulted, started on CRRT 10/9.  - he does have h/o partial RIGHT nephrectomy in 2015 due to renal cancer. Non-ischemic cardiomyopathy  - TTE showed EF 50%, with RWMA's. Will need ACE/ARB/ARNI when he is more stable, also BB.     Morbid obesity with BMI of 25.1-63.5, adult   Complicating assessment and treatment, placing patient at high risk for multiple co-morbidities as well as early death and contributing to the patient's presentation. Counseled on weight loss.       DVT Prophylaxis: SCDs  Diet: Diet NPO, After Midnight  Code Status: Full Code    PT/OT Eval Status:  Order when more stable. Dispo -unclear - perhaps 10/20. He came from home.        Verónica Gibbons MD

## 2020-10-13 NOTE — PROGRESS NOTES
Nephrology Progress Note   [email or web address]      This patient is a 71year old male whom we are following for KEVIN. Subjective: The patient was seen and examined on CRRT. Tolerating goal UF of 50-100cc/hr, on low dose Levophed. IVC filter placed earlier today by Dr. Dalton Hanson. Denies any pain, breathing seems better. Family History: Family at bedside and questions were answered  ROS: no nausea or vomiting     Vitals:  BP (!) 155/55   Pulse 79   Temp 98.1 °F (36.7 °C) (Bladder)   Resp 20   Ht 5' 4\" (1.626 m)   Wt (!) 357 lb 1.6 oz (162 kg)   SpO2 92%   BMI 61.30 kg/m²   I/O last 3 completed shifts:  In: -   Out: 680 [Other:680]  No intake/output data recorded. Physical Exam:  Physical Exam  Vitals signs reviewed. Constitutional:       Appearance: Normal appearance. HENT:      Head: Normocephalic and atraumatic. Mouth/Throat:      Mouth: Mucous membranes are moist.   Eyes:      General: No scleral icterus. Conjunctiva/sclera: Conjunctivae normal.   Cardiovascular:      Rate and Rhythm: Normal rate. Heart sounds: No friction rub. Pulmonary:      Effort: No respiratory distress. Breath sounds: Rales present. No wheezing. Abdominal:      General: Bowel sounds are normal. There is no distension. Tenderness: There is no abdominal tenderness. Musculoskeletal:      Right lower leg: Edema present. Left lower leg: Edema present. Neurological:      Mental Status: He is alert.        Access: LIJ temp HD cath      Medications:   mupirocin   Nasal BID    furosemide  80 mg Intravenous Once    cefepime  2 g Intravenous K35M    folic acid  1 mg Oral Daily    sodium chloride flush  10 mL Intravenous 2 times per day    pantoprazole  40 mg Intravenous BID         Labs:  Recent Labs     10/11/20  0605  10/12/20  0620  10/12/20  1730 10/12/20  1951 10/13/20  0228   WBC 43.7*  --  27.2*  --   --   --  22.2*   HGB 7.1*   < > 7.5*   < > 7.0* 7.1* 7.3*   HCT 21.3*   < > 22.9*   < > 21.1* 21.9* 22.1*   MCV 88.2  --  88.3  --   --   --  89.7     --  163  --   --   --  166    < > = values in this interval not displayed. Recent Labs     10/12/20  0620 10/12/20  1100 10/12/20  1730 10/13/20  0228 10/13/20  1230    139 134* 133* 136   K 4.9 3.1* 4.4 4.5 4.5    115* 102 101 103   CO2 27 17* 26 26 27   GLUCOSE 133* 82 114* 129* 111*   PHOS  --  1.4* 1.8* 1.8* 1.8*   MG 2.30 1.60*  --  2.40  --    BUN 19 14 16 12 12   CREATININE 1.2 0.8 1.1 1.0 1.1   LABGLOM 60* >60 >60 >60 >60   GFRAA >60 >60 >60 >60 >60       Assessment/Plan:    Acute Kidney Injury.  - Likely ATN from profound hypotension, sepsis. - CRRT started on 10/10/20. LIJ temp HD cath as access. - UF goal of 50-100cc/hr as tolerated. - Lasix 80mg IV x 1 today. - Electrolytes monitoring and replacement per CRRT protocol.  - Please avoid any nephrotoxic agents such as NSAIDs or IV contrast unless deemed necessary. Left retroperitoneal hematoma  - H/o open R partial nephrectomy  - High risk for page kidney   - Urology following. Shock.  - Hemorrhagic and septic. E. Coli in the blood and urine. Antibiotics per ID.  - Supra therapeutic INR on admission. H/o DVT, now off coumadin. S/P IVC filter placement on 10/13/20. Anemia. - PRN blood transfusion per primary service.     Please do not hesitate to contact me at [phone] if with questions. Thank you!     Alex Cifuentes MD  10/13/2020  [facility]

## 2020-10-13 NOTE — PROGRESS NOTES
Progress Note  Date:10/13/2020       Room:0234/0234-01  Patient Sy Alvarenga Sr. YOB: 1950     Age:69 y.o. Subjective    Subjective:  Symptoms:  Stable. Pain:  He reports no pain. Review of Systems  Objective         Vitals Last 24 Hours:  TEMPERATURE:  Temp  Av.7 °F (36.5 °C)  Min: 97.2 °F (36.2 °C)  Max: 98.1 °F (36.7 °C)  RESPIRATIONS RANGE: Resp  Av.6  Min: 14  Max: 31  PULSE OXIMETRY RANGE: SpO2  Av.1 %  Min: 88 %  Max: 100 %  PULSE RANGE: Pulse  Av.6  Min: 69  Max: 94  BLOOD PRESSURE RANGE: No data recorded.  ; No data recorded. I/O (24Hr): Intake/Output Summary (Last 24 hours) at 10/13/2020 1135  Last data filed at 10/12/2020 1600  Gross per 24 hour   Intake --   Output 680 ml   Net -680 ml     Objective:  General Appearance: In no acute distress and not in pain. Vital signs: (most recent): Blood pressure (!) 155/55, pulse 79, temperature 98.1 °F (36.7 °C), temperature source Bladder, resp. rate 20, height 5' 4\" (1.626 m), weight (!) 357 lb 1.6 oz (162 kg), SpO2 92 %. Heart: Normal rate. Regular rhythm. S1 normal and S2 normal.    Abdomen: Abdomen is soft. Bowel sounds are normal.   There is no abdominal tenderness. Labs/Imaging/Diagnostics    Labs:  CBC:  Recent Labs     10/11/20  0605  10/12/20  0620  10/12/20  1730 10/12/20  1951 10/13/20  0228   WBC 43.7*  --  27.2*  --   --   --  22.2*   RBC 2.41*  --  2.60*  --   --   --  2.47*   HGB 7.1*   < > 7.5*   < > 7.0* 7.1* 7.3*   HCT 21.3*   < > 22.9*   < > 21.1* 21.9* 22.1*   MCV 88.2  --  88.3  --   --   --  89.7   RDW 15.2  --  15.2  --   --   --  15.4     --  163  --   --   --  166    < > = values in this interval not displayed.      CHEMISTRIES:  Recent Labs     10/12/20  0620 10/12/20  1100 10/12/20  1730 10/13/20  0228    139 134* 133*   K 4.9 3.1* 4.4 4.5    115* 102 101   CO2 27 17* 26 26   BUN 19 14 16 12   CREATININE 1.2 0.8 1.1 1.0   GLUCOSE 133* 82 114* 129*   PHOS  --  1.4* 1.8* 1.8*   MG 2.30 1.60*  --  2.40     PT/INR:No results for input(s): PROTIME, INR in the last 72 hours. APTT:No results for input(s): APTT in the last 72 hours. LIVER PROFILE:  Recent Labs     10/11/20  0605 10/12/20  0620 10/13/20  0228   AST 32 19 14*   ALT 27 25 24   BILITOT 0.9 0.9 0.7   ALKPHOS 78 79 76       Imaging Last 24 Hours:  No results found. Assessment//Plan           Hospital Problems           Last Modified POA    * (Principal) Retroperitoneal bleed 10/10/2020 Yes    Morbid obesity with BMI of 60.0-69.9, adult ([address] (Chronic) 10/9/2020 Yes    Benign hypertension (Chronic) 10/9/2020 Yes    History of DVT (deep vein thrombosis) (Chronic) 10/9/2020 Yes    Suspected sleep apnea 10/12/2020 Yes    Supratherapeutic INR 10/9/2020 Yes    Shock ([address] 10/9/2020 Yes    Non-ischemic cardiomyopathy ([address] EF 40% 10/9/2020 Yes    Bacteremia due to Escherichia coli 10/12/2020 Yes    Bandemia 10/12/2020 Yes    Pyuria 10/12/2020 Yes    Anemia due to blood loss, acute 10/12/2020 Yes    Atelectasis 10/12/2020 Yes        Assessment & Plan  72 yo w obesity, HTN, Fe def anemia, h/o DU bleed in 8/2019 at [facility] (repeat EGD by me in 9/2019 was neg.), kidney CA, DVT on Couamdin and recent choledocholithiasis s/p ERCP in July 2018, presents with supratherapeutic INR complicated by retroperitoneal bleeding and reported melena that has now resolved. INR of 9.2 and H/H 6/20.   EGD and colonoscopy in 4/2019 are neg. Except diverticulosis and polyp. Capsule Endoscopy 4/2019 was neg. So most likely blood loss is due to retroperitoneal rather than GI bleed.     Plan:   1. Supportive care and agree w holding AC   2. Awaiting Greenfilter placement   3. F/U H/H  4. Consider EGD soon  5. The ERCP in 2018 could explain his asymptomatic pneumobilia  6.  Will follow     Carla London MD       ([phone]) [phone]    Electronically signed by Carla London MD on 10/13/20 at 11:35 AM EDT

## 2020-10-14 LAB
A/G RATIO: 0.9 (ref 1.1–2.2)
ABO/RH: NORMAL
ALBUMIN SERPL-MCNC: 2.5 G/DL (ref 3.4–5)
ALP BLD-CCNC: 77 U/L (ref 40–129)
ALT SERPL-CCNC: 20 U/L (ref 10–40)
ANION GAP SERPL CALCULATED.3IONS-SCNC: 5 MMOL/L (ref 3–16)
ANION GAP SERPL CALCULATED.3IONS-SCNC: 6 MMOL/L (ref 3–16)
ANISOCYTOSIS: ABNORMAL
ANTIBODY SCREEN: NORMAL
AST SERPL-CCNC: 10 U/L (ref 15–37)
BANDED NEUTROPHILS RELATIVE PERCENT: 4 % (ref 0–7)
BASOPHILIC STIPPLING: ABNORMAL
BASOPHILS ABSOLUTE: 0.2 K/UL (ref 0–0.2)
BASOPHILS RELATIVE PERCENT: 1 %
BILIRUB SERPL-MCNC: 0.8 MG/DL (ref 0–1)
BLOOD BANK DISPENSE STATUS: NORMAL
BLOOD BANK PRODUCT CODE: NORMAL
BPU ID: NORMAL
BUN BLDV-MCNC: 7 MG/DL (ref 7–20)
BUN BLDV-MCNC: 8 MG/DL (ref 7–20)
BUN BLDV-MCNC: 8 MG/DL (ref 7–20)
BUN BLDV-MCNC: 9 MG/DL (ref 7–20)
CALCIUM IONIZED: 1.07 MMOL/L (ref 1.12–1.32)
CALCIUM IONIZED: 1.07 MMOL/L (ref 1.12–1.32)
CALCIUM IONIZED: 1.1 MMOL/L (ref 1.12–1.32)
CALCIUM SERPL-MCNC: 7.3 MG/DL (ref 8.3–10.6)
CALCIUM SERPL-MCNC: 7.4 MG/DL (ref 8.3–10.6)
CALCIUM SERPL-MCNC: 7.5 MG/DL (ref 8.3–10.6)
CALCIUM SERPL-MCNC: 7.7 MG/DL (ref 8.3–10.6)
CHLORIDE BLD-SCNC: 102 MMOL/L (ref 99–110)
CHLORIDE BLD-SCNC: 103 MMOL/L (ref 99–110)
CHLORIDE BLD-SCNC: 104 MMOL/L (ref 99–110)
CHLORIDE BLD-SCNC: 105 MMOL/L (ref 99–110)
CO2: 27 MMOL/L (ref 21–32)
CO2: 28 MMOL/L (ref 21–32)
CREAT SERPL-MCNC: 0.9 MG/DL (ref 0.8–1.3)
CREAT SERPL-MCNC: 0.9 MG/DL (ref 0.8–1.3)
CREAT SERPL-MCNC: 1 MG/DL (ref 0.8–1.3)
CREAT SERPL-MCNC: 1 MG/DL (ref 0.8–1.3)
DESCRIPTION BLOOD BANK: NORMAL
EOSINOPHILS ABSOLUTE: 0.3 K/UL (ref 0–0.6)
EOSINOPHILS RELATIVE PERCENT: 2 %
GFR AFRICAN AMERICAN: >60
GFR NON-AFRICAN AMERICAN: >60
GLOBULIN: 2.7 G/DL
GLUCOSE BLD-MCNC: 121 MG/DL (ref 70–99)
GLUCOSE BLD-MCNC: 129 MG/DL (ref 70–99)
GLUCOSE BLD-MCNC: 140 MG/DL (ref 70–99)
GLUCOSE BLD-MCNC: 148 MG/DL (ref 70–99)
HCT VFR BLD CALC: 21.3 % (ref 40.5–52.5)
HEMOGLOBIN: 6.9 G/DL (ref 13.5–17.5)
LACTIC ACID: 0.6 MMOL/L (ref 0.4–2)
LYMPHOCYTES ABSOLUTE: 0.6 K/UL (ref 1–5.1)
LYMPHOCYTES RELATIVE PERCENT: 4 %
MACROCYTES: ABNORMAL
MAGNESIUM: 2.2 MG/DL (ref 1.8–2.4)
MCH RBC QN AUTO: 29.4 PG (ref 26–34)
MCHC RBC AUTO-ENTMCNC: 32.5 G/DL (ref 31–36)
MCV RBC AUTO: 90.6 FL (ref 80–100)
MICROCYTES: ABNORMAL
MONOCYTES ABSOLUTE: 1.1 K/UL (ref 0–1.3)
MONOCYTES RELATIVE PERCENT: 7 %
NEUTROPHILS ABSOLUTE: 13.1 K/UL (ref 1.7–7.7)
NEUTROPHILS RELATIVE PERCENT: 82 %
PDW BLD-RTO: 14.9 % (ref 12.4–15.4)
PH VENOUS: 7.27 (ref 7.35–7.45)
PH VENOUS: 7.28 (ref 7.35–7.45)
PH VENOUS: 7.31 (ref 7.35–7.45)
PHOSPHORUS: 1.5 MG/DL (ref 2.5–4.9)
PHOSPHORUS: 1.8 MG/DL (ref 2.5–4.9)
PHOSPHORUS: 2 MG/DL (ref 2.5–4.9)
PLATELET # BLD: 161 K/UL (ref 135–450)
PMV BLD AUTO: 7.6 FL (ref 5–10.5)
POLYCHROMASIA: ABNORMAL
POTASSIUM REFLEX MAGNESIUM: 4.4 MMOL/L (ref 3.5–5.1)
POTASSIUM SERPL-SCNC: 4.2 MMOL/L (ref 3.5–5.1)
POTASSIUM SERPL-SCNC: 4.3 MMOL/L (ref 3.5–5.1)
POTASSIUM SERPL-SCNC: 4.4 MMOL/L (ref 3.5–5.1)
RBC # BLD: 2.35 M/UL (ref 4.2–5.9)
SODIUM BLD-SCNC: 135 MMOL/L (ref 136–145)
SODIUM BLD-SCNC: 136 MMOL/L (ref 136–145)
SODIUM BLD-SCNC: 137 MMOL/L (ref 136–145)
SODIUM BLD-SCNC: 138 MMOL/L (ref 136–145)
TOTAL PROTEIN: 5.2 G/DL (ref 6.4–8.2)
WBC # BLD: 15.2 K/UL (ref 4–11)

## 2020-10-14 PROCEDURE — C9113 INJ PANTOPRAZOLE SODIUM, VIA: HCPCS | Performed by: INTERNAL MEDICINE

## 2020-10-14 PROCEDURE — 6360000002 HC RX W HCPCS: Performed by: NURSE PRACTITIONER

## 2020-10-14 PROCEDURE — 2500000003 HC RX 250 WO HCPCS: Performed by: INTERNAL MEDICINE

## 2020-10-14 PROCEDURE — 2580000003 HC RX 258: Performed by: INTERNAL MEDICINE

## 2020-10-14 PROCEDURE — 86901 BLOOD TYPING SEROLOGIC RH(D): CPT

## 2020-10-14 PROCEDURE — 90945 DIALYSIS ONE EVALUATION: CPT

## 2020-10-14 PROCEDURE — 36415 COLL VENOUS BLD VENIPUNCTURE: CPT

## 2020-10-14 PROCEDURE — 97530 THERAPEUTIC ACTIVITIES: CPT

## 2020-10-14 PROCEDURE — 2580000003 HC RX 258: Performed by: NURSE PRACTITIONER

## 2020-10-14 PROCEDURE — 2700000000 HC OXYGEN THERAPY PER DAY

## 2020-10-14 PROCEDURE — P9016 RBC LEUKOCYTES REDUCED: HCPCS

## 2020-10-14 PROCEDURE — 6370000000 HC RX 637 (ALT 250 FOR IP): Performed by: INTERNAL MEDICINE

## 2020-10-14 PROCEDURE — 84100 ASSAY OF PHOSPHORUS: CPT

## 2020-10-14 PROCEDURE — 82330 ASSAY OF CALCIUM: CPT

## 2020-10-14 PROCEDURE — 2000000000 HC ICU R&B

## 2020-10-14 PROCEDURE — 36592 COLLECT BLOOD FROM PICC: CPT

## 2020-10-14 PROCEDURE — 94761 N-INVAS EAR/PLS OXIMETRY MLT: CPT

## 2020-10-14 PROCEDURE — 97166 OT EVAL MOD COMPLEX 45 MIN: CPT

## 2020-10-14 PROCEDURE — 83735 ASSAY OF MAGNESIUM: CPT

## 2020-10-14 PROCEDURE — 99233 SBSQ HOSP IP/OBS HIGH 50: CPT | Performed by: INTERNAL MEDICINE

## 2020-10-14 PROCEDURE — 86923 COMPATIBILITY TEST ELECTRIC: CPT

## 2020-10-14 PROCEDURE — 6360000002 HC RX W HCPCS: Performed by: INTERNAL MEDICINE

## 2020-10-14 PROCEDURE — 85025 COMPLETE CBC W/AUTO DIFF WBC: CPT

## 2020-10-14 PROCEDURE — 36430 TRANSFUSION BLD/BLD COMPNT: CPT

## 2020-10-14 PROCEDURE — 80053 COMPREHEN METABOLIC PANEL: CPT

## 2020-10-14 PROCEDURE — 97110 THERAPEUTIC EXERCISES: CPT

## 2020-10-14 PROCEDURE — 83605 ASSAY OF LACTIC ACID: CPT

## 2020-10-14 PROCEDURE — 86850 RBC ANTIBODY SCREEN: CPT

## 2020-10-14 PROCEDURE — 99232 SBSQ HOSP IP/OBS MODERATE 35: CPT | Performed by: INTERNAL MEDICINE

## 2020-10-14 PROCEDURE — 97163 PT EVAL HIGH COMPLEX 45 MIN: CPT

## 2020-10-14 PROCEDURE — 86900 BLOOD TYPING SEROLOGIC ABO: CPT

## 2020-10-14 RX ORDER — MIDODRINE HYDROCHLORIDE 5 MG/1
10 TABLET ORAL
Status: DISCONTINUED | OUTPATIENT
Start: 2020-10-14 | End: 2020-10-22 | Stop reason: HOSPADM

## 2020-10-14 RX ORDER — LEVOFLOXACIN 5 MG/ML
750 INJECTION, SOLUTION INTRAVENOUS ONCE
Status: COMPLETED | OUTPATIENT
Start: 2020-10-15 | End: 2020-10-15

## 2020-10-14 RX ORDER — LEVOFLOXACIN 5 MG/ML
500 INJECTION, SOLUTION INTRAVENOUS
Status: DISCONTINUED | OUTPATIENT
Start: 2020-10-17 | End: 2020-10-21

## 2020-10-14 RX ORDER — 0.9 % SODIUM CHLORIDE 0.9 %
20 INTRAVENOUS SOLUTION INTRAVENOUS ONCE
Status: COMPLETED | OUTPATIENT
Start: 2020-10-14 | End: 2020-10-14

## 2020-10-14 RX ADMIN — SODIUM PHOSPHATE, MONOBASIC, MONOHYDRATE 12 MMOL: 276; 142 INJECTION, SOLUTION INTRAVENOUS at 03:57

## 2020-10-14 RX ADMIN — Medication 2000 ML/HR: at 03:01

## 2020-10-14 RX ADMIN — Medication 1500 ML/HR: at 08:29

## 2020-10-14 RX ADMIN — CASTOR OIL AND BALSAM, PERU: 788; 87 OINTMENT TOPICAL at 21:02

## 2020-10-14 RX ADMIN — MICONAZOLE NITRATE: 20 POWDER TOPICAL at 21:02

## 2020-10-14 RX ADMIN — MORPHINE SULFATE 2 MG: 2 INJECTION, SOLUTION INTRAMUSCULAR; INTRAVENOUS at 15:12

## 2020-10-14 RX ADMIN — Medication 500 ML/HR: at 08:30

## 2020-10-14 RX ADMIN — Medication 1000 ML/HR: at 08:30

## 2020-10-14 RX ADMIN — MIDODRINE HYDROCHLORIDE 10 MG: 5 TABLET ORAL at 10:37

## 2020-10-14 RX ADMIN — MIDODRINE HYDROCHLORIDE 10 MG: 5 TABLET ORAL at 19:10

## 2020-10-14 RX ADMIN — PANTOPRAZOLE SODIUM 40 MG: 40 INJECTION, POWDER, FOR SOLUTION INTRAVENOUS at 10:39

## 2020-10-14 RX ADMIN — Medication 1000 ML/HR: at 03:03

## 2020-10-14 RX ADMIN — CASTOR OIL AND BALSAM, PERU: 788; 87 OINTMENT TOPICAL at 10:37

## 2020-10-14 RX ADMIN — Medication 1500 ML/HR: at 14:15

## 2020-10-14 RX ADMIN — MUPIROCIN: 20 OINTMENT TOPICAL at 21:02

## 2020-10-14 RX ADMIN — MUPIROCIN: 20 OINTMENT TOPICAL at 10:37

## 2020-10-14 RX ADMIN — SODIUM CHLORIDE 20 ML: 9 INJECTION, SOLUTION INTRAVENOUS at 10:53

## 2020-10-14 RX ADMIN — Medication 2000 ML/HR: at 05:33

## 2020-10-14 RX ADMIN — SODIUM PHOSPHATE, MONOBASIC, MONOHYDRATE 12 MMOL: 276; 142 INJECTION, SOLUTION INTRAVENOUS at 16:15

## 2020-10-14 RX ADMIN — Medication 1000 ML/HR: at 23:20

## 2020-10-14 RX ADMIN — MICONAZOLE NITRATE: 20 POWDER TOPICAL at 10:37

## 2020-10-14 RX ADMIN — SODIUM CHLORIDE, PRESERVATIVE FREE 10 ML: 5 INJECTION INTRAVENOUS at 10:52

## 2020-10-14 RX ADMIN — FOLIC ACID 1 MG: 1 TABLET ORAL at 10:37

## 2020-10-14 RX ADMIN — CEFEPIME HYDROCHLORIDE 2 G: 2 INJECTION, POWDER, FOR SOLUTION INTRAVENOUS at 14:16

## 2020-10-14 RX ADMIN — Medication 2000 ML/HR: at 00:20

## 2020-10-14 RX ADMIN — CALCIUM GLUCONATE 1 G: 20 INJECTION, SOLUTION INTRAVENOUS at 21:03

## 2020-10-14 RX ADMIN — SODIUM PHOSPHATE, MONOBASIC, MONOHYDRATE 6 MMOL: 276; 142 INJECTION, SOLUTION INTRAVENOUS at 22:16

## 2020-10-14 RX ADMIN — CALCIUM GLUCONATE 1 G: 20 INJECTION, SOLUTION INTRAVENOUS at 14:16

## 2020-10-14 RX ADMIN — CALCIUM GLUCONATE 1 G: 20 INJECTION, SOLUTION INTRAVENOUS at 02:45

## 2020-10-14 RX ADMIN — SODIUM CHLORIDE, PRESERVATIVE FREE 10 ML: 5 INJECTION INTRAVENOUS at 21:02

## 2020-10-14 RX ADMIN — PANTOPRAZOLE SODIUM 40 MG: 40 INJECTION, POWDER, FOR SOLUTION INTRAVENOUS at 21:02

## 2020-10-14 ASSESSMENT — PAIN SCALES - GENERAL: PAINLEVEL_OUTOF10: 10

## 2020-10-14 NOTE — PROGRESS NOTES
Nutrition Assessment     Type and Reason for Visit: Reassess    Nutrition Recommendations/Plan:   1. Continue renal diet, add 2L FR  2. Add frozen ONS with meals  3. Encourage nutrition  4. Monitor nutrition adequacy, pertinent labs, bowel habits, wt changes, and clinical progress     Nutrition Assessment:  Follow up: Pt remains in the ICU ordered on CRRT. Diet advanced to renal. PO intakes minimal- consuming 50% or less of meals. UOP poor and +5.4L since admission. Will add FR to orders. Discussed diet with pt. Encouraged nutrition with pt and consuming small/frequent meals. He is favorable to magic cups, but not favorable to any other nutrition drink. Malnutrition Assessment:  Malnutrition Status: No malnutrition    Estimated Daily Nutrient Needs:  Energy (kcal): 7876-8166; Weight Used for Energy Requirements:  Ideal(59 kg)     Protein (g):  g; Weight Used for Protein Requirements:  Ideal(1.2-2)        Fluid (ml/day): 1 mL/kcal; Weight Used for Fluid Requirements:  Current      Nutrition Related Findings: Large hernia, Swelling to BLE with poor skin integrity. Wound care consult pending. Current Nutrition Therapies:    Dietary Nutrition Supplements: Frozen Oral Supplement  DIET RENAL; Daily Fluid Restriction: 2000 ml    Anthropometric Measures:  · Height: 5' 4\" (162.6 cm)  · Current Body Wt: 352 lb (159.7 kg)   · BMI: 60.4    Nutrition Diagnosis:   · Overweight/Obese related to excessive energy intake as evidenced by BMI      Nutrition Interventions:   Food and/or Nutrient Delivery:  Continue Current Diet, Start Oral Nutrition Supplement  Nutrition Education/Counseling:  Education initiated   Coordination of Nutrition Care:  Continued Inpatient Monitoring    Goals:   Tolerate diet and consume greater than 50% of meals and ONS this admisssion       Nutrition Monitoring and Evaluation:   Food/Nutrient Intake Outcomes:  Food and Nutrient Intake  Physical Signs/Symptoms Outcomes:  Weight, Skin, Biochemical Data     Discharge Planning: Too soon to determine     Electronically signed by Khushboo Cantu.  Melanie Escobar, RAY, LD on 10/14/20 at 11:15 AM EDT    Contact: [phone]

## 2020-10-14 NOTE — PROGRESS NOTES
Infectious Disease Follow up Notes    CC :  Hemorrhagic shock, E coli bacteremia      Antibiotics:   Cefepime 2g q24    Admit Date:   10/9/2020  Hospital Day: 6    Subjective:   Patient is resting, wife at bedside  On CRRT tolerating volume removal.  Now off vasopressors. Hypothermic from CRRT with warming blanket.      Objective:     Patient Vitals for the past 8 hrs:   BP Temp Temp src Pulse Resp SpO2   10/14/20 1042 -- 98.4 °F (36.9 °C) -- -- -- --   10/14/20 1000 (!) 105/57 -- -- 90 24 --   10/14/20 0900 (!) 100/47 -- -- 87 23 --   10/14/20 0800 (!) 113/55 97.8 °F (36.6 °C) Bladder 86 22 92 %       EXAM:  General:    HEENT:    NECK:       LUNGS:    CV:     ABD:     EXT:     SKIN:     Wound:      LINE:         Scheduled Meds:   midodrine  10 mg Oral TID WC    mupirocin   Nasal BID    Venelex   Topical BID    miconazole   Topical BID    cefepime  2 g Intravenous Z25M    folic acid  1 mg Oral Daily    sodium chloride flush  10 mL Intravenous 2 times per day    pantoprazole  40 mg Intravenous BID       Continuous Infusions:   prismaSATE BGK 4/2.5 1,500 mL/hr (10/14/20 0829)    prismaSATE BGK 4/2.5 1,000 mL/hr (10/14/20 0830)    prismaSATE BGK 4/2.5 500 mL/hr (10/14/20 0830)    norepinephrine 3 mcg/min (10/14/20 0831)          Data Review:    Lab Results   Component Value Date    WBC 15.2 (H) 10/14/2020    HGB 6.9 (LL) 10/14/2020    HCT 21.3 (L) 10/14/2020    MCV 90.6 10/14/2020     10/14/2020     Lab Results   Component Value Date    CREATININE 0.9 10/14/2020    BUN 7 10/14/2020     10/14/2020    K 4.2 10/14/2020     10/14/2020    CO2 28 10/14/2020       Hepatic Function Panel:   Lab Results   Component Value Date    ALKPHOS 77 10/14/2020    ALT 20 10/14/2020    AST 10 10/14/2020    PROT 5.2 10/14/2020    BILITOT 0.8 10/14/2020    BILIDIR <0.2 09/28/2018    IBILI see below 09/28/2018    LABALBU 2.5 10/14/2020         MICRO:  10/9 BC x2, UC Escherichia coli   Antibiotic  Interpretation  JANE  Status     ampicillin  Resistant  >=32  mcg/mL      ceFAZolin  Sensitive  <=4  mcg/mL      cefepime  Sensitive  <=0.12  mcg/mL      cefTRIAXone  Sensitive  <=0.25  mcg/mL      ciprofloxacin  Sensitive  <=0.25  mcg/mL      ertapenem  Sensitive  <=0.12  mcg/mL      gentamicin  Sensitive  <=1  mcg/mL      levofloxacin  Sensitive  <=0.12  mcg/mL      piperacillin-tazobactam  Sensitive  <=4  mcg/mL      trimethoprim-sulfamethoxazole  Resistant  >=320  mcg/mL            IMAGING:  CT a/p 10/9/20       Impression    Large left retroperitoneal hematoma from presumed spontaneous hemorrhage    particularly given the elevated INR.               Pneumobilia with apparent moderate contraction of the gallbladder surrounding    the gallstone.  This could be the result of prior stone passage and resulting    in incompetence of the ampulla of Vater.  No ductal stones seen currently.         Large panniculus and abdominal wall hernia.     ADDENDUM:  Additional information, corrected report:   HISTORY:  The initial history was incorrect, patient did not have left side nephrectomy  but partial nephrectomy on the right.   Therefore the hematoma seen retroperitoneal on the left in the renal fossa  likely obscures the entire left kidney.  There is no normal renal parenchyma  identified.  The measured area, 13 x 9 cm therefore presumably include  spontaneous hematoma and kidney tissue combined.  This would also therefore  indicate a lesser degree of hemorrhage present, since some of the measured  area being occupied by the obscured kidney.   Again the primary etiology is spontaneous hemorrhage, given the patient's  grossly elevated INR, and likely arising within the obscured left kidney and  extending left pararenal retroperitoneal space      Assessment:     Patient Active Problem List    Diagnosis Date Noted    Bacteremia due to Escherichia coli 10/12/2020    Bandemia 10/12/2020    Pyuria 10/12/2020    Anemia due to blood loss, acute 10/12/2020    Atelectasis 10/12/2020    Retroperitoneal bleed 10/09/2020    Supratherapeutic INR 10/09/2020    Shock ([facility] 10/09/2020    Non-ischemic cardiomyopathy ([facility] EF 40% 10/09/2020    GI bleed 04/07/2019    Suspected sleep apnea 10/01/2018    Biatrial enlargement 10/01/2018    Congestive heart failure (HCC)     Benign hypertension 09/24/2018    Elevated brain natriuretic peptide (BNP) level 09/24/2018    Pulmonary vascular congestion on CXR 09/24/2018    Sepsis ([facility] 09/24/2018    Hypomagnesemia 09/24/2018    Hypokalemia 09/24/2018    Hyponatremia 09/24/2018    Hypochloremia 09/24/2018    KEVIN (acute kidney injury) ([facility] 09/24/2018    Subacute microcytic anemia (July 2018) 09/24/2018    Hx choledocholithiasis s/p ERCP (July 2018) 09/24/2018     Overview Note:     Reportedly awaiting cholecystectomy per Dr. Kellie Josue. 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     The isolate in urine is sensitive to cefepime currently prescribed   Interval decline in WBC is reassuring   -change to levaquin, dosed q48 for CRRT.  Can change to po route when improved and taking po well.   Plan to continue through 10/20/20      KEVIN on CRRT     Pneumobilia noted on CT  LFTs are wnl and abd exam is reassuring   Secondary to remote ERCP per GI     S/p IVC filter placement 10/13/20       Discussed with wife, all questions answered    Little more to add, please call with questions or if clinical picture changes   D/kenneth Argueta MD  Phone: [phone]   Fax : [phone]

## 2020-10-14 NOTE — PROGRESS NOTES
INPATIENT PULMONARY CRITICAL CARE PROGRESS NOTE      Reason for visit    Shock     SUBJECTIVE: Patient when seen this morning continues to be critically ill, patient continues to be on IV pressors to maintain hemodynamics and the pressors are being tapered at this time, patient continues to be on CRRT as per nephrology, patient was not complaining of any increasing shortness of breath, patient does not have any abdominal pain nausea vomiting and has been tolerating oral diet t, patient was alert and communicative when seen, patient was afebrile, patient had sinus rhythm on the monitor, patient was on 3 L of nasal cannula oxygen with saturation of 95% when seen, patient's glycemic control was acceptable, patient is patient is status post IVC filter placement, no other pertinent review of system of concern           Physical Exam:  Blood pressure (!) 105/57, pulse 90, temperature 98.4 °F (36.9 °C), resp. rate 24, height 5' 4\" (1.626 m), weight (!) 352 lb 3.2 oz (159.8 kg), SpO2 92 %.'   Constitutional:  No acute distress. HENT:  Oropharynx is clear and moist. No thyromegaly. Eyes:  Conjunctivae are normal. Pupils equal, round, and reactive to light. No scleral icterus. Neck: . No tracheal deviation present. No obvious thyroid mass. Short/enlarged neck   Cardiovascular: Normal rate, regular rhythm, normal heart sounds. No right ventricular heave. (+) lower extremity edema. Pulmonary/Chest: No wheezes. No rales. Chest wall is not dull to percussion. No accessory muscle usage or stridor. Abdominal: Soft. Bowel sounds present. No distension or hernia. No tenderness. Obese    Musculoskeletal: No cyanosis. No clubbing. No obvious joint deformity. Lymphadenopathy: No cervical or supraclavicular adenopathy. Skin: Skin is warm and dry. No rash or nodules on the exposed extremities. statis (+)  Psychiatric: Normal mood and affect. Behavior is normal.  No anxiety. Neurologic: Alert, awake and oriented. PERRL. Speech fluent        Results:  CBC:   Recent Labs     10/12/20  0620  10/13/20  0228 10/13/20  1820 10/14/20  0603   WBC 27.2*  --  22.2*  --  15.2*   HGB 7.5*   < > 7.3* 7.2* 6.9*   HCT 22.9*   < > 22.1* 22.2* 21.3*   MCV 88.3  --  89.7  --  90.6     --  166  --  161    < > = values in this interval not displayed. BMP:   Recent Labs     10/13/20  1230 10/13/20  1820 10/14/20  0147 10/14/20  0603    134* 135* 137   K 4.5 4.4 4.4 4.4    101 102 104   CO2 27 28 28 28   PHOS 1.8* 1.6* 1.8*  --    BUN 12 10 9 8   CREATININE 1.1 1.1 1.0 0.9     LIVER PROFILE:   Recent Labs     10/12/20  0620 10/13/20  0228 10/14/20  0603   AST 19 14* 10*   ALT 25 24 20   BILITOT 0.9 0.7 0.8   ALKPHOS 79 76 77       Imaging:  I have reviewed radiology images personally. XR CHEST PORTABLE   Final Result   No evidence of pneumothorax following left IJ catheter placement. Improved   aeration in the left lung base. XR CHEST PORTABLE   Final Result   No acute process. CT ABDOMEN PELVIS WO CONTRAST Additional Contrast? None   Final Result   Addendum 1 of 1   ADDENDUM:   Additional information, corrected report:      HISTORY:   The initial history was incorrect, patient did not have left side    nephrectomy   but partial nephrectomy on the right. Therefore the hematoma seen retroperitoneal on the left in the renal fossa   likely obscures the entire left kidney. There is no normal renal    parenchyma   identified. The measured area, 13 x 9 cm therefore presumably include   spontaneous hematoma and kidney tissue combined. This would also    therefore   indicate a lesser degree of hemorrhage present, since some of the measured   area being occupied by the obscured kidney. Again the primary etiology is spontaneous hemorrhage, given the patient's   grossly elevated INR, and likely arising within the obscured left kidney    and   extending left pararenal retroperitoneal space.          Final CT HEAD WO CONTRAST   Final Result   1. No acute intracranial abnormality. XR CHEST PORTABLE   Final Result   No acute cardiopulmonary disease. Echocardiogram in 2019-Conclusions      Summary   Technically difficult examination due to body habitus. Definity® used for   myocardial border enhancement. Mildly reduced ejection fraction visually estimated at 40-45% with EF   calculated at 41% by Menendez's method. Hypokinesis infero-septal wall. The left ventricle is mildly dilated with normal wall thickness. There is a large left pleural effusion. IVC size is dilated (>2.1 cm) and collapses < 50% with respiration   consistent with elevated RA pressure (15 mmHg). Results for Richard Hernandez (MRN [id number]) as of 10/14/2020 11:02   Ref.  Range 10/13/2020 12:30 10/13/2020 18:20 10/14/2020 01:47 10/14/2020 06:03   Sodium Latest Ref Range: 136 - 145 mmol/L 136 134 (L) 135 (L) 137   Potassium Latest Ref Range: 3.5 - 5.1 mmol/L 4.5 4.4 4.4 4.4   Chloride Latest Ref Range: 99 - 110 mmol/L 103 101 102 104   CO2 Latest Ref Range: 21 - 32 mmol/L 27 28 28 28   BUN Latest Ref Range: 7 - 20 mg/dL 12 10 9 8   Creatinine Latest Ref Range: 0.8 - 1.3 mg/dL 1.1 1.1 1.0 0.9   Anion Gap Latest Ref Range: 3 - 16  6 5 5 5   Calcium, Ion Latest Ref Range: 1.12 - 1.32 mmol/L 0.99 (L) 1.08 (L) 1.07 (L)    GFR Non- Latest Ref Range: >60  >60 >60 >60 >60   GFR  Latest Ref Range: >60  >60 >60 >60 >60   Magnesium Latest Ref Range: 1.80 - 2.40 mg/dL    2.20   Lactic Acid Latest Ref Range: 0.4 - 2.0 mmol/L 0.7   0.6   Glucose Latest Ref Range: 70 - 99 mg/dL 111 (H) 113 (H) 121 (H) 129 (H)   Calcium Latest Ref Range: 8.3 - 10.6 mg/dL 7.3 (L) 7.5 (L) 7.3 (L) 7.4 (L)   Phosphorus Latest Ref Range: 2.5 - 4.9 mg/dL 1.8 (L) 1.6 (L) 1.8 (L)    Total Protein Latest Ref Range: 6.4 - 8.2 g/dL    5.2 (L)   Albumin Latest Ref Range: 3.4 - 5.0 g/dL    2.5 (L)   Globulin Latest Units: g/dL 2.7   Albumin/Globulin Ratio Latest Ref Range: 1.1 - 2.2     0.9 (L)   Alk Phos Latest Ref Range: 40 - 129 U/L    77   ALT Latest Ref Range: 10 - 40 U/L    20   AST Latest Ref Range: 15 - 37 U/L    10 (L)   Bilirubin Latest Ref Range: 0.0 - 1.0 mg/dL    0.8     Results for Emma Grady (MRN [id number]) as of 10/14/2020 11:02   Ref. Range 10/12/2020 17:30 10/12/2020 19:51 10/13/2020 02:28 10/13/2020 18:20 10/14/2020 06:03   WBC Latest Ref Range: 4.0 - 11.0 K/uL   22.2 (H)  15.2 (H)   RBC Latest Ref Range: 4.20 - 5.90 M/uL   2.47 (L)  2.35 (L)   Hemoglobin Quant Latest Ref Range: 13.5 - 17.5 g/dL 7.0 (L) 7.1 (L) 7.3 (L) 7.2 (L) 6.9 (LL)   Hematocrit Latest Ref Range: 40.5 - 52.5 % 21.1 (L) 21.9 (L) 22.1 (L) 22.2 (L) 21.3 (L)   MCV Latest Ref Range: 80.0 - 100.0 fL   89.7  90.6   MCH Latest Ref Range: 26.0 - 34.0 pg   29.2  29.4   MCHC Latest Ref Range: 31.0 - 36.0 g/dL   32.5  32.5   MPV Latest Ref Range: 5.0 - 10.5 fL   8.3  7.6   RDW Latest Ref Range: 12.4 - 15.4 %   15.4  14.9   Platelet Count Latest Ref Range: 135 - 450 K/uL   166  161   Neutrophils % Latest Units: %   71.0  82.0   Lymphocyte % Latest Units: %   4.0  4.0   Monocytes % Latest Units: %   4.0  7.0   Eosinophils % Latest Units: %   2.0  2.0     Assessment:  Principal Problem:    Retroperitoneal bleed  Active Problems: Morbid obesity with BMI of 60.0-69.9, adult (HCC)    Benign hypertension    History of DVT (deep vein thrombosis)    Suspected sleep apnea    Supratherapeutic INR    Shock ([address]    Non-ischemic cardiomyopathy (HCC) EF 40%    Bacteremia due to Escherichia coli    Bandemia    Pyuria    Anemia due to blood loss, acute    Atelectasis  Resolved Problems:    * No resolved hospital problems.  *          Plan:   · Oxygen supplementation to keep saturation between 90 to 94%  · Please titrate the oxygen as per these parameters  · Patient clinically has COLT-may require CPAP/BiPAP on intermittent basis  · Patient continues to be on IV

## 2020-10-14 NOTE — PROGRESS NOTES
Urology Attending Progress Note      Subjective: Feels better. No pain except \"butt\" from being in bed    Vitals:  BP (!) 113/55   Pulse 86   Temp 97.8 °F (36.6 °C) (Bladder)   Resp 22   Ht 5' 4\" (1.626 m)   Wt (!) 352 lb 3.2 oz (159.8 kg)   SpO2 92%   BMI 60.45 kg/m²   Temp  Av.7 °F (36.5 °C)  Min: 97.3 °F (36.3 °C)  Max: 97.9 °F (36.6 °C)    Intake/Output Summary (Last 24 hours) at 10/14/2020 1018  Last data filed at 10/13/2020 1600  Gross per 24 hour   Intake --   Output 1792 ml   Net -1792 ml       Exam: Look good  Still on Levo and CRRT  Wife at bedside  Abdomen no longer tender  [place]ills with low output (about 20 cc/hour)    Labs:  WBC:    Lab Results   Component Value Date    WBC 15.2 10/14/2020     Hemoglobin/Hematocrit:    Lab Results   Component Value Date    HGB 6.9 10/14/2020    HCT 21.3 10/14/2020     BMP:    Lab Results   Component Value Date     10/14/2020    K 4.4 10/14/2020     10/14/2020    CO2 28 10/14/2020    BUN 8 10/14/2020    LABALBU 2.5 10/14/2020    CREATININE 0.9 10/14/2020    CALCIUM 7.4 10/14/2020    GFRAA >60 10/14/2020    LABGLOM >60 10/14/2020     PT/INR:    Lab Results   Component Value Date    PROTIME 16.5 10/10/2020    INR 1.42 10/10/2020     PTT:  No results found for: APTT[APTT      Impression/Plan: Discussed plan with patient and wife  Will need imaging (ideally with contrast) in a month or two to see status of left kidney  No longer seems to be actively bleeding (although getting 4th unit of PRBC's today).    Will follow    Ruben Salgado MD

## 2020-10-14 NOTE — PROGRESS NOTES
Occupational Therapy   Occupational Therapy Initial Assessment  Date: 10/14/2020   Patient Name: Chaka Parmar. MRN: [id number]     : 1950    Date of Service: 10/14/2020    Discharge Recommendations:  Subacute/Skilled Nursing Facility       Assessment   Performance deficits / Impairments: Decreased functional mobility ; Decreased ADL status; Decreased balance;Decreased endurance;Decreased strength  After evaluation, pt found to be presenting with the above mentioned occupational performance deficits which are affecting participation in daily living skills. Patient currently on CRRT this date, patient okay for bed level activities. Per patient used cane PTA though sat around a lot. Today patient totalA of 2 for mandie-care and repositioning. Pt would benefit from continued skilled occupational therapy to address ADLs, functional mobility, and safety while in acute care. Prognosis: Fair  Decision Making: Medium Complexity  OT Education: OT Role;ADL Adaptive Strategies; Plan of Care;Family Education;Home Exercise Program  Patient Education: Disease specific: Pt educated on benefits of OOB activity to decrease risks associated with prolonged bedrest while in hospital.  REQUIRES OT FOLLOW UP: Yes  Activity Tolerance  Activity Tolerance: Patient Tolerated treatment well  Activity Tolerance: Vitals stable throughout session, patient on CRRT during session, in bed activity only per RN. Safety Devices  Safety Devices in place: Yes  Type of devices: Nurse notified; Left in bed           Patient Diagnosis(es): The primary encounter diagnosis was Retroperitoneal bleed. Diagnoses of Elevated INR, Septic shock (HCC), Hypovolemic shock ([address] Blood loss anemia, and KEVIN (acute kidney injury) ([address] were also pertinent to this visit. has a past medical history of Cancer ([address] Cardiomyopathy ([address] CHF (congestive heart failure) ([address] Edema of both legs, GI bleed, Hx of blood clots, and Hypertension.    has a past surgical history that includes Kidney surgery (4/7/2014); Abdomen surgery; Colonoscopy; Endoscopy, colon, diagnostic; pr egd transoral biopsy single/multiple (9/28/2018); Upper gastrointestinal endoscopy (N/A, 4/8/2019); Colonoscopy (N/A, 4/9/2019); Upper gastrointestinal endoscopy (N/A, 4/12/2019); and Cardiac catheterization (09/06/2019). Restrictions  Restrictions/Precautions  Restrictions/Precautions: Fall Risk  Position Activity Restriction  Other position/activity restrictions: dialysis catheter, devine, central line, O2 3L, telemetry    Subjective   General  Chart Reviewed: Yes  Patient assessed for rehabilitation services?: Yes  Additional Pertinent Hx: Pt on CRRT during evaluation, RN asking for bed level activity only at this time. Family / Caregiver Present: Yes  Referring Practitioner: JOEY Suarez CNP 10/1/2020  Diagnosis: L perinephric hemorrhage  Subjective  Subjective: Pt pleasant and agreeable to bed level exercises. Per RN hold OOB as pt on CRRT. Pain: denies  Social/Functional History  Social/Functional History  Lives With: Spouse  Type of Home: House  Home Layout: One level  Home Access: Stairs to enter without rails  Entrance Stairs - Number of Steps: 1 ASHANTI  Bathroom Shower/Tub: Tub/Shower unit(does sponge bathes)  Bathroom Toilet: Standard  Home Equipment: Cane, Rolling walker, Lift chair, Reacher, Sock aid, Long-handled shoehorn, Leg (sleeps in lift chair)  ADL Assistance: Needs assistance  Bath: Moderate assistance  Dressing:  Moderate assistance  Grooming: Independent  Feeding: Independent  Toileting: Independent  Homemaking Assistance: Independent  Homemaking Responsibilities: Yes  Meal Prep Responsibility: Secondary  Laundry Responsibility: Primary  Cleaning Responsibility: Secondary  Ambulation Assistance: Independent(RW in the house; cane in community)  Transfer Assistance: Independent  Active : No  Occupation: Retired  Type of occupation: computer   Leisure & Hobbies: \"sit in my lift chair, watch sports and talks to my children and grandchildren\"       Objective   Vision: Impaired    Orientation  Overall Orientation Status: Within Functional Limits     Balance  Sitting Balance: Unable to assess(comment)(pt on CRRT during session, RN asked for bed level activity only.)  ADL  UE Dressing:  Moderate assistance  LE Dressing: Dependent/Total  Tone RUE  RUE Tone: Normotonic  Tone LUE  LUE Tone: Normotonic  Coordination  Movements Are Fluid And Coordinated: Yes     Bed mobility  Rolling to Left: 2 Person assistance;Dependent/Total  Rolling to Right: Dependent/Total;2 Person assistance        Cognition  Overall Cognitive Status: WFL           Type of ROM/Therapeutic Exercise  Type of ROM/Therapeutic Exercise: AROM  Exercises  Shoulder Elevation: x 10 reps  Shoulder Flexion: x 10 reps  Elbow Flexion: x 10 reps  Elbow Extension: x 10 reps  Supination: x 10 reps  Pronation: x 10 reps  Wrist Flexion: x 10 reps  Wrist Extension: x 10 reps  Finger Flexion: x 10 reps  Finger Extension: x 10 reps              Plan    3-5x's a week while in acute care             AM-PAC Score        AM-[facility] Inpatient Daily Activity Raw Score: 11 (10/14/20 1602)  AM-PAC Inpatient ADL T-Scale Score : 29.04 (10/14/20 1602)  ADL Inpatient CMS 0-100% Score: 70.42 (10/14/20 1602)  ADL Inpatient CMS G-Code Modifier : CL (10/14/20 1602)    Goals  Short term goals  Time Frame for Short term goals: 1 week unless otherwise specified 10/21/2020  Short term goal 1: Pt will complete 15-20 reps of BUE AROM exercises to increase strength for ADLs/transfers by 10/19  Short term goal 2: Pt will complete functional transfers with modA of 2  Short term goal 3: Pt will complete EOB sitting <5 mins for prep for transfers with SBA for balance  Patient Goals   Patient goals : \"to get stronger\"       Therapy Time   Individual Concurrent Group Co-treatment   Time In 4694         Time Out 1449         Minutes 34 Timed Code Treatment Minutes: 24 Minutes       Diana Shelton OTR/L  If pt is unable to be seen after this session, please let this note serve as discharge summary. Please see case management note for discharge disposition. Thank you.

## 2020-10-14 NOTE — PROGRESS NOTES
ONCOLOGY HEMATOLOGY CARE PROGRESS NOTE      SUBJECTIVE:     Patient reports a sore bottom. Plans to move to HD tomorrow. Wife with many questions about the filter placed yesterday. Hgb is 6.9 and 1 units PRBC was ordered. ROS:   The remaining 10 point review of symptoms is unremarkable. OBJECTIVE        Physical    VITALS:  BP (!) 105/57   Pulse 90   Temp 98.4 °F (36.9 °C)   Resp 24   Ht 5' 4\" (1.626 m)   Wt (!) 352 lb 3.2 oz (159.8 kg)   SpO2 92%   BMI 60.45 kg/m²   TEMPERATURE:  Current - Temp: 98.4 °F (36.9 °C); Max - Temp  Av.8 °F (36.6 °C)  Min: 97.3 °F (36.3 °C)  Max: 98.4 °F (36.9 °C)  PULSE OXIMETRY RANGE: SpO2  Av.4 %  Min: 86 %  Max: 100 %  24HR INTAKE/OUTPUT:      Intake/Output Summary (Last 24 hours) at 10/14/2020 1043  Last data filed at 10/13/2020 1600  Gross per 24 hour   Intake --   Output 1792 ml   Net -1792 ml       CONSTITUTIONAL:  He is morbidly obese  , HEENT oral pharynx , no scleral icterus  HEMATOLOGIC/LYMPHATICS:  no cervical lymphadenopathy, no supraclavicular lymphadenopathy, no axillary lymphadenopathy and no inguinal lymphadenopathy  LUNGS:  No increased work of breathing, good air exchange, clear to auscultation bilaterally, no crackles or wheezing  CARDIOVASCULAR:  , regular rate and rhythm, normal S1 and S2, no S3 or S4, and no murmur noted  ABDOMEN:  No scars, normal bowel sounds, soft, non-distended, non-tender, no masses palpated, no hepatosplenomegally  MUSCULOSKELETAL:  There is no redness, warmth, or swelling of the joints. EXTREMETIES: No clubbing cynosis or edema  NEUROLOGIC:  Awake, alert, oriented to name, place and time. Cranial nerves II-XII are grossly intact. Motor is 5 out of 5 bilaterally.    SKIN:  no bruising or bleeding      Data      Recent Labs     10/12/20  0620  10/13/20  0228 10/13/20  1820 10/14/20  0603   WBC 27.2*  --  22.2*  --  15.2*   HGB 7.5*   < > 7.3* 7.2* 6.9*   HCT 22.9*   < > 22.1* in the office.  -Interestingly the majority of bleeding on Coumadin is with a therapeutic INR and it is questionable whether a high INR actually Increases someone's bleeding leg risk or just makes it harder to control once they start bleeding.  -I discussed this with the patient and he is amenable to stopping Coumadin  -He will have a filter placed.  -There is no perfect solution to this problem and he can have problems no matter what we do. This was also explained to the patient. I do think this is the safest solution.  - Filter placed 10-14- defer mgmt and retrieval to Dr. Priyank Marques outpatient- wife is aware. He will need a filter life long.      Anemia:  -Likely Due to bleeding  -No evidence of iron deficiency  - Transfuse 1 unit PRBC today for Hgb 6.9     History of renal cell carcinoma  -No clinical or radiographic evidence of recurrence          ONCOLOGIC DISPOSITION:    -When CBC is stable- per renal and urology and ICU teams   - can dc from heme/onc standpoint     Stone Grajeda, CNP  Please contact through [facility]

## 2020-10-14 NOTE — PROGRESS NOTES
Received bedside report from off going RN. Pt currently sedated and on vent. Pts skin was assessed. Turned and repositioned. IJ  infusing. Mills in place. Vas Cath with CRRT currently running. Orders verified. Pt able to follow commands. Call light in reach, bed in lowest position, will continue to monitor.

## 2020-10-14 NOTE — PROGRESS NOTES
Hospitalist Progress Note      PCP: Brittany Gottlieb MD    Date of Admission: 10/9/2020    Chief Complaint: Fatigue, dizziness    Hospital Course:  \"Patient with PMH of nonischemic cardiomyopathy EF 40 to 45%, HTN, history of DVT on Coumadin, GI bleed in April 2019, presented to the ED today with complaints of fatigue and dizziness. Patient is pretty much bedbound at home. Wife reports when patient stood up he got lightheaded, and fell back in bed. Also reports patient has not been eating or drinking much. Wife also reports patient stool has been black this past week. Patient is on Coumadin for past history of DVT/PE. She contacted patient's PCP about this, who ordered stool sample for blood, they had trouble getting the test kit until the seventh. Today she called patient's PCP about the patient being very weak, unable to get out of bed, and they were advised to come to the ED. patient also reports diffuse dull aching abdominal pain that is intermittent. \"       Subjective:  He denies complaints, no significant pain, in good spirits.        Medications:  Reviewed    Infusion Medications    prismaSATE BGK 4/2.5 1,500 mL/hr (10/14/20 0829)    prismaSATE BGK 4/2.5 1,000 mL/hr (10/14/20 0830)    prismaSATE BGK 4/2.5 500 mL/hr (10/14/20 0830)    norepinephrine 3 mcg/min (10/14/20 0831)     Scheduled Medications    sodium chloride  20 mL Intravenous Once    midodrine  10 mg Oral TID WC    mupirocin   Nasal BID    Venelex   Topical BID    miconazole   Topical BID    cefepime  2 g Intravenous Q10S    folic acid  1 mg Oral Daily    sodium chloride flush  10 mL Intravenous 2 times per day    pantoprazole  40 mg Intravenous BID     PRN Meds: morphine, magnesium sulfate, calcium gluconate **OR** calcium gluconate **OR** calcium gluconate **OR** calcium gluconate, sodium phosphate IVPB **OR** sodium phosphate IVPB **OR** sodium phosphate IVPB **OR** sodium phosphate IVPB, sodium chloride flush, acetaminophen **OR** acetaminophen, polyethylene glycol, promethazine **OR** ondansetron, perflutren lipid microspheres      Intake/Output Summary (Last 24 hours) at 10/14/2020 1006  Last data filed at 10/13/2020 1600  Gross per 24 hour   Intake --   Output 1792 ml   Net -1792 ml       Physical Exam Performed:    BP (!) 113/55   Pulse 86   Temp 97.8 °F (36.6 °C) (Bladder)   Resp 22   Ht 5' 4\" (1.626 m)   Wt (!) 352 lb 3.2 oz (159.8 kg)   SpO2 92%   BMI 60.45 kg/m²       General appearance: Pleasant male in no apparent distress, appears stated age and cooperative. HEENT: Pupils equal, round, and reactive to light. Conjunctivae/corneas clear. Neck: Supple, with full range of motion. No jugular venous distention. Trachea midline. Respiratory:  Normal respiratory effort. Clear to auscultation, bilaterally without Rales/Wheezes/Rhonchi. Cardiovascular: Regular rate and rhythm with normal S1/S2 without murmurs, rubs or gallops. Abdomen: Soft, non-tender, non-distended with normal bowel sounds. Large ventral hernia noted. Musculoskeletal: No clubbing, cyanosis or edema bilaterally. Full range of motion without deformity. Skin: Skin color with PVD changes BLE. No rashes or lesions. Neurologic:  Neurovascularly intact without any focal sensory/motor deficits. Cranial nerves: II-XII intact, grossly non-focal.  Psychiatric: Alert and oriented, thought content appropriate, normal insight. Capillary Refill: Brisk,< 3 seconds   Peripheral Pulses: +2 palpable, equal bilaterally       Labs:   Recent Labs     10/12/20  0620  10/13/20  0228 10/13/20  1820 10/14/20  0603   WBC 27.2*  --  22.2*  --  15.2*   HGB 7.5*   < > 7.3* 7.2* 6.9*   HCT 22.9*   < > 22.1* 22.2* 21.3*     --  166  --  161    < > = values in this interval not displayed.      Recent Labs     10/13/20  1230 10/13/20  1820 10/14/20  0147 10/14/20  0603    134* 135* 137   K 4.5 4.4 4.4 4.4    101 102 104   CO2 27 28 28 28   BUN 12 10 9 8   CREATININE 1.1 1.1 1.0 0.9   CALCIUM 7.3* 7.5* 7.3* 7.4*   PHOS 1.8* 1.6* 1.8*  --      Recent Labs     10/12/20  0620 10/13/20  0228 10/14/20  0603   AST 19 14* 10*   ALT 25 24 20   BILITOT 0.9 0.7 0.8   ALKPHOS 79 76 77     No results for input(s): INR in the last 72 hours. No results for input(s): Reyne Macedonian in the last 72 hours. Urinalysis:      Lab Results   Component Value Date    NITRU Negative 10/09/2020    WBCUA 21-50 10/09/2020    BACTERIA 2+ 10/09/2020    RBCUA 11-20 10/09/2020    BLOODU LARGE 10/09/2020    SPECGRAV 1.010 10/09/2020    GLUCOSEU Negative 10/09/2020       Radiology:  XR CHEST PORTABLE   Final Result   No evidence of pneumothorax following left IJ catheter placement. Improved   aeration in the left lung base. XR CHEST PORTABLE   Final Result   No acute process. CT ABDOMEN PELVIS WO CONTRAST Additional Contrast? None   Final Result   Addendum 1 of 1   ADDENDUM:   Additional information, corrected report:      HISTORY:   The initial history was incorrect, patient did not have left side    nephrectomy   but partial nephrectomy on the right. Therefore the hematoma seen retroperitoneal on the left in the renal fossa   likely obscures the entire left kidney. There is no normal renal    parenchyma   identified. The measured area, 13 x 9 cm therefore presumably include   spontaneous hematoma and kidney tissue combined. This would also    therefore   indicate a lesser degree of hemorrhage present, since some of the measured   area being occupied by the obscured kidney. Again the primary etiology is spontaneous hemorrhage, given the patient's   grossly elevated INR, and likely arising within the obscured left kidney    and   extending left pararenal retroperitoneal space. Final      CT HEAD WO CONTRAST   Final Result   1. No acute intracranial abnormality. XR CHEST PORTABLE   Final Result   No acute cardiopulmonary disease. Assessment/Plan:    Active Hospital Problems    Diagnosis    Bacteremia due to Escherichia coli [R78.81, B96.20]    Bandemia [D72.825]    Pyuria [R82.81]    Anemia due to blood loss, acute [D62]    Atelectasis [J98.11]    Retroperitoneal bleed [R58]    Supratherapeutic INR [R79.1]    Shock ([address] [R57.9]    Non-ischemic cardiomyopathy (HCC) EF 40% [I42.8]    Suspected sleep apnea [R29.818]    Benign hypertension [I10]    History of DVT (deep vein thrombosis) [Z86.718]    Morbid obesity with BMI of 60.0-69.9, adult (HCC) [E66.01, Z68.44]       L perinephric hemorrhage  Likely 2/2 supratherapeutic INR of 9.24, also possible underlying renal mass  -Discussed with Dr. Gloria Huitron and Dr. Jonel Walker - suspect bleed could be from left renal lesion. CT of abd read as though patient had a left nephrectomy, but this in NOT accurate. He still has his left kidney. Per urology: \"possibly bleed from left renal tumor but had imaging a year ago only showing a stable 2 cm renal cortical lesion. Will ideally need repeat imaging with contrast when creatinine stable in a few weeks. \"  Also, \"Down the line, may require a left radical nephrectomy. \"  -Reversed Coumadin with K Centra, vitamin K.  - with ABLA present on admission. Received 1 unit pRBC on 10/9, 10/11, 10/12, and 10/14.  - vascular surgery is considering embolization     Shock   Combination of hemorrhagic due to above issue and GI bleed, also suspect sepsis due to UTI present on arrival  - Given IV fluid boluses without adequate response, required pressor support  - held anithypertensives     H/o DVT  - Vascular surgery consulted, placed IVC filter on 10/13. This the patient's 3rd life threatening bleed. Last in 4/2019 - at that time had a GIB, which required 9 units of pRBC.   - In 4/2019, hematology was consulted regarding possible cessation of warfarin, but given patient's morbid obesity and immobility, he was determined to be too high risk for recurrent DVT, so continued AC was recommended. Unable to switch to DOAC, as they have not been adequately studied in morbidly obese patients. Patient follows with Dr. Hue Coley as outpatient, appreciate hematology consult.     E. Coli Bacteremia - secondary to acute cystitis.  -treated with IV cefepime. ID input appreciated. Suspected upper GI bleed   Worsened due to supratherapeutic INR  -Hb dropped from 12 to 8.5 with fluids revealing true degree of anemia  -Hold Coumadin and antiplatelet agents  -Consulted GI, who deferred EGD at this time due to patient's instability. They will reconsider if he has melena here.   -continue IV PPI twice daily     KEVIN  -Baseline Cr 1.0. Likely ATN due to hypotension and hypovolemia  - nephrology consulted, started on CRRT 10/9.  - he does have h/o partial RIGHT nephrectomy in 2015 due to renal cancer. Non-ischemic cardiomyopathy  - TTE showed EF 50%, with RWMA's. Will need ACE/ARB/ARNI when he is more stable, also BB.     Morbid obesity with BMI of 19.2-86.4, adult   Complicating assessment and treatment, placing patient at high risk for multiple co-morbidities as well as early death and contributing to the patient's presentation. Counseled on weight loss.       DVT Prophylaxis: SCDs  Diet: DIET RENAL;  Code Status: Full Code    PT/OT Eval Status:  eval ordered    Dispo - unclear - perhaps 10/20. He came from home. Will probably have new HD requirement.       Aleksey Rosado MD

## 2020-10-14 NOTE — PROGRESS NOTES
Progress Note  Date:10/14/2020       Room:0234/0234-01  Patient Vertis Mortimer Sr. YOB: 1950     Age:69 y.o. Subjective    Subjective:  Symptoms:  Stable. Pain:  He reports no pain. Review of Systems  Objective         Vitals Last 24 Hours:  TEMPERATURE:  Temp  Av.8 °F (36.6 °C)  Min: 97.3 °F (36.3 °C)  Max: 98.4 °F (36.9 °C)  RESPIRATIONS RANGE: Resp  Av.7  Min: 16  Max: 32  PULSE OXIMETRY RANGE: SpO2  Av.4 %  Min: 86 %  Max: 100 %  PULSE RANGE: Pulse  Av.1  Min: 72  Max: 98  BLOOD PRESSURE RANGE: Systolic (16PFO), IS , Min:80 , NKH:462   ; Diastolic (00JIR), DZZ:02, Min:42, Max:165    I/O (24Hr): Intake/Output Summary (Last 24 hours) at 10/14/2020 1135  Last data filed at 10/13/2020 1600  Gross per 24 hour   Intake --   Output 1792 ml   Net -1792 ml     Objective:  General Appearance: In no acute distress and not in pain. Vital signs: (most recent): Blood pressure (!) 105/57, pulse 90, temperature 98.4 °F (36.9 °C), resp. rate 24, height 5' 4\" (1.626 m), weight (!) 352 lb 3.2 oz (159.8 kg), SpO2 92 %. Abdomen: Abdomen is soft. Bowel sounds are normal.   There is no abdominal tenderness. Labs/Imaging/Diagnostics    Labs:  CBC:  Recent Labs     10/12/20  0620  10/13/20  0228 10/13/20  1820 10/14/20  0603   WBC 27.2*  --  22.2*  --  15.2*   RBC 2.60*  --  2.47*  --  2.35*   HGB 7.5*   < > 7.3* 7.2* 6.9*   HCT 22.9*   < > 22.1* 22.2* 21.3*   MCV 88.3  --  89.7  --  90.6   RDW 15.2  --  15.4  --  14.9     --  166  --  161    < > = values in this interval not displayed.      CHEMISTRIES:  Recent Labs     10/12/20  1100  10/13/20  0228  10/13/20  1820 10/14/20  0147 10/14/20  0603 10/14/20  1005      < > 133*   < > 134* 135* 137 138   K 3.1*   < > 4.5   < > 4.4 4.4 4.4 4.2   *   < > 101   < > 101 102 104 105   CO2 17*   < > 26   < > 28 28 28 28   BUN 14   < > 12   < > 10 9 8 7   CREATININE 0.8   < > 1.0   < > 1.1 1.0 0.9 0.9 pneumobilia  6.  Will follow  MD Seamus Eason Prior) [phone]    Electronically signed by Tyesha Means MD on 10/14/20 at 11:35 AM EDT

## 2020-10-14 NOTE — CARE COORDINATION
CASE MANAGEMENT INITIAL ASSESSMENT      Reviewed chart and completed assessment with: patient and wife  Explained Case Management role/services. Primary contact information:Moni Nick [phone]    Health Care Decision Maker:  Who do you trust or have selected to make healthcare decisions for you? Name:   Scotty Landau  Phone  Number: [phone]  Can this person be reached and be able to respond quickly, such as within a few minutes or hours? Yes    Admit date/status:10/9/20  Diagnosis:retroperitoneal hemorrhage   Is this a Readmission?:  No      Insurance:Medicare    Precert required for SNF: No       3 night stay required: wiaved    Living arrangements, Adls, care needs, prior to admission: patient states he lives in a single story house with his wife. Transportation:private     Durable Medical Equipment at home:  Walker_X_Cane_X_RTS__ BSC__Shower Chair__  02__ HHN__ CPAP__  BiPap__  Hospital Bed__ W/C___ Other__________    Services in the home and/or outpatient, prior to admission: none    Dialysis Facility (if applicable) none currently    PT/OT recs:none    Hospital Exemption Notification (HEN): not initiated    Barriers to discharge:none    Plan/comments:  Spoke to patient and wife at bedside. Per patient he ambulates with a walker, also has a cane. States he helps with all laundry and dishes but is mostly sedentary at home. CM awaiting therapy to evaluate but anticipate a rehab need. Also of note currently on CRRT. Per nephrology discussions may need ongoing HD for a short time at discharge. Call placed to [facility] to have them start working and following. Spoke to Kym with [facility] who will begin process.      [facility] on chart for MD christa Alexis, RN

## 2020-10-14 NOTE — PROGRESS NOTES
--  89.7  --  90.6     --  166  --  161    < > = values in this interval not displayed. Recent Labs     10/12/20  1100  10/13/20  0228 10/13/20  1230 10/13/20  1820 10/14/20  0147 10/14/20  0603      < > 133* 136 134* 135* 137   K 3.1*   < > 4.5 4.5 4.4 4.4 4.4   *   < > 101 103 101 102 104   CO2 17*   < > 26 27 28 28 28   GLUCOSE 82   < > 129* 111* 113* 121* 129*   PHOS 1.4*   < > 1.8* 1.8* 1.6* 1.8*  --    MG 1.60*  --  2.40  --   --   --  2.20   BUN 14   < > 12 12 10 9 8   CREATININE 0.8   < > 1.0 1.1 1.1 1.0 0.9   LABGLOM >60   < > >60 >60 >60 >60 >60   GFRAA >60   < > >60 >60 >60 >60 >60    < > = values in this interval not displayed. Assessment/Plan:    Acute Kidney Injury.  - Likely ATN from profound hypotension, sepsis. - CRRT started on 10/10/20. LIJ temp HD cath as access. - Good solute clearance. UF goal of 50-100cc/hr as tolerated. - Plan to transition to iHD once off Levophed. - Electrolytes monitoring and replacement per CRRT protocol.  - Please avoid any nephrotoxic agents such as NSAIDs or IV contrast unless deemed necessary. Left retroperitoneal hematoma  - H/o open R partial nephrectomy  - High risk for page kidney   - Urology following. Shock.  - Hemorrhagic and septic. E. Coli in the blood and urine. Antibiotics per ID.  - Supra therapeutic INR on admission. H/o DVT, now off coumadin. S/P IVC filter placement on 10/13/20.  - Ok to start Midodrine 10mg 3x/day to help wean Levophed off. Anemia. - PRN blood transfusion per primary service. Discussed with ICU team.     Please do not hesitate to contact me at [phone] if with questions. Thank you!     Odalys Pride MD  10/14/2020  [facility]

## 2020-10-14 NOTE — PLAN OF CARE
Problem: Nutrition  Goal: Optimal nutrition therapy  Description: Nutrition Problem #1: Overweight/Obese  Intervention: Food and/or Nutrient Delivery: Continue NPO(Initiate oral diet when medically appropriate)  Nutritional Goals: Tolerate diet and consume greater than 50% of meals and ONS this admisssion      Outcome: Ongoing  Note: Nutrition Problem #1: Overweight/Obese  Intervention: Food and/or Nutrient Delivery: Continue Current Diet, Start Oral Nutrition Supplement  Nutritional Goals:  Tolerate diet and consume greater than 50% of meals and ONS this admisssion

## 2020-10-14 NOTE — PROGRESS NOTES
Patient with audible wheezing from upper airway after turning, laying flat in bed briefly. Lung sounds clear, diminished, vitals stable. Able to cough, clear secretions. No acute distress noted at this time. Will monitor closely.

## 2020-10-14 NOTE — PROGRESS NOTES
INPATIENT PULMONARY CRITICAL CARE PROGRESS NOTE      Reason for visit    Shock     SUBJECTIVE: Patient when seen this morning continues to be critically ill, patient continues to be on IV pressors to maintain hemodynamics, patient continues to be on CRRT as per nephrology, patient was not complaining of any increasing shortness of breath, patient's abdominal pain and tenderness has decreased as compared to the previous time, patient does not have any increasing urine output, patient was alert and communicative when seen, patient was afebrile, patient had sinus rhythm on the monitor, patient was on 2 L of nasal cannula oxygen with saturation of 96% when seen, patient's glycemic control was acceptable, patient is supposed to go for IVC filter placement today no other pertinent review of system of concern           Physical Exam:  Blood pressure 104/87, pulse 85, temperature 97.7 °F (36.5 °C), temperature source Bladder, resp. rate 21, height 5' 4\" (1.626 m), weight (!) 352 lb 3.2 oz (159.8 kg), SpO2 (!) 86 %.'   Constitutional:  No acute distress. HENT:  Oropharynx is clear and moist. No thyromegaly. Eyes:  Conjunctivae are normal. Pupils equal, round, and reactive to light. No scleral icterus. Neck: . No tracheal deviation present. No obvious thyroid mass. Short/enlarged neck   Cardiovascular: Normal rate, regular rhythm, normal heart sounds. No right ventricular heave. (+) lower extremity edema. Pulmonary/Chest: No wheezes. No rales. Chest wall is not dull to percussion. No accessory muscle usage or stridor. Abdominal: Soft. Bowel sounds present. No distension or hernia. No tenderness. Obese    Musculoskeletal: No cyanosis. No clubbing. No obvious joint deformity. Lymphadenopathy: No cervical or supraclavicular adenopathy. Skin: Skin is warm and dry. No rash or nodules on the exposed extremities. statis (+)  Psychiatric: Normal mood and affect. Behavior is normal.  No anxiety.    Neurologic: Alert, awake and oriented. PERRL. Speech fluent        Results:  CBC:   Recent Labs     10/11/20  0605  10/12/20  0620  10/12/20  1951 10/13/20  0228 10/13/20  1820   WBC 43.7*  --  27.2*  --   --  22.2*  --    HGB 7.1*   < > 7.5*   < > 7.1* 7.3* 7.2*   HCT 21.3*   < > 22.9*   < > 21.9* 22.1* 22.2*   MCV 88.2  --  88.3  --   --  89.7  --      --  163  --   --  166  --     < > = values in this interval not displayed. BMP:   Recent Labs     10/13/20  0228 10/13/20  1230 10/13/20  1820   * 136 134*   K 4.5 4.5 4.4    103 101   CO2 26 27 28   PHOS 1.8* 1.8* 1.6*   BUN 12 12 10   CREATININE 1.0 1.1 1.1     LIVER PROFILE:   Recent Labs     10/11/20  0605 10/12/20  0620 10/13/20  0228   AST 32 19 14*   ALT 27 25 24   BILITOT 0.9 0.9 0.7   ALKPHOS 78 79 76       Imaging:  I have reviewed radiology images personally. XR CHEST PORTABLE   Final Result   No evidence of pneumothorax following left IJ catheter placement. Improved   aeration in the left lung base. XR CHEST PORTABLE   Final Result   No acute process. CT ABDOMEN PELVIS WO CONTRAST Additional Contrast? None   Final Result   Addendum 1 of 1   ADDENDUM:   Additional information, corrected report:      HISTORY:   The initial history was incorrect, patient did not have left side    nephrectomy   but partial nephrectomy on the right. Therefore the hematoma seen retroperitoneal on the left in the renal fossa   likely obscures the entire left kidney. There is no normal renal    parenchyma   identified. The measured area, 13 x 9 cm therefore presumably include   spontaneous hematoma and kidney tissue combined. This would also    therefore   indicate a lesser degree of hemorrhage present, since some of the measured   area being occupied by the obscured kidney.       Again the primary etiology is spontaneous hemorrhage, given the patient's   grossly elevated INR, and likely arising within the obscured left kidney    and   extending left pararenal retroperitoneal space. Final      CT HEAD WO CONTRAST   Final Result   1. No acute intracranial abnormality. XR CHEST PORTABLE   Final Result   No acute cardiopulmonary disease. Ct Abdomen Pelvis Wo Contrast Additional Contrast? None    Addendum Date: 10/10/2020    ADDENDUM: Additional information, corrected report: HISTORY: The initial history was incorrect, patient did not have left side nephrectomy but partial nephrectomy on the right. Therefore the hematoma seen retroperitoneal on the left in the renal fossa likely obscures the entire left kidney. There is no normal renal parenchyma identified. The measured area, 13 x 9 cm therefore presumably include spontaneous hematoma and kidney tissue combined. This would also therefore indicate a lesser degree of hemorrhage present, since some of the measured area being occupied by the obscured kidney. Again the primary etiology is spontaneous hemorrhage, given the patient's grossly elevated INR, and likely arising within the obscured left kidney and extending left pararenal retroperitoneal space. Result Date: 10/10/2020  EXAMINATION: CT OF THE ABDOMEN AND PELVIS WITHOUT CONTRAST 10/9/2020 3:17 pm TECHNIQUE: CT of the abdomen and pelvis was performed without the administration of intravenous contrast. Multiplanar reformatted images are provided for review. Dose modulation, iterative reconstruction, and/or weight based adjustment of the mA/kV was utilized to reduce the radiation dose to as low as reasonably achievable. COMPARISON: Prior study(s) most recent 04/09/2019.  HISTORY: ORDERING SYSTEM PROVIDED HISTORY: sepsis, left sided abdominal pain, hernia TECHNOLOGIST PROVIDED HISTORY: Reason for exam:->sepsis, left sided abdominal pain, hernia Additional Contrast?->None Reason for Exam: hx-renal ca with no tx   c/o left sided abd pain x 2 hrs  - known gallstones/hernia Acuity: Acute Type of Exam: Initial Relevant Medical/Surgical History: renal mass removed Lightheaded, chronic anticoagulation on Coumadin. INR 9.24 FINDINGS: Lower Chest: Lung bases are clear. Incidental calcified granuloma. Extrapleural fat seen posteriorly. Organs; 1. Liver: The density is unremarkable with no focal suspicious liver lesions on noncontrast images. Pneumobilia is seen new from the prior study. 2. Gallbladder: The gallbladder appears to be entirely contracted around a dense stone near the neck of the gallbladder which is also seen on prior study. However there is air within the small residual portion of fluid content non dependently. Air is seen in the common bile duct which is nondilated. Again pneumobilia is seen centrally. There is no biliary dilation. 3. Spleen: Normal. 4. Pancreas: Unremarkable on noncontrast imaging. 5. Kidneys: The left kidney has been removed. The left renal fossa is occupied by a large hematoma described below. The right kidney is unremarkable with cortical scarring. There is curvilinear density projecting from the lower anterior aspect of the right kidney with suspected prior surgery. This is unchanged. Some of the in capsulated fat could represent fat necrosis. Again this is unchanged. No hydronephrosis. 6. Adrenal glands: Normal. GI/Bowel: There is no distention, obstruction or significant inflammatory change. The bowel is mostly empty with no distention whatsoever and near complete contraction of small bowel and colon with only trace amount of stool in the rectosigmoid. There is a large panniculus containing protrusion of fat into the panniculus inferiorly containing unremarkable mesentery and nondistended bowel loops. Pelvis: Urinary bladder is unremarkable. There is no free pelvic fluid. Peritoneum/Retroperitoneum: A large hematoma is seen retroperitoneal on the left extending anterior from the left empty renal fossa.   The more confined portion of the hematoma measures 9 x 13 cm transversely an Calcium Latest Ref Range: 8.3 - 10.6 mg/dL 7.5 (L)  7.3 (L) 7.5 (L)   Phosphorus Latest Ref Range: 2.5 - 4.9 mg/dL 1.8 (L)  1.8 (L) 1.6 (L)   Total Protein Latest Ref Range: 6.4 - 8.2 g/dL 5.0 (L)        Results for Yoel Wright (MRN [id number]) as of 10/13/2020 21:01   Ref. Range 10/12/2020 06:20 10/12/2020 11:00 10/12/2020 15:32 10/12/2020 17:30 10/12/2020 19:51 10/13/2020 02:28 10/13/2020 18:20   WBC Latest Ref Range: 4.0 - 11.0 K/uL 27.2 (H)     22.2 (H)    RBC Latest Ref Range: 4.20 - 5.90 M/uL 2.60 (L)     2.47 (L)    Hemoglobin Quant Latest Ref Range: 13.5 - 17.5 g/dL 7.5 (L) 7.4 (L)  7.0 (L) 7.1 (L) 7.3 (L) 7.2 (L)   Hematocrit Latest Ref Range: 40.5 - 52.5 % 22.9 (L) 22.0 (L)  21.1 (L) 21.9 (L) 22.1 (L) 22.2 (L)   MCV Latest Ref Range: 80.0 - 100.0 fL 88.3     89.7    MCH Latest Ref Range: 26.0 - 34.0 pg 28.8     29.2    MCHC Latest Ref Range: 31.0 - 36.0 g/dL 32.6     32.5    MPV Latest Ref Range: 5.0 - 10.5 fL 7.9     8.3    RDW Latest Ref Range: 12.4 - 15.4 % 15.2     15.4    Platelet Count Latest Ref Range: 135 - 450 K/uL 163     166    Neutrophils % Latest Units: % 91.1     71.0    Lymphocyte % Latest Units: % 2.3     4.0    Monocytes % Latest Units: % 6.0     4.0          Echocardiogram in 2019-Conclusions      Summary   Technically difficult examination due to body habitus. Definity® used for   myocardial border enhancement. Mildly reduced ejection fraction visually estimated at 40-45% with EF   calculated at 41% by Menendez's method. Hypokinesis infero-septal wall. The left ventricle is mildly dilated with normal wall thickness. There is a large left pleural effusion. IVC size is dilated (>2.1 cm) and collapses < 50% with respiration   consistent with elevated RA pressure (15 mmHg). Assessment:  Principal Problem:    Retroperitoneal bleed  Active Problems:     Morbid obesity with BMI of 60.0-69.9, adult ([facility] [address]    Benign hypertension    History of DVT (deep vein thrombosis) Suspected sleep apnea    Supratherapeutic INR    Shock ([facility] [address]    Non-ischemic cardiomyopathy (HCC) EF 40%    Bacteremia due to Escherichia coli    Bandemia    Pyuria    Anemia due to blood loss, acute    Atelectasis  Resolved Problems:    * No resolved hospital problems. *          Plan:   · Oxygen supplementation to keep saturation between 90 to 94%  · Please titrate the oxygen as per these parameters  · Patient clinically has COLT-may require CPAP/BiPAP on intermittent basis  · Patient continues to be on IV pressors to maintain mean arterial pressure more than 65 mmHg  · CRRT as per nephrology continue  · Patient's electrolytes being replaced aggressively  · PRBC transfusions on intermittent basis  · IV cefepime for bacteremia/pyuria at this time-titration as per clinical status and cultures  · Vancomycin has been discontinued  · Trend WBC count  · No need for any bronchodilators on regular basis  · Incentive spirometry  · Not be given Coreg or lisinopril because of hypotension and shock  · No surgical intervention at this time  · Monitor for any hypoventilation and hypercarbia  · Correct electrolytes on whenever necessary basis  · Patient going for IVC filter placement  · SCDs  · PUD prophylaxis    Case discussed with patient, and ICU team    Due to life threatening hemodynamic instability, renal failure, acute bleed with associated acute anemia this patient is critically ill.  Total critical care time involved in his care was 35 minutes        Electronically signed by:  Derrell Herrera MD    10/13/2020    8:59 PM.

## 2020-10-14 NOTE — PROGRESS NOTES
Physical Therapy    Facility/Department: [facility] C2 CARD TELEMETRY  Initial Assessment    NAME: Yvette Gamez Sr.  : 1950  MRN: [id number]    Date of Service: 10/14/2020    Discharge Recommendations:  Subacute/Skilled Nursing Facility, LTACH(SNF vs. LTACH depending on pt's acute medical issues closer to time of D/C)   PT Equipment Recommendations  Equipment Needed: No(TBD as pt progresses with PT)    Assessment   Body structures, Functions, Activity limitations: Decreased functional mobility ; Decreased ROM; Decreased strength;Decreased endurance;Decreased balance;Decreased posture  Assessment: Pt referred for PT evaluation during current hospital stay with dx of retroperitoneal hemorrhage. Pt currently in the ICU and still receiving CRRT; therefore, unable to assess OOB activity this session. Pt demonstrates significant weakness, requiring mod-maxA for most LE exercises and maxA x 2-4 for tasks such as scooting and rolling in bed. Anticipate pt requiring SNF vs. LTACH at D/C, depending on pt's acute medical issues and MD preference at the time of discharge. Will cont. to follow for acute PT needs for remainder of pt's acute care stay. Treatment Diagnosis: Decreased strength and (I) with functional mobility  Specific instructions for Next Treatment: Progress ther ex and mobility as tolerated  Prognosis: Good  Decision Making: High Complexity  PT Education: Goals; General Safety; Disease Specific Education;PT Role;Plan of Care;Family Education; Functional Mobility Training;Home Exercise Program;Precautions;Pressure Relief;Energy Conservation  Patient Education: Disease-specific education: Pt and pt's wife educated on supine LE ther ex pt can perform throughout the day for ROM/strengthening and to decrease the risks of prolonged bedrest; pt verbalizes/demonstrates understanding.   REQUIRES PT FOLLOW UP: Yes  Activity Tolerance: Patient Tolerated treatment well;Patient limited by fatigue;Treatment limited secondary to medical complications (free text)  Activity Tolerance: Treatment limited to in-bed activity only due to pt being on CRRT.  VSS during session. Patient Diagnosis(es): The primary encounter diagnosis was Retroperitoneal bleed. Diagnoses of Elevated INR, Septic shock (HCC), Hypovolemic shock ([address] Blood loss anemia, and KEVIN (acute kidney injury) ([address] were also pertinent to this visit. has a past medical history of Cancer ([address] Cardiomyopathy ([address] CHF (congestive heart failure) ([address] Edema of both legs, GI bleed, Hx of blood clots, and Hypertension. has a past surgical history that includes Kidney surgery (4/7/2014); Abdomen surgery; Colonoscopy; Endoscopy, colon, diagnostic; pr egd transoral biopsy single/multiple (9/28/2018); Upper gastrointestinal endoscopy (N/A, 4/8/2019); Colonoscopy (N/A, 4/9/2019); Upper gastrointestinal endoscopy (N/A, 4/12/2019); and Cardiac catheterization (09/06/2019). Restrictions  Restrictions/Precautions  Restrictions/Precautions: Fall Risk, General Precautions  Position Activity Restriction  Other position/activity restrictions: dialysis catheter (currently on CRRT), [place], central line, O2 3L, telemetry with ICU monitoring  Vision/Hearing  Vision: Impaired  Vision Exceptions: Wears glasses for reading  Hearing: Exceptions to [facility]  Hearing Exceptions: Hard of hearing/hearing concerns     Subjective  General  Chart Reviewed: Yes  Patient assessed for rehabilitation services?: Yes  Family / Caregiver Present: Yes(wife)  Referring Practitioner: JOEY Villalta CNP  Referral Date : 10/14/20  Diagnosis: Retroperitoneal hemorrhage (pt currently requiring CRRT)  Follows Commands: Within Functional Limits  General Comment  Comments: Pt resting in bed upon entry of therapy staff  Subjective  Subjective: Pt agreeable to work with PT/OT this afternoon, pleasant and cooperative. States he would be happy to start some leg exercises in bed.   Pain Screening  Patient goal  Patient Goals   Patient goals : \"To get stronger\"       Therapy Time   Individual Concurrent Group Co-treatment   Time In 7619         Time Out 1450         Minutes 35         Timed Code Treatment Minutes: [address] [place], [place] #[id number]    If pt is unable to be seen after this session, please let this note serve as discharge summary. Please see case management note for discharge disposition. Thank you.

## 2020-10-15 LAB
A/G RATIO: 0.9 (ref 1.1–2.2)
ALBUMIN SERPL-MCNC: 2.6 G/DL (ref 3.4–5)
ALP BLD-CCNC: 84 U/L (ref 40–129)
ALT SERPL-CCNC: 18 U/L (ref 10–40)
ANION GAP SERPL CALCULATED.3IONS-SCNC: 6 MMOL/L (ref 3–16)
ANION GAP SERPL CALCULATED.3IONS-SCNC: 7 MMOL/L (ref 3–16)
ANISOCYTOSIS: ABNORMAL
AST SERPL-CCNC: 12 U/L (ref 15–37)
BANDED NEUTROPHILS RELATIVE PERCENT: 4 % (ref 0–7)
BASOPHILS ABSOLUTE: 0 K/UL (ref 0–0.2)
BASOPHILS RELATIVE PERCENT: 0 %
BILIRUB SERPL-MCNC: 1 MG/DL (ref 0–1)
BUN BLDV-MCNC: 8 MG/DL (ref 7–20)
BUN BLDV-MCNC: 8 MG/DL (ref 7–20)
CALCIUM IONIZED: 0.95 MMOL/L (ref 1.12–1.32)
CALCIUM IONIZED: 1.05 MMOL/L (ref 1.12–1.32)
CALCIUM SERPL-MCNC: 7.3 MG/DL (ref 8.3–10.6)
CALCIUM SERPL-MCNC: 7.4 MG/DL (ref 8.3–10.6)
CHLORIDE BLD-SCNC: 101 MMOL/L (ref 99–110)
CHLORIDE BLD-SCNC: 102 MMOL/L (ref 99–110)
CO2: 27 MMOL/L (ref 21–32)
CO2: 27 MMOL/L (ref 21–32)
CREAT SERPL-MCNC: 0.9 MG/DL (ref 0.8–1.3)
CREAT SERPL-MCNC: 0.9 MG/DL (ref 0.8–1.3)
EOSINOPHILS ABSOLUTE: 0.4 K/UL (ref 0–0.6)
EOSINOPHILS RELATIVE PERCENT: 2 %
GFR AFRICAN AMERICAN: >60
GFR AFRICAN AMERICAN: >60
GFR NON-AFRICAN AMERICAN: >60
GFR NON-AFRICAN AMERICAN: >60
GLOBULIN: 2.8 G/DL
GLUCOSE BLD-MCNC: 124 MG/DL (ref 70–99)
GLUCOSE BLD-MCNC: 125 MG/DL (ref 70–99)
HCT VFR BLD CALC: 24.8 % (ref 40.5–52.5)
HEMOGLOBIN: 8 G/DL (ref 13.5–17.5)
INR BLD: 3.12 (ref 0.86–1.14)
LACTIC ACID: 0.8 MMOL/L (ref 0.4–2)
LYMPHOCYTES ABSOLUTE: 0.4 K/UL (ref 1–5.1)
LYMPHOCYTES RELATIVE PERCENT: 2 %
MAGNESIUM: 2.2 MG/DL (ref 1.8–2.4)
MCH RBC QN AUTO: 29 PG (ref 26–34)
MCHC RBC AUTO-ENTMCNC: 32.3 G/DL (ref 31–36)
MCV RBC AUTO: 89.8 FL (ref 80–100)
METAMYELOCYTES RELATIVE PERCENT: 2 %
MONOCYTES ABSOLUTE: 1.5 K/UL (ref 0–1.3)
MONOCYTES RELATIVE PERCENT: 8 %
NEUTROPHILS ABSOLUTE: 16.3 K/UL (ref 1.7–7.7)
NEUTROPHILS RELATIVE PERCENT: 82 %
PDW BLD-RTO: 15.3 % (ref 12.4–15.4)
PH VENOUS: 7.29 (ref 7.35–7.45)
PH VENOUS: 7.31 (ref 7.35–7.45)
PHOSPHORUS: 1.3 MG/DL (ref 2.5–4.9)
PHOSPHORUS: 2 MG/DL (ref 2.5–4.9)
PLATELET # BLD: 156 K/UL (ref 135–450)
PMV BLD AUTO: 7.7 FL (ref 5–10.5)
POLYCHROMASIA: ABNORMAL
POTASSIUM REFLEX MAGNESIUM: 4.3 MMOL/L (ref 3.5–5.1)
POTASSIUM SERPL-SCNC: 4.3 MMOL/L (ref 3.5–5.1)
PROTHROMBIN TIME: 36.6 SEC (ref 10–13.2)
RBC # BLD: 2.76 M/UL (ref 4.2–5.9)
SODIUM BLD-SCNC: 135 MMOL/L (ref 136–145)
SODIUM BLD-SCNC: 135 MMOL/L (ref 136–145)
TOTAL PROTEIN: 5.4 G/DL (ref 6.4–8.2)
WBC # BLD: 18.5 K/UL (ref 4–11)

## 2020-10-15 PROCEDURE — 90945 DIALYSIS ONE EVALUATION: CPT

## 2020-10-15 PROCEDURE — 36592 COLLECT BLOOD FROM PICC: CPT

## 2020-10-15 PROCEDURE — 99233 SBSQ HOSP IP/OBS HIGH 50: CPT | Performed by: INTERNAL MEDICINE

## 2020-10-15 PROCEDURE — 80053 COMPREHEN METABOLIC PANEL: CPT

## 2020-10-15 PROCEDURE — 6370000000 HC RX 637 (ALT 250 FOR IP): Performed by: INTERNAL MEDICINE

## 2020-10-15 PROCEDURE — 2580000003 HC RX 258

## 2020-10-15 PROCEDURE — 6360000002 HC RX W HCPCS: Performed by: INTERNAL MEDICINE

## 2020-10-15 PROCEDURE — 85610 PROTHROMBIN TIME: CPT

## 2020-10-15 PROCEDURE — 6360000002 HC RX W HCPCS: Performed by: NURSE PRACTITIONER

## 2020-10-15 PROCEDURE — 2580000003 HC RX 258: Performed by: INTERNAL MEDICINE

## 2020-10-15 PROCEDURE — 83605 ASSAY OF LACTIC ACID: CPT

## 2020-10-15 PROCEDURE — 2000000000 HC ICU R&B

## 2020-10-15 PROCEDURE — 2500000003 HC RX 250 WO HCPCS: Performed by: INTERNAL MEDICINE

## 2020-10-15 PROCEDURE — 84100 ASSAY OF PHOSPHORUS: CPT

## 2020-10-15 PROCEDURE — 83735 ASSAY OF MAGNESIUM: CPT

## 2020-10-15 PROCEDURE — 94761 N-INVAS EAR/PLS OXIMETRY MLT: CPT

## 2020-10-15 PROCEDURE — 2700000000 HC OXYGEN THERAPY PER DAY

## 2020-10-15 PROCEDURE — 85025 COMPLETE CBC W/AUTO DIFF WBC: CPT

## 2020-10-15 PROCEDURE — 82330 ASSAY OF CALCIUM: CPT

## 2020-10-15 PROCEDURE — C9113 INJ PANTOPRAZOLE SODIUM, VIA: HCPCS | Performed by: INTERNAL MEDICINE

## 2020-10-15 RX ORDER — SODIUM CHLORIDE 9 MG/ML
INJECTION INTRAVENOUS
Status: COMPLETED
Start: 2020-10-15 | End: 2020-10-15

## 2020-10-15 RX ORDER — HEPARIN SODIUM 1000 [USP'U]/ML
2000 INJECTION, SOLUTION INTRAVENOUS; SUBCUTANEOUS ONCE
Status: COMPLETED | OUTPATIENT
Start: 2020-10-15 | End: 2020-10-15

## 2020-10-15 RX ADMIN — MICONAZOLE NITRATE: 20 POWDER TOPICAL at 20:42

## 2020-10-15 RX ADMIN — PANTOPRAZOLE SODIUM 40 MG: 40 INJECTION, POWDER, FOR SOLUTION INTRAVENOUS at 08:32

## 2020-10-15 RX ADMIN — Medication 1500 ML/HR: at 00:24

## 2020-10-15 RX ADMIN — HEPARIN SODIUM 2000 UNITS: 1000 INJECTION INTRAVENOUS; SUBCUTANEOUS at 15:51

## 2020-10-15 RX ADMIN — MUPIROCIN: 20 OINTMENT TOPICAL at 08:34

## 2020-10-15 RX ADMIN — MIDODRINE HYDROCHLORIDE 10 MG: 5 TABLET ORAL at 11:53

## 2020-10-15 RX ADMIN — SODIUM PHOSPHATE, MONOBASIC, MONOHYDRATE 6 MMOL: 276; 142 INJECTION, SOLUTION INTRAVENOUS at 07:38

## 2020-10-15 RX ADMIN — SODIUM CHLORIDE 3 ML: 9 INJECTION, SOLUTION INTRAMUSCULAR; INTRAVENOUS; SUBCUTANEOUS at 15:47

## 2020-10-15 RX ADMIN — PHYTONADIONE 5 MG: 10 INJECTION, EMULSION INTRAMUSCULAR; INTRAVENOUS; SUBCUTANEOUS at 13:24

## 2020-10-15 RX ADMIN — Medication 1000 ML/HR: at 10:05

## 2020-10-15 RX ADMIN — SODIUM CHLORIDE, PRESERVATIVE FREE 10 ML: 5 INJECTION INTRAVENOUS at 08:32

## 2020-10-15 RX ADMIN — Medication 1500 ML/HR: at 07:15

## 2020-10-15 RX ADMIN — FOLIC ACID 1 MG: 1 TABLET ORAL at 08:38

## 2020-10-15 RX ADMIN — MICONAZOLE NITRATE: 20 POWDER TOPICAL at 08:33

## 2020-10-15 RX ADMIN — MIDODRINE HYDROCHLORIDE 10 MG: 5 TABLET ORAL at 08:38

## 2020-10-15 RX ADMIN — CASTOR OIL AND BALSAM, PERU: 788; 87 OINTMENT TOPICAL at 20:42

## 2020-10-15 RX ADMIN — Medication 1500 ML/HR: at 14:08

## 2020-10-15 RX ADMIN — LEVOFLOXACIN 750 MG: 5 INJECTION, SOLUTION INTRAVENOUS at 09:44

## 2020-10-15 RX ADMIN — Medication 500 ML/HR: at 00:24

## 2020-10-15 RX ADMIN — CALCIUM GLUCONATE 1 G: 20 INJECTION, SOLUTION INTRAVENOUS at 05:04

## 2020-10-15 RX ADMIN — Medication 1500 ML/HR: at 03:42

## 2020-10-15 RX ADMIN — SODIUM CHLORIDE, PRESERVATIVE FREE 10 ML: 5 INJECTION INTRAVENOUS at 20:42

## 2020-10-15 RX ADMIN — CALCIUM GLUCONATE 1 G: 20 INJECTION, SOLUTION INTRAVENOUS at 12:07

## 2020-10-15 RX ADMIN — MORPHINE SULFATE 2 MG: 2 INJECTION, SOLUTION INTRAMUSCULAR; INTRAVENOUS at 20:42

## 2020-10-15 RX ADMIN — Medication 1000 ML/HR: at 05:00

## 2020-10-15 RX ADMIN — Medication 1500 ML/HR: at 10:41

## 2020-10-15 RX ADMIN — PANTOPRAZOLE SODIUM 40 MG: 40 INJECTION, POWDER, FOR SOLUTION INTRAVENOUS at 20:42

## 2020-10-15 RX ADMIN — MIDODRINE HYDROCHLORIDE 10 MG: 5 TABLET ORAL at 17:25

## 2020-10-15 RX ADMIN — Medication 500 ML/HR: at 10:40

## 2020-10-15 RX ADMIN — CASTOR OIL AND BALSAM, PERU: 788; 87 OINTMENT TOPICAL at 07:55

## 2020-10-15 RX ADMIN — MUPIROCIN: 20 OINTMENT TOPICAL at 20:42

## 2020-10-15 ASSESSMENT — PAIN SCALES - GENERAL
PAINLEVEL_OUTOF10: 0
PAINLEVEL_OUTOF10: 10
PAINLEVEL_OUTOF10: 10

## 2020-10-15 NOTE — PROGRESS NOTES
Urology Attending Progress Note      Subjective: Patient states \"feels ok\"    71 y.o.male with hx of right RCC with partial nephrectomy, urinary retention, and with 2 cm renal cortical lesion a year ago. Consulted for left retroperitoneal bleed. Patient states feels ok today. He seems more alert. Vitals:  /63   Pulse 82   Temp 98.1 °F (36.7 °C) (Bladder)   Resp 25   Ht 5' 4\" (1.626 m)   Wt (!) 361 lb 5 oz (163.9 kg)   SpO2 94%   BMI 62.02 kg/m²   Temp  Av.2 °F (36.8 °C)  Min: 97.9 °F (36.6 °C)  Max: 98.5 °F (36.9 °C)    Intake/Output Summary (Last 24 hours) at 10/15/2020 2362  Last data filed at 10/14/2020 1500  Gross per 24 hour   Intake --   Output 2040 ml   Net -2040 ml       Exam:    Well developed, well nourished in no acute distress   Orientated to time and place   Neck is supple, trachea is midline   Respiratory effort is normal without distress   Skin warm and dry, exposed skin shows no abnormal lesions, rashes   Musculoskeletal no digital cyanosis, head normocephalic   Psych shows normal mood and affect, alert and appropriately answers questions   Clear, dark urine in devine catheter    Labs:  WBC:    Lab Results   Component Value Date    WBC 18.5 10/15/2020     Hemoglobin/Hematocrit:    Lab Results   Component Value Date    HGB 8.0 10/15/2020    HCT 24.8 10/15/2020     BMP:    Lab Results   Component Value Date     10/15/2020    K 4.3 10/15/2020     10/15/2020    CO2 27 10/15/2020    BUN 8 10/15/2020    LABALBU 2.6 10/15/2020    CREATININE 0.9 10/15/2020    CALCIUM 7.4 10/15/2020    GFRAA >60 10/15/2020    LABGLOM >60 10/15/2020     PT/INR:    Lab Results   Component Value Date    PROTIME 16.5 10/10/2020    INR 1.42 10/10/2020     PTT:  No results found for: APTT[APTT      Impression/Plan:   69 y.o.male hx of right partial nephrectomy/RCC, with retroperitoneal bleed causing large left RP hematoma. Patient had INR of 9 at admission.  Pt had 2 cm renal cortical lesion seen about a year ago. Clear, dark urine today.  His creatinine is 0.9 today.    -Will need imaging w/ contrast in 1-2 months, to see status of left kidney  -Will follow       Yesy Oneal PA-C

## 2020-10-15 NOTE — PROGRESS NOTES
Returned 190 ml of pt's blood before stopping therapy. Each VasCath port now has 1000 u heparin/0.5 ml NS to dwell.

## 2020-10-15 NOTE — PROGRESS NOTES
[facility]       Progress Note           Subjective/   71y.o. year old male who we are seeing in consultation for KEVIN requiring CRRT.     HPI:  Seen on CRRT. Orders confirmed. Hypotensive requiring pressors, being weaned. ROS:  Denies any sob, oxygen being weaned. No fevers. Objective/   GEN:  Chronically ill, BP (!) 105/58   Pulse 93   Temp 98.9 °F (37.2 °C) (Bladder)   Resp 29   Ht 5' 4\" (1.626 m)   Wt (!) 361 lb 5 oz (163.9 kg)   SpO2 90%   BMI 62.02 kg/m²   HEENT: non-icteric, no JVD  CV: S1, S2 without m/r/g; + LE edema  RESP: CTA B without w/r/r; breathing wnl  ABD: +bs, soft, nt, no hsm, +hernia  SKIN: warm, no rashes    Data/  Recent Labs     10/13/20  0228 10/13/20  1820 10/14/20  0603 10/15/20  0411   WBC 22.2*  --  15.2* 18.5*   HGB 7.3* 7.2* 6.9* 8.0*   HCT 22.1* 22.2* 21.3* 24.8*   MCV 89.7  --  90.6 89.8     --  161 156     Recent Labs     10/13/20  0228  10/14/20  0603  10/14/20  2035 10/15/20  0411 10/15/20  1144   *   < > 137   < > 136 135* 135*   K 4.5   < > 4.4   < > 4.3 4.3 4.3      < > 104   < > 103 101 102   CO2 26   < > 28   < > 27 27 27   GLUCOSE 129*   < > 129*   < > 140* 125* 124*   PHOS 1.8*   < >  --    < > 2.0* 2.0* 1.3*   MG 2.40  --  2.20  --   --  2.20  --    BUN 12   < > 8   < > 8 8 8   CREATININE 1.0   < > 0.9   < > 1.0 0.9 0.9   LABGLOM >60   < > >60   < > >60 >60 >60   GFRAA >60   < > >60   < > >60 >60 >60    < > = values in this interval not displayed. Assessment/Plan     Acute Kidney Injury.  - Likely ATN from profound hypotension, sepsis. - CRRT started on 10/10/20. LIJ temp HD cath as access. - Transition off CRRT today, transition to HD as needed. Left retroperitoneal hematoma  - H/o open R partial nephrectomy  - High risk for page kidney   - Urology following.     Shock.  - Hemorrhagic and septic. E. Coli in the blood and urine. Antibiotics per ID.  - Supra therapeutic INR on admission.  H/o DVT, now off coumadin. S/P IVC filter placement on 10/13/20.  - Continue midodrine 10mg 3x/day to help wean Levophed off.     Anemia. - PRN blood transfusion per primary service.       Case d/w Nursing & patient's wife

## 2020-10-15 NOTE — PROGRESS NOTES
ONCOLOGY HEMATOLOGY CARE PROGRESS NOTE      SUBJECTIVE:    Wife at bedside. He is cold, on dialysis. Nose bleed last night- INR ordered for noon. Bandage to nose is CDI. Per patient and wife report, \" nose bled for quite some time\". ROS:   The remaining 10 point review of symptoms is unremarkable. OBJECTIVE        Physical    VITALS:  BP (!) 85/47   Pulse 77   Temp 98.5 °F (36.9 °C) (Bladder)   Resp 21   Ht 5' 4\" (1.626 m)   Wt (!) 361 lb 5 oz (163.9 kg)   SpO2 (!) 89%   BMI 62.02 kg/m²   TEMPERATURE:  Current - Temp: 98.5 °F (36.9 °C); Max - Temp  Av.3 °F (36.8 °C)  Min: 97.9 °F (36.6 °C)  Max: 98.5 °F (36.9 °C)  PULSE OXIMETRY RANGE: SpO2  Av.4 %  Min: 89 %  Max: 100 %  24HR INTAKE/OUTPUT:      Intake/Output Summary (Last 24 hours) at 10/15/2020 1041  Last data filed at 10/14/2020 1500  Gross per 24 hour   Intake --   Output 2040 ml   Net -2040 ml       CONSTITUTIONAL:  He is morbidly obese  , HEENT oral pharynx , no scleral icterus  HEMATOLOGIC/LYMPHATICS:  no cervical lymphadenopathy, no supraclavicular lymphadenopathy, no axillary lymphadenopathy and no inguinal lymphadenopathy  LUNGS:  No increased work of breathing, good air exchange, clear to auscultation bilaterally, no crackles or wheezing  CARDIOVASCULAR:  , regular rate and rhythm, normal S1 and S2, no S3 or S4, and no murmur noted  ABDOMEN:  No scars, normal bowel sounds, soft, non-distended, non-tender, no masses palpated, no hepatosplenomegally  MUSCULOSKELETAL:  There is no redness, warmth, or swelling of the joints. EXTREMETIES: No clubbing cynosis or edema  NEUROLOGIC:  Awake, alert, oriented to name, place and time. Cranial nerves II-XII are grossly intact. Motor is 5 out of 5 bilaterally.    SKIN:  no bruising or bleeding      Data      Recent Labs     10/13/20  0228 10/13/20  1820 10/14/20  0603 10/15/20  0411   WBC 22.2*  --  15.2* 18.5*   HGB 7.3* 7.2* 6.9* 8.0*   HCT 22.1* 22.2* 21.3* 24.8*     --  161 156   MCV 89.7  --  90.6 89.8        Recent Labs     10/14/20  1005 10/14/20  2035 10/15/20  0411    136 135*   K 4.2 4.3 4.3    103 101   CO2 28 27 27   PHOS 1.5* 2.0* 2.0*   BUN 7 8 8   CREATININE 0.9 1.0 0.9     Recent Labs     10/13/20  0228 10/14/20  0603 10/15/20  0411   AST 14* 10* 12*   ALT 24 20 18   BILITOT 0.7 0.8 1.0   ALKPHOS 76 77 84       Magnesium:    Lab Results   Component Value Date    MG 2.20 10/15/2020    MG 2.20 10/14/2020    MG 2.40 10/13/2020         Problem List  Patient Active Problem List   Diagnosis    Morbid obesity with BMI of 60.0-69.9, adult ([facility] [address]    Anticoagulation goal of INR 1.5 to 2.5    Benign hypertension    History of DVT (deep vein thrombosis)    Ventral hernia    Elevated brain natriuretic peptide (BNP) level    Pulmonary vascular congestion on CXR    Sepsis (HCC)    Hypomagnesemia    Hypokalemia    Hyponatremia    Hypochloremia    [facility]    Subacute microcytic anemia (July 2018)    Hx choledocholithiasis s/p ERCP (July 2018)    Acute respiratory failure with hypoxia and hypercapnia (HCC)    Calculus of gallbladder with acute cholecystitis without obstruction    Intertrigo    Lymphedema    Suspected sleep apnea    Biatrial enlargement    Congestive heart failure (HCC)    GI bleed    Retroperitoneal bleed    Supratherapeutic INR    Shock ([facility] [address]    Non-ischemic cardiomyopathy ([address] EF 40%    Bacteremia due to Escherichia coli    Bandemia    Pyuria    Anemia due to blood loss, acute    Atelectasis       ASSESSMENT AND PLAN:    At least 2 DVTs greater than 20 years ago:  -He is now had a second episode of considerable bleeding  -I do not think he can be safely anticoagulated and this was discussed with the patient  -His INR was over 9 on admission despite typically being less than 3 in the office.  -Interestingly the majority of bleeding on Coumadin is with a therapeutic INR and it is questionable whether a high INR actually Increases someone's bleeding leg risk or just makes it harder to control once they start bleeding.  -I discussed this with the patient and he is amenable to stopping Coumadin  -He will have a filter placed.  -There is no perfect solution to this problem and he can have problems no matter what we do. This was also explained to the patient. I do think this is the safest solution.  - Filter placed 10-14- defer mgmt and retrieval to Dr. Miguel Hoang outpatient- wife is aware. He will need a filter life long.      Anemia:  -Likely Due to bleeding  -No evidence of iron deficiency  - PRBC 10-14-20 for Hgb 6.9, Hgb today is 8     History of renal cell carcinoma  -No clinical or radiographic evidence of recurrence    Epistaxis  - repeat INR   - INR could be trending up due to malnutrition and low Vitamin K intake vs fatty liver disease and underlying liver issues       ONCOLOGIC DISPOSITION:    -When CBC is stable- per renal and urology and ICU teams   - can dc from heme/onc standpoint   - Await INR today- can give Vitamin K 5 mg PO X 1 for INR over 2- discussed with RN     Terrie Esteves, CNP  Please contact through [facility]

## 2020-10-15 NOTE — PROGRESS NOTES
Progress Note  Date:10/15/2020       Room:0234/0234-01  Patient Sy Alvarenga Sr. YOB: 1950     Age:69 y.o. Subjective    Subjective:  Symptoms:  Stable. Pain:  He reports no pain. Review of Systems  Objective         Vitals Last 24 Hours:  TEMPERATURE:  Temp  Av.2 °F (36.8 °C)  Min: 97.9 °F (36.6 °C)  Max: 98.5 °F (36.9 °C)  RESPIRATIONS RANGE: Resp  Av.8  Min: 13  Max: 30  PULSE OXIMETRY RANGE: SpO2  Av.9 %  Min: 84 %  Max: 100 %  PULSE RANGE: Pulse  Av.2  Min: 65  Max: 97  BLOOD PRESSURE RANGE: Systolic (77USA), VAO:339 , Min:62 , FAD:365   ; Diastolic (54PFD), LDO:02, Min:28, Max:115    I/O (24Hr): Intake/Output Summary (Last 24 hours) at 10/15/2020 1149  Last data filed at 10/14/2020 1500  Gross per 24 hour   Intake --   Output 2040 ml   Net -2040 ml     Objective:  General Appearance:  Not in pain. Vital signs: (most recent): Blood pressure (!) 88/28, pulse 87, temperature 98.5 °F (36.9 °C), temperature source Bladder, resp. rate 26, height 5' 4\" (1.626 m), weight (!) 361 lb 5 oz (163.9 kg), SpO2 (!) 88 %. Abdomen: Abdomen is soft. Bowel sounds are normal.   There is no abdominal tenderness.        Labs/Imaging/Diagnostics    Labs:  CBC:  Recent Labs     10/13/20  0228 10/13/20  1820 10/14/20  0603 10/15/20  0411   WBC 22.2*  --  15.2* 18.5*   RBC 2.47*  --  2.35* 2.76*   HGB 7.3* 7.2* 6.9* 8.0*   HCT 22.1* 22.2* 21.3* 24.8*   MCV 89.7  --  90.6 89.8   RDW 15.4  --  14.9 15.3     --  161 156     CHEMISTRIES:  Recent Labs     10/13/20  0228  10/14/20  0603 10/14/20  1005 10/14/20  2035 10/15/20  0411   *   < > 137 138 136 135*   K 4.5   < > 4.4 4.2 4.3 4.3      < > 104 105 103 101   CO2 26   < > 28 28 27 27   BUN 12   < > 8 7 8 8   CREATININE 1.0   < > 0.9 0.9 1.0 0.9   GLUCOSE 129*   < > 129* 148* 140* 125*   PHOS 1.8*   < >  --  1.5* 2.0* 2.0*   MG 2.40  --  2.20  --   --  2.20    < > = values in this interval not displayed. PT/INR:No results for input(s): PROTIME, INR in the last 72 hours. APTT:No results for input(s): APTT in the last 72 hours. LIVER PROFILE:  Recent Labs     10/13/20  0228 10/14/20  0603 10/15/20  0411   AST 14* 10* 12*   ALT 24 20 18   BILITOT 0.7 0.8 1.0   ALKPHOS 76 77 84       Imaging Last 24 Hours:  No results found. Assessment//Plan           Hospital Problems           Last Modified POA    * (Principal) Retroperitoneal bleed 10/10/2020 Yes    Morbid obesity with BMI of 60.0-69.9, adult ([address] (Chronic) 10/9/2020 Yes    Benign hypertension (Chronic) 10/9/2020 Yes    History of DVT (deep vein thrombosis) (Chronic) 10/9/2020 Yes    Suspected sleep apnea 10/12/2020 Yes    Supratherapeutic INR 10/9/2020 Yes    Shock ([address] 10/9/2020 Yes    Non-ischemic cardiomyopathy ([facility] [address] EF 40% 10/9/2020 Yes    Bacteremia due to Escherichia coli 10/12/2020 Yes    Bandemia 10/12/2020 Yes    Pyuria 10/12/2020 Yes    Anemia due to blood loss, acute 10/12/2020 Yes    Atelectasis 10/12/2020 Yes        Assessment & Plan  70 yo w obesity, HTN, Fe def anemia, h/o DU bleed in 8/2019 at [facility] (repeat EGD by me in 9/2019 was neg.), kidney CA, DVT on Couamdin and recent choledocholithiasis s/p ERCP in July 2018, presents with supratherapeutic INR complicated by retroperitoneal bleeding and reported melena (resolved) that has now resolved.  INR of 9.2 and H/H 6/20.   EGD and colonoscopy in 4/2019 are neg. Except diverticulosis and polyp. Capsule Endoscopy 4/2019 was neg. So most likely blood loss is mostly due to retroperitoneal rather than GI bleeding.     Plan:   1. Supportive care and agree w holding AC   2. Awaiting Greenfilter placement   3. F/U H/H  4. Consider EGD at some point. Especially if melena recurs  5. The ERCP in 2018 could explain his asymptomatic pneumobilia  6.  Will follow  MD Ale Becerra Payment) [phone]    Electronically signed by Dominique Moore MD on 10/15/20 at 11:49 AM EDT

## 2020-10-15 NOTE — PROGRESS NOTES
Hospitalist Progress Note      PCP: Brittany Gottlieb MD    Date of Admission: 10/9/2020    Chief Complaint: Fatigue, dizziness    Hospital Course:  \"Patient with PMH of nonischemic cardiomyopathy EF 40 to 45%, HTN, history of DVT on Coumadin, GI bleed in April 2019, presented to the ED today with complaints of fatigue and dizziness. Patient is pretty much bedbound at home. Wife reports when patient stood up he got lightheaded, and fell back in bed. Also reports patient has not been eating or drinking much. Wife also reports patient stool has been black this past week. Patient is on Coumadin for past history of DVT/PE. She contacted patient's PCP about this, who ordered stool sample for blood, they had trouble getting the test kit until the seventh. Today she called patient's PCP about the patient being very weak, unable to get out of bed, and they were advised to come to the ED. patient also reports diffuse dull aching abdominal pain that is intermittent. \"       Subjective:  He looks more weak and tired today. Less talkative and energetic. I informed the wife that it is very possible that he might have an indefinite dialysis requirement. She prefers the [facility] time will tell\" mindset. I also told her that I am concerned that he is up against an endless series of setbacks, and that he might be at a point where he starts developing problems faster than we can fix them. I could clearly tell that she wasn't emotionally ready for further discussions of this type, but I think she needed to hear some of these things because she was was quite surprised. It is still relatively early in his disease course, however, and ongoing aggressive care is very reasonable.         Medications:  Reviewed    Infusion Medications    prismaSATE BGK 4/2.5 1,500 mL/hr (10/15/20 5083)    prismaSATE BGK 4/2.5 1,000 mL/hr (10/15/20 1005)    prismaSATE BGK 4/2.5 500 mL/hr (10/15/20 0024)    norepinephrine 5 mcg/min (10/15/20 0603)     Scheduled Medications    midodrine  10 mg Oral TID     levofloxacin  750 mg Intravenous Once    [START ON 10/17/2020] levofloxacin  500 mg Intravenous Q48H    mupirocin   Nasal BID    Venelex   Topical BID    miconazole   Topical BID    folic acid  1 mg Oral Daily    sodium chloride flush  10 mL Intravenous 2 times per day    pantoprazole  40 mg Intravenous BID     PRN Meds: morphine, magnesium sulfate, calcium gluconate **OR** calcium gluconate **OR** calcium gluconate **OR** calcium gluconate, sodium phosphate IVPB **OR** sodium phosphate IVPB **OR** sodium phosphate IVPB **OR** sodium phosphate IVPB, sodium chloride flush, acetaminophen **OR** acetaminophen, polyethylene glycol, promethazine **OR** ondansetron, perflutren lipid microspheres      Intake/Output Summary (Last 24 hours) at 10/15/2020 1025  Last data filed at 10/14/2020 1500  Gross per 24 hour   Intake --   Output 2040 ml   Net -2040 ml       Physical Exam Performed:    BP (!) 85/47   Pulse 77   Temp 98.5 °F (36.9 °C) (Bladder)   Resp 21   Ht 5' 4\" (1.626 m)   Wt (!) 361 lb 5 oz (163.9 kg)   SpO2 (!) 89%   BMI 62.02 kg/m²       General appearance: Pleasant male in no apparent distress, appears stated age and cooperative. HEENT: Pupils equal, round, and reactive to light. Conjunctivae/corneas clear. Neck: Supple, with full range of motion. No jugular venous distention. Trachea midline. Respiratory:  Normal respiratory effort. Clear to auscultation, bilaterally without Rales/Wheezes/Rhonchi. Cardiovascular: Regular rate and rhythm with normal S1/S2 without murmurs, rubs or gallops. Abdomen: Soft, non-tender, non-distended with normal bowel sounds. Large ventral hernia noted. Musculoskeletal: No clubbing, cyanosis or edema bilaterally. Full range of motion without deformity. Skin: Skin color with PVD changes BLE. No rashes or lesions.   Neurologic:  Neurovascularly intact without any focal sensory/motor deficits. Cranial nerves: II-XII intact, grossly non-focal.  Psychiatric: Alert and oriented, thought content appropriate, normal insight. Capillary Refill: Brisk,< 3 seconds   Peripheral Pulses: +2 palpable, equal bilaterally       Labs:   Recent Labs     10/13/20  0228 10/13/20  1820 10/14/20  0603 10/15/20  0411   WBC 22.2*  --  15.2* 18.5*   HGB 7.3* 7.2* 6.9* 8.0*   HCT 22.1* 22.2* 21.3* 24.8*     --  161 156     Recent Labs     10/14/20  1005 10/14/20  2035 10/15/20  0411    136 135*   K 4.2 4.3 4.3    103 101   CO2 28 27 27   BUN 7 8 8   CREATININE 0.9 1.0 0.9   CALCIUM 7.5* 7.7* 7.4*   PHOS 1.5* 2.0* 2.0*     Recent Labs     10/13/20  0228 10/14/20  0603 10/15/20  0411   AST 14* 10* 12*   ALT 24 20 18   BILITOT 0.7 0.8 1.0   ALKPHOS 76 77 84     No results for input(s): INR in the last 72 hours. No results for input(s): Winford Atoka in the last 72 hours. Urinalysis:      Lab Results   Component Value Date    NITRU Negative 10/09/2020    WBCUA 21-50 10/09/2020    BACTERIA 2+ 10/09/2020    RBCUA 11-20 10/09/2020    BLOODU LARGE 10/09/2020    SPECGRAV 1.010 10/09/2020    GLUCOSEU Negative 10/09/2020       Radiology:  XR CHEST PORTABLE   Final Result   No evidence of pneumothorax following left IJ catheter placement. Improved   aeration in the left lung base. XR CHEST PORTABLE   Final Result   No acute process. CT ABDOMEN PELVIS WO CONTRAST Additional Contrast? None   Final Result   Addendum 1 of 1   ADDENDUM:   Additional information, corrected report:      HISTORY:   The initial history was incorrect, patient did not have left side    nephrectomy   but partial nephrectomy on the right. Therefore the hematoma seen retroperitoneal on the left in the renal fossa   likely obscures the entire left kidney. There is no normal renal    parenchyma   identified.   The measured area, 13 x 9 cm therefore presumably include   spontaneous hematoma and kidney tissue combined. This would also    therefore   indicate a lesser degree of hemorrhage present, since some of the measured   area being occupied by the obscured kidney. Again the primary etiology is spontaneous hemorrhage, given the patient's   grossly elevated INR, and likely arising within the obscured left kidney    and   extending left pararenal retroperitoneal space. Final      CT HEAD WO CONTRAST   Final Result   1. No acute intracranial abnormality. XR CHEST PORTABLE   Final Result   No acute cardiopulmonary disease. Assessment/Plan:    Active Hospital Problems    Diagnosis    Bacteremia due to Escherichia coli [R78.81, B96.20]    Bandemia [D72.825]    Pyuria [R82.81]    Anemia due to blood loss, acute [D62]    Atelectasis [J98.11]    Retroperitoneal bleed [R58]    Supratherapeutic INR [R79.1]    Shock ([address] [R57.9]    Non-ischemic cardiomyopathy (HCC) EF 40% [I42.8]    Suspected sleep apnea [R29.818]    Benign hypertension [I10]    History of DVT (deep vein thrombosis) [Z86.718]    Morbid obesity with BMI of 60.0-69.9, adult (HCC) [E66.01, Z68.44]       L perinephric hemorrhage  Likely 2/2 supratherapeutic INR of 9.24, also possible underlying renal mass  -Discussed with Dr. Julissa Gan and Dr. Adela Syed - suspect bleed could be from left renal lesion. CT of abd read as though patient had a left nephrectomy, but this in NOT accurate. He still has his left kidney. Per urology: \"possibly bleed from left renal tumor but had imaging a year ago only showing a stable 2 cm renal cortical lesion. Will ideally need repeat imaging with contrast when creatinine stable in a few weeks. \"  Also, \"Down the line, may require a left radical nephrectomy. \"  -Reversed Coumadin with K Centra, vitamin K.  - with ABLA present on admission.   Received 1 unit pRBC on 10/9, 10/11, 10/12, and 10/14.  - vascular surgery is considering embolization     Shock   Combination of hemorrhagic due to above issue and GI bleed, also suspect sepsis due to UTI present on arrival  - Given IV fluid boluses without adequate response, required pressor support  - held anithypertensives     H/o DVT  - Vascular surgery consulted, placed IVC filter on 10/13. This the patient's 3rd life threatening bleed. Last in 4/2019 - at that time had a GIB, which required 9 units of pRBC. - In 4/2019, hematology was consulted regarding possible cessation of warfarin, but given patient's morbid obesity and immobility, he was determined to be too high risk for recurrent DVT, so continued AC was recommended. Unable to switch to DOAC, as they have not been adequately studied in morbidly obese patients. Patient follows with Dr. Koby Oconnell as outpatient, appreciate hematology consult.     E. Coli Bacteremia - secondary to acute cystitis.  -treated with IV cefepime, then changed to levofloxacin on 10/14, plan to complete course 10/20. ID input appreciated, they signed off. Suspected upper GI bleed   Worsened due to supratherapeutic INR  -Hb dropped from 12 to 8.5 with fluids revealing true degree of anemia  -Hold Coumadin and antiplatelet agents  -Consulted GI, who deferred EGD at this time due to patient's instability. They will reconsider if he has melena here, which he did not. -PPI     KEVIN  -Baseline Cr 1.0. Likely ATN due to hypotension and hypovolemia  - nephrology consulted, started on CRRT 10/9.  - he does have h/o partial RIGHT nephrectomy in 2015 due to renal cancer. Non-ischemic cardiomyopathy  - TTE showed EF 50%, with RWMA's. Will need ACE/ARB/ARNI when he is more stable, also BB.     Morbid obesity with BMI of 12.3-41.7, adult   Complicating assessment and treatment, placing patient at high risk for multiple co-morbidities as well as early death and contributing to the patient's presentation.  Counseled on weight loss.       DVT Prophylaxis: SCDs  Diet: Dietary Nutrition Supplements: Frozen Oral Supplement  DIET RENAL; Daily Fluid Restriction: 2000 ml  Code Status: Full Code    PT/OT Eval Status:  rec'd SNF    Dispo - unclear - perhaps 10/22. He came from home. Will probably have new HD requirement.       Nicolette Herrera MD

## 2020-10-15 NOTE — PROGRESS NOTES
INPATIENT PULMONARY CRITICAL CARE PROGRESS NOTE      Reason for visit    Shock     SUBJECTIVE: Patient when seen this morning continues to be critically ill, patient continues to be on IV pressors to maintain hemodynamics and the pressors are being tapered at this time, patient continues to be on CRRT as per nephrology, patient was not complaining of any increasing shortness of breath, patient does not have any abdominal pain nausea vomiting and has been tolerating oral diet patient had an episode of epistaxis from left nostril yesterday night and had to be put pressure with improvement, also patient's oxygen supplementation was humidified,, patient was alert and communicative when seen, patient was afebrile, patient had sinus rhythm on the monitor, patient was on 2 L of nasal cannula oxygen with saturation of 97% when seen, patient's glycemic control was acceptable, patient is patient is status post IVC filter placement, no other pertinent review of system of concern           Physical Exam:  Blood pressure (!) 85/47, pulse 77, temperature 98.5 °F (36.9 °C), temperature source Bladder, resp. rate 21, height 5' 4\" (1.626 m), weight (!) 361 lb 5 oz (163.9 kg), SpO2 (!) 89 %.'   Constitutional:  No acute distress. HENT:  Oropharynx is clear and moist. No thyromegaly. Eyes:  Conjunctivae are normal. Pupils equal, round, and reactive to light. No scleral icterus. Neck: . No tracheal deviation present. No obvious thyroid mass. Short/enlarged neck   Cardiovascular: Normal rate, regular rhythm, normal heart sounds. No right ventricular heave. (+) lower extremity edema. Pulmonary/Chest: No wheezes. No rales. Chest wall is not dull to percussion. No accessory muscle usage or stridor. Abdominal: Soft. Bowel sounds present. No distension or hernia. No tenderness. Obese    Musculoskeletal: No cyanosis. No clubbing. No obvious joint deformity. Lymphadenopathy: No cervical or supraclavicular adenopathy.    Skin: Skin is warm and dry. No rash or nodules on the exposed extremities. statis (+)  Psychiatric: Normal mood and affect. Behavior is normal.  No anxiety. Neurologic: Alert, awake and oriented. PERRL. Speech fluent        Results:  CBC:   Recent Labs     10/13/20  0228 10/13/20  1820 10/14/20  0603 10/15/20  0411   WBC 22.2*  --  15.2* 18.5*   HGB 7.3* 7.2* 6.9* 8.0*   HCT 22.1* 22.2* 21.3* 24.8*   MCV 89.7  --  90.6 89.8     --  161 156     BMP:   Recent Labs     10/14/20  1005 10/14/20  2035 10/15/20  0411    136 135*   K 4.2 4.3 4.3    103 101   CO2 28 27 27   PHOS 1.5* 2.0* 2.0*   BUN 7 8 8   CREATININE 0.9 1.0 0.9     LIVER PROFILE:   Recent Labs     10/13/20  0228 10/14/20  0603 10/15/20  0411   AST 14* 10* 12*   ALT 24 20 18   BILITOT 0.7 0.8 1.0   ALKPHOS 76 77 84       Imaging:  I have reviewed radiology images personally. XR CHEST PORTABLE   Final Result   No evidence of pneumothorax following left IJ catheter placement. Improved   aeration in the left lung base. XR CHEST PORTABLE   Final Result   No acute process. CT ABDOMEN PELVIS WO CONTRAST Additional Contrast? None   Final Result   Addendum 1 of 1   ADDENDUM:   Additional information, corrected report:      HISTORY:   The initial history was incorrect, patient did not have left side    nephrectomy   but partial nephrectomy on the right. Therefore the hematoma seen retroperitoneal on the left in the renal fossa   likely obscures the entire left kidney. There is no normal renal    parenchyma   identified. The measured area, 13 x 9 cm therefore presumably include   spontaneous hematoma and kidney tissue combined. This would also    therefore   indicate a lesser degree of hemorrhage present, since some of the measured   area being occupied by the obscured kidney.       Again the primary etiology is spontaneous hemorrhage, given the patient's   grossly elevated INR, and likely arising within the obscured left kidney and   extending left pararenal retroperitoneal space. Final      CT HEAD WO CONTRAST   Final Result   1. No acute intracranial abnormality. XR CHEST PORTABLE   Final Result   No acute cardiopulmonary disease. Echocardiogram in 2019-Conclusions      Summary   Technically difficult examination due to body habitus. Definity® used for   myocardial border enhancement. Mildly reduced ejection fraction visually estimated at 40-45% with EF   calculated at 41% by Menendez's method. Hypokinesis infero-septal wall. The left ventricle is mildly dilated with normal wall thickness. There is a large left pleural effusion. IVC size is dilated (>2.1 cm) and collapses < 50% with respiration   consistent with elevated RA pressure (15 mmHg). Results for Peace Gabriel. (MRN [id number]) as of 10/15/2020 11:00   Ref.  Range 10/14/2020 06:03 10/14/2020 10:05 10/14/2020 20:35 10/15/2020 04:11   Sodium Latest Ref Range: 136 - 145 mmol/L 137 138 136 135 (L)   Potassium Latest Ref Range: 3.5 - 5.1 mmol/L 4.4 4.2 4.3 4.3   Chloride Latest Ref Range: 99 - 110 mmol/L 104 105 103 101   CO2 Latest Ref Range: 21 - 32 mmol/L 28 28 27 27   BUN Latest Ref Range: 7 - 20 mg/dL 8 7 8 8   Creatinine Latest Ref Range: 0.8 - 1.3 mg/dL 0.9 0.9 1.0 0.9   Anion Gap Latest Ref Range: 3 - 16  5 5 6 7   Calcium, Ion Latest Ref Range: 1.12 - 1.32 mmol/L  1.07 (L) 1.10 (L) 1.05 (L)   GFR Non- Latest Ref Range: >60  >60 >60 >60 >60   GFR  Latest Ref Range: >60  >60 >60 >60 >60   Magnesium Latest Ref Range: 1.80 - 2.40 mg/dL 2.20   2.20   Lactic Acid Latest Ref Range: 0.4 - 2.0 mmol/L 0.6   0.8   Glucose Latest Ref Range: 70 - 99 mg/dL 129 (H) 148 (H) 140 (H) 125 (H)   Calcium Latest Ref Range: 8.3 - 10.6 mg/dL 7.4 (L) 7.5 (L) 7.7 (L) 7.4 (L)   Phosphorus Latest Ref Range: 2.5 - 4.9 mg/dL  1.5 (L) 2.0 (L) 2.0 (L)   Total Protein Latest Ref Range: 6.4 - 8.2 g/dL 5.2 (L)   5.4 (L) Albumin Latest Ref Range: 3.4 - 5.0 g/dL 2.5 (L)   2.6 (L)   Globulin Latest Units: g/dL 2.7   2.8   Albumin/Globulin Ratio Latest Ref Range: 1.1 - 2.2  0.9 (L)   0.9 (L)   Alk Phos Latest Ref Range: 40 - 129 U/L 77   84   ALT Latest Ref Range: 10 - 40 U/L 20   18   AST Latest Ref Range: 15 - 37 U/L 10 (L)   12 (L)   Bilirubin Latest Ref Range: 0.0 - 1.0 mg/dL 0.8   1.0   WBC Latest Ref Range: 4.0 - 11.0 K/uL 15.2 (H)   18.5 (H)   RBC Latest Ref Range: 4.20 - 5.90 M/uL 2.35 (L)   2.76 (L)   Hemoglobin Quant Latest Ref Range: 13.5 - 17.5 g/dL 6.9 (LL)   8.0 (L)   Hematocrit Latest Ref Range: 40.5 - 52.5 % 21.3 (L)   24.8 (L)   MCV Latest Ref Range: 80.0 - 100.0 fL 90.6   89.8   MCH Latest Ref Range: 26.0 - 34.0 pg 29.4   29.0   MCHC Latest Ref Range: 31.0 - 36.0 g/dL 32.5   32.3   MPV Latest Ref Range: 5.0 - 10.5 fL 7.6   7.7   RDW Latest Ref Range: 12.4 - 15.4 % 14.9   15.3   Platelet Count Latest Ref Range: 135 - 450 K/uL 161   156   Neutrophils % Latest Units: % 82.0   82.0   Lymphocyte % Latest Units: % 4.0   2.0         Assessment:  Principal Problem:    Retroperitoneal bleed  Active Problems: Morbid obesity with BMI of 60.0-69.9, adult (HCC)    Benign hypertension    History of DVT (deep vein thrombosis)    Suspected sleep apnea    Supratherapeutic INR    Shock ([address]    Non-ischemic cardiomyopathy (HCC) EF 40%    Bacteremia due to Escherichia coli    Bandemia    Pyuria    Anemia due to blood loss, acute    Atelectasis  Resolved Problems:    * No resolved hospital problems.  *          Plan:   · Oxygen supplementation to keep saturation between 90 to 94%  · Please titrate the oxygen as per these parameters-may not require supplemental oxygen in the daytime-nursing was requested to titrate  · Patient clinically has COLT-may require CPAP/BiPAP on intermittent basis  · Patient continues to be on IV pressors to maintain mean arterial pressure more than 65 mmHg  · ProAmatine being started after discussion with nephrology with the hope that IV Levophed can be tapered to DC  · CRRT as per nephrology continue  · Patient's electrolytes being replaced aggressively  · PRBC transfusions on intermittent basis-patient got 1 unit yesterday  · IV cefepime for bacteremia/pyuria at this time-titration as per clinical status and cultures  · Trend WBC count-slightly worse this morning  · No need for any bronchodilators on regular basis  · Incentive spirometry  · For any worsening epistaxis  · Not be given Coreg or lisinopril because of hypotension and shock  · No surgical intervention at this time  · Monitor for any hypoventilation and hypercarbia  · Correct electrolytes on whenever necessary basis  · Status post IVC filter placement  · SCDs  · PUD prophylaxis    Case discussed with ICU team        Electronically signed by:  Isabell Darlign MD    10/15/2020    10:59 AM.

## 2020-10-16 LAB
ACANTHOCYTES: ABNORMAL
ALBUMIN SERPL-MCNC: 2.5 G/DL (ref 3.4–5)
ANION GAP SERPL CALCULATED.3IONS-SCNC: 7 MMOL/L (ref 3–16)
ANISOCYTOSIS: ABNORMAL
BANDED NEUTROPHILS RELATIVE PERCENT: 17 % (ref 0–7)
BASOPHILS ABSOLUTE: 0 K/UL (ref 0–0.2)
BASOPHILS RELATIVE PERCENT: 0 %
BUN BLDV-MCNC: 13 MG/DL (ref 7–20)
CALCIUM IONIZED: 1.09 MMOL/L (ref 1.12–1.32)
CALCIUM SERPL-MCNC: 7.7 MG/DL (ref 8.3–10.6)
CHLORIDE BLD-SCNC: 102 MMOL/L (ref 99–110)
CO2: 27 MMOL/L (ref 21–32)
CREAT SERPL-MCNC: 1.6 MG/DL (ref 0.8–1.3)
EOSINOPHILS ABSOLUTE: 0.3 K/UL (ref 0–0.6)
EOSINOPHILS RELATIVE PERCENT: 2 %
GFR AFRICAN AMERICAN: 52
GFR NON-AFRICAN AMERICAN: 43
GLUCOSE BLD-MCNC: 113 MG/DL (ref 70–99)
HCT VFR BLD CALC: 23.4 % (ref 40.5–52.5)
HEMOGLOBIN: 7.7 G/DL (ref 13.5–17.5)
INR BLD: 1.39 (ref 0.86–1.14)
LYMPHOCYTES ABSOLUTE: 0.9 K/UL (ref 1–5.1)
LYMPHOCYTES RELATIVE PERCENT: 6 %
MACROCYTES: ABNORMAL
MAGNESIUM: 2.2 MG/DL (ref 1.8–2.4)
MCH RBC QN AUTO: 29.4 PG (ref 26–34)
MCHC RBC AUTO-ENTMCNC: 33 G/DL (ref 31–36)
MCV RBC AUTO: 89.4 FL (ref 80–100)
METAMYELOCYTES RELATIVE PERCENT: 3 %
MONOCYTES ABSOLUTE: 0.6 K/UL (ref 0–1.3)
MONOCYTES RELATIVE PERCENT: 4 %
NEUTROPHILS ABSOLUTE: 13.8 K/UL (ref 1.7–7.7)
NEUTROPHILS RELATIVE PERCENT: 68 %
OVALOCYTES: ABNORMAL
PDW BLD-RTO: 15.4 % (ref 12.4–15.4)
PH VENOUS: 7.32 (ref 7.35–7.45)
PHOSPHORUS: 1.7 MG/DL (ref 2.5–4.9)
PLATELET # BLD: 162 K/UL (ref 135–450)
PLATELET SLIDE REVIEW: ADEQUATE
PMV BLD AUTO: 7.8 FL (ref 5–10.5)
POLYCHROMASIA: ABNORMAL
POTASSIUM SERPL-SCNC: 4.4 MMOL/L (ref 3.5–5.1)
PROTHROMBIN TIME: 16.2 SEC (ref 10–13.2)
RBC # BLD: 2.62 M/UL (ref 4.2–5.9)
SODIUM BLD-SCNC: 136 MMOL/L (ref 136–145)
WBC # BLD: 15.7 K/UL (ref 4–11)

## 2020-10-16 PROCEDURE — C9113 INJ PANTOPRAZOLE SODIUM, VIA: HCPCS | Performed by: INTERNAL MEDICINE

## 2020-10-16 PROCEDURE — 6370000000 HC RX 637 (ALT 250 FOR IP): Performed by: INTERNAL MEDICINE

## 2020-10-16 PROCEDURE — 2580000003 HC RX 258: Performed by: INTERNAL MEDICINE

## 2020-10-16 PROCEDURE — 2700000000 HC OXYGEN THERAPY PER DAY

## 2020-10-16 PROCEDURE — 82330 ASSAY OF CALCIUM: CPT

## 2020-10-16 PROCEDURE — 99233 SBSQ HOSP IP/OBS HIGH 50: CPT | Performed by: INTERNAL MEDICINE

## 2020-10-16 PROCEDURE — 2500000003 HC RX 250 WO HCPCS: Performed by: INTERNAL MEDICINE

## 2020-10-16 PROCEDURE — 85025 COMPLETE CBC W/AUTO DIFF WBC: CPT

## 2020-10-16 PROCEDURE — 6360000002 HC RX W HCPCS: Performed by: INTERNAL MEDICINE

## 2020-10-16 PROCEDURE — 36592 COLLECT BLOOD FROM PICC: CPT

## 2020-10-16 PROCEDURE — 1200000000 HC SEMI PRIVATE

## 2020-10-16 PROCEDURE — 97110 THERAPEUTIC EXERCISES: CPT

## 2020-10-16 PROCEDURE — 80069 RENAL FUNCTION PANEL: CPT

## 2020-10-16 PROCEDURE — 83735 ASSAY OF MAGNESIUM: CPT

## 2020-10-16 PROCEDURE — 85610 PROTHROMBIN TIME: CPT

## 2020-10-16 PROCEDURE — 6360000002 HC RX W HCPCS: Performed by: NURSE PRACTITIONER

## 2020-10-16 PROCEDURE — 94761 N-INVAS EAR/PLS OXIMETRY MLT: CPT

## 2020-10-16 RX ORDER — OXYCODONE HYDROCHLORIDE 5 MG/1
5 TABLET ORAL EVERY 6 HOURS PRN
Status: DISCONTINUED | OUTPATIENT
Start: 2020-10-16 | End: 2020-10-22 | Stop reason: HOSPADM

## 2020-10-16 RX ADMIN — CALCIUM GLUCONATE 1 G: 20 INJECTION, SOLUTION INTRAVENOUS at 05:23

## 2020-10-16 RX ADMIN — MIDODRINE HYDROCHLORIDE 10 MG: 5 TABLET ORAL at 08:37

## 2020-10-16 RX ADMIN — MORPHINE SULFATE 2 MG: 2 INJECTION, SOLUTION INTRAMUSCULAR; INTRAVENOUS at 05:03

## 2020-10-16 RX ADMIN — MIDODRINE HYDROCHLORIDE 10 MG: 5 TABLET ORAL at 17:32

## 2020-10-16 RX ADMIN — CASTOR OIL AND BALSAM, PERU: 788; 87 OINTMENT TOPICAL at 08:36

## 2020-10-16 RX ADMIN — SODIUM CHLORIDE, PRESERVATIVE FREE 10 ML: 5 INJECTION INTRAVENOUS at 21:57

## 2020-10-16 RX ADMIN — PANTOPRAZOLE SODIUM 40 MG: 40 INJECTION, POWDER, FOR SOLUTION INTRAVENOUS at 21:54

## 2020-10-16 RX ADMIN — PANTOPRAZOLE SODIUM 40 MG: 40 INJECTION, POWDER, FOR SOLUTION INTRAVENOUS at 08:36

## 2020-10-16 RX ADMIN — MICONAZOLE NITRATE: 20 POWDER TOPICAL at 21:56

## 2020-10-16 RX ADMIN — MICONAZOLE NITRATE: 20 POWDER TOPICAL at 08:36

## 2020-10-16 RX ADMIN — SODIUM PHOSPHATE, MONOBASIC, MONOHYDRATE 12 MMOL: 276; 142 INJECTION, SOLUTION INTRAVENOUS at 06:30

## 2020-10-16 RX ADMIN — OXYCODONE 5 MG: 5 TABLET ORAL at 08:57

## 2020-10-16 RX ADMIN — SODIUM CHLORIDE, PRESERVATIVE FREE 10 ML: 5 INJECTION INTRAVENOUS at 08:37

## 2020-10-16 RX ADMIN — MORPHINE SULFATE 2 MG: 2 INJECTION, SOLUTION INTRAMUSCULAR; INTRAVENOUS at 00:22

## 2020-10-16 RX ADMIN — MUPIROCIN: 20 OINTMENT TOPICAL at 08:30

## 2020-10-16 RX ADMIN — FOLIC ACID 1 MG: 1 TABLET ORAL at 08:37

## 2020-10-16 RX ADMIN — CASTOR OIL AND BALSAM, PERU: 788; 87 OINTMENT TOPICAL at 21:56

## 2020-10-16 RX ADMIN — MIDODRINE HYDROCHLORIDE 10 MG: 5 TABLET ORAL at 12:11

## 2020-10-16 ASSESSMENT — PAIN SCALES - GENERAL
PAINLEVEL_OUTOF10: 8
PAINLEVEL_OUTOF10: 8
PAINLEVEL_OUTOF10: 4
PAINLEVEL_OUTOF10: 8
PAINLEVEL_OUTOF10: 8
PAINLEVEL_OUTOF10: 0

## 2020-10-16 ASSESSMENT — PAIN DESCRIPTION - PAIN TYPE: TYPE: ACUTE PAIN

## 2020-10-16 NOTE — PROGRESS NOTES
21.3* 24.8* 23.4*   MCV 90.6 89.8 89.4    156 162     BMP:   Recent Labs     10/15/20  0411 10/15/20  1144 10/16/20  0459   * 135* 136   K 4.3 4.3 4.4    102 102   CO2 27 27 27   PHOS 2.0* 1.3* 1.7*   BUN 8 8 13   CREATININE 0.9 0.9 1.6*     LIVER PROFILE:   Recent Labs     10/14/20  0603 10/15/20  0411   AST 10* 12*   ALT 20 18   BILITOT 0.8 1.0   ALKPHOS 77 84       Imaging:  I have reviewed radiology images personally. XR CHEST PORTABLE   Final Result   No evidence of pneumothorax following left IJ catheter placement. Improved   aeration in the left lung base. XR CHEST PORTABLE   Final Result   No acute process. CT ABDOMEN PELVIS WO CONTRAST Additional Contrast? None   Final Result   Addendum 1 of 1   ADDENDUM:   Additional information, corrected report:      HISTORY:   The initial history was incorrect, patient did not have left side    nephrectomy   but partial nephrectomy on the right. Therefore the hematoma seen retroperitoneal on the left in the renal fossa   likely obscures the entire left kidney. There is no normal renal    parenchyma   identified. The measured area, 13 x 9 cm therefore presumably include   spontaneous hematoma and kidney tissue combined. This would also    therefore   indicate a lesser degree of hemorrhage present, since some of the measured   area being occupied by the obscured kidney. Again the primary etiology is spontaneous hemorrhage, given the patient's   grossly elevated INR, and likely arising within the obscured left kidney    and   extending left pararenal retroperitoneal space. Final      CT HEAD WO CONTRAST   Final Result   1. No acute intracranial abnormality. XR CHEST PORTABLE   Final Result   No acute cardiopulmonary disease. Echocardiogram in 2019-Conclusions      Summary   Technically difficult examination due to body habitus. Definity® used for   myocardial border enhancement.    Mildly reduced ejection fraction visually estimated at 40-45% with EF   calculated at 41% by Menendez's method. Hypokinesis infero-septal wall. The left ventricle is mildly dilated with normal wall thickness. There is a large left pleural effusion. IVC size is dilated (>2.1 cm) and collapses < 50% with respiration   consistent with elevated RA pressure (15 mmHg). Results for Pennie Marques (MRN [id number]) as of 10/16/2020 11:23   Ref.  Range 10/15/2020 04:11 10/15/2020 11:44 10/16/2020 04:59   Sodium Latest Ref Range: 136 - 145 mmol/L 135 (L) 135 (L) 136   Potassium Latest Ref Range: 3.5 - 5.1 mmol/L 4.3 4.3 4.4   Chloride Latest Ref Range: 99 - 110 mmol/L 101 102 102   CO2 Latest Ref Range: 21 - 32 mmol/L 27 27 27   BUN Latest Ref Range: 7 - 20 mg/dL 8 8 13   Creatinine Latest Ref Range: 0.8 - 1.3 mg/dL 0.9 0.9 1.6 (H)   Anion Gap Latest Ref Range: 3 - 16  7 6 7   Calcium, Ion Latest Ref Range: 1.12 - 1.32 mmol/L 1.05 (L) 0.95 (L) 1.09 (L)   GFR Non- Latest Ref Range: >60  >60 >60 43 (A)   GFR  Latest Ref Range: >60  >60 >60 52 (A)   Magnesium Latest Ref Range: 1.80 - 2.40 mg/dL 2.20  2.20   Lactic Acid Latest Ref Range: 0.4 - 2.0 mmol/L 0.8     Glucose Latest Ref Range: 70 - 99 mg/dL 125 (H) 124 (H) 113 (H)   Calcium Latest Ref Range: 8.3 - 10.6 mg/dL 7.4 (L) 7.3 (L) 7.7 (L)   Phosphorus Latest Ref Range: 2.5 - 4.9 mg/dL 2.0 (L) 1.3 (L) 1.7 (L)   Total Protein Latest Ref Range: 6.4 - 8.2 g/dL 5.4 (L)     Albumin Latest Ref Range: 3.4 - 5.0 g/dL 2.6 (L)  2.5 (L)   Globulin Latest Units: g/dL 2.8     Albumin/Globulin Ratio Latest Ref Range: 1.1 - 2.2  0.9 (L)     Alk Phos Latest Ref Range: 40 - 129 U/L 84     ALT Latest Ref Range: 10 - 40 U/L 18     AST Latest Ref Range: 15 - 37 U/L 12 (L)     Bilirubin Latest Ref Range: 0.0 - 1.0 mg/dL 1.0     WBC Latest Ref Range: 4.0 - 11.0 K/uL 18.5 (H)  15.7 (H)   RBC Latest Ref Range: 4.20 - 5.90 M/uL 2.76 (L)  2.62 (L)   Hemoglobin Quant Latest Ref Range: 13.5 - 17.5 g/dL 8.0 (L)  7.7 (L)   Hematocrit Latest Ref Range: 40.5 - 52.5 % 24.8 (L)  23.4 (L)   MCV Latest Ref Range: 80.0 - 100.0 fL 89.8  89.4   MCH Latest Ref Range: 26.0 - 34.0 pg 29.0  29.4   MCHC Latest Ref Range: 31.0 - 36.0 g/dL 32.3  33.0   MPV Latest Ref Range: 5.0 - 10.5 fL 7.7  7.8   RDW Latest Ref Range: 12.4 - 15.4 % 15.3  15.4   Platelet Count Latest Ref Range: 135 - 450 K/uL 156  162   Neutrophils % Latest Units: % 82.0  68.0   Lymphocyte % Latest Units: % 2.0  6.0       Results for [employer] (MRN [id number]) as of 10/16/2020 11:23   Ref. Range 10/9/2020 12:47 10/9/2020 12:47 10/9/2020 12:58 10/9/2020 14:54   CULTURE, BLOOD 1 Unknown Rpt (A)      CULTURE, BLOOD 2 Unknown   Rpt (A)    CULTURE, BLOOD, PCR ID PANEL RESULTS REPORT Unknown Rpt  Rpt    Culture, Blood 2 Unknown   POSITIVE for. .. Organism Unknown Escherichia coli (A) Escherichia coli DNA Detected (A) Escherichia coli (A)    Occult Blood Screening Unknown    Result: POSITIVE. .. (A)   BLOOD OCCULT STOOL SCREEN #1 Unknown    Rpt (A)     Assessment:  Principal Problem:    Retroperitoneal bleed  Active Problems: Morbid obesity with BMI of 60.0-69.9, adult (HCC)    Benign hypertension    History of DVT (deep vein thrombosis)    Suspected sleep apnea    Supratherapeutic INR    Shock ([facility] [address]    Non-ischemic cardiomyopathy (HCC) EF 40%    Bacteremia due to Escherichia coli    Bandemia    Pyuria    Anemia due to blood loss, acute    Atelectasis  Resolved Problems:    * No resolved hospital problems.  *          Plan:   · Oxygen supplementation to keep saturation between 90 to 94%  · Please was on room air oxygen when seen this morning  · Patient clinically has COLT-may require CPAP/BiPAP on intermittent basis  · Patient continues to be on IV pressors to maintain mean arterial pressure more than 65 mmHg-nursing was requested to discontinue it and reassess the blood pressure and if the blood pressure remained stable for 4 hours then patient can be transferred to a stepdown unit  · ProAmatine to continue  · CRRT has been discontinued-may need intermittent dialysis  · Patient's electrolytes being replaced aggressively  · PRBC transfusions on intermittent basis-patient got 1 unit yesterday  · IV cefepime for bacteremia/pyuria at this time-titration as per clinical status and cultures  · Trend WBC count-slightly better this morning  · No need for any bronchodilators on regular basis  · Incentive spirometry  · For any worsening epistaxis-patient does not have any more epistaxis  · Not be given Coreg or lisinopril because of hypotension and shock  · No surgical intervention at this time  · Monitor for any hypoventilation and hypercarbia  · Correct electrolytes on whenever necessary basis  · Status post IVC filter placement  · SCDs  · PUD prophylaxis    Case discussed with ICU team  Case discussed with family in detail    If the blood pressure remains stable after discontinuation of the pressors and patient can be transferred to stepdown unit from pulmonary/critical care standpoint of view        Electronically signed by:  Merlinda Pott, MD    10/16/2020    11:21 AM.

## 2020-10-16 NOTE — PROGRESS NOTES
ONCOLOGY HEMATOLOGY CARE PROGRESS NOTE      SUBJECTIVE:     NO further nose bleeds. INR was 3 yesterday- Vit K given and now 1.3.       ROS:   The remaining 10 point review of symptoms is unremarkable. OBJECTIVE        Physical    VITALS:  /60   Pulse 89   Temp 99.7 °F (37.6 °C) (Bladder)   Resp 22   Ht 5' 4\" (1.626 m)   Wt (!) 341 lb 3.2 oz (154.8 kg)   SpO2 95%   BMI 58.57 kg/m²   TEMPERATURE:  Current - Temp: 99.7 °F (37.6 °C); Max - Temp  Av.6 °F (37.6 °C)  Min: 98.5 °F (36.9 °C)  Max: 100.3 °F (37.9 °C)  PULSE OXIMETRY RANGE: SpO2  Av.7 %  Min: 84 %  Max: 97 %  24HR INTAKE/OUTPUT:      Intake/Output Summary (Last 24 hours) at 10/16/2020 0804  Last data filed at 10/16/2020 3803  Gross per 24 hour   Intake 239 ml   Output 2514 ml   Net -2275 ml       CONSTITUTIONAL:  He is morbidly obese  , HEENT oral pharynx , no scleral icterus  HEMATOLOGIC/LYMPHATICS:  no cervical lymphadenopathy, no supraclavicular lymphadenopathy, no axillary lymphadenopathy and no inguinal lymphadenopathy  LUNGS:  No increased work of breathing, good air exchange, clear to auscultation bilaterally, no crackles or wheezing  CARDIOVASCULAR:  , regular rate and rhythm, normal S1 and S2, no S3 or S4, and no murmur noted  ABDOMEN:  No scars, normal bowel sounds, soft, non-distended, non-tender, no masses palpated, no hepatosplenomegally  MUSCULOSKELETAL:  There is no redness, warmth, or swelling of the joints. EXTREMETIES: No clubbing cynosis or edema  NEUROLOGIC:  Awake, alert, oriented to name, place and time. Cranial nerves II-XII are grossly intact. Motor is 5 out of 5 bilaterally.    SKIN:  no bruising or bleeding      Data      Recent Labs     10/14/20  0603 10/15/20  0411 10/16/20  0459   WBC 15.2* 18.5* 15.7*   HGB 6.9* 8.0* 7.7*   HCT 21.3* 24.8* 23.4*    156 162   MCV 90.6 89.8 89.4        Recent Labs     10/15/20  0411 10/15/20  1144 10/16/20  0459   * 135* 136 K 4.3 4.3 4.4    102 102   CO2 27 27 27   PHOS 2.0* 1.3* 1.7*   BUN 8 8 13   CREATININE 0.9 0.9 1.6*     Recent Labs     10/14/20  0603 10/15/20  0411   AST 10* 12*   ALT 20 18   BILITOT 0.8 1.0   ALKPHOS 77 84       Magnesium:    Lab Results   Component Value Date    MG 2.20 10/16/2020    MG 2.20 10/15/2020    MG 2.20 10/14/2020         Problem List  Patient Active Problem List   Diagnosis    Morbid obesity with BMI of 60.0-69.9, adult ([facility] [address]    Anticoagulation goal of INR 1.5 to 2.5    Benign hypertension    History of DVT (deep vein thrombosis)    Ventral hernia    Elevated brain natriuretic peptide (BNP) level    Pulmonary vascular congestion on CXR    Sepsis ([facility] [address]    Hypomagnesemia    Hypokalemia    Hyponatremia    Hypochloremia    KEVIN [facility] kidney [facility]) ([facility])    Subacute microcytic anemia (July 2018)    Hx choledocholithiasis s/p ERCP (July 2018)    Acute respiratory failure with hypoxia and hypercapnia (HCC)    Calculus of gallbladder with acute cholecystitis without obstruction    Intertrigo    Lymphedema    Suspected sleep apnea    Biatrial enlargement    Congestive heart failure (HCC)    GI bleed    Retroperitoneal bleed    Supratherapeutic INR    Shock ([facility] [address]    Non-ischemic cardiomyopathy ([address] EF 40%    Bacteremia due to Escherichia coli    Bandemia    Pyuria    Anemia due to blood loss, acute    Atelectasis       ASSESSMENT AND PLAN:    At least 2 DVTs greater than 20 years ago:  -He is now had a second episode of considerable bleeding  -I do not think he can be safely anticoagulated and this was discussed with the patient  -His INR was over 9 on admission despite typically being less than 3 in the office.  -Interestingly the majority of bleeding on Coumadin is with a therapeutic INR and it is questionable whether a high INR actually Increases someone's bleeding leg risk or just makes it harder to control once they start bleeding.  -I discussed this with the

## 2020-10-16 NOTE — PROGRESS NOTES
Urology Attending Progress Note      Subjective: Patient states \"have left side pain\"    71 y.o.male obese, alert male with hx of right RCC with partial nephrectomy, urinary retention, and with 2 cm renal cortical lesion a year ago. Consulted for left retroperitoneal bleed. Patient states feels ok today with mild left sided abdominal pain, he states occurred after the rolled him to his side.    Vitals:  /69   Pulse 94   Temp 99.5 °F (37.5 °C) (Bladder)   Resp 22   Ht 5' 4\" (1.626 m)   Wt (!) 341 lb 3.2 oz (154.8 kg)   SpO2 95%   BMI 58.57 kg/m²   Temp  Av.6 °F (37.6 °C)  Min: 98.5 °F (36.9 °C)  Max: 100.3 °F (37.9 °C)    Intake/Output Summary (Last 24 hours) at 10/16/2020 0934  Last data filed at 10/16/2020 0845  Gross per 24 hour   Intake 239 ml   Output 2614 ml   Net -2375 ml       Exam:    Obese, male in no acute distress   Orientated to time and place   Neck is supple, trachea is midline   Respiratory effort is normal without distress   Skin warm and dry, exposed skin shows no abnormal lesions, rashes   Musculoskeletal no digital cyanosis, head normocephalic   Psych shows normal mood and affect, alert and appropriately answers questions  [place] urine in devine catheter    Labs:  WBC:    Lab Results   Component Value Date    WBC 15.7 10/16/2020     Hemoglobin/Hematocrit:    Lab Results   Component Value Date    HGB 7.7 10/16/2020    HCT 23.4 10/16/2020     BMP:    Lab Results   Component Value Date     10/16/2020    K 4.4 10/16/2020    K 4.3 10/15/2020     10/16/2020    CO2 27 10/16/2020    BUN 13 10/16/2020    LABALBU 2.5 10/16/2020    CREATININE 1.6 10/16/2020    CALCIUM 7.7 10/16/2020    GFRAA 52 10/16/2020    LABGLOM 43 10/16/2020     PT/INR:    Lab Results   Component Value Date    PROTIME 16.2 10/16/2020    INR 1.39 10/16/2020     PTT:  No results found for: APTT[APTT      Impression/Plan:   69 y.o.male hx of right partial nephrectomy/RCC, with retroperitoneal bleed causing large left RP hematoma. Patient had INR of 9 at admission. Pt had 2 cm renal cortical lesion seen about a year ago. Clear, dark urine today. His creatinine is 1.6 today.     -Monitor creatinine for improvement    -Imaging with contrast 1-2 months, to evaluate status of left kidney  -Will follow       Yasmani Joseph PA-C

## 2020-10-16 NOTE — PROGRESS NOTES
Physical Therapy  Facility/Department: [facility] A2 CARD TELEMETRY  Daily Treatment Note  NAME: Bijan Shah Sr.  : 1950  MRN: [id number]    Date of Service: 10/16/2020    Discharge Recommendations:  Subacute/Skilled Nursing Facility, LTACH(SNF vs LTAC pending pts medical status when pt closer to d/c)   PT Equipment Recommendations  Equipment Needed: No(TBD with progression of PT)    Assessment   Body structures, Functions, Activity limitations: Decreased functional mobility ; Decreased ROM; Decreased strength;Decreased endurance;Decreased balance;Decreased posture  Assessment: Pt seen for PT tx, OOB mobility deferred this session for safety d/t pt requiring maxA x 2-4 for rolling in previous session. Pt does participate in supine BLE and BUE exercises and requries decreased assistance to complete them this date. Pt does require intermittent rest breaks d/t fatigue and SOB. Pt will benefit from continued skilled PT in acute care setting to address above deficits. Treatment Diagnosis: Decreased strength and (I) with functional mobility  Specific instructions for Next Treatment: Progress ther ex and mobility as tolerated  Prognosis: Good  Decision Making: High Complexity  PT Education: Goals; General Safety; Disease Specific Education;PT Role;Plan of Care;Family Education; Functional Mobility Training;Home Exercise Program;Precautions;Pressure Relief;Energy Conservation  Patient Education: Disease-specific education: Pt and pt's wife educated on supine LE ther ex pt can perform throughout the day for ROM/strengthening and to decrease the risks of prolonged bedrest; pt verbalizes/demonstrates understanding. REQUIRES PT FOLLOW UP: Yes  Activity Tolerance  Activity Tolerance: Patient Tolerated treatment well;Patient limited by fatigue     Patient Diagnosis(es): The primary encounter diagnosis was Retroperitoneal bleed.  Diagnoses of Elevated INR, Septic shock (HCC), Hypovolemic shock ([address] Blood loss anemia, and KEVIN (acute kidney injury) ([facility] were also pertinent to this visit. has a past medical history of Cancer ([facility] [address] Cardiomyopathy ([facility] [address] CHF (congestive heart failure) ([facility] [address] Edema of both legs, GI bleed, Hx of blood clots, and Hypertension. has a past surgical history that includes Kidney surgery (4/7/2014); Abdomen surgery; Colonoscopy; Endoscopy, colon, diagnostic; pr egd transoral biopsy single/multiple (9/28/2018); Upper gastrointestinal endoscopy (N/A, 4/8/2019); Colonoscopy (N/A, 4/9/2019); Upper gastrointestinal endoscopy (N/A, 4/12/2019); and Cardiac catheterization (09/06/2019). Restrictions  Restrictions/Precautions  Restrictions/Precautions: Fall Risk, General Precautions  Position Activity Restriction  Other position/activity restrictions: dialysis catheter, [place], O2 1L, telemetry  Subjective   General  Chart Reviewed: Yes  Response To Previous Treatment: Patient with no complaints from previous session.   Family / Caregiver Present: Yes(Wife)  Referring Practitioner: JOEY Aquino CNP  Subjective  Subjective: Pt resting in bed on approach, pleasant and agreeable to PT tx  General Comment  Comments: RN cleared pt for session  Pain Screening  Patient Currently in Pain: Denies  Vital Signs  Patient Currently in Pain: Denies       Orientation  Orientation  Overall Orientation Status: Within Functional Limits  Cognition   Cognition  Overall Cognitive Status: WFL     Objective   Bed mobility  Comment: Deferred this session  Transfers  Comment: Deferred d/t safety              Exercises  Quad Sets: Supine BLE x 15  Heelslides: Supine BLE x 15 Quyen  Gluteal Sets: Supine BLE x 15  Hip Abduction: Supine BLE x 15 Quyen  Knee Short Arc Quad: Supine BLE x Quyen  Ankle Pumps: Supine BLE x 15  Upper Extremity: Supine BUE finger flex/ext x 10, BUE elbow flexion/ext x 10, B forward reach x 10                       AM-PAC Score  AM-PAC Inpatient Mobility Raw Score : 7 (10/16/20 1914)  AM-PAC Inpatient T-Scale Score : 26.42 (10/16/20 1914)  Mobility Inpatient CMS 0-100% Score: 92.36 (10/16/20 1914)  Mobility Inpatient CMS G-Code Modifier : CM (10/16/20 1914)          Goals  Short term goals  Time Frame for Short term goals: 1 week, 10/21/20 (unless otherwise specified)  Short term goal 1: Pt will transfer supine <-> sit with modA x 2 -- 10/16: DNT  Short term goal 2: Pt will transfer sit <-> stand and bed>chair using appropriate AD/lift equipment with maxA x 2 -- 10/16: DNT  Short term goal 3: By 10/17/20: Pt will tolerate 12-15 reps BLE exercise for ROM/strengthening, balance, and endurance -- 10/16: GOAL MET x 15 reps supine BLE exercises  Short term goal 4: Assess amb when pt demonstrates adequate strength and set appropriate goal 10/16: DNT  Patient Goals   Patient goals : \"To get stronger\"    Plan    Plan  Times per week: 3-5x/week in acute care  Times per day: Daily  Specific instructions for Next Treatment: Progress ther ex and mobility as tolerated  Current Treatment Recommendations: Strengthening, Balance Training, Endurance Training, Functional Mobility Training, Transfer Training, Gait Training, ROM, Safety Education & Training, Patient/Caregiver Education & Training, Equipment Evaluation, Education, & procurement, Positioning, Neuromuscular Re-education  Safety Devices  Type of devices: All fall risk precautions in place, Call light within reach, Patient at risk for falls, Left in bed, Bed alarm in place, Nurse notified     Therapy Time   Individual Concurrent Group Co-treatment   Time In 1827         Time Out 1851         Minutes 24         Timed Code Treatment Minutes: 24 Minutes     If pt is unable to be seen after this session, please let this note serve as discharge summary. Please see case management note for discharge disposition. Thank you.     Vanessa Roy, PT, DPT

## 2020-10-16 NOTE — PLAN OF CARE
Problem: Falls - Risk of:  Goal: Will remain free from falls  Description: Will remain free from falls  10/16/2020 1037 by Veronica Jaeger RN  Outcome: Ongoing  10/15/2020 2130 by Arin Jewell RN  Outcome: Ongoing  Goal: Absence of physical injury  Description: Absence of physical injury  10/16/2020 1037 by Veronica Jaeger RN  Outcome: Ongoing  10/15/2020 2130 by Arin Jewell RN  Outcome: Ongoing     Problem: Skin Integrity:  Goal: Will show no infection signs and symptoms  Description: Will show no infection signs and symptoms  Outcome: Ongoing  Note: Q2 turns, sacral heart dressing in place. Venelex ointment applied to DTi x3 on buttocks. Powder applied to groin & abdominal folds. Legs and heels elevated off bed. Will continue to monitor. Goal: Absence of new skin breakdown  Description: Absence of new skin breakdown  Outcome: Ongoing     Problem: Activity:  Goal: Risk for activity intolerance will decrease  Description: Risk for activity intolerance will decrease  Outcome: Ongoing     Problem: Fluid Volume:  Goal: Maintenance of adequate hydration will improve  Description: Maintenance of adequate hydration will improve  Outcome: Ongoing  Note: Encouraging fluids. Patient with minimal appetite.       Problem: Sensory:  Goal: Ability to identify factors that increase the pain will improve  Description: Ability to identify factors that increase the pain will improve  Outcome: Ongoing     Problem: Pain:  Goal: Control of acute pain  Description: Control of acute pain  Outcome: Ongoing

## 2020-10-16 NOTE — CARE COORDINATION
Writer spoke to Dr Isaura Saunders during rounds. [facility] SNF is appropriate discharge plan for patient. Spoke to patient and wife at bedside. They are hesitant to give facility choice but after much conversation they asked that we make a referral to [facility]. Previously CM had made a referral to [facility] to work on getting a HD slot however may not be needed. Nephrology following and managing this. [facility] to follow up on placement. Spoke to Chelo Navas. They are still working on placement    Referral given to [facility]. Notified Susan Zaragoza with admissions.       Rainelle Boast, RN

## 2020-10-16 NOTE — PROGRESS NOTES
Progress Note  Date:10/16/2020       Room:0234/0234-01  Patient Elvi Huddleston Sr. YOB: 1950     Age:69 y.o. Subjective    Subjective:  Symptoms:  Stable. Pain:  He reports no pain. Review of Systems  Objective         Vitals Last 24 Hours:  TEMPERATURE:  Temp  Av.6 °F (37.6 °C)  Min: 98.5 °F (36.9 °C)  Max: 100.3 °F (37.9 °C)  RESPIRATIONS RANGE: Resp  Av.4  Min: 20  Max: 33  PULSE OXIMETRY RANGE: SpO2  Av.7 %  Min: 84 %  Max: 97 %  PULSE RANGE: Pulse  Av.3  Min: 77  Max: 100  BLOOD PRESSURE RANGE: Systolic (23HSI), YDU:815 , Min:75 , KHY:972   ; Diastolic (54ZTF), ZCA:48, Min:28, Max:117    I/O (24Hr): Intake/Output Summary (Last 24 hours) at 10/16/2020 0940  Last data filed at 10/16/2020 0845  Gross per 24 hour   Intake 239 ml   Output 2614 ml   Net -2375 ml     Objective:  General Appearance: In no acute distress and not in pain. Vital signs: (most recent): Blood pressure 129/69, pulse 94, temperature 99.5 °F (37.5 °C), temperature source Bladder, resp. rate 22, height 5' 4\" (1.626 m), weight (!) 341 lb 3.2 oz (154.8 kg), SpO2 95 %. Abdomen: Abdomen is soft. Bowel sounds are normal.   There is no abdominal tenderness. Labs/Imaging/Diagnostics    Labs:  CBC:  Recent Labs     10/14/20  0603 10/15/20  0411 10/16/20  0459   WBC 15.2* 18.5* 15.7*   RBC 2.35* 2.76* 2.62*   HGB 6.9* 8.0* 7.7*   HCT 21.3* 24.8* 23.4*   MCV 90.6 89.8 89.4   RDW 14.9 15.3 15.4    156 162     CHEMISTRIES:  Recent Labs     10/14/20  0603  10/15/20  0411 10/15/20  1144 10/16/20  0459      < > 135* 135* 136   K 4.4   < > 4.3 4.3 4.4      < > 101 102 102   CO2 28   < > 27 27 27   BUN 8   < > 8 8 13   CREATININE 0.9   < > 0.9 0.9 1.6*   GLUCOSE 129*   < > 125* 124* 113*   PHOS  --    < > 2.0* 1.3* 1.7*   MG 2.20  --  2.20  --  2.20    < > = values in this interval not displayed.      PT/INR:  Recent Labs     10/15/20  1144 10/16/20  0459   PROTIME 36.6* 16.2*   INR 3.12* 1.39*     APTT:No results for input(s): APTT in the last 72 hours. LIVER PROFILE:  Recent Labs     10/14/20  0603 10/15/20  0411   AST 10* 12*   ALT 20 18   BILITOT 0.8 1.0   ALKPHOS 77 84       Imaging Last 24 Hours:  No results found. Assessment//Plan           Hospital Problems           Last Modified POA    * (Principal) Retroperitoneal bleed 10/10/2020 Yes    Morbid obesity with BMI of 60.0-69.9, adult ([facility] (Chronic) 10/9/2020 Yes    Benign hypertension (Chronic) 10/9/2020 Yes    History of DVT (deep vein thrombosis) (Chronic) 10/9/2020 Yes    Suspected sleep apnea 10/12/2020 Yes    Supratherapeutic INR 10/9/2020 Yes    Shock ([facility] [address] 10/9/2020 Yes    Non-ischemic cardiomyopathy ([facility] [address] EF 40% 10/9/2020 Yes    Bacteremia due to Escherichia coli 10/12/2020 Yes    Bandemia 10/12/2020 Yes    Pyuria 10/12/2020 Yes    Anemia due to blood loss, acute 10/12/2020 Yes    Atelectasis 10/12/2020 Yes        Assessment & Plan   68 yo w obesity, HTN, Fe def anemia, h/o DU bleed in 8/2019 at [facility] (repeat EGD by me in 9/2019 was neg.), kidney CA, DVT on Couamdin and recent choledocholithiasis s/p ERCP in July 2018, presents with supratherapeutic INR complicated by retroperitoneal bleeding and reported melena (resolved) that has now resolved.  INR of 9.2 and H/H 6/20.   EGD and colonoscopy in 4/2019 are neg. Except diverticulosis and polyp. Capsule Endoscopy 4/2019 was neg. So most likely blood loss is mostly due to retroperitoneal rather than GI bleeding. Nella Boom was placed.     Plan:   1. Supportive care and agree w Hematology to hold AC   2. Daily H/H  3. Consider EGD at some point. Especially if melena recurs  4. The ERCP in 2018 could explain his asymptomatic pneumobilia  5.  Will follow  Carnell Sever, MD       Lodema Abt) [phone]    Electronically signed by Terrence Aragon MD on 10/16/20 at 9:40 AM EDT

## 2020-10-16 NOTE — PLAN OF CARE
Problem: Falls - Risk of:  Goal: Will remain free from falls  Description: Will remain free from falls  10/16/2020 1835 by Dorlene Pallas, RN  Outcome: Ongoing  Note: Pt will remain free from falls throughout hospital stay. Fall precautions in place, bed alarm on, bed in lowest position with wheels locked and side rails 2/4 up. Room door open and hourly rounding completed. Will continue to monitor throughout shift.        Problem: Skin Integrity:  Goal: Will show no infection signs and symptoms  Description: Will show no infection signs and symptoms  10/16/2020 1835 by Dorlene Pallas, RN  Outcome: Ongoing

## 2020-10-16 NOTE — PROGRESS NOTES
[facility]       Progress Note           Subjective/   71y.o. year old male who we are seeing in consultation for KEVIN requiring CRRT.     HPI:  Off CRRT sine 10/15, renal function indeterminate, urine output of 300 ml over 24 hours. Hypotensive, off pressors now. ROS:  Denies any sob, oxygen being weaned. No fevers. Objective/   GEN:  Chronically ill, /66   Pulse 99   Temp 99.4 °F (37.4 °C) (Bladder)   Resp 24   Ht 5' 4\" (1.626 m)   Wt (!) 341 lb 3.2 oz (154.8 kg)   SpO2 95%   BMI 58.57 kg/m²   HEENT: non-icteric, no JVD  CV: S1, S2 without m/r/g; + LE edema  RESP: CTA B without w/r/r; breathing wnl  ABD: +bs, soft, nt, no hsm, +hernia  SKIN: warm, no rashes    Data/  Recent Labs     10/14/20  0603 10/15/20  0411 10/16/20  0459   WBC 15.2* 18.5* 15.7*   HGB 6.9* 8.0* 7.7*   HCT 21.3* 24.8* 23.4*   MCV 90.6 89.8 89.4    156 162     Recent Labs     10/14/20  0603  10/15/20  0411 10/15/20  1144 10/16/20  0459      < > 135* 135* 136   K 4.4   < > 4.3 4.3 4.4      < > 101 102 102   CO2 28   < > 27 27 27   GLUCOSE 129*   < > 125* 124* 113*   PHOS  --    < > 2.0* 1.3* 1.7*   MG 2.20  --  2.20  --  2.20   BUN 8   < > 8 8 13   CREATININE 0.9   < > 0.9 0.9 1.6*   LABGLOM >60   < > >60 >60 43*   GFRAA >60   < > >60 >60 52*    < > = values in this interval not displayed. Assessment/Plan     Acute Kidney Injury.  - Likely ATN from profound hypotension, sepsis. - CRRT started on 10/10/20, off since 10/15. [facility] tem HD cath as access. - No acute HD needs today, monitoring daily. Left retroperitoneal hematoma  - H/o open R partial nephrectomy  - High risk for page kidney   - Urology following.     Shock.  - Hemorrhagic and septic. E. Coli in the blood and urine. Antibiotics per ID.  - Supra therapeutic INR on admission. H/o DVT, now off coumadin. S/P IVC filter placement on 10/13/20.  - Continue midodrine 10mg 3x/day, off levophed.     Anemia.   - PRN blood

## 2020-10-16 NOTE — PROGRESS NOTES
Patient assessment complete and charted. VSS. MAP trending >65. Levo gtt titrated off. AOx4. Patient complaining of left sided pain 8/10 with movement. PRN pain medication given. Bed locked and in lowest position. Non-skid socks in place. Call light within reach. Bed alarm on. Patient states no further needs at this time. Will continue to monitor.

## 2020-10-16 NOTE — PLAN OF CARE
Fall risk assessment complete, fall precautions in place. Fall visuals posted, bed alarm on, bed in lowest position with wheels locked. Patient has been free of falls this shift, will continue to monitor. Was the patient seen in the last year in this department? Yes     Does patient have an active prescription for medications requested? No     Received Request Via: Pharmacy     Per  last fill:   Xanax: 5-3-17 # 20  Ultram: 4-26-17 # 90

## 2020-10-16 NOTE — PROGRESS NOTES
Hospitalist Progress Note      PCP: Lety Wilcox MD    Date of Admission: 10/9/2020    Chief Complaint: Fatigue, dizziness    Hospital Course:  \"Patient with PMH of nonischemic cardiomyopathy EF 40 to 45%, HTN, history of DVT on Coumadin, GI bleed in April 2019, presented to the ED today with complaints of fatigue and dizziness. Patient is pretty much bedbound at home. Wife reports when patient stood up he got lightheaded, and fell back in bed. Also reports patient has not been eating or drinking much. Wife also reports patient stool has been black this past week. Patient is on Coumadin for past history of DVT/PE. She contacted patient's PCP about this, who ordered stool sample for blood, they had trouble getting the test kit until the seventh. Today she called patient's PCP about the patient being very weak, unable to get out of bed, and they were advised to come to the ED. patient also reports diffuse dull aching abdominal pain that is intermittent. \"       Subjective:  He continues to look weak and tired. Dozing off during our conversation. Not eating much per wife. Bandemia and fevers are concerning, continue to reassess. On levofloxacin. Off pressor and CRRT.       Medications:  Reviewed    Infusion Medications    norepinephrine Stopped (10/16/20 0900)     Scheduled Medications    midodrine  10 mg Oral TID WC    [START ON 10/17/2020] levofloxacin  500 mg Intravenous Q48H    mupirocin   Nasal BID    Venelex   Topical BID    miconazole   Topical BID    folic acid  1 mg Oral Daily    sodium chloride flush  10 mL Intravenous 2 times per day    pantoprazole  40 mg Intravenous BID     PRN Meds: oxyCODONE, sodium chloride flush, acetaminophen **OR** acetaminophen, polyethylene glycol, promethazine **OR** ondansetron, perflutren lipid microspheres      Intake/Output Summary (Last 24 hours) at 10/16/2020 1022  Last data filed at 10/16/2020 0845  Gross per 24 hour   Intake 239 ml Output 2614 ml   Net -2375 ml       Physical Exam Performed:    BP (!) 106/57   Pulse 98   Temp 99.5 °F (37.5 °C) (Bladder)   Resp 19   Ht 5' 4\" (1.626 m)   Wt (!) 341 lb 3.2 oz (154.8 kg)   SpO2 94%   BMI 58.57 kg/m²       General appearance: Pleasant male in no apparent distress, appears stated age and cooperative. HEENT: Pupils equal, round, and reactive to light. Conjunctivae/corneas clear. Neck: Supple, with full range of motion. No jugular venous distention. Trachea midline. Respiratory:  Normal respiratory effort. Clear to auscultation, bilaterally without Rales/Wheezes/Rhonchi. Cardiovascular: Regular rate and rhythm with normal S1/S2 without murmurs, rubs or gallops. Abdomen: Soft, non-tender, non-distended with normal bowel sounds. Large ventral hernia noted. Musculoskeletal: No clubbing, cyanosis or edema bilaterally. Full range of motion without deformity. Skin: Skin color with PVD changes BLE. No rashes or lesions. Neurologic:  Neurovascularly intact without any focal sensory/motor deficits. Cranial nerves: II-XII intact, grossly non-focal.  Psychiatric: Alert and oriented, thought content appropriate, normal insight. Capillary Refill: Brisk,< 3 seconds   Peripheral Pulses: +2 palpable, equal bilaterally       Labs:   Recent Labs     10/14/20  0603 10/15/20  0411 10/16/20  0459   WBC 15.2* 18.5* 15.7*   HGB 6.9* 8.0* 7.7*   HCT 21.3* 24.8* 23.4*    156 162     Recent Labs     10/15/20  0411 10/15/20  1144 10/16/20  0459   * 135* 136   K 4.3 4.3 4.4    102 102   CO2 27 27 27   BUN 8 8 13   CREATININE 0.9 0.9 1.6*   CALCIUM 7.4* 7.3* 7.7*   PHOS 2.0* 1.3* 1.7*     Recent Labs     10/14/20  0603 10/15/20  0411   AST 10* 12*   ALT 20 18   BILITOT 0.8 1.0   ALKPHOS 77 84     Recent Labs     10/15/20  1144 10/16/20  0459   INR 3.12* 1.39*     No results for input(s): CKTOTAL, TROPONINI in the last 72 hours.     Urinalysis:      Lab Results   Component Value Date Benign hypertension [I10]    History of DVT (deep vein thrombosis) [Z86.718]    Morbid obesity with BMI of 60.0-69.9, adult (HCC) [E66.01, Z68.44]       L perinephric hemorrhage  Likely 2/2 supratherapeutic INR of 9.24, also possible underlying renal mass  -Discussed with Dr. Gwyn Serra and Dr. Marco Antonio Zapata - suspect bleed could be from left renal lesion. CT of abd read as though patient had a left nephrectomy, but this in NOT accurate. He still has his left kidney. Per urology: \"possibly bleed from left renal tumor but had imaging a year ago only showing a stable 2 cm renal cortical lesion. Will ideally need repeat imaging with contrast when creatinine stable in a few weeks. \"  Also, \"Down the line, may require a left radical nephrectomy. \"  - Reversed Coumadin with K Centra, vitamin K. Again given vit K 10/15. Hematology recommended checking the INR every Monday and Friday and giving vit K if >2.  - with ABLA present on admission. Received 1 unit pRBC on 10/9, 10/11, 10/12, and 10/14.  - vascular surgery was considering embolization     Shock   Combination of hemorrhagic due to above issue and GI bleed, also suspect sepsis due to UTI present on arrival  - Given IV fluid boluses without adequate response, required pressor support, weaned off 10/15.  - held anithypertensives     H/o DVT  - Vascular surgery consulted, placed IVC filter on 10/13. This the patient's 3rd life threatening bleed. Last in 4/2019 - at that time had a GIB, which required 9 units of pRBC. - In 4/2019, hematology was consulted regarding possible cessation of warfarin, but given patient's morbid obesity and immobility, he was determined to be too high risk for recurrent DVT, so continued AC was recommended. Unable to switch to DOAC, as they have not been adequately studied in morbidly obese patients. Patient follows with Dr. Mavis Milner as outpatient, appreciate hematology consult.     E. Coli Bacteremia - secondary to acute cystitis.   - treated with IV cefepime, then changed to levofloxacin on 10/14, plan to complete course 10/20. ID input appreciated, they signed off. Suspected upper GI bleed   Worsened due to supratherapeutic INR  -Hb dropped from 12 to 8.5 with fluids revealing true degree of anemia  -Hold Coumadin and antiplatelet agents  -Consulted GI, who deferred EGD at this time due to patient's instability. They will reconsider if he has melena here, which he did not. -PPI     KEVIN  -Baseline Cr 1.0. Likely ATN due to hypotension and hypovolemia  - nephrology consulted, started on CRRT 10/9, stopped CRRT 10/15.  - he does have h/o partial RIGHT nephrectomy in 2015 due to renal cancer. Non-ischemic cardiomyopathy  - TTE showed EF 50%, with RWMA's. Will need ACE/ARB/ARNI when he is more stable, also BB.     Morbid obesity with BMI of 57.2-57.0, adult   Complicating assessment and treatment, placing patient at high risk for multiple co-morbidities as well as early death and contributing to the patient's presentation. Counseled on weight loss.       DVT Prophylaxis: SCDs  Diet: Dietary Nutrition Supplements: Frozen Oral Supplement  DIET RENAL; Daily Fluid Restriction: 2000 ml  Code Status: Full Code    PT/OT Eval Status:  rec'd SNF    Dispo - unclear - perhaps 10/21. He came from home. Will probably have new HD requirement.       Annia Miles MD

## 2020-10-17 LAB
ACANTHOCYTES: ABNORMAL
ALBUMIN SERPL-MCNC: 2.3 G/DL (ref 3.4–5)
ALP BLD-CCNC: 74 U/L (ref 40–129)
ALT SERPL-CCNC: 12 U/L (ref 10–40)
ANION GAP SERPL CALCULATED.3IONS-SCNC: 7 MMOL/L (ref 3–16)
ANISOCYTOSIS: ABNORMAL
AST SERPL-CCNC: 8 U/L (ref 15–37)
BANDED NEUTROPHILS RELATIVE PERCENT: 16 % (ref 0–7)
BASOPHILIC STIPPLING: ABNORMAL
BASOPHILS ABSOLUTE: 0 K/UL (ref 0–0.2)
BASOPHILS RELATIVE PERCENT: 0 %
BILIRUB SERPL-MCNC: 0.9 MG/DL (ref 0–1)
BILIRUBIN DIRECT: 0.3 MG/DL (ref 0–0.3)
BILIRUBIN, INDIRECT: 0.6 MG/DL (ref 0–1)
BUN BLDV-MCNC: 22 MG/DL (ref 7–20)
CALCIUM SERPL-MCNC: 7.7 MG/DL (ref 8.3–10.6)
CHLORIDE BLD-SCNC: 103 MMOL/L (ref 99–110)
CO2: 26 MMOL/L (ref 21–32)
CREAT SERPL-MCNC: 2.2 MG/DL (ref 0.8–1.3)
EOSINOPHILS ABSOLUTE: 0.2 K/UL (ref 0–0.6)
EOSINOPHILS RELATIVE PERCENT: 2 %
GFR AFRICAN AMERICAN: 36
GFR NON-AFRICAN AMERICAN: 30
GLUCOSE BLD-MCNC: 102 MG/DL (ref 70–99)
HCT VFR BLD CALC: 22.5 % (ref 40.5–52.5)
HEMOGLOBIN: 7.3 G/DL (ref 13.5–17.5)
LYMPHOCYTES ABSOLUTE: 0.7 K/UL (ref 1–5.1)
LYMPHOCYTES RELATIVE PERCENT: 6 %
MACROCYTES: ABNORMAL
MAGNESIUM: 1.9 MG/DL (ref 1.8–2.4)
MCH RBC QN AUTO: 29.2 PG (ref 26–34)
MCHC RBC AUTO-ENTMCNC: 32.4 G/DL (ref 31–36)
MCV RBC AUTO: 90.1 FL (ref 80–100)
METAMYELOCYTES RELATIVE PERCENT: 3 %
MONOCYTES ABSOLUTE: 1 K/UL (ref 0–1.3)
MONOCYTES RELATIVE PERCENT: 9 %
NEUTROPHILS ABSOLUTE: 9.2 K/UL (ref 1.7–7.7)
NEUTROPHILS RELATIVE PERCENT: 64 %
OVALOCYTES: ABNORMAL
PDW BLD-RTO: 15.7 % (ref 12.4–15.4)
PHOSPHORUS: 3 MG/DL (ref 2.5–4.9)
PLATELET # BLD: 149 K/UL (ref 135–450)
PLATELET SLIDE REVIEW: ADEQUATE
PMV BLD AUTO: 7.8 FL (ref 5–10.5)
POLYCHROMASIA: ABNORMAL
POTASSIUM SERPL-SCNC: 4.7 MMOL/L (ref 3.5–5.1)
RBC # BLD: 2.49 M/UL (ref 4.2–5.9)
SODIUM BLD-SCNC: 136 MMOL/L (ref 136–145)
STOMATOCYTES: ABNORMAL
TOTAL PROTEIN: 5.2 G/DL (ref 6.4–8.2)
TOXIC GRANULATION: PRESENT
WBC # BLD: 11.1 K/UL (ref 4–11)

## 2020-10-17 PROCEDURE — 2700000000 HC OXYGEN THERAPY PER DAY

## 2020-10-17 PROCEDURE — 6360000002 HC RX W HCPCS: Performed by: INTERNAL MEDICINE

## 2020-10-17 PROCEDURE — 36592 COLLECT BLOOD FROM PICC: CPT

## 2020-10-17 PROCEDURE — 83735 ASSAY OF MAGNESIUM: CPT

## 2020-10-17 PROCEDURE — 6370000000 HC RX 637 (ALT 250 FOR IP): Performed by: INTERNAL MEDICINE

## 2020-10-17 PROCEDURE — C9113 INJ PANTOPRAZOLE SODIUM, VIA: HCPCS | Performed by: INTERNAL MEDICINE

## 2020-10-17 PROCEDURE — 2580000003 HC RX 258: Performed by: INTERNAL MEDICINE

## 2020-10-17 PROCEDURE — 1200000000 HC SEMI PRIVATE

## 2020-10-17 PROCEDURE — 80069 RENAL FUNCTION PANEL: CPT

## 2020-10-17 PROCEDURE — 85025 COMPLETE CBC W/AUTO DIFF WBC: CPT

## 2020-10-17 PROCEDURE — 80076 HEPATIC FUNCTION PANEL: CPT

## 2020-10-17 PROCEDURE — 94761 N-INVAS EAR/PLS OXIMETRY MLT: CPT

## 2020-10-17 RX ADMIN — CASTOR OIL AND BALSAM, PERU: 788; 87 OINTMENT TOPICAL at 10:32

## 2020-10-17 RX ADMIN — Medication: at 17:41

## 2020-10-17 RX ADMIN — SODIUM CHLORIDE, PRESERVATIVE FREE 10 ML: 5 INJECTION INTRAVENOUS at 10:32

## 2020-10-17 RX ADMIN — MIDODRINE HYDROCHLORIDE 10 MG: 5 TABLET ORAL at 10:31

## 2020-10-17 RX ADMIN — SODIUM CHLORIDE, PRESERVATIVE FREE 10 ML: 5 INJECTION INTRAVENOUS at 19:52

## 2020-10-17 RX ADMIN — MICONAZOLE NITRATE: 20 POWDER TOPICAL at 10:32

## 2020-10-17 RX ADMIN — LEVOFLOXACIN 500 MG: 5 INJECTION, SOLUTION INTRAVENOUS at 10:32

## 2020-10-17 RX ADMIN — OXYCODONE 5 MG: 5 TABLET ORAL at 21:19

## 2020-10-17 RX ADMIN — Medication: at 19:57

## 2020-10-17 RX ADMIN — MICONAZOLE NITRATE: 20 POWDER TOPICAL at 19:56

## 2020-10-17 RX ADMIN — MIDODRINE HYDROCHLORIDE 10 MG: 5 TABLET ORAL at 13:09

## 2020-10-17 RX ADMIN — CASTOR OIL AND BALSAM, PERU: 788; 87 OINTMENT TOPICAL at 19:56

## 2020-10-17 RX ADMIN — PANTOPRAZOLE SODIUM 40 MG: 40 INJECTION, POWDER, FOR SOLUTION INTRAVENOUS at 10:32

## 2020-10-17 RX ADMIN — MIDODRINE HYDROCHLORIDE 10 MG: 5 TABLET ORAL at 17:40

## 2020-10-17 RX ADMIN — PANTOPRAZOLE SODIUM 40 MG: 40 INJECTION, POWDER, FOR SOLUTION INTRAVENOUS at 19:52

## 2020-10-17 RX ADMIN — FOLIC ACID 1 MG: 1 TABLET ORAL at 10:31

## 2020-10-17 ASSESSMENT — PAIN SCALES - GENERAL
PAINLEVEL_OUTOF10: 8
PAINLEVEL_OUTOF10: 0

## 2020-10-17 NOTE — PROGRESS NOTES
Hospitalist Progress Note      PCP: Cynthia Olmstead MD    Date of Admission: 10/9/2020    Chief Complaint: Fatigue, dizziness    Hospital Course:  \"Patient with PMH of nonischemic cardiomyopathy EF 40 to 45%, HTN, history of DVT on Coumadin, GI bleed in April 2019, presented to the ED today with complaints of fatigue and dizziness. Patient is pretty much bedbound at home. Wife reports when patient stood up he got lightheaded, and fell back in bed. Also reports patient has not been eating or drinking much. Wife also reports patient stool has been black this past week. Patient is on Coumadin for past history of DVT/PE. She contacted patient's PCP about this, who ordered stool sample for blood, they had trouble getting the test kit until the seventh. Today she called patient's PCP about the patient being very weak, unable to get out of bed, and they were advised to come to the ED. patient also reports diffuse dull aching abdominal pain that is intermittent. \"       Subjective:  Remains off pressor and CRRT. Only 500 ml UOP, and Cr rising. Despite our best efforts the patient's fragile skin is breaking down.        Medications:  Reviewed    Infusion Medications    norepinephrine Stopped (10/16/20 0900)     Scheduled Medications    zinc oxide   Topical BID    midodrine  10 mg Oral TID WC    levofloxacin  500 mg Intravenous Q48H    mupirocin   Nasal BID    Venelex   Topical BID    miconazole   Topical BID    folic acid  1 mg Oral Daily    sodium chloride flush  10 mL Intravenous 2 times per day    pantoprazole  40 mg Intravenous BID     PRN Meds: oxyCODONE, sodium chloride flush, acetaminophen **OR** acetaminophen, polyethylene glycol, promethazine **OR** ondansetron, perflutren lipid microspheres      Intake/Output Summary (Last 24 hours) at 10/17/2020 1219  Last data filed at 10/17/2020 1052  Gross per 24 hour   Intake 895 ml   Output 350 ml   Net 545 ml       Physical Exam Performed:    BP 118/73   Pulse 94   Temp 98.4 °F (36.9 °C) (Oral)   Resp 17   Ht 5' 4\" (1.626 m)   Wt (!) 339 lb 12.8 oz (154.1 kg)   SpO2 96%   BMI 58.33 kg/m²       General appearance: Pleasant male in no apparent distress, appears stated age and cooperative. HEENT: Pupils equal, round, and reactive to light. Conjunctivae/corneas clear. Hard of hearing. Neck: Supple, with full range of motion. No jugular venous distention. Trachea midline. Respiratory:  Normal respiratory effort. Clear to auscultation, bilaterally without Rales/Wheezes/Rhonchi. Cardiovascular: Regular rate and rhythm with normal S1/S2 without murmurs, rubs or gallops. Abdomen: Soft, non-tender, non-distended with normal bowel sounds. Large ventral hernia noted. Musculoskeletal: No clubbing, cyanosis or edema bilaterally. Full range of motion without deformity. Skin: Skin color with PVD changes BLE. Scattered areas of skin breakdown. Neurologic:  Neurovascularly intact without any focal sensory/motor deficits. Cranial nerves: II-XII intact, grossly non-focal.  Psychiatric: Alert and oriented, thought content appropriate, normal insight. Capillary Refill: Brisk,< 3 seconds   Peripheral Pulses: +2 palpable, equal bilaterally       Labs:   Recent Labs     10/15/20  0411 10/16/20  0459 10/17/20  0440   WBC 18.5* 15.7* 11.1*   HGB 8.0* 7.7* 7.3*   HCT 24.8* 23.4* 22.5*    162 149     Recent Labs     10/15/20  1144 10/16/20  0459 10/17/20  0440   * 136 136   K 4.3 4.4 4.7    102 103   CO2 27 27 26   BUN 8 13 22*   CREATININE 0.9 1.6* 2.2*   CALCIUM 7.3* 7.7* 7.7*   PHOS 1.3* 1.7* 3.0     Recent Labs     10/15/20  0411 10/17/20  0440   AST 12* 8*   ALT 18 12   BILIDIR  --  0.3   BILITOT 1.0 0.9   ALKPHOS 84 74     Recent Labs     10/15/20  1144 10/16/20  0459   INR 3.12* 1.39*     No results for input(s): CKTOTAL, TROPONINI in the last 72 hours.     Urinalysis:      Lab Results   Component Value Date    NITRU Negative 10/09/2020    WBCUA 21-50 10/09/2020    BACTERIA 2+ 10/09/2020    RBCUA 11-20 10/09/2020    BLOODU LARGE 10/09/2020    SPECGRAV 1.010 10/09/2020    GLUCOSEU Negative 10/09/2020       Radiology:  XR CHEST PORTABLE   Final Result   No evidence of pneumothorax following left IJ catheter placement. Improved   aeration in the left lung base. XR CHEST PORTABLE   Final Result   No acute process. CT ABDOMEN PELVIS WO CONTRAST Additional Contrast? None   Final Result   Addendum 1 of 1   ADDENDUM:   Additional information, corrected report:      HISTORY:   The initial history was incorrect, patient did not have left side    nephrectomy   but partial nephrectomy on the right. Therefore the hematoma seen retroperitoneal on the left in the renal fossa   likely obscures the entire left kidney. There is no normal renal    parenchyma   identified. The measured area, 13 x 9 cm therefore presumably include   spontaneous hematoma and kidney tissue combined. This would also    therefore   indicate a lesser degree of hemorrhage present, since some of the measured   area being occupied by the obscured kidney. Again the primary etiology is spontaneous hemorrhage, given the patient's   grossly elevated INR, and likely arising within the obscured left kidney    and   extending left pararenal retroperitoneal space. Final      CT HEAD WO CONTRAST   Final Result   1. No acute intracranial abnormality. XR CHEST PORTABLE   Final Result   No acute cardiopulmonary disease.                  Assessment/Plan:    Active Hospital Problems    Diagnosis    Bacteremia due to Escherichia coli [R78.81, B96.20]    Bandemia [D72.825]    Pyuria [R82.81]    Anemia due to blood loss, acute [D62]    Atelectasis [J98.11]    Retroperitoneal bleed [R58]    Supratherapeutic INR [R79.1]    Shock ([address] [R57.9]    Non-ischemic cardiomyopathy (HCC) EF 40% [I42.8]    Suspected sleep apnea [R29.818]    Benign

## 2020-10-17 NOTE — PROGRESS NOTES
PROGRESS NOTE  S:69 yrs Patient  admitted on 10/9/2020 with Retroperitoneal hemorrhage [R58] . Today he feels well. Denies further bleeding. Exam:   Vitals:    10/17/20 1034   BP:    Pulse:    Resp:    Temp:    SpO2: 96%      General appearance: alert, appears stated age and cooperative  HEENT: Neck supple with midline trachea  Neck: no adenopathy and supple, symmetrical, trachea midline  Lungs: clear to auscultation bilaterally  Heart: regular rate and rhythm, S1, S2 normal, no murmur, click, rub or gallop  Abdomen: soft, non-tender; bowel sounds normal; no masses,  no organomegaly  Extremities: extremities normal, atraumatic, no cyanosis or edema     Medications: Reviewed    Labs:  CBC:   Recent Labs     10/15/20  0411 10/16/20  0459 10/17/20  0440   WBC 18.5* 15.7* 11.1*   HGB 8.0* 7.7* 7.3*   HCT 24.8* 23.4* 22.5*   MCV 89.8 89.4 90.1    162 149     BMP:   Recent Labs     10/15/20  1144 10/16/20  0459 10/17/20  0440   * 136 136   K 4.3 4.4 4.7    102 103   CO2 27 27 26   PHOS 1.3* 1.7* 3.0   BUN 8 13 22*   CREATININE 0.9 1.6* 2.2*     LIVER PROFILE:   Recent Labs     10/15/20  0411 10/17/20  0440   AST 12* 8*   ALT 18 12   PROT 5.4* 5.2*   BILIDIR  --  0.3   BILITOT 1.0 0.9   ALKPHOS 84 74     PT/INR:   Recent Labs     10/15/20  1144 10/16/20  0459   INR 3.12* 1.39*       Impression:71year old male with history of HTN, DVT, CHF, kidney cancer s/p R partial nephrectomy c/b L RP hematoma    Recommendation:  1. Continue supportive care  2. Monitor Hgb  3. Observe for signs of bleeding  4. Hold anticoagulation  5. Continue PPI  6.  No plans for endoscopy at this time      Oswaldo Espinoza MD  11:43 AM 10/17/2020

## 2020-10-17 NOTE — PLAN OF CARE
Problem: Falls - Risk of:  Goal: Will remain free from falls  Description: Will remain free from falls  10/17/2020 0624 by Shira Clay RN  Outcome: Met This Shift     Problem: OXYGENATION/RESPIRATORY FUNCTION  Goal: Patient will maintain patent airway  Outcome: Met This Shift     Problem: OXYGENATION/RESPIRATORY FUNCTION  Goal: Patient will achieve/maintain normal respiratory rate/effort  Description: Respiratory rate and effort will be within normal limits for the patient  Outcome: Met This Shift     Problem: Skin Integrity:  Goal: Will show no infection signs and symptoms  Description: Will show no infection signs and symptoms  10/17/2020 0624 by Shira Clay RN  Outcome: Ongoing     Problem: HEMODYNAMIC STATUS  Goal: Patient has stable vital signs and fluid balance  Outcome: Ongoing     Problem: FLUID AND ELECTROLYTE IMBALANCE  Goal: Fluid and electrolyte balance are achieved/maintained  Outcome: Ongoing     Problem: ACTIVITY INTOLERANCE/IMPAIRED MOBILITY  Goal: Mobility/activity is maintained at optimum level for patient  Outcome: Ongoing     Patient's EF (Ejection Fraction) is greater than 40%    Heart Failure Medications:  Diuretics[de-identified] None    (One of the following REQUIRED for EF <40%/SYSTOLIC FAILURE but MAY be used in EF% >40%/DIASTOLIC FAILURE)        ACE[de-identified] None        ARB[de-identified] None         ARNI[de-identified] None    (Beta Blockers)  NON- Evidenced Based Beta Blocker (for EF% >40%/DIASTOLIC FAILURE): None    Evidenced Based Beta Blocker::(REQUIRED for EF% <40%/SYSTOLIC FAILURE) None  . .................................................................................................................................................. Patient's weights and intake/output reviewed: Yes    Patient's Last Weight: 339 lbs obtained by bed scale. Difference of 2 lbs less than last documented weight.       Intake/Output Summary (Last 24 hours) at 10/17/2020 0626  Last data filed at 10/17/2020 0454  Gross per 24 hour   Intake 645 ml   Output 510 ml   Net 135 ml       Comorbidities Reviewed Yes    Patient has a past medical history of Cancer ([facility] Cardiomyopathy ([address] CHF (congestive heart failure) ([facility] [address] Edema of both legs, GI bleed, Hx of blood clots, and Hypertension. >>For CHF and Comorbidity documentation on Education Time and Topics, please see Education Tab    Progressive Mobility Assessment:  What is this patient's Current Level of Mobility?: Requires Bed Rest  How was this patient Mobilized today?: Unable to Mobilize and Patient Refuses to Mobilize                 With Whom? Nurse, PCA, PT, OT, and Self                 Level of Difficulty/Assistance: 2x Assist     Pt resting in bed at this time on  1 L O2. Pt with complaints of shortness of breath. Pt with pitting lower extremity edema.      Patient and/or Family's stated Goal of Care this Admission: reduce shortness of breath, increase activity tolerance, better understand heart failure and disease management, be more comfortable, and reduce lower extremity edema prior to discharge        :

## 2020-10-17 NOTE — PROGRESS NOTES
Urology Attending Progress Note      Subjective: \" No pain today\"    71 y.o.male obese, alert male with hx of right RCC with partial nephrectomy, urinary retention, and with 2 cm renal cortical lesion a year ago. Consulted for left retroperitoneal bleed. Patient states feels ok today with no abd pain    Vitals:  /73   Pulse 94   Temp 98.4 °F (36.9 °C) (Oral)   Resp 17   Ht 5' 4\" (1.626 m)   Wt (!) 339 lb 12.8 oz (154.1 kg)   SpO2 96%   BMI 58.33 kg/m²   Temp  Av.2 °F (36.8 °C)  Min: 97.7 °F (36.5 °C)  Max: 99.4 °F (37.4 °C)    Intake/Output Summary (Last 24 hours) at 10/17/2020 1038  Last data filed at 10/17/2020 1031  Gross per 24 hour   Intake 655 ml   Output 410 ml   Net 245 ml       Exam:    Obese, male in no acute distress   Orientated to time and place   Neck is supple, trachea is midline   Respiratory effort is normal without distress   Skin warm and dry, exposed skin shows no abnormal lesions, rashes   Musculoskeletal no digital cyanosis, head normocephalic   Psych shows normal mood and affect, alert and appropriately answers questions   Clear yellow urine in devine catheter    Labs:  WBC:    Lab Results   Component Value Date    WBC 11.1 10/17/2020     Hemoglobin/Hematocrit:    Lab Results   Component Value Date    HGB 7.3 10/17/2020    HCT 22.5 10/17/2020     BMP:    Lab Results   Component Value Date     10/17/2020    K 4.7 10/17/2020    K 4.3 10/15/2020     10/17/2020    CO2 26 10/17/2020    BUN 22 10/17/2020    LABALBU 2.3 10/17/2020    CREATININE 2.2 10/17/2020    CALCIUM 7.7 10/17/2020    GFRAA 36 10/17/2020    LABGLOM 30 10/17/2020     PT/INR:    Lab Results   Component Value Date    PROTIME 16.2 10/16/2020    INR 1.39 10/16/2020     PTT:  No results found for: APTT[APTT      Impression/Plan:   69 y.o.male hx of right partial nephrectomy/RCC, with retroperitoneal bleed causing large left RP hematoma. Patient had INR of 9 at admission.  Pt had 2 cm renal cortical lesion seen about a year ago. Clear, dark urine today. His creatinine is uptrending to 2.2 today from 1.6. INR 1.39.  Hgb stable 7.3 from 7.7    BP low-normal - no HTN noted    - continue [place] for now   - no intervention currently inspite of rising CR = avoid nephrotoxins, observe - no HTN noted so does not seem to Page kidney currently   - defer to nephrology regarding need for RRT  - Imaging 1-2 months once CR improves to evaluate status of left kidney  - following with you       Celsaanthony Villela MD

## 2020-10-17 NOTE — PROGRESS NOTES
End of shift report given to Renee Looney RN. Patient alert and oriented. Bed in lowest position with wheels locked. Call light within reach.  No further needs at this time. [facility]

## 2020-10-17 NOTE — PLAN OF CARE
Problem: Falls - Risk of:  Goal: Will remain free from falls  Description: Will remain free from falls  10/17/2020 1433 by Jessee Mancuso RN  Outcome: Ongoing  Note: Pt high fall risk. Instructed to use call light before getting out of bed. Call light within reach. Bed in low position. Bed alarm on. Will continue to monitor. Problem: Skin Integrity:  Goal: Will show no infection signs and symptoms  Description: Will show no infection signs and symptoms  10/17/2020 1433 by Jessee Mancuso RN  Outcome: Ongoing  Note: Patient's skin assessed. See flowsheet. Patient turned in bed. Pressure ulcer prevention in place. No issues with skin integrity this shift. Will continue to monitor. Problem: Sensory:  Goal: Pain level will decrease  Description: Pain level will decrease  Outcome: Ongoing  Note: Patient's pain level controlled at this time. Will continue to monitor. Problem: Pain:  Goal: Pain level will decrease  Description: Pain level will decrease  Outcome: Ongoing  Note: Patient's pain level controlled at this time. Will continue to monitor. Problem: OXYGENATION/RESPIRATORY FUNCTION  Goal: Patient will maintain patent airway  10/17/2020 1433 by Jessee Mancuso RN  Outcome: Ongoing  Note: Patient on 1 L of oxygen. Oxygen saturation 93%. Breath sounds diminished. Denies any SOB, dyspnea, tachypnea or bradypnea. Will continue to monitor.         Problem: HEMODYNAMIC STATUS  Goal: Patient has stable vital signs and fluid balance  10/17/2020 1433 by Jessee Mancuso RN  Outcome: Ongoing  Note:   Patient's EF (Ejection Fraction) is greater than 40%    Heart Failure Medications:  Diuretics[de-identified] None    (One of the following REQUIRED for EF <40%/SYSTOLIC FAILURE but MAY be used in EF% >40%/DIASTOLIC FAILURE)        ACE[de-identified] None        ARB[de-identified] None         ARNI[de-identified] None    (Beta Blockers)  NON- Evidenced Based Beta Blocker (for EF% >40%/DIASTOLIC FAILURE): None    Evidenced Based Beta Blocker::(REQUIRED for EF% <40%/SYSTOLIC FAILURE) None  . .................................................................................................................................................. Patient's weights and intake/output reviewed: Yes    Patient's Last Weight: 339 lbs obtained by bed scale. Difference of 2 lbs less than last documented weight. Intake/Output Summary (Last 24 hours) at 10/17/2020 1431  Last data filed at 10/17/2020 1428  Gross per 24 hour   Intake 490 ml   Output 300 ml   Net 190 ml       Comorbidities Reviewed Yes    Patient has a past medical history of Cancer ([facility] [address] Cardiomyopathy ([facility] [address] CHF (congestive heart failure) ([facility] [address] Edema of both legs, GI bleed, Hx of blood clots, and Hypertension. >>For CHF and Comorbidity documentation on Education Time and Topics, please see Education Tab    Progressive Mobility Assessment:  What is this patient's Current Level of Mobility?: Wheelchair    Dependent  How was this patient Mobilized today?: Edge of Bed                 With Whom? Nurse, PCA, PT, and OT                 Level of Difficulty/Assistance: 2x Assist     Pt resting in bed at this time on  1 L O2. Pt denies shortness of breath. Pt with pitting lower extremity edema.      Patient and/or Family's stated Goal of Care this Admission: reduce shortness of breath, increase activity tolerance, better understand heart failure and disease management, be more comfortable, and reduce lower extremity edema prior to discharge        :

## 2020-10-17 NOTE — PROGRESS NOTES
[facility]       Progress Note           Subjective/   71y.o. year old male who we are seeing in consultation for KEVIN requiring CRRT.     HPI:  Off CRRT sine 10/15, renal function slowly worsening, urine output of 510 ml over 24 hours. Hypotensive, off pressors now. Intake better. ROS:  Denies any sob, oxygen being weaned. No fevers. Objective/   GEN:  Chronically ill, /61   Pulse 93   Temp 98.3 °F (36.8 °C) (Oral)   Resp 18   Ht 5' 4\" (1.626 m)   Wt (!) 339 lb 12.8 oz (154.1 kg)   SpO2 93%   BMI 58.33 kg/m²   HEENT: non-icteric, no JVD  CV: S1, S2 without m/r/g; + LE edema  RESP: CTA B without w/r/r; breathing wnl  ABD: +bs, soft, nt, no hsm, +hernia  SKIN: warm, no rashes    Data/  Recent Labs     10/15/20  0411 10/16/20  0459 10/17/20  0440   WBC 18.5* 15.7* 11.1*   HGB 8.0* 7.7* 7.3*   HCT 24.8* 23.4* 22.5*   MCV 89.8 89.4 90.1    162 149     Recent Labs     10/15/20  0411 10/15/20  1144 10/16/20  0459 10/17/20  0440   * 135* 136 136   K 4.3 4.3 4.4 4.7    102 102 103   CO2 27 27 27 26   GLUCOSE 125* 124* 113* 102*   PHOS 2.0* 1.3* 1.7* 3.0   MG 2.20  --  2.20 1.90   BUN 8 8 13 22*   CREATININE 0.9 0.9 1.6* 2.2*   LABGLOM >60 >60 43* 30*   GFRAA >60 >60 52* 36*       Assessment/Plan     Acute Kidney Injury.  - Likely ATN from profound hypotension, sepsis. - CRRT started on 10/10/20, off since 10/15. LIJ temp HD cath as access. - No acute HD needs today, monitoring daily. Left retroperitoneal hematoma  - H/o open R partial nephrectomy  - High risk for page kidney   - Urology following.     Shock.  - Hemorrhagic and septic. E. Coli in the blood and urine. Antibiotics per ID.  - Supra therapeutic INR on admission. H/o DVT, now off coumadin. S/P IVC filter placement on 10/13/20.  - Continue midodrine 10mg 3x/day, off levophed.     Anemia. - PRN blood transfusion per primary service.       Case d/w patient's wife

## 2020-10-17 NOTE — PROGRESS NOTES
ONCOLOGY HEMATOLOGY CARE PROGRESS NOTE      SUBJECTIVE:     The patient states he feels little better today. He denies nosebleeds. ROS:   The remaining 10 point review of symptoms is unremarkable. OBJECTIVE        Physical    VITALS:  BP (!) 92/55   Pulse 96   Temp 97.9 °F (36.6 °C) (Oral)   Resp 18   Ht 5' 4\" (1.626 m)   Wt (!) 339 lb 12.8 oz (154.1 kg)   SpO2 95%   BMI 58.33 kg/m²   TEMPERATURE:  Current - Temp: 97.9 °F (36.6 °C); Max - Temp  Av.2 °F (36.8 °C)  Min: 97.7 °F (36.5 °C)  Max: 99.4 °F (37.4 °C)  PULSE OXIMETRY RANGE: SpO2  Av.9 %  Min: 94 %  Max: 98 %  24HR INTAKE/OUTPUT:      Intake/Output Summary (Last 24 hours) at 10/17/2020 0959  Last data filed at 10/17/2020 0454  Gross per 24 hour   Intake 645 ml   Output 410 ml   Net 235 ml       CONSTITUTIONAL:  He is morbidly obese  , HEENT oral pharynx , no scleral icterus  HEMATOLOGIC/LYMPHATICS:  no cervical lymphadenopathy, no supraclavicular lymphadenopathy, no axillary lymphadenopathy and no inguinal lymphadenopathy  LUNGS:  No increased work of breathing, good air exchange, clear to auscultation bilaterally, no crackles or wheezing  CARDIOVASCULAR:  , regular rate and rhythm, normal S1 and S2, no S3 or S4, and no murmur noted  ABDOMEN:  No scars, normal bowel sounds, soft, non-distended, non-tender, no masses palpated, no hepatosplenomegally  MUSCULOSKELETAL:  There is no redness, warmth, or swelling of the joints. EXTREMETIES: No clubbing cynosis or edema  NEUROLOGIC:  Awake, alert, oriented to name, place and time. Cranial nerves II-XII are grossly intact. Motor is 5 out of 5 bilaterally.    SKIN:  no bruising or bleeding      Data      Recent Labs     10/15/20  0411 10/16/20  0459 10/17/20  044   WBC 18.5* 15.7* 11.1*   HGB 8.0* 7.7* 7.3*   HCT 24.8* 23.4* 22.5*    162 149   MCV 89.8 89.4 90.1        Recent Labs     10/15/20  1144 10/16/20  0459 10/17/20  0440   * 136 136   K 4.3 4.4 4.7    102 103   CO2 27 27 26   PHOS 1.3* 1.7* 3.0   BUN 8 13 22*   CREATININE 0.9 1.6* 2.2*     Recent Labs     10/15/20  0411 10/17/20  0440   AST 12* 8*   ALT 18 12   BILIDIR  --  0.3   BILITOT 1.0 0.9   ALKPHOS 84 74       Magnesium:    Lab Results   Component Value Date    MG 1.90 10/17/2020    MG 2.20 10/16/2020    MG 2.20 10/15/2020         Problem List  Patient Active Problem List   Diagnosis    Morbid obesity with BMI of 60.0-69.9, adult ([facility] [address]    Anticoagulation goal of INR 1.5 to 2.5    Benign hypertension    History of DVT (deep vein thrombosis)    Ventral hernia    Elevated brain natriuretic peptide (BNP) level    Pulmonary vascular congestion on CXR    Sepsis ([facility] [address]    Hypomagnesemia    Hypokalemia    Hyponatremia    Hypochloremia    KEVIN (acute kidney injury) ([address]    Subacute microcytic anemia (July 2018)    Hx choledocholithiasis s/p ERCP (July 2018)    Acute respiratory failure with hypoxia and hypercapnia (HCC)    Calculus of gallbladder with acute cholecystitis without obstruction    Intertrigo    Lymphedema    Suspected sleep apnea    Biatrial enlargement    Congestive heart failure (HCC)    GI bleed    Retroperitoneal bleed    Supratherapeutic INR    Shock ([address]    Non-ischemic cardiomyopathy ([address] EF 40%    Bacteremia due to Escherichia coli    Bandemia    Pyuria    Anemia due to blood loss, acute    Atelectasis       ASSESSMENT AND PLAN:    At least 2 DVTs greater than 20 years ago:  -He is now had a second episode of considerable bleeding  -The risk of anticoagulation is higher than the risk of DVT/pulmonary embolism and the patient is aware  -He is status post a Filter placed 10/14/2020    Anemia:  -Likely Due to bleeding  -No evidence of iron deficiency  -His counts have been stable for several days    Vitamin K deficiency:  -This is a bit unusual given his morbid obesity  -He may either have liver dysfunction, but CT scan does not show any

## 2020-10-17 NOTE — PROGRESS NOTES
Patient is awake, alert and oriented x4. Assessment is complete, see flow sheet. Bed in lowest position, wheels locked, call light is within reach. Patient denies any further needs at the moment. Will continue to monitor.     /73   Pulse 94   Temp 98.4 °F (36.9 °C) (Oral)   Resp 17   Ht 5' 4\" (1.626 m)   Wt (!) 339 lb 12.8 oz (154.1 kg)   SpO2 96%   BMI 58.33 kg/m²   Denies pain, on 1L O2

## 2020-10-18 LAB
ALBUMIN SERPL-MCNC: 2.2 G/DL (ref 3.4–5)
ANION GAP SERPL CALCULATED.3IONS-SCNC: 7 MMOL/L (ref 3–16)
ANISOCYTOSIS: ABNORMAL
BANDED NEUTROPHILS RELATIVE PERCENT: 17 % (ref 0–7)
BASOPHILS ABSOLUTE: 0.1 K/UL (ref 0–0.2)
BASOPHILS RELATIVE PERCENT: 1 %
BUN BLDV-MCNC: 32 MG/DL (ref 7–20)
CALCIUM SERPL-MCNC: 7.8 MG/DL (ref 8.3–10.6)
CHLORIDE BLD-SCNC: 99 MMOL/L (ref 99–110)
CO2: 26 MMOL/L (ref 21–32)
CREAT SERPL-MCNC: 2.6 MG/DL (ref 0.8–1.3)
EOSINOPHILS ABSOLUTE: 0.2 K/UL (ref 0–0.6)
EOSINOPHILS RELATIVE PERCENT: 2 %
GFR AFRICAN AMERICAN: 30
GFR NON-AFRICAN AMERICAN: 25
GLUCOSE BLD-MCNC: 103 MG/DL (ref 70–99)
HBV SURFACE AB TITR SER: 4.31 MIU/ML
HCT VFR BLD CALC: 22.6 % (ref 40.5–52.5)
HEMOGLOBIN: 7.5 G/DL (ref 13.5–17.5)
HEPATITIS B SURFACE ANTIGEN INTERPRETATION: NORMAL
LYMPHOCYTES ABSOLUTE: 0.8 K/UL (ref 1–5.1)
LYMPHOCYTES RELATIVE PERCENT: 8 %
MACROCYTES: ABNORMAL
MAGNESIUM: 1.9 MG/DL (ref 1.8–2.4)
MCH RBC QN AUTO: 29.7 PG (ref 26–34)
MCHC RBC AUTO-ENTMCNC: 33 G/DL (ref 31–36)
MCV RBC AUTO: 89.9 FL (ref 80–100)
METAMYELOCYTES RELATIVE PERCENT: 4 %
MONOCYTES ABSOLUTE: 0.5 K/UL (ref 0–1.3)
MONOCYTES RELATIVE PERCENT: 5 %
MYELOCYTE PERCENT: 1 %
NEUTROPHILS ABSOLUTE: 8.1 K/UL (ref 1.7–7.7)
NEUTROPHILS RELATIVE PERCENT: 62 %
PDW BLD-RTO: 16.4 % (ref 12.4–15.4)
PHOSPHORUS: 3.1 MG/DL (ref 2.5–4.9)
PLATELET # BLD: 158 K/UL (ref 135–450)
PLATELET SLIDE REVIEW: ADEQUATE
PMV BLD AUTO: 7.9 FL (ref 5–10.5)
POLYCHROMASIA: ABNORMAL
POTASSIUM SERPL-SCNC: 4.7 MMOL/L (ref 3.5–5.1)
RBC # BLD: 2.52 M/UL (ref 4.2–5.9)
SODIUM BLD-SCNC: 132 MMOL/L (ref 136–145)
WBC # BLD: 9.7 K/UL (ref 4–11)

## 2020-10-18 PROCEDURE — 86704 HEP B CORE ANTIBODY TOTAL: CPT

## 2020-10-18 PROCEDURE — G0008 ADMIN INFLUENZA VIRUS VAC: HCPCS | Performed by: INTERNAL MEDICINE

## 2020-10-18 PROCEDURE — 97530 THERAPEUTIC ACTIVITIES: CPT

## 2020-10-18 PROCEDURE — 6370000000 HC RX 637 (ALT 250 FOR IP): Performed by: INTERNAL MEDICINE

## 2020-10-18 PROCEDURE — 83735 ASSAY OF MAGNESIUM: CPT

## 2020-10-18 PROCEDURE — 2700000000 HC OXYGEN THERAPY PER DAY

## 2020-10-18 PROCEDURE — 94761 N-INVAS EAR/PLS OXIMETRY MLT: CPT

## 2020-10-18 PROCEDURE — 97110 THERAPEUTIC EXERCISES: CPT

## 2020-10-18 PROCEDURE — 87340 HEPATITIS B SURFACE AG IA: CPT

## 2020-10-18 PROCEDURE — 85025 COMPLETE CBC W/AUTO DIFF WBC: CPT

## 2020-10-18 PROCEDURE — 2580000003 HC RX 258: Performed by: INTERNAL MEDICINE

## 2020-10-18 PROCEDURE — C9113 INJ PANTOPRAZOLE SODIUM, VIA: HCPCS | Performed by: INTERNAL MEDICINE

## 2020-10-18 PROCEDURE — 90686 IIV4 VACC NO PRSV 0.5 ML IM: CPT | Performed by: INTERNAL MEDICINE

## 2020-10-18 PROCEDURE — 1200000000 HC SEMI PRIVATE

## 2020-10-18 PROCEDURE — 6360000002 HC RX W HCPCS: Performed by: INTERNAL MEDICINE

## 2020-10-18 PROCEDURE — 86706 HEP B SURFACE ANTIBODY: CPT

## 2020-10-18 PROCEDURE — 80069 RENAL FUNCTION PANEL: CPT

## 2020-10-18 RX ADMIN — CASTOR OIL AND BALSAM, PERU: 788; 87 OINTMENT TOPICAL at 21:01

## 2020-10-18 RX ADMIN — PANTOPRAZOLE SODIUM 40 MG: 40 INJECTION, POWDER, FOR SOLUTION INTRAVENOUS at 10:35

## 2020-10-18 RX ADMIN — INFLUENZA A VIRUS A/VICTORIA/2454/2019 IVR-207 (H1N1) ANTIGEN (PROPIOLACTONE INACTIVATED), INFLUENZA A VIRUS A/HONG KONG/2671/2019 IVR-208 (H3N2) ANTIGEN (PROPIOLACTONE INACTIVATED), INFLUENZA B VIRUS B/VICTORIA/705/2018 BVR-11 ANTIGEN (PROPIOLACTONE INACTIVATED), INFLUENZA B VIRUS B/PHUKET/3073/2013 BVR-1B ANTIGEN (PROPIOLACTONE INACTIVATED) 0.5 ML: 15; 15; 15; 15 INJECTION, SUSPENSION INTRAMUSCULAR at 10:38

## 2020-10-18 RX ADMIN — MIDODRINE HYDROCHLORIDE 10 MG: 5 TABLET ORAL at 10:35

## 2020-10-18 RX ADMIN — PANTOPRAZOLE SODIUM 40 MG: 40 INJECTION, POWDER, FOR SOLUTION INTRAVENOUS at 21:12

## 2020-10-18 RX ADMIN — Medication: at 10:36

## 2020-10-18 RX ADMIN — Medication: at 21:01

## 2020-10-18 RX ADMIN — MIDODRINE HYDROCHLORIDE 10 MG: 5 TABLET ORAL at 17:08

## 2020-10-18 RX ADMIN — MICONAZOLE NITRATE: 20 POWDER TOPICAL at 10:36

## 2020-10-18 RX ADMIN — FOLIC ACID 1 MG: 1 TABLET ORAL at 10:35

## 2020-10-18 RX ADMIN — SODIUM CHLORIDE, PRESERVATIVE FREE 10 ML: 5 INJECTION INTRAVENOUS at 10:35

## 2020-10-18 RX ADMIN — MICONAZOLE NITRATE: 20 POWDER TOPICAL at 21:01

## 2020-10-18 RX ADMIN — SODIUM CHLORIDE, PRESERVATIVE FREE 10 ML: 5 INJECTION INTRAVENOUS at 21:01

## 2020-10-18 RX ADMIN — CASTOR OIL AND BALSAM, PERU: 788; 87 OINTMENT TOPICAL at 10:36

## 2020-10-18 RX ADMIN — MIDODRINE HYDROCHLORIDE 10 MG: 5 TABLET ORAL at 11:51

## 2020-10-18 ASSESSMENT — PAIN SCALES - GENERAL
PAINLEVEL_OUTOF10: 0

## 2020-10-18 NOTE — PLAN OF CARE
at 10/18/2020 1223  Gross per 24 hour   Intake 730 ml   Output 400 ml   Net 330 ml       Comorbidities Reviewed Yes    Patient has a past medical history of Cancer ([facility] [address] Cardiomyopathy ([facility] [address] CHF (congestive heart failure) ([facility] [address] Edema of both legs, GI bleed, Hx of blood clots, and Hypertension. >>For CHF and Comorbidity documentation on Education Time and Topics, please see Education Tab    Progressive Mobility Assessment:  What is this patient's Current Level of Mobility?: Wheelchair    Dependent  How was this patient Mobilized today?: Edge of Bed and Unable to Mobilize                 With Whom? Nurse, PCA, PT, and OT                 Level of Difficulty/Assistance:  x3      Pt resting in bed at this time on  1 L O2. Pt denies shortness of breath. Pt with pitting lower extremity edema.      Patient and/or Family's stated Goal of Care this Admission: reduce shortness of breath, increase activity tolerance, better understand heart failure and disease management, be more comfortable, and reduce lower extremity edema prior to discharge        :

## 2020-10-18 NOTE — PROGRESS NOTES
End of shift report given to [facility] RN. Call light within reach, bed in lowest position, no needs at this time.

## 2020-10-18 NOTE — PLAN OF CARE
Patient's EF (Ejection Fraction) is greater than 40%    Heart Failure Medications:   Diuretics[de-identified] None     (One of the following REQUIRED for EF <40%/SYSTOLIC FAILURE but MAY be used in EF% >40%/DIASTOLIC FAILURE)        ACE[de-identified] None        ARB[de-identified] None         ARNI[de-identified] None    (Beta Blockers)   NON- Evidenced Based Beta Blocker (for EF% >40%/DIASTOLIC FAILURE): None     Evidenced Based Beta Blocker::(REQUIRED for EF% <40%/SYSTOLIC FAILURE) None  . .................................................................................................................................................. Patient's weights and intake/output reviewed: Yes    Patient's Last Weight: 338 lbs obtained by bed scale. Difference of 1 lbs less than last documented weight. Intake/Output Summary (Last 24 hours) at 10/18/2020 0631  Last data filed at 10/18/2020 0341  Gross per 24 hour   Intake 610 ml   Output 400 ml   Net 210 ml       Comorbidities Reviewed Yes    Patient has a past medical history of Cancer ([facility] [address] Cardiomyopathy ([facility] [address] CHF (congestive heart failure) ([facility] [address] Edema of both legs, GI bleed, Hx of blood clots, and Hypertension. >>For CHF and Comorbidity documentation on Education Time and Topics, please see Education Tab    Progressive Mobility Assessment:  What is this patient's Current Level of Mobility?: Requires Bed Rest  How was this patient Mobilized today?: Unable to Mobilize                 With Whom? Nurse, PCA, PT, OT and Self                 Level of Difficulty/Assistance: 2x Assist     Pt resting in bed at this time on 1 L O2. Pt denies shortness of breath. Pt with pitting lower extremity edema.      Patient and/or Family's stated Goal of Care this Admission: reduce shortness of breath, increase activity tolerance, better understand heart failure and disease management, be more comfortable and reduce lower extremity edema prior to discharge        :

## 2020-10-18 NOTE — PROGRESS NOTES
PROGRESS NOTE  S:69 yrs Patient  admitted on 10/9/2020 with Retroperitoneal hemorrhage [R58] . Today he feels well. No bleeding. Exam:   Vitals:    10/18/20 1223   BP: 112/68   Pulse: 81   Resp: 18   Temp: 98.2 °F (36.8 °C)   SpO2: 99%      General appearance: alert, appears stated age and cooperative  HEENT: Oropharynx clear, no lesions  Neck: no adenopathy and supple, symmetrical, trachea midline  Lungs: clear to auscultation bilaterally  Heart: regular rate and rhythm, S1, S2 normal, no murmur, click, rub or gallop  Abdomen: soft, non-tender; bowel sounds normal; no masses,  no organomegaly  Extremities: extremities normal, atraumatic, no cyanosis or edema     Medications: Reviewed    Labs:  CBC:   Recent Labs     10/16/20  0459 10/17/20  0440 10/18/20  0443   WBC 15.7* 11.1* 9.7   HGB 7.7* 7.3* 7.5*   HCT 23.4* 22.5* 22.6*   MCV 89.4 90.1 89.9    149 158     BMP:   Recent Labs     10/16/20  0459 10/17/20  0440 10/18/20  0443    136 132*   K 4.4 4.7 4.7    103 99   CO2 27 26 26   PHOS 1.7* 3.0 3.1   BUN 13 22* 32*   CREATININE 1.6* 2.2* 2.6*     LIVER PROFILE:   Recent Labs     10/17/20  0440   AST 8*   ALT 12   PROT 5.2*   BILIDIR 0.3   BILITOT 0.9   ALKPHOS 74     PT/INR:   Recent Labs     10/16/20  0459   INR 1.39*       Impression:71year old male with history of HTN, DVT, CHF, kidney cancer s/p R partial nephrectomy c/b L RP hematoma       Recommendation:  1. Continue supportive care  2. Monitor Hgb  3. Continue PPI   4. Observe for signs of bleeding  5. No plan for endoscopy at this time  6.  Hold anticoagulation       Babak Shaffer MD  12:25 PM 10/18/2020

## 2020-10-18 NOTE — PROGRESS NOTES
Patient is awake, alert and oriented x4. Assessment is complete, see flow sheet. Bed in lowest position, wheels locked, call light is within reach. Patient denies any further needs at the moment. Will continue to monitor.   Vitals:    10/18/20 1223   BP: 112/68   Pulse: 81   Resp: 18   Temp: 98.2 °F (36.8 °C)   SpO2: 99%     Denies pain, on 1L O2

## 2020-10-18 NOTE — PROGRESS NOTES
Urology Attending Progress Note      Subjective: \" I feel ok\"    71 y.o.male obese, alert male with hx of right RCC with partial nephrectomy, urinary retention, and with 2 cm renal cortical lesion a year ago. Consulted for left retroperitoneal bleed. Patient states feels ok today with no abd pain. Getting a sponge bath when I saw him. Vitals:  BP (!) 93/55   Pulse 91   Temp 98.5 °F (36.9 °C) (Oral)   Resp 18   Ht 5' 4\" (1.626 m)   Wt (!) 338 lb (153.3 kg)   SpO2 99%   BMI 58.02 kg/m²   Temp  Av.2 °F (36.8 °C)  Min: 97.8 °F (36.6 °C)  Max: 98.5 °F (36.9 °C)    Intake/Output Summary (Last 24 hours) at 10/18/2020 1106  Last data filed at 10/18/2020 1035  Gross per 24 hour   Intake 250 ml   Output 400 ml   Net -150 ml       Exam:    Obese, male in no acute distress   Orientated to time and place   Neck is supple, trachea is midline   Respiratory effort is normal without distress   Skin warm and dry, exposed skin shows no abnormal lesions, rashes   Musculoskeletal no digital cyanosis, head normocephalic   Psych shows normal mood and affect, alert and appropriately answers questions   Clear yellow urine in devine catheter    Labs:  WBC:    Lab Results   Component Value Date    WBC 9.7 10/18/2020     Hemoglobin/Hematocrit:    Lab Results   Component Value Date    HGB 7.5 10/18/2020    HCT 22.6 10/18/2020     BMP:    Lab Results   Component Value Date     10/18/2020    K 4.7 10/18/2020    K 4.3 10/15/2020    CL 99 10/18/2020    CO2 26 10/18/2020    BUN 32 10/18/2020    LABALBU 2.2 10/18/2020    CREATININE 2.6 10/18/2020    CALCIUM 7.8 10/18/2020    GFRAA 30 10/18/2020    LABGLOM 25 10/18/2020     PT/INR:    Lab Results   Component Value Date    PROTIME 16.2 10/16/2020    INR 1.39 10/16/2020     PTT:  No results found for: APTT[APTT      Impression/Plan:   69 y.o.male hx of right partial nephrectomy/RCC, with retroperitoneal bleed causing large left RP hematoma.  Patient had INR of 9 at

## 2020-10-18 NOTE — PROGRESS NOTES
[facility]       Progress Note           Subjective/   71y.o. year old male who we are seeing in consultation for KEVIN requiring CRRT.     HPI:  Off CRRT sine 10/15, renal function slowly worsening, urine output of 400 ml over 24 hours. Hypotensive, off pressors now. Intake better. ROS:  Denies any sob, oxygen being weaned. No fevers. Objective/   GEN:  Chronically ill, /68   Pulse 81   Temp 98.2 °F (36.8 °C) (Oral)   Resp 18   Ht 5' 4\" (1.626 m)   Wt (!) 338 lb (153.3 kg)   SpO2 99%   BMI 58.02 kg/m²   HEENT: non-icteric, no JVD  CV: S1, S2 without m/r/g; + LE edema  RESP: CTA B without w/r/r; breathing wnl  ABD: +bs, soft, nt, no hsm, +hernia  SKIN: warm, no rashes    Data/  Recent Labs     10/16/20  0459 10/17/20  0440 10/18/20  0443   WBC 15.7* 11.1* 9.7   HGB 7.7* 7.3* 7.5*   HCT 23.4* 22.5* 22.6*   MCV 89.4 90.1 89.9    149 158     Recent Labs     10/16/20  0459 10/17/20  0440 10/18/20  0443    136 132*   K 4.4 4.7 4.7    103 99   CO2 27 26 26   GLUCOSE 113* 102* 103*   PHOS 1.7* 3.0 3.1   MG 2.20 1.90 1.90   BUN 13 22* 32*   CREATININE 1.6* 2.2* 2.6*   LABGLOM 43* 30* 25*   GFRAA 52* 36* 30*       Assessment/Plan     Acute Kidney Injury.  - Likely ATN from profound hypotension, sepsis. - CRRT started on 10/10/20, off since 10/15. [facility] temp HD cath as access. - Plan for first HD tomorrow with no fluid removal.    Left retroperitoneal hematoma  - H/o open R partial nephrectomy  - High risk for page kidney   - Urology following.     Shock.  - Hemorrhagic and septic. E. Coli in the blood and urine. Antibiotics per ID.  - Supra therapeutic INR on admission. H/o DVT, now off coumadin. S/P IVC filter placement on 10/13/20.  - Continue midodrine 10mg 3x/day, off levophed.     Anemia. - PRN blood transfusion per primary service.       Case d/w patient's wife

## 2020-10-18 NOTE — PROGRESS NOTES
Hospitalist Progress Note      PCP: Maria Isabel Bennett MD    Date of Admission: 10/9/2020    Chief Complaint: Fatigue, dizziness    Hospital Course:  \"Patient with PMH of nonischemic cardiomyopathy EF 40 to 45%, HTN, history of DVT on Coumadin, GI bleed in April 2019, presented to the ED today with complaints of fatigue and dizziness. Patient is pretty much bedbound at home. Wife reports when patient stood up he got lightheaded, and fell back in bed. Also reports patient has not been eating or drinking much. Wife also reports patient stool has been black this past week. Patient is on Coumadin for past history of DVT/PE. She contacted patient's PCP about this, who ordered stool sample for blood, they had trouble getting the test kit until the seventh. Today she called patient's PCP about the patient being very weak, unable to get out of bed, and they were advised to come to the ED. patient also reports diffuse dull aching abdominal pain that is intermittent. \"       Subjective:  Cr slowly rising. 400 ml UOP yesterday. He is eating a little more. Pain controlled.        Medications:  Reviewed    Infusion Medications     Scheduled Medications    zinc oxide   Topical BID    midodrine  10 mg Oral TID WC    levofloxacin  500 mg Intravenous Q48H    Venelex   Topical BID    miconazole   Topical BID    folic acid  1 mg Oral Daily    sodium chloride flush  10 mL Intravenous 2 times per day    pantoprazole  40 mg Intravenous BID     PRN Meds: oxyCODONE, sodium chloride flush, acetaminophen **OR** acetaminophen, polyethylene glycol, promethazine **OR** ondansetron, perflutren lipid microspheres      Intake/Output Summary (Last 24 hours) at 10/18/2020 1144  Last data filed at 10/18/2020 1035  Gross per 24 hour   Intake 250 ml   Output 400 ml   Net -150 ml       Physical Exam Performed:    BP (!) 93/55   Pulse 91   Temp 98.5 °F (36.9 °C) (Oral)   Resp 18   Ht 5' 4\" (1.626 m)   Wt (!) 338 lb (153.3 Radiology:  XR CHEST PORTABLE   Final Result   No evidence of pneumothorax following left IJ catheter placement. Improved   aeration in the left lung base. XR CHEST PORTABLE   Final Result   No acute process. CT ABDOMEN PELVIS WO CONTRAST Additional Contrast? None   Final Result   Addendum 1 of 1   ADDENDUM:   Additional information, corrected report:      HISTORY:   The initial history was incorrect, patient did not have left side    nephrectomy   but partial nephrectomy on the right. Therefore the hematoma seen retroperitoneal on the left in the renal fossa   likely obscures the entire left kidney. There is no normal renal    parenchyma   identified. The measured area, 13 x 9 cm therefore presumably include   spontaneous hematoma and kidney tissue combined. This would also    therefore   indicate a lesser degree of hemorrhage present, since some of the measured   area being occupied by the obscured kidney. Again the primary etiology is spontaneous hemorrhage, given the patient's   grossly elevated INR, and likely arising within the obscured left kidney    and   extending left pararenal retroperitoneal space. Final      CT HEAD WO CONTRAST   Final Result   1. No acute intracranial abnormality. XR CHEST PORTABLE   Final Result   No acute cardiopulmonary disease.                  Assessment/Plan:    Active Hospital Problems    Diagnosis    Bacteremia due to Escherichia coli [R78.81, B96.20]    Bandemia [D72.825]    Pyuria [R82.81]    Anemia due to blood loss, acute [D62]    Atelectasis [J98.11]    Retroperitoneal bleed [R58]    Supratherapeutic INR [R79.1]    Shock ([address] [R57.9]    Non-ischemic cardiomyopathy (HCC) EF 40% [I42.8]    Suspected sleep apnea [R29.818]    Benign hypertension [I10]    History of DVT (deep vein thrombosis) [Z86.718]    Morbid obesity with BMI of 60.0-69.9, adult (HCC) [E66.01, Z68.44]       L perinephric hemorrhage  Likely 2/2 supratherapeutic INR of 9.24, also possible underlying renal mass  -Discussed with Dr. Julissa Gan and Dr. Adela Syed - suspect bleed could be from left renal lesion. CT of abd read as though patient had a left nephrectomy, but this in NOT accurate. He still has his left kidney. Per urology: \"possibly bleed from left renal tumor but had imaging a year ago only showing a stable 2 cm renal cortical lesion. Will ideally need repeat imaging with contrast when creatinine stable in a few weeks. \"  Also, \"Down the line, may require a left radical nephrectomy. \"  - Reversed Coumadin with K Centra, vitamin K. Again given vit K 10/15. Hematology recommended checking the INR every Monday and Friday and giving vit K if >2.  - with ABLA present on admission. Received 1 unit pRBC on 10/9, 10/11, 10/12, and 10/14. Noted the disproportionate bandemia, also metamyelocytes, hematology considering BM biopsy.  - vascular surgery was considering embolization, but then it seemed like he no longer needed this.     Shock   Combination of hemorrhagic due to above issue and GI bleed, also suspect sepsis due to UTI present on arrival  - Given IV fluid boluses without adequate response, required pressor support, weaned off 10/15.  - held anithypertensives     H/o DVT  - Vascular surgery consulted, placed IVC filter on 10/13. This the patient's 3rd life threatening bleed. Last in 4/2019 - at that time had a GIB, which required 9 units of pRBC. - anticoagulation contraindicated     E. Coli Bacteremia - secondary to acute cystitis. - treated with IV cefepime, then changed to levofloxacin on 10/14, plan to complete course 10/20. ID input appreciated, they signed off. Suspected upper GI bleed   Worsened due to supratherapeutic INR  -Hb dropped from 12 to 8.5 with fluids revealing true degree of anemia  -Hold Coumadin and antiplatelet agents  -Consulted GI, who deferred EGD at this time due to patient's instability.   They will reconsider if he has melena here, which he did not. GI no longer planning inpatient endoscopy. -PPI     KEVIN  - baseline Cr 1.0. Likely ATN due to hypotension and hypovolemia  - nephrology consulted, started on CRRT 10/9, stopped CRRT 10/15.  - he does have h/o partial RIGHT nephrectomy in 2015 due to renal cancer. Non-ischemic cardiomyopathy  - TTE showed EF 50%, with RWMA's. Will need ACE/ARB/ARNI when he is more stable, also BB.     Morbid obesity with BMI of 64.4-58.3, adult   Complicating assessment and treatment, placing patient at high risk for multiple co-morbidities as well as early death and contributing to the patient's presentation. Counseled on weight loss.       DVT Prophylaxis: SCDs  Diet: Dietary Nutrition Supplements: Frozen Oral Supplement  DIET RENAL; Daily Fluid Restriction: 2000 ml  Code Status: Full Code    PT/OT Eval Status:  rec'd SNF    Dispo - when he is either definitively liberated from HD or when outpatient HD is arranged, perhaps 10/21. He came from home.        Kristen Robledo MD

## 2020-10-18 NOTE — PROGRESS NOTES
Physical Therapy  Facility/Department: [facility] A2 CARD TELEMETRY  Daily Treatment Note  NAME: Ander Chance Sr.  : 1950  MRN: [id number]    Date of Service: 10/18/2020    Discharge Recommendations:  Subacute/Skilled Nursing Facility, LTACH(pending patient's medical status when closer to discharge. )   PT Equipment Recommendations  Equipment Needed: No  Other: defer to patient's facility  If pt is unable to be seen after this session, please let this note serve as discharge summary. Please see case management note for discharge disposition. Thank you. Barriers to home discharge:   [x] Steps to access home entry or bed/bath: 1 ASHANTI   [x] No rail with steps to access home entry or bed/bath   [x] Reported available assist at home upon discharge limited  Assessment   Body structures, Functions, Activity limitations: Decreased functional mobility ; Decreased ROM; Decreased strength;Decreased endurance;Decreased balance;Decreased posture  Assessment: Patient is making slow progress with his therapy goals. Patient continues to require dependent/maximum assistance x 2 for rolling left/right in bed. He also required maximum assistance to perform supine to partial long sit transfer in bed. Patient's therapeutic exercise program was reviewed with the patient and his wife. Patient continues to present with the therapy deficits listed above. Patient will continue to benefit from skilled PT while patient is in the acute care setting to address his therapy deficits and help him maximize his safety and independence with all functional mobility. PT to continue to follow. Treatment Diagnosis: Decreased strength and (I) with functional mobility  Specific instructions for Next Treatment: Progress ther ex and mobility as tolerated  Prognosis: Fair  Decision Making: Medium Complexity  PT Education: Goals; General Safety; Disease Specific Education;PT Role;Plan of Care;Family Education; Functional Mobility Training;Home Exercise Program;Precautions;Pressure Relief;Energy Conservation  Patient Education: Disease-specific education: Patient and patient's wife educated on supine LE therapeutic exercise patient can perform throughout the day for ROM/strengthening and to decrease the risks of prolonged bedrest; pt verbalizes/demonstrates understanding. Barriers to Learning: none  REQUIRES PT FOLLOW UP: Yes  Activity Tolerance  Activity Tolerance: Patient limited by endurance; Patient limited by fatigue  Activity Tolerance: Vitals stable     Patient Diagnosis(es): The primary encounter diagnosis was Retroperitoneal bleed. Diagnoses of Elevated INR, Septic shock (HCC), Hypovolemic shock ([address] Blood loss anemia, and KEVIN (acute kidney injury) ([facility] [address] were also pertinent to this visit. has a past medical history of Cancer ([facility] [address] Cardiomyopathy ([address] CHF (congestive heart failure) ([facility] [address] Edema of both legs, GI bleed, Hx of blood clots, and Hypertension. has a past surgical history that includes Kidney surgery (4/7/2014); Abdomen surgery; Colonoscopy; Endoscopy, colon, diagnostic; pr egd transoral biopsy single/multiple (9/28/2018); Upper gastrointestinal endoscopy (N/A, 4/8/2019); Colonoscopy (N/A, 4/9/2019); Upper gastrointestinal endoscopy (N/A, 4/12/2019); and Cardiac catheterization (09/06/2019). Restrictions  Restrictions/Precautions  Restrictions/Precautions: Fall Risk, General Precautions  Position Activity Restriction  Other position/activity restrictions: dialysis catheter, [place], O2 1L, telemetry  Subjective   General  Chart Reviewed: Yes  Response To Previous Treatment: Patient with no complaints from previous session.   Family / Caregiver Present: Yes(wife)  Referring Practitioner: Michel Nissen, APRN - CNP  Subjective  Subjective: Pt resting in bed on approach, pleasant and agreeable to PT tx  General Comment  Comments: RN cleared pt for session  Pain Screening  Patient Currently in Pain: Denies  Vital Signs  Patient Currently in Pain: Denies       Orientation  Orientation  Overall Orientation Status: Within Functional Limits  Cognition   Cognition  Overall Cognitive Status: WFL  Objective   Bed mobility  Rolling to Left: Dependent/Total;Maximum assistance;2 Person assistance  Rolling to Right: Dependent/Total;Maximum assistance;2 Person assistance  Supine to Sit: Maximum assistance(supine to partial long sit)  Sit to Supine: Maximum assistance  Scooting: Dependent/Total;Maximal assistance;2 Person assistance  Comment: patient performed supine to partial long sit transfer. 1st attempt for ~30 seconds. 2nd attempt ~30 seconds. limited by weakness, fatigue. Transfers  Sit to Stand: Unable to assess  Stand to sit: Unable to assess  Bed to Chair: Unable to assess  Comment: Deferred d/t safety  Ambulation  Ambulation?: No(unable to stand/ambulate due to patient requiring max assist x 2 to roll/bed mobility)     Balance  Posture: Poor  Exercises  Straight Leg Raise: 1 x 10 AAROM  Quad Sets: supine 1 x 10  Heelslides: 1 x 10 AAROM  Gluteal Sets: supine 1 x 10  Hip Abduction: 1 x 10 AAROM  Knee Short Arc Quad: 1 x 10 AAROM  Ankle Pumps: 2 x 10 AAROM  Upper Extremity: bilateral shoulder flexion/extension 1 x 10, bilateral elbow flexion/extension 1 x 10  Core Strengthening: supine to long sit with maximum assistance. patient held long sit for ~30 seconds. 2nd attempt: 30 seconds.   Comments: cueing for sequencing and technique  Other exercises  Other exercises?: No                        AM-PAC Score     AM-PAC Inpatient Mobility without Stair Climbing Raw Score : 5 (10/18/20 1725)  AM-PAC Inpatient without Stair Climbing T-Scale Score : 23.59 (10/18/20 1725)  Mobility Inpatient CMS 0-100% Score: 100 (10/18/20 1725)  Mobility Inpatient without Stair CMS G-Code Modifier : CN (10/18/20 1725)       Goals  Short term goals  Time Frame for Short term goals: 1 week, 10/21/20 (unless otherwise specified)  Short term goal 1: Pt will transfer supine <-> sit

## 2020-10-18 NOTE — PROGRESS NOTES
End of shift report given to Nathan Walker RN. Patient alert and oriented. Bed in lowest position with wheels locked. Call light within reach.  No further needs at this time. [facility]

## 2020-10-19 ENCOUNTER — APPOINTMENT (OUTPATIENT)
Dept: CT IMAGING | Age: 70
DRG: 853 | End: 2020-10-19
Payer: MEDICARE

## 2020-10-19 LAB
ALBUMIN SERPL-MCNC: 2.4 G/DL (ref 3.4–5)
ANION GAP SERPL CALCULATED.3IONS-SCNC: 6 MMOL/L (ref 3–16)
ANISOCYTOSIS: ABNORMAL
BANDED NEUTROPHILS RELATIVE PERCENT: 10 % (ref 0–7)
BASOPHILIC STIPPLING: ABNORMAL
BASOPHILS ABSOLUTE: 0 K/UL (ref 0–0.2)
BASOPHILS RELATIVE PERCENT: 0 %
BUN BLDV-MCNC: 39 MG/DL (ref 7–20)
CALCIUM SERPL-MCNC: 7.9 MG/DL (ref 8.3–10.6)
CHLORIDE BLD-SCNC: 103 MMOL/L (ref 99–110)
CO2: 26 MMOL/L (ref 21–32)
CREAT SERPL-MCNC: 2.6 MG/DL (ref 0.8–1.3)
EOSINOPHILS ABSOLUTE: 0 K/UL (ref 0–0.6)
EOSINOPHILS RELATIVE PERCENT: 0 %
GFR AFRICAN AMERICAN: 30
GFR NON-AFRICAN AMERICAN: 25
GLUCOSE BLD-MCNC: 104 MG/DL (ref 70–99)
HCT VFR BLD CALC: 21.9 % (ref 40.5–52.5)
HEMOGLOBIN: 7.4 G/DL (ref 13.5–17.5)
INR BLD: 1.32 (ref 0.86–1.14)
LYMPHOCYTES ABSOLUTE: 0.5 K/UL (ref 1–5.1)
LYMPHOCYTES RELATIVE PERCENT: 7 %
MACROCYTES: ABNORMAL
MAGNESIUM: 1.7 MG/DL (ref 1.8–2.4)
MCH RBC QN AUTO: 30.3 PG (ref 26–34)
MCHC RBC AUTO-ENTMCNC: 33.5 G/DL (ref 31–36)
MCV RBC AUTO: 90.4 FL (ref 80–100)
METAMYELOCYTES RELATIVE PERCENT: 1 %
MONOCYTES ABSOLUTE: 0.6 K/UL (ref 0–1.3)
MONOCYTES RELATIVE PERCENT: 8 %
MYELOCYTE PERCENT: 1 %
NEUTROPHILS ABSOLUTE: 6.3 K/UL (ref 1.7–7.7)
NEUTROPHILS RELATIVE PERCENT: 73 %
OVALOCYTES: ABNORMAL
PDW BLD-RTO: 16.2 % (ref 12.4–15.4)
PHOSPHORUS: 3.1 MG/DL (ref 2.5–4.9)
PLATELET # BLD: 156 K/UL (ref 135–450)
PLATELET SLIDE REVIEW: ADEQUATE
PMV BLD AUTO: 7.9 FL (ref 5–10.5)
POLYCHROMASIA: ABNORMAL
POTASSIUM SERPL-SCNC: 4.7 MMOL/L (ref 3.5–5.1)
PROTHROMBIN TIME: 15.3 SEC (ref 10–13.2)
RBC # BLD: 2.43 M/UL (ref 4.2–5.9)
SODIUM BLD-SCNC: 135 MMOL/L (ref 136–145)
WBC # BLD: 7.4 K/UL (ref 4–11)

## 2020-10-19 PROCEDURE — 80069 RENAL FUNCTION PANEL: CPT

## 2020-10-19 PROCEDURE — 0QB33ZX EXCISION OF LEFT PELVIC BONE, PERCUTANEOUS APPROACH, DIAGNOSTIC: ICD-10-PCS | Performed by: RADIOLOGY

## 2020-10-19 PROCEDURE — 2700000000 HC OXYGEN THERAPY PER DAY

## 2020-10-19 PROCEDURE — C1830 POWER BONE MARROW BX NEEDLE: HCPCS

## 2020-10-19 PROCEDURE — 6360000002 HC RX W HCPCS: Performed by: INTERNAL MEDICINE

## 2020-10-19 PROCEDURE — 87103 BLOOD FUNGUS CULTURE: CPT

## 2020-10-19 PROCEDURE — 6370000000 HC RX 637 (ALT 250 FOR IP): Performed by: INTERNAL MEDICINE

## 2020-10-19 PROCEDURE — 97110 THERAPEUTIC EXERCISES: CPT

## 2020-10-19 PROCEDURE — 36415 COLL VENOUS BLD VENIPUNCTURE: CPT

## 2020-10-19 PROCEDURE — 87116 MYCOBACTERIA CULTURE: CPT

## 2020-10-19 PROCEDURE — 07DR3ZX EXTRACTION OF ILIAC BONE MARROW, PERCUTANEOUS APPROACH, DIAGNOSTIC: ICD-10-PCS | Performed by: RADIOLOGY

## 2020-10-19 PROCEDURE — 6360000002 HC RX W HCPCS: Performed by: RADIOLOGY

## 2020-10-19 PROCEDURE — C9113 INJ PANTOPRAZOLE SODIUM, VIA: HCPCS | Performed by: INTERNAL MEDICINE

## 2020-10-19 PROCEDURE — 6370000000 HC RX 637 (ALT 250 FOR IP): Performed by: NURSE PRACTITIONER

## 2020-10-19 PROCEDURE — 87252 VIRUS INOCULATION TISSUE: CPT

## 2020-10-19 PROCEDURE — 87040 BLOOD CULTURE FOR BACTERIA: CPT

## 2020-10-19 PROCEDURE — 88184 FLOWCYTOMETRY/ TC 1 MARKER: CPT

## 2020-10-19 PROCEDURE — 85025 COMPLETE CBC W/AUTO DIFF WBC: CPT

## 2020-10-19 PROCEDURE — 94761 N-INVAS EAR/PLS OXIMETRY MLT: CPT

## 2020-10-19 PROCEDURE — 1200000000 HC SEMI PRIVATE

## 2020-10-19 PROCEDURE — 83735 ASSAY OF MAGNESIUM: CPT

## 2020-10-19 PROCEDURE — 77012 CT SCAN FOR NEEDLE BIOPSY: CPT

## 2020-10-19 PROCEDURE — 2580000003 HC RX 258: Performed by: INTERNAL MEDICINE

## 2020-10-19 PROCEDURE — 85610 PROTHROMBIN TIME: CPT

## 2020-10-19 PROCEDURE — 88185 FLOWCYTOMETRY/TC ADD-ON: CPT

## 2020-10-19 RX ORDER — FENTANYL CITRATE 50 UG/ML
INJECTION, SOLUTION INTRAMUSCULAR; INTRAVENOUS
Status: COMPLETED | OUTPATIENT
Start: 2020-10-19 | End: 2020-10-19

## 2020-10-19 RX ORDER — MIDAZOLAM HYDROCHLORIDE 5 MG/ML
INJECTION INTRAMUSCULAR; INTRAVENOUS
Status: COMPLETED | OUTPATIENT
Start: 2020-10-19 | End: 2020-10-19

## 2020-10-19 RX ORDER — POLYETHYLENE GLYCOL 3350 17 G/17G
17 POWDER, FOR SOLUTION ORAL DAILY
Status: DISCONTINUED | OUTPATIENT
Start: 2020-10-19 | End: 2020-10-22 | Stop reason: HOSPADM

## 2020-10-19 RX ORDER — DOCUSATE SODIUM 100 MG/1
100 CAPSULE, LIQUID FILLED ORAL DAILY
Status: DISCONTINUED | OUTPATIENT
Start: 2020-10-19 | End: 2020-10-22 | Stop reason: HOSPADM

## 2020-10-19 RX ADMIN — MICONAZOLE NITRATE: 20 POWDER TOPICAL at 08:31

## 2020-10-19 RX ADMIN — DOCUSATE SODIUM 100 MG: 100 CAPSULE ORAL at 16:39

## 2020-10-19 RX ADMIN — MIDODRINE HYDROCHLORIDE 10 MG: 5 TABLET ORAL at 12:44

## 2020-10-19 RX ADMIN — Medication: at 08:31

## 2020-10-19 RX ADMIN — MIDODRINE HYDROCHLORIDE 10 MG: 5 TABLET ORAL at 08:25

## 2020-10-19 RX ADMIN — PANTOPRAZOLE SODIUM 40 MG: 40 INJECTION, POWDER, FOR SOLUTION INTRAVENOUS at 21:42

## 2020-10-19 RX ADMIN — OXYCODONE 5 MG: 5 TABLET ORAL at 17:59

## 2020-10-19 RX ADMIN — LEVOFLOXACIN 500 MG: 5 INJECTION, SOLUTION INTRAVENOUS at 08:26

## 2020-10-19 RX ADMIN — SODIUM CHLORIDE, PRESERVATIVE FREE 10 ML: 5 INJECTION INTRAVENOUS at 21:43

## 2020-10-19 RX ADMIN — FENTANYL CITRATE 25 MCG: 50 INJECTION INTRAMUSCULAR; INTRAVENOUS at 15:42

## 2020-10-19 RX ADMIN — Medication: at 21:43

## 2020-10-19 RX ADMIN — FENTANYL CITRATE 25 MCG: 50 INJECTION INTRAMUSCULAR; INTRAVENOUS at 15:48

## 2020-10-19 RX ADMIN — FOLIC ACID 1 MG: 1 TABLET ORAL at 08:25

## 2020-10-19 RX ADMIN — SODIUM CHLORIDE, PRESERVATIVE FREE 10 ML: 5 INJECTION INTRAVENOUS at 08:26

## 2020-10-19 RX ADMIN — CASTOR OIL AND BALSAM, PERU: 788; 87 OINTMENT TOPICAL at 08:31

## 2020-10-19 RX ADMIN — MICONAZOLE NITRATE: 20 POWDER TOPICAL at 21:43

## 2020-10-19 RX ADMIN — POLYETHYLENE GLYCOL 3350 17 G: 17 POWDER, FOR SOLUTION ORAL at 16:39

## 2020-10-19 RX ADMIN — MIDODRINE HYDROCHLORIDE 10 MG: 5 TABLET ORAL at 16:39

## 2020-10-19 RX ADMIN — CASTOR OIL AND BALSAM, PERU: 788; 87 OINTMENT TOPICAL at 21:43

## 2020-10-19 RX ADMIN — PANTOPRAZOLE SODIUM 40 MG: 40 INJECTION, POWDER, FOR SOLUTION INTRAVENOUS at 08:25

## 2020-10-19 RX ADMIN — MIDAZOLAM HYDROCHLORIDE 0.5 MG: 5 INJECTION, SOLUTION INTRAMUSCULAR; INTRAVENOUS at 15:43

## 2020-10-19 ASSESSMENT — PAIN SCALES - GENERAL
PAINLEVEL_OUTOF10: 6
PAINLEVEL_OUTOF10: 0
PAINLEVEL_OUTOF10: 7

## 2020-10-19 ASSESSMENT — PAIN SCALES - WONG BAKER
WONGBAKER_NUMERICALRESPONSE: 0
WONGBAKER_NUMERICALRESPONSE: 0

## 2020-10-19 NOTE — PROGRESS NOTES
Urology Attending Progress Note      Subjective: Patient states \"no pain today\"    71 y.o.male obese, alert male with hx of right RCC with partial nephrectomy, urinary retention, and with 2 cm renal cortical lesion a year ago. Consulted for left retroperitoneal bleed. Patient states feels fine today. Vitals:  /73   Pulse 86   Temp 97.4 °F (36.3 °C) (Oral)   Resp 18   Ht 5' 4\" (1.626 m)   Wt (!) 337 lb (152.9 kg)   SpO2 99%   BMI 57.85 kg/m²   Temp  Av.2 °F (36.8 °C)  Min: 97.4 °F (36.3 °C)  Max: 98.5 °F (36.9 °C)    Intake/Output Summary (Last 24 hours) at 10/19/2020 1009  Last data filed at 10/19/2020 5745  Gross per 24 hour   Intake 1330 ml   Output 600 ml   Net 730 ml       Exam:    Well developed, obese, in no acute distress   Orientated to time and place   Neck is supple, trachea is midline   Respiratory effort is normal without distress   Abdomen soft, nontender, nondistended, no guarding.  Skin warm and dry, exposed skin shows no abnormal lesions, rashes   Musculoskeletal no digital cyanosis, head normocephalic   Psych shows normal mood and affect, alert and appropriately answers questions   Cloudy, yellow in devine catheter    Labs:  WBC:    Lab Results   Component Value Date    WBC 7.4 10/19/2020     Hemoglobin/Hematocrit:    Lab Results   Component Value Date    HGB 7.4 10/19/2020    HCT 21.9 10/19/2020     BMP:    Lab Results   Component Value Date     10/19/2020    K 4.7 10/19/2020    K 4.3 10/15/2020     10/19/2020    CO2 26 10/19/2020    BUN 39 10/19/2020    LABALBU 2.4 10/19/2020    CREATININE 2.6 10/19/2020    CALCIUM 7.9 10/19/2020    GFRAA 30 10/19/2020    LABGLOM 25 10/19/2020     PT/INR:    Lab Results   Component Value Date    PROTIME 15.3 10/19/2020    INR 1.32 10/19/2020     PTT:  No results found for: APTT[APTT      Impression/Plan:   69 y.o.male hx of right partial nephrectomy/RCC, with retroperitoneal bleed causing large left RP hematoma. Patient had INR of 9 at admission. Pt had 2 cm renal cortical lesion seen about a year ago. Cloudy, yellow urine today.  His creatinine is 2.6 today.    -Continue devine  -Monitor creatinine  -Imaging w contrast in 1-2 months to evaluate status of left kidney  -Continue to follow      Jj Lopez PA-C

## 2020-10-19 NOTE — PROGRESS NOTES
End of shift report given to [address] [place] [address]. Call light within reach, bed in lowest position, no needs at this time.

## 2020-10-19 NOTE — SEDATION DOCUMENTATION
Left iliac bone marrow biopsy completed without difficulty. Patient tolerated procedure well. [facility] called to [facility]. Returned to A2 per bed.

## 2020-10-19 NOTE — PROGRESS NOTES
Pt has been refusing turns/repositioning during stay. Discussed with pt the importance of turning from side to side and repositioning often in bed to prevent bed sores and pressure injuries. Pt verbalized understanding, pt states that sometimes pillows/wedges under sides cause him back pain and he can't stand it. Pt agreeable to repositioning with wedge pillow, currently laying on left side.

## 2020-10-19 NOTE — H&P
HISTORY OF PRESENT ILLNESS:      The patient is a 71 y.o. male  Here for bone marrow biopsy/aspiration. Allergies:  Patient has no known allergies. Social History:   Non contrib. PHYSICAL EXAM:    Vitals:  /81   Pulse 116   Temp 99.3 °F (37.4 °C) (Oral)   Resp (!) 32   Ht 5' 4\" (1.626 m)   Wt (!) 337 lb (152.9 kg)   SpO2 100%   BMI 57.85 kg/m²     ASA 3 Mallapati 3    Obese male in nad resting on bed  AAO x3 Intact grossly    ASSESSMENT AND PLAN:       Bone marrow biopsy/aspiration.

## 2020-10-19 NOTE — PROGRESS NOTES
Progress Note  Date:10/19/2020       Room:0210/0210-02  Patient Lashell Menezes Sr. YOB: 1950     Age:69 y.o. Subjective    Subjective:  Symptoms:  Stable. Pain:  He reports no pain. Review of Systems  Objective         Vitals Last 24 Hours:  TEMPERATURE:  Temp  Av.2 °F (36.8 °C)  Min: 97.4 °F (36.3 °C)  Max: 98.5 °F (36.9 °C)  RESPIRATIONS RANGE: Resp  Av  Min: 16  Max: 20  PULSE OXIMETRY RANGE: SpO2  Av %  Min: 93 %  Max: 99 %  PULSE RANGE: Pulse  Av  Min: 81  Max: 91  BLOOD PRESSURE RANGE: Systolic (89IXQ), TLY:056 , Min:93 , MONA:303   ; Diastolic (14CIP), UMX:03, Min:55, Max:73    I/O (24Hr): Intake/Output Summary (Last 24 hours) at 10/19/2020 1002  Last data filed at 10/19/2020 0938  Gross per 24 hour   Intake 1330 ml   Output 600 ml   Net 730 ml     Objective:  General Appearance: In no acute distress and not in pain. Vital signs: (most recent): Blood pressure 111/73, pulse 86, temperature 97.4 °F (36.3 °C), temperature source Oral, resp. rate 18, height 5' 4\" (1.626 m), weight (!) 337 lb (152.9 kg), SpO2 99 %. Heart: Normal rate. Regular rhythm. S1 normal and S2 normal.    Abdomen: Abdomen is soft. Bowel sounds are normal.   There is no abdominal tenderness. Labs/Imaging/Diagnostics    Labs:  CBC:  Recent Labs     10/17/20  0440 10/18/20  0443 10/19/20  0520   WBC 11.1* 9.7 7.4   RBC 2.49* 2.52* 2.43*   HGB 7.3* 7.5* 7.4*   HCT 22.5* 22.6* 21.9*   MCV 90.1 89.9 90.4   RDW 15.7* 16.4* 16.2*    158 156     CHEMISTRIES:  Recent Labs     10/17/20  0440 10/18/20  0443 10/19/20  0520    132* 135*   K 4.7 4.7 4.7    99 103   CO2 26 26 26   BUN 22* 32* 39*   CREATININE 2.2* 2.6* 2.6*   GLUCOSE 102* 103* 104*   PHOS 3.0 3.1 3.1   MG 1.90 1.90 1.70*     PT/INR:  Recent Labs     10/19/20  0520   PROTIME 15.3*   INR 1.32*     APTT:No results for input(s): APTT in the last 72 hours.   LIVER PROFILE:  Recent Labs 10/17/20  0440   AST 8*   ALT 12   BILIDIR 0.3   BILITOT 0.9   ALKPHOS 74       Imaging Last 24 Hours:  No results found. Assessment//Plan           Hospital Problems           Last Modified POA    * (Principal) Retroperitoneal bleed 10/10/2020 Yes    Morbid obesity with BMI of 60.0-69.9, adult ([address] (Chronic) 10/9/2020 Yes    Benign hypertension (Chronic) 10/9/2020 Yes    History of DVT (deep vein thrombosis) (Chronic) 10/9/2020 Yes    Suspected sleep apnea 10/12/2020 Yes    Supratherapeutic INR 10/9/2020 Yes    Shock ([address] 10/9/2020 Yes    Non-ischemic cardiomyopathy ([facility] EF 40% 10/9/2020 Yes    Bacteremia due to Escherichia coli 10/12/2020 Yes    Bandemia 10/12/2020 Yes    Pyuria 10/12/2020 Yes    Anemia due to blood loss, acute 10/12/2020 Yes    Atelectasis 10/12/2020 Yes        Assessment & Plan  68 yo w obesity, HTN, Fe def anemia, h/o DU bleed in 8/2019 at [facility] (repeat EGD by me in 9/2019 was neg.), kidney CA, DVT on Couamdin and recent choledocholithiasis s/p ERCP in July 2018, presents with supratherapeutic INR complicated by retroperitoneal bleeding and reported melena (resolved) that has now resolved.  INR of 9.2 and H/H 6/20.   EGD and colonoscopy in 4/2019 are neg. Except diverticulosis and polyp. Capsule Endoscopy 4/2019 was neg. So most likely blood loss is mostly due to retroperitoneal rather than GI bleeding. Bobby Dumas was placed.     Plan:   1. Supportive care and agree w Hematology to hold AC   2. Daily H/H  3. Will hold off EGD unless melena recurs  4. Miralax today for constipation  5. Awaiting bone marrow Bx for bandemia  6. The ERCP in 2018 could explain his asymptomatic pneumobilia  7.  Will follow  MD Kyaw Jackson [phone]    Electronically signed by Sibyl Goltz, MD on 10/19/20 at 10:02 AM EDT

## 2020-10-19 NOTE — PROGRESS NOTES
Hospitalist Progress Note      PCP: Dmitry Haas MD    Date of Admission: 10/9/2020    Chief Complaint: Fatigue, dizziness    Hospital Course:  \"Patient with PMH of nonischemic cardiomyopathy EF 40 to 45%, HTN, history of DVT on Coumadin, GI bleed in April 2019, presented to the ED today with complaints of fatigue and dizziness. Patient is pretty much bedbound at home. Wife reports when patient stood up he got lightheaded, and fell back in bed. Also reports patient has not been eating or drinking much. Wife also reports patient stool has been black this past week. Patient is on Coumadin for past history of DVT/PE. She contacted patient's PCP about this, who ordered stool sample for blood, they had trouble getting the test kit until the seventh. Today she called patient's PCP about the patient being very weak, unable to get out of bed, and they were advised to come to the ED. patient also reports diffuse dull aching abdominal pain that is intermittent. \"       Subjective:  Cr stable today.  cc/24 hours. On VSS. Planning for bone marrow biopsy today.     Medications:  Reviewed    Infusion Medications     Scheduled Medications    polyethylene glycol  17 g Oral Daily    docusate sodium  100 mg Oral Daily    zinc oxide   Topical BID    midodrine  10 mg Oral TID WC    levofloxacin  500 mg Intravenous Q48H    Venelex   Topical BID    miconazole   Topical BID    folic acid  1 mg Oral Daily    sodium chloride flush  10 mL Intravenous 2 times per day    pantoprazole  40 mg Intravenous BID     PRN Meds: oxyCODONE, sodium chloride flush, acetaminophen **OR** acetaminophen, promethazine **OR** ondansetron, perflutren lipid microspheres      Intake/Output Summary (Last 24 hours) at 10/19/2020 1758  Last data filed at 10/19/2020 1342  Gross per 24 hour   Intake 720 ml   Output 1200 ml   Net -480 ml       Physical Exam Performed:    /61   Pulse 92   Temp 98.6 °F (37 °C) (Oral) Resp 18   Ht 5' 4\" (1.626 m)   Wt (!) 337 lb (152.9 kg)   SpO2 94%   BMI 57.85 kg/m²       General appearance: Pleasant male in no apparent distress, appears stated age and cooperative. HEENT: Pupils equal, round, and reactive to light. Conjunctivae/corneas clear. Hard of hearing. Neck: Supple, with full range of motion. No jugular venous distention. Trachea midline. Respiratory:  Normal respiratory effort. Clear to auscultation, bilaterally without Rales/Wheezes/Rhonchi. Cardiovascular: Regular rate and rhythm with normal S1/S2 without murmurs, rubs or gallops. Abdomen: Soft, non-tender, non-distended with normal bowel sounds. Large ventral hernia noted. Musculoskeletal: No clubbing, cyanosis or edema bilaterally. Full range of motion without deformity. Skin: Skin color with PVD changes BLE. Scattered areas of skin breakdown. Neurologic:  Neurovascularly intact without any focal sensory/motor deficits. Cranial nerves: II-XII intact, grossly non-focal.  Psychiatric: Alert and oriented, thought content appropriate, normal insight. Capillary Refill: Brisk,< 3 seconds   Peripheral Pulses: +2 palpable, equal bilaterally       Labs:   Recent Labs     10/17/20  0440 10/18/20  0443 10/19/20  0520   WBC 11.1* 9.7 7.4   HGB 7.3* 7.5* 7.4*   HCT 22.5* 22.6* 21.9*    158 156     Recent Labs     10/17/20  0440 10/18/20  0443 10/19/20  0520    132* 135*   K 4.7 4.7 4.7    99 103   CO2 26 26 26   BUN 22* 32* 39*   CREATININE 2.2* 2.6* 2.6*   CALCIUM 7.7* 7.8* 7.9*   PHOS 3.0 3.1 3.1     Recent Labs     10/17/20  0440   AST 8*   ALT 12   BILIDIR 0.3   BILITOT 0.9   ALKPHOS 74     Recent Labs     10/19/20  0520   INR 1.32*     No results for input(s): CKTOTAL, TROPONINI in the last 72 hours.     Urinalysis:      Lab Results   Component Value Date    NITRU Negative 10/09/2020    WBCUA 21-50 10/09/2020    BACTERIA 2+ 10/09/2020    RBCUA 11-20 10/09/2020    BLOODU LARGE 10/09/2020    SPECGRAV 1.010 10/09/2020    GLUCOSEU Negative 10/09/2020       Radiology:  CT BIOPSY BONE MARROW   Final Result   Successful CT guided bone marrow aspiration x2 and core biopsy x1 of the left   iliac bone. CT GUIDED NEEDLE PLACEMENT   Final Result   Successful CT guided bone marrow aspiration x2 and core biopsy x1 of the left   iliac bone. XR CHEST PORTABLE   Final Result   No evidence of pneumothorax following left IJ catheter placement. Improved   aeration in the left lung base. XR CHEST PORTABLE   Final Result   No acute process. CT ABDOMEN PELVIS WO CONTRAST Additional Contrast? None   Final Result   Addendum 1 of 1   ADDENDUM:   Additional information, corrected report:      HISTORY:   The initial history was incorrect, patient did not have left side    nephrectomy   but partial nephrectomy on the right. Therefore the hematoma seen retroperitoneal on the left in the renal fossa   likely obscures the entire left kidney. There is no normal renal    parenchyma   identified. The measured area, 13 x 9 cm therefore presumably include   spontaneous hematoma and kidney tissue combined. This would also    therefore   indicate a lesser degree of hemorrhage present, since some of the measured   area being occupied by the obscured kidney. Again the primary etiology is spontaneous hemorrhage, given the patient's   grossly elevated INR, and likely arising within the obscured left kidney    and   extending left pararenal retroperitoneal space. Final      CT HEAD WO CONTRAST   Final Result   1. No acute intracranial abnormality. XR CHEST PORTABLE   Final Result   No acute cardiopulmonary disease.          NM LIVER SPLEEN SCAN    (Results Pending)           Assessment/Plan:    Active Hospital Problems    Diagnosis    Bacteremia due to Escherichia coli [R78.81, B96.20]    Bandemia [D72.825]    Pyuria [R82.81]    Anemia due to blood loss, acute [D62]    Atelectasis [J98.11]    Retroperitoneal bleed [R58]    Supratherapeutic INR [R79.1]    Shock ([address] [R57.9]    Non-ischemic cardiomyopathy ([address] EF 40% [I42.8]    Suspected sleep apnea [R29.818]    Benign hypertension [I10]    History of DVT (deep vein thrombosis) [Z86.718]    Morbid obesity with BMI of 60.0-69.9, adult (HCC) [E66.01, Z68.44]       L perinephric hemorrhage  Likely 2/2 supratherapeutic INR of 9.24, also possible underlying renal mass  -Discussed with Dr. Jose Trevino and Dr. Elsa Rosales - suspect bleed could be from left renal lesion. CT of abd read as though patient had a left nephrectomy, but this in NOT accurate. He still has his left kidney. Per urology: \"possibly bleed from left renal tumor but had imaging a year ago only showing a stable 2 cm renal cortical lesion. Will ideally need repeat imaging with contrast when creatinine stable in a few weeks. \"  Also, \"Down the line, may require a left radical nephrectomy. \"  - Reversed Coumadin with K Centra, vitamin K. Again given vit K 10/15. Hematology recommended checking the INR every Monday and Friday and giving vit K if >2.  - with ABLA present on admission. Received 1 unit pRBC on 10/9, 10/11, 10/12, and 10/14. Noted the disproportionate bandemia, also metamyelocytes, hematology planning for BMB 10/19   - check MRI of liver   - vascular surgery was considering embolization, but then it seemed like he no longer needed this.     Shock   Combination of hemorrhagic due to above issue and GI bleed, also suspect sepsis due to UTI present on arrival  - Given IV fluid boluses without adequate response, required pressor support, weaned off 10/15.  - held anithypertensives     H/o DVT  - Vascular surgery consulted, placed IVC filter on 10/13. This the patient's 3rd life threatening bleed. Last in 4/2019 - at that time had a GIB, which required 9 units of pRBC. - anticoagulation contraindicated     E. Coli Bacteremia - secondary to acute cystitis.   - treated with IV cefepime, then changed to levofloxacin on 10/14, plan to complete course 10/20. ID input appreciated, they signed off. Suspected upper GI bleed   Worsened due to supratherapeutic INR  -Hb dropped from 12 to 8.5 with fluids revealing true degree of anemia  -Hold Coumadin and antiplatelet agents  -Consulted GI, who deferred EGD at this time due to patient's instability. They will reconsider if he has melena here, which he did not. GI no longer planning inpatient endoscopy. -PPI     KEVIN  - baseline Cr 1.0. Likely ATN due to hypotension and hypovolemia  - nephrology consulted, started on CRRT 10/9, stopped CRRT 10/15.  - he does have h/o partial RIGHT nephrectomy in 2015 due to renal cancer.  - on midodrine - avoid hypotension.  - daily weight, I&O    Non-ischemic cardiomyopathy  - TTE showed EF 50%, with RWMA's. Will need ACE/ARB/ARNI when he is more stable, also BB.     Morbid obesity with BMI of 62.8-53.8, adult   Complicating assessment and treatment, placing patient at high risk for multiple co-morbidities as well as early death and contributing to the patient's presentation. Counseled on weight loss.           DVT Prophylaxis: SCDs  Diet: DIET RENAL; Daily Fluid Restriction: 2000 ml  Code Status: Full Code    PT/OT Eval Status:  rec'd SNF vs LTACH    Dispo - when he is either definitively liberated from HD or when outpatient HD is arranged, perhaps 10/21. He came from home.        Sunil Richard, JOEY - CNP

## 2020-10-19 NOTE — PROGRESS NOTES
Occupational Therapy  Facility/Department: [facility] A2 CARD TELEMETRY  Daily Treatment Note  NAME: Ian Enrique Sr.  : 1950  MRN: [id number]    Date of Service: 10/19/2020    Discharge Recommendations:  Subacute/Skilled Nursing Facility       Assessment      Patient Education: Disease specific:  importance of AROM, bed mobility for preparation for bed mobility & standing  Activity Tolerance  Activity Tolerance: Patient Tolerated treatment well  Safety Devices  Safety Devices in place: Yes  Type of devices: Call light within reach; Left in bed         Patient Diagnosis(es): The primary encounter diagnosis was Retroperitoneal bleed. Diagnoses of Elevated INR, Septic shock (HCC), Hypovolemic shock ([facility] Blood loss anemia, and KEVIN (acute kidney injury) ([facility] were also pertinent to this visit. has a past medical history of Cancer ([facility] Cardiomyopathy ([facility] CHF (congestive heart failure) ([facility] Edema of both legs, GI bleed, Hx of blood clots, and Hypertension. has a past surgical history that includes Kidney surgery (2014); Abdomen surgery; Colonoscopy; Endoscopy, colon, diagnostic; pr egd transoral biopsy single/multiple (2018); Upper gastrointestinal endoscopy (N/A, 2019); Colonoscopy (N/A, 2019); Upper gastrointestinal endoscopy (N/A, 2019); and Cardiac catheterization (2019). Restrictions  Restrictions/Precautions  Restrictions/Precautions: Fall Risk, General Precautions  Position Activity Restriction  Other position/activity restrictions: dialysis catheter, [place], O2 1L, telemetry  Subjective   General  Chart Reviewed: Yes  Patient assessed for rehabilitation services?: Yes  Additional Pertinent Hx: Pt on CRRT during evaluation, RN asking for bed level activity only at this time.   Family / Caregiver Present: Yes(wife)  Referring Practitioner: JOEY Galvez CNP 10/1/2020  Diagnosis: L perinephric hemorrhage  Subjective  Subjective: Pt pleasant and agreeable to

## 2020-10-19 NOTE — PROGRESS NOTES
ONCOLOGY HEMATOLOGY CARE PROGRESS NOTE      SUBJECTIVE:     INR is stable. HD today. Agreeable to MRI liver no contrast and bone marrow bx. Hgb today is 7.4. Wife at bedside. Bands are 10 and down trending. Metamyelocytes present as well. ROS:   The remaining 10 point review of symptoms is unremarkable. OBJECTIVE        Physical    VITALS:  /73   Pulse 86   Temp 97.4 °F (36.3 °C) (Oral)   Resp 18   Ht 5' 4\" (1.626 m)   Wt (!) 337 lb (152.9 kg)   SpO2 99%   BMI 57.85 kg/m²   TEMPERATURE:  Current - Temp: 97.4 °F (36.3 °C); Max - Temp  Av.1 °F (36.7 °C)  Min: 97.4 °F (36.3 °C)  Max: 98.4 °F (36.9 °C)  PULSE OXIMETRY RANGE: SpO2  Av.9 %  Min: 93 %  Max: 99 %  24HR INTAKE/OUTPUT:      Intake/Output Summary (Last 24 hours) at 10/19/2020 1123  Last data filed at 10/19/2020 6880  Gross per 24 hour   Intake 960 ml   Output 600 ml   Net 360 ml       CONSTITUTIONAL:  He is morbidly obese  , HEENT oral pharynx , no scleral icterus  HEMATOLOGIC/LYMPHATICS:  no cervical lymphadenopathy, no supraclavicular lymphadenopathy, no axillary lymphadenopathy and no inguinal lymphadenopathy  LUNGS:  No increased work of breathing, good air exchange, clear to auscultation bilaterally, no crackles or wheezing  CARDIOVASCULAR:  , regular rate and rhythm, normal S1 and S2, no S3 or S4, and no murmur noted  ABDOMEN:  No scars, normal bowel sounds, soft, non-distended, non-tender, no masses palpated, no hepatosplenomegally  MUSCULOSKELETAL:  There is no redness, warmth, or swelling of the joints. EXTREMETIES: No clubbing cynosis or edema  NEUROLOGIC:  Awake, alert, oriented to name, place and time. Cranial nerves II-XII are grossly intact. Motor is 5 out of 5 bilaterally.    SKIN:  no bruising or bleeding      Data      Recent Labs     10/17/20  0440 10/18/20  0443 10/19/20  0520   WBC 11.1* 9.7 7.4   HGB 7.3* 7.5* 7.4*   HCT 22.5* 22.6* 21.9*    158 156   MCV 90.1 89.9 90.4        Recent Labs     10/17/20  0440 10/18/20  0443 10/19/20  0520    132* 135*   K 4.7 4.7 4.7    99 103   CO2 26 26 26   PHOS 3.0 3.1 3.1   BUN 22* 32* 39*   CREATININE 2.2* 2.6* 2.6*     Recent Labs     10/17/20  0440   AST 8*   ALT 12   BILIDIR 0.3   BILITOT 0.9   ALKPHOS 74       Magnesium:    Lab Results   Component Value Date    MG 1.70 10/19/2020    MG 1.90 10/18/2020    MG 1.90 10/17/2020         Problem List  Patient Active Problem List   Diagnosis    Morbid obesity with BMI of 60.0-69.9, adult ([address]    Anticoagulation goal of INR 1.5 to 2.5    Benign hypertension    History of DVT (deep vein thrombosis)    Ventral hernia    Elevated brain natriuretic peptide (BNP) level    Pulmonary vascular congestion on CXR    Sepsis ([address]    Hypomagnesemia    Hypokalemia    Hyponatremia    Hypochloremia    KEVIN [facility]) [facility])    Subacute microcytic anemia (July 2018)    Hx choledocholithiasis s/p ERCP (July 2018)    Acute respiratory failure with hypoxia and hypercapnia (HCC)    Calculus of gallbladder with acute cholecystitis without obstruction    Intertrigo    Lymphedema    Suspected sleep apnea    Biatrial enlargement    Congestive heart failure (HCC)    GI bleed    Retroperitoneal bleed    Supratherapeutic INR    Shock ([address]    Non-ischemic cardiomyopathy ([address] EF 40%    Bacteremia due to Escherichia coli    Bandemia    Pyuria    Anemia due to blood loss, acute    Atelectasis       ASSESSMENT AND PLAN:    At least 2 DVTs greater than 20 years ago:  -He is now had a second episode of considerable bleeding  -The risk of anticoagulation is higher than the risk of DVT/pulmonary embolism and the patient is aware  -He is status post a Filter placed 10/14/2020    Anemia:  -Likely Due to bleeding  -No evidence of iron deficiency  -His counts have been stable for several days  - Hgb over 7 today  - likely reactive to underlying issues (dialysis and bleeding

## 2020-10-19 NOTE — CARE COORDINATION
Discussed plan of care with Brittany Garcia CNP; concerns regarding level of care and needs for patient at [place]. Referral made to [facility] for both LTAC and SNF consideration; under review; await decision; call to wife to update, message left with contact info. Patient with many needs at this point; unsure that SNF level of care best option for him. Following for care plan.      Byron Garvey  MSW LSW

## 2020-10-19 NOTE — PROGRESS NOTES
[facility]       Progress Note           Subjective/   71y.o. year old male who we are seeing in consultation for KEVIN requiring CRRT.     HPI:  Off CRRT sine 10/15, renal function showing some signs of recovery  No edema  Eating better  Urine output picking up     ROS:  Denies any sob, on room air   No fevers. Objective/   GEN:  Chronically ill, /73   Pulse 86   Temp 97.4 °F (36.3 °C) (Oral)   Resp 18   Ht 5' 4\" (1.626 m)   Wt (!) 337 lb (152.9 kg)   SpO2 99%   BMI 57.85 kg/m²   HEENT: non-icteric, no JVD  CV: S1, S2 without m/r/g; + LE edema  RESP: CTA B without w/r/r; breathing wnl  ABD: +bs, soft, nt, no hsm, +hernia  SKIN: warm, no rashes    Data/  Recent Labs     10/17/20  0440 10/18/20  0443 10/19/20  0520   WBC 11.1* 9.7 7.4   HGB 7.3* 7.5* 7.4*   HCT 22.5* 22.6* 21.9*   MCV 90.1 89.9 90.4    158 156     Recent Labs     10/17/20  0440 10/18/20  0443 10/19/20  0520    132* 135*   K 4.7 4.7 4.7    99 103   CO2 26 26 26   GLUCOSE 102* 103* 104*   PHOS 3.0 3.1 3.1   MG 1.90 1.90 1.70*   BUN 22* 32* 39*   CREATININE 2.2* 2.6* 2.6*   LABGLOM 30* 25* 25*   GFRAA 36* 30* 30*       Assessment/Plan     Acute Kidney Injury.  - Likely ATN from profound hypotension, sepsis. - CRRT started on 10/10/20, off since 10/15. [facility] temp HD cath as access. - Plan to holding dialysis, monitor labs, urine output, and symptoms    Left retroperitoneal hematoma  - Urology following.     Shock.  - Hemorrhagic and septic. E. Coli in the blood and urine. Antibiotics per ID.  - Supra therapeutic INR on admission. H/o DVT, now off coumadin. S/P IVC filter placement on 10/13/20.  - Continue midodrine 10mg 3x/day, off levophed.     Anemia. - PRN blood transfusion per primary service.       Case d/w patient's wife

## 2020-10-19 NOTE — PLAN OF CARE
Problem: Falls - Risk of:  Goal: Will remain free from falls  Description: Will remain free from falls  Outcome: Ongoing     Problem: Skin Integrity:  Goal: Will show no infection signs and symptoms  Description: Will show no infection signs and symptoms  Outcome: Ongoing     Problem: Activity:  Goal: Risk for activity intolerance will decrease  Description: Risk for activity intolerance will decrease  Outcome: Ongoing     Problem: OXYGENATION/RESPIRATORY FUNCTION  Goal: Patient will maintain patent airway  Outcome: Ongoing     Patient's EF (Ejection Fraction) is greater than 40%    Heart Failure Medications:  Diuretics[de-identified] None    (One of the following REQUIRED for EF <40%/SYSTOLIC FAILURE but MAY be used in EF% >40%/DIASTOLIC FAILURE)        ACE[de-identified] None        ARB[de-identified] None         ARNI[de-identified] None    (Beta Blockers)  NON- Evidenced Based Beta Blocker (for EF% >40%/DIASTOLIC FAILURE): None    Evidenced Based Beta Blocker::(REQUIRED for EF% <40%/SYSTOLIC FAILURE) None  . .................................................................................................................................................. Patient's weights and intake/output reviewed: No    Patient's Last Weight: 337 lbs obtained by bed scale. Difference of 1 lbs less than last documented weight. Intake/Output Summary (Last 24 hours) at 10/19/2020 1612  Last data filed at 10/19/2020 1342  Gross per 24 hour   Intake 720 ml   Output 1200 ml   Net -480 ml       Comorbidities Reviewed Yes    Patient has a past medical history of Cancer ([facility] Cardiomyopathy ([facility] [address] [facility] CHF (congestive heart failure) ([facility] [address] [facility] Edema of both legs, GI bleed, Hx of blood clots, and Hypertension.      >>For CHF and Comorbidity documentation on Education Time and Topics, please see Education Tab    Progressive Mobility Assessment:  What is this patient's Current Level of Mobility?: Requires Bed Rest  How was this patient Mobilized today?: Unable to [facility] and Patient Refuses to Mobilize                 With Whom? Nurse, PCA, PT, OT, and Self                 Level of Difficulty/Assistance: 2x Assist     Pt sitting in bed at this time on 1. Pt denies shortness of breath. Pt with pitting lower extremity edema.      Patient and/or Family's stated Goal of Care this Admission: reduce shortness of breath, increase activity tolerance, better understand heart failure and disease management, be more comfortable, and reduce lower extremity edema prior to discharge        :

## 2020-10-20 ENCOUNTER — APPOINTMENT (OUTPATIENT)
Dept: NUCLEAR MEDICINE | Age: 70
DRG: 853 | End: 2020-10-20
Payer: MEDICARE

## 2020-10-20 LAB
ALBUMIN SERPL-MCNC: 2.5 G/DL (ref 3.4–5)
ANION GAP SERPL CALCULATED.3IONS-SCNC: 8 MMOL/L (ref 3–16)
ANISOCYTOSIS: ABNORMAL
BANDED NEUTROPHILS RELATIVE PERCENT: 10 % (ref 0–7)
BASOPHILIC STIPPLING: ABNORMAL
BASOPHILS ABSOLUTE: 0.1 K/UL (ref 0–0.2)
BASOPHILS RELATIVE PERCENT: 1 %
BUN BLDV-MCNC: 44 MG/DL (ref 7–20)
CALCIUM SERPL-MCNC: 8.1 MG/DL (ref 8.3–10.6)
CHLORIDE BLD-SCNC: 104 MMOL/L (ref 99–110)
CO2: 25 MMOL/L (ref 21–32)
CREAT SERPL-MCNC: 2.6 MG/DL (ref 0.8–1.3)
EOSINOPHILS ABSOLUTE: 0.1 K/UL (ref 0–0.6)
EOSINOPHILS RELATIVE PERCENT: 2 %
GFR AFRICAN AMERICAN: 30
GFR NON-AFRICAN AMERICAN: 25
GLUCOSE BLD-MCNC: 96 MG/DL (ref 70–99)
HCT VFR BLD CALC: 21.9 % (ref 40.5–52.5)
HEMOGLOBIN: 7.2 G/DL (ref 13.5–17.5)
HEPATITIS B CORE TOTAL ANTIBODY: NEGATIVE
LYMPHOCYTES ABSOLUTE: 0.4 K/UL (ref 1–5.1)
LYMPHOCYTES RELATIVE PERCENT: 6 %
MACROCYTES: ABNORMAL
MAGNESIUM: 1.6 MG/DL (ref 1.8–2.4)
MCH RBC QN AUTO: 29.5 PG (ref 26–34)
MCHC RBC AUTO-ENTMCNC: 32.7 G/DL (ref 31–36)
MCV RBC AUTO: 90 FL (ref 80–100)
METAMYELOCYTES RELATIVE PERCENT: 5 %
MONOCYTES ABSOLUTE: 0.9 K/UL (ref 0–1.3)
MONOCYTES RELATIVE PERCENT: 13 %
MYELOCYTE PERCENT: 5 %
NEUTROPHILS ABSOLUTE: 5.7 K/UL (ref 1.7–7.7)
NEUTROPHILS RELATIVE PERCENT: 58 %
PDW BLD-RTO: 16.5 % (ref 12.4–15.4)
PHOSPHORUS: 2.9 MG/DL (ref 2.5–4.9)
PLATELET # BLD: 145 K/UL (ref 135–450)
PMV BLD AUTO: 8.2 FL (ref 5–10.5)
POTASSIUM SERPL-SCNC: 5 MMOL/L (ref 3.5–5.1)
RBC # BLD: 2.43 M/UL (ref 4.2–5.9)
SODIUM BLD-SCNC: 137 MMOL/L (ref 136–145)
TOXIC GRANULATION: PRESENT
WBC # BLD: 7.3 K/UL (ref 4–11)

## 2020-10-20 PROCEDURE — 88305 TISSUE EXAM BY PATHOLOGIST: CPT

## 2020-10-20 PROCEDURE — 3430000000 HC RX DIAGNOSTIC RADIOPHARMACEUTICAL: Performed by: NURSE PRACTITIONER

## 2020-10-20 PROCEDURE — 6360000002 HC RX W HCPCS: Performed by: INTERNAL MEDICINE

## 2020-10-20 PROCEDURE — 88313 SPECIAL STAINS GROUP 2: CPT

## 2020-10-20 PROCEDURE — 97110 THERAPEUTIC EXERCISES: CPT

## 2020-10-20 PROCEDURE — 6370000000 HC RX 637 (ALT 250 FOR IP): Performed by: INTERNAL MEDICINE

## 2020-10-20 PROCEDURE — C9113 INJ PANTOPRAZOLE SODIUM, VIA: HCPCS | Performed by: INTERNAL MEDICINE

## 2020-10-20 PROCEDURE — 2700000000 HC OXYGEN THERAPY PER DAY

## 2020-10-20 PROCEDURE — A9541 TC99M SULFUR COLLOID: HCPCS | Performed by: NURSE PRACTITIONER

## 2020-10-20 PROCEDURE — 88311 DECALCIFY TISSUE: CPT

## 2020-10-20 PROCEDURE — 6360000002 HC RX W HCPCS: Performed by: NURSE PRACTITIONER

## 2020-10-20 PROCEDURE — 85025 COMPLETE CBC W/AUTO DIFF WBC: CPT

## 2020-10-20 PROCEDURE — 6370000000 HC RX 637 (ALT 250 FOR IP): Performed by: NURSE PRACTITIONER

## 2020-10-20 PROCEDURE — 83735 ASSAY OF MAGNESIUM: CPT

## 2020-10-20 PROCEDURE — 78215 LVR&SPLEEN IMG STATIC ONLY: CPT

## 2020-10-20 PROCEDURE — 1200000000 HC SEMI PRIVATE

## 2020-10-20 PROCEDURE — 36592 COLLECT BLOOD FROM PICC: CPT

## 2020-10-20 PROCEDURE — 94761 N-INVAS EAR/PLS OXIMETRY MLT: CPT

## 2020-10-20 PROCEDURE — 80069 RENAL FUNCTION PANEL: CPT

## 2020-10-20 PROCEDURE — 2580000003 HC RX 258: Performed by: INTERNAL MEDICINE

## 2020-10-20 RX ORDER — MAGNESIUM SULFATE 1 G/100ML
1 INJECTION INTRAVENOUS ONCE
Status: COMPLETED | OUTPATIENT
Start: 2020-10-20 | End: 2020-10-20

## 2020-10-20 RX ADMIN — PANTOPRAZOLE SODIUM 40 MG: 40 INJECTION, POWDER, FOR SOLUTION INTRAVENOUS at 20:53

## 2020-10-20 RX ADMIN — POLYETHYLENE GLYCOL 3350 17 G: 17 POWDER, FOR SOLUTION ORAL at 08:48

## 2020-10-20 RX ADMIN — SODIUM CHLORIDE, PRESERVATIVE FREE 10 ML: 5 INJECTION INTRAVENOUS at 08:48

## 2020-10-20 RX ADMIN — PANTOPRAZOLE SODIUM 40 MG: 40 INJECTION, POWDER, FOR SOLUTION INTRAVENOUS at 08:48

## 2020-10-20 RX ADMIN — Medication: at 08:49

## 2020-10-20 RX ADMIN — Medication 7 MILLICURIE: at 06:45

## 2020-10-20 RX ADMIN — Medication: at 20:53

## 2020-10-20 RX ADMIN — MAGNESIUM SULFATE HEPTAHYDRATE 1 G: 1 INJECTION, SOLUTION INTRAVENOUS at 09:29

## 2020-10-20 RX ADMIN — CASTOR OIL AND BALSAM, PERU: 788; 87 OINTMENT TOPICAL at 08:49

## 2020-10-20 RX ADMIN — MICONAZOLE NITRATE: 20 POWDER TOPICAL at 20:53

## 2020-10-20 RX ADMIN — MICONAZOLE NITRATE: 20 POWDER TOPICAL at 08:49

## 2020-10-20 RX ADMIN — FOLIC ACID 1 MG: 1 TABLET ORAL at 08:48

## 2020-10-20 RX ADMIN — DOCUSATE SODIUM 100 MG: 100 CAPSULE ORAL at 08:48

## 2020-10-20 RX ADMIN — MIDODRINE HYDROCHLORIDE 10 MG: 5 TABLET ORAL at 08:48

## 2020-10-20 RX ADMIN — CASTOR OIL AND BALSAM, PERU: 788; 87 OINTMENT TOPICAL at 20:53

## 2020-10-20 RX ADMIN — MIDODRINE HYDROCHLORIDE 10 MG: 5 TABLET ORAL at 13:36

## 2020-10-20 RX ADMIN — DARBEPOETIN ALFA 60 MCG: 60 INJECTION, SOLUTION INTRAVENOUS; SUBCUTANEOUS at 10:35

## 2020-10-20 RX ADMIN — MIDODRINE HYDROCHLORIDE 10 MG: 5 TABLET ORAL at 17:44

## 2020-10-20 RX ADMIN — SODIUM CHLORIDE, PRESERVATIVE FREE 10 ML: 5 INJECTION INTRAVENOUS at 20:53

## 2020-10-20 ASSESSMENT — PAIN DESCRIPTION - FREQUENCY
FREQUENCY: INTERMITTENT
FREQUENCY: CONTINUOUS

## 2020-10-20 ASSESSMENT — PAIN DESCRIPTION - PAIN TYPE
TYPE: CHRONIC PAIN
TYPE: ACUTE PAIN

## 2020-10-20 ASSESSMENT — PAIN DESCRIPTION - LOCATION
LOCATION: ABDOMEN
LOCATION: ABDOMEN

## 2020-10-20 ASSESSMENT — PAIN SCALES - WONG BAKER
WONGBAKER_NUMERICALRESPONSE: 0
WONGBAKER_NUMERICALRESPONSE: 6
WONGBAKER_NUMERICALRESPONSE: 0
WONGBAKER_NUMERICALRESPONSE: 0

## 2020-10-20 ASSESSMENT — PAIN SCALES - GENERAL
PAINLEVEL_OUTOF10: 0
PAINLEVEL_OUTOF10: 5

## 2020-10-20 ASSESSMENT — PAIN DESCRIPTION - PROGRESSION: CLINICAL_PROGRESSION: NOT CHANGED

## 2020-10-20 ASSESSMENT — PAIN DESCRIPTION - DESCRIPTORS
DESCRIPTORS: ACHING
DESCRIPTORS: ACHING;DISCOMFORT;SORE

## 2020-10-20 ASSESSMENT — PAIN DESCRIPTION - ONSET
ONSET: ON-GOING
ONSET: ON-GOING

## 2020-10-20 ASSESSMENT — PAIN - FUNCTIONAL ASSESSMENT: PAIN_FUNCTIONAL_ASSESSMENT: PREVENTS OR INTERFERES WITH MANY ACTIVE NOT PASSIVE ACTIVITIES

## 2020-10-20 ASSESSMENT — PAIN DESCRIPTION - ORIENTATION: ORIENTATION: LOWER

## 2020-10-20 NOTE — PLAN OF CARE
Problem: Falls - Risk of:  Goal: Will remain free from falls  Description: Will remain free from falls  10/19/2020 2334 by Jessica Arriola RN  Outcome: Met This Shift     Problem: OXYGENATION/RESPIRATORY FUNCTION  Goal: Patient will maintain patent airway  10/19/2020 2334 by Jessica Arriola RN  Outcome: Met This Shift     Problem: OXYGENATION/RESPIRATORY FUNCTION  Goal: Patient will achieve/maintain normal respiratory rate/effort  Description: Respiratory rate and effort will be within normal limits for the patient  Outcome: Met This Shift     Problem: Skin Integrity:  Goal: Will show no infection signs and symptoms  Description: Will show no infection signs and symptoms  10/19/2020 2334 by Jessica Arriola RN  Outcome: Ongoing     Problem: Skin Integrity:  Goal: Absence of new skin breakdown  Description: Absence of new skin breakdown  Outcome: Ongoing     Problem: FLUID AND ELECTROLYTE IMBALANCE  Goal: Fluid and electrolyte balance are achieved/maintained  Outcome: Ongoing     Patient's EF (Ejection Fraction) is greater than 40%    Heart Failure Medications:   Diuretics[de-identified] None     (One of the following REQUIRED for EF <40%/SYSTOLIC FAILURE but MAY be used in EF% >40%/DIASTOLIC FAILURE)        ACE[de-identified] None        ARB[de-identified] None         ARNI[de-identified] None    (Beta Blockers)   NON- Evidenced Based Beta Blocker (for EF% >40%/DIASTOLIC FAILURE): None     Evidenced Based Beta Blocker::(REQUIRED for EF% <40%/SYSTOLIC FAILURE) None  . .................................................................................................................................................. Patient's weights and intake/output reviewed: Yes    Patient's Last Weight: 341 lbs obtained by bed scale. Difference of 4 lbs more than last documented weight.       Intake/Output Summary (Last 24 hours) at 10/19/2020 2335  Last data filed at 10/19/2020 180  Gross per 24 hour   Intake 840 ml   Output 1400 ml   Net -560 ml Comorbidities Reviewed Yes    Patient has a past medical history of Cancer ([facility] [address] Cardiomyopathy ([facility] [address] CHF (congestive heart failure) ([facility] [address] Edema of both legs, GI bleed, Hx of blood clots, and Hypertension. >>For CHF and Comorbidity documentation on Education Time and Topics, please see Education Tab    Progressive Mobility Assessment:  What is this patient's Current Level of Mobility?: Requires Bed Rest  How was this patient Mobilized today?: Unable to Mobilize                 With Whom? Nurse, PCA, OT and Self                 Level of Difficulty/Assistance: 2x Assist     Pt resting in bed at this time on 1 L O2. Pt denies shortness of breath. Pt with pitting lower extremity edema.      Patient and/or Family's stated Goal of Care this Admission: increase activity tolerance, better understand heart failure and disease management, be more comfortable and reduce lower extremity edema prior to discharge        :

## 2020-10-20 NOTE — PROGRESS NOTES
Progress Note  Date:10/20/2020       Room:0210/0210-02  Patient Ta Vergara Sr. YOB: 1950     Age:69 y.o. Subjective    Subjective:  Symptoms:  Stable. Pain:  He reports no pain. Review of Systems  Objective         Vitals Last 24 Hours:  TEMPERATURE:  Temp  Av.7 °F (37.1 °C)  Min: 97.9 °F (36.6 °C)  Max: 99.7 °F (37.6 °C)  RESPIRATIONS RANGE: Resp  Av.9  Min: 16  Max: 38  PULSE OXIMETRY RANGE: SpO2  Av.5 %  Min: 94 %  Max: 100 %  PULSE RANGE: Pulse  Av.9  Min: 89  Max: 116  BLOOD PRESSURE RANGE: Systolic (91EOQ), IBS:405 , Min:100 , DWU:139   ; Diastolic (44PKR), ZR, Min:61, Max:89    I/O (24Hr): Intake/Output Summary (Last 24 hours) at 10/20/2020 0841  Last data filed at 10/19/2020 1803  Gross per 24 hour   Intake 840 ml   Output 1200 ml   Net -360 ml     Objective:  General Appearance: In no acute distress and not in pain. Vital signs: (most recent): Blood pressure 100/66, pulse 89, temperature 98.3 °F (36.8 °C), temperature source Oral, resp. rate 16, height 5' 4\" (1.626 m), weight (!) 341 lb 1.6 oz (154.7 kg), SpO2 97 %. Abdomen: Abdomen is soft. Bowel sounds are normal.   There is no abdominal tenderness. Labs/Imaging/Diagnostics    Labs:  CBC:  Recent Labs     10/18/20  0443 10/19/20  0520 10/20/20  0510   WBC 9.7 7.4 7.3   RBC 2.52* 2.43* 2.43*   HGB 7.5* 7.4* 7.2*   HCT 22.6* 21.9* 21.9*   MCV 89.9 90.4 90.0   RDW 16.4* 16.2* 16.5*    156 145     CHEMISTRIES:  Recent Labs     10/18/20  0443 10/19/20  0520 10/20/20  0510   * 135* 137   K 4.7 4.7 5.0   CL 99 103 104   CO2 26 26 25   BUN 32* 39* 44*   CREATININE 2.6* 2.6* 2.6*   GLUCOSE 103* 104* 96   PHOS 3.1 3.1 2.9   MG 1.90 1.70* 1.60*     PT/INR:  Recent Labs     10/19/20  0520   PROTIME 15.3*   INR 1.32*     APTT:No results for input(s): APTT in the last 72 hours.   LIVER PROFILE:No results for input(s): AST, ALT, BILIDIR, BILITOT, ALKPHOS in the last 72 hours.    Imaging Last 24 Hours:  Ct Guided Needle Placement    Result Date: 10/19/2020  PROCEDURE: CT GUIDED BONE MARROW ASPIRATION AND CORE NEEDLE BONE BIOPSY OF THE  ILIAC BONE. MODERATE CONSCIOUS SEDATION 10/19/2020 HISTORY: ORDERING SYSTEM PROVIDED HISTORY: bone marrow bx and aspiration, rule out mds , anemia TECHNOLOGIST PROVIDED HISTORY: Reason for exam:->bone marrow bx and aspiration, rule out mds , anemia SEDATION: Approximately 8 minutes using 50 mcg of intravenous fentanyl and 2.5 mg of intravenous Versed administered and monitored by the radiology nursing staff and supervised by Dr. Otoniel Arrington: Informed consent was obtained following a detailed explanation of the procedure including risks, benefits, and alternatives. Universal protocol was followed. Axial images were obtained through the iliac bones using CT guidance and a suitable skin site was prepped and draped in sterile fashion. Local anesthesia was achieved with lidocaine. An 11 gauge l bone marrow biopsy needle was advanced into the left iliac bone and approximately 10 mL x2 of bone marrow aspirate was obtained. A single core biopsy specimen was obtained. The sample was deemed satisfactory by the cytopathologist in presence. Afterwards the needle withdrawn and a sterile dressing applied. There was no bleeding or hematoma noted. The patient tolerated the procedure well. Dose modulation, iterative reconstruction, and/or weight based adjustment of the mA/kV was utilized to reduce the radiation dose to as low as reasonably achievable. Successful CT guided bone marrow aspiration x2 and core biopsy x1 of the left iliac bone. Ct Biopsy Bone Marrow    Result Date: 10/19/2020  PROCEDURE: CT GUIDED BONE MARROW ASPIRATION AND CORE NEEDLE BONE BIOPSY OF THE  ILIAC BONE.  MODERATE CONSCIOUS SEDATION 10/19/2020 HISTORY: ORDERING SYSTEM PROVIDED HISTORY: bone marrow bx and aspiration, rule out mds , anemia TECHNOLOGIST PROVIDED HISTORY: Reason for exam:->bone marrow bx and aspiration, rule out mds , anemia SEDATION: Approximately 8 minutes using 50 mcg of intravenous fentanyl and 2.5 mg of intravenous Versed administered and monitored by the radiology nursing staff and supervised by Dr. Woodruff Primer: Informed consent was obtained following a detailed explanation of the procedure including risks, benefits, and alternatives. Universal protocol was followed. Axial images were obtained through the iliac bones using CT guidance and a suitable skin site was prepped and draped in sterile fashion. Local anesthesia was achieved with lidocaine. An 11 gauge l bone marrow biopsy needle was advanced into the left iliac bone and approximately 10 mL x2 of bone marrow aspirate was obtained. A single core biopsy specimen was obtained. The sample was deemed satisfactory by the cytopathologist in presence. Afterwards the needle withdrawn and a sterile dressing applied. There was no bleeding or hematoma noted. The patient tolerated the procedure well. Dose modulation, iterative reconstruction, and/or weight based adjustment of the mA/kV was utilized to reduce the radiation dose to as low as reasonably achievable. Successful CT guided bone marrow aspiration x2 and core biopsy x1 of the left iliac bone.      Assessment//Plan           Hospital Problems           Last Modified POA    * (Principal) Retroperitoneal bleed 10/10/2020 Yes    Morbid obesity with BMI of 60.0-69.9, adult ([address] (Chronic) 10/9/2020 Yes    Benign hypertension (Chronic) 10/9/2020 Yes    History of DVT (deep vein thrombosis) (Chronic) 10/9/2020 Yes    Suspected sleep apnea 10/12/2020 Yes    Supratherapeutic INR 10/9/2020 Yes    Shock ([address] 10/9/2020 Yes    Non-ischemic cardiomyopathy ([address] EF 40% 10/9/2020 Yes    Bacteremia due to Escherichia coli 10/12/2020 Yes    Bandemia 10/12/2020 Yes    Pyuria 10/12/2020 Yes    Anemia due to blood loss, acute 10/12/2020 Yes    Atelectasis 10/12/2020 Yes        Assessment & Plan  68 yo w obesity, HTN, Fe def anemia, h/o DU bleed in 8/2019 at [facility] (repeat EGD by me in 9/2019 was neg.), kidney CA, DVT on Couamdin and recent choledocholithiasis s/p ERCP in July 2018, presents with supratherapeutic INR complicated by retroperitoneal bleeding and reported melena (resolved) that has now resolved.  INR of 9.2 and H/H 6/20.   EGD and colonoscopy in 4/2019 are neg. Except diverticulosis and polyp. Capsule Endoscopy 4/2019 was neg. So most likely blood loss is mostly due to retroperitoneal rather than GI bleeding. Greenfilter was placed. Is S/P bone marrow Bx for bandemia on 10/19.     Plan:   1. Supportive care and agree w Hematology to hold AC   2. Daily H/H  3. Will hold off EGD unless melena recurs  4. The ERCP in 2018 could explain his asymptomatic pneumobilia  5.  Will follow     Mariana Vázquez MD       [facility] [phone]    Electronically signed by Mariana Vázquez MD on 10/20/20 at 8:41 AM EDT

## 2020-10-20 NOTE — CARE COORDINATION
Writer spoke with [facility] admissions, they are continuing to follow pt, right now his needs are not quite up to LTAC level needed, he only has 1 IV antibiotic and PT/OT. Writer spoke with Vara Harada, NP, she is waiting for specialists plans to form, pt may need HD. Will continue to follow and coordinate discharge arrangements, pt does not need precert since has Medicare.   YOLANDA Whitney-RN

## 2020-10-20 NOTE — PROGRESS NOTES
ONCOLOGY HEMATOLOGY CARE PROGRESS NOTE      SUBJECTIVE:     HD on hold for now. Awaiting placement at [facility]. Bone marrow done yesterday. Liver spleen scan this AM.   Hgb stable. Patient feels well. ROS:   The remaining 10 point review of symptoms is unremarkable. OBJECTIVE        Physical    VITALS:  /66   Pulse 89   Temp 98.3 °F (36.8 °C) (Oral)   Resp 16   Ht 5' 4\" (1.626 m)   Wt (!) 341 lb 1.6 oz (154.7 kg)   SpO2 97%   BMI 58.55 kg/m²   TEMPERATURE:  Current - Temp: 98.3 °F (36.8 °C); Max - Temp  Av.7 °F (37.1 °C)  Min: 97.9 °F (36.6 °C)  Max: 99.7 °F (37.6 °C)  PULSE OXIMETRY RANGE: SpO2  Av.4 %  Min: 94 %  Max: 100 %  24HR INTAKE/OUTPUT:      Intake/Output Summary (Last 24 hours) at 10/20/2020 1101  Last data filed at 10/20/2020 0942  Gross per 24 hour   Intake 700 ml   Output 1825 ml   Net -1125 ml       CONSTITUTIONAL:  He is morbidly obese  , HEENT oral pharynx , no scleral icterus  HEMATOLOGIC/LYMPHATICS:  no cervical lymphadenopathy, no supraclavicular lymphadenopathy, no axillary lymphadenopathy and no inguinal lymphadenopathy  LUNGS:  No increased work of breathing, good air exchange, clear to auscultation bilaterally, no crackles or wheezing  CARDIOVASCULAR:  , regular rate and rhythm, normal S1 and S2, no S3 or S4, and no murmur noted  ABDOMEN:  No scars, normal bowel sounds, soft, non-distended, non-tender, no masses palpated, no hepatosplenomegally  MUSCULOSKELETAL:  There is no redness, warmth, or swelling of the joints. EXTREMETIES: No clubbing cynosis or edema  NEUROLOGIC:  Awake, alert, oriented to name, place and time. Cranial nerves II-XII are grossly intact. Motor is 5 out of 5 bilaterally.    SKIN:  no bruising or bleeding      Data      Recent Labs     10/18/20  0443 10/19/20  0520 10/20/20  0510   WBC 9.7 7.4 7.3   HGB 7.5* 7.4* 7.2*   HCT 22.6* 21.9* 21.9*    156 145   MCV 89.9 90.4 90.0        Recent Labs 10/18/20  0443 10/19/20  0520 10/20/20  0510   * 135* 137   K 4.7 4.7 5.0   CL 99 103 104   CO2 26 26 25   PHOS 3.1 3.1 2.9   BUN 32* 39* 44*   CREATININE 2.6* 2.6* 2.6*     No results for input(s): AST, ALT, ALB, BILIDIR, BILITOT, ALKPHOS in the last 72 hours.     Magnesium:    Lab Results   Component Value Date    MG 1.60 10/20/2020    MG 1.70 10/19/2020    MG 1.90 10/18/2020         Problem List  Patient Active Problem List   Diagnosis    Morbid obesity with BMI of 60.0-69.9, adult ([address]    Anticoagulation goal of INR 1.5 to 2.5    Benign hypertension    History of DVT (deep vein thrombosis)    Ventral hernia    Elevated brain natriuretic peptide (BNP) level    Pulmonary vascular congestion on CXR    Sepsis ([address]    Hypomagnesemia    Hypokalemia    Hyponatremia    Hypochloremia    KEVIN (acute kidney injury) ([address]    Subacute microcytic anemia (July 2018)    Hx choledocholithiasis s/p ERCP (July 2018)    Acute respiratory failure with hypoxia and hypercapnia (HCC)    Calculus of gallbladder with acute cholecystitis without obstruction    Intertrigo    Lymphedema    Suspected sleep apnea    Biatrial enlargement    Congestive heart failure (HCC)    GI bleed    Retroperitoneal bleed    Supratherapeutic INR    Shock ([address]    Non-ischemic cardiomyopathy ([address] EF 40%    Bacteremia due to Escherichia coli    Bandemia    Pyuria    Anemia due to blood loss, acute    Atelectasis       ASSESSMENT AND PLAN:    At least 2 DVTs greater than 20 years ago:  -He is now had a second episode of considerable bleeding  -The risk of anticoagulation is higher than the risk of DVT/pulmonary embolism and the patient is aware  -He is status post a Filter placed 10/14/2020    Anemia:  -Likely Due to bleeding  -No evidence of iron deficiency  -His counts have been stable for several days  - Hgb over 7 today  - likely reactive to underlying issues (dialysis and bleeding event)- metamyelocytes present indicating an exhausted bone marrow   - Bone marrow bx ordered to rule out MDS- unlikely - check cx as well. Done 10-19 and results are pending.   - start aranesp today 60 weekly - dosed on weight per PI. Vitamin K deficiency:  -This is a bit unusual given his morbid obesity  -He may either have liver dysfunction, but CT scan does not show any evidence of cirrhosis  -His INR does correct with vitamin K  -NM liver spleen scan today- results pending     Acute kidney injury:  -Being managed by nephrology  - HD stopped 10-19     History of renal cell carcinoma:  -No evidence of recurrence    Epistaxis:  -Resolved  - INR stable. ONCOLOGIC DISPOSITION:    -okay to dc to [facility] from heme onc standpoint.    - start weekly aranesp today for EPO less than [place], CNP   May be reached through [facility]

## 2020-10-20 NOTE — PROGRESS NOTES
Comprehensive Nutrition Assessment    Type and Reason for Visit:  Reassess    Nutrition Recommendations/Plan:   1. Continue renal diet with 2000 mL FR   2. Resume Magic Cups TID   3. Encourage PO intakes   4. Monitor nutrition adequacy, pertinent labs, bowel habits, wt changes, and clinical progress    Nutrition Assessment:  Follow up: Off CRRT. Per nephrology, kidney function improving and monitoring further need for HD. Bone marrow bx on 10/19, awaiting results. NM liver and spleen scan today. Pt nutritionally improving AEB improving PO intakes per EMR. Pt reports fair appetite. Pt favorable to resuming Lucia-Ike. No further nutrition related questions or concerns at this time. Malnutrition Assessment:  Malnutrition Status:  No malnutrition      Estimated Daily Nutrient Needs:  Energy (kcal):  0489-4617; Weight Used for Energy Requirements:  Ideal(59 kg)     Protein (g):   g; Weight Used for Protein Requirements:  Ideal(1.2-2)        Fluid (ml/day):  1 mL/kcal; Weight Used for Fluid Requirements:  Current      Nutrition Related Findings:  Distended abdomen, active BS, last BM 10/20, generalized trace pitting edema      Wounds:  Multiple, Deep Tissue Injury       Current Nutrition Therapies:    DIET RENAL; Daily Fluid Restriction: 2000 ml    Anthropometric Measures:  · Height: 5' 4\" (162.6 cm)  · Current Body Weight: 341 lb (154.7 kg)   · Admission Body Weight: 350 lb (158.8 kg)(bed scale)    · Ideal Body Weight: 130 lbs;   · BMI: 58.5  · BMI Categories: Obese Class 3 (BMI 40.0 or greater)       Nutrition Diagnosis:   · Overweight/Obese related to excessive energy intake as evidenced by BMI      Nutrition Interventions:   Food and/or Nutrient Delivery:  Continue Current Diet, Start Oral Nutrition Supplement  Nutrition Education/Counseling:  Education initiated   Coordination of Nutrition Care:  Continued Inpatient Monitoring    Goals:   Tolerate diet and consume greater than 50% of meals and ONS this admisssion       Nutrition Monitoring and Evaluation:   Food/Nutrient Intake Outcomes:  Food and Nutrient Intake, Supplement Intake  Physical Signs/Symptoms Outcomes:  Weight, Skin, Biochemical Data     Discharge Planning:    Continue current diet     Electronically signed by Santa Gan MS, RD, LD on 10/20/20 at 2:47 PM EDT    Contact: [phone]

## 2020-10-20 NOTE — PROGRESS NOTES
Hospitalist Progress Note      PCP: Alisa Artis MD    Date of Admission: 10/9/2020    Chief Complaint: Fatigue, dizziness    Hospital Course:  \"Patient with PMH of nonischemic cardiomyopathy EF 40 to 45%, HTN, history of DVT on Coumadin, GI bleed in April 2019, presented to the ED today with complaints of fatigue and dizziness. Patient is pretty much bedbound at home. Wife reports when patient stood up he got lightheaded, and fell back in bed. Also reports patient has not been eating or drinking much. Wife also reports patient stool has been black this past week. Patient is on Coumadin for past history of DVT/PE. She contacted patient's PCP about this, who ordered stool sample for blood, they had trouble getting the test kit until the seventh. Today she called patient's PCP about the patient being very weak, unable to get out of bed, and they were advised to come to the ED. patient also reports diffuse dull aching abdominal pain that is intermittent. \"       Subjective:  Cr stable today. UOP 1200 cc/24 hours.       Medications:  Reviewed    Infusion Medications     Scheduled Medications    magnesium sulfate  1 g Intravenous Once    darbepoetin obdulio-polysorbate  60 mcg Subcutaneous Weekly    polyethylene glycol  17 g Oral Daily    docusate sodium  100 mg Oral Daily    zinc oxide   Topical BID    midodrine  10 mg Oral TID WC    levofloxacin  500 mg Intravenous Q48H    Venelex   Topical BID    miconazole   Topical BID    folic acid  1 mg Oral Daily    sodium chloride flush  10 mL Intravenous 2 times per day    pantoprazole  40 mg Intravenous BID     PRN Meds: oxyCODONE, sodium chloride flush, acetaminophen **OR** acetaminophen, promethazine **OR** ondansetron, perflutren lipid microspheres      Intake/Output Summary (Last 24 hours) at 10/20/2020 1004  Last data filed at 10/20/2020 0942  Gross per 24 hour   Intake 700 ml   Output 1825 ml   Net -1125 ml       Physical Exam Performed:    /66   Pulse 89   Temp 98.3 °F (36.8 °C) (Oral)   Resp 16   Ht 5' 4\" (1.626 m)   Wt (!) 341 lb 1.6 oz (154.7 kg)   SpO2 97%   BMI 58.55 kg/m²       General appearance: Pleasant male in no apparent distress, appears stated age and cooperative. HEENT: Pupils equal, round, and reactive to light. Conjunctivae/corneas clear. Hard of hearing. Neck: Supple, with full range of motion. No jugular venous distention. Trachea midline. Respiratory:  Normal respiratory effort. Clear to auscultation, bilaterally without Rales/Wheezes/Rhonchi. Cardiovascular: Regular rate and rhythm with normal S1/S2 without murmurs, rubs or gallops. Abdomen: Soft, non-tender, non-distended with normal bowel sounds. Large ventral hernia noted. Musculoskeletal: No clubbing, cyanosis or edema bilaterally. Full range of motion without deformity. Skin: Skin color with PVD changes BLE. Scattered areas of skin breakdown. Neurologic:  Neurovascularly intact without any focal sensory/motor deficits. Cranial nerves: II-XII intact, grossly non-focal.  Psychiatric: Alert and oriented, thought content appropriate, normal insight. Capillary Refill: Brisk,< 3 seconds   Peripheral Pulses: +2 palpable, equal bilaterally       Labs:   Recent Labs     10/18/20  0443 10/19/20  0520 10/20/20  0510   WBC 9.7 7.4 7.3   HGB 7.5* 7.4* 7.2*   HCT 22.6* 21.9* 21.9*    156 145     Recent Labs     10/18/20  0443 10/19/20  0520 10/20/20  0510   * 135* 137   K 4.7 4.7 5.0   CL 99 103 104   CO2 26 26 25   BUN 32* 39* 44*   CREATININE 2.6* 2.6* 2.6*   CALCIUM 7.8* 7.9* 8.1*   PHOS 3.1 3.1 2.9     No results for input(s): AST, ALT, BILIDIR, BILITOT, ALKPHOS in the last 72 hours. Recent Labs     10/19/20  0520   INR 1.32*     No results for input(s): Verlena Adam in the last 72 hours.     Urinalysis:      Lab Results   Component Value Date    NITRU Negative 10/09/2020    WBCUA 21-50 10/09/2020    BACTERIA 2+ 10/09/2020 RBCUA 11-20 10/09/2020    BLOODU LARGE 10/09/2020    SPECGRAV 1.010 10/09/2020    GLUCOSEU Negative 10/09/2020       Radiology:  CT BIOPSY BONE MARROW   Final Result   Successful CT guided bone marrow aspiration x2 and core biopsy x1 of the left   iliac bone. CT GUIDED NEEDLE PLACEMENT   Final Result   Successful CT guided bone marrow aspiration x2 and core biopsy x1 of the left   iliac bone. XR CHEST PORTABLE   Final Result   No evidence of pneumothorax following left IJ catheter placement. Improved   aeration in the left lung base. XR CHEST PORTABLE   Final Result   No acute process. CT ABDOMEN PELVIS WO CONTRAST Additional Contrast? None   Final Result   Addendum 1 of 1   ADDENDUM:   Additional information, corrected report:      HISTORY:   The initial history was incorrect, patient did not have left side    nephrectomy   but partial nephrectomy on the right. Therefore the hematoma seen retroperitoneal on the left in the renal fossa   likely obscures the entire left kidney. There is no normal renal    parenchyma   identified. The measured area, 13 x 9 cm therefore presumably include   spontaneous hematoma and kidney tissue combined. This would also    therefore   indicate a lesser degree of hemorrhage present, since some of the measured   area being occupied by the obscured kidney. Again the primary etiology is spontaneous hemorrhage, given the patient's   grossly elevated INR, and likely arising within the obscured left kidney    and   extending left pararenal retroperitoneal space. Final      CT HEAD WO CONTRAST   Final Result   1. No acute intracranial abnormality. XR CHEST PORTABLE   Final Result   No acute cardiopulmonary disease.          NM LIVER SPLEEN SCAN    (Results Pending)           Assessment/Plan:    Active Hospital Problems    Diagnosis    Bacteremia due to Escherichia coli [R78.81, B96.20]    Bandemia [D72.825]    Pyuria [R82.81]  Anemia due to blood loss, acute [D62]    Atelectasis [J98.11]    Retroperitoneal bleed [R58]    Supratherapeutic INR [R79.1]    Shock ([address] [R57.9]    Non-ischemic cardiomyopathy (HCC) EF 40% [I42.8]    Suspected sleep apnea [R29.818]    Benign hypertension [I10]    History of DVT (deep vein thrombosis) [Z86.718]    Morbid obesity with BMI of 60.0-69.9, adult (HCC) [E66.01, Z68.44]       L perinephric hemorrhage  Likely 2/2 supratherapeutic INR of 9.24, also possible underlying renal mass  -Discussed with Dr. Beatrice Ashraf and Dr. Amadeo Spaulding - suspect bleed could be from left renal lesion. CT of abd read as though patient had a left nephrectomy, but this in NOT accurate. He still has his left kidney. Per urology: \"possibly bleed from left renal tumor but had imaging a year ago only showing a stable 2 cm renal cortical lesion. Will ideally need repeat imaging with contrast when creatinine stable in a few weeks. \"  Also, \"Down the line, may require a left radical nephrectomy. \"  - Reversed Coumadin with K Centra, vitamin K. Again given vit K 10/15. Hematology recommended checking the INR every Monday and Friday and giving vit K if >2.  - with ABLA present on admission. Received 1 unit pRBC on 10/9, 10/11, 10/12, and 10/14. Noted the disproportionate bandemia, also metamyelocytes. - f/u [facility] 10/19.  - NM liver scan pending.    - vascular surgery was considering embolization, but then it seemed like he no longer needed this.     Shock   Combination of hemorrhagic due to above issue and GI bleed, also suspect sepsis due to UTI present on arrival  - Given IV fluid boluses without adequate response, required pressor support, weaned off 10/15.  - held anithypertensives     H/o DVT  - Vascular surgery consulted, placed IVC filter on 10/13. This the patient's 3rd life threatening bleed. Last in 4/2019 - at that time had a GIB, which required 9 units of pRBC. - anticoagulation contraindicated     E.  Coli Bacteremia - secondary to acute cystitis. - treated with IV cefepime, then changed to levofloxacin on 10/14, plan to complete course 10/20. ID input appreciated, they signed off. Suspected upper GI bleed   Worsened due to supratherapeutic INR  -Hb dropped from 12 to 8.5 with fluids revealing true degree of anemia  -Hold Coumadin and antiplatelet agents  -Consulted GI, who deferred EGD at this time due to patient's instability. They will reconsider if he has melena here, which he did not. GI no longer planning inpatient endoscopy. -PPI     KEVIN  - baseline Cr 1.0. Likely ATN due to hypotension and hypovolemia  - nephrology consulted, started on CRRT 10/9, stopped CRRT 10/15.  - he does have h/o partial RIGHT nephrectomy in 2015 due to renal cancer.  - on midodrine - avoid hypotension.  - daily weight, I&O    Non-ischemic cardiomyopathy  - TTE showed EF 50%, with RWMA's. Will need ACE/ARB/ARNI when he is more stable, also BB.     Morbid obesity with BMI of 94.6-67.3, adult   Complicating assessment and treatment, placing patient at high risk for multiple co-morbidities as well as early death and contributing to the patient's presentation. Counseled on weight loss. DVT Prophylaxis: SCDs  Diet: DIET RENAL; Daily Fluid Restriction: 2000 ml  Code Status: Full Code    PT/OT Eval Status:  rec'd SNF vs LTACH    Dispo - pending on need for HD - still watching renal function - hopefully will have a determination either way in the next couple of days.     [employer], APRN - CNP

## 2020-10-20 NOTE — PLAN OF CARE
Problem: Nutrition  Goal: Optimal nutrition therapy  Outcome: Ongoing  Note: Nutrition Problem #1: Overweight/Obese  Intervention: Food and/or Nutrient Delivery: Continue Current Diet, Start Oral Nutrition Supplement  Nutritional Goals:  Tolerate diet and consume greater than 50% of meals and ONS this admisssion

## 2020-10-20 NOTE — PROGRESS NOTES
Physical Therapy  Facility/Department: [facility] A2 CARD TELEMETRY  Daily Treatment Note  NAME: Rudolph Shine Sr.  : 1950  MRN: [id number]    Date of Service: 10/20/2020    Discharge Recommendations:  Subacute/Skilled Nursing Facility, [facility], [facility]   PT Equipment Recommendations  Equipment Needed: No  If pt discharges prior to next PT session this note will serve as discharge summary. Assessment   Body structures, Functions, Activity limitations: Decreased functional mobility ; Decreased ROM; Decreased strength;Decreased endurance;Decreased balance;Decreased posture  Assessment: Pt agreeable to therapy, follows directions well, voices motivation. Pt participated in BLE, [address], and deep breathing exercises in Bed. Pt reporting abdominal discomfort due to proceedure done yesterday and did not participate in mobility activities. Pt will benefit from skilled PT to address current deficits. Recommend SNF or LTACH at discharge pending functional and medical progress while hospitalized  Treatment Diagnosis: Decreased strength and (I) with functional mobility  Specific instructions for Next Treatment: Progress ther ex and mobility as tolerated  Prognosis: Fair  PT Education: Goals; General Safety; Disease Specific Education;PT Role;Plan of Care;Family Education; Functional Mobility Training;Home Exercise Program;Precautions;Pressure Relief;Energy Conservation  Patient Education: pt educated in deep breathing, prevention of complications of bedrest, he voices understanding  Barriers to Learning: none  REQUIRES PT FOLLOW UP: Yes  Activity Tolerance  Activity Tolerance: Patient limited by endurance; Patient limited by fatigue;Patient limited by pain; Patient Tolerated treatment well     Patient Diagnosis(es): The primary encounter diagnosis was Retroperitoneal bleed. Diagnoses of Elevated INR, Septic shock (HCC), Hypovolemic shock ([address] Blood loss anemia, and KEVIN (acute kidney injury) ([facility] [address] were also pertinent to this visit.      has a past medical history of Cancer ([facility] [address] Cardiomyopathy ([facility] [address] CHF (congestive heart failure) ([facility] [address] Edema of both legs, GI bleed, Hx of blood clots, and Hypertension. has a past surgical history that includes Kidney surgery (4/7/2014); Abdomen surgery; Colonoscopy; Endoscopy, colon, diagnostic; pr egd transoral biopsy single/multiple (9/28/2018); Upper gastrointestinal endoscopy (N/A, 4/8/2019); Colonoscopy (N/A, 4/9/2019); Upper gastrointestinal endoscopy (N/A, 4/12/2019); Cardiac catheterization (09/06/2019); and CT BIOPSY BONE MARROW (10/19/2020). Restrictions  Restrictions/Precautions: Fall Risk, General Precautions  Other position/activity restrictions: dialysis catheter, Mills, O2 1L, telemetry  Subjective   Chart Reviewed: Yes  Response To Previous Treatment: Patient with no complaints from previous session. Family / Caregiver Present: No  Referring Practitioner: JOEY Encinas CNP  Subjective: pt agrees to therapy, states his abdomen is very sore from laying prone for a procedure yesterday  [facility]  Pain Screening  Patient Currently in Pain: Yes  Pain Assessment  Pain Assessment: Faces  Goncalves-Baker Pain Rating: Hurts even more  Pain Type: Acute pain  Pain Location: Abdomen  Pain Descriptors: Aching;Discomfort; Sore  Pain Frequency: Continuous  Pain Onset: On-going  Clinical Progression: Not changed  Functional Pain Assessment: Prevents or interferes with many active not passive activities  Non-Pharmaceutical Pain Intervention(s): Emotional support  Response to Pain Intervention: Patient Satisfied       Orientation  Overall Orientation Status: Within Normal Limits     Objective   Bed mobility  Comment: not assessed today due to abdominal discomfort  Transfers  Sit to Stand: Unable to assess  Stand to sit: Unable to assess  Comment: Anticipate pt's best chance to perform sit to stand would occur if pt could be in room with HUGO LIFT and lifted to bariatric chair from which he could safely attempt this. Exiting the current bariatric bed would be difficult and pose safety risk. Ambulation  Ambulation?: No        Exercises  Quad Sets: 15 x B with 5 sec hold  Heelslides: 10 x B assisted  Gluteal Sets: 15 x B with 5 sec hold  Hip IR/ER: 15 x B  Ankle Pumps: 20 x B   B shoulder flexion coordinated with deep breathing 10 x          B shoulder ER/IR coordinated with deep breathing 10 x                  Manual resistance to tricep press: 15 x B      AM-PAC Score     AM-PAC Inpatient Mobility without Stair Climbing Raw Score : 5 (10/20/20 1537)  AM-PAC Inpatient without Stair Climbing T-Scale Score : 23.59 (10/20/20 1537)  Mobility Inpatient CMS 0-100% Score: 100 (10/20/20 1537)  Mobility Inpatient without Stair CMS G-Code Modifier : CN (10/20/20 1537)       Goals  Short term goals  Time Frame for Short term goals: 1 week, 10/21/20 (unless otherwise specified)  Short term goal 1: Pt will transfer supine <-> sit with modA x 2 -- 10/20 not tested, abdominal soreness from procedure yesterday  Short term goal 2: Pt will transfer sit <-> stand and bed>chair using appropriate AD/lift equipment with maxA x 2 -- 10/20: DNT  Short term goal 3: By 10/17/20: Pt will tolerate 12-15 reps BLE exercise for ROM/strengthening, balance, and endurance -- 10/20: GOAL MET x 15 reps supine BLE exercises  Short term goal 4: Assess amb when pt demonstrates adequate strength and set appropriate goal 10/20: DNT  Patient Goals   Patient goals :  \"To be able to get on my feet and walk again\"    Plan    Times per week: 3-5x/week in acute care  Times per day: Daily  Specific instructions for Next Treatment: Progress ther ex and mobility as tolerated  Current Treatment Recommendations: Strengthening, Balance Training, Endurance Training, Functional Mobility Training, Transfer Training, Gait Training, ROM, Safety Education & Training, Patient/Caregiver Education & Training, Equipment Evaluation, Education, & procurement, Positioning, Neuromuscular Re-education  Safety Devices  Type of devices:  All fall risk precautions in place, Call light within reach, Patient at risk for falls, Left in bed, Bed alarm in place, Nurse notified     Therapy Time   Individual Concurrent Group Co-treatment   Time In [address]         Time Out 1535         Minutes [id number] [facility],

## 2020-10-20 NOTE — PROGRESS NOTES
Paged Mehdi Mansfield MD :Kaylie Nieves pt results for Mag 1.6. Does not have any PRN orders. Will look for any new orders. Thanks. \"  Awaiting orders.

## 2020-10-20 NOTE — PROGRESS NOTES
Urology Attending Progress Note      Subjective: pt comfortable in bed    Vitals:  BP (!) 94/58   Pulse 87   Temp 98 °F (36.7 °C) (Oral)   Resp 16   Ht 5' 4\" (1.626 m)   Wt (!) 341 lb 1.6 oz (154.7 kg)   SpO2 93%   BMI 58.55 kg/m²   Temp  Av.4 °F (36.9 °C)  Min: 97.9 °F (36.6 °C)  Max: 99.7 °F (37.6 °C)    Intake/Output Summary (Last 24 hours) at 10/20/2020 1658  Last data filed at 10/20/2020 0942  Gross per 24 hour   Intake 340 ml   Output 625 ml   Net -285 ml       Exam: abd soft, obese  Mills in with tiburcio urine    Labs:  WBC:    Lab Results   Component Value Date    WBC 7.3 10/20/2020     Hemoglobin/Hematocrit:    Lab Results   Component Value Date    HGB 7.2 10/20/2020    HCT 21.9 10/20/2020     BMP:    Lab Results   Component Value Date     10/20/2020    K 5.0 10/20/2020    K 4.3 10/15/2020     10/20/2020    CO2 25 10/20/2020    BUN 44 10/20/2020    LABALBU 2.5 10/20/2020    CREATININE 2.6 10/20/2020    CALCIUM 8.1 10/20/2020    GFRAA 30 10/20/2020    LABGLOM 25 10/20/2020     PT/INR:    Lab Results   Component Value Date    PROTIME 15.3 10/19/2020    INR 1.32 10/19/2020     PTT:  No results found for: APTT[APTT    Impression/Plan: retroperitoneal hematoma  1. H/H stable  2. CT in a month  3.  Mills out when ambulatory    Kingsley Villareal MD

## 2020-10-20 NOTE — PROGRESS NOTES
[facility]       Progress Note           Subjective/   71y.o. year old male who we are seeing in consultation for KEVIN requiring CRRT.     HPI:  Off CRRT sine 10/15, renal function showing some signs of recovery  No edema  Eating better  Urine output picking up and we have been holding dialysis     ROS:  Denies any sob, on room air   No fevers. Objective/   GEN:  Chronically ill, BP (!) 94/58   Pulse 87   Temp 98 °F (36.7 °C) (Oral)   Resp 16   Ht 5' 4\" (1.626 m)   Wt (!) 341 lb 1.6 oz (154.7 kg)   SpO2 93%   BMI 58.55 kg/m²   HEENT: non-icteric, no JVD  CV: S1, S2 without m/r/g; + LE edema  RESP: CTA B without w/r/r; breathing wnl  ABD: +bs, soft, nt, no hsm, +hernia  SKIN: warm, no rashes    Data/  Recent Labs     10/18/20  0443 10/19/20  0520 10/20/20  0510   WBC 9.7 7.4 7.3   HGB 7.5* 7.4* 7.2*   HCT 22.6* 21.9* 21.9*   MCV 89.9 90.4 90.0    156 145     Recent Labs     10/18/20  0443 10/19/20  0520 10/20/20  0510   * 135* 137   K 4.7 4.7 5.0   CL 99 103 104   CO2 26 26 25   GLUCOSE 103* 104* 96   PHOS 3.1 3.1 2.9   MG 1.90 1.70* 1.60*   BUN 32* 39* 44*   CREATININE 2.6* 2.6* 2.6*   LABGLOM 25* 25* 25*   GFRAA 30* 30* 30*       Assessment/Plan     Acute Kidney Injury.  - Likely ATN from profound hypotension, sepsis. - CRRT started on 10/10/20, off since 10/15. [facility] temp HD cath as access. - Plan:  holding dialysis, monitor labs, urine output, and symptoms. Day to day assessment but seems to have reached plateau phase. Left retroperitoneal hematoma  - Urology following.     Shock.  - Hemorrhagic and septic. E. Coli in the blood and urine. Antibiotics per ID.  - Supra therapeutic INR on admission. H/o DVT, now off coumadin. S/P IVC filter placement on 10/13/20.  - Continue midodrine 10mg 3x/day, off levophed.     Anemia. - PRN blood transfusion per primary service.       Case d/w patient's wife

## 2020-10-21 LAB
ALBUMIN SERPL-MCNC: 2.4 G/DL (ref 3.4–5)
ANION GAP SERPL CALCULATED.3IONS-SCNC: 8 MMOL/L (ref 3–16)
ANISOCYTOSIS: ABNORMAL
BANDED NEUTROPHILS RELATIVE PERCENT: 16 % (ref 0–7)
BASOPHILS ABSOLUTE: 0.1 K/UL (ref 0–0.2)
BASOPHILS RELATIVE PERCENT: 1 %
BUN BLDV-MCNC: 43 MG/DL (ref 7–20)
CALCIUM SERPL-MCNC: 8.1 MG/DL (ref 8.3–10.6)
CHLORIDE BLD-SCNC: 101 MMOL/L (ref 99–110)
CO2: 24 MMOL/L (ref 21–32)
CREAT SERPL-MCNC: 2.4 MG/DL (ref 0.8–1.3)
EOSINOPHILS ABSOLUTE: 0.4 K/UL (ref 0–0.6)
EOSINOPHILS RELATIVE PERCENT: 5 %
GFR AFRICAN AMERICAN: 33
GFR NON-AFRICAN AMERICAN: 27
GLUCOSE BLD-MCNC: 101 MG/DL (ref 70–99)
HCT VFR BLD CALC: 21.7 % (ref 40.5–52.5)
HEMOGLOBIN: 7.2 G/DL (ref 13.5–17.5)
LYMPHOCYTES ABSOLUTE: 0.3 K/UL (ref 1–5.1)
LYMPHOCYTES RELATIVE PERCENT: 4 %
MACROCYTES: ABNORMAL
MAGNESIUM: 1.6 MG/DL (ref 1.8–2.4)
MCH RBC QN AUTO: 29.8 PG (ref 26–34)
MCHC RBC AUTO-ENTMCNC: 33.3 G/DL (ref 31–36)
MCV RBC AUTO: 89.5 FL (ref 80–100)
METAMYELOCYTES RELATIVE PERCENT: 2 %
MICROCYTES: ABNORMAL
MONOCYTES ABSOLUTE: 1 K/UL (ref 0–1.3)
MONOCYTES RELATIVE PERCENT: 14 %
NEUTROPHILS ABSOLUTE: 5.6 K/UL (ref 1.7–7.7)
NEUTROPHILS RELATIVE PERCENT: 58 %
OVALOCYTES: ABNORMAL
PDW BLD-RTO: 16.4 % (ref 12.4–15.4)
PHOSPHORUS: 2.3 MG/DL (ref 2.5–4.9)
PLATELET # BLD: 152 K/UL (ref 135–450)
PMV BLD AUTO: 8.2 FL (ref 5–10.5)
POLYCHROMASIA: ABNORMAL
POTASSIUM SERPL-SCNC: 4.8 MMOL/L (ref 3.5–5.1)
RBC # BLD: 2.42 M/UL (ref 4.2–5.9)
SODIUM BLD-SCNC: 133 MMOL/L (ref 136–145)
STOMATOCYTES: ABNORMAL
WBC # BLD: 7.4 K/UL (ref 4–11)

## 2020-10-21 PROCEDURE — 2580000003 HC RX 258: Performed by: INTERNAL MEDICINE

## 2020-10-21 PROCEDURE — C9113 INJ PANTOPRAZOLE SODIUM, VIA: HCPCS | Performed by: INTERNAL MEDICINE

## 2020-10-21 PROCEDURE — 80069 RENAL FUNCTION PANEL: CPT

## 2020-10-21 PROCEDURE — 6370000000 HC RX 637 (ALT 250 FOR IP): Performed by: INTERNAL MEDICINE

## 2020-10-21 PROCEDURE — 1200000000 HC SEMI PRIVATE

## 2020-10-21 PROCEDURE — 36592 COLLECT BLOOD FROM PICC: CPT

## 2020-10-21 PROCEDURE — 6360000002 HC RX W HCPCS: Performed by: INTERNAL MEDICINE

## 2020-10-21 PROCEDURE — 6360000002 HC RX W HCPCS: Performed by: NURSE PRACTITIONER

## 2020-10-21 PROCEDURE — 85025 COMPLETE CBC W/AUTO DIFF WBC: CPT

## 2020-10-21 PROCEDURE — 83735 ASSAY OF MAGNESIUM: CPT

## 2020-10-21 PROCEDURE — 6370000000 HC RX 637 (ALT 250 FOR IP): Performed by: NURSE PRACTITIONER

## 2020-10-21 RX ORDER — MAGNESIUM SULFATE 1 G/100ML
1 INJECTION INTRAVENOUS ONCE
Status: COMPLETED | OUTPATIENT
Start: 2020-10-21 | End: 2020-10-21

## 2020-10-21 RX ADMIN — SODIUM CHLORIDE, PRESERVATIVE FREE 10 ML: 5 INJECTION INTRAVENOUS at 08:36

## 2020-10-21 RX ADMIN — MIDODRINE HYDROCHLORIDE 10 MG: 5 TABLET ORAL at 16:34

## 2020-10-21 RX ADMIN — MICONAZOLE NITRATE: 20 POWDER TOPICAL at 08:36

## 2020-10-21 RX ADMIN — MIDODRINE HYDROCHLORIDE 10 MG: 5 TABLET ORAL at 13:42

## 2020-10-21 RX ADMIN — CASTOR OIL AND BALSAM, PERU: 788; 87 OINTMENT TOPICAL at 08:35

## 2020-10-21 RX ADMIN — MICONAZOLE NITRATE: 20 POWDER TOPICAL at 21:43

## 2020-10-21 RX ADMIN — MIDODRINE HYDROCHLORIDE 10 MG: 5 TABLET ORAL at 08:36

## 2020-10-21 RX ADMIN — POLYETHYLENE GLYCOL 3350 17 G: 17 POWDER, FOR SOLUTION ORAL at 08:36

## 2020-10-21 RX ADMIN — CASTOR OIL AND BALSAM, PERU: 788; 87 OINTMENT TOPICAL at 21:43

## 2020-10-21 RX ADMIN — LEVOFLOXACIN 500 MG: 5 INJECTION, SOLUTION INTRAVENOUS at 09:05

## 2020-10-21 RX ADMIN — Medication: at 21:44

## 2020-10-21 RX ADMIN — MAGNESIUM SULFATE HEPTAHYDRATE 1 G: 1 INJECTION, SOLUTION INTRAVENOUS at 11:53

## 2020-10-21 RX ADMIN — DOCUSATE SODIUM 100 MG: 100 CAPSULE ORAL at 08:36

## 2020-10-21 RX ADMIN — PANTOPRAZOLE SODIUM 40 MG: 40 INJECTION, POWDER, FOR SOLUTION INTRAVENOUS at 08:36

## 2020-10-21 RX ADMIN — PANTOPRAZOLE SODIUM 40 MG: 40 INJECTION, POWDER, FOR SOLUTION INTRAVENOUS at 21:38

## 2020-10-21 RX ADMIN — FOLIC ACID 1 MG: 1 TABLET ORAL at 08:36

## 2020-10-21 RX ADMIN — SODIUM CHLORIDE, PRESERVATIVE FREE 10 ML: 5 INJECTION INTRAVENOUS at 21:39

## 2020-10-21 RX ADMIN — Medication: at 08:35

## 2020-10-21 ASSESSMENT — PAIN SCALES - WONG BAKER
WONGBAKER_NUMERICALRESPONSE: 0

## 2020-10-21 ASSESSMENT — PAIN SCALES - GENERAL
PAINLEVEL_OUTOF10: 0
PAINLEVEL_OUTOF10: 0

## 2020-10-21 NOTE — PROGRESS NOTES
Urology Attending Progress Note      Subjective: pt comfortable in bed - has not ambulated yet but no pain    Vitals:  BP (!) 111/58   Pulse 90   Temp 98.5 °F (36.9 °C) (Oral)   Resp 18   Ht 5' 4\" (1.626 m)   Wt (!) 336 lb 10.3 oz (152.7 kg)   SpO2 98%   BMI 57.78 kg/m²   Temp  Av.5 °F (36.9 °C)  Min: 98 °F (36.7 °C)  Max: 98.9 °F (37.2 °C)    Intake/Output Summary (Last 24 hours) at 10/21/2020 1000  Last data filed at 10/21/2020 0835  Gross per 24 hour   Intake 1020 ml   Output 875 ml   Net 145 ml       Exam: abd soft, obese  Mills in with clear yellow urine    Labs:  WBC:    Lab Results   Component Value Date    WBC 7.4 10/21/2020     Hemoglobin/Hematocrit:    Lab Results   Component Value Date    HGB 7.2 10/21/2020    HCT 21.7 10/21/2020     BMP:    Lab Results   Component Value Date     10/21/2020    K 4.8 10/21/2020    K 4.3 10/15/2020     10/21/2020    CO2 24 10/21/2020    BUN 43 10/21/2020    LABALBU 2.4 10/21/2020    CREATININE 2.4 10/21/2020    CALCIUM 8.1 10/21/2020    GFRAA 33 10/21/2020    LABGLOM 27 10/21/2020     PT/INR:    Lab Results   Component Value Date    PROTIME 15.3 10/19/2020    INR 1.32 10/19/2020     PTT:  No results found for: APTT[APTT    Impression/Plan: retroperitoneal hematoma  1. H/H stable  2. CR improving to 2.4 today from 2.6  3. CT in a month  4.  Mills out when ambulatory    Nora Coe MD

## 2020-10-21 NOTE — PLAN OF CARE
Problem: OXYGENATION/RESPIRATORY FUNCTION  Goal: Patient will maintain patent airway  Outcome: Ongoing     Patient's EF (Ejection Fraction) is greater than 40%    Heart Failure Medications:  Diuretics[de-identified] None    (One of the following REQUIRED for EF <40%/SYSTOLIC FAILURE but MAY be used in EF% >40%/DIASTOLIC FAILURE)        ACE[de-identified] None        ARB[de-identified] None         ARNI[de-identified] None    (Beta Blockers)  NON- Evidenced Based Beta Blocker (for EF% >40%/DIASTOLIC FAILURE): None    Evidenced Based Beta Blocker::(REQUIRED for EF% <40%/SYSTOLIC FAILURE) None  . .................................................................................................................................................. Patient's weights and intake/output reviewed: Yes    Patient's Last Weight:336 lbs obtained by bed scale. Difference of 6 lbs less than last documented weight. Intake/Output Summary (Last 24 hours) at 10/21/2020 1419  Last data filed at 10/21/2020 0905  Gross per 24 hour   Intake 1500 ml   Output 1125 ml   Net 375 ml       Comorbidities Reviewed Yes    Patient has a past medical history of Cancer ([facility] [address] Cardiomyopathy ([address] CHF (congestive heart failure) ([facility] [address] Edema of both legs, GI bleed, Hx of blood clots, Hypertension, and Presence of IVC filter. >>For CHF and Comorbidity documentation on Education Time and Topics, please see Education Tab    Progressive Mobility Assessment:  What is this patient's Current Level of Mobility?: Requires Bed Rest  How was this patient Mobilized today?: Unable to Mobilize                 With Whom? Nurse and Self                 Level of Difficulty/Assistance: 2x Assist     Pt resting in bed at this time on room air. Pt denies shortness of breath. Pt with nonpitting lower extremity edema.      Patient and/or Family's stated Goal of Care this Admission: increase activity tolerance, better understand heart failure and disease management, and be more comfortable prior to discharge        :

## 2020-10-21 NOTE — PROGRESS NOTES
Progress Note  Date:10/21/2020       Room:0210/0210-02  Patient Daryle Artis Sr. YOB: 1950     Age:69 y.o. Subjective    Subjective:  Symptoms:  Stable. Pain:  He reports no pain. Review of Systems  Objective         Vitals Last 24 Hours:  TEMPERATURE:  Temp  Av.5 °F (36.9 °C)  Min: 98 °F (36.7 °C)  Max: 98.9 °F (37.2 °C)  RESPIRATIONS RANGE: Resp  Av.3  Min: 16  Max: 18  PULSE OXIMETRY RANGE: SpO2  Av.7 %  Min: 92 %  Max: 97 %  PULSE RANGE: Pulse  Av.2  Min: 85  Max: 92  BLOOD PRESSURE RANGE: Systolic (97TDS), ZPD:049 , Min:94 , GBU:995   ; Diastolic (88HHB), TGW:85, Min:58, Max:72    I/O (24Hr): Intake/Output Summary (Last 24 hours) at 10/21/2020 0550  Last data filed at 10/21/2020 0531  Gross per 24 hour   Intake 1090 ml   Output 1500 ml   Net -410 ml     Objective:  General Appearance: In no acute distress and not in pain. Vital signs: (most recent): Blood pressure 123/72, pulse 92, temperature 98.1 °F (36.7 °C), temperature source Oral, resp. rate 18, height 5' 4\" (1.626 m), weight (!) 336 lb 9.6 oz (152.7 kg), SpO2 93 %. Abdomen: Abdomen is soft. Bowel sounds are normal.   There is no abdominal tenderness. Labs/Imaging/Diagnostics    Labs:  CBC:  Recent Labs     10/19/20  0520 10/20/20  0510 10/21/20  0346   WBC 7.4 7.3 7.4   RBC 2.43* 2.43* 2.42*   HGB 7.4* 7.2* 7.2*   HCT 21.9* 21.9* 21.7*   MCV 90.4 90.0 89.5   RDW 16.2* 16.5* 16.4*    145 152     CHEMISTRIES:  Recent Labs     10/19/20  0520 10/20/20  0510 10/21/20  0346   * 137 133*   K 4.7 5.0 4.8    104 101   CO2 26 25 24   BUN 39* 44* 43*   CREATININE 2.6* 2.6* 2.4*   GLUCOSE 104* 96 101*   PHOS 3.1 2.9 2.3*   MG 1.70* 1.60* 1.60*     PT/INR:  Recent Labs     10/19/20  0520   PROTIME 15.3*   INR 1.32*     APTT:No results for input(s): APTT in the last 72 hours.   LIVER PROFILE:No results for input(s): AST, ALT, BILIDIR, BILITOT, ALKPHOS in the last 72 hours.    Imaging Last 24 Hours:  Ct Guided Needle Placement    Result Date: 10/19/2020  PROCEDURE: CT GUIDED BONE MARROW ASPIRATION AND CORE NEEDLE BONE BIOPSY OF THE  ILIAC BONE. MODERATE CONSCIOUS SEDATION 10/19/2020 HISTORY: ORDERING SYSTEM PROVIDED HISTORY: bone marrow bx and aspiration, rule out mds , anemia TECHNOLOGIST PROVIDED HISTORY: Reason for exam:->bone marrow bx and aspiration, rule out mds , anemia SEDATION: Approximately 8 minutes using 50 mcg of intravenous fentanyl and 2.5 mg of intravenous Versed administered and monitored by the radiology nursing staff and supervised by Dr. Guzmán Salvage: Informed consent was obtained following a detailed explanation of the procedure including risks, benefits, and alternatives. Universal protocol was followed. Axial images were obtained through the iliac bones using CT guidance and a suitable skin site was prepped and draped in sterile fashion. Local anesthesia was achieved with lidocaine. An 11 gauge l bone marrow biopsy needle was advanced into the left iliac bone and approximately 10 mL x2 of bone marrow aspirate was obtained. A single core biopsy specimen was obtained. The sample was deemed satisfactory by the cytopathologist in presence. Afterwards the needle withdrawn and a sterile dressing applied. There was no bleeding or hematoma noted. The patient tolerated the procedure well. Dose modulation, iterative reconstruction, and/or weight based adjustment of the mA/kV was utilized to reduce the radiation dose to as low as reasonably achievable. Successful CT guided bone marrow aspiration x2 and core biopsy x1 of the left iliac bone. Nm Liver Spleen Scan    Result Date: 10/20/2020  EXAMINATION: NUCLEAR MEDICINE LIVER AND SPLEEN SCAN  10/20/2020 TECHNIQUE: Following an appropriate delay after the intravenous administration of 7.0 mCi Tc99m sulfur colloid, multiplanar imaging of the abdomen is obtained.  COMPARISON: 10/09/2020 CT HISTORY: achievable. Successful CT guided bone marrow aspiration x2 and core biopsy x1 of the left iliac bone. Assessment//Plan           Hospital Problems           Last Modified POA    * (Principal) Retroperitoneal bleed 10/10/2020 Yes    Morbid obesity with BMI of 60.0-69.9, adult ([address] (Chronic) 10/9/2020 Yes    Benign hypertension (Chronic) 10/9/2020 Yes    History of DVT (deep vein thrombosis) (Chronic) 10/9/2020 Yes    Suspected sleep apnea 10/12/2020 Yes    Supratherapeutic INR 10/9/2020 Yes    Shock ([address] 10/9/2020 Yes    Non-ischemic cardiomyopathy ([address] EF 40% 10/9/2020 Yes    Bacteremia due to Escherichia coli 10/12/2020 Yes    Bandemia 10/12/2020 Yes    Pyuria 10/12/2020 Yes    Anemia due to blood loss, acute 10/12/2020 Yes    Atelectasis 10/12/2020 Yes        Assessment & Plan  68 yo w obesity, HTN, Fe def anemia, h/o DU bleed in 8/2019 at [facility] (repeat EGD by me in 9/2019 was neg.), kidney CA, DVT on Couamdin and recent choledocholithiasis s/p ERCP in July 2018, presents with supratherapeutic INR complicated by retroperitoneal bleeding and reported melena (resolved) that has now resolved.  INR of 9.2 and H/H 6/20.   EGD and colonoscopy in 4/2019 are neg. Except diverticulosis and polyp. Capsule Endoscopy 4/2019 was neg. So most likely blood loss is mostly due to retroperitoneal rather than GI bleeding. Greenfilter was placed. Is S/P bone marrow Bx for bandemia on 10/19.     Plan:   1. Supportive care and agree w Hematology to hold AC   2. Daily H/H  3. Will continue to hold off EGD unless melena recurs  4. The ERCP in 2018 could explain his asymptomatic pneumobilia  5.  Will follow  Benita Corley MD       Critical access hospital) [phone]    Electronically signed by Bernardo Barragan MD on 10/21/20 at 5:50 AM EDT

## 2020-10-21 NOTE — PROGRESS NOTES
Nutrition Education    Spoke with RN, pt and wife requesting renal and low sodium diet education. Discussed low sodium, low potassium, low phosphorous and low protein diet. Wife had several questions regarding specific foods. Pt and wife report try to follow low sodium diet at home. Pt wanting to follow renal diet at nursing facility. Will continue to monitor for further education needs. · Verbally reviewed information with Patient and Family  · Educated on renal diet   · Written educational materials provided. · Contact name and number provided. · Refer to Patient Education activity for more details.     Electronically signed by Tato Ambrose MS, RD, LD on 10/21/20 at 3:29 PM EDT    Contact: [phone]

## 2020-10-21 NOTE — PROGRESS NOTES
ONCOLOGY HEMATOLOGY CARE PROGRESS NOTE      SUBJECTIVE:     The patient states that he feels a little better this morning. ROS:   The remaining 10 point review of symptoms is unremarkable. OBJECTIVE        Physical    VITALS:  BP (!) 111/58   Pulse 90   Temp 98.5 °F (36.9 °C) (Oral)   Resp 18   Ht 5' 4\" (1.626 m)   Wt (!) 336 lb 10.3 oz (152.7 kg)   SpO2 98%   BMI 57.78 kg/m²   TEMPERATURE:  Current - Temp: 98.5 °F (36.9 °C); Max - Temp  Av.5 °F (36.9 °C)  Min: 98 °F (36.7 °C)  Max: 98.9 °F (37.2 °C)  PULSE OXIMETRY RANGE: SpO2  Av.8 %  Min: 92 %  Max: 98 %  24HR INTAKE/OUTPUT:      Intake/Output Summary (Last 24 hours) at 10/21/2020 1101  Last data filed at 10/21/2020 0835  Gross per 24 hour   Intake 1020 ml   Output 875 ml   Net 145 ml       CONSTITUTIONAL:  He is morbidly obese  , HEENT oral pharynx , no scleral icterus  HEMATOLOGIC/LYMPHATICS:  no cervical lymphadenopathy, no supraclavicular lymphadenopathy, no axillary lymphadenopathy and no inguinal lymphadenopathy  LUNGS:  No increased work of breathing, good air exchange, clear to auscultation bilaterally, no crackles or wheezing  CARDIOVASCULAR:  , regular rate and rhythm, normal S1 and S2, no S3 or S4, and no murmur noted  ABDOMEN:  No scars, normal bowel sounds, soft, non-distended, non-tender, no masses palpated, no hepatosplenomegally  MUSCULOSKELETAL:  There is no redness, warmth, or swelling of the joints. EXTREMETIES: No clubbing cynosis or edema  NEUROLOGIC:  Awake, alert, oriented to name, place and time. Cranial nerves II-XII are grossly intact. Motor is 5 out of 5 bilaterally.    SKIN:  no bruising or bleeding      Data      Recent Labs     10/19/20  0520 10/20/20  0510 10/21/20  0346   WBC 7.4 7.3 7.4   HGB 7.4* 7.2* 7.2*   HCT 21.9* 21.9* 21.7*    145 152   MCV 90.4 90.0 89.5        Recent Labs     10/19/20  0520 10/20/20  0510 10/21/20  0346   * 137 133*   K 4.7 5.0 4.8  104 101   CO2 26 25 24   PHOS 3.1 2.9 2.3*   BUN 39* 44* 43*   CREATININE 2.6* 2.6* 2.4*     No results for input(s): AST, ALT, ALB, BILIDIR, BILITOT, ALKPHOS in the last 72 hours.     Magnesium:    Lab Results   Component Value Date    MG 1.60 10/21/2020    MG 1.60 10/20/2020    MG 1.70 10/19/2020         Problem List  Patient Active Problem List   Diagnosis    Morbid obesity with BMI of 60.0-69.9, adult ([address]    Anticoagulation goal of INR 1.5 to 2.5    Benign hypertension    History of DVT (deep vein thrombosis)    Ventral hernia    Elevated brain natriuretic peptide (BNP) level    Pulmonary vascular congestion on CXR    Sepsis ([address]    Hypomagnesemia    Hypokalemia    Hyponatremia    Hypochloremia    KEVIN (acute kidney injury) ([address]    Subacute microcytic anemia (July 2018)    Hx choledocholithiasis s/p ERCP (July 2018)    Acute respiratory failure with hypoxia and hypercapnia (HCC)    Calculus of gallbladder with acute cholecystitis without obstruction    Intertrigo    Lymphedema    Suspected sleep apnea    Biatrial enlargement    Congestive heart failure (HCC)    GI bleed    Retroperitoneal bleed    Supratherapeutic INR    Shock ([address]    Non-ischemic cardiomyopathy ([address] EF 40%    Bacteremia due to Escherichia coli    Bandemia    Pyuria    Anemia due to blood loss, acute    Atelectasis       ASSESSMENT AND PLAN:    At least 2 DVTs greater than 20 years ago:  -He is now had a second episode of considerable bleeding  -The risk of anticoagulation is higher than the risk of DVT/pulmonary embolism and the patient is aware  -He is status post a Filter placed 10/14/2020    Anemia:  -Likely Due to bleeding  -No evidence of iron deficiency  -His counts have been stable for several days  - Hgb over 7 today  - likely reactive to underlying issues (dialysis and bleeding event)- metamyelocytes present indicating an exhausted bone marrow   - Bone marrow bx ordered to rule out MDS- unlikely - check cx as well. Done 10-19 and results are pending.   - start aranesp today 60 weekly - dosed on weight per PI.   -Hopefully he is on Aranesp will be short-lived      Vitamin K deficiency:  -This is a bit unusual given his morbid obesity  -He may either have liver dysfunction, but CT scan does not show any evidence of cirrhosis  -His INR does correct with vitamin K  -NM liver spleen Does not show any colloid shift, cirrhosis or splenomegaly. Acute kidney injury:  -Being managed by nephrology  - HD stopped 10-19   -His erythropoietin level is low at 68 and this is why the Aranesp is started    History of renal cell carcinoma:  -No evidence of recurrence    Epistaxis:  -Resolved  - INR stable. Morbid obesity:  -I think this is by far and away his biggest health problem  -I discussed this with the patient on multiple occasions    ONCOLOGIC DISPOSITION:    -okay to dc to [facility] from heme onc standpoint.    - start weekly aranesp today for EPO less than 70     Elida Oreilly MD  May be reached through [facility]

## 2020-10-21 NOTE — PLAN OF CARE
Problem: Falls - Risk of:  Goal: Will remain free from falls  Description: Will remain free from falls  Outcome: Ongoing   Fall precautions in place, bed alarm on, nonskid foot wear applied, bed in lowest position, and call light within reach. Will continue to monitor. Problem: Skin Integrity:  Goal: Absence of new skin breakdown  Description: Absence of new skin breakdown  Outcome: Ongoing   Cleaning pt with barrier wipes. Turning him Q 2 H, Keeping him dry. Pt has a air mattress. Using miconazole powder groins and inter dry in between. Venelex cream on red areas and zinc oxide on the mandie area. Problem: HEMODYNAMIC STATUS  Goal: Patient has stable vital signs and fluid balance  Outcome: Ongoing     Patient's EF (Ejection Fraction) is greater than 40%    Heart Failure Medications:  Diuretics[de-identified] None    (One of the following REQUIRED for EF <40%/SYSTOLIC FAILURE but MAY be used in EF% >40%/DIASTOLIC FAILURE)        ACE[de-identified] None        ARB[de-identified] None         ARNI[de-identified] None    (Beta Blockers)  NON- Evidenced Based Beta Blocker (for EF% >40%/DIASTOLIC FAILURE): None    Evidenced Based Beta Blocker::(REQUIRED for EF% <40%/SYSTOLIC FAILURE) None  . .................................................................................................................................................. Patient's weights and intake/output reviewed: yes    Patient's Last Weight: 341 lbs obtained by bed scale. Difference of 4 lbs more than last documented weight. Intake/Output Summary (Last 24 hours) at 10/21/2020 0036  Last data filed at 10/20/2020 2323  Gross per 24 hour   Intake 730 ml   Output 1275 ml   Net -545 ml       Comorbidities Reviewed Yes    Patient has a past medical history of Cancer ([facility] Cardiomyopathy ([facility] CHF (congestive heart failure) ([facility] Edema of both legs, GI bleed, Hx of blood clots, and Hypertension.      >>For CHF and Comorbidity documentation on Education Time and Topics, please see Education Tab    Progressive Mobility Assessment:  What is this patient's Current Level of Mobility?: Requires Bed Rest  How was this patient Mobilized today?: Edge of Bed and Unable to Mobilize                 With Whom? Nurse, PCA, PT, and OT                 Level of Difficulty/Assistance: 2x Assist     Pt resting in bed at this time on room air. Pt denies shortness of breath. Pt with nonpitting lower extremity edema.      Patient and/or Family's stated Goal of Care this Admission: reduce shortness of breath, increase activity tolerance, better understand heart failure and disease management, be more comfortable, and reduce lower extremity edema prior to discharge        :

## 2020-10-21 NOTE — PROGRESS NOTES
Hospitalist Progress Note      PCP: Ulises Wong MD    Date of Admission: 10/9/2020    Chief Complaint: Fatigue, dizziness    Hospital Course:  \"Patient with PMH of nonischemic cardiomyopathy EF 40 to 45%, HTN, history of DVT on Coumadin, GI bleed in April 2019, presented to the ED today with complaints of fatigue and dizziness. Patient is pretty much bedbound at home. Wife reports when patient stood up he got lightheaded, and fell back in bed. Also reports patient has not been eating or drinking much. Wife also reports patient stool has been black this past week. Patient is on Coumadin for past history of DVT/PE. She contacted patient's PCP about this, who ordered stool sample for blood, they had trouble getting the test kit until the seventh. Today she called patient's PCP about the patient being very weak, unable to get out of bed, and they were advised to come to the ED. patient also reports diffuse dull aching abdominal pain that is intermittent. \"       Subjective:  Cr improved today. UOP up to 1500 cc/24 hours.       Medications:  Reviewed    Infusion Medications     Scheduled Medications    magnesium sulfate  1 g Intravenous Once    darbepoetin obdulio-polysorbate  60 mcg Subcutaneous Weekly    polyethylene glycol  17 g Oral Daily    docusate sodium  100 mg Oral Daily    zinc oxide   Topical BID    midodrine  10 mg Oral TID WC    Venelex   Topical BID    miconazole   Topical BID    folic acid  1 mg Oral Daily    sodium chloride flush  10 mL Intravenous 2 times per day    pantoprazole  40 mg Intravenous BID     PRN Meds: oxyCODONE, sodium chloride flush, acetaminophen **OR** acetaminophen, promethazine **OR** ondansetron, perflutren lipid microspheres      Intake/Output Summary (Last 24 hours) at 10/21/2020 1211  Last data filed at 10/21/2020 0905  Gross per 24 hour   Intake 1500 ml   Output 1125 ml   Net 375 ml       Physical Exam Performed:    BP (!) 111/58   Pulse 90   Temp 98.5 °F (36.9 °C) (Oral)   Resp 18   Ht 5' 4\" (1.626 m)   Wt (!) 336 lb 10.3 oz (152.7 kg)   SpO2 98%   BMI 57.78 kg/m²       General appearance: Pleasant male in no apparent distress, appears stated age and cooperative. HEENT: Pupils equal, round, and reactive to light. Conjunctivae/corneas clear. Hard of hearing. Neck: Supple, with full range of motion. No jugular venous distention. Trachea midline. Respiratory:  Normal respiratory effort. Clear to auscultation, bilaterally without Rales/Wheezes/Rhonchi. Cardiovascular: Regular rate and rhythm with normal S1/S2 without murmurs, rubs or gallops. Abdomen: Soft, non-tender, non-distended with normal bowel sounds. Large ventral hernia noted. Musculoskeletal: No clubbing, cyanosis or edema bilaterally. Full range of motion without deformity. Skin: Skin color with PVD changes BLE. Scattered areas of skin breakdown. Neurologic:  Neurovascularly intact without any focal sensory/motor deficits. Cranial nerves: II-XII intact, grossly non-focal.  Psychiatric: Alert and oriented, thought content appropriate, normal insight. Capillary Refill: Brisk,< 3 seconds   Peripheral Pulses: +2 palpable, equal bilaterally       Labs:   Recent Labs     10/19/20  0520 10/20/20  0510 10/21/20  0346   WBC 7.4 7.3 7.4   HGB 7.4* 7.2* 7.2*   HCT 21.9* 21.9* 21.7*    145 152     Recent Labs     10/19/20  0520 10/20/20  0510 10/21/20  0346   * 137 133*   K 4.7 5.0 4.8    104 101   CO2 26 25 24   BUN 39* 44* 43*   CREATININE 2.6* 2.6* 2.4*   CALCIUM 7.9* 8.1* 8.1*   PHOS 3.1 2.9 2.3*     No results for input(s): AST, ALT, BILIDIR, BILITOT, ALKPHOS in the last 72 hours. Recent Labs     10/19/20  0520   INR 1.32*     No results for input(s): [place] in the last 72 hours.     Urinalysis:      Lab Results   Component Value Date    NITRU Negative 10/09/2020    WBCUA 21-50 10/09/2020    BACTERIA 2+ 10/09/2020    RBCUA 11-20 10/09/2020    BLOODU LARGE 10/09/2020    SPECGRAV 1.010 10/09/2020    GLUCOSEU Negative 10/09/2020       Radiology:  NM LIVER SPLEEN SCAN   Final Result   1. No hepatosplenomegaly. 2.  No evidence of colloid shift. CT BIOPSY BONE MARROW   Final Result   Successful CT guided bone marrow aspiration x2 and core biopsy x1 of the left   iliac bone. CT GUIDED NEEDLE PLACEMENT   Final Result   Successful CT guided bone marrow aspiration x2 and core biopsy x1 of the left   iliac bone. XR CHEST PORTABLE   Final Result   No evidence of pneumothorax following left IJ catheter placement. Improved   aeration in the left lung base. XR CHEST PORTABLE   Final Result   No acute process. CT ABDOMEN PELVIS WO CONTRAST Additional Contrast? None   Final Result   Addendum 1 of 1   ADDENDUM:   Additional information, corrected report:      HISTORY:   The initial history was incorrect, patient did not have left side    nephrectomy   but partial nephrectomy on the right. Therefore the hematoma seen retroperitoneal on the left in the renal fossa   likely obscures the entire left kidney. There is no normal renal    parenchyma   identified. The measured area, 13 x 9 cm therefore presumably include   spontaneous hematoma and kidney tissue combined. This would also    therefore   indicate a lesser degree of hemorrhage present, since some of the measured   area being occupied by the obscured kidney. Again the primary etiology is spontaneous hemorrhage, given the patient's   grossly elevated INR, and likely arising within the obscured left kidney    and   extending left pararenal retroperitoneal space. Final      CT HEAD WO CONTRAST   Final Result   1. No acute intracranial abnormality. XR CHEST PORTABLE   Final Result   No acute cardiopulmonary disease.                  Assessment/Plan:    Active Hospital Problems    Diagnosis    Bacteremia due to Escherichia coli [R78.81, B96.20]    Bandemia [D72.825]    Pyuria [R82.81]    Anemia due to blood loss, acute [D62]    Atelectasis [J98.11]    Retroperitoneal bleed [R58]    Supratherapeutic INR [R79.1]    Shock ([address] [R57.9]    Non-ischemic cardiomyopathy (HCC) EF 40% [I42.8]    Suspected sleep apnea [R29.818]    Benign hypertension [I10]    History of DVT (deep vein thrombosis) [Z86.718]    Morbid obesity with BMI of 60.0-69.9, adult (HCC) [E66.01, Z68.44]       L perinephric hemorrhage  Likely 2/2 supratherapeutic INR of 9.24, also possible underlying renal mass  -Discussed with Dr. Josefina Navarrete and Dr. Mynor Long - suspect bleed could be from left renal lesion. CT of abd read as though patient had a left nephrectomy, but this in NOT accurate. He still has his left kidney. Per urology: \"possibly bleed from left renal tumor but had imaging a year ago only showing a stable 2 cm renal cortical lesion. Will ideally need repeat imaging with contrast when creatinine stable in a few weeks. \"  Also, \"Down the line, may require a left radical nephrectomy. \"  - Reversed Coumadin with K Centra, vitamin K. Again given vit K 10/15. Hematology recommended checking the INR every Monday and Friday and giving vit K if >2.  - with ABLA present on admission. Received 1 unit pRBC on 10/9, 10/11, 10/12, and 10/14. Noted the disproportionate bandemia, also metamyelocytes. - f/u [facility] 10/19.  - NM liver scan negative. - vascular surgery was considering embolization, but then it seemed like he no longer needed this.     Shock   Combination of hemorrhagic due to above issue and GI bleed, also suspect sepsis due to UTI present on arrival  - Given IV fluid boluses without adequate response, required pressor support, weaned off 10/15.  - held anithypertensives     H/o DVT  - Vascular surgery consulted, placed IVC filter on 10/13. This the patient's 3rd life threatening bleed. Last in 4/2019 - at that time had a GIB, which required 9 units of pRBC.   - anticoagulation contraindicated     E. Coli Bacteremia - secondary to acute cystitis. - treated with IV cefepime, then changed to levofloxacin on 10/14, plan to complete course 10/20. ID input appreciated, they signed off. Suspected upper GI bleed   Worsened due to supratherapeutic INR  -Hb dropped from 12 to 8.5 with fluids revealing true degree of anemia  -Hold Coumadin and antiplatelet agents  -Consulted GI, who deferred EGD at this time due to patient's instability. They will reconsider if he has melena here, which he did not. GI no longer planning inpatient endoscopy. -PPI     KEVIN  - baseline Cr 1.0. Likely ATN due to hypotension and hypovolemia  - nephrology consulted, started on CRRT 10/9, stopped CRRT 10/15.  - he does have h/o partial RIGHT nephrectomy in 2015 due to renal cancer.  - on midodrine - avoid hypotension.  - daily weight, I&O    Non-ischemic cardiomyopathy  - TTE showed EF 50%, with RWMA's. Will need ACE/ARB/ARNI when he is more stable, also BB.     Morbid obesity with BMI of 03.3-15.8, adult   Complicating assessment and treatment, placing patient at high risk for multiple co-morbidities as well as early death and contributing to the patient's presentation. Counseled on weight loss. DVT Prophylaxis: SCDs  Diet: DIET RENAL; Daily Fluid Restriction: 2000 ml  Dietary Nutrition Supplements: Frozen Oral Supplement  Code Status: Full Code    PT/OT Eval Status:  rec'd SNF    Dispo - hopefully tomorrow - seems kidney function has plateaued and will not likely need HD.   Referral placed to [facility] 60[facility], APRN - CNP

## 2020-10-21 NOTE — PROGRESS NOTES
[facility]       Progress Note           Subjective/   71y.o. year old male who we are seeing in consultation for KEVIN requiring CRRT.     HPI:  Off CRRT sine 10/15, renal function showing some signs of recovery  No edema  Eating better  Urine output picking up and we have been holding dialysis, Scr down to 2.4    ROS:  Denies any sob, on room air   No fevers. Objective/   GEN:  Chronically ill, /68   Pulse 87   Temp 98.9 °F (37.2 °C) (Oral)   Resp 18   Ht 5' 4\" (1.626 m)   Wt (!) 336 lb 10.3 oz (152.7 kg)   SpO2 93%   BMI 57.78 kg/m²   HEENT: non-icteric, no JVD  CV: S1, S2 without m/r/g; no LE edema  RESP: CTA B without w/r/r; breathing wnl  ABD: +bs, soft, nt, no hsm, +hernia  SKIN: warm, no rashes    Data/  Recent Labs     10/19/20  0520 10/20/20  0510 10/21/20  0346   WBC 7.4 7.3 7.4   HGB 7.4* 7.2* 7.2*   HCT 21.9* 21.9* 21.7*   MCV 90.4 90.0 89.5    145 152     Recent Labs     10/19/20  0520 10/20/20  0510 10/21/20  0346   * 137 133*   K 4.7 5.0 4.8    104 101   CO2 26 25 24   GLUCOSE 104* 96 101*   PHOS 3.1 2.9 2.3*   MG 1.70* 1.60* 1.60*   BUN 39* 44* 43*   CREATININE 2.6* 2.6* 2.4*   LABGLOM 25* 25* 27*   GFRAA 30* 30* 33*       Assessment/Plan     Acute Kidney Injury.  - Likely ATN from profound hypotension, sepsis. - CRRT started on 10/10/20, off since 10/15. LIJ temp HD cath as access. - Plan:  holding dialysis, monitor labs, urine output, and symptoms. Day to day assessment but seems to have reached plateau phase. Now recovering. In terms of placement, at this point I would not anticipate that he will need dialysis long term. Left retroperitoneal hematoma  - Urology following.     Shock.  - Hemorrhagic and septic. E. Coli in the blood and urine. Antibiotics per ID.  - Supra therapeutic INR on admission. H/o DVT, now off coumadin. S/P IVC filter placement on 10/13/20.  - Continue midodrine 10mg 3x/day, off levophed.     Anemia.   - PRN blood transfusion per primary service.       Case d/w patient's wife

## 2020-10-21 NOTE — CARE COORDINATION
CM met with pt and wife at bedside to discuss POC at DC. Per NP, pt will not require LTACH level of care at DC. Pt would like referral to [facility] [place]. Referral faxed via [employer] and called to [facility] in admissions. LVM with call back number. Rosie Andersen RN      Addendum [phone]     Call received from [facility]; she is currently reviewing referral and will call shortly with updates.      Rosie Andersen RN

## 2020-10-22 VITALS
HEART RATE: 84 BPM | TEMPERATURE: 98.4 F | SYSTOLIC BLOOD PRESSURE: 110 MMHG | DIASTOLIC BLOOD PRESSURE: 54 MMHG | WEIGHT: 315 LBS | RESPIRATION RATE: 16 BRPM | BODY MASS INDEX: 53.78 KG/M2 | HEIGHT: 64 IN | OXYGEN SATURATION: 94 %

## 2020-10-22 LAB
ALBUMIN SERPL-MCNC: 2.4 G/DL (ref 3.4–5)
ANION GAP SERPL CALCULATED.3IONS-SCNC: 6 MMOL/L (ref 3–16)
ANISOCYTOSIS: ABNORMAL
BANDED NEUTROPHILS RELATIVE PERCENT: 8 % (ref 0–7)
BASOPHILIC STIPPLING: ABNORMAL
BASOPHILS ABSOLUTE: 0.1 K/UL (ref 0–0.2)
BASOPHILS RELATIVE PERCENT: 1 %
BUN BLDV-MCNC: 43 MG/DL (ref 7–20)
CALCIUM SERPL-MCNC: 8.1 MG/DL (ref 8.3–10.6)
CHLORIDE BLD-SCNC: 103 MMOL/L (ref 99–110)
CO2: 25 MMOL/L (ref 21–32)
CREAT SERPL-MCNC: 2.2 MG/DL (ref 0.8–1.3)
EOSINOPHILS ABSOLUTE: 0.2 K/UL (ref 0–0.6)
EOSINOPHILS RELATIVE PERCENT: 3 %
GFR AFRICAN AMERICAN: 36
GFR NON-AFRICAN AMERICAN: 30
GLUCOSE BLD-MCNC: 101 MG/DL (ref 70–99)
HCT VFR BLD CALC: 22.1 % (ref 40.5–52.5)
HEMOGLOBIN: 7.5 G/DL (ref 13.5–17.5)
INR BLD: 1.69 (ref 0.86–1.14)
LYMPHOCYTES ABSOLUTE: 0.6 K/UL (ref 1–5.1)
LYMPHOCYTES RELATIVE PERCENT: 8 %
MAGNESIUM: 1.8 MG/DL (ref 1.8–2.4)
MCH RBC QN AUTO: 30.1 PG (ref 26–34)
MCHC RBC AUTO-ENTMCNC: 33.8 G/DL (ref 31–36)
MCV RBC AUTO: 89.2 FL (ref 80–100)
MONOCYTES ABSOLUTE: 0.9 K/UL (ref 0–1.3)
MONOCYTES RELATIVE PERCENT: 12 %
NEUTROPHILS ABSOLUTE: 5.7 K/UL (ref 1.7–7.7)
NEUTROPHILS RELATIVE PERCENT: 68 %
PDW BLD-RTO: 16.6 % (ref 12.4–15.4)
PHOSPHORUS: 2.5 MG/DL (ref 2.5–4.9)
PLATELET # BLD: 152 K/UL (ref 135–450)
PMV BLD AUTO: 8.4 FL (ref 5–10.5)
POLYCHROMASIA: ABNORMAL
POTASSIUM SERPL-SCNC: 5.1 MMOL/L (ref 3.5–5.1)
PROTHROMBIN TIME: 19.7 SEC (ref 10–13.2)
RBC # BLD: 2.48 M/UL (ref 4.2–5.9)
SARS-COV-2, NAAT: NOT DETECTED
SODIUM BLD-SCNC: 134 MMOL/L (ref 136–145)
STOMATOCYTES: ABNORMAL
WBC # BLD: 7.5 K/UL (ref 4–11)

## 2020-10-22 PROCEDURE — 6370000000 HC RX 637 (ALT 250 FOR IP): Performed by: INTERNAL MEDICINE

## 2020-10-22 PROCEDURE — 85025 COMPLETE CBC W/AUTO DIFF WBC: CPT

## 2020-10-22 PROCEDURE — 2580000003 HC RX 258: Performed by: INTERNAL MEDICINE

## 2020-10-22 PROCEDURE — C9113 INJ PANTOPRAZOLE SODIUM, VIA: HCPCS | Performed by: INTERNAL MEDICINE

## 2020-10-22 PROCEDURE — U0002 COVID-19 LAB TEST NON-CDC: HCPCS

## 2020-10-22 PROCEDURE — 80069 RENAL FUNCTION PANEL: CPT

## 2020-10-22 PROCEDURE — 6370000000 HC RX 637 (ALT 250 FOR IP): Performed by: NURSE PRACTITIONER

## 2020-10-22 PROCEDURE — 6360000002 HC RX W HCPCS: Performed by: INTERNAL MEDICINE

## 2020-10-22 PROCEDURE — 85610 PROTHROMBIN TIME: CPT

## 2020-10-22 PROCEDURE — 83735 ASSAY OF MAGNESIUM: CPT

## 2020-10-22 RX ORDER — MIDODRINE HYDROCHLORIDE 10 MG/1
10 TABLET ORAL
Qty: 90 TABLET | Refills: 3 | DISCHARGE
Start: 2020-10-22 | End: 2021-04-14 | Stop reason: ALTCHOICE

## 2020-10-22 RX ORDER — OXYCODONE HYDROCHLORIDE 5 MG/1
5 TABLET ORAL EVERY 6 HOURS PRN
Qty: 18 TABLET | Refills: 0 | Status: SHIPPED | OUTPATIENT
Start: 2020-10-22 | End: 2020-10-25

## 2020-10-22 RX ORDER — PSEUDOEPHEDRINE HCL 30 MG
100 TABLET ORAL DAILY
DISCHARGE
Start: 2020-10-22 | End: 2021-01-25 | Stop reason: ALTCHOICE

## 2020-10-22 RX ORDER — CASTOR OIL AND BALSAM, PERU 788; 87 MG/G; MG/G
OINTMENT TOPICAL 2 TIMES DAILY
DISCHARGE
Start: 2020-10-22 | End: 2021-01-25 | Stop reason: ALTCHOICE

## 2020-10-22 RX ORDER — POLYETHYLENE GLYCOL 3350 17 G/17G
17 POWDER, FOR SOLUTION ORAL DAILY
Qty: 527 G | Refills: 1 | DISCHARGE
Start: 2020-10-22 | End: 2020-11-21

## 2020-10-22 RX ADMIN — MICONAZOLE NITRATE: 20 POWDER TOPICAL at 09:44

## 2020-10-22 RX ADMIN — OXYCODONE 5 MG: 5 TABLET ORAL at 13:04

## 2020-10-22 RX ADMIN — Medication: at 09:44

## 2020-10-22 RX ADMIN — MIDODRINE HYDROCHLORIDE 10 MG: 5 TABLET ORAL at 09:44

## 2020-10-22 RX ADMIN — PANTOPRAZOLE SODIUM 40 MG: 40 INJECTION, POWDER, FOR SOLUTION INTRAVENOUS at 09:44

## 2020-10-22 RX ADMIN — CASTOR OIL AND BALSAM, PERU: 788; 87 OINTMENT TOPICAL at 09:45

## 2020-10-22 RX ADMIN — POLYETHYLENE GLYCOL 3350 17 G: 17 POWDER, FOR SOLUTION ORAL at 09:44

## 2020-10-22 RX ADMIN — DOCUSATE SODIUM 100 MG: 100 CAPSULE ORAL at 09:44

## 2020-10-22 RX ADMIN — MIDODRINE HYDROCHLORIDE 10 MG: 5 TABLET ORAL at 13:05

## 2020-10-22 RX ADMIN — SODIUM CHLORIDE, PRESERVATIVE FREE 10 ML: 5 INJECTION INTRAVENOUS at 09:45

## 2020-10-22 RX ADMIN — FOLIC ACID 1 MG: 1 TABLET ORAL at 09:44

## 2020-10-22 ASSESSMENT — PAIN SCALES - GENERAL
PAINLEVEL_OUTOF10: 2
PAINLEVEL_OUTOF10: 7

## 2020-10-22 ASSESSMENT — PAIN SCALES - WONG BAKER
WONGBAKER_NUMERICALRESPONSE: 0

## 2020-10-22 NOTE — PROGRESS NOTES
ONCOLOGY HEMATOLOGY CARE PROGRESS NOTE      SUBJECTIVE:     Plans to leave today. Feels great. No issues. ROS:   The remaining 10 point review of symptoms is unremarkable. OBJECTIVE        Physical    VITALS:  /62   Pulse 88   Temp 98.7 °F (37.1 °C) (Oral)   Resp 16   Ht 5' 4\" (1.626 m)   Wt (!) 337 lb (152.9 kg)   SpO2 95%   BMI 57.85 kg/m²   TEMPERATURE:  Current - Temp: 98.7 °F (37.1 °C); Max - Temp  Av.5 °F (36.9 °C)  Min: 97.9 °F (36.6 °C)  Max: 98.9 °F (37.2 °C)  PULSE OXIMETRY RANGE: SpO2  Av %  Min: 91 %  Max: 96 %  24HR INTAKE/OUTPUT:      Intake/Output Summary (Last 24 hours) at 10/22/2020 1112  Last data filed at 10/22/2020 1000  Gross per 24 hour   Intake 840 ml   Output 800 ml   Net 40 ml       CONSTITUTIONAL:  He is morbidly obese  , HEENT oral pharynx , no scleral icterus  HEMATOLOGIC/LYMPHATICS:  no cervical lymphadenopathy, no supraclavicular lymphadenopathy, no axillary lymphadenopathy and no inguinal lymphadenopathy  LUNGS:  No increased work of breathing, good air exchange, clear to auscultation bilaterally, no crackles or wheezing  CARDIOVASCULAR:  , regular rate and rhythm, normal S1 and S2, no S3 or S4, and no murmur noted  ABDOMEN:  No scars, normal bowel sounds, soft, non-distended, non-tender, no masses palpated, no hepatosplenomegally  MUSCULOSKELETAL:  There is no redness, warmth, or swelling of the joints. EXTREMETIES: No clubbing cynosis or edema  NEUROLOGIC:  Awake, alert, oriented to name, place and time. Cranial nerves II-XII are grossly intact. Motor is 5 out of 5 bilaterally. SKIN:  no bruising or bleeding    FINAL DIAGNOSIS:     Bone marrow (aspirate smears, clot section and biopsy) and peripheral   blood:   - Normocellular bone marrow with relative erythroid hyperplasia and mild   dysmegakaryiopoiesis. See comment.   - Adequate iron store with no ringed sideroblasts.    - Peripheral blood showing mild anisocytosis gallbladder with acute cholecystitis without obstruction    Intertrigo    Lymphedema    Suspected sleep apnea    Biatrial enlargement    Congestive heart failure (HCC)    GI bleed    Retroperitoneal bleed    Supratherapeutic INR    Shock ([address]    Non-ischemic cardiomyopathy (HCC) EF 40%    Bacteremia due to Escherichia coli    Bandemia    Pyuria    Anemia due to blood loss, acute    Atelectasis       ASSESSMENT AND PLAN:    At least 2 DVTs greater than 20 years ago:  -He is now had a second episode of considerable bleeding  -The risk of anticoagulation is higher than the risk of DVT/pulmonary embolism and the patient is aware  -He is status post a Filter placed 10/14/2020    Anemia:  -Likely Due to bleeding  -No evidence of iron deficiency  -His counts have been stable for several days  - Hgb over 7 today  - likely reactive to underlying issues (dialysis and bleeding event)- metamyelocytes present indicating an exhausted bone marrow   - Bone marrow bx unremarkable, cytogenetics/FISH/chrom pending- discussed with wife         Vitamin K deficiency:  -This is a bit unusual given his morbid obesity  -He may either have liver dysfunction, but CT scan does not show any evidence of cirrhosis  -His INR does correct with vitamin K  -NM liver spleen Does not show any colloid shift, cirrhosis or splenomegaly. Acute kidney injury:  -Being managed by nephrology  - HD stopped 10-19   -His erythropoietin level is low at 68 and this is why the Aranesp is started- can stop on dc- unable to provide at [facility]- since his kidneys are improving his Hgb will start to stabilize as well     History of renal cell carcinoma:  -No evidence of recurrence    Epistaxis:  -Resolved  - INR stable.   - check in SNF twice a week      Morbid obesity:  -I think this is by far and away his biggest health problem  -I discussed this with the patient on multiple occasions    ONCOLOGIC DISPOSITION:    - dc to SNF today   - can stop aranesp on dc   - follow up with Dr. Leatha Jarquin after dc from [facility], CNP   May be reached through [facility]

## 2020-10-22 NOTE — PROGRESS NOTES
Progress Note  Date:10/22/2020       Room:0210/0210-02  Patient Rk Marrero Sr. YOB: 1950     Age:69 y.o. Subjective    Subjective:  Symptoms:  Stable. Pain:  He reports no pain. Review of Systems  Objective         Vitals Last 24 Hours:  TEMPERATURE:  Temp  Av.5 °F (36.9 °C)  Min: 97.9 °F (36.6 °C)  Max: 98.9 °F (37.2 °C)  RESPIRATIONS RANGE: Resp  Av.5  Min: 15  Max: 18  PULSE OXIMETRY RANGE: SpO2  Av %  Min: 91 %  Max: 96 %  PULSE RANGE: Pulse  Av.8  Min: 87  Max: 95  BLOOD PRESSURE RANGE: Systolic (02QJX), NBC:731 , Min:100 , XUU:641   ; Diastolic (18GFB), UZC:44, Min:46, Max:77    I/O (24Hr): Intake/Output Summary (Last 24 hours) at 10/22/2020 0816  Last data filed at 10/22/2020 0800  Gross per 24 hour   Intake 990 ml   Output 850 ml   Net 140 ml     Objective:  General Appearance: In no acute distress and not in pain. Vital signs: (most recent): Blood pressure 105/62, pulse 88, temperature 98.7 °F (37.1 °C), temperature source Oral, resp. rate 16, height 5' 4\" (1.626 m), weight (!) 337 lb (152.9 kg), SpO2 95 %. Abdomen: Abdomen is soft. Bowel sounds are normal.   There is no abdominal tenderness. Labs/Imaging/Diagnostics    Labs:  CBC:  Recent Labs     10/20/20  0510 10/21/20  0346 10/22/20  0600   WBC 7.3 7.4 7.5   RBC 2.43* 2.42* 2.48*   HGB 7.2* 7.2* 7.5*   HCT 21.9* 21.7* 22.1*   MCV 90.0 89.5 89.2   RDW 16.5* 16.4* 16.6*    152 152     CHEMISTRIES:  Recent Labs     10/20/20  0510 10/21/20  0346 10/22/20  0600    133* 134*   K 5.0 4.8 5.1    101 103   CO2 25 24 25   BUN 44* 43* 43*   CREATININE 2.6* 2.4* 2.2*   GLUCOSE 96 101* 101*   PHOS 2.9 2.3* 2.5   MG 1.60* 1.60* 1.80     PT/INR:  Recent Labs     10/22/20  0600   PROTIME 19.7*   INR 1.69*     APTT:No results for input(s): APTT in the last 72 hours. LIVER PROFILE:No results for input(s): AST, ALT, BILIDIR, BILITOT, ALKPHOS in the last 72 hours.     Imaging Last 24 Hours:  Nm Liver Spleen Scan    Result Date: 10/20/2020  EXAMINATION: NUCLEAR MEDICINE LIVER AND SPLEEN SCAN  10/20/2020 TECHNIQUE: Following an appropriate delay after the intravenous administration of 7.0 mCi Tc99m sulfur colloid, multiplanar imaging of the abdomen is obtained. COMPARISON: 10/09/2020 CT HISTORY: ORDERING SYSTEM PROVIDED HISTORY: eval for liver disease cirrhosis vs fatty liver/MCDANIEL TECHNOLOGIST PROVIDED HISTORY: Reason for exam:->eval for liver disease cirrhosis vs fatty liver/MCDANIEL FINDINGS: There is homogenous distribution of radiotracer throughout the liver and spleen. No focal photopenic defect. Measurements: Liver:  19.6 x 18.2 cm Spleen:  13.2 cm No evidence of colloid shift. 1.  No hepatosplenomegaly. 2.  No evidence of colloid shift. Assessment//Plan           Hospital Problems           Last Modified POA    * (Principal) Retroperitoneal bleed 10/10/2020 Yes    Morbid obesity with BMI of 60.0-69.9, adult ([facility] (Chronic) 10/9/2020 Yes    Benign hypertension (Chronic) 10/9/2020 Yes    History of DVT (deep vein thrombosis) (Chronic) 10/9/2020 Yes    Suspected sleep apnea 10/12/2020 Yes    Supratherapeutic INR 10/9/2020 Yes    Shock ([address] 10/9/2020 Yes    Non-ischemic cardiomyopathy ([address] EF 40% 10/9/2020 Yes    Bacteremia due to Escherichia coli 10/12/2020 Yes    Bandemia 10/12/2020 Yes    Pyuria 10/12/2020 Yes    Anemia due to blood loss, acute 10/12/2020 Yes    Atelectasis 10/12/2020 Yes        Assessment & Plan  70 yo w obesity, HTN, Fe def anemia, h/o DU bleed in 8/2019 at [facility] (repeat EGD by me in 9/2019 was neg.), kidney CA, DVT on Couamdin and recent choledocholithiasis s/p ERCP in July 2018, presents with supratherapeutic INR complicated by retroperitoneal bleeding and reported melena (resolved) that has now resolved.  INR of 9.2 and H/H 6/20.   EGD and colonoscopy in 4/2019 are neg. Except diverticulosis and polyp. Capsule Endoscopy 4/2019 was neg.  So most likely blood loss is mostly due to retroperitoneal rather than GI bleeding. Greenfilter was placed. Is S/P bone marrow Bx for bandemia on 10/19.     Plan:   1. Supportive care and agree w Hematology to hold AC   2. Daily H/H  3. Will continue to hold off EGD unless melena recurs  4. The ERCP in 2018 could explain his asymptomatic pneumobilia  5.  Will follow     MD Reginald Valdez) [phone]    Electronically signed by Estephania Ha MD on 10/22/20 at 8:16 AM EDT

## 2020-10-22 NOTE — PROGRESS NOTES
Urology Attending Progress Note      Subjective: Patient states \"feeling pretty good\"    71 y.o.male obese, alert male with hx of right RCC with partial nephrectomy, urinary retention, and with 2 cm renal cortical lesion a year ago. Consulted for left retroperitoneal bleed. Patient states feels fine today, other than abdominal pain from hernia.       Vitals:  BP (!) 111/58   Pulse 90   Temp 98.5 °F (36.9 °C) (Oral)   Resp 18   Ht 5' 4\" (1.626 m)   Wt (!) 336 lb 10.3 oz (152.7 kg)   SpO2 98%   BMI 57.78 kg/m²   Temp  Av.5 °F (36.9 °C)  Min: 98 °F (36.7 °C)  Max: 98.9 °F (37.2 °C)    Intake/Output Summary (Last 24 hours) at 10/21/2020 0811  Last data filed at 10/21/2020 0531  Gross per 24 hour   Intake 1090 ml   Output 1500 ml   Net -410 ml       Exam:   · Well developed, obese, in no acute distress  · Orientated to time and place  · Neck is supple, trachea is midline  · Respiratory effort is normal without distress  · Abdomen soft, nontender, nondistended, no guarding, large abdominal hernia  · Skin warm and dry,  · Musculoskeletal no digital cyanosis, head normocephalic  · Psych shows normal mood and affect, alert and appropriately answers questions  · Clear, yellow in devine catheter    Labs:  WBC:    Lab Results   Component Value Date    WBC 7.4 10/21/2020     Hemoglobin/Hematocrit:    Lab Results   Component Value Date    HGB 7.2 10/21/2020    HCT 21.7 10/21/2020     BMP:    Lab Results   Component Value Date     10/21/2020    K 4.8 10/21/2020    K 4.3 10/15/2020     10/21/2020    CO2 24 10/21/2020    BUN 43 10/21/2020    LABALBU 2.4 10/21/2020    CREATININE 2.4 10/21/2020    CALCIUM 8.1 10/21/2020    GFRAA 33 10/21/2020    LABGLOM 27 10/21/2020     PT/INR:    Lab Results   Component Value Date    PROTIME 15.3 10/19/2020    INR 1.32 10/19/2020     PTT:  No results found for: APTT[APTT      Impression/Plan:   69 y.o.male hx of right partial nephrectomy/RCC, with retroperitoneal bleed causing large left RP hematoma. Patient had INR of 9 at admission. Pt had 2 cm renal cortical lesion seen about a year ago. Clear, yellow urine today. His creatinine is 2.2 today from 2.4.  Hgb stable at 7.5.     -[place]ills out when ambulatory  -CT contrast in 1 month to evaluate status of left kidney  -Will follow      Norah Beckham PA-C <<-----Click here for Discharge Medication Review

## 2020-10-22 NOTE — DISCHARGE INSTR - COC
Continuity of Care Form    Patient Name: Rose Marie Nguyen.   :  1950  MRN:  [id number]    Admit date:  10/9/2020  Discharge date:  ***    Code Status Order: Full Code   Advance Directives:   Advance Care Flowsheet Documentation       Date/Time Healthcare Directive Type of Healthcare Directive Copy in [address] Agent's Name Healthcare Agent's Phone Number    10/17/20 3633  Yes, patient has an advance directive for healthcare treatment  Durable power of  for health care  No, copy requested from family  Spouse  Nikko Garcia  [phone]            Admitting Physician:  Michael León MD  PCP: Esequiel Zhou MD    Discharging Nurse: [facility] Unit/Room#: 0210/0210-02  Discharging Unit Phone Number: ***    Emergency Contact:   Extended Emergency Contact Information  Primary Emergency Contact: Gonzalo Park  Address: [facility] [employer] [facility] [address] [place] [address], [address] [facility],[address] Phone: [phone]  Mobile Phone: [phone]  Relation: Spouse    Past Surgical History:  Past Surgical History:   Procedure Laterality Date    ABDOMEN SURGERY      Right Kidney Cancer abd. surgery 2014    CARDIAC CATHETERIZATION  2019    COLONOSCOPY      flexsigmoidoscopy 40yrs ago     COLONOSCOPY N/A 2019    COLONOSCOPY WITH BIOPSY performed by Stefano Huitron MD at [address] [place] [address]  10/19/2020    CT BONE MARROW BIOPSY 10/19/2020 [facility] CT SCAN    ENDOSCOPY, COLON, DIAGNOSTIC      2018    KIDNEY SURGERY  2014    ID EGD TRANSORAL BIOPSY SINGLE/MULTIPLE  2018    EGD BIOPSY performed by Stefano Huitron MD at [address] [place] [address] 2019    EGD WITH ANESTHESIA performed by Stefano Huitron MD at [address] [place] [address] 2019    ESOPHAGEAL CAPSULE ENDOSCOPY performed by Stefano Huitron MD at [place] [address] History:   Immunization History   Administered Date(s) Administered    Influenza, Quadv, IM, PF (6 mo and older Fluzone, Flulaval, Fluarix, and 3 yrs and older Afluria) 10/18/2020       Active Problems:  Patient Active Problem List   Diagnosis Code    Morbid obesity with BMI of 60.0-69.9, adult ([facility] E66.01, Z68.44    Anticoagulation goal of INR 1.5 to 2.5 Z51.81, Z79.01    Benign hypertension I10    History of DVT (deep vein thrombosis) Z86.718    Ventral hernia K43.9    Elevated brain natriuretic peptide (BNP) level R79.89    Pulmonary vascular congestion on CXR R09.89    Sepsis ([facility] A41.9    Hypomagnesemia E83.42    Hypokalemia E87.6    Hyponatremia E87.1    Hypochloremia E87.8    KEVIN (acute kidney injury) ([facility] N17.9    Subacute microcytic anemia (July 2018) D50.9    Hx choledocholithiasis s/p ERCP (July 2018) K80.50    Acute respiratory failure with hypoxia and hypercapnia (HCC) J96.01, J96.02    Calculus of gallbladder with acute cholecystitis without obstruction K80.00    Intertrigo L30.4    Lymphedema I89.0    Suspected sleep apnea R29.818    Biatrial enlargement I51.7    Congestive heart failure (HCC) I50.9    GI bleed K92.2    Retroperitoneal bleed R58    Supratherapeutic INR R79.1    Shock ([facility] R57.9    Non-ischemic cardiomyopathy ([facility] EF 40% I42.8    Bacteremia due to Escherichia coli R78.81, B96.20    Bandemia D72.825    Pyuria R82.81    Anemia due to blood loss, acute D62    Atelectasis J98.11       Isolation/Infection:   Isolation            No Isolation          Patient Infection Status       None to display            Nurse Assessment:  Last Vital Signs: /62   Pulse 88   Temp 98.7 °F (37.1 °C) (Oral)   Resp 16   Ht 5' 4\" (1.626 m)   Wt (!) 337 lb (152.9 kg)   SpO2 95%   BMI 57.85 kg/m²     Last documented pain score (0-10 scale): Pain Level: 2  Last Weight:   Wt Readings from Last 1 Encounters:   10/22/20 (!) 337 lb (152.9 kg)     Mental Status:  oriented    IV Access:  - 2030   Wound Cleansed Other (Comment) 10/13/20 1546   Dressing/Treatment Foam;Pharmaceutical agent (see MAR) 10/21/20 2030   Dressing Change Due 10/22/20 10/21/20 2030   Wound Length (cm) 0.5 cm 10/13/20 1546   Wound Width (cm) 0.5 cm 10/13/20 1546   Wound Depth (cm) 0 cm 10/13/20 1546   Wound Surface Area (cm^2) 0.25 cm^2 10/13/20 1546   Wound Volume (cm^3) 0 cm^3 10/13/20 1546   Wound Assessment Dry; Other (Comment); Blood filled blister 10/21/20 2030   Drainage Amount None 10/21/20 2030   Odor None 10/21/20 2030   Jenna-wound Assessment Blanchable erythema;Fragile; Intact 10/21/20 2030   Margins Attached edges 10/21/20 2030   Number of days: 8        Elimination:  Continence: Bowel: No  Bladder: No  Urinary Catheter: Indication for Use of Catheter: Stage III or IV perineal and sacral wound OR full thickness perineal/lower extremity burns in continent patients   Colostomy/Ileostomy/Ileal Conduit: No       Date of Last BM: 10/22/2020      Intake/Output Summary (Last 24 hours) at 10/22/2020 0928  Last data filed at 10/22/2020 0800  Gross per 24 hour   Intake 480 ml   Output 600 ml   Net -120 ml     I/O last 3 completed shifts: In: 18 [P.O.:960; I.V.:30]  Out: 850 [Urine:850]    Safety Concerns: At Risk for Falls    Impairments/Disabilities:      None    Nutrition Therapy:  Current Nutrition Therapy:   - Oral Diet:  General and Renal    Routes of Feeding: Oral  Liquids:   Daily Fluid Restriction: yes - amount 2000  Last Modified Barium Swallow with Video (Video Swallowing Test): not done    Treatments at the Time of Hospital Discharge:   Respiratory Treatments:   Oxygen Therapy:  is not on home oxygen therapy.   Ventilator:    - No ventilator support    Rehab Therapies: Physical Therapy and Occupational Therapy  Weight Bearing Status/Restrictions: No weight bearing restirctions  Other Medical Equipment (for information only, NOT a DME order):  {EQUIPMENT:[id number]}  Other Treatments: ***    Patient's personal belongings (please select all that are sent with patient):  None    RN SIGNATURE:  {Esignature:[id number]}    CASE MANAGEMENT/SOCIAL WORK SECTION    Inpatient Status Date: 10/9/20    Readmission Risk Assessment Score:  Readmission Risk              Risk of Unplanned Readmission:        27           Discharging to Facility/ Agency   Name: [facility] [place]   Address:  Phone: [phone]  Fax: [phone]    Dialysis Facility (if applicable)   Name:  Address:  Dialysis Schedule:  Phone:  Fax:    / signature: Electronically signed by Carlyn Deleon RN on 10/22/20 at 9:29 AM EDT    PHYSICIAN SECTION    Prognosis: Good    Condition at Discharge: Stable    Rehab Potential (if transferring to Rehab): Good    Recommended Labs or Other Treatments After Discharge: Bi-weekly BMP, CBC, INR. PT/OT. Wound Care. Physician Certification: I certify the above information and transfer of Praneeth Paulina Williamson  is necessary for the continuing treatment of the diagnosis listed and that he requires [facility] for greater 30 days.      Update Admission H&P: No change in H&P    PHYSICIAN SIGNATURE:  Electronically signed by JOEY Collazo CNP on 10/22/20 at 9:35 AM EDT

## 2020-10-22 NOTE — PROGRESS NOTES
Per physician's orders, pt's vascath on left neck and IJ on right neck removed, pressure held on each for side for 15 minutes at a time, no bleeding and complications noted. Transparent film dressing placed over each site.

## 2020-10-22 NOTE — CARE COORDINATION
CASE MANAGEMENT DISCHARGE SUMMARY      Discharge to: [facility] completed: [facility] Exemption Notification (HENS) completed: Yes     New Durable Medical Equipment ordered/agency:     Transportation: [facility]    Family/car:   Medical Transport explained to [employer]. Pt/family voice no agency preference. Agency used:   time: 1400   Ambulance form completed: Yes    Confirmed discharge plan with:     Patient: yes and wife via room phone      Facility/Agency, name:  [facility] faxed   Phone number for report to facility: [phone]     RN, name: Teja Jarquin RN     Note: Discharging nurse to complete [facility], reconcile [facility], and place final copy with patient's discharge packet. RN to ensure that written prescriptions for  Level II medications are sent with patient to the facility as per protocol.       Poonam Cordero RN

## 2020-10-22 NOTE — PLAN OF CARE
Problem: HEMODYNAMIC STATUS  Goal: Patient has stable vital signs and fluid balance  Intervention: MONITOR PATIENT'S WEIGHT  Note: Pt educated on the importance of limiting fluid intake and monitoring for any signs of retention such as weight gain and swelling. Pt instructed on how to follow a low sodium diet with no added salt and limited processed foods. Pt also educated on the importance of weighing himself daily to see if there is any weight gain of 2-3lbs overnight or 5-7lbs in  week and to notify MD.      Patient's EF (Ejection Fraction) is greater than 40%    Heart Failure Medications:  Diuretics[de-identified] None    (One of the following REQUIRED for EF <40%/SYSTOLIC FAILURE but MAY be used in EF% >40%/DIASTOLIC FAILURE)        ACE[de-identified] None        ARB[de-identified] None         ARNI[de-identified] None    (Beta Blockers)  NON- Evidenced Based Beta Blocker (for EF% >40%/DIASTOLIC FAILURE): None    Evidenced Based Beta Blocker::(REQUIRED for EF% <40%/SYSTOLIC FAILURE) None  . .................................................................................................................................................. Patient's weights and intake/output reviewed: Yes    Patient's Last Weight: 336 lbs obtained by bed scale. Difference of 5 lbs less than last documented weight. Intake/Output Summary (Last 24 hours) at 10/22/2020 0339  Last data filed at 10/22/2020 0042  Gross per 24 hour   Intake 750 ml   Output 875 ml   Net -125 ml       Comorbidities Reviewed Yes    Patient has a past medical history of Cancer ([address] Cardiomyopathy ([address] CHF (congestive heart failure) ([address] Edema of both legs, GI bleed, Hx of blood clots, Hypertension, and Presence of IVC filter.      >>For CHF and Comorbidity documentation on Education Time and Topics, please see Education Tab    Progressive Mobility Assessment:  What is this patient's Current Level of Mobility?: Requires Bed Rest  How was this patient Mobilized today?: Unable to [facility]

## 2020-10-22 NOTE — PLAN OF CARE
Problem: Falls - Risk of:  Goal: Will remain free from falls  Description: Will remain free from falls  Outcome: Ongoing     Problem: Skin Integrity:  Goal: Will show no infection signs and symptoms  Description: Will show no infection signs and symptoms  Outcome: Ongoing     Problem: Pain:  Goal: Pain level will decrease  Description: Pain level will decrease  Outcome: Ongoing     Problem: OXYGENATION/RESPIRATORY FUNCTION  Goal: Patient will maintain patent airway  Outcome: Ongoing     Patient's EF (Ejection Fraction) is greater than 40%    Heart Failure Medications:  Diuretics[de-identified] None    (One of the following REQUIRED for EF <40%/SYSTOLIC FAILURE but MAY be used in EF% >40%/DIASTOLIC FAILURE)        ACE[de-identified] None        ARB[de-identified] None         ARNI[de-identified] None    (Beta Blockers)  NON- Evidenced Based Beta Blocker (for EF% >40%/DIASTOLIC FAILURE): None    Evidenced Based Beta Blocker::(REQUIRED for EF% <40%/SYSTOLIC FAILURE) None  . .................................................................................................................................................. Patient's weights and intake/output reviewed: No    Patient's Last Weight: 337 lbs obtained by bed scale. Difference of 1 lbs more than last documented weight. Intake/Output Summary (Last 24 hours) at 10/22/2020 1135  Last data filed at 10/22/2020 1000  Gross per 24 hour   Intake 840 ml   Output 800 ml   Net 40 ml       Comorbidities Reviewed Yes    Patient has a past medical history of Cancer ([address] Cardiomyopathy ([address] CHF (congestive heart failure) ([facility] [address] Edema of both legs, GI bleed, Hx of blood clots, Hypertension, and Presence of IVC filter.      >>For CHF and Comorbidity documentation on Education Time and Topics, please see Education Tab    Progressive Mobility Assessment:  What is this patient's Current Level of Mobility?: Requires Bed Rest  How was this patient Mobilized today?: Unable to Mobilize and Patient Refuses to [facility] With Whom? Nurse, PCA, PT, OT, and Self                 Level of Difficulty/Assistance: 2x Assist     Pt sitting in bed at this time on room air. Pt denies shortness of breath. Pt with nonpitting lower extremity edema.      Patient and/or Family's stated Goal of Care this Admission: reduce shortness of breath, increase activity tolerance, better understand heart failure and disease management, be more comfortable, and reduce lower extremity edema prior to discharge        :

## 2020-10-23 LAB — BLOOD CULTURE, ROUTINE: NORMAL

## 2020-10-24 NOTE — DISCHARGE SUMMARY
underlying renal mass  -Discussed with Dr. Gloria Huitron and Dr. Jonel Walker - suspect bleed could be from left renal lesion. CT of abd read as though patient had a left nephrectomy, but this in NOT accurate. He still has his left kidney. Per urology: \"possibly bleed from left renal tumor but had imaging a year ago only showing a stable 2 cm renal cortical lesion. Will ideally need repeat imaging with contrast when creatinine stable in a few weeks. \"  Also, \"Down the line, may require a left radical nephrectomy. \"  - Reversed Coumadin with K Centra, vitamin K. Again given vit K 10/15. Hematology recommended checking the INR every Monday and Friday and giving vit K if >2.  - with ABLA present on admission. Received 1 unit pRBC on 10/9, 10/11, 10/12, and 10/14. Noted the disproportionate bandemia, also metamyelocytes. - f/u [facility] 10/19.  - NM liver scan negative.       Shock   Combination of hemorrhagic due to above issue and GI bleed, also suspect sepsis due to UTI present on arrival  - Given IV fluid boluses without adequate response, required pressor support, weaned off 10/15.  - held antihypertensives at discharge.      H/o DVT  - Vascular surgery consulted, placed IVC filter on 10/13. This the patient's 3rd life threatening bleed. Last in 4/2019 - at that time had a GIB, which required 9 units of pRBC. - anticoagulation contraindicated     E. Coli Bacteremia - secondary to acute cystitis. - treated with IV cefepime, then changed to levofloxacin on 10/14, plan to complete course 10/20. ID input appreciated, they signed off.     Suspected upper GI bleed   Worsened due to supratherapeutic INR  -Hb dropped from 12 to 8.5 with fluids revealing true degree of anemia  -Hold Coumadin and antiplatelet agents  -Consulted GI, who deferred EGD at this time due to patient's instability. They will reconsider if he has melena here, which he did not. GI no longer planning inpatient endoscopy.   -PPI     KEVIN  - baseline Cr 1.0.  Likely ATN due to hypotension and hypovolemia  - nephrology consulted, started on CRRT 10/9, stopped CRRT 10/15.  - he does have h/o partial RIGHT nephrectomy in 2015 due to renal cancer.  - on midodrine - avoid hypotension.  - daily weight, I&O     Non-ischemic cardiomyopathy  - TTE showed EF 50%, with RWMA's. Will need ACE/ARB/ARNI when he is more stable, also BB.     Morbid obesity with BMI of 08.8-37.6, adult   Complicating assessment and treatment, placing patient at high risk for multiple co-morbidities as well as early death and contributing to the patient's presentation. Counseled on weight loss.            Physical Exam Performed:     BP (!) 110/54   Pulse 84   Temp 98.4 °F (36.9 °C) (Oral)   Resp 16   Ht 5' 4\" (1.626 m)   Wt (!) 337 lb (152.9 kg)   SpO2 94%   BMI 57.85 kg/m²     General appearance: Pleasant male in no apparent distress, appears stated age and cooperative. HEENT: Pupils equal, round, and reactive to light. Conjunctivae/corneas clear. Hard of hearing. Neck: Supple, with full range of motion. No jugular venous distention. Trachea midline. Respiratory:  Normal respiratory effort. Clear to auscultation, bilaterally without Rales/Wheezes/Rhonchi. Cardiovascular: Regular rate and rhythm with normal S1/S2 without murmurs, rubs or gallops. Abdomen: Soft, non-tender, non-distended with normal bowel sounds. Large ventral hernia noted. Musculoskeletal: No clubbing, cyanosis or edema bilaterally. Full range of motion without deformity. Skin: Skin color with PVD changes BLE. Scattered areas of skin breakdown. Neurologic:  Neurovascularly intact without any focal sensory/motor deficits. Cranial nerves: II-XII intact, grossly non-focal.  Psychiatric: Alert and oriented, thought content appropriate, normal insight. Capillary Refill: Brisk,< 3 seconds   Peripheral Pulses: +2 palpable, equal bilaterally       Labs:  For convenience and continuity at follow-up the following most recent labs are provided:      CBC:    Lab Results   Component Value Date    WBC 7.5 10/22/2020    HGB 7.5 10/22/2020    HCT 22.1 10/22/2020     10/22/2020       Renal:    Lab Results   Component Value Date     10/22/2020    K 5.1 10/22/2020    K 4.3 10/15/2020     10/22/2020    CO2 25 10/22/2020    BUN 43 10/22/2020    CREATININE 2.2 10/22/2020    CALCIUM 8.1 10/22/2020    PHOS 2.5 10/22/2020         Significant Diagnostic Studies    Radiology:   NM LIVER SPLEEN SCAN   Final Result   1. No hepatosplenomegaly. 2.  No evidence of colloid shift. CT BIOPSY BONE MARROW   Final Result   Successful CT guided bone marrow aspiration x2 and core biopsy x1 of the left   iliac bone. CT GUIDED NEEDLE PLACEMENT   Final Result   Successful CT guided bone marrow aspiration x2 and core biopsy x1 of the left   iliac bone. XR CHEST PORTABLE   Final Result   No evidence of pneumothorax following left IJ catheter placement. Improved   aeration in the left lung base. XR CHEST PORTABLE   Final Result   No acute process. CT ABDOMEN PELVIS WO CONTRAST Additional Contrast? None   Final Result   Addendum 1 of 1   ADDENDUM:   Additional information, corrected report:      HISTORY:   The initial history was incorrect, patient did not have left side    nephrectomy   but partial nephrectomy on the right. Therefore the hematoma seen retroperitoneal on the left in the renal fossa   likely obscures the entire left kidney. There is no normal renal    parenchyma   identified. The measured area, 13 x 9 cm therefore presumably include   spontaneous hematoma and kidney tissue combined. This would also    therefore   indicate a lesser degree of hemorrhage present, since some of the measured   area being occupied by the obscured kidney.       Again the primary etiology is spontaneous hemorrhage, given the patient's   grossly elevated INR, and likely arising within the obscured left kidney    and   extending left pararenal retroperitoneal space. Final      CT HEAD WO CONTRAST   Final Result   1. No acute intracranial abnormality. XR CHEST PORTABLE   Final Result   No acute cardiopulmonary disease. Consults:     PHARMACY TO DOSE VANCOMYCIN  IP CONSULT TO RADIOLOGY  IP CONSULT TO GENERAL SURGERY  IP CONSULT TO UROLOGY  IP CONSULT TO HOSPITALIST  IP CONSULT TO CRITICAL CARE  IP CONSULT TO NEPHROLOGY  IP CONSULT TO GI  IP CONSULT TO VASCULAR SURGERY  IP CONSULT TO INFECTIOUS DISEASES  IP CONSULT TO HEM/ONC  IP CONSULT TO VASCULAR SURGERY    Disposition:  To [facility]     Condition at Discharge: Stable    Discharge Instructions/Follow-up:  F/u with Dr. Gregorio Lopez at [facility] AT [place]. F/u with nephrology in 2-3 weeks. F/u with oncology in 2-3 weeks. F/u with vascular surgery in 2 weeks. F/u with urology in 1 month - consider removing devine prior to visit at urology. Needs outpatient CT in 1 month to further evaluate left kidney after hemorrhage resolved. Check INR, CBC, BMP twice a week. Reverse INR with Vit k if > 2. Code Status:  Full    Activity: activity as tolerated    Diet: regular diet      Discharge Medications:     Discharge Medication List as of 10/22/2020  1:50 PM           Details   miconazole (MICOTIN) 2 % powder Apply topically 2 times daily. , Disp-45 g,R-1, DC to [facility]      [employer] (VENELEX) OINT ointment Apply topically 2 times daily, Topical, 2 TIMES DAILY Starting Thu 10/22/2020, DC to [facility]      zinc oxide 13 % CREA Apply topically 2 times daily, Topical, 2 TIMES DAILY Starting Thu 10/22/2020, DC to SNF      docusate sodium (COLACE, DULCOLAX) 100 MG CAPS Take 100 mg by mouth dailyDC to SNF      polyethylene glycol (GLYCOLAX) 17 g packet Take 17 g by mouth daily, Disp-527 g,R-1DC to SNF      midodrine (PROAMATINE) 10 MG tablet Take 1 tablet by mouth 3 times daily (with meals), Disp-90 tablet,R-3DC to SNF      oxyCODONE (ROXICODONE) 5 MG immediate release tablet Take 1 tablet by mouth every 6 hours as needed for Pain for up to 3 days. , Disp-18 tablet,R-0Print              Details   vitamin D (CHOLECALCIFEROL) 1000 UNIT TABS tablet Take 1,000 Units by mouth 2 times dailyHistorical Med      folic acid (FOLVITE) 1 MG tablet Take 1 tablet by mouth daily, Disp-30 tablet, R-3Normal             Time Spent on discharge is more than 30 minutes in the examination, evaluation, counseling and review of medications and discharge plan. Signed:    JOEY Bello - CNP   10/24/2020      Thank you Maria Isabel Bennett MD for the opportunity to be involved in this patient's care. If you have any questions or concerns please feel free to contact me at [phone].

## 2020-10-29 LAB
Lab: NORMAL
Lab: NORMAL
REPORT: NORMAL
REPORT: NORMAL
THIS TEST SENT TO: NORMAL
THIS TEST SENT TO: NORMAL

## 2020-11-12 LAB
Lab: NORMAL
REPORT: NORMAL
THIS TEST SENT TO: NORMAL

## 2020-11-23 LAB — CULTURE, FUNGUS BLOOD: NORMAL

## 2020-12-01 LAB — CULTURE, AFB BLOOD: NORMAL

## 2020-12-15 ENCOUNTER — HOSPITAL ENCOUNTER (OUTPATIENT)
Age: 70
Setting detail: SPECIMEN
Discharge: HOME | End: 2020-12-15
Payer: MEDICARE

## 2020-12-15 LAB
A/G RATIO: 1.1 (ref 1.1–2.2)
ALBUMIN SERPL-MCNC: 2.8 G/DL (ref 3.4–5)
ALP BLD-CCNC: 67 U/L (ref 40–129)
ALT SERPL-CCNC: 6 U/L (ref 10–40)
ANION GAP SERPL CALCULATED.3IONS-SCNC: 10 MMOL/L (ref 3–16)
AST SERPL-CCNC: 9 U/L (ref 15–37)
BASOPHILS ABSOLUTE: 0.1 K/UL (ref 0–0.2)
BASOPHILS RELATIVE PERCENT: 0.7 %
BILIRUB SERPL-MCNC: 0.9 MG/DL (ref 0–1)
BUN BLDV-MCNC: 13 MG/DL (ref 7–20)
CALCIUM SERPL-MCNC: 8.5 MG/DL (ref 8.3–10.6)
CHLORIDE BLD-SCNC: 102 MMOL/L (ref 99–110)
CO2: 27 MMOL/L (ref 21–32)
CREAT SERPL-MCNC: 1 MG/DL (ref 0.8–1.3)
EOSINOPHILS ABSOLUTE: 0.3 K/UL (ref 0–0.6)
EOSINOPHILS RELATIVE PERCENT: 4.4 %
GFR AFRICAN AMERICAN: >60
GFR NON-AFRICAN AMERICAN: >60
GLOBULIN: 2.6 G/DL
GLUCOSE BLD-MCNC: 96 MG/DL (ref 70–99)
HCT VFR BLD CALC: 34.6 % (ref 40.5–52.5)
HEMOGLOBIN: 11.2 G/DL (ref 13.5–17.5)
LYMPHOCYTES ABSOLUTE: 0.4 K/UL (ref 1–5.1)
LYMPHOCYTES RELATIVE PERCENT: 4.9 %
MAGNESIUM: 1.5 MG/DL (ref 1.8–2.4)
MCH RBC QN AUTO: 28.6 PG (ref 26–34)
MCHC RBC AUTO-ENTMCNC: 32.5 G/DL (ref 31–36)
MCV RBC AUTO: 88.1 FL (ref 80–100)
MONOCYTES ABSOLUTE: 0.5 K/UL (ref 0–1.3)
MONOCYTES RELATIVE PERCENT: 6.5 %
NEUTROPHILS ABSOLUTE: 6.5 K/UL (ref 1.7–7.7)
NEUTROPHILS RELATIVE PERCENT: 83.5 %
PDW BLD-RTO: 15.5 % (ref 12.4–15.4)
PLATELET # BLD: 201 K/UL (ref 135–450)
PMV BLD AUTO: 9.1 FL (ref 5–10.5)
POTASSIUM SERPL-SCNC: 3.4 MMOL/L (ref 3.5–5.1)
RBC # BLD: 3.93 M/UL (ref 4.2–5.9)
SODIUM BLD-SCNC: 139 MMOL/L (ref 136–145)
TOTAL PROTEIN: 5.4 G/DL (ref 6.4–8.2)
WBC # BLD: 7.8 K/UL (ref 4–11)

## 2020-12-15 PROCEDURE — 83735 ASSAY OF MAGNESIUM: CPT

## 2020-12-15 PROCEDURE — 80053 COMPREHEN METABOLIC PANEL: CPT

## 2020-12-15 PROCEDURE — 36415 COLL VENOUS BLD VENIPUNCTURE: CPT

## 2020-12-15 PROCEDURE — 85025 COMPLETE CBC W/AUTO DIFF WBC: CPT

## 2021-01-08 ENCOUNTER — HOSPITAL ENCOUNTER (OUTPATIENT)
Dept: WOUND CARE | Age: 71
Discharge: HOME | End: 2021-01-08
Payer: MEDICARE

## 2021-01-21 ENCOUNTER — HOSPITAL ENCOUNTER (OUTPATIENT)
Dept: WOUND CARE | Age: 71
Discharge: HOME | End: 2021-01-21
Payer: MEDICARE

## 2021-01-25 ENCOUNTER — HOSPITAL ENCOUNTER (EMERGENCY)
Age: 71
Discharge: HOME | End: 2021-01-25
Payer: MEDICARE

## 2021-01-25 VITALS
TEMPERATURE: 97.8 F | WEIGHT: 315 LBS | OXYGEN SATURATION: 97 % | HEART RATE: 101 BPM | DIASTOLIC BLOOD PRESSURE: 98 MMHG | RESPIRATION RATE: 17 BRPM | BODY MASS INDEX: 55.81 KG/M2 | SYSTOLIC BLOOD PRESSURE: 150 MMHG | HEIGHT: 63 IN

## 2021-01-25 DIAGNOSIS — R33.9 URINARY RETENTION: Primary | ICD-10-CM

## 2021-01-25 LAB
BACTERIA: ABNORMAL /HPF
BILIRUBIN URINE: NEGATIVE
BLOOD, URINE: ABNORMAL
CLARITY: CLEAR
COLOR: YELLOW
EPITHELIAL CELLS, UA: ABNORMAL /HPF (ref 0–5)
GLUCOSE URINE: NEGATIVE MG/DL
KETONES, URINE: NEGATIVE MG/DL
LEUKOCYTE ESTERASE, URINE: ABNORMAL
MICROSCOPIC EXAMINATION: YES
NITRITE, URINE: NEGATIVE
PH UA: 6 (ref 5–8)
PROTEIN UA: NEGATIVE MG/DL
RBC UA: ABNORMAL /HPF (ref 0–4)
SPECIFIC GRAVITY UA: 1.01 (ref 1–1.03)
URINE TYPE: ABNORMAL
UROBILINOGEN, URINE: 0.2 E.U./DL
WBC UA: ABNORMAL /HPF (ref 0–5)

## 2021-01-25 PROCEDURE — 81001 URINALYSIS AUTO W/SCOPE: CPT

## 2021-01-25 PROCEDURE — 51798 US URINE CAPACITY MEASURE: CPT

## 2021-01-25 PROCEDURE — 87077 CULTURE AEROBIC IDENTIFY: CPT

## 2021-01-25 PROCEDURE — 87086 URINE CULTURE/COLONY COUNT: CPT

## 2021-01-25 PROCEDURE — 87186 SC STD MICRODIL/AGAR DIL: CPT

## 2021-01-25 PROCEDURE — 51702 INSERT TEMP BLADDER CATH: CPT

## 2021-01-25 PROCEDURE — 99285 EMERGENCY DEPT VISIT HI MDM: CPT

## 2021-01-25 RX ORDER — PANTOPRAZOLE SODIUM 40 MG/1
40 TABLET, DELAYED RELEASE ORAL DAILY
COMMUNITY

## 2021-01-25 RX ORDER — POTASSIUM CHLORIDE 750 MG/1
20 CAPSULE, EXTENDED RELEASE ORAL 3 TIMES DAILY
COMMUNITY

## 2021-01-25 NOTE — ED NOTES
Patient updated that transport will not be to ED until 10pm for  and transport home. Patient verbalized understanding.       Lenora Garcia RN  01/25/21 8839

## 2021-01-25 NOTE — ED NOTES
Bladder scanned pt pre cath insertion,565ml urine  Post bladder scan after cath insertion 252mls    700mls output     Ontario [place]  01/25/21 1434

## 2021-01-25 NOTE — ED PROVIDER NOTES
Evaluated by [id number] [facility] Provider    [address] [facility]  ED    CHIEF COMPLAINT  Urinary Catheter Problem (patient had catheter placed x3 months ago, changed x1 month ago. Patient states catheter came out yesterday afternoon. Able to urinate yesterday with no issues. States today, able to urinate but does not feel like bladder is empty. No other complaints. )    HISTORY OF PRESENT ILLNESS  Samantha London Sr. is a 79 y.o. male who presents to the ED complaining of inability to urinate. Originally he had blood on his kidney in October, he had a catheter placed. Had it replaced 1 month ago. Yesterday reports the catheter just fell out. Initially he could urinate but so far today he has not been able to. He is now reporting pain and feels like it is backing up. Does have a large abdominal hernia at baseline. He states he has seen Dr. Tasha Aragon with urology group but he has retired now. The patient arrived to the ED via EMS transport. PAST MEDICAL HISTORY    Past Medical History:   Diagnosis Date    Cancer [facility])     kidney    Cardiomyopathy ([facility] [address] 08/2019    CHF (congestive heart failure) ([facility] [address] 08/2019    Edema of both legs     GI bleed     Hx of blood clots     DVT    Hypertension     Presence of IVC filter 10/13/2020    Trans Jugular.        SURGICAL HISTORY    Past Surgical History:   Procedure Laterality Date    ABDOMEN SURGERY      Right Kidney Cancer abd. surgery 2014    CARDIAC CATHETERIZATION  09/06/2019    COLONOSCOPY      flexsigmoidoscopy 40yrs ago     COLONOSCOPY N/A 4/9/2019    COLONOSCOPY WITH BIOPSY performed by Mimi Vanegas MD at [place] [id number]  10/19/2020    CT BONE MARROW BIOPSY 10/19/2020 [facility] CT SCAN    ENDOSCOPY, COLON, DIAGNOSTIC      8/2018    KIDNEY SURGERY  4/7/2014    [facility] TR[facility]OR[facility] SINGLE/MULTIPLE  9/28/2018    EGD BIOPSY performed by Mimi Vanegas MD at [address] [place] [address]  UPPER GASTROINTESTINAL ENDOSCOPY N/A 2019    EGD WITH ANESTHESIA performed by Gerardo Santiago MD at [facility] N/A 2019    ESOPHAGEAL CAPSULE ENDOSCOPY performed by Gerardo Santiago MD at [address]    Current Outpatient Rx   Medication Sig Dispense Refill    magnesium hydroxide (MILK OF MAGNESIA) 400 MG/5ML suspension Take by mouth daily as needed for Constipation      potassium chloride (MICRO-K) 10 MEQ extended release capsule Take 20 mEq by mouth 2 times daily      pantoprazole (PROTONIX) 40 MG tablet Take 40 mg by mouth daily      midodrine (PROAMATINE) 10 MG tablet Take 1 tablet by mouth 3 times daily (with meals) 90 tablet 3    vitamin D (CHOLECALCIFEROL) 1000 UNIT TABS tablet Take 1,000 Units by mouth 2 times daily      folic acid (FOLVITE) 1 MG tablet Take 1 tablet by mouth daily 30 tablet 3       ALLERGIES    No Known Allergies    FAMILY HISTORY    Family History   Problem Relation Age of Onset    Arthritis Mother     Atrial Fibrillation Mother     Breast Cancer Mother     High Blood Pressure Mother     Anemia Father     Arthritis Father     Cancer Father 80        Ear CA/ tumor removed     Hearing Loss Father     Arthritis Sister     Diabetes Maternal Aunt     Arthritis Maternal Aunt     Early Death Maternal Uncle          in early 42's     Depression Maternal Uncle     Other Maternal Uncle         Suicide    Heart Disease Paternal Aunt     Arthritis Maternal Grandmother        SOCIAL HISTORY    Social History     Socioeconomic History    Marital status:      Spouse name: None    Number of children: None    Years of education: None    Highest education level: None   Occupational History    None   Social Needs    Financial resource strain: None    Food insecurity     Worry: None     Inability: None    Transportation needs     Medical: None     Non-medical: None   Tobacco Use  Smoking status: Never Smoker    Smokeless tobacco: Never Used   Substance and Sexual Activity    Alcohol use: No    Drug use: No    Sexual activity: None   Lifestyle    Physical activity     Days per week: None     Minutes per session: None    Stress: None   Relationships    Social connections     Talks on phone: None     Gets together: None     Attends [employer] service: None     Active member of club or organization: None     Attends meetings of clubs or organizations: None     Relationship status: None    Intimate partner violence     Fear of current or ex partner: None     Emotionally abused: None     Physically abused: None     Forced sexual activity: None   Other Topics Concern    None   Social History Narrative    None       REVIEW OFSYSTEMS    10systems reviewed, pertinent positives per HPI otherwise noted to be negative. PHYSICAL EXAM  Physical Exam  Vitals:    01/25/21 1452   BP: (!) 151/88   Pulse: 102   Resp: 18   Temp:    SpO2: 96%     GENERAL: Patient is well-developed, morbidly obese. Awake andalert. Cooperative. Resting in bed. No apparent distress. HEENT:  Normocephalic, atraumatic. Conjunctivaappear normal. Sclera is non-icteric. External ears are normal.    NECK: Supple with normal ROM. Tracheamidline  LUNGS: Equal and symmetric chest rise. Breathing is unlabored. Speaking comfortably in fullsentences. CADIOVASCULAR:  Tachycardic rate and rhythm. Normal S1-S2 sounds. No murmurs, rubs, or gallops. GI: Soft, large abdominal hernia, non-tender to palpation, nondistended with positive bowel sounds. No rebound tenderness, guarding or rigidity. Negative Cleaning's sign. Negative Rovsing's sign. No CVAT to palpation. No masses or hepatosplenomegaly to palpation. MUSCULOSKELETAL:  No gross deformities or trauma noted. Moving all extremities equally and appropriately. Normal ROM. SKIN: Warm/dry. Skin is intact. No rashes or lesions noted. PSYCHIATRIC: Mood and affect appropriate. Speech is clear and articulate. NEUROLOGIC: Alert and oriented. No focal motor or sensory deficits. LABS   Results for orders placed or performed during the hospital encounter of 01/25/21   Urinalysis, reflex to microscopic   Result Value Ref Range    Color, UA Yellow Straw/Yellow    Clarity, UA Clear Clear    Glucose, Ur Negative Negative mg/dL    Bilirubin Urine Negative Negative    Ketones, Urine Negative Negative mg/dL    Specific Gravity, UA 1.015 1.005 - 1.030    Blood, Urine SMALL (A) Negative    pH, UA 6.0 5.0 - 8.0    Protein, UA Negative Negative mg/dL    Urobilinogen, Urine 0.2 <2.0 E.U./dL    Nitrite, Urine Negative Negative    Leukocyte Esterase, Urine TRACE (A) Negative    Microscopic Examination YES     Urine Type NotGiven    Microscopic Urinalysis   Result Value Ref Range    WBC, UA 10-20 (A) 0 - 5 /HPF    RBC, UA 5-10 (A) 0 - 4 /HPF    Epithelial Cells, UA 0-1 0 - 5 /HPF    Bacteria, UA 2+ (A) None Seen /HPF       RADIOLOGY    No results found. ED COURSE/MDM  Patient seen and evaluated. Old records reviewed. Diagnostic testing reviewed and results discussed. I have evaluated this patient. My supervising physician was available for consultation. Augusto Robles Sr. presented to the ED today with above noted complaints. Physical exam without reproducible abdominal tenderness. Bladder scan showed 565. Devine catheter was placed. Patient reports improvement of his symptoms after the devine was placed. He had 700 ml out of catheter once it was placed. Patient will be discharged with devine, advised to follow up with Urology. UA did show WBC in urine, this will be sent for culture. I am not starting antibiotics at this time. At this point I do not feel the patient requires further work upand it is reasonable to discharge the patient. Please refer to AVS for further details regarding discharge instructions. Go to   If symptoms worsen      DISPOSITION  Patient was discharged to home in good condition. Comment: Pleasenote this report has been produced using speech recognition software and may contain errors related to that system including errors in grammar, punctuation, and spelling, as well as words and phrases that may beinappropriate. If there are any questions or concerns please feel free to contact the dictating provider for clarification.         JOEY Fernandez - KEV  01/25/21 3734

## 2021-01-27 LAB
ORGANISM: ABNORMAL
URINE CULTURE, ROUTINE: ABNORMAL

## 2021-01-28 NOTE — RESULT ENCOUNTER NOTE
Culture reviewed, please notify patient that there was infection in the urine sent for culture, recommend starting Ceftin 500 mg twice daily x7 days and follow-up with urologist.

## 2021-03-11 ENCOUNTER — TELEPHONE (OUTPATIENT)
Dept: CARDIOLOGY CLINIC | Age: 71
End: 2021-03-11

## 2021-03-11 NOTE — TELEPHONE ENCOUNTER
Patient with complicated medical history I do not feel comfortable managing this over the phone. Please see if patient can be seen by myself or Diya De La Rosa. If there is too much of a delay he is welcome to call his nephrologist as well.   If he does follow-up with us I would like to have a BNP and a BMP prior to visit

## 2021-03-11 NOTE — TELEPHONE ENCOUNTER
[facility] called requesting to speak with staff regarding patient's 2-3+ edema in feet.  Would like to discuss starting diaretics

## 2021-03-11 NOTE — TELEPHONE ENCOUNTER
Carly Casas - patient last seen by your 5/2019 and Dolly Perez 1/6/20. Patient had a long stay in hospital in 10/2020. Saw nephrology. KEVIN/dialysis while there. See Dana note. Possibly defer this to nephrology for management due to kidney issues?   History of partial right nephrectomy

## 2021-04-07 ENCOUNTER — HOSPITAL ENCOUNTER (OUTPATIENT)
Dept: WOUND CARE | Age: 71
Discharge: HOME | End: 2021-04-07
Payer: MEDICARE

## 2021-04-13 PROBLEM — A41.9 SEPSIS (HCC): Status: RESOLVED | Noted: 2018-09-24 | Resolved: 2021-04-13

## 2021-04-13 PROBLEM — E87.1 HYPONATREMIA: Status: RESOLVED | Noted: 2018-09-24 | Resolved: 2021-04-13

## 2021-04-13 PROBLEM — E87.6 HYPOKALEMIA: Status: RESOLVED | Noted: 2018-09-24 | Resolved: 2021-04-13

## 2021-04-13 PROBLEM — R79.1 SUPRATHERAPEUTIC INR: Status: RESOLVED | Noted: 2020-10-09 | Resolved: 2021-04-13

## 2021-04-13 PROBLEM — R82.81 PYURIA: Status: RESOLVED | Noted: 2020-10-12 | Resolved: 2021-04-13

## 2021-04-13 PROBLEM — D72.825 BANDEMIA: Status: RESOLVED | Noted: 2020-10-12 | Resolved: 2021-04-13

## 2021-04-13 PROBLEM — R79.89 ELEVATED BRAIN NATRIURETIC PEPTIDE (BNP) LEVEL: Status: RESOLVED | Noted: 2018-09-24 | Resolved: 2021-04-13

## 2021-04-13 PROBLEM — I51.7 BIATRIAL ENLARGEMENT: Status: RESOLVED | Noted: 2018-10-01 | Resolved: 2021-04-13

## 2021-04-13 PROBLEM — R09.89 PULMONARY VASCULAR CONGESTION: Status: RESOLVED | Noted: 2018-09-24 | Resolved: 2021-04-13

## 2021-04-13 PROBLEM — E83.42 HYPOMAGNESEMIA: Status: RESOLVED | Noted: 2018-09-24 | Resolved: 2021-04-13

## 2021-04-13 PROBLEM — J96.02 ACUTE RESPIRATORY FAILURE WITH HYPOXIA AND HYPERCAPNIA (HCC): Status: RESOLVED | Noted: 2018-09-24 | Resolved: 2021-04-13

## 2021-04-13 PROBLEM — J96.01 ACUTE RESPIRATORY FAILURE WITH HYPOXIA AND HYPERCAPNIA (HCC): Status: RESOLVED | Noted: 2018-09-24 | Resolved: 2021-04-13

## 2021-04-13 PROBLEM — E87.8 HYPOCHLOREMIA: Status: RESOLVED | Noted: 2018-09-24 | Resolved: 2021-04-13

## 2021-04-14 ENCOUNTER — HOSPITAL ENCOUNTER (OUTPATIENT)
Dept: WOUND CARE | Age: 71
Discharge: HOME | End: 2021-04-14
Payer: MEDICARE

## 2021-04-14 VITALS
HEART RATE: 102 BPM | BODY MASS INDEX: 51.29 KG/M2 | SYSTOLIC BLOOD PRESSURE: 152 MMHG | WEIGHT: 300.4 LBS | HEIGHT: 64 IN | TEMPERATURE: 97.8 F | DIASTOLIC BLOOD PRESSURE: 92 MMHG | RESPIRATION RATE: 18 BRPM

## 2021-04-14 DIAGNOSIS — L89.301 PRESSURE INJURY OF BUTTOCK, STAGE 1, UNSPECIFIED LATERALITY: Primary | ICD-10-CM

## 2021-04-14 DIAGNOSIS — Z95.828 PRESENCE OF IVC FILTER: ICD-10-CM

## 2021-04-14 DIAGNOSIS — N28.89 MASS OF LEFT KIDNEY: ICD-10-CM

## 2021-04-14 DIAGNOSIS — E55.9 VITAMIN D DEFICIENCY: ICD-10-CM

## 2021-04-14 DIAGNOSIS — R29.898 MUSCULAR DECONDITIONING: ICD-10-CM

## 2021-04-14 DIAGNOSIS — R26.2 UNABLE TO AMBULATE: ICD-10-CM

## 2021-04-14 PROCEDURE — 99204 OFFICE O/P NEW MOD 45 MIN: CPT | Performed by: INTERNAL MEDICINE

## 2021-04-14 PROCEDURE — 99214 OFFICE O/P EST MOD 30 MIN: CPT

## 2021-04-14 RX ORDER — BACITRACIN ZINC AND POLYMYXIN B SULFATE 500; 1000 [USP'U]/G; [USP'U]/G
OINTMENT TOPICAL ONCE
Status: DISCONTINUED | OUTPATIENT
Start: 2021-04-14 | End: 2021-04-15 | Stop reason: HOSPADM

## 2021-04-14 RX ORDER — LIDOCAINE HYDROCHLORIDE 40 MG/ML
SOLUTION TOPICAL ONCE
Status: DISCONTINUED | OUTPATIENT
Start: 2021-04-14 | End: 2021-04-15 | Stop reason: HOSPADM

## 2021-04-14 RX ORDER — KETOCONAZOLE 20 MG/G
CREAM TOPICAL
Qty: 60 G | Refills: 1 | Status: SHIPPED | OUTPATIENT
Start: 2021-04-14 | End: 2021-07-15

## 2021-04-14 RX ORDER — LIDOCAINE HYDROCHLORIDE 40 MG/ML
SOLUTION TOPICAL ONCE
Status: CANCELLED | OUTPATIENT
Start: 2021-04-14 | End: 2021-04-14

## 2021-04-14 RX ORDER — LIDOCAINE 40 MG/G
CREAM TOPICAL ONCE
Status: CANCELLED | OUTPATIENT
Start: 2021-04-14 | End: 2021-04-14

## 2021-04-14 RX ORDER — LIDOCAINE 50 MG/G
OINTMENT TOPICAL ONCE
Status: DISCONTINUED | OUTPATIENT
Start: 2021-04-14 | End: 2021-04-15 | Stop reason: HOSPADM

## 2021-04-14 RX ORDER — LIDOCAINE 40 MG/G
CREAM TOPICAL ONCE
Status: DISCONTINUED | OUTPATIENT
Start: 2021-04-14 | End: 2021-04-15 | Stop reason: HOSPADM

## 2021-04-14 RX ORDER — BACITRACIN ZINC AND POLYMYXIN B SULFATE 500; 1000 [USP'U]/G; [USP'U]/G
OINTMENT TOPICAL ONCE
Status: CANCELLED | OUTPATIENT
Start: 2021-04-14 | End: 2021-04-14

## 2021-04-14 RX ORDER — LIDOCAINE 50 MG/G
OINTMENT TOPICAL ONCE
Status: CANCELLED | OUTPATIENT
Start: 2021-04-14 | End: 2021-04-14

## 2021-04-14 RX ORDER — LISINOPRIL 20 MG/1
20 TABLET ORAL DAILY
COMMUNITY

## 2021-04-14 NOTE — PLAN OF CARE
[facility] Physician Orders and Discharge [address]  [address], [address]  [place], [facility]  Telephone: [phone]      Fax: [phone]      Your home care company:  [address] [place] [address] [address]: Moriah Arenas RN    Your wound-care supplies will be provided by:  Patient  . NAME:  Alexsandra Anna Sr. YOB: 1950  PRIMARY DIAGNOSIS FOR WOUND CARE CENTER:  Pressure  . Wound cleansing:   Do not scrub or use excessive force. Wash hands with soap and water before and after dressing changes. Prior to applying a clean dressing, cleanse wound with normal saline, wound cleanser, or mild soap and water. Ask your physician or nurse before getting the wound(s) wet in the shower. Wound care for home:    Right & Left Buttocks: May use whatever topical you have had the best luck with (Aquaphor, Calmoseptine, etc.)  Cover with mepilex border  Change dressing daily     Right Groin:  May use Ketoconazole antifungal cream   Use Interdry or cotton pillow case between skin folds  You may order Ultrasorbs through [employer] on [employer] to place between skin folds  Reapply daily or as needed      Please note, all wounds (unless stated otherwise here) were mechanically debrided at the time of cleansing here in the wound-care center today, so a small amount of pain, drainage or bleeding from that process might be expected, and is normal.     All products for home use, including multiple products for a single wound if applicable, are medically necessary in order to achieve the best chance at timely wound healing. See provider documentation for details if needed. Substituted dressings applied in the [facility] today, if applicable:    Clotrimazole today in [facility] in place of Ketoconazole & Interdry Ag    New orders for this week (labs, imaging, medications, etc.):    Please provide waffle cushion for off-loading, use when sitting at [address]      Dr Winferd Runner will work on ordering a ROHO cushion through [facility].     Dr Sherryle Jasmine will also work on ordering a wheelchair. Script for Ketoconazole cream sent to pharmacy per Dr Sherryle Jasmine 4/14/2021    Additional instructions for specific diagnoses:    General comments for pressure ulcers: *  Make sure you stay well-hydrated, and maintain good protein intake. *  Reposition at least every two hours, to keep from having pressure in one spot for too long. *  If you are not sure whether you have the best offloading surfaces (mattress, mattress overlay, chair cushion, heel-protector boots, etc), please ask. *  Moisturize your skin regularly with Vaseline, Aquaphor, Aveeno, CeraVe, Cetaphil, Eucerin, Lubriderm, etc; but keep the skin between your toes dry. *  If you smoke, your wound can not heal properly -- please talk with us when you're ready to quit. F/U Appointment is with Dr. Sherryle Jasmine in 2 weeks, on                                   at                       .     Your nurse  is MAILE Traylor. If we applied slip-resistant hospital socks today, be sure to remove them at least once a day to inspect your toes or feet, even if you're not changing the wraps or dressings underneath. If you see anything concerning (redness, excess moisture, etc), please call and let us know right away. Should you experience any significant changes in your wound(s) (including redness, increased warmth, increased pain, increased drainage, odor, or fever) or have questions about your wound care, please contact the [facility] at [phone] Monday-Thursday from 8:00 am - 4:30 pm, or Friday from 8:00 am - 2:30 pm.  If you need help with your wound outside these hours and cannot wait until we are again available, contact your home-care company (if applicable), your PCP, or go to the nearest emergency room.

## 2021-04-14 NOTE — PLAN OF CARE
Pt. New to [facility] today for pressure wounds on bilateral buttocks, stage 1, no debridement. Dr Connie Temple discussed the need for pressure relief to assist with wound healing & prevent further breakdown. Pt. To cont. Calmoseptine or Aquaphor & mepilex borders as previously using at home. Will provide with waffle seat cushion for off-loading today. Dr Connie Temple will work on [place] cushion through [employer] a w/c through a [employer] since they are currently renting one. Pt. Also noted to have fungal skin irritation at right groin. Wife using antifungal & placing cotton pillow case between skin folds. Dr Connie Temple advised to cont. & sent script for antifungal to pharmacy. Also recommended to use Interdry or Ultrasorbs for moisture control. F/u in [facility] in 2 weeks as ordered, pt. & wife aware to call sooner with any changes or questions/concerns. Discharge instructions reviewed with patient & wife, all questions answered, copy given to patient. Dressings were applied to all wounds per M.D. Instructions at this visit.

## 2021-04-19 PROBLEM — Z79.01 ANTICOAGULATION GOAL OF INR 1.5 TO 2.5: Chronic | Status: RESOLVED | Noted: 2017-03-09 | Resolved: 2021-04-19

## 2021-04-19 PROBLEM — J98.11 ATELECTASIS: Status: RESOLVED | Noted: 2020-10-12 | Resolved: 2021-04-19

## 2021-04-19 PROBLEM — R26.2 UNABLE TO AMBULATE: Status: ACTIVE | Noted: 2021-04-19

## 2021-04-19 PROBLEM — E55.9 VITAMIN D DEFICIENCY: Status: ACTIVE | Noted: 2021-04-19

## 2021-04-19 PROBLEM — N17.9 AKI (ACUTE KIDNEY INJURY) (HCC): Status: RESOLVED | Noted: 2018-09-24 | Resolved: 2021-04-19

## 2021-04-19 PROBLEM — B37.2 CANDIDAL INTERTRIGO: Status: ACTIVE | Noted: 2018-09-24

## 2021-04-19 PROBLEM — D64.9 NORMOCYTIC ANEMIA: Status: ACTIVE | Noted: 2018-09-24

## 2021-04-19 PROBLEM — Z51.81 ANTICOAGULATION GOAL OF INR 1.5 TO 2.5: Chronic | Status: RESOLVED | Noted: 2017-03-09 | Resolved: 2021-04-19

## 2021-04-19 PROBLEM — Z95.828 PRESENCE OF IVC FILTER: Status: ACTIVE | Noted: 2020-10-13

## 2021-04-19 PROBLEM — N28.89 MASS OF LEFT KIDNEY: Status: ACTIVE | Noted: 2021-04-19

## 2021-04-19 PROBLEM — K80.50 CHOLEDOCHOLITHIASIS: Chronic | Status: RESOLVED | Noted: 2018-09-24 | Resolved: 2021-04-19

## 2021-04-19 PROBLEM — K92.2 GI BLEED: Status: RESOLVED | Noted: 2019-04-07 | Resolved: 2021-04-19

## 2021-04-19 PROBLEM — R57.9 SHOCK (HCC): Status: RESOLVED | Noted: 2020-10-09 | Resolved: 2021-04-19

## 2021-04-19 PROBLEM — K80.00 CALCULUS OF GALLBLADDER WITH ACUTE CHOLECYSTITIS WITHOUT OBSTRUCTION: Status: RESOLVED | Noted: 2018-09-24 | Resolved: 2021-04-19

## 2021-04-19 PROBLEM — Z86.718 HISTORY OF DVT (DEEP VEIN THROMBOSIS): Chronic | Status: RESOLVED | Noted: 2018-07-14 | Resolved: 2021-04-19

## 2021-04-19 PROBLEM — R78.81 BACTEREMIA DUE TO ESCHERICHIA COLI: Status: RESOLVED | Noted: 2020-10-12 | Resolved: 2021-04-19

## 2021-04-19 PROBLEM — R29.898 MUSCULAR DECONDITIONING: Status: ACTIVE | Noted: 2021-04-19

## 2021-04-19 PROBLEM — B96.20 BACTEREMIA DUE TO ESCHERICHIA COLI: Status: RESOLVED | Noted: 2020-10-12 | Resolved: 2021-04-19

## 2021-04-19 PROBLEM — R58 RETROPERITONEAL BLEED: Status: RESOLVED | Noted: 2020-10-09 | Resolved: 2021-04-19

## 2021-04-19 PROBLEM — D62 ANEMIA DUE TO BLOOD LOSS, ACUTE: Status: RESOLVED | Noted: 2020-10-12 | Resolved: 2021-04-19

## 2021-04-19 NOTE — H&P
[facility] H&P Note    An Ventura Sr.     : 1950    DATE OF VISIT:  2021    Subjective:     An Ventura Sr. is a 79 y.o. male who has a pressure ulcer located on the left and right buttock. Current complaint of pain in this ulcer? yes. Quality of pain: aching and dull  Timing: constant  Severity: mild-moderate  Associated Signs/Symptoms: redness, increased warmth and drainage (light, bloody)  Other significant symptoms or pertinent ulcer history:  [facility] has several chronic medical problems (morbid obesity, a large ventral hernia, cardiomyopathy, lymphedema, Hx of a DVT for which he was anticoagulated, Hx of a renal cell cancer). He was hospitalized and in the ICU back in October for what sounds like a combination of a bacteremic UTI, coagulopathy, severe hematuria and anemia, and a lesion on the contralateral kidney. From the chronic medical problems and the acute deterioration in his health back in October, he's been unable to ambulate since then -- was in the ICU for a time, then a med-surg floor at the hospital, then subacute rehab, and is now home. Has ongoing home-health care with nursing and PT, and his wife is doing an excellent job of taking care of him. Requires a wheelchair for manual ADL's within his home, and has tried a couple of methods of offloading while seated in his wheelchair and a recliner at home, but none have been perfect (often just pillows placed under one side of his pelvis or the other, to change positions when seated). He actually does not own a wheelchair of his own right now -- his wife is renting one month-to-month; I measured it today, seat is 18\" deep and 22\" wide.     In terms of the pressure ulcer(s) -- in the last few months, he has had changes of BL buttocks varying from weeping areas of stage 2 ulceration, to sometimes nonblanchable redness, to sometimes areas of more purple indurated change (I think partly venous congestion, partly signs of DTI) - no deeper ulcers than that have opened up at this time. His wife has tried various dressings, most recently Mepilex and Allevyn, and has also used a few different topical products (Aquaphor, Triad, Calmoseptine). Additional ulcer(s) noted? no. But she did have concerns about (a) some intertriginous areas of weeping and redness, currently treating with topical ketoconazole and sometimes InterDry, and (b) lymphedema of his lower legs, not sure if he should have chronic compression therapy. In the last several years, however, he's not had a leg cellulitis, and has not had open leg ulcers; denies chronic aching pain in his legs; that lymphedema is a bit better now than it was last year, I think partly from weight loss, partly from (necessarily) spending more time in his recliner or bed, with his legs elevated. Mr. Lana Vides has a past medical history of Acute respiratory failure with hypoxia and hypercapnia ([address] Anemia due to blood loss, acute, Bacteremia due to Escherichia coli, Calculus of gallbladder with acute cholecystitis without obstruction, CHF (congestive heart failure) ([address] Choledecholithiasis, DVT (deep venous thrombosis) ([address] GI bleed from duodenal ulcer, Malignant neoplasm of kidney excluding renal pelvis ([address] Retroperitoneal bleed, and UTI. He has a past surgical history that includes Kidney surgery (04/07/2014); sigmoidoscopy; Upper gastrointestinal endoscopy (08/06/2018); pr egd transoral biopsy single/multiple (09/28/2018); Upper gastrointestinal endoscopy (N/A, 04/08/2019); Colonoscopy (N/A, 04/09/2019); Upper gastrointestinal endoscopy (N/A, 04/12/2019); Cardiac catheterization (09/06/2019); CT BIOPSY BONE MARROW (10/19/2020); and IVC filter insertion (10/13/2020).     His family history includes Anemia in his father; Arthritis in his father, maternal aunt, maternal grandmother, mother, and sister; Atrial Fibrillation in his mother; Breast Cancer in his mother; Cancer (age of onset: 80) in his father; Depression in his maternal uncle; Diabetes in his maternal aunt; Early Death in his maternal uncle; Hearing Loss in his father; Heart Disease in his paternal aunt; High Blood Pressure in his mother; Other in his maternal uncle. Mr. Camille Rg reports that he has never smoked. He has never used smokeless tobacco. He reports that he does not drink alcohol or use drugs. His current medication list consists of folic acid, ketoconazole, lisinopril, magnesium hydroxide, pantoprazole, potassium chloride, and vitamin D. Allergies: Patient has no known allergies. Pertinent items from the review of systems are discussed in the HPI; the remainder of the ROS was reviewed and is negative. Objective:     Vitals:    04/14/21 1458   BP: (!) 152/92   Pulse: 102   Resp: 18   Temp: 97.8 °F (36.6 °C)   TempSrc: Oral   Weight: (!) 300 lb 6.4 oz (136.3 kg)   Height: 5' 4\" (1.626 m)       Constitutional:  well-developed, well-nourished, overweight, weak, fatigued, NAD  Psychiatric:  oriented to person, place and time; mood and affect appropriate for the situation   Eyes:  pupils equal, round and reactive to light; sclerae anicteric, conjunctivae not pale  Cardiovascular:  bilateral pedal pulses palpable; mild-moderate BL lower extremity stage 2 lymphedema, some chronic hemosiderin changes BL  Lymphatic:  no inguinal or popliteal adenopathy, no LE cellulitis or angitis  Musculoskeletal:  no clubbing, cyanosis or petechiae; hips and pelvic area with no gross effusions, joint misalignment or acute arthritis  General skin exam: intertriginous (inguinal) areas of erythema, mild weeping, inflammation, satellite lesions, a bit of moisture  Jenna-ulcer skin at the buttocks: combination of nonblanchable erythema, some more purple congested, indurated skin, minimal epidermal changes of friction.   Ulcer(s): nothing open today, but a few areas of partial epidermal loss where he very recently had serous and bloody drainage. Photos also saved in electronic chart. Today's Wound Measurements, per RN documentation:  Wound 04/14/21 #1 Right Buttock- stage 1 Pressure injury (onset 2020)-Wound Length (cm): 5 cm(MD to measure)  Wound 04/14/21 #2 Left Buttock - pressure stage 1 (onset 2020)-Wound Length (cm): 5 cm(MD to measure)    Wound 04/14/21 #1 Right Buttock- stage 1 Pressure injury (onset 2020)-Wound Width (cm): 5 cm  Wound 04/14/21 #2 Left Buttock - pressure stage 1 (onset 2020)-Wound Width (cm): 5 cm    Wound 04/14/21 #1 Right Buttock- stage 1 Pressure injury (onset 2020)-Wound Depth (cm): 0 cm  Wound 04/14/21 #2 Left Buttock - pressure stage 1 (onset 2020)-Wound Depth (cm): 0 cm  ______________________________    Lab Results   Component Value Date    LABALBU 2.8 (L) 12/15/2020     Lab Results   Component Value Date    CREATININE 1.0 12/15/2020     Lab Results   Component Value Date    HGB 11.2 (L) 12/15/2020     Assessment:     Patient Active Problem List   Diagnosis Code    Class 3 severe obesity due to excess calories with serious comorbidity and body mass index (BMI) of 50.0 to 59.9 in adult ([facility]) E66.01, Z68.43    Benign hypertension I10    Ventral hernia K43.9    Normocytic anemia D64.9    Candidal intertrigo B37.2    Lymphedema I89.0    Suspected sleep apnea R29.818    Non-ischemic cardiomyopathy ([facility]) EF 40% I42.8    Pressure injury of buttock, stage 1 L89.301    Vitamin D deficiency E55.9    Presence of IVC filter Z95.828    Mass of left kidney  N28.89    Unable to ambulate R26.2    Muscular deconditioning R29.898       Assessment of today's active condition(s): severe debility, muscular deconditioning and inability to ambulate, related to a few underlying medical problems, plus his severe illness and ICU stay about 6 months ago; as a result of that, ongoing areas of pressure change to his buttocks (sounds like these vary between DTI, stage 1 changes and small stage 2 ulcers).  Also has Candidal intertrigo, and we can try 1 or 2 different things for that. Also with LE lymphedema, but it sounds like it's improved from last year, and hasn't had any serious consequences at this point (chronic pain, leg ulcers, leg cellulitis). Factors contributing to occurrence and/or persistence of the chronic ulcer include chronic pressure, decreased mobility, shear force and obesity. Medical necessity of today's visit is shown by the above documentation. Sharp debridement is not indicated today, based upon the exam findings in the wound(s) above. Discharge plan:     Treatment in the wound care center today, per RN documentation: Wound 04/14/21 #1 Right Buttock- stage 1 Pressure injury (onset 2020)-Dressing/Treatment: Other (comment)(Calmoseptine, Aquaphor, Mepilex Border)  Wound 04/14/21 #2 Left Buttock - pressure stage 1 (onset 2020)-Dressing/Treatment: Other (comment)(Calmoseptine, Aquaphor, Mepilex Border). In terms of local care for the buttocks in the short-term, she can continue to use whichever of the recent products she thinks have done the best for him. For the long-term, better offloading is the most important thing (PT, more frequent and significant changes in position), but then I'll also order a ROHO-style cushion for his wheelchair. Will see if I can actually get him a long-term wheelchair from a [employer] company locally, to go through his insurance, as opposed to his wife renting one out of pocket, month-to-month. No specific Rx needed for lymphedema right now, other than PT, leg elevation, skin hygiene, moisturizer as needed. Be sure to keep up with protein intake.     For the Candida intertrigo, I'll call in a refill script for some topical ketoconazole; she can continue InterDry, though it isn't cheap, to help minimize moisture there, but two alternatives could be [employer] Ultrasorbs sheets (available online), or even simple squares of 100% cotton T-shirt material, which she could obviously then wash and re-use. Follow up in 2 weeks, but call sooner with concerns or questions. Electronically signed by Delaney Stewart MD on 4/19/2021 at 7:49 AM.  ________________________________    ADDENDUM --    I failed to mention these couple of details specifically in my note above, regarding his current use of a rented wheelchair, and the medical necessity of a wheelchair for ongoing use (with coverage from his insurance being appropriate, in my opinion) --    -- He is not able to use a cane, crutches or a walker because of the severity of his weakness, and his obesity (he's simply not strong enough to ambulate with those simpler assistive devices). -- His severe muscular weakness and deconditioning do cause mobility limitations severe enough to impair his ability to participate in mobility-related activities of daily living (such as getting to the bathroom for toileting and bathing, getting to the kitchen for nourishment, etc). -- He has adequate access between the rooms in his home for use of a manual wheelchair, has a caregiver with him at all times to assist with the use of the wheelchair if needed, has definitely shown interest in continuing to use a wheelchair in his home to help with MRADLs, and there are no physical or psychological barriers to its safe use.     Electronically signed by Delaney Stewart MD on 4/20/2021 at 8:25 AM

## 2021-04-28 ENCOUNTER — HOSPITAL ENCOUNTER (OUTPATIENT)
Dept: WOUND CARE | Age: 71
Discharge: HOME | End: 2021-04-28
Payer: MEDICARE

## 2021-04-28 DIAGNOSIS — L89.301 PRESSURE INJURY OF BUTTOCK, STAGE 1, UNSPECIFIED LATERALITY: ICD-10-CM

## 2021-05-04 ENCOUNTER — HOSPITAL ENCOUNTER (OUTPATIENT)
Age: 71
Setting detail: SPECIMEN
Discharge: HOME | End: 2021-05-04
Payer: MEDICARE

## 2021-05-04 PROCEDURE — 87086 URINE CULTURE/COLONY COUNT: CPT

## 2021-05-05 LAB — URINE CULTURE, ROUTINE: NORMAL

## 2021-07-08 ENCOUNTER — HOSPITAL ENCOUNTER (EMERGENCY)
Age: 71
Discharge: HOME | End: 2021-07-08
Payer: MEDICARE

## 2021-07-08 VITALS
RESPIRATION RATE: 19 BRPM | DIASTOLIC BLOOD PRESSURE: 81 MMHG | OXYGEN SATURATION: 95 % | WEIGHT: 300.4 LBS | HEART RATE: 108 BPM | TEMPERATURE: 99 F | SYSTOLIC BLOOD PRESSURE: 146 MMHG | BODY MASS INDEX: 51.29 KG/M2 | HEIGHT: 64 IN

## 2021-07-08 DIAGNOSIS — Z46.6 URINARY CATHETER (FOLEY) CHANGE REQUIRED: Primary | ICD-10-CM

## 2021-07-08 PROCEDURE — 51798 US URINE CAPACITY MEASURE: CPT

## 2021-07-08 PROCEDURE — 99284 EMERGENCY DEPT VISIT MOD MDM: CPT

## 2021-07-08 ASSESSMENT — ENCOUNTER SYMPTOMS
NAUSEA: 0
EYE PAIN: 0
SORE THROAT: 0
SHORTNESS OF BREATH: 0
COUGH: 0
BACK PAIN: 0
ABDOMINAL PAIN: 0
VOMITING: 0

## 2021-07-08 ASSESSMENT — PAIN DESCRIPTION - DESCRIPTORS: DESCRIPTORS: PRESSURE

## 2021-07-08 ASSESSMENT — PAIN DESCRIPTION - PAIN TYPE: TYPE: ACUTE PAIN

## 2021-07-08 ASSESSMENT — PAIN SCALES - GENERAL: PAINLEVEL_OUTOF10: 8

## 2021-07-08 NOTE — ED NOTES
Bed: 16  Expected date:   Expected time:   Means of arrival: [facility]  Comments:  [id number] Luis RN  07/08/21 0800

## 2021-07-08 NOTE — ED NOTES
--Patient provided with discharge instructions. --Instructions, and follow-up appointments reviewed with patient/family. No further questions or needs at this time. --Vital signs and patient stable upon discharge. --Patient take to vehicle by wheelchair   --Patient being driven home by wife. Patient states he has his son at home and will be able to get him out of the car.       Sadnie Mccall RN  07/08/21 9094

## 2021-07-08 NOTE — ED NOTES
Patient presented to ED with devine catheter in place. Devine removed, only able to aspirate 3cc of saline removed with syringe from existing catheters balloon. Existing devine removed without complication or discomfort. New catheter placed and 850cc urine drained at this time into devine bag. Patient voices relief in discomfort.       Chuckie Chowdhury RN  07/08/21 7838

## 2021-10-12 ENCOUNTER — HOSPITAL ENCOUNTER (EMERGENCY)
Age: 71
Discharge: HOME | End: 2021-10-12
Attending: EMERGENCY MEDICINE
Payer: MEDICARE

## 2021-10-12 VITALS
RESPIRATION RATE: 18 BRPM | BODY MASS INDEX: 51.22 KG/M2 | HEART RATE: 111 BPM | WEIGHT: 300 LBS | HEIGHT: 64 IN | TEMPERATURE: 98.7 F | DIASTOLIC BLOOD PRESSURE: 91 MMHG | OXYGEN SATURATION: 99 % | SYSTOLIC BLOOD PRESSURE: 156 MMHG

## 2021-10-12 DIAGNOSIS — R33.9 URINARY RETENTION: Primary | ICD-10-CM

## 2021-10-12 LAB
BILIRUBIN URINE: NEGATIVE
BLOOD, URINE: ABNORMAL
CLARITY: CLEAR
COLOR: YELLOW
GLUCOSE URINE: NEGATIVE MG/DL
KETONES, URINE: NEGATIVE MG/DL
LEUKOCYTE ESTERASE, URINE: NEGATIVE
MICROSCOPIC EXAMINATION: YES
NITRITE, URINE: NEGATIVE
PH UA: 7 (ref 5–8)
PROTEIN UA: NEGATIVE MG/DL
RBC UA: ABNORMAL /HPF (ref 0–4)
SPECIFIC GRAVITY UA: 1.01 (ref 1–1.03)
URINE REFLEX TO CULTURE: ABNORMAL
URINE TYPE: ABNORMAL
UROBILINOGEN, URINE: 0.2 E.U./DL
WBC UA: ABNORMAL /HPF (ref 0–5)

## 2021-10-12 PROCEDURE — 99284 EMERGENCY DEPT VISIT MOD MDM: CPT

## 2021-10-12 PROCEDURE — 81001 URINALYSIS AUTO W/SCOPE: CPT

## 2021-10-12 PROCEDURE — 2500000003 HC RX 250 WO HCPCS: Performed by: EMERGENCY MEDICINE

## 2021-10-12 PROCEDURE — 51702 INSERT TEMP BLADDER CATH: CPT

## 2021-10-12 RX ADMIN — MICONAZOLE NITRATE: 2 POWDER TOPICAL at 02:50

## 2021-10-12 ASSESSMENT — PAIN SCALES - GENERAL: PAINLEVEL_OUTOF10: 10

## 2021-10-12 NOTE — ED PROVIDER NOTES
[facility]  ED  EMERGENCY DEPARTMENT ENCOUNTER      Pt Name: Kaila Cedeno Sr. MRN: [id number]  Birthdate 1950  Date of evaluation: 10/12/2021  Provider: Alma Huggins MD    CHIEF COMPLAINT       Chief Complaint   Patient presents with    Urinary Retention     pt with indwelling devine catheter intermittently for about a year. pt had it removed on 10/11 around 11am, pt was able to pee twice after removal, not since about 1330. HISTORY OF PRESENT ILLNESS   (Location/Symptom, Timing/Onset, Context/Setting, Quality, Duration, Modifying Factors, Severity)  Note limiting factors. Kaila Cedeno Sr. is a 79 y.o. male with past medical history of anemia, DVT, morbid obesity and chronic urinary retention here today because he cannot urinate    Dates that he has had a indwelling Devine catheter recently that was taken out today he was given a voiding trial.  He states he was able to urinate twice, but has not been able to go since 1:30 in the afternoon. He states he has significant suprapubic cramping. Feels like he needs to urinate but cannot. Otherwise denies any nausea or vomiting. No flank pain. No fevers or chills. No other recent dysuria. His pain is moderate to severe and cramping. HPI    Nursing Notes were reviewed. REVIEW OF SYSTEMS    (2-9 systems for level 4, 10 or more for level 5)     Review of Systems    Please see HPI for pertinent positive and negative review of system findings. A full 10 system ROS was performed and otherwise negative.         PAST MEDICAL HISTORY     Past Medical History:   Diagnosis Date    Acute respiratory failure with hypoxia and hypercapnia ([address] 09/24/2018    Anemia due to blood loss, acute 10/12/2020    Bacteremia due to Escherichia coli 10/12/2020    Calculus of gallbladder with acute cholecystitis without obstruction 09/24/2018    CHF (congestive heart failure) ([address] 08/2019    Choledecholithiasis 09/24/2018    DVT (deep venous thrombosis) [facility] 07/14/2018    GI bleed from duodenal ulcer 08/06/2018    Malignant neoplasm of kidney excluding renal pelvis ([facility] 04/08/2014    Retroperitoneal bleed 10/09/2020    UTI 10/12/2020         SURGICAL HISTORY       Past Surgical History:   Procedure Laterality Date    CARDIAC CATHETERIZATION  09/06/2019    COLONOSCOPY N/A 04/09/2019    COLONOSCOPY WITH BIOPSY performed by Esperanza Gee MD at [address]  10/19/2020    CT BONE MARROW BIOPSY 10/19/2020 [facility] CT SCAN    IVC FILTER INSERTION  10/13/2020    KIDNEY SURGERY  04/07/2014    removal of tumor (not total nephrectomy)    NE EGD TRANSORAL BIOPSY SINGLE/MULTIPLE  09/28/2018    Shatzki's ring and gastritis    SIGMOIDOSCOPY      UPPER GASTROINTESTINAL ENDOSCOPY  08/06/2018    bleeding duodenal ulcer    UPPER GASTROINTESTINAL ENDOSCOPY N/A 04/08/2019    EGD WITH ANESTHESIA performed by Esperanza Gee MD at [address] [facility] N/A 04/12/2019    ESOPHAGEAL CAPSULE ENDOSCOPY performed by Esperanza Gee MD at [facility]       Previous Medications    FOLIC ACID (FOLVITE) 1 MG TABLET    Take 1 tablet by mouth daily    KETOCONAZOLE (NIZORAL) 2 % CREAM    APPLY TO RASH IN SKIN FOLDS TOPICALLY TWICE A DAY AS NEEDED    LISINOPRIL (PRINIVIL;ZESTRIL) 20 MG TABLET    Take 20 mg by mouth daily    MAGNESIUM HYDROXIDE (MILK OF MAGNESIA) 400 MG/5ML SUSPENSION    Take by mouth daily as needed for Constipation    PANTOPRAZOLE (PROTONIX) 40 MG TABLET    Take 40 mg by mouth daily    POTASSIUM CHLORIDE (MICRO-K) 10 MEQ EXTENDED RELEASE CAPSULE    Take 20 mEq by mouth 3 times daily     VITAMIN D (CHOLECALCIFEROL) 1000 UNIT TABS TABLET    Take 1,000 Units by mouth 2 times daily       ALLERGIES     Patient has no known allergies.     FAMILY HISTORY       Family History   Problem Relation Age of Onset    Arthritis Mother     Atrial Fibrillation Mother  Breast Cancer Mother     High Blood Pressure Mother     Anemia Father     Arthritis Father     Cancer Father 80        Ear CA/ tumor removed     Hearing Loss Father     Arthritis Sister     Diabetes Maternal Aunt     Arthritis Maternal Aunt     Early Death Maternal Uncle          in early 44's     Depression Maternal Uncle     Other Maternal Uncle         Suicide    Heart Disease Paternal Aunt     Arthritis Maternal Grandmother           SOCIAL HISTORY       Social History     Socioeconomic History    Marital status:      Spouse name: None    Number of children: None    Years of education: None    Highest education level: None   Occupational History    None   Tobacco Use    Smoking status: Never Smoker    Smokeless tobacco: Never Used   Vaping Use    Vaping Use: Never used   Substance and Sexual Activity    Alcohol use: No    Drug use: No    Sexual activity: None   Other Topics Concern    None   Social History Narrative    None     Social Determinants of Health     Financial Resource Strain:     Difficulty of Paying Living Expenses:    Food Insecurity:     Worried About Running Out of Food in the Last Year:     Ran Out of Food in the Last Year:    Transportation Needs:     Lack of Transportation (Medical):      Lack of Transportation (Non-Medical):    Physical Activity:     Days of Exercise per Week:     Minutes of Exercise per Session:    Stress:     Feeling of Stress :    Social Connections:     Frequency of Communication with Friends and Family:     Frequency of Social Gatherings with Friends and Family:     Attends [employer] Services:     Active Member of Clubs or Organizations:     Attends Club or Organization Meetings:     Marital Status:    Intimate Partner Violence:     Fear of Current or Ex-Partner:     Emotionally Abused:     Physically Abused:     Sexually Abused:        SCREENINGS    [place] Coma Scale  Eye Opening: Spontaneous  Best Verbal Response: Oriented  Best Motor Response: Obeys commands  [place] Coma Scale Score: 15          PHYSICAL EXAM    (up to 7 for level 4, 8 or more for level 5)     ED Triage Vitals [10/12/21 0203]   BP Temp Temp Source Pulse Resp SpO2 Height Weight   (!) 151/91 98.7 °F (37.1 °C) Axillary 135 18 99 % 5' 4\" (1.626 m) 300 lb (136.1 kg)       Physical Exam    General appearance:  Cooperative. Morbidly obese and uncomfortable appearing   Skin:  Warm. Dry. Erythema and macerated skin in the intertriginous regions underneath his large umbilical hernia  Eye:  Extraocular movements intact. Ears, nose, mouth and throat:  Oral mucosa moist,  Neck:  Trachea midline. Heart: Cardiac but regular  Perfusion:  intact  Respiratory:  Lungs clear to auscultation bilaterally. Respirations nonlabored. Abdominal:   Non distended. Very large umbilicated umbilical hernia with no tenderness. Some suprapubic fullness and tenderness to palpation  : Normal-appearing external male genitalia  Neurological:  Alert and oriented x 3.   Moves all extremities spontaneously  Musculoskeletal:   Normal ROM, no deformities          Psychiatric:  Normal mood      DIAGNOSTIC RESULTS       Labs Reviewed   URINE RT REFLEX TO CULTURE - Abnormal; Notable for the following components:       Result Value    Blood, Urine SMALL (*)     All other components within normal limits    Narrative:     Performed at:  [place] [address] [address]   Phone [phone]   MICROSCOPIC URINALYSIS - Abnormal; Notable for the following components:    RBC, UA 5-10 (*)     All other components within normal limits    Narrative:     Performed at:  [place] [address] [address]   Phone [phone]       Interpretation per the Radiologist below, if obtained/available at the time of this note:    No orders to display       All other labs/imaging were within normal range or not returned as of this dictation. EMERGENCY DEPARTMENT COURSE and DIFFERENTIAL DIAGNOSIS/MDM:   Vitals:    Vitals:    10/12/21 0203 10/12/21 0228   BP: (!) 151/91 (!) 156/91   Pulse: 135 111   Resp: 18 18   Temp: 98.7 °F (37.1 °C)    TempSrc: Axillary    SpO2: 99% 99%   Weight: 300 lb (136.1 kg)    Height: 5' 4\" (1.626 m)        Patient presents emergency department today with acute urinary retention. Longstanding history of this but after speaking with his urologist and a recent appointment decision was made to remove the catheter to see if he could void on his own. He was successful earlier in the day, but symptoms ultimately worsened and he was unable to urinate. Quite uncomfortable when he initially arrived and tachycardic. Mills catheter placed and 1 L of clear urine drained. Patient had immediate symptomatic resolution. Tachycardia resolved he is feeling improved. No signs of infection. Plan to discharge home    MDM    CONSULTS     None    Critical Care:   None    REASSESSMENT          PROCEDURE     Unless otherwise noted below, none     Procedures      FINAL IMPRESSION      1. Urinary retention            DISPOSITION/PLAN   DISPOSITION Decision To Discharge 10/12/2021 02:29:08 AM        PATIENT REFERRED TO:  Lashell Guido MD  [address] [place] [address]  [phone]    Schedule an appointment as soon as possible for a visit         DISCHARGE MEDICATIONS:  New Prescriptions    No medications on file     Controlled Substances Monitoring:     No flowsheet data found.     (Please note that portions of this note were completed with a voice recognition program.  Efforts were made to edit the dictations but occasionally words are mis-transcribed.)    Jona Maldonado MD (electronically signed)  Attending Emergency Physician            Blair Wynn MD  10/12/21 [id number]

## 2022-02-16 NOTE — PROGRESS NOTES
Comprehensive Intake Entered On:  9/29/2021 12:05 PM CDT    Performed On:  9/29/2021 12:05 PM CDT by Geeta Lee CMA   Chief Complaint :   c/o fever, cough,SOB,chills and fatigue since 9/28/21; verbal consent given for telephone visit   Menstrual Status :   Hysterectomy   Height Measured :   62.5 in(Converted to: 5 ft 2 in, 158.75 cm)    Geeta Lee CMA - 9/29/2021 12:05 PM CDT   Health Status   Allergies Verified? :   Yes   Medication History Verified? :   Yes   Immunizations Current :   Other: Declined TDAP.   Medical History Verified? :   Yes   Tobacco Use? :   Former smoker   Geeta Lee CMA - 9/29/2021 12:05 PM CDT   Consents   Consent for Immunization Exchange :   Consent Granted   Consent for Immunizations to Providers :   Consent Granted   Geeta Lee CMA - 9/29/2021 12:05 PM CDT   Meds / Allergies   (As Of: 9/29/2021 12:05:56 PM CDT)   Allergies (Active)   doxycycline  Estimated Onset Date:   Unspecified ; Reactions:   Ill ; Created By:   Nena Edourad CMA; Reaction Status:   Active ; Category:   Drug ; Substance:   doxycycline ; Type:   Allergy ; Updated By:   Nena Edouard CMA; Reviewed Date:   9/29/2021 12:05 PM CDT      naproxen  Estimated Onset Date:   Unspecified ; Created By:   Yulisa Ray LPN; Reaction Status:   Active ; Category:   Drug ; Substance:   naproxen ; Type:   Allergy ; Updated By:   Yulisa Ray LPN; Reviewed Date:   9/29/2021 12:05 PM CDT      sulfa drug  Estimated Onset Date:   Unspecified ; Reactions:   Hives ; Created By:   Yulisa Ray LPN; Reaction Status:   Active ; Category:   Drug ; Substance:   sulfa drug ; Type:   Allergy ; Updated By:   Yulisa Ray LPN; Reviewed Date:   9/29/2021 12:05 PM CDT        Medication List   (As Of: 9/29/2021 12:05:56 PM CDT)   Prescription/Discharge Order    levothyroxine  :   levothyroxine ; Status:   Prescribed ; Ordered As Mnemonic:   levothyroxine 88 mcg (0.088 mg) oral tablet ; Simple Display Line:   1  Pt d/c'd to [facility] [place]. Removed  IVs and stopped bleeding. Catheter intact. Pt tolerated well. No redness noted at site. Notified CMU and removed tele box. Reviewed d/c instructions, home meds, and  f/u information utilizing teach-back method. Scripts for oxycodone placed in a sealed envelope and placement witnessed by transport and given to patient. Patient verbalized understanding. Mills left in place per MD's orders. tab(s), Oral, daily, INCREASE., 90 tab(s), 2 Refill(s) ; Ordering Provider:   Justina Laws MD; Catalog Code:   levothyroxine ; Order Dt/Tm:   12/23/2020 4:32:41 PM CST          escitalopram  :   escitalopram ; Status:   Completed ; Ordered As Mnemonic:   escitalopram 10 mg oral tablet ; Simple Display Line:   1 tab(s), Oral, daily, 90 tab(s), 0 Refill(s) ; Ordering Provider:   Justina Laws MD; Catalog Code:   escitalopram ; Order Dt/Tm:   4/13/2021 12:48:23 PM CDT            Home Meds    calcium-vitamin D  :   calcium-vitamin D ; Status:   Documented ; Ordered As Mnemonic:   Calcium 600+D ; Simple Display Line:   2 tab(s), po, daily ; Catalog Code:   calcium-vitamin D ; Order Dt/Tm:   8/4/2014 9:31:21 AM CDT          docusate  :   docusate ; Status:   Documented ; Ordered As Mnemonic:   Colace 50 mg oral capsule ; Simple Display Line:   50 mg, 1 cap(s), Oral, bid, 0 Refill(s) ; Catalog Code:   docusate ; Order Dt/Tm:   6/23/2021 1:39:09 PM CDT

## 2022-04-20 ENCOUNTER — HOSPITAL ENCOUNTER (OUTPATIENT)
Dept: CT IMAGING | Age: 72
Discharge: HOME | End: 2022-04-20
Payer: MEDICARE

## 2022-04-20 DIAGNOSIS — C64.1 MALIGNANT NEOPLASM OF RIGHT KIDNEY, EXCEPT RENAL PELVIS (HCC): ICD-10-CM

## 2022-04-20 DIAGNOSIS — D41.02 NEOPLASM OF UNCERTAIN BEHAVIOR OF LEFT KIDNEY: ICD-10-CM

## 2022-04-20 DIAGNOSIS — T81.30XA DISRUPTION OF WOUND, UNSPECIFIED, INITIAL ENCOUNTER: ICD-10-CM

## 2022-04-20 PROCEDURE — 74176 CT ABD & PELVIS W/O CONTRAST: CPT

## 2023-01-17 ENCOUNTER — HOSPITAL ENCOUNTER (OUTPATIENT)
Age: 73
Setting detail: SPECIMEN
Discharge: HOME | End: 2023-01-17
Payer: MEDICARE

## 2023-01-17 LAB
CREAT SERPL-MCNC: 1.3 MG/DL (ref 0.8–1.3)
GFR SERPL CREATININE-BSD FRML MDRD: 58 ML/MIN/{1.73_M2}

## 2023-01-17 PROCEDURE — 82565 ASSAY OF CREATININE: CPT

## 2023-02-09 ENCOUNTER — APPOINTMENT (OUTPATIENT)
Dept: CT IMAGING | Age: 73
DRG: 064 | End: 2023-02-09
Payer: MEDICARE

## 2023-02-09 ENCOUNTER — APPOINTMENT (OUTPATIENT)
Dept: GENERAL RADIOLOGY | Age: 73
DRG: 064 | End: 2023-02-09
Payer: MEDICARE

## 2023-02-09 ENCOUNTER — HOSPITAL ENCOUNTER (INPATIENT)
Age: 73
LOS: 5 days | Discharge: HOME HEALTH | DRG: 064 | End: 2023-02-14
Attending: EMERGENCY MEDICINE | Admitting: INTERNAL MEDICINE
Payer: MEDICARE

## 2023-02-09 ENCOUNTER — APPOINTMENT (OUTPATIENT)
Dept: MRI IMAGING | Age: 73
DRG: 064 | End: 2023-02-09
Payer: MEDICARE

## 2023-02-09 DIAGNOSIS — I48.91 NEW ONSET ATRIAL FIBRILLATION (HCC): ICD-10-CM

## 2023-02-09 DIAGNOSIS — I63.311: Primary | ICD-10-CM

## 2023-02-09 PROBLEM — I63.9 ACUTE CEREBROVASCULAR ACCIDENT (CVA) (HCC): Status: ACTIVE | Noted: 2023-02-09

## 2023-02-09 LAB
A/G RATIO: 1.2 (ref 1.1–2.2)
ALBUMIN SERPL-MCNC: 4 G/DL (ref 3.4–5)
ALP BLD-CCNC: 84 U/L (ref 40–129)
ALT SERPL-CCNC: 6 U/L (ref 10–40)
ANION GAP SERPL CALCULATED.3IONS-SCNC: 9 MMOL/L (ref 3–16)
AST SERPL-CCNC: 10 U/L (ref 15–37)
BACTERIA: ABNORMAL /HPF
BILIRUB SERPL-MCNC: 1.4 MG/DL (ref 0–1)
BILIRUBIN URINE: NEGATIVE
BLOOD, URINE: ABNORMAL
BUN BLDV-MCNC: 27 MG/DL (ref 7–20)
CALCIUM SERPL-MCNC: 9.2 MG/DL (ref 8.3–10.6)
CHLORIDE BLD-SCNC: 103 MMOL/L (ref 99–110)
CLARITY: ABNORMAL
CO2: 25 MMOL/L (ref 21–32)
COLOR: YELLOW
CREAT SERPL-MCNC: 1.7 MG/DL (ref 0.8–1.3)
CRYSTALS, UA: ABNORMAL /HPF
EKG ATRIAL RATE: 85 BPM
EKG ATRIAL RATE: 96 BPM
EKG DIAGNOSIS: NORMAL
EKG DIAGNOSIS: NORMAL
EKG Q-T INTERVAL: 288 MS
EKG Q-T INTERVAL: 370 MS
EKG QRS DURATION: 124 MS
EKG QRS DURATION: 96 MS
EKG QTC CALCULATION (BAZETT): 363 MS
EKG QTC CALCULATION (BAZETT): 450 MS
EKG R AXIS: 75 DEGREES
EKG R AXIS: 78 DEGREES
EKG T AXIS: 238 DEGREES
EKG T AXIS: 250 DEGREES
EKG VENTRICULAR RATE: 89 BPM
EKG VENTRICULAR RATE: 96 BPM
EPITHELIAL CELLS, UA: ABNORMAL /HPF (ref 0–5)
GFR SERPL CREATININE-BSD FRML MDRD: 42 ML/MIN/{1.73_M2}
GLUCOSE BLD-MCNC: 115 MG/DL (ref 70–99)
GLUCOSE BLD-MCNC: 126 MG/DL (ref 70–99)
GLUCOSE BLD-MCNC: 99 MG/DL (ref 70–99)
GLUCOSE URINE: NEGATIVE MG/DL
HCT VFR BLD CALC: 41.3 % (ref 40.5–52.5)
HEMOGLOBIN: 13 G/DL (ref 13.5–17.5)
INR BLD: 1.09 (ref 0.87–1.14)
KETONES, URINE: NEGATIVE MG/DL
LEUKOCYTE ESTERASE, URINE: ABNORMAL
MCH RBC QN AUTO: 27.1 PG (ref 26–34)
MCHC RBC AUTO-ENTMCNC: 31.5 G/DL (ref 31–36)
MCV RBC AUTO: 86 FL (ref 80–100)
MICROSCOPIC EXAMINATION: YES
NITRITE, URINE: NEGATIVE
PDW BLD-RTO: 15.7 % (ref 12.4–15.4)
PERFORMED ON: ABNORMAL
PERFORMED ON: NORMAL
PH UA: 8.5 (ref 5–8)
PLATELET # BLD: 153 K/UL (ref 135–450)
PMV BLD AUTO: 8.5 FL (ref 5–10.5)
POTASSIUM SERPL-SCNC: 4.9 MMOL/L (ref 3.5–5.1)
PROTEIN UA: 100 MG/DL
PROTHROMBIN TIME: 14 SEC (ref 11.7–14.5)
RBC # BLD: 4.81 M/UL (ref 4.2–5.9)
RBC UA: ABNORMAL /HPF (ref 0–4)
SODIUM BLD-SCNC: 137 MMOL/L (ref 136–145)
SPECIFIC GRAVITY UA: 1.01 (ref 1–1.03)
TOTAL PROTEIN: 7.4 G/DL (ref 6.4–8.2)
TROPONIN: 0.04 NG/ML
URINE REFLEX TO CULTURE: YES
URINE TYPE: ABNORMAL
UROBILINOGEN, URINE: 0.2 E.U./DL
WBC # BLD: 4.8 K/UL (ref 4–11)
WBC UA: ABNORMAL /HPF (ref 0–5)

## 2023-02-09 PROCEDURE — 74018 RADEX ABDOMEN 1 VIEW: CPT

## 2023-02-09 PROCEDURE — 4A03X5D MEASUREMENT OF ARTERIAL FLOW, INTRACRANIAL, EXTERNAL APPROACH: ICD-10-PCS | Performed by: RADIOLOGY

## 2023-02-09 PROCEDURE — 36415 COLL VENOUS BLD VENIPUNCTURE: CPT

## 2023-02-09 PROCEDURE — 93010 ELECTROCARDIOGRAM REPORT: CPT | Performed by: INTERNAL MEDICINE

## 2023-02-09 PROCEDURE — 70450 CT HEAD/BRAIN W/O DYE: CPT

## 2023-02-09 PROCEDURE — 80053 COMPREHEN METABOLIC PANEL: CPT

## 2023-02-09 PROCEDURE — 71045 X-RAY EXAM CHEST 1 VIEW: CPT

## 2023-02-09 PROCEDURE — 2580000003 HC RX 258: Performed by: INTERNAL MEDICINE

## 2023-02-09 PROCEDURE — 1200000000 HC SEMI PRIVATE

## 2023-02-09 PROCEDURE — 70498 CT ANGIOGRAPHY NECK: CPT

## 2023-02-09 PROCEDURE — 81001 URINALYSIS AUTO W/SCOPE: CPT

## 2023-02-09 PROCEDURE — 6360000002 HC RX W HCPCS: Performed by: INTERNAL MEDICINE

## 2023-02-09 PROCEDURE — 84443 ASSAY THYROID STIM HORMONE: CPT

## 2023-02-09 PROCEDURE — 85610 PROTHROMBIN TIME: CPT

## 2023-02-09 PROCEDURE — 85027 COMPLETE CBC AUTOMATED: CPT

## 2023-02-09 PROCEDURE — 87086 URINE CULTURE/COLONY COUNT: CPT

## 2023-02-09 PROCEDURE — 84484 ASSAY OF TROPONIN QUANT: CPT

## 2023-02-09 PROCEDURE — 51702 INSERT TEMP BLADDER CATH: CPT

## 2023-02-09 PROCEDURE — 70551 MRI BRAIN STEM W/O DYE: CPT

## 2023-02-09 PROCEDURE — 6360000004 HC RX CONTRAST MEDICATION: Performed by: EMERGENCY MEDICINE

## 2023-02-09 PROCEDURE — 93005 ELECTROCARDIOGRAM TRACING: CPT | Performed by: EMERGENCY MEDICINE

## 2023-02-09 PROCEDURE — 6370000000 HC RX 637 (ALT 250 FOR IP): Performed by: INTERNAL MEDICINE

## 2023-02-09 PROCEDURE — 6370000000 HC RX 637 (ALT 250 FOR IP): Performed by: EMERGENCY MEDICINE

## 2023-02-09 PROCEDURE — 99285 EMERGENCY DEPT VISIT HI MDM: CPT

## 2023-02-09 RX ORDER — TAMSULOSIN HYDROCHLORIDE 0.4 MG/1
0.4 CAPSULE ORAL NIGHTLY
COMMUNITY

## 2023-02-09 RX ORDER — SODIUM CHLORIDE 0.9 % (FLUSH) 0.9 %
10 SYRINGE (ML) INJECTION ONCE
Status: DISCONTINUED | OUTPATIENT
Start: 2023-02-09 | End: 2023-02-09

## 2023-02-09 RX ORDER — ATORVASTATIN CALCIUM 80 MG/1
80 TABLET, FILM COATED ORAL NIGHTLY
Status: DISCONTINUED | OUTPATIENT
Start: 2023-02-09 | End: 2023-02-14 | Stop reason: HOSPADM

## 2023-02-09 RX ORDER — ASPIRIN 81 MG/1
TABLET, CHEWABLE ORAL
Status: DISPENSED
Start: 2023-02-09 | End: 2023-02-10

## 2023-02-09 RX ORDER — ONDANSETRON 4 MG/1
4 TABLET, ORALLY DISINTEGRATING ORAL EVERY 8 HOURS PRN
Status: DISCONTINUED | OUTPATIENT
Start: 2023-02-09 | End: 2023-02-14 | Stop reason: HOSPADM

## 2023-02-09 RX ORDER — MAGNESIUM OXIDE 400 MG/1
400 TABLET ORAL DAILY
COMMUNITY

## 2023-02-09 RX ORDER — SODIUM CHLORIDE 9 MG/ML
INJECTION, SOLUTION INTRAVENOUS CONTINUOUS
Status: DISCONTINUED | OUTPATIENT
Start: 2023-02-09 | End: 2023-02-10

## 2023-02-09 RX ORDER — CLOPIDOGREL BISULFATE 75 MG/1
300 TABLET ORAL ONCE
Status: COMPLETED | OUTPATIENT
Start: 2023-02-09 | End: 2023-02-09

## 2023-02-09 RX ORDER — POLYETHYLENE GLYCOL 3350 17 G/17G
17 POWDER, FOR SOLUTION ORAL DAILY PRN
Status: DISCONTINUED | OUTPATIENT
Start: 2023-02-09 | End: 2023-02-14 | Stop reason: HOSPADM

## 2023-02-09 RX ORDER — ASPIRIN 300 MG/1
300 SUPPOSITORY RECTAL DAILY
Status: DISCONTINUED | OUTPATIENT
Start: 2023-02-10 | End: 2023-02-14 | Stop reason: HOSPADM

## 2023-02-09 RX ORDER — ONDANSETRON 2 MG/ML
4 INJECTION INTRAMUSCULAR; INTRAVENOUS EVERY 6 HOURS PRN
Status: DISCONTINUED | OUTPATIENT
Start: 2023-02-09 | End: 2023-02-14 | Stop reason: HOSPADM

## 2023-02-09 RX ORDER — ENOXAPARIN SODIUM 100 MG/ML
40 INJECTION SUBCUTANEOUS 2 TIMES DAILY
Status: DISCONTINUED | OUTPATIENT
Start: 2023-02-09 | End: 2023-02-14 | Stop reason: HOSPADM

## 2023-02-09 RX ORDER — ASPIRIN 81 MG/1
324 TABLET, CHEWABLE ORAL ONCE
Status: COMPLETED | OUTPATIENT
Start: 2023-02-09 | End: 2023-02-09

## 2023-02-09 RX ORDER — AMLODIPINE BESYLATE 5 MG/1
5 TABLET ORAL DAILY
COMMUNITY

## 2023-02-09 RX ORDER — ASPIRIN 81 MG/1
81 TABLET ORAL DAILY
Status: DISCONTINUED | OUTPATIENT
Start: 2023-02-10 | End: 2023-02-14 | Stop reason: HOSPADM

## 2023-02-09 RX ADMIN — IOPAMIDOL 75 ML: 755 INJECTION, SOLUTION INTRAVENOUS at 09:26

## 2023-02-09 RX ADMIN — CLOPIDOGREL BISULFATE 300 MG: 75 TABLET ORAL at 13:49

## 2023-02-09 RX ADMIN — ATORVASTATIN CALCIUM 80 MG: 80 TABLET, FILM COATED ORAL at 21:00

## 2023-02-09 RX ADMIN — ASPIRIN 81 MG 324 MG: 81 TABLET ORAL at 13:49

## 2023-02-09 RX ADMIN — SODIUM CHLORIDE: 9 INJECTION, SOLUTION INTRAVENOUS at 17:45

## 2023-02-09 RX ADMIN — ENOXAPARIN SODIUM 40 MG: 100 INJECTION SUBCUTANEOUS at 20:45

## 2023-02-09 ASSESSMENT — PAIN - FUNCTIONAL ASSESSMENT: PAIN_FUNCTIONAL_ASSESSMENT: NONE - DENIES PAIN

## 2023-02-09 ASSESSMENT — LIFESTYLE VARIABLES
HOW MANY STANDARD DRINKS CONTAINING ALCOHOL DO YOU HAVE ON A TYPICAL DAY: PATIENT DOES NOT DRINK
HOW OFTEN DO YOU HAVE A DRINK CONTAINING ALCOHOL: NEVER

## 2023-02-09 NOTE — CONSULTS
Consult placed    Bianca Aayla  Date:2/9/2023,  Time:4:11 PM        Electronically signed by Abdoul Gonzalez on 2/9/2023 at 4:11 PM

## 2023-02-09 NOTE — ED NOTES
Pt is taken to MRI on heart monitor with 2 rn's, we waited with patient due to MRI having someone on the table.        Opal Martel RN  02/09/23 [id number]

## 2023-02-09 NOTE — ED NOTES
[id number] - Dr Mary Wilkes doing eval at this time in hallway  0915 - Dr Mary Wilkes activated pt as a code stroke  0915 - pt to CT, table 2  0916 - called  stroke team  [id number] - Dr Damaso Harrington called back to speak with Dr Mary Wilkes  3707 - called lab     Osvaldo Marie  02/09/23 7350

## 2023-02-09 NOTE — ED NOTES
Mri called they are ready in 5 minutes. Pt will be transported to mri with 2 rn's. Pt remains alert and oriented.       Twyla Interiano RN  02/09/23 [id number]

## 2023-02-09 NOTE — H&P
Hospital Medicine History & Physical      PCP: Hector Alfaro MD    Date of Admission: 2/9/2023    Date of Service: Pt seen/examined on 2/9/2023 and Admitted to Inpatient with expected LOS greater than two midnights due to medical therapy. Chief Complaint: Dysarthria and left-sided facial droop      History Of Present Illness:  ()    67 y.o. male who presented to [facility] with above complaint. History is from patient and EMR. He was last seen normal at midnight. He woke up with above complaint. Currently he does not have dizziness change in mental status headache weakness or numbness over the extremities. He denies shortness of breath cough fever nausea vomiting diarrhea or worsening edema. He has chronic Mills. He has a history of GI bleed years ago and currently he does not have any symptoms.     Past Medical History:          Diagnosis Date    Acute respiratory failure with hypoxia and hypercapnia (HCC) 09/24/2018    Anemia due to blood loss, acute 10/12/2020    Bacteremia due to Escherichia coli 10/12/2020    Calculus of gallbladder with acute cholecystitis without obstruction 09/24/2018    CHF (congestive heart failure) ([address] 08/2019    Choledecholithiasis 09/24/2018    DVT (deep venous thrombosis) ([address] 07/14/2018    GI bleed from duodenal ulcer 08/06/2018    Malignant neoplasm of kidney excluding renal pelvis ([address] 04/08/2014    Retroperitoneal bleed 10/09/2020    UTI 10/12/2020       Past Surgical History:          Procedure Laterality Date    CARDIAC CATHETERIZATION  09/06/2019    COLONOSCOPY N/A 04/09/2019    COLONOSCOPY WITH BIOPSY performed by Era Taylor MD at [address]  10/19/2020    CT BONE MARROW BIOPSY 10/19/2020 [facility] CT SCAN    IVC FILTER INSERTION  10/13/2020    KIDNEY SURGERY  04/07/2014    removal of tumor (not total nephrectomy)    OK EGD TRANSORAL BIOPSY SINGLE/MULTIPLE  09/28/2018    Shatzki's ring and gastritis SIGMOIDOSCOPY      UPPER GASTROINTESTINAL ENDOSCOPY  08/06/2018    bleeding duodenal ulcer    UPPER GASTROINTESTINAL ENDOSCOPY N/A 04/08/2019    EGD WITH ANESTHESIA performed by Kati Hogan MD at [facility] N/A 04/12/2019    ESOPHAGEAL CAPSULE ENDOSCOPY performed by Kati Hogan MD at [address]       Medications Prior to Admission:      Prior to Admission medications    Medication Sig Start Date End Date Taking? Authorizing Provider   ketoconazole (NIZORAL) 2 % cream APPLY TO RASH IN SKIN FOLDS TOPICALLY TWICE A DAY AS NEEDED 7/15/21   Melissa Schwarz MD   lisinopril (PRINIVIL;ZESTRIL) 20 MG tablet Take 20 mg by mouth daily    Historical Provider, MD   magnesium hydroxide (MILK OF MAGNESIA) 400 MG/5ML suspension Take by mouth daily as needed for Constipation    Historical Provider, MD   potassium chloride (MICRO-K) 10 MEQ extended release capsule Take 20 mEq by mouth 3 times daily     Historical Provider, MD   pantoprazole (PROTONIX) 40 MG tablet Take 40 mg by mouth daily    Historical Provider, MD   vitamin D (CHOLECALCIFEROL) 1000 UNIT TABS tablet Take 1,000 Units by mouth 2 times daily    Historical Provider, MD   folic acid (FOLVITE) 1 MG tablet Take 1 tablet by mouth daily 10/1/18   JOEY Mayberry - CNP       Allergies:  Patient has no known allergies. Social History:      The patient currently lives home with wife walks with a walker    TOBACCO:   reports that he has never smoked. He has never used smokeless tobacco.  ETOH:   reports no history of alcohol use. E-cigarette/Vaping       Questions Responses    E-cigarette/Vaping Use Never User    Start Date     Passive Exposure     Quit Date     Counseling Given     Comments               Family History:      Reviewed and negative in regards to presenting illness/complaint.         Problem Relation Age of Onset    Arthritis Mother     Atrial Fibrillation Mother     Breast Cancer Mother     High Blood Pressure Mother     Anemia Father     Arthritis Father     Cancer Father 80        Ear CA/ tumor removed     Hearing Loss Father     Arthritis Sister     Diabetes Maternal Aunt     Arthritis Maternal Aunt     Early Death Maternal Uncle          in early 42's     Depression Maternal Uncle     Other Maternal Uncle         Suicide    Heart Disease Paternal Aunt     Arthritis Maternal Grandmother        REVIEW OF SYSTEMS COMPLETED:   Pertinent positives as noted in the HPI. All other systems reviewed and negative. PHYSICAL EXAM PERFORMED:    /89   Pulse 89   Temp 98.5 °F (36.9 °C) (Oral)   Resp 24   Ht 5' 4\" (1.626 m)   Wt (!) 341 lb 8 oz (154.9 kg)   SpO2 93%   BMI 58.62 kg/m²     General appearance:  No apparent distress, appears stated age and cooperative. Morbidly obese individual breathing  HEENT:  Normal cephalic, atraumatic without obvious deformity. Pupils equal, round, and reactive to light. Extra ocular muscles intact. Conjunctivae/corneas clear. Neck: Supple, with full range of motion. No jugular venous distention. Trachea midline. Respiratory:  Normal respiratory effort. Reduced entry, bilaterally without Rales/Wheezes/Rhonchi. Cardiovascular:  irRegular rate and rhythm with normal S1/S2 without murmurs, rubs or gallops. Abdomen: Soft, non-tender, non-distended with normal bowel sounds. Morbidly obese hernia no signs of obstruction has chronic Mills  Musculoskeletal:  No clubbing, cyanosis or edema bilaterally. Full range of motion without deformity. Skin: Skin color, texture, turgor normal.  No rashes or lesions. Neurologic:  Neurovascularly intact without any focal sensory/motor deficits except left-sided facial droop and dysarthria.    Psychiatric:  Alert and oriented, thought content appropriate, normal insight  Capillary Refill: Brisk,3 seconds, normal  Peripheral Pulses: +2 palpable, equal bilaterally       Labs:     Recent Labs     23  0915   WBC 4.8   HGB 13.0* HCT 41.3        Recent Labs     02/09/23 0915      K 4.9      CO2 25   BUN 27*   CREATININE 1.7*   CALCIUM 9.2     Recent Labs     02/09/23 0915   AST 10*   ALT 6*   BILITOT 1.4*   ALKPHOS 84     Recent Labs     02/09/23 0915   INR 1.09     Recent Labs     02/09/23 0915   TROPONINI 0.04*       Urinalysis:      Lab Results   Component Value Date/Time    NITRU Negative 02/09/2023 08:35 AM    WBCUA 10-20 02/09/2023 08:35 AM    BACTERIA 2+ 02/09/2023 08:35 AM    RBCUA 11-20 02/09/2023 08:35 AM    BLOODU MODERATE 02/09/2023 08:35 AM    SPECGRAV 1.010 02/09/2023 08:35 AM    GLUCOSEU Negative 02/09/2023 08:35 AM       Radiology:     CXR: I have reviewed the CXR with the following interpretation:   EKG:  I have reviewed the EKG with the following interpretation: Atrial fibrillation with controlled ventricular rate 85/min    XR ABDOMEN (KUB) (SINGLE AP VIEW)   Final Result   Appropriate positioning of enteric tube. MRI BRAIN WO CONTRAST   Final Result   Acute right MCA infarct within the right frontal lobe involving the right   precentral gyrus extending to the superior margin of the right insula. There   is no corresponding FLAIR abnormality. No acute intracranial hemorrhage identified. The above findings were discussed with Dr. Nabor Maldonado at 12:15 p.m. on   02/09/2023. XR CHEST PORTABLE   Final Result   Mild cardiomegaly with mild interstitial pulmonary edema         CTA HEAD NECK W CONTRAST   Final Result   1. Atherosclerosis contributes to moderate stenosis at the origin of the left   vertebral artery. 2. Otherwise, no significant stenosis seen of the cervical carotid/vertebral   arteries. 3. No significant stenosis or large vessel occlusion of the [id number]. 4. Small bilateral pleural effusions. 5. Nonspecific borderline prominent mediastinal lymph nodes. CT HEAD WO CONTRAST   Final Result   Motion limited. No hemorrhage or mass identified. Mild periventricular small vessel ischemic change, similar to prior      Mild paranasal sinus disease      Results discussed with [address] [place] [address] by Landon Wilder. Lori Shine MD at 9:30 am on   2/9/2023             Consults:    IP CONSULT TO PHARMACY  PHARMACY TO CHANGE BASE FLUIDS  IP CONSULT TO HOSPITALIST  IP CONSULT TO NEUROLOGY    ASSESSMENT:    Active Hospital Problems    Diagnosis Date Noted    Acute cerebrovascular accident (CVA) ([facility] [I63.9] 02/09/2023     Priority: Medium         PLAN:  Right frontal lobe CVA with left-sided facial droop and dysarthria-admit, neurochecks, aspirin, statin, neurology evaluation, echocardiogram PT OT speech currently patient has NG tube    Education chronic kidney disease-creatinine normally runs 1.3 currently 1.7-gentle hydration monitor BMP    New onset of atrial fibrillation-controlled ventricular rate-echo is pending, TSH, given acute CVA not a candidate for anticoagulation, telemetry monitoring    History of perinephric hemorrhage in 2020-/? GIBcurrently no bleeding    Nonischemic cardiomyopathy ejection fraction 50%    Chronic Mills-?   indication    History of DVT has IVC filter placed in 2020 following retroperitoneal hemorrhage    Morbid obesity with a abdominal hernia no evidence of obstruction    Abnormal UA-possibly secondary to chronic Mills patient does not have any evidence of sepsis monitor closely          DVT Prophylaxis: PERRL, conjunctiva normal  Diet: Diet NPO  Code Status: Full code    PT/OT Eval Status: Ordered    Dispo -admitted to [facility] telemetry       Natalia Miller MD    Thank you Sesar Horn MD for the opportunity to be involved in this patient's care. If you have any questions or concerns please feel free to contact me at [phone].

## 2023-02-09 NOTE — PROGRESS NOTES
4 Eyes Skin Assessment     The patient is being assess for   Admission    I agree that 2 RN's have performed a thorough Head to Toe Skin Assessment on the patient. ALL assessment sites listed below have been assessed. Areas assessed for pressure by both nurses:   [x]   Head, Face, and Ears   [x]   Shoulders, Back, and Chest, Abdomen  [x]   Arms, Elbows, and Hands   [x]   Coccyx, Sacrum, and Ischium  [x]   Legs, Feet, and Heels        Skin Assessed Under all Medical Devices by both nurses:  YES               All Mepilex Borders were peeled back and area peeked at by both nurses:  Yes  Please list where Mepilex Borders are located:  NONE             **SHARE this note so that the co-signing nurse is able to place an eSignature**    Co-signer eSignature: Electronically signed by Brandan Murdock RN on 2/10/23 at 1:41 AM EST    Does the Patient have Skin Breakdown related to pressure?   No     Redness and bruising to coccyx  Redness to abd folds         Gopi Prevention initiated:  Yes   Wound Care Orders initiated:  NA      [facility] nurse consulted for Pressure Injury (Stage 3,4, Unstageable, DTI, NWPT, Complex wounds)and New or Established Ostomies:  NA      Primary Nurse eSignature: Electronically signed by Artem Moreno RN on 2/9/23 at 4:11 PM EST

## 2023-02-09 NOTE — PROGRESS NOTES
Patient admitted to room 361 from ED. Patient oriented to room, call light, bed rails, phone, lights and bathroom. Patient instructed about the schedule of the day including: vital sign frequency, lab draws, possible tests, frequency of MD and staff rounds, including RN/MD rounding together at bedside, daily weights, and I &O's. Patient instructed about prescribed diet, how to use 8MENU, and television. Bed alarm in place, patient aware of placement and reason. Telemetry box 67 in place, patient aware of placement and reason. Bed locked, in lowest position, side rails up 2/4, call light within reach. Will continue to monitor.

## 2023-02-09 NOTE — PLAN OF CARE
Problem: Discharge Planning  Goal: Discharge to home or other facility with appropriate resources  Outcome: Progressing  Flowsheets  Taken 2/9/2023 1649  Discharge to home or other facility with appropriate resources: Identify barriers to discharge with patient and caregiver  Taken 2/9/2023 1617  Discharge to home or other facility with appropriate resources: Identify barriers to discharge with patient and caregiver     Problem: Chronic Conditions and Co-morbidities  Goal: Patient's chronic conditions and co-morbidity symptoms are monitored and maintained or improved  Outcome: Progressing  Flowsheets  Taken 2/9/2023 [address] [facility] Final - Patient's Chronic Conditions and Co-Morbidity Symptoms are Monitored and Maintained or Improved: Monitor and assess patient's chronic conditions and comorbid symptoms for stability, deterioration, or improvement  Taken 2/9/2023 111 [address] [place] [address] - Patient's Chronic Conditions and Co-Morbidity Symptoms are Monitored and Maintained or Improved: Monitor and assess patient's chronic conditions and comorbid symptoms for stability, deterioration, or improvement     Problem: Skin/Tissue Integrity  Goal: Absence of new skin breakdown  Description: 1. Monitor for areas of redness and/or skin breakdown  2. Assess vascular access sites hourly  3. Every 4-6 hours minimum:  Change oxygen saturation probe site  4. Every 4-6 hours:  If on nasal continuous positive airway pressure, respiratory therapy assess nares and determine need for appliance change or resting period.   Outcome: Progressing     Problem: Safety - Adult  Goal: Free from fall injury  Outcome: Progressing     Problem: Neurosensory - Adult  Goal: Achieves stable or improved neurological status  Outcome: Progressing  Flowsheets (Taken 2/9/2023 1649)  Achieves stable or improved neurological status: Assess for and report changes in neurological status     Problem: Respiratory - Adult  Goal: Achieves optimal ventilation and oxygenation  Outcome: Progressing  Flowsheets (Taken 2/9/2023 1649)  Achieves optimal ventilation and oxygenation: Assess for changes in respiratory status     Problem: Musculoskeletal - Adult  Goal: Return mobility to safest level of function  Outcome: Progressing  Flowsheets (Taken 2/9/2023 1649)  Return Mobility to Safest Level of Function: Assess patient stability and activity tolerance for standing, transferring and ambulating with or without assistive devices     Problem: Gastrointestinal - Adult  Goal: Minimal or absence of nausea and vomiting  Outcome: Progressing  Flowsheets (Taken 2/9/2023 1649)  Minimal or absence of nausea and vomiting: Provide nonpharmacologic comfort measures as appropriate     Problem: Genitourinary - Adult  Goal: Absence of urinary retention  Outcome: Progressing  Flowsheets (Taken 2/9/2023 1649)  Absence of urinary retention: Assess patients ability to void and empty bladder

## 2023-02-09 NOTE — ED PROVIDER NOTES
[facility] Emergency Department      CHIEF COMPLAINT  Aphasia      HISTORY OF PRESENT ILLNESS  Ricky Miranda Sr. is a 67 y.o. male with a history of CHF, remote DVT taken off of Coumadin secondary to recurrent GI bleeds also with a history of remote renal cancer presents with slurred speech and left-sided facial droop. He went to bed at midnight last night and woke up at 745 this morning when his wife immediately noticed a left-sided facial droop and slurred speech. She tried to give him some of his pills at home and he was having trouble swallowing as well. He denies vision changes. No weakness or numbness of extremities. No chest pain or shortness of breath. No fever. No recent illness. He did get up to urinate in the middle of the night but did not look in the mirror at his face or speak at all. .   No other complaints, modifying factors or associated symptoms. History obtained from the patient and the wife. I have reviewed the following from the nursing documentation.     Past Medical History:   Diagnosis Date    Acute respiratory failure with hypoxia and hypercapnia (HCC) 09/24/2018    Anemia due to blood loss, acute 10/12/2020    Bacteremia due to Escherichia coli 10/12/2020    Calculus of gallbladder with acute cholecystitis without obstruction 09/24/2018    CHF (congestive heart failure) ([address] 08/2019    Choledecholithiasis 09/24/2018    DVT (deep venous thrombosis) ([address] 07/14/2018    GI bleed from duodenal ulcer 08/06/2018    Malignant neoplasm of kidney excluding renal pelvis ([address] 04/08/2014    Retroperitoneal bleed 10/09/2020    UTI 10/12/2020     Past Surgical History:   Procedure Laterality Date    CARDIAC CATHETERIZATION  09/06/2019    COLONOSCOPY N/A 04/09/2019    COLONOSCOPY WITH BIOPSY performed by Ken Hawk MD at [address]  10/19/2020    CT BONE MARROW BIOPSY 10/19/2020 [facility] CT SCAN    IVC FILTER INSERTION  10/13/2020 KIDNEY SURGERY  2014    removal of tumor (not total nephrectomy)    SC EGD TRANSORAL BIOPSY SINGLE/MULTIPLE  2018    Shatzki's ring and gastritis    SIGMOIDOSCOPY      UPPER GASTROINTESTINAL ENDOSCOPY  2018    bleeding duodenal ulcer    UPPER GASTROINTESTINAL ENDOSCOPY N/A 2019    EGD WITH ANESTHESIA performed by Rosenda Cervantes MD at [facility] N/A 2019    ESOPHAGEAL CAPSULE ENDOSCOPY performed by Rosenda Cervantes MD at [address] [place] [address] Real     Family History   Problem Relation Age of Onset    Arthritis Mother     Atrial Fibrillation Mother     Breast Cancer Mother     High Blood Pressure Mother     Anemia Father     Arthritis Father     Cancer Father 80        Ear CA/ tumor removed     Hearing Loss Father     Arthritis Sister     Diabetes Maternal Aunt     Arthritis Maternal Aunt     Early Death Maternal Uncle          in early 42's     Depression Maternal Uncle     Other Maternal Uncle         Suicide    Heart Disease Paternal Aunt     Arthritis Maternal Grandmother      Social History     Socioeconomic History    Marital status:      Spouse name: Not on file    Number of children: Not on file    Years of education: Not on file    Highest education level: Not on file   Occupational History    Not on file   Tobacco Use    Smoking status: Never    Smokeless tobacco: Never   Vaping Use    Vaping Use: Never used   Substance and Sexual Activity    Alcohol use: No    Drug use: No    Sexual activity: Not on file   Other Topics Concern    Not on file   Social History Narrative    Not on file     Social Determinants of Health     Financial Resource Strain: Not on file   Food Insecurity: Not on file   Transportation Needs: Not on file   Physical Activity: Not on file   Stress: Not on file   Social Connections: Not on file   Intimate Partner Violence: Not on file   Housing Stability: Not on file     Current Facility-Administered Medications Medication Dose Route Frequency Provider Last Rate Last Admin    aspirin 81 MG chewable tablet     Pat Sanchez MD        ondansetron (ZOFRAN-ODT) disintegrating tablet 4 mg  4 mg Oral Q8H PRN Pat Sanchez MD        Or    ondansetron (ZOFRAN) injection 4 mg  4 mg IntraVENous Q6H PRN Pat Sanchez MD        polyethylene glycol (GLYCOLAX) packet 17 g  17 g Oral Daily PRN Pat Sanchez MD        enoxaparin (LOVENOX) injection 40 mg  40 mg SubCUTAneous BID MD Dwain George ON 2/10/2023] aspirin EC tablet 81 mg  81 mg Oral Daily Pat Sanchez MD        Or    Dwain Au ON 2/10/2023] aspirin suppository 300 mg  300 mg Rectal Daily Pat Sanchez MD        perflutren lipid microspheres (DEFINITY) injection 1.5 mL  1.5 mL IntraVENous ONCE PRN Pat Sanchez MD        0.9 % sodium chloride infusion   IntraVENous Continuous Pat Sanchez MD        atorvastatin (LIPITOR) tablet 80 mg  80 mg Oral Nightly Pat Sanchez MD         No Known Allergies    REVIEW OF SYSTEMS    Unless otherwise stated in this report, this patient's positive and negative responses for review of systems (constitutional, eyes, ENT, cardiovascular, respiratory, gastrointestinal, neurological, genitourinary, musculoskeletal, integument systems and systems related to the presenting problem) are either stated in the preceding paragraph, were not pertinent or were negative for the symptoms and/or complaints related to the medical problem. PHYSICAL EXAM  BP (!) 123/105   Pulse 87   Temp 98.5 °F (36.9 °C) (Oral)   Resp 24   Ht 5' 4\" (1.626 m)   Wt (!) 341 lb 8 oz (154.9 kg)   SpO2 93%   BMI 58.62 kg/m²   GENERAL APPEARANCE: Awake and alert. Cooperative. No acute distress. Morbidly obese. HEAD: Normocephalic. Atraumatic. EYES: PERRL. EOM's grossly intact. ENT: Mucous membranes are moist.   NECK: Supple, trachea midline. HEART: Irregular rhythm, normal rate  LUNGS: Respirations unlabored. CTAB. Good air exchange. No wheezes, rales, or rhonchi. Speaking comfortably in full sentences. ABDOMEN:  Soft. Non-distended. Non-tender. No guarding or rebound. EXTREMITIES: Significant lymphedema to bilateral lower extremities. MAEE. No acute deformities. SKIN: Warm, dry and intact. No acute rashes. NEUROLOGICAL: Alert and oriented X 3. Left-sided facial droop and slurred speech. Strength is 5/5 in all 4 extremities, sensation intact. NIH stroke scale equals 3. PSYCHIATRIC: Normal mood and affect. LABS  I have reviewed all labs for this visit.    Results for orders placed or performed during the hospital encounter of 02/09/23   Comprehensive Metabolic Panel   Result Value Ref Range    Sodium 137 136 - 145 mmol/L    Potassium 4.9 3.5 - 5.1 mmol/L    Chloride 103 99 - 110 mmol/L    CO2 25 21 - 32 mmol/L    Anion Gap 9 3 - 16    Glucose 126 (H) 70 - 99 mg/dL    BUN 27 (H) 7 - 20 mg/dL    Creatinine 1.7 (H) 0.8 - 1.3 mg/dL    Est, Glom Filt Rate 42 (A) >60    Calcium 9.2 8.3 - 10.6 mg/dL    Total Protein 7.4 6.4 - 8.2 g/dL    Albumin 4.0 3.4 - 5.0 g/dL    Albumin/Globulin Ratio 1.2 1.1 - 2.2    Total Bilirubin 1.4 (H) 0.0 - 1.0 mg/dL    Alkaline Phosphatase 84 40 - 129 U/L    ALT 6 (L) 10 - 40 U/L    AST 10 (L) 15 - 37 U/L   Troponin   Result Value Ref Range    Troponin 0.04 (H) <0.01 ng/mL   CBC   Result Value Ref Range    WBC 4.8 4.0 - 11.0 K/uL    RBC 4.81 4.20 - 5.90 M/uL    Hemoglobin 13.0 (L) 13.5 - 17.5 g/dL    Hematocrit 41.3 40.5 - 52.5 %    MCV 86.0 80.0 - 100.0 fL    MCH 27.1 26.0 - 34.0 pg    MCHC 31.5 31.0 - 36.0 g/dL    RDW 15.7 (H) 12.4 - 15.4 %    Platelets 701 597 - 878 K/uL    MPV 8.5 5.0 - 10.5 fL   Protime-INR   Result Value Ref Range    Protime 14.0 11.7 - 14.5 sec    INR 1.09 0.87 - 1.14   Urinalysis with Reflex to Culture    Specimen: Urine   Result Value Ref Range    Color, UA Yellow Straw/Yellow    Clarity, UA SL CLOUDY (A) Clear    Glucose, Ur Negative Negative mg/dL    Bilirubin Urine Negative Negative    Ketones, Urine Negative Negative mg/dL    Specific Gravity, UA 1.010 1.005 - 1.030    Blood, Urine MODERATE (A) Negative    pH, UA 8.5 (A) 5.0 - 8.0    Protein,  (A) Negative mg/dL    Urobilinogen, Urine 0.2 <2.0 E.U./dL    Nitrite, Urine Negative Negative    Leukocyte Esterase, Urine MODERATE (A) Negative    Microscopic Examination YES     Urine Type NotGiven     Urine Reflex to Culture Yes    Microscopic Urinalysis   Result Value Ref Range    WBC, UA 10-20 (A) 0 - 5 /HPF    RBC, UA 11-20 (A) 0 - 4 /HPF    Epithelial Cells, UA 2-5 0 - 5 /HPF    Bacteria, UA 2+ (A) None Seen /HPF    Crystals, UA 2+ Triple Phos (A) None Seen /HPF   POCT Glucose   Result Value Ref Range    POC Glucose 115 (H) 70 - 99 mg/dl    Performed on ACCU-CHEK    EKG 12 Lead   Result Value Ref Range    Ventricular Rate 96 BPM    Atrial Rate 96 BPM    QRS Duration 96 ms    Q-T Interval 288 ms    QTc Calculation (Bazett) 363 ms    R Axis 75 degrees    T Axis 238 degrees    Diagnosis       Atrial fibrillationControlled ventricular rateLow voltage QRSRight bundle branch blockAbnormal ECGWhen compared with ECG of 09-OCT-2020 12:39,afib is now presentCriteria for Lateral infarct are no longer PresentConfirmed by Kevin Del Toro MD, [facility] ([id number]) on 2/9/2023 12:30:09 PM     EKG 12 Lead   Result Value Ref Range    Ventricular Rate 89 BPM    Atrial Rate 85 BPM    QRS Duration 124 ms    Q-T Interval 370 ms    QTc Calculation (Bazett) 450 ms    R Axis 78 degrees    T Axis 250 degrees    Diagnosis       Atrial fibrillation Controlled ventricular rateRight bundle branch blockT wave abnormality, consider inferolateral ischemia or digitalis effectAbnormal ECGWhen compared with ECG of 09-FEB-2023 09:41,QT has lengthened T wave inversion in inferior leads is now present. Confirmed by Kevin Del Toro MD, [facility] ([id number]) on 2/9/2023 12:31:25 PM         EKG  The Ekg interpreted as interpreted by me:  atrial fibrillation with a rate of 89  Axis is Normal  QTc is  normal  RBBB     No specific ST-T wave changes appreciated. No evidence of acute ischemia. When compared to the EKG from October 9, 2020 A-fib replaces sinus rhythm with first-degree AV block. The right bundle branch block is chronic. Cardiac Monitoring: The cardiac monitor revealed atrial fibrillation as interpreted by me. The cardiac monitor was ordered secondary to the patient's complaint of slurred speech and facial droop and to monitor the patient for dysrhythmia. RADIOLOGY  X-RAYS: ALL IMAGES INCLUDING PLAIN FILMS, CT, ULTRASOUND AND MRI HAVE BEEN READ BY THE RADIOLOGIST. XR ABDOMEN (KUB) (SINGLE AP VIEW)   Final Result   Appropriate positioning of enteric tube. MRI BRAIN WO CONTRAST   Final Result   Acute right MCA infarct within the right frontal lobe involving the right   precentral gyrus extending to the superior margin of the right insula. There   is no corresponding FLAIR abnormality. No acute intracranial hemorrhage identified. The above findings were discussed with Dr. Brian Keller at 12:15 p.m. on   02/09/2023. XR CHEST PORTABLE   Final Result   Mild cardiomegaly with mild interstitial pulmonary edema         CTA HEAD NECK W CONTRAST   Final Result   1. Atherosclerosis contributes to moderate stenosis at the origin of the left   vertebral artery. 2. Otherwise, no significant stenosis seen of the cervical carotid/vertebral   arteries. 3. No significant stenosis or large vessel occlusion of the [id number]. 4. Small bilateral pleural effusions. 5. Nonspecific borderline prominent mediastinal lymph nodes. CT HEAD WO CONTRAST   Final Result   Motion limited. No hemorrhage or mass identified. Mild periventricular small vessel ischemic change, similar to prior      Mild paranasal sinus disease      Results discussed with [address] [place] [address] by Angie Marie.  Aranza Love MD at 9:30 am on   2/9/2023                  I have personally reviewed Xray and Ultrasound images and radiology confirms the interpretation:  Chest x-ray with no pneumonia or pneumothorax. Abdominal x-ray with appropriate positioning of the NG tube      Medications administered. (Dose and Route): Aspirin 325 mg by mouth, Plavix 300 mg by mouth      Critical Care:I personally spent a total of 50 minutes of critical care time in obtaining history, performing a physical exam, bedside monitoring of interventions, collecting and interpreting tests and discussion with consultants but excluding time spent performing procedures, treating other patients and teaching time. Sepsis:  Is this patient to be included in the SEP-1 Core Measure due to severe sepsis or septic shock? No   Exclusion criteria - the patient is NOT to be included for SEP-1 Core Measure due to:   Infection is not suspected         NIHSS: NIH Stroke Score:  1A: Level of Consciousness  Alert; keenly responsive 0  Arouses to minor stimulation +1  Requires repeated stimulation to arouse +2  Movements to Pain +2  Postures or Unresponsive +3     1B: Ask Month and Age  Both Questions Right 0  1 Question Right +1  0 Questions Right +2  Dysarthric/Intubated/ Trauma/Language Barrier +1  Aphasic +2     1C: 'Blink Eyes' & 'Squeeze Hands'(Pantomime Commands if Communication Barrier)  Performs Both Tasks0  Performs 1 Task+1  Performs 0 Tasks+2     2: Test Horizontal Extraocular Movements  Normal 0  Partial Gaze Palsy: Can Be Overcome +1  Partial Gaze Palsy: Corrects with Oculocephalic Reflex +1  Forced Gaze Palsy: Cannot Be Overcome +2     3: Test Visual Fields  No Visual Loss 0  Partial Hemianopia +1  Complete Hemianopia +2  Patient is Bilaterally Blind +3  Bilateral Hemianopia +3     4: Test Facial Palsy(Use Grimace if Obtunded)  Normal symmetry 0  Minor paralysis (flat nasolabial fold, smile asymmetry) +1  Partial paralysis (lower face) +2  Unilateral Complete paralysis (upper/lower face) +3  Bilateral Complete paralysis (upper/lower face) +3     5A: Test Left Arm Motor Drift  Amputation/Joint Fusion 0  No Drift for 10 Seconds 0  Drift, but doesn't hit bed +1  Drift, hits bed +2  Some Effort Against Gravity +2  No Effort Against Gravity +3  No Movement +4     5B:  Test Right Arm Motor Drift  Amputation/Joint Fusion 0  No Drift for 10 Seconds 0  Drift, but doesn't hit bed +1  Drift, hits bed +2  Some Effort Against Gravity +2  No Effort Against Gravity +3  No Movement +4     6A: Test Left Leg Motor Drift  Amputation/Joint Fusion 0  No Drift for 5 Seconds 0  Drift, but doesn't hit bed +1  Drift, hits bed +2  Some Effort Against Gravity +2  No Effort Against Gravity +3  No Movement +4     6B: Test Right Leg Motor Drift  Amputation/Joint Fusion 0  No Drift for 5 Seconds 0  Drift, but doesn't hit bed +1  Drift, hits bed +2  Some Effort Against Gravity +2  No Effort Against Gravity +3  No Movement +4     7: Test Limb Ataxia(FTN/Heel-Shin)  Amputation/Joint Fusion 0  Does Not Understand 0  Paralyzed 0  No Ataxia 0  Ataxia in 1 Limb +1  Ataxia in 2 Limbs +2     8: Test Sensation  Normal; No sensory loss 0  Mild-Moderate Loss: Less Sharp/More Dull +1  Mild-Moderate Loss: Can Sense Being Touched +1  Complete Loss: Cannot Sense Being Touched At All +2  No Response and Quadriplegic +2  Coma/Unresponsive +2     9: Test Language/Aphasia (Describe the scene; name the words; read the sentences)  Normal; No aphasia 0  Mild-Moderate Aphasia: Some Obvious Changes, Without Significant Limitation +1  Severe Aphasia: Fragmentary Expression, Inference Needed, Cannot Identify   Materials +2  Mute/Global Aphasia: No Usable Speech/Auditory Comprehension +3  Coma/Unresponsive +3     10: Test Dysarthria (Read the words)  Intubated/Unable to Test 0  Normal 0  Mild-Moderate Dysarthria: Slurring but can be understood +1  Severe Dysarthria: Unintelligble Slurring or Out of Proportion to Dysphasia +2  Mute/Anarthric +2     11: Test Extinction/Inattention  No abnormality 0  Visual/tactile/auditory/spatial/personal inattention +1  Extinction to bilateral simultaneous stimulation +1  Profound jeison-inattention (ex: does not recognize own hand) +2  Extinction to >1 modality +2     NIH score is 3. ED COURSE/MDM:  Patient seen and evaluated. Here the patient is afebrile with normal vitals signs. Old records reviewed: No prior records reviewed. Diagnoses affirmatively addressed, consideration of tests, treatments & admission, including shared decision making:    I evaluated the patient while he was still on the EMS stretcher. He has an NIH stroke scale of 3 and does have dysarthria and left-sided facial droop. Last known well was at midnight. Blood glucose is normal.  Woke up with the symptoms at 745 this morning. Stroke alert was activated. He had a CT head and CTA head and neck which were normal.  Lab work is showing a slightly elevated troponin at 0.04. He is not having chest pain. EKG shows new onset A-fib but no ischemic changes. He has mild chronic renal insufficiency and slight hyperglycemia but still appears to be at baseline. I spoke with Dr. Aura Wilson with  stroke team.  He states based on his timeframe he is not intact based candidate immediately. He does recommend that we get a bedside swallow because if he does have dysphagia he would recommend that we proceed with MRI for wake-up stroke protocol. The patient does not pass a bedside swallow and does have dysphagia. We ordered an MRI stat for wake-up stroke protocol. MRI was completed and I discussed the results with Dr. Aura Wilson. MRI is showing an acute right MCA infarct with no corresponding FLAIR abnormality. Given this finding Dr. Aura Wilson did consider tenecteplase.   Unfortunately the patient is now outside of the window for intervention since we are discussing tenecteplase at 12:30 PM and he woke up with the symptoms at 7:45 AM.  We did initially start to consent the patient and his family for tenecteplase but once we calculated the time again we realized he was outside of the window even for a wake-up stroke protocol. In light of this Dr. Ivanna Santana is recommending dual antiplatelet therapy with aspirin and Plavix load. We will admit him to the hospitalist for further stroke work-up. Consultation summary: I spoke with  stroke team, Dr. Ivanna Santana on multiple occasions. We discussed the patient's initial presentation, his brain MRI and he performed bedside telemedicine consult to discuss the possibility of tenecteplase with the patient and his family. Social determinants of health: None. Labs and imaging reviewed and results discussed with patient. Patient was reassessed as noted above . Plan of care discussed with patient. Patient in agreement with plan. CLINICAL IMPRESSION  1. Acute cerebrovascular accident (CVA) due to thrombosis of right middle cerebral artery ([address]    2. New onset atrial fibrillation (HCC)        Blood pressure (!) 123/105, pulse 87, temperature 98.5 °F (36.9 °C), temperature source Oral, resp. rate 24, height 5' 4\" (1.626 m), weight (!) 341 lb 8 oz (154.9 kg), SpO2 93 %. Zahida Ricco [address]. was admitted in stable condition. Karen Rincon MD am the primary clinician of record.     (Please note this note was completed with a voice recognition program.  Efforts were made to edit the dictations but occasionally words are mis-transcribed.)        Vlad Acuña MD  02/09/23 [id number]

## 2023-02-09 NOTE — CONSULTS
2082 - called MRI to notify that pt will need wake-up stroke protocol MRI completed. Marilyn Chaney stated that they are between inpt & outpt orders at this time. 1324 - called MRI to notify that pt's checklist is done, they will call when ready for pt  1022 -   1333 - called MRI since they still have yet to call for pt. Stated they will be ready in 7-10 mins  1111 - MRI completed at this time  [id number] - called MRI since MRI technicians did not transmit imaging to  stroke team, Dr Tyrone Lai at [facility] stroke team unable to see imaging.  Per MRI tech Marilyn Chaney, they don't do that at [facility], so she didn't transmit MRI to  stroke team  [phone] - called  stroke team again  1218 - MRI resulted in chart with call from [facility] [employer]  1218 - Dr Tyrone Lai called back to speak with Dr Liliam Walters

## 2023-02-09 NOTE — CONSULTS
Pharmacy Consult:   [address] [place] [address] has been asked by Dr. Araseli Mercado to review this patient's medication profile to evaluate IV medications and change all base solutions to 0.9% sodium chloride if possible based on compatibility and product availability. This profile review will include an assessment of total IV fluids being administered to the patient. If a current order exists for continuous infusion of non-hypertonic plain IV fluid, the pharmacist will contact the prescriber to recommend the discontinuation of the IV fluid order. The following intermittent IV drips/infusions have been adjusted to 0.9% sodium chloride:   None    The following medications must remain in D5W due to incompatibility with 0.9% sodium chloride:        Amphotericin    Mycophenolate    Nitroprusside                Penicillin G Potassium    Please be aware that the patient may have Dextrose 10% ordered as part of hypoglycemia orderset. Pharmacy will follow daily to ensure all new IVPBs + infusions are in 0.9% sodium chloride. Please call with questions.   Thank you,    Yo Cruz, PharmD    2/9/2023 9:36 AM

## 2023-02-09 NOTE — ED NOTES
Pt is taken to MRI on heart monitor with 2 rn's, we waited with patient due to MRI having someone on the table.        Ana Carlos RN  02/09/23 [id number]

## 2023-02-10 LAB
ANION GAP SERPL CALCULATED.3IONS-SCNC: 9 MMOL/L (ref 3–16)
BUN BLDV-MCNC: 24 MG/DL (ref 7–20)
CALCIUM SERPL-MCNC: 9 MG/DL (ref 8.3–10.6)
CHLORIDE BLD-SCNC: 104 MMOL/L (ref 99–110)
CHOLESTEROL, TOTAL: 93 MG/DL (ref 0–199)
CO2: 25 MMOL/L (ref 21–32)
CREAT SERPL-MCNC: 1.6 MG/DL (ref 0.8–1.3)
ESTIMATED AVERAGE GLUCOSE: 91.1 MG/DL
GFR SERPL CREATININE-BSD FRML MDRD: 45 ML/MIN/{1.73_M2}
GLUCOSE BLD-MCNC: 104 MG/DL (ref 70–99)
GLUCOSE BLD-MCNC: 106 MG/DL (ref 70–99)
GLUCOSE BLD-MCNC: 107 MG/DL (ref 70–99)
GLUCOSE BLD-MCNC: 112 MG/DL (ref 70–99)
GLUCOSE BLD-MCNC: 134 MG/DL (ref 70–99)
GLUCOSE BLD-MCNC: 95 MG/DL (ref 70–99)
GLUCOSE BLD-MCNC: 99 MG/DL (ref 70–99)
HBA1C MFR BLD: 4.8 %
HCT VFR BLD CALC: 38.4 % (ref 40.5–52.5)
HDLC SERPL-MCNC: 34 MG/DL (ref 40–60)
HEMOGLOBIN: 12.6 G/DL (ref 13.5–17.5)
LDL CHOLESTEROL CALCULATED: 48 MG/DL
MCH RBC QN AUTO: 28.1 PG (ref 26–34)
MCHC RBC AUTO-ENTMCNC: 32.9 G/DL (ref 31–36)
MCV RBC AUTO: 85.4 FL (ref 80–100)
PDW BLD-RTO: 15.4 % (ref 12.4–15.4)
PERFORMED ON: ABNORMAL
PERFORMED ON: NORMAL
PERFORMED ON: NORMAL
PLATELET # BLD: 159 K/UL (ref 135–450)
PMV BLD AUTO: 8.2 FL (ref 5–10.5)
POTASSIUM REFLEX MAGNESIUM: 4.8 MMOL/L (ref 3.5–5.1)
RBC # BLD: 4.49 M/UL (ref 4.2–5.9)
SODIUM BLD-SCNC: 138 MMOL/L (ref 136–145)
TRIGL SERPL-MCNC: 53 MG/DL (ref 0–150)
TSH REFLEX FT4: 3.89 UIU/ML (ref 0.27–4.2)
URINE CULTURE, ROUTINE: NORMAL
VLDLC SERPL CALC-MCNC: 11 MG/DL
WBC # BLD: 6.1 K/UL (ref 4–11)

## 2023-02-10 PROCEDURE — 2580000003 HC RX 258: Performed by: INTERNAL MEDICINE

## 2023-02-10 PROCEDURE — 99223 1ST HOSP IP/OBS HIGH 75: CPT | Performed by: PSYCHIATRY & NEUROLOGY

## 2023-02-10 PROCEDURE — 97530 THERAPEUTIC ACTIVITIES: CPT

## 2023-02-10 PROCEDURE — 85027 COMPLETE CBC AUTOMATED: CPT

## 2023-02-10 PROCEDURE — 83036 HEMOGLOBIN GLYCOSYLATED A1C: CPT

## 2023-02-10 PROCEDURE — 92526 ORAL FUNCTION THERAPY: CPT

## 2023-02-10 PROCEDURE — 97166 OT EVAL MOD COMPLEX 45 MIN: CPT

## 2023-02-10 PROCEDURE — 80061 LIPID PANEL: CPT

## 2023-02-10 PROCEDURE — 97162 PT EVAL MOD COMPLEX 30 MIN: CPT

## 2023-02-10 PROCEDURE — 6370000000 HC RX 637 (ALT 250 FOR IP): Performed by: INTERNAL MEDICINE

## 2023-02-10 PROCEDURE — 1200000000 HC SEMI PRIVATE

## 2023-02-10 PROCEDURE — 6360000002 HC RX W HCPCS: Performed by: INTERNAL MEDICINE

## 2023-02-10 PROCEDURE — 92523 SPEECH SOUND LANG COMPREHEN: CPT

## 2023-02-10 PROCEDURE — 36415 COLL VENOUS BLD VENIPUNCTURE: CPT

## 2023-02-10 PROCEDURE — 80048 BASIC METABOLIC PNL TOTAL CA: CPT

## 2023-02-10 PROCEDURE — 92610 EVALUATE SWALLOWING FUNCTION: CPT

## 2023-02-10 RX ORDER — DEXTROSE AND SODIUM CHLORIDE 5; .45 G/100ML; G/100ML
INJECTION, SOLUTION INTRAVENOUS CONTINUOUS
Status: DISCONTINUED | OUTPATIENT
Start: 2023-02-10 | End: 2023-02-11

## 2023-02-10 RX ORDER — DEXTROSE MONOHYDRATE 100 MG/ML
INJECTION, SOLUTION INTRAVENOUS CONTINUOUS PRN
Status: DISCONTINUED | OUTPATIENT
Start: 2023-02-10 | End: 2023-02-14 | Stop reason: HOSPADM

## 2023-02-10 RX ADMIN — ENOXAPARIN SODIUM 40 MG: 100 INJECTION SUBCUTANEOUS at 10:36

## 2023-02-10 RX ADMIN — ASPIRIN 81 MG: 81 TABLET, COATED ORAL at 10:36

## 2023-02-10 RX ADMIN — DEXTROSE AND SODIUM CHLORIDE: 5; 450 INJECTION, SOLUTION INTRAVENOUS at 10:36

## 2023-02-10 RX ADMIN — ENOXAPARIN SODIUM 40 MG: 100 INJECTION SUBCUTANEOUS at 19:49

## 2023-02-10 RX ADMIN — ATORVASTATIN CALCIUM 80 MG: 80 TABLET, FILM COATED ORAL at 19:49

## 2023-02-10 NOTE — PLAN OF CARE
SLP completed evaluation. Please refer to notes in EMR.    Kam BONNER-SLP  Clinical Fellow  [id number]

## 2023-02-10 NOTE — PROGRESS NOTES
Speech Language Pathology  Facility/Department: [place] [address] - MED SURG  Initial Speech/Language/Cognitive Assessment       NAME:Erick Montes Sr.  : 1950 (67 y.o.)   MRN: [id number]  ROOM: [address] [place] [address]  ADMISSION DATE: 2023  PATIENT DIAGNOSIS(ES): Acute cerebrovascular accident (CVA) [facility]) [I63.9]  Patient Active Problem List    Diagnosis Date Noted    Acute cerebrovascular accident (CVA) ([facility] 2023    Vitamin D deficiency 2021    Mass of left kidney  2021    Unable to ambulate 2021    Muscular deconditioning 2021    Pressure injury of buttock, stage 1 2021    Presence of IVC filter 10/13/2020    Non-ischemic cardiomyopathy ([facility] [address] EF 40% 10/09/2020    Suspected sleep apnea 10/01/2018    Benign hypertension 2018    Normocytic anemia 2018    Candidal intertrigo 2018    Lymphedema     Ventral hernia 2018    Class 3 severe obesity due to excess calories with serious comorbidity and body mass index (BMI) of 50.0 to 59.9 in adult ([address] 2014     Past Medical History:   Diagnosis Date    Acute respiratory failure with hypoxia and hypercapnia ([address] 2018    Anemia due to blood loss, acute 10/12/2020    Bacteremia due to Escherichia coli 10/12/2020    Calculus of gallbladder with acute cholecystitis without obstruction 2018    CHF (congestive heart failure) ([address] 2019    Choledecholithiasis 2018    DVT (deep venous thrombosis) ([address] 2018    GI bleed from duodenal ulcer 2018    Malignant neoplasm of kidney excluding renal pelvis ([address] 2014    Retroperitoneal bleed 10/09/2020    UTI 10/12/2020     Past Surgical History:   Procedure Laterality Date    CARDIAC CATHETERIZATION  2019    COLONOSCOPY N/A 2019    COLONOSCOPY WITH BIOPSY performed by Anish Brown MD at [address]  10/19/2020    CT BONE MARROW BIOPSY 10/19/2020 [facility] CT SCAN    IVC FILTER INSERTION 10/13/2020    KIDNEY SURGERY  04/07/2014    removal of tumor (not total nephrectomy)    DE EGD TRANSORAL BIOPSY SINGLE/MULTIPLE  09/28/2018    Shatzki's ring and gastritis    SIGMOIDOSCOPY      UPPER GASTROINTESTINAL ENDOSCOPY  08/06/2018    bleeding duodenal ulcer    UPPER GASTROINTESTINAL ENDOSCOPY N/A 04/08/2019    EGD WITH ANESTHESIA performed by Esperanza Gee MD at [facility] N/A 04/12/2019    ESOPHAGEAL CAPSULE ENDOSCOPY performed by Esperanza Gee MD at [address] [place] [address]     No Known Allergies    Date of Evaluation: 2/10/2023   Evaluating Therapist: SUSANA Tolentino    Subjective:   Chart Reviewed: : [x] Yes [] No    Onset Date: 02/09/2023    Recent Results of CT of Head / MRI:  Date: 02/09/2023  Impressions:   Impression   Acute right MCA infarct within the right frontal lobe involving the right   precentral gyrus extending to the superior margin of the right insula. There   is no corresponding FLAIR abnormality. No acute intracranial hemorrhage identified. Medical record review/interview: Per MD H&P: \"71 y.o. male who [facility] with above complaint. History is from patient and EMR. He was last seen normal at midnight. He woke up with above complaint. Currently he does not have dizziness change in mental status headache weakness or numbness over the extremities. He denies shortness of breath cough fever nausea vomiting diarrhea or worsening edema. He has chronic [place]. He has a history of GI bleed years ago and currently he does not have any symptoms. \"    Behavior / Cognition: alert, cooperative, and pleasant mood    Primary Complaint: N/a     Pain: The patient does not complain of pain     Vitals/labs:   SpO2: 95  RR: 20  BP: DNT   HR: 88    Vision / Hearing:  Vision: Impaired and Wears glasses for reading  Hearing: X = Exceptions to [facility] and Hard of hearing / hearing concerns    Previous Level of Function:  Living Status: Spouse  Occupation: Retired  Homemaking Responsibilities: Pt's wife help assists with ADLs  Active : No   Leisure and Hobbies: Watching TV     Assessment   Cognitive Diagnosis: DNT  Speech Diagnosis: Mild-Moderate Dysarthria     Impressions: Pt alert, pleasant and cooperative for speech evaluation. Pt declined formal cognitive assessment. Pt able to answer yes/no questions, follow 1-2 step commands, and oriented x4. Pt's verbal language, mild-moderate dysarthria with blended word boundaries, decreased breath support, and impreciseness present. Pt ~50-70% intelligible at conversation level. Formal Assessments: No formal assessment completed     Goals:  Long Term Goals:   Time Frame for Long Term Goals: (7 days 02/17/2023)  Goal 1: Pt will improve overall speech intelligibility for improved communication with all listeners within functional environment. Short Term Goals:  Time Frame for Short Term Goals: (5 days 02/15/2023)  Goal 1: Pt will complete OME's for improve oral motor function, strength, and coordination 10/10 given min cues. Goal 2: Pt will repeat phrases / sentences using speech strategies with 50% acc given mod cues     Objective:   Cranial nerve / Oral motor exam:   CN V (trigeminal): ophthalmic, maxillary, and mandibular facial sensation- WNL b/l  CN VII (facial): Impaired L  CN IX/X (glossopharyngeal/vagus): MPT: DNT; pitch range: DNT; vocal quality: Adequate; cough: Strong-perceptually, Congested, and Dry  CN XII (hypoglossal): WNL b/l    Oral motor exam   Labial symmetry impaired left    Dentition: full    Motor Speech:  Moderate   Dysarthria   [x]Intelligibility:  50-70% at conversation level   Dysarthric Characteristics: blended word boundaries, decreased breath support, and imprecise      Comprehension:   Auditory Comprehension: WFL  Impairments: Yes/No Questions, Basic Questions, One-step commands, and Two-step Commands    Expression:   Primary Mode of Expression: Verbal  Verbal Expression: WFL   Pragmatics/Social Functioning: WFL      Cognition: Pt declined cognitive evaluation this date     Overall Orientation : WFL    Safety/Judgement: Mild   Impairments: Insight    Voice:   Voice: WFL     Plan  Prognosis:  Speech Therapy Prognosis  Prognosis: Fair  Prognosis Considerations: Motivation and Severity of impairments   Individuals consulted and agree with results and recommendations: Patient , RN, and Family Member  Safety Devices in place: Yes  Type of Devices:  All fall risk precautions in place , Call light within reach, Bed alarm in place, Left in bed, and RN notified     Recommendations:   Requires SLP Intervention: Yes   Duration / Frequency of Treatment: 3-5x/wk     Therapeutic Interventions:  Oral motor exercises, repeat phrases / sentences using speech strategies    Education:  Patient education: SLP educated the patient re: Role of SLP, rationale for completion of assessment, results of assessment, recommendations, and POC  Patient Education Responses: Pt verbalized understanding, would benefit from ongoing ed, caregiver aware and RN aware      Total Treatment Time / Charges       Time in Time out Total Time / units   Speech / Arletta Brash / Mal Fret Eval:  1500 1530 30 min/ 1 unit      Signature:  Anthony BONNER-SLP  Clinical Fellow  [id number]

## 2023-02-10 NOTE — PROGRESS NOTES
Per wife, she would rather this nurse wait for speech to eval pt.'s swallowing instead of this nurse performing swallow screen. Speech consulted. IVF going now. Notified md of need of f.s. q4h d/t npo status and possible d5 iv fluids. No response. Notified charge nurse and oncoming nurse.

## 2023-02-10 NOTE — PLAN OF CARE
Gabriela Christine TODAYS DATE:  2/10/2023    Discussed personal risk factors for Stroke /TIA with patient/family, and ways to reduce the risk for a recurrent stroke. Patient's personal risk factors which were identified are:     [] Alcohol Abuse: check with your physician before any alcohol consumption. [x] Atrial fibrillation: may cause blood clots. [] Drug Abuse: Seek help, talk with your doctor  [] Clotting Disorder  [] Diabetes  [] Family history of stroke or heart disease  [x] High Blood Pressure/Hypertension: work with your physician. [x] High cholesterol: monitor cholesterol levels with your physician. [x] Overweight/Obesity: work with your physician for your ideal body weight.  [] Physical Inactivity: get regular exercise as directed by your physician. [] Personal history of previous TIA or stroke  [] Poor Diet; decrease salt (sodium) in your diet, follow diet directed by physician. [] Smoking: Cigarette/Cigar: stop smoking. Reviewed the Following Education with Patient and/or Family:   -Signs and Symptoms of Stroke:     (facial droop, weakness/numbness especially on one side, speech difficulty, sudden confusion, sudden loss of vision, sudden severe headache,       sudden loss of balance or having difficulty walking, syncope or seizure)  -How to activate EMS (911)   -Importance of Follow Up Appointment at Discharge   -Importance of Compliance with Medications Prescribed at Discharge     Pt {Blank single:04513::\"verbalized understanding\",\"demonstrates understanding\",\"education needs reinforcement\",\"with no evidence of learning\",\"refuses teaching\",\"not appropriate for education at this time\"}.      Family Present during Education: {YES/NO/NA:19705}     Stroke Education Booklet given to patient/family which includes above education: {YES/NO:19705}     Electronically signed by Jaycob Nava RN on 2/10/2023 at 5:40 AM

## 2023-02-10 NOTE — PLAN OF CARE
TODAYS DATE:  2/10/2023    Discussed personal risk factors for Stroke /TIA with patient/family, and ways to reduce the risk for a recurrent stroke. Patient's personal risk factors which were identified are:     [] Alcohol Abuse: check with your physician before any alcohol consumption. [x] Atrial fibrillation: may cause blood clots. [] Drug Abuse: Seek help, talk with your doctor  [] Clotting Disorder  [] Diabetes  [] Family history of stroke or heart disease  [] High Blood Pressure/Hypertension: work with your physician. [x] High cholesterol: monitor cholesterol levels with your physician. [x] Overweight/Obesity: work with your physician for your ideal body weight. [x] Physical Inactivity: get regular exercise as directed by your physician. [] Personal history of previous TIA or stroke  [] Poor Diet; decrease salt (sodium) in your diet, follow diet directed by physician. [] Smoking: Cigarette/Cigar: stop smoking. Reviewed the Following Education with Patient and/or Family:   -Signs and Symptoms of Stroke:     (facial droop, weakness/numbness especially on one side, speech difficulty, sudden confusion, sudden loss of vision, sudden severe headache,       sudden loss of balance or having difficulty walking, syncope or seizure)  -How to activate EMS (911)   -Importance of Follow Up Appointment at Discharge   -Importance of Compliance with Medications Prescribed at Discharge     Pt education needs reinforcement.      Family Present during Education: no     Stroke Education Booklet given to patient/family which includes above education: yes     Electronically signed by Papo Block RN on 2/10/2023 at 5:46 AM

## 2023-02-10 NOTE — PROGRESS NOTES
Occupational Therapy  Facility/Department: [facility] - MED SURG  Occupational Therapy Initial Assessment/Treatment    Name: Kym Ibanez.  : 1950  MRN: [id number]  Date of Service: 2/10/2023    Discharge Recommendations:  Subacute/Skilled Nursing Facility  OT Equipment Recommendations  Equipment Needed: No  Other: defer to next level of care     Patient Diagnosis(es): The primary encounter diagnosis was Acute cerebrovascular accident (CVA) due to thrombosis of right middle cerebral artery ([address] A diagnosis of New onset atrial fibrillation (HCC) was also pertinent to this visit. Past Medical History:  has a past medical history of Acute respiratory failure with hypoxia and hypercapnia ([address] Anemia due to blood loss, acute, Bacteremia due to Escherichia coli, Calculus of gallbladder with acute cholecystitis without obstruction, CHF (congestive heart failure) ([address] Choledecholithiasis, DVT (deep venous thrombosis) ([address] GI bleed from duodenal ulcer, Malignant neoplasm of kidney excluding renal pelvis ([address] Retroperitoneal bleed, and UTI. Past Surgical History:  has a past surgical history that includes Kidney surgery (2014); sigmoidoscopy; Upper gastrointestinal endoscopy (2018); pr egd transoral biopsy single/multiple (2018); Upper gastrointestinal endoscopy (N/A, 2019); Colonoscopy (N/A, 2019); Upper gastrointestinal endoscopy (N/A, 2019); Cardiac catheterization (2019); CT BIOPSY BONE MARROW (10/19/2020); and IVC filter insertion (10/13/2020). Assessment  Pt is 66 yo M presenting with diagnosis of acute CVA. Pt presents with BLE edema and abdominal hernia. Pt sleeps in lift chair recliner that brings pt to just about a full stand. Pt resides in one [address] with wife. PTA pt was ind for ADLs/transfers/ambulation with RW.  At this time pt is maxA x2 bed mobilty, maxAx1 and CGA second person assist STS and SPT transfer bed<>recliner with RW, and dep footwear due to deficits listed below and to increase pt safety. Pt is currently functioning below baseline, will continue to benefit from skilled OT services in the acute care setting, and SNF is recommended at discharge to increase pt safety and ind with ADLs/transfers/ambulation. Performance deficits / Impairments: Decreased functional mobility ; Decreased ROM; Decreased safe awareness;Decreased endurance;Decreased balance;Decreased ADL status; Decreased strength;Decreased posture  Prognosis: Fair  Decision Making: Medium Complexity  REQUIRES OT FOLLOW-UP: Yes  Activity Tolerance: Patient limited by fatigue;Patient limited by pain (R knee pain)     Plan  Occupational Therapy Plan  Times Per Week: 3-5x/wk  Current Treatment Recommendations: Strengthening, ROM, Balance training, Functional mobility training, Endurance training, Gait training, Patient/Caregiver education & training, Safety education & training, Equipment evaluation, education, & procurement, Positioning, Self-Care / ADL     Restrictions  Restrictions/Precautions: Fall Risk, General Precautions  Other position/activity restrictions: tele, catheter, NG tube    Subjective  Chart Reviewed: Yes, Orders, Progress Notes, History and Physical, Labs  Patient assessed for rehabilitation services?: Yes  Family / Caregiver Present: Yes  Referring Practitioner: Yovani Lee MD  Diagnosis: Acute cerebrovascular accident (CVA) ([facility] [address]  Subjective: Pt high fowlers agreeable to OT services agreeable to OT services. RN approved therapy. Pain: Pt report 9/10 R knee ache pain (baseline since playing high school football). Pt offered therapeutic listening and comfortable positioning end of session. RN notified.     Social/Functional History  Lives With: Spouse  Type of Home: House  Home Layout: One level  Home Access: Level entry  Entrance Stairs - Number of Steps: 1  Bathroom Shower/Tub: Tub only (hasn't taken shower for years, sponge bathes)  Bathroom Toilet: Standard  Home Equipment: Areta Nay, rolling, Cane  Has the patient had two or more falls in the past year or any fall with injury in the past year?: No  Receives Help From: Family, Other (comment) (Has a nurse that comes every 3 weeks)  ADL Assistance: Needs assistance (needs assistance with sponge bath)  Toileting: Independent  Homemaking Assistance: Independent  Homemaking Responsibilities: Yes  Ambulation Assistance: Independent (uses RW more frequently)  Transfer Assistance: Independent  Active : No  Patient's  Info: wife  Occupation: Retired  Type of Occupation:   Leisure & Hobbies: watch tv  Additional Comments: pt report sleeping in recliner/lift chair and ambulation with walker in home    Objective  vitals  Heart Rate: 88  [address]: Monitor  BP: (!) 146/81  BP Location: Right lower arm  BP Method: Automatic  Patient Position: High fowlers  MAP (Calculated): 103  Resp: 22  SpO2: 95 %  O2 Device: None (Room air)    Observation/Palpation  Posture: Fair (min kyphotic posture)  Edema: BLE and abdominal hernia    Safety Devices  Type of Devices: All fall risk precautions in place; Bed alarm in place;Call light within reach;Gait belt; Heels elevated for pressure relief;Patient at risk for falls; Left in bed;Nurse notified  Restraints Initially in Place: No    Bed Mobility Training  Overall Level of Assistance: Maximum assistance;Assist X2;Adaptive equipment; Additional time (HOB raised, bed rails)  Interventions: Verbal cues; Safety awareness training;Weight shifting training/pressure relief  Supine to Sit: Assist X2;Maximum assistance; Adaptive equipment; Additional time (HOB raised, bed rails, to R side)  Sit to Supine: Maximum assistance;Assist X2;Additional time (to L side)  Scooting: Maximum assistance;Assist X2;Additional time (seated EOB)    Balance  Sitting: Intact (seated EOB)  Sitting - Static: Fair (occasional) (pt was slipping off edge of bed, couldn't scoot himself up without assistance)  Standing: With support (RW)    Transfer Training  Overall Level of Assistance: Maximum assistance;Assist X2;Adaptive equipment; Additional time (RW, maxAx1 with second person CGA for added safety and line management)  Interventions: Weight shifting training/pressure relief; Safety awareness training;Verbal cues  Sit to Stand: Maximum assistance;Assist X2;Adaptive equipment; Additional time (from EOB to RW and from reclienr to RW, maxAx1 with second person CGA for added safety and line management)  Stand to Sit: Maximum assistance;Assist X2;Adaptive equipment; Additional time (from RW to recliner and from RW to seated EOB, maxAx1 with second person CGA for added safety and line management)  Stand Pivot Transfers: Assist X2;Maximum assistance; Adaptive equipment; Additional time (with RW bed<>recliner maxAx1 with second person CGA for added safety and line management)  Bed to Chair: Maximum assistance;Assist X2;Adaptive equipment; Additional time (SPT with RW bed<>recliner maxAx1 with second person CGA for added safety and line management)    Strength/ROM  AROM: Generally decreased, functional  PROM: Generally decreased, functional  Strength: Generally decreased, functional  Coordination: Generally decreased, functional  Tone: Normal  Sensation: Intact (pt denies N/T)    ADL  LE Dressing: Dependent/Total  LE Dressing Skilled Clinical Factors: don socks    Vision  Vision: Impaired  Vision Exceptions: Wears glasses for reading    Hearing  Hearing: Exceptions to [facility]  Hearing Exceptions: Hard of hearing/hearing concerns (both ears)    Cognition  Overall Cognitive Status: WNL    Orientation  Overall Orientation Status: Within Normal Limits  Orientation Level: Oriented X4    Perception  Overall Perceptual Status: [facility]    Education  Education Given To: Patient; Family  Education Provided: Role of Therapy; Energy Conservation; Fall Prevention Strategies; Plan of Care;Equipment;Transfer Training  Education Method: Demonstration;Verbal  Education Outcome: Verbalized understanding;Demonstrated understanding;Continued education needed  Disease specific: Pt educated on benefits of OOB activity to decrease risks associated with prolonged bedrest while in hospital. Pt verbalized understanding. AM-PAC Score  AM-PAC Inpatient Daily Activity Raw Score: 14 (02/10/23 1157)  AM-PAC Inpatient ADL T-Scale Score : 33.39 (02/10/23 1157)  ADL Inpatient CMS 0-100% Score: 59.67 (02/10/23 1157)  ADL Inpatient CMS G-Code Modifier : CK (02/10/23 1157)    Goals  Short Term Goals  Time Frame for Short Term Goals: 1 wk 2/17/2023 unless otherwise noted  Short Term Goal 1: pt will complete EOB grooming ADL setup 2/14/2023  Short Term Goal 2: pt will complete LB dressing Quyen  Short Term Goal 3: pt will complete bed<>BSC transfer Quyen and LRAD    Therapy Time   Individual Concurrent Group Co-treatment   Time In 1114         Time Out 1150         Minutes 36         Timed Code Treatment Minutes: 26 Minutes (10 min eval)    Mercy Lopez OTR/L  If pt is unable to be seen after this session, please let this note serve as discharge summary. Please see case management note for discharge disposition. Thank you. Lives With: Spouse

## 2023-02-10 NOTE — ACP (ADVANCE CARE PLANNING)
Met with Pt and wife for Advance Directives consult. Pt and wife wanting to read over on their own before completing. Left copy of AD forms with them and informed them of our continued availability to help complete when ready.     Vida Crouch   Associate

## 2023-02-10 NOTE — PROGRESS NOTES
Hospitalist Progress Note      PCP: Kellee Xavier MD    Date of Admission: 2/9/2023    Chief Complaint:  Dysarthria and left-sided facial droop    Hospital Course: h and p reviewed     Subjective:  no new c/o , still has slurred speech and left facial droop       Medications:  Reviewed    Infusion Medications    dextrose      dextrose 5 % and 0.45 % NaCl 75 mL/hr at 02/10/23 1036     Scheduled Medications    enoxaparin  40 mg SubCUTAneous BID    aspirin  81 mg Oral Daily    Or    aspirin  300 mg Rectal Daily    atorvastatin  80 mg Oral Nightly     PRN Meds: glucose, dextrose bolus **OR** dextrose bolus, glucagon (rDNA), dextrose, ondansetron **OR** ondansetron, polyethylene glycol, perflutren lipid microspheres      Intake/Output Summary (Last 24 hours) at 2/10/2023 1112  Last data filed at 2/10/2023 0941  Gross per 24 hour   Intake 637.36 ml   Output 1400 ml   Net -762.64 ml       Physical Exam Performed:    BP (!) 146/81   Pulse 91   Temp 97.8 °F (36.6 °C) (Oral)   Resp 22   Ht 5' 4\" (1.626 m)   Wt (!) 341 lb 8 oz (154.9 kg)   SpO2 97%   BMI 58.62 kg/m²     General appearance:  No apparent distress, appears stated age and cooperative. Morbidly obese individual breathing  HEENT:  Normal cephalic, atraumatic without obvious deformity. Pupils equal, round, and reactive to light. Extra ocular muscles intact. Conjunctivae/corneas clear. Neck: Supple, with full range of motion. No jugular venous distention. Trachea midline. Respiratory:  Normal respiratory effort. Reduced entry, bilaterally without Rales/Wheezes/Rhonchi. Cardiovascular:  irRegular rate and rhythm with normal S1/S2 without murmurs, rubs or gallops. Abdomen: Soft, non-tender, non-distended with normal bowel sounds. Morbidly obese hernia no signs of obstruction has chronic Mills  Musculoskeletal:  No clubbing, cyanosis or edema bilaterally. Full range of motion without deformity.   Skin: Skin color, texture, turgor normal.  No rashes or lesions. Neurologic:  Neurovascularly intact without any focal sensory/motor deficits except left-sided facial droop and dysarthria. Psychiatric:  Alert and oriented, thought content appropriate, normal insight  Capillary Refill: Brisk,3 seconds, normal  Peripheral Pulses: +2 palpable, equal bilaterally        Labs:   Recent Labs     02/09/23  0915 02/10/23  0614   WBC 4.8 6.1   HGB 13.0* 12.6*   HCT 41.3 38.4*    159     Recent Labs     02/09/23 0915      K 4.9      CO2 25   BUN 27*   CREATININE 1.7*   CALCIUM 9.2     Recent Labs     02/09/23 0915   AST 10*   ALT 6*   BILITOT 1.4*   ALKPHOS 84     Recent Labs     02/09/23 0915   INR 1.09     Recent Labs     02/09/23 0915 02/09/23  1621 02/09/23 2038   TROPONINI 0.04* 0.04* 0.04*       Urinalysis:      Lab Results   Component Value Date/Time    NITRU Negative 02/09/2023 08:35 AM    WBCUA 10-20 02/09/2023 08:35 AM    BACTERIA 2+ 02/09/2023 08:35 AM    RBCUA 11-20 02/09/2023 08:35 AM    BLOODU MODERATE 02/09/2023 08:35 AM    SPECGRAV 1.010 02/09/2023 08:35 AM    GLUCOSEU Negative 02/09/2023 08:35 AM       Radiology:  XR ABDOMEN (KUB) (SINGLE AP VIEW)   Final Result   Appropriate positioning of enteric tube. MRI BRAIN WO CONTRAST   Final Result   Acute right MCA infarct within the right frontal lobe involving the right   precentral gyrus extending to the superior margin of the right insula. There   is no corresponding FLAIR abnormality. No acute intracranial hemorrhage identified. The above findings were discussed with Dr. John Bernal at 12:15 p.m. on   02/09/2023. XR CHEST PORTABLE   Final Result   Mild cardiomegaly with mild interstitial pulmonary edema         CTA HEAD NECK W CONTRAST   Final Result   1. Atherosclerosis contributes to moderate stenosis at the origin of the left   vertebral artery. 2. Otherwise, no significant stenosis seen of the cervical carotid/vertebral   arteries.    3. No significant stenosis or large vessel occlusion of the [id number]-[id number]. 4. Small bilateral pleural effusions. 5. Nonspecific borderline prominent mediastinal lymph nodes. CT HEAD WO CONTRAST   Final Result   Motion limited. No hemorrhage or mass identified. Mild periventricular small vessel ischemic change, similar to prior      Mild paranasal sinus disease      Results discussed with [address] [place] [address] [place] by Annabella Petersen. Suraj Decker MD at 9:30 am on   2/9/2023             IP CONSULT TO PHARMACY  PHARMACY TO CHANGE BASE FLUIDS  IP CONSULT TO HOSPITALIST  IP CONSULT TO NEUROLOGY  IP CONSULT TO SPIRITUAL SERVICES    Assessment/Plan:    Active Hospital Problems    Diagnosis     Acute cerebrovascular accident (CVA) ([facility] [address] [I63.9]      Priority: Medium     Right frontal lobe CVA with left-sided facial droop and dysarthria-  neurochecks, aspirin, statin, neurology evaluation, echocardiogram PT OT speech currently patient has NG tube  Rec MBS  Gentle IVF     Education chronic kidney disease-creatinine normally runs 1.3 currently 1.7-gentle hydration monitor BMP- pending        New onset of atrial fibrillation-controlled ventricular rate-echo is pending, TSH,- WNL given acute CVA not a candidate for anticoagulation, telemetry monitoring     History of perinephric hemorrhage in 2020-/?  GIBcurrently no bleeding- hb stable      Nonischemic cardiomyopathy ejection fraction 50%     Chronic Mills-?   indication     History of DVT has IVC filter placed in 2020 following retroperitoneal hemorrhage     Morbid obesity with a abdominal hernia no evidence of obstruction     Abnormal UA-possibly secondary to chronic Mills patient does not have any evidence of sepsis monitor closely    DVT Prophylaxis: Lovenox   Diet: Diet NPO  Code Status: Full Code  PT/OT Eval Status: on going     Dispo - 2 days    Appropriate for A1 Discharge Unit: No      Gauri Flanagan MD

## 2023-02-10 NOTE — CONSULTS
In patient Neurology consult        [facility]      MD Daisy Adorno  1950    Date of Service: 2/10/2023    Referring Physician: Angelika Carrasquillo MD      Reason for the consult and CC: Acute dysarthria and new stroke    HPI:   The patient is a 67y.o.  years old male with history of hypertension, diabetes, hyperlipidemia and other medical problems who was admitted to the hospital in the ER yesterday with acute speech impairment and facial droop. Symptoms started yesterday upon awakening. He noticed left facial droop with dysarthria and difficulties following direction. Degree was severe and duration was persistent. No headache or chest pain. No other relieving or aggravating factors. He came to the ED. Stroke team was consulted. Initial work-up with CT showed no large ischemic stroke or vessel occlusion. He did have some vertebral stenosis. Patient was admitted to the hospital.  He is about the same today. Having difficulties with swallowing. MRI of the brain reviewed and showed acute right ischemic MCA stroke. History of significant GI bleed of unknown source in the past when he was taking Coumadin. Constitutional:   Vitals:    02/09/23 2352 02/10/23 0420 02/10/23 0815 02/10/23 1123   BP: 131/70 (!) 145/92 (!) 146/81    Pulse: 85 90 91 88   Resp: 22 22 22    Temp: 98.9 °F (37.2 °C) 97.5 °F (36.4 °C) 97.8 °F (36.6 °C)    TempSrc: Oral Oral Oral    SpO2: 95% 95% 97% 95%   Weight:       Height:             I personally reviewed and updated social history, past medical history, medications, allergy, surgical history, and family history as documented in the patient's electronic health records. ROS: 10-14 ROS reviewed with the patient/nurse/family which were unremarkable except mentioned in H&P. General appearance:  Normal development and appear in no acute distress. Mental Status:   Oriented to person, place, problem, and time.     Memory: Impaired immediate recall. Intact remote memory  Normal attention span and concentration. Language: Dysarthric and hypotonic voice  Poor fund of Knowledge. Aware of current events and vocabulary   Cranial Nerves:   II: Visual fields: Full. Pupils: equal, round, reactive to light, bilaterally  III,IV,VI: Extra Ocular Movements are intact. No nystagmus  V: Facial sensation is intact  VII: Facial strength and movements: Left facial asymmetry  IX: Normal palatal elevation and shoulder shrug  XII: Tongue movements are normal  Musculoskeletal: 5/5 in left side. Slightly weak on the right side. Good range of motion. No muscle atrophy. Tone: Normal tone. Reflexes: Symmetric 2+ in the arms and 2+ in the legs   Planters: Flexor on the left and extensor on the right  Coordination: no pronator drift, no dysmetria with FNF and normal REM  Sensation: normal in both arms and legs. Gait/Posture: unsteady gait with normal posturing and station.          Medical decision making:  Data: reviewed   LABS:   Lab Results   Component Value Date/Time     02/10/2023 06:14 AM    K 4.8 02/10/2023 06:14 AM     02/10/2023 06:14 AM    CO2 25 02/10/2023 06:14 AM    BUN 24 02/10/2023 06:14 AM    CREATININE 1.6 02/10/2023 06:14 AM    GFRAA >60 09/30/2021 06:45 PM    LABGLOM 45 02/10/2023 06:14 AM    GLUCOSE 107 02/10/2023 06:14 AM    PHOS 2.5 10/22/2020 06:00 AM    MG 1.50 12/15/2020 02:30 PM    CALCIUM 9.0 02/10/2023 06:14 AM     Lab Results   Component Value Date/Time    WBC 6.1 02/10/2023 06:14 AM    RBC 4.49 02/10/2023 06:14 AM    HGB 12.6 02/10/2023 06:14 AM    HCT 38.4 02/10/2023 06:14 AM    MCV 85.4 02/10/2023 06:14 AM    RDW 15.4 02/10/2023 06:14 AM     02/10/2023 06:14 AM     Lab Results   Component Value Date    INR 1.09 02/09/2023    PROTIME 14.0 02/09/2023       Neuroimaging was independently reviewed by myself and discussed results with the patient  Reviewed notes from different physicians including H&P and ED notes.  Reviewed lab and blood testing    Impression:  Acute right ischemic MCA stroke. Thromboembolic versus cardioembolic  New onset A-fib with RVR  History of GI bleed  Hypertension  Hyperlipidemia  History of DVT, status post IVC filter    Recommendation:  PT and OT  Speech  Aspiration precautions  Aspirin for now  Echo  A1c and lipid panel  Statin  Blood pressure monitor for now  Treat if blood pressure above 180/90  Blood sugar monitor  Telemetry  DVT and GI prophylaxis  Lengthy discussion with the patient and his wife regarding stroke prevention and risk of recurrence. So far risk of anticoagulation outweighed the benefit given history of significant GI bleed in the past.  Follow CBC  ARU consultation  We will follow        Thank you for referring such patient. If you have any questions regarding my consult note, please don't hesitate to call me. Hope Jensen MD  [phone]    This dictation was generated by voice recognition computer software.  Although all attempts are made to edit the dictation for accuracy, there may be errors in the  transcription that are not intended

## 2023-02-10 NOTE — PLAN OF CARE
Problem: Safety - Adult  Goal: Free from fall injury  2/10/2023 0142 by Arti Stephenson RN  Outcome: Progressing  2/9/2023 1854 by Kenzie Licona RN  Outcome: Progressing     Problem: Respiratory - Adult  Goal: Achieves optimal ventilation and oxygenation  2/10/2023 0142 by Arti Stephenson RN  Outcome: Progressing  2/9/2023 1854 by Kenzie Licona RN  Outcome: Progressing  Flowsheets (Taken 2/9/2023 1649)  Achieves optimal ventilation and oxygenation: Assess for changes in respiratory status     Problem: Neurosensory - Adult  Goal: Achieves stable or improved neurological status  2/10/2023 0142 by Arti Stephenson RN  Outcome: Progressing  2/9/2023 1854 by Kenzie Licona RN  Outcome: Progressing  Flowsheets (Taken 2/9/2023 1649)  Achieves stable or improved neurological status: Assess for and report changes in neurological status     Problem: Genitourinary - Adult  Goal: Absence of urinary retention  2/10/2023 0142 by Arti Stephenson RN  Outcome: Progressing     Problem: Chronic Conditions and Co-morbidities  Goal: Patient's chronic conditions and co-morbidity symptoms are monitored and maintained or improved  2/10/2023 0142 by Arti Stephenson RN  Outcome: Progressing  2/9/2023 1854 by Kenzie Licona RN  Outcome: Progressing  Flowsheets  Taken 2/9/2023 1649  Care Plan - Patient's Chronic Conditions and Co-Morbidity Symptoms are Monitored and Maintained or Improved: Monitor and assess patient's chronic conditions and comorbid symptoms for stability, deterioration, or improvement  Taken 2/9/2023 [address] [place] [address] - Patient's Chronic Conditions and Co-Morbidity Symptoms are Monitored and Maintained or Improved: Monitor and assess patient's chronic conditions and comorbid symptoms for stability, deterioration, or improvement

## 2023-02-10 NOTE — PROGRESS NOTES
Physical Therapy  Facility/Department: [place] [address] - [facility]  Physical Therapy Initial Assessment/Treatment    Name: Felix Bañuelos  : 1950  MRN: [id number]  Date of Service: 2/10/2023    Discharge Recommendations:  Subacute/Skilled Nursing Facility   PT Equipment Recommendations  Equipment Needed: No  Other: Pt owns RW      Patient Diagnosis(es): The primary encounter diagnosis was Acute cerebrovascular accident (CVA) due to thrombosis of right middle cerebral artery ([address] A diagnosis of New onset atrial fibrillation (HCC) was also pertinent to this visit. Past Medical History:  has a past medical history of Acute respiratory failure with hypoxia and hypercapnia ([address] Anemia due to blood loss, acute, Bacteremia due to Escherichia coli, Calculus of gallbladder with acute cholecystitis without obstruction, CHF (congestive heart failure) ([address] Choledecholithiasis, DVT (deep venous thrombosis) ([address] GI bleed from duodenal ulcer, Malignant neoplasm of kidney excluding renal pelvis ([address] Retroperitoneal bleed, and UTI. Past Surgical History:  has a past surgical history that includes Kidney surgery (2014); sigmoidoscopy; Upper gastrointestinal endoscopy (2018); pr egd transoral biopsy single/multiple (2018); Upper gastrointestinal endoscopy (N/A, 2019); Colonoscopy (N/A, 2019); Upper gastrointestinal endoscopy (N/A, 2019); Cardiac catheterization (2019); CT BIOPSY BONE MARROW (10/19/2020); and IVC filter insertion (10/13/2020). Assessment   Body Structures, Functions, Activity Limitations Requiring Skilled Therapeutic Intervention: Decreased functional mobility ; Decreased strength;Decreased balance;Decreased endurance  Assessment: Pt presents to [facility] with an acute CVA. Pt admitted to [facility] with complaints of left-sided facial droop and slurred speech. Pt reports no sx in BLEs. Pt states that he lives with his wife in a single story house with level entry.  He states that he is overall IND with ADLs/IADLs but does recieve assistance from his wife when needed. He reports that he was IND with ambulation with a SPC and RW, mainly using his RW. Currently, pt is performing bed mobility with MaxAx2 and further mobility was limited due to safety concerns with pts instability and slipping sitting EOB. Pt would benefit from continued skilled therapy in order to address functional deficits and ehance pt independence. Recommend SNF upon d/c.   Treatment Diagnosis: Impaired functional mobility  Therapy Prognosis: Fair  Decision Making: Medium Complexity  Requires PT Follow-Up: Yes  Activity Tolerance  Activity Tolerance: Patient limited by fatigue;Patient limited by pain;Treatment limited secondary to medical complications     Plan   Physcial Therapy Plan  General Plan: 3-5 times per week  Current Treatment Recommendations: Strengthening, ROM, Balance training, Functional mobility training, Endurance training, Gait training, Home exercise program, Safety education & training, Therapeutic activities  Safety Devices  Type of Devices: Patient at risk for falls, Bed alarm in place, Call light within reach, Gait belt, Left in bed, Nurse notified  Restraints  Restraints Initially in Place: No     Restrictions  Restrictions/Precautions  Restrictions/Precautions: Fall Risk, General Precautions  Position Activity Restriction  Other position/activity restrictions: tele, catheter, NG tube     Subjective   Pain: pt denies pain  General  Chart Reviewed: Yes  Patient assessed for rehabilitation services?: Yes  Family / Caregiver Present: Yes (wife)  Referring Practitioner: Tray Del Toro MD  Referral Date : 02/09/23  Diagnosis: Acute CVA  Follows Commands: Within Functional Limits  General Comment  Comments: RN cleared pt for PT eval  Subjective  Subjective: Arrived and pt agreed to participate in PT eval         Social/Functional History  Social/Functional History  Lives With: Spouse  Type of Home: House  Home Layout: One level  Home Access: Level entry  Entrance Stairs - Number of Steps: 1  Bathroom Shower/Tub: Tub only (hasn't taken shower for years, sponge bathes)  Bathroom Toilet: Standard  Home Equipment: Jaelyn Naman, rolling, Cane  Has the patient had two or more falls in the past year or any fall with injury in the past year?: No  Receives Help From: Family, Other (comment) (Has a nurse that comes every 3 weeks)  ADL Assistance: Needs assistance (needs assistance with sponge bath)  Toileting: Independent  Homemaking Assistance: Independent  Homemaking Responsibilities: Yes  Ambulation Assistance: Independent (uses RW more frequently)  Transfer Assistance: Independent  Active : No  Patient's  Info: wife  Occupation: Retired  Type of Occupation:   Leisure & Hobbies: watch tv  [address]: Impaired  Vision Exceptions: Wears glasses at all times  Hearing  Hearing: Exceptions to [facility]  Hearing Exceptions: Hard of hearing/hearing concerns (B ears)    Cognition   Orientation  Overall Orientation Status: Within Normal Limits  Orientation Level: Oriented X4  Cognition  Overall Cognitive Status: WNL     Objective   Heart Rate: 91  Heart Rate Source: Monitor  BP: (!) 146/81  BP Location: Right lower arm  BP Method: Automatic  Patient Position: High fowlers  MAP (Calculated): 103  Resp: 22  SpO2: 97 %  O2 Device: None (Room air)     Gross Assessment  AROM: Generally decreased, functional  Strength: Generally decreased, functional  Sensation: Intact     Bed Mobility Training  Bed Mobility Training: Yes  Overall Level of Assistance: Maximum assistance;Assist X2  Interventions: Verbal cues; Safety awareness training;Weight shifting training/pressure relief  Supine to Sit: Maximum assistance;Assist X2;Additional time (assist at trunk and with BLE)  Sit to Supine: Maximum assistance;Assist X2;Additional time (assist with trunk and BLE)  Scooting: Maximum assistance;Assist X2;Additional time (towards EOB and HOB)  Balance  Sitting: Impaired  Sitting - Static: Fair (occasional) (pt was slipping off edge of bed, couldn't scoot himself up without assistance)  Transfer Training  Transfer Training: No (Not attempted due to safety concerns with pts instability sitting EOB and amount of assistance required for bed mobility)    AM-PAC Score  AM-PAC Inpatient Mobility Raw Score : 6 (02/10/23 1103)  AM-PAC Inpatient T-Scale Score : 23.55 (02/10/23 1103)  Mobility Inpatient CMS 0-100% Score: 100 (02/10/23 1103)  Mobility Inpatient CMS G-Code Modifier : CN (02/10/23 1103)    Goals  Short Term Goals  Time Frame for Short Term Goals: 7 days (2/17/23)  Short Term Goal 1: Pt will perform bed mobility with ModAx1  Short Term Goal 2: Pt will perform sit to stand transfer with RW and ModAx1  Short Term Goal 3: Pt will perform 12-15 reps BLE exercises by 2/14/23  Patient Goals   Patient Goals : \"to go home\"     Education  Patient Education  Education Given To: Patient; Family  Education Provided: Role of Therapy;Plan of Care;Transfer Training  Education Method: Verbal  Barriers to Learning: None  Education Outcome: Verbalized understanding;Continued education needed    Therapy Time   Individual Concurrent Group Co-treatment   Time In 0859         Time Out 0932         Minutes 33         Timed Code Treatment Minutes: 23 Minutes (10 min eval)     If pt is unable to be seen after this session, please let this note serve as discharge summary. Please see case management note for discharge disposition. Thank you.    Laurie Black,SPT

## 2023-02-10 NOTE — CARE COORDINATION
Case Management Assessment  Initial Evaluation    Date/Time of Evaluation: 2/10/2023 3:55 PM  Assessment Completed by: Jaziel Wisdom RN    If patient is discharged prior to next notation, then this note serves as note for discharge by case management. Patient Name: Brina Song Sr. YOB: 1950  Diagnosis: Acute cerebrovascular accident (CVA) [facility]) [I63.9]                   Date / Time: 2/9/2023  9:12 AM    Patient Admission Status: Inpatient   Readmission Risk (Low < 19, Mod (19-27), High > 27): Readmission Risk Score: 17.7    Current PCP: Park Gonzalez MD  PCP verified by CM? Yes    Chart Reviewed: Yes      History Provided by: Patient, Significant Other  Patient Orientation: Alert and Oriented    Patient Cognition: Alert    Hospitalization in the last 30 days (Readmission):  No    If yes, Readmission Assessment in  Navigator will be completed. Advance Directives:      Code Status: Full Code   Patient's Primary Decision Maker is: Legal Next of Kin    Primary Decision MakerArnakul Dalal Spouse - [phone]    Discharge Planning:    Patient lives with: Spouse/Significant Other Type of Home: House  Primary Care Giver: Spouse  Patient Support Systems include: Spouse/Significant Other, Family Members, Home Care Staff   Current Financial resources: Medicare  Current community resources: None  Current services prior to admission: Home Care            Current DME:              Type of Home Care services:  Nursing Services (Will need PT/OT/Nsg/SLP)    ADLS  Prior functional level: Independent in ADLs/IADLs  Current functional level: Independent in ADLs/IADLs    PT AM-PAC: 6 /24  OT AM-PAC: 14 /24    Family can provide assistance at DC: Yes  Would you like Case Management to discuss the discharge plan with any other family members/significant others, and if so, who?  Yes (Moni/wife)  Plans to Return to Present Housing: Yes  Other Identified Issues/Barriers to RETURNING to current housing: none  Potential Assistance needed at discharge: Home Care            Potential DME:    Patient expects to discharge to: [facility] for transportation at discharge:      Financial    Payor: Trenton Pierson / Plan: MEDICARE PART A AND B / Product Type: *No Product type* /     Does insurance require precert for SNF: No    Potential assistance Purchasing Medications: No  [place] [facility] [id number] Leatha Mobley, [address] [facility] [phone] - f [phone] [address] [facility] [address]  Phone: [phone] Fax: [phone]      Notes:    Factors facilitating achievement of predicted outcomes: Family support, Friend support, Motivated, Cooperative, Pleasant, Sense of humor, Good insight into deficits, Has needed Durable Medical Equipment at home, and Knowledge about rehab    Barriers to discharge: Medical complications    Additional Case Management Notes: Acute CVA. Management per neuro and IM. Pt from home with wife. Active with [facility] for nursing for care of chronic devine catheter. PTOT rec SNF. Pt and wife refusing. They want to return home with [facility] for PT/OT/NSG/SLP. CM called [facility] [phone]. They will be able to provide those services. Will need order faxed to [facility] @ [phone]    The Plan for Transition of Care is related to the following treatment goals of Acute cerebrovascular accident (CVA) ([facility] [C85.1]    IF APPLICABLE: The Patient and/or patient representative Chelsea Luna and his family were provided with a choice of provider and agrees with the discharge plan. Freedom of choice list with basic dialogue that supports the patient's individualized plan of care/goals and shares the quality data associated with the providers was provided to:     Patient Representative Name:       The Patient and/or Patient Representative Agree with the Discharge Plan?       Johana Lagos RN  Case Management Department  Ph: [phone] Fax: [phone]

## 2023-02-10 NOTE — PROGRESS NOTES
Speech Language Pathology  Clinical Bedside Swallow Assessment  Facility/Department: [facility] B3 - MED SURG      Recommendations:  Diet recommendation: IDDSI 7 Regular Solids; IDDSI 0 Thin Liquids; Meds PO as tolerated  Instrumentation: Not clinically indicated at this time. Will cont to monitor   Risk management: upright for all intake, stay upright for at least 30 mins after intake, small bites/sips, assist feed, oral care q4 hrs to reduce adverse affects in the event of aspiration, increase physical mobility as able, alternate bites/sips, slow rate of intake, general GERD precautions, general aspiration precautions, and hold PO and contact SLP if s/s of aspiration or worsening respiratory status develop.       Kaden Tatianna Rivas.  : 1950 (67 y.o.)   MRN: [id number]  ROOM: [address] [place] [address]  ADMISSION DATE: 2023  PATIENT DIAGNOSIS(ES): Acute cerebrovascular accident (CVA) [facility]) [I63.9]  Chief Complaint   Patient presents with    Aphasia     Patient Active Problem List    Diagnosis Date Noted    Acute cerebrovascular accident (CVA) ([facility] 2023    Vitamin D deficiency 2021    Mass of left kidney  2021    Unable to ambulate 2021    Muscular deconditioning 2021    Pressure injury of buttock, stage 1 2021    Presence of IVC filter 10/13/2020    Non-ischemic cardiomyopathy ([facility] EF 40% 10/09/2020    Suspected sleep apnea 10/01/2018    Benign hypertension 2018    Normocytic anemia 2018    Candidal intertrigo 2018    Lymphedema     Ventral hernia 2018    Class 3 severe obesity due to excess calories with serious comorbidity and body mass index (BMI) of 50.0 to 59.9 in adult [facility]) 2014     Past Medical History:   Diagnosis Date    Acute respiratory failure with hypoxia and hypercapnia ([facility] 2018    Anemia due to blood loss, acute 10/12/2020    Bacteremia due to Escherichia coli 10/12/2020    Calculus of gallbladder with acute cholecystitis without obstruction 09/24/2018    CHF (congestive heart failure) ([facility] 08/2019    Choledecholithiasis 09/24/2018    DVT (deep venous thrombosis) ([facility] 07/14/2018    GI bleed from duodenal ulcer 08/06/2018    Malignant neoplasm of kidney excluding renal pelvis ([facility] 04/08/2014    Retroperitoneal bleed 10/09/2020    UTI 10/12/2020     Past Surgical History:   Procedure Laterality Date    CARDIAC CATHETERIZATION  09/06/2019    COLONOSCOPY N/A 04/09/2019    COLONOSCOPY WITH BIOPSY performed by Zbigniew Marcum MD at [address]  10/19/2020    CT BONE MARROW BIOPSY 10/19/2020 [facility] CT SCAN    IVC FILTER INSERTION  10/13/2020    KIDNEY SURGERY  04/07/2014    removal of tumor (not total nephrectomy)    [place] EGD TRANSORAL BIOPSY SINGLE/MULTIPLE  09/28/2018    Shatzki's ring and gastritis    SIGMOIDOSCOPY      UPPER GASTROINTESTINAL ENDOSCOPY  08/06/2018    bleeding duodenal ulcer    UPPER GASTROINTESTINAL ENDOSCOPY N/A 04/08/2019    EGD WITH ANESTHESIA performed by Zbigniew Marcum MD at [address] [place] [address] N/A 04/12/2019    ESOPHAGEAL CAPSULE ENDOSCOPY performed by Zbigniew Marcum MD at [address] [place] [address]     No Known Allergies    DATE ONSET: 02/09/2023    Date of Evaluation: 2/10/2023   Evaluating Therapist: SUSANA Young    Chart Reviewed: : [x] Yes [] No    Current Diet: Diet NPO    Recent Chest Radiography: [x] Chest XR   [] CT of Chest  Date: 02/09/2023  Impressions  Impression   Mild cardiomegaly with mild interstitial pulmonary edema     Pain: The patient does not complain of pain     Reason for Referral  Rachelekerry Sebastian. was referred for a bedside swallow evaluation to assess the efficiency of their swallow function, identify signs and symptoms of aspiration and make recommendations regarding safe dietary consistencies, effective compensatory strategies, and safe eating environment.     Assessment    Medical record review/interview: Per MD H&P: \"71 y.o. male who presented to [facility] with above complaint. History is from patient and EMR. He was last seen normal at midnight. He woke up with above complaint. Currently he does not have dizziness change in mental status headache weakness or numbness over the extremities. He denies shortness of breath cough fever nausea vomiting diarrhea or worsening edema. He has chronic [place]. He has a history of GI bleed years ago and currently he does not have any symptoms. \"    Predisposing dysphagia risk factors: N/A  Clinical signs of possible chronic dysphagia: N/A  Precipitating dysphagia risk factors: reduced physical mobility and acute neurological event    Patient Complaints:  Odynophagia: [] Yes [x] No  Globus Sensation: [] Yes [x] No  SOB with PO intake: [] Yes [x] No  Increased WOB with PO intake: [] Yes [x] No  Reflux Sx's: [] Yes [x] No  Weight loss: [] Yes [x] No  Coughing/Choking with PO intake: [] Yes [x] No  Reduced Appetite: [] Yes [x] No    Additional Reported Symptoms/Complaints/Hospital Course:   No prior SLP intervention on record. Per chart review pt failed swallow screen and NG was placed 02/09/2023. Pt noted with dysarthria and left-sided facial droop. Vitals/labs:   SpO2: 97  RR: 22  BP: 146/81  HR: 91  O2 device: RA    CBC:   Recent Labs     02/10/23  0614   WBC 6.1   HGB 12.6*         BMP:  Recent Labs     02/09/23  0915      K 4.9      CO2 25   BUN 27*   CREATININE 1.7*   GLUCOSE 126*          Cranial nerve exam:   CN V (trigeminal): ophthalmic, maxillary, and mandibular facial sensation- WNL b/l  CN VII (facial): Impaired L  CN IX/X (glossopharyngeal/vagus): MPT: DNT; pitch range: DNT; vocal quality: Adequate; cough: Strong-perceptually, Congested, and Dry  CN XII (hypoglossal):  WNL b/l    Laryngeal function exam:   Secretions: oral mucosa pink and moist   Vocal quality: See CN exam above  MPT: See CN exam above  S/Z ratio: DNT  Pitch range: See CN exam above  Cough: See CN exam above    Oral Care Status:    [x] Oral Care [facility]  [] Poor oral care status  [] Edentulous  [] Upper Dentures  [] Lower Dentures  [] Missing/Broken Teeth  [] Evidence of dental cavities/carries    PO trials:   IDDSI 0 (thin): no anterior bolus loss, suspect functional A-P bolus transit, swallow timing subjectively appears timely, audible regurgitation, and delayed cough, belching after trials    IDDSI 4 (puree): no anterior bolus loss, suspect functional A-P bolus transit, swallow timing subjectively appears timely, audible regurgitation, and delayed cough, belching after trials    IDDSI 6 (soft and bite sized): no anterior bolus loss, suspect functional A-P bolus transit, swallow timing subjectively appears timely, grossly functional mastication, oral clearance grossly WFL, audible regurgitation, and delayed cough, belching after trials    IDDSI 7 (regular): no anterior bolus loss , suspect functional A-P bolus transit, swallow timing subjectively appears timely, grossly functional mastication, oral clearance grossly WFL, no clinical s/s of aspiration, no coughing, and vitals stable    Impressions:  RN ok'd SLP entry. Per RN, pt failed swallow screen and NG was placed 02/09/2023. Pt trialed with thin liquids, puree, soft and bite sized and regular solids. No priya aspiration observed. Pt with audible sounding regurgitation, however, pt reported no hx of acid reflux or GERD. Pt's vitals remained stable throughout PO trials. Oral phases WFL. No deficits noted. Pharyngeal phase WFL. No deficits noted. SLP recommends GI consult d/t suspected regurgitation and belching after trials. SLP perfectserved MD for GI consult. SLP recommends regular solids and thin liquids, meds PO as tolerated. Pt verbalized understanding and RN aware. Pt would benefit from speech evaluation to further assess dysarthria. ST to follow.       Recommendations:  Diet recommendation: IDDSI 7 Regular Solids; IDDSI 0 Thin Liquids; Meds PO as tolerated  Instrumentation: Not clinically indicated at this time. Will cont to monitor   Risk management: upright for all intake, stay upright for at least 30 mins after intake, small bites/sips, assist feed, oral care q4 hrs to reduce adverse affects in the event of aspiration, increase physical mobility as able, alternate bites/sips, slow rate of intake, general GERD precautions, general aspiration precautions, and hold PO and contact SLP if s/s of aspiration or worsening respiratory status develop.     Prognosis: Fair - Good    Recommended Intervention:   [x] Dysphagia tx  [] Videostroboscopy                      [] NPO   [] MBS       [] Speech/Cog Eval  [] Therapeutic PO Trials     [] Ice Chips   [] Other:  [x] FEES                                                 Dysphagia Therapeutic Intervention:   []  Bolus control Exercises  []  Oral Motor Exercises  []  [employer] Protocol  []  Thermal Stimulation  []  Oral Care    []  Vital Stim/NMES  []  Laryngeal Exercises  [x]  Patient/Family Education  []  Pharyngeal Exercises  []  Therapeutic PO trials with SLP  [x]  Diet tolerance monitoring  []  Other:     Referrals:  [] ENT    [] PT  [] Pulmonology [x] GI  [] Neurology  [] RD  [] OT   []     Goals:  Short Term Goals:  Timeframe for Short Term Goals: (5 days 02/15/2023)  Goal 1: The patient will tolerate recommended diet with no clinical s/s of aspiration 5/5  Goal 2: The patient will recall/perform recommended compensatory strategies given min cues    Long Term Goals:   Timeframe for Long Term Goals: (7 days 02/17/2023)  Goal 1: The patient will tolerate least restrictive diet with no clinical s/s of aspiration or worsening respiratory/pulmonary status    Treatment:  Skilled instruction completed with patient re: evidenced based practice regarding recommendations and POC, importance of oral care to reduce adverse affects in the event of aspiration, and instruction of recommended compensatory strategies developed based upon clinical exam. Pt able to recall/demonstrate compensatory strategies with min cues. Pt Education: SLP educated the patient re: Role of SLP, rationale for completion of assessment, anatomical components of swallow structures as they pertain to airway protection, results of assessment, recommendations, and POC  Pt Education Response: verbalized understanding, would benefit from ongoing education, RN aware, and caregiver aware    Duration/Frequency of Tx: 1-2x/wk     Individuals Consulted:   [x]  Patient     []  NP         [x]  RN   []  RD                   [x]  MD      [x]  Family Member                        []  PA    []  Other:      Safety Devices / Report:  [x]  All fall risk precautions in place [x]  Safety handoff completed with RN  [x]  Bed alarm in place  [x]  Left in bed     []  Chair alarm in place  []  Left in chair   [x]  Call light in reach   []  Other:                        Total Treatment Time / Charges       Time in Time out Total Time / units   Swallow Eval/Tx Time 0800  0839 39 min/ 2 units      Signature:  Arjun BONNER-SLP  Clinical Fellow  [id number]

## 2023-02-11 ENCOUNTER — APPOINTMENT (OUTPATIENT)
Dept: GENERAL RADIOLOGY | Age: 73
DRG: 064 | End: 2023-02-11
Payer: MEDICARE

## 2023-02-11 LAB
ANION GAP SERPL CALCULATED.3IONS-SCNC: 13 MMOL/L (ref 3–16)
BASOPHILS ABSOLUTE: 0.1 K/UL (ref 0–0.2)
BASOPHILS RELATIVE PERCENT: 0.5 %
BUN BLDV-MCNC: 21 MG/DL (ref 7–20)
CALCIUM SERPL-MCNC: 8.5 MG/DL (ref 8.3–10.6)
CHLORIDE BLD-SCNC: 100 MMOL/L (ref 99–110)
CO2: 22 MMOL/L (ref 21–32)
CREAT SERPL-MCNC: 1.6 MG/DL (ref 0.8–1.3)
EOSINOPHILS ABSOLUTE: 0 K/UL (ref 0–0.6)
EOSINOPHILS RELATIVE PERCENT: 0.3 %
GFR SERPL CREATININE-BSD FRML MDRD: 45 ML/MIN/{1.73_M2}
GLUCOSE BLD-MCNC: 108 MG/DL (ref 70–99)
GLUCOSE BLD-MCNC: 109 MG/DL (ref 70–99)
GLUCOSE BLD-MCNC: 109 MG/DL (ref 70–99)
GLUCOSE BLD-MCNC: 112 MG/DL (ref 70–99)
GLUCOSE BLD-MCNC: 118 MG/DL (ref 70–99)
GLUCOSE BLD-MCNC: 120 MG/DL (ref 70–99)
HCT VFR BLD CALC: 38.4 % (ref 40.5–52.5)
HEMOGLOBIN: 12.5 G/DL (ref 13.5–17.5)
LYMPHOCYTES ABSOLUTE: 0.2 K/UL (ref 1–5.1)
LYMPHOCYTES RELATIVE PERCENT: 1.3 %
MCH RBC QN AUTO: 27.5 PG (ref 26–34)
MCHC RBC AUTO-ENTMCNC: 32.5 G/DL (ref 31–36)
MCV RBC AUTO: 84.5 FL (ref 80–100)
MONOCYTES ABSOLUTE: 0.8 K/UL (ref 0–1.3)
MONOCYTES RELATIVE PERCENT: 6.5 %
NEUTROPHILS ABSOLUTE: 11.9 K/UL (ref 1.7–7.7)
NEUTROPHILS RELATIVE PERCENT: 91.4 %
PDW BLD-RTO: 15.9 % (ref 12.4–15.4)
PERFORMED ON: ABNORMAL
PLATELET # BLD: 169 K/UL (ref 135–450)
PMV BLD AUTO: 8.6 FL (ref 5–10.5)
POTASSIUM REFLEX MAGNESIUM: 3.8 MMOL/L (ref 3.5–5.1)
RBC # BLD: 4.55 M/UL (ref 4.2–5.9)
SARS-COV-2, NAAT: NOT DETECTED
SODIUM BLD-SCNC: 135 MMOL/L (ref 136–145)
WBC # BLD: 13 K/UL (ref 4–11)

## 2023-02-11 PROCEDURE — 92526 ORAL FUNCTION THERAPY: CPT

## 2023-02-11 PROCEDURE — 80048 BASIC METABOLIC PNL TOTAL CA: CPT

## 2023-02-11 PROCEDURE — 1200000000 HC SEMI PRIVATE

## 2023-02-11 PROCEDURE — 87635 SARS-COV-2 COVID-19 AMP PRB: CPT

## 2023-02-11 PROCEDURE — 87086 URINE CULTURE/COLONY COUNT: CPT

## 2023-02-11 PROCEDURE — 6370000000 HC RX 637 (ALT 250 FOR IP): Performed by: HOSPITALIST

## 2023-02-11 PROCEDURE — 99233 SBSQ HOSP IP/OBS HIGH 50: CPT | Performed by: PSYCHIATRY & NEUROLOGY

## 2023-02-11 PROCEDURE — 71046 X-RAY EXAM CHEST 2 VIEWS: CPT

## 2023-02-11 PROCEDURE — 87040 BLOOD CULTURE FOR BACTERIA: CPT

## 2023-02-11 PROCEDURE — 94761 N-INVAS EAR/PLS OXIMETRY MLT: CPT

## 2023-02-11 PROCEDURE — 87186 SC STD MICRODIL/AGAR DIL: CPT

## 2023-02-11 PROCEDURE — 36415 COLL VENOUS BLD VENIPUNCTURE: CPT

## 2023-02-11 PROCEDURE — 2580000003 HC RX 258: Performed by: INTERNAL MEDICINE

## 2023-02-11 PROCEDURE — 51798 US URINE CAPACITY MEASURE: CPT

## 2023-02-11 PROCEDURE — 87077 CULTURE AEROBIC IDENTIFY: CPT

## 2023-02-11 PROCEDURE — 85025 COMPLETE CBC W/AUTO DIFF WBC: CPT

## 2023-02-11 PROCEDURE — 6360000002 HC RX W HCPCS: Performed by: INTERNAL MEDICINE

## 2023-02-11 PROCEDURE — 2700000000 HC OXYGEN THERAPY PER DAY

## 2023-02-11 PROCEDURE — 6370000000 HC RX 637 (ALT 250 FOR IP): Performed by: INTERNAL MEDICINE

## 2023-02-11 RX ORDER — SODIUM CHLORIDE 9 MG/ML
INJECTION, SOLUTION INTRAVENOUS CONTINUOUS
Status: DISCONTINUED | OUTPATIENT
Start: 2023-02-11 | End: 2023-02-13

## 2023-02-11 RX ORDER — DIPHENOXYLATE HYDROCHLORIDE AND ATROPINE SULFATE 2.5; .025 MG/1; MG/1
1 TABLET ORAL 4 TIMES DAILY PRN
Status: DISCONTINUED | OUTPATIENT
Start: 2023-02-11 | End: 2023-02-14 | Stop reason: HOSPADM

## 2023-02-11 RX ADMIN — SODIUM CHLORIDE: 9 INJECTION, SOLUTION INTRAVENOUS at 11:57

## 2023-02-11 RX ADMIN — ASPIRIN 81 MG: 81 TABLET, COATED ORAL at 15:42

## 2023-02-11 RX ADMIN — SODIUM CHLORIDE: 9 INJECTION, SOLUTION INTRAVENOUS at 20:17

## 2023-02-11 RX ADMIN — ENOXAPARIN SODIUM 40 MG: 100 INJECTION SUBCUTANEOUS at 14:35

## 2023-02-11 RX ADMIN — PIPERACILLIN AND TAZOBACTAM 3375 MG: 3; .375 INJECTION, POWDER, LYOPHILIZED, FOR SOLUTION INTRAVENOUS at 18:15

## 2023-02-11 RX ADMIN — PIPERACILLIN AND TAZOBACTAM 3375 MG: 3; .375 INJECTION, POWDER, LYOPHILIZED, FOR SOLUTION INTRAVENOUS at 12:05

## 2023-02-11 RX ADMIN — DIPHENOXYLATE HYDROCHLORIDE AND ATROPINE SULFATE 1 TABLET: 2.5; .025 TABLET ORAL at 18:32

## 2023-02-11 RX ADMIN — ATORVASTATIN CALCIUM 80 MG: 80 TABLET, FILM COATED ORAL at 20:17

## 2023-02-11 RX ADMIN — ENOXAPARIN SODIUM 40 MG: 100 INJECTION SUBCUTANEOUS at 20:17

## 2023-02-11 RX ADMIN — DEXTROSE AND SODIUM CHLORIDE: 5; 450 INJECTION, SOLUTION INTRAVENOUS at 01:13

## 2023-02-11 NOTE — CONSULTS
Gastroenterology Consult Note    Patient:   Bernardo Jackson Sr.   :    1950   Facility:   [facility]   Referring/PCP: Natalia Virk MD  Date:     2023  Consultant:   Julissa Millard MD, MD      Chief Complaint   Patient presents with    Aphasia        History of Present illness     60-year-old male with history of diabetes, hypertension, hyperlipidemia, congestive heart failure, DVT status post IVC filter presented to the emergency room with left-sided facial droop and difficulty speaking. He underwent further workup with CT scan which showed Acute right ischemic MCA stroke. He is a poor historian due to underlying aphasia. Speech therapy has been consulted and swallow eval suggested possible regurgitation. No reports of GI bleed. He has previous history of duodenal ulcer and choledocholithiasis status post ERCP in . He also had obscure GI bleed status post EGD, colonoscopy and capsule endoscopy in 2019. He underwent Vincenzo filter placement.     Past Medical History:   Diagnosis Date    Acute respiratory failure with hypoxia and hypercapnia (HCC) 2018    Anemia due to blood loss, acute 10/12/2020    Bacteremia due to Escherichia coli 10/12/2020    Calculus of gallbladder with acute cholecystitis without obstruction 2018    CHF (congestive heart failure) ([address] 2019    Choledecholithiasis 2018    DVT (deep venous thrombosis) ([address] 2018    GI bleed from duodenal ulcer 2018    Malignant neoplasm of kidney excluding renal pelvis ([address] 2014    Retroperitoneal bleed 10/09/2020    UTI 10/12/2020     Past Surgical History:   Procedure Laterality Date    CARDIAC CATHETERIZATION  2019    COLONOSCOPY N/A 2019    COLONOSCOPY WITH BIOPSY performed by Marisol Saul MD at [address]  10/19/2020    CT BONE MARROW BIOPSY 10/19/2020 [facility] CT SCAN    IVC FILTER INSERTION 10/13/2020    KIDNEY SURGERY  2014    removal of tumor (not total nephrectomy)    [place] EGD TRANSORAL BIOPSY SINGLE/MULTIPLE  2018    Shatzki's ring and gastritis    SIGMOIDOSCOPY      UPPER GASTROINTESTINAL ENDOSCOPY  2018    bleeding duodenal ulcer    UPPER GASTROINTESTINAL ENDOSCOPY N/A 2019    EGD WITH ANESTHESIA performed by Joseph Rodriguez MD at [address] [place] N/A 2019    ESOPHAGEAL CAPSULE ENDOSCOPY performed by Joseph Rodriguez MD at [address]:   Social History     Tobacco Use    Smoking status: Never    Smokeless tobacco: Never   Substance Use Topics    Alcohol use: No     Family:   Family History   Problem Relation Age of Onset    Arthritis Mother     Atrial Fibrillation Mother     Breast Cancer Mother     High Blood Pressure Mother     Anemia Father     Arthritis Father     Cancer Father 80        Ear CA/ tumor removed     Hearing Loss Father     Arthritis Sister     Diabetes Maternal Aunt     Arthritis Maternal Aunt     Early Death Maternal Uncle          in early 42's     Depression Maternal Uncle     Other Maternal Uncle         Suicide    Heart Disease Paternal Aunt     Arthritis Maternal Grandmother      No current facility-administered medications on file prior to encounter.      Current Outpatient Medications on File Prior to Encounter   Medication Sig Dispense Refill    tamsulosin (FLOMAX) 0.4 MG capsule Take 0.4 mg by mouth at bedtime      amLODIPine (NORVASC) 5 MG tablet Take 5 mg by mouth daily      magnesium oxide (MAG-OX) 400 MG tablet Take 400 mg by mouth daily      ketoconazole (NIZORAL) 2 % cream APPLY TO RASH IN SKIN FOLDS TOPICALLY TWICE A DAY AS NEEDED 60 g 2    lisinopril (PRINIVIL;ZESTRIL) 20 MG tablet Take 40 mg by mouth daily      potassium chloride (MICRO-K) 10 MEQ extended release capsule Take 20 mEq by mouth 2 times daily      pantoprazole (PROTONIX) 40 MG tablet Take 40 mg by mouth daily vitamin D (CHOLECALCIFEROL) 1000 UNIT TABS tablet Take 3,000 Units by mouth daily      folic acid (FOLVITE) 1 MG tablet Take 1 tablet by mouth daily 30 tablet 3      Infusions:    dextrose      dextrose 5 % and 0.45 % NaCl 75 mL/hr at 02/11/23 0113     PRN Medications: glucose, dextrose bolus **OR** dextrose bolus, glucagon (rDNA), dextrose, ondansetron **OR** ondansetron, polyethylene glycol, perflutren lipid microspheres  Allergies: No Known Allergies    ROS: Unobtainable    Physical Exam   /64   Pulse 95 Comment: -115 unsustained  Temp 98.4 °F (36.9 °C)   Resp 24   Ht 5' 4\" (1.626 m)   Wt (!) 341 lb 8 oz (154.9 kg)   SpO2 93%   BMI 58.62 kg/m²      General appearance: alert, appears stated age, aphasic  HEENT: Neck supple with midline trachea  Neck: supple  Heart: regular rate and rhythm  Abdomen: soft, non-tender; bowel sounds normal  Extremities: edema trace     Lab and Imaging Review   Labs:  CBC:   Recent Labs     02/09/23  0915 02/10/23  0614   WBC 4.8 6.1   HGB 13.0* 12.6*   HCT 41.3 38.4*   MCV 86.0 85.4    159     BMP:   Recent Labs     02/09/23 0915 02/10/23  0614    138   K 4.9 4.8    104   CO2 25 25   BUN 27* 24*   CREATININE 1.7* 1.6*     LIVER PROFILE:   Recent Labs     02/09/23 0915   AST 10*   ALT 6*   PROT 7.4   BILITOT 1.4*   ALKPHOS 84     PT/INR:   Recent Labs     02/09/23 0915   INR 1.09     Triglycerides:   Recent Labs     02/10/23  0614   TRIG 53     Iron panel:  Lab Results   Component Value Date/Time    FERRITIN 631.5 10/12/2020 03:32 PM    IRON 50 10/12/2020 03:32 PM    LABIRON 36 10/12/2020 03:32 PM    [id number] 371 09/24/2018 05:25 AM    FOLATE 8.95 09/24/2018 05:25 AM       Assessment:     19-year-old male with history of diabetes, hypertension, hyperlipidemia, congestive heart failure, DVT status post IVC filter Admitted with CVA. His dysphagia and regurgitation are most likely secondary to CVA.     Plan:   Continue supportive care  Continue PPI daily  Aggressive speech therapy  Upright position during meals and 4 hours after  PT/OT  Consider barium esophagram if symptoms do not improve    Keny Larson MD, MD  9:53 AM 2/11/2023

## 2023-02-11 NOTE — PROGRESS NOTES
Speech Language Pathology  Dysphagia Treatment/Follow-Up Note  Facility/Department: [facility] B3 - MED SURG    Recommendations:  Solid Consistency: IDDSI 7 Regular Solids  Liquid Consistency: IDDSI 0 Thin Liquids  Medication: Meds PO as tolerated    Risk Management: upright for all intake, stay upright for at least 30 mins after intake, small bites/sips, assist feed, oral care q4 hrs to reduce adverse affects in the event of aspiration, increase physical mobility as able, alternate bites/sips, slow rate of intake, general GERD precautions, general aspiration precautions, and hold PO and contact SLP if s/s of aspiration or worsening respiratory status develop. Delta Salinas Sr.  : 1950 (67 y.o.)   MRN: [id number]  ROOM: [address] [place] [address]  ADMISSION DATE: 2023  PATIENT DIAGNOSIS(ES): [facility] cerebro[facility]) [facility] [I63.9]  No Known Allergies    DATE ONSET: 2023    Pain: The patient does not complain of pain. Current Diet: Diet NPO    Diet Tolerance:  Patient currently NPO. Dysphagia Treatment and Impressions:  Subjective: Pt seen in room at bedside with RN permission. Behavior / Cognition: Pleasant and cooperative   RN Report/Chart Review: BSE completed yesterday 2/10/23 with recs for regular solids/thin liquids and GI referral. GI saw pt this AM and recommended continue speech therapy, upright positioning during and after meals, start PPI and barium esophagram if symptoms persist. Per RN, pt downgraded to NPO due to concerns for aspiration while taking pills. Pt reported \"pills were sticking in his teeth\" and wife reported coughing with liquids after taking pills. Pt was febrile and required supplemental O2. Pt tested for COVID, results pending. Patient tolerance: Currently NPO    Baseline Respiratory Status Measures: Pt with SPO2% of 97 on 2 LPM NC with RR of 20.     Liquid PO Trials:    IDDSI 0 Thin:  Assessed via 1/2 tsp (2.5cc), cup, and straw: no anterior bolus loss, suspect functional A-P bolus transit, swallow timing subjectively appears timely, audible regurgitation, belching after trials    Solid PO Trials  IDDSI 4 Puree:  no anterior bolus loss, suspect functional A-P bolus transit, swallow timing subjectively appears timely, audible regurgitation, and belching after trials  IDDSI 7 Regular:  no anterior bolus loss, suspect functional A-P bolus transit, swallow timing subjectively appears timely, grossly functional mastication, and belching after trials    Impressions:    No overt s/s of aspiration observed across trialed consistencies. Strongly suspect esophageal dysphagia contributing to pt's symptoms. Recommend regular solids/thin liquids with GERD precautions in place. Recommend further assessment of esophageal phase of swallowing. If concerns for aspiration, d/c PO and contact SLP. Recommend MBSS if indicated. Dysphagia Goals:  Timeframe for Long-term Goals: (7 days 02/17/2023)  Goal 1: The patient will tolerate least restrictive diet with no clinical s/s of aspiration or worsening respiratory/pulmonary status  2/11: ongoing, progressing     Short-term Goals  Timeframe for Short-term Goals: (5 days 02/15/2023)  Goal 1: The patient will tolerate recommended diet with no clinical s/s of aspiration 5/5  2/11: ongoing, progressing  Goal 2: The patient will recall/perform recommended compensatory strategies given min cues  2/11: ongoing, progressing  Goal 3: The patient will tolerate instrumental assessment when able  2/11: ongoing, progressing       Speech/Language/Cog Goals: N/A  Time Frame for Long Term Goals: (7 days 02/17/2023)  Goal 1: Pt will improve overall speech intelligibility for improved communication with all listeners within functional environment.   2/11: not addressed this date        Short Term Goals  Time Frame for Short Term Goals: (5 days 02/15/2023)  Goal 1: Pt will complete OME's for improve oral motor function, strength, and coordination 10/10 given min cues. 2/11: not addressed this date  Goal 2: Pt will repeat phrases / sentences using speech strategies with 50% acc given mod cues  2/11: not addressed this date  Time Frame for Short Term Goals: (5 days 02/15/2023)      Recommendations:  Solid Consistency: IDDSI 7 Regular Solids  Liquid Consistency: IDDSI 0 Thin Liquids  Medication: Meds PO as tolerated    Risk Management: upright for all intake, stay upright for at least 30 mins after intake, small bites/sips, assist feed, oral care q4 hrs to reduce adverse affects in the event of aspiration, increase physical mobility as able, alternate bites/sips, slow rate of intake, general GERD precautions, general aspiration precautions, and hold PO and contact SLP if s/s of aspiration or worsening respiratory status develop. Education: SLP edu pt re: Role of SLP, Rationale for dysphagia tx, and Recommended compensatory strategies, GERD precautions. Pt verbalized understanding and RN aware of recommendations           Plan:    Continued Dysphagia treatment with goals per plan of care. Discharge Recommendations: Recommend SLP tx at discharge    If pt discharges from hospital prior to Speech/Swallowing discharge, this note serves as tx and discharge summary.      Total Treatment Time / Charges     Time in Time out Total Time / units   Cognitive Tx         Speech Tx      Dysphagia Tx [id number] 33 min / 1 unit     Signature:  Ervin Neely, [address] .[id number]  Speech-Language Pathologist

## 2023-02-11 NOTE — CONSULTS
Consult placed    [facility], [address]   Date:2/11/2023,  Time:8:39 AM        Electronically signed by Autumn Betancourt on 2/11/2023 at 8:39 AM

## 2023-02-11 NOTE — PROGRESS NOTES
Physical Therapy/Occupational Therapy  Chart reviewed, collaborated with RN to hold pt at this time due to increased O2 demands due to SOB, consulting with MD, will follow up as pt condition and schedule allow  No charge  [facility] [employer] Mena, [address], OT

## 2023-02-11 NOTE — PROGRESS NOTES
Hospitalist Progress Note      PCP: Norbert Roche MD    Date of Admission: 2/9/2023    Chief Complaint:  Dysarthria and left-sided facial droop    Hospital Course: h and p reviewed     Subjective: Fever, shortness of breath, hypoxia    Medications:  Reviewed    Infusion Medications    sodium chloride 125 mL/hr at 02/11/23 1157    dextrose       Scheduled Medications    piperacillin-tazobactam  3,375 mg IntraVENous Q8H    enoxaparin  40 mg SubCUTAneous BID    aspirin  81 mg Oral Daily    Or    aspirin  300 mg Rectal Daily    atorvastatin  80 mg Oral Nightly     PRN Meds: glucose, dextrose bolus **OR** dextrose bolus, glucagon (rDNA), dextrose, ondansetron **OR** ondansetron, polyethylene glycol, perflutren lipid microspheres      Intake/Output Summary (Last 24 hours) at 2/11/2023 1350  Last data filed at 2/11/2023 0539  Gross per 24 hour   Intake 130 ml   Output 1050 ml   Net -920 ml       Physical Exam Performed:    /61   Pulse 100   Temp 97.2 °F (36.2 °C) (Oral)   Resp 20   Ht 5' 4\" (1.626 m)   Wt (!) 341 lb 8 oz (154.9 kg)   SpO2 95%   BMI 58.62 kg/m²     General appearance: On oxygen, flushed, appears stated age and cooperative. Morbidly obese   HEENT:  Normal cephalic, atraumatic without obvious deformity. .  Extra ocular muscles intact. Conjunctivae/corneas clear. Neck: Supple, with full range of motion. No jugular venous distention. Trachea midline. Respiratory:  Normal respiratory effort. Reduced entry, bilaterally without Rales/Wheezes/Rhonchi. On oxygen no wheezing  Cardiovascular:  irRegular rate and rhythm with normal S1/S2 without murmurs, rubs or gallops. Abdomen: Soft, non-tender, non-distended with normal bowel sounds. Morbidly obese hernia no signs of obstruction has chronic Mills, dark urine  Musculoskeletal:  No clubbing, cyanosis , chronic edema bilaterally.   Right knee movement limited secondary to chronic right knee pain   skin: Skin color, texture, turgor normal.  .  Neurologic:   left-sided facial droop and dysarthria. Psychiatric:  Alert and oriented,   Capillary Refill: Brisk,3 seconds, normal  Peripheral Pulses: +2 palpable, equal bilaterally        Labs:   Recent Labs     02/09/23  0915 02/10/23  0614 02/11/23  1019   WBC 4.8 6.1 13.0*   HGB 13.0* 12.6* 12.5*   HCT 41.3 38.4* 38.4*    159 169     Recent Labs     02/09/23  0915 02/10/23  0614 02/11/23  1019    138 135*   K 4.9 4.8 3.8    104 100   CO2 25 25 22   BUN 27* 24* 21*   CREATININE 1.7* 1.6* 1.6*   CALCIUM 9.2 9.0 8.5     Recent Labs     02/09/23  0915   AST 10*   ALT 6*   BILITOT 1.4*   ALKPHOS 84     Recent Labs     02/09/23 0915   INR 1.09     Recent Labs     02/09/23  0915 02/09/23  1621 02/09/23 2038   TROPONINI 0.04* 0.04* 0.04*       Urinalysis:      Lab Results   Component Value Date/Time    NITRU Negative 02/09/2023 08:35 AM    WBCUA 10-20 02/09/2023 08:35 AM    BACTERIA 2+ 02/09/2023 08:35 AM    RBCUA 11-20 02/09/2023 08:35 AM    BLOODU MODERATE 02/09/2023 08:35 AM    SPECGRAV 1.010 02/09/2023 08:35 AM    GLUCOSEU Negative 02/09/2023 08:35 AM       Radiology:  XR CHEST (2 VW)   Preliminary Result   Suggestion of left lower lobe infiltrate and small left effusion. Cardiomegaly and congestion but no pulmonary edema. The right lung is clear. XR ABDOMEN (KUB) (SINGLE AP VIEW)   Final Result   Appropriate positioning of enteric tube. MRI BRAIN WO CONTRAST   Final Result   Acute right MCA infarct within the right frontal lobe involving the right   precentral gyrus extending to the superior margin of the right insula. There   is no corresponding FLAIR abnormality. No acute intracranial hemorrhage identified. The above findings were discussed with Dr. Neno Barreto at 12:15 p.m. on   02/09/2023. XR CHEST PORTABLE   Final Result   Mild cardiomegaly with mild interstitial pulmonary edema         CTA HEAD NECK W CONTRAST   Final Result   1. Atherosclerosis contributes to moderate stenosis at the origin of the left   vertebral artery. 2. Otherwise, no significant stenosis seen of the cervical carotid/vertebral   arteries. 3. No significant stenosis or large vessel occlusion of the [id number]-gg-Lxigxv. 4. Small bilateral pleural effusions. 5. Nonspecific borderline prominent mediastinal lymph nodes. CT HEAD WO CONTRAST   Final Result   Motion limited. No hemorrhage or mass identified. Mild periventricular small vessel ischemic change, similar to prior      Mild paranasal sinus disease      Results discussed with [address] [place] [address] by Lucero Byrnes. Maryam Leija MD at 9:30 am on   2/9/2023             IP CONSULT TO PHARMACY  PHARMACY TO CHANGE BASE FLUIDS  IP CONSULT TO HOSPITALIST  IP CONSULT TO NEUROLOGY  IP CONSULT TO SPIRITUAL SERVICES  IP CONSULT TO GI    Assessment/Plan:    Active Hospital Problems    Diagnosis     Acute cerebrovascular accident (CVA) ([facility] [I63.9]      Priority: Medium     Right frontal lobe CVA with left-sided facial droop and dysarthria-  neurochecks, aspirin, statin, neurology evaluation appreciated, echocardiogram pending PT OT speech   Speech recommended regular diet   NG tube was discontinued yesterday     Fever leukocytosis-left lower lobe pneumonia possible aspiration-started on Zosyn, breathing treatment as needed, out of bed to chair   Blood culture urine culture pending    Acute hypoxic respiratory failure-secondary to above currently on 2 L oxygen     chronic kidney disease-creatinine normally runs 1.3 currently 1.7-gentle hydration   Urine dark urine IV fluids increased to 125 cc/h normal saline       New onset of atrial fibrillation-controlled ventricular rate-echo is pending, TSH,- WNL given acute CVA , history of perinephric hemorrhage in the past ,not a candidate for anticoagulation, telemetry monitoring  Echocardiogram pending, troponin slight     History of perinephric hemorrhage in 2020-/?  GIBcurrently no bleeding- hb stable      Nonischemic cardiomyopathy ejection fraction 50%-repeat echocardiogram pending     Chronic Mills-history of retention     History of DVT has IVC filter placed in 2020 following retroperitoneal hemorrhage     Morbid obesity with a abdominal hernia no evidence of obstruction     Abnormal UA-collected on 9-urine culture negative    DVT Prophylaxis: Lovenox   Diet: N.p.o. evaluated by speech  Code Status: Full Code  PT/OT Eval Status: on going     Dispo -pending progress    Appropriate for A1 Discharge Unit: Linda Flanagan MD

## 2023-02-11 NOTE — PROGRESS NOTES
Pt hot, red in face, requesting repositioning. Pt stated he was hot. Room was very humid and hot. Check thermostat and it was set on 85 degrees. Lowered that, changed pts sheets, repositioned him. Pt was sob and red in cheeks. Rr 32. O2 sats were 81-85. Placed oxygen nc at 2 liters. Pt slowly increased to 94 percent and was a bit more relaxed. Pts cheeks now pink. Rr was 32 now it is 24. Pt able to rest. 93 percent on 2 l nc.

## 2023-02-11 NOTE — PROGRESS NOTES
[address]  Neurology Follow-up  [facility] Neurology    Date of Service: 2/11/2023    Subjective:   CC: Follow up today regarding: Acute ischemic stroke    Events noted. Chart and lab reviewed. Slightly better today. Less dysarthric and dysphagia. No focal weakness. The same over the visit today. Denies any neck or back pain. No chest pain. Other review of system was unremarkable. ROS : A 10-12 system review obtained and updated today and is unremarkable except as mentioned  in my interval history. Past medical history, social history, medication and family history reviewed. Objective:  Exam:   Constitutional:   Vitals:    02/11/23 0022 02/11/23 0525 02/11/23 0830 02/11/23 1130   BP: (!) 142/74 (!) 140/77 118/64 107/61   Pulse: 94 99 95 100   Resp: 18 18 24 20   Temp: 97.8 °F (36.6 °C) 98.6 °F (37 °C) 98.4 °F (36.9 °C) 97.2 °F (36.2 °C)   TempSrc: Oral Oral  Oral   SpO2: 90% 90% 93% 95%   Weight:       Height:         General appearance:  Normal development and appear in no acute distress. Mental Status:   Oriented to person, place, problem, and time. Memory: Good immediate recall. Intact remote memory  Normal attention span and concentration. Language: Nonfluent aphasia but better compared to yesterday   good fund of Knowledge. Cranial Nerves:   II:   Pupils: equal, round, reactive to light  III,IV,VI: Extra Ocular Movements are intact.  No nystagmus  V: Facial sensation is intact  VII: Facial strength and movements: intact and symmetric  XII: Tongue movements are normal  Musculoskeletal: No focal weakness  Diffuse edema in his legs to the ER  Diminished DTRs in the legs and 2+ in the arms  Normal tone  Plantars equivocal  Gait cannot be tested due to poor cooperation      Data:  LABS:   Lab Results   Component Value Date/Time     02/11/2023 10:19 AM    K 3.8 02/11/2023 10:19 AM     02/11/2023 10:19 AM    CO2 22 02/11/2023 10:19 AM    BUN 21 02/11/2023 10:19 AM CREATININE 1.6 02/11/2023 10:19 AM    GFRAA >60 09/30/2021 06:45 PM    LABGLOM 45 02/11/2023 10:19 AM    GLUCOSE 109 02/11/2023 10:19 AM    PHOS 2.5 10/22/2020 06:00 AM    MG 1.50 12/15/2020 02:30 PM    CALCIUM 8.5 02/11/2023 10:19 AM     Lab Results   Component Value Date/Time    WBC 13.0 02/11/2023 10:19 AM    RBC 4.55 02/11/2023 10:19 AM    HGB 12.5 02/11/2023 10:19 AM    HCT 38.4 02/11/2023 10:19 AM    MCV 84.5 02/11/2023 10:19 AM    RDW 15.9 02/11/2023 10:19 AM     02/11/2023 10:19 AM     Lab Results   Component Value Date    INR 1.09 02/09/2023    PROTIME 14.0 02/09/2023       Neuroimaging was independently reviewed by me and discussed results with the patient  I reviewed blood testing and other test results and discussed results with the patient      Impression:  Acute right ischemic MCA stroke. Thromboembolic versus cardioembolic  New onset A-fib with RVR  History of GI bleed  Hypertension  Hyperlipidemia  History of DVT, status post IVC filter      Recommendation  Continue current supportive care  Aspirin  Statin  PT, OT and speech  Stroke education and prevention discussed today  I think risk of anticoagulation outweighs the benefit given history of significant and recurrent GI bleed on Coumadin in the past.  Hydration  Follow kidney function testing  Blood pressure monitoring continue current medication  Can be discharged once medically stable  We will follow from Cb Montalvo MD   [phone]      This dictation was generated by voice recognition computer software. Although all attempts are made to edit the dictation for accuracy, there may be errors in the transcription that are not intended.

## 2023-02-12 LAB
ANION GAP SERPL CALCULATED.3IONS-SCNC: 8 MMOL/L (ref 3–16)
BASOPHILS ABSOLUTE: 0 K/UL (ref 0–0.2)
BASOPHILS RELATIVE PERCENT: 0.7 %
BUN BLDV-MCNC: 22 MG/DL (ref 7–20)
CALCIUM SERPL-MCNC: 8.2 MG/DL (ref 8.3–10.6)
CHLORIDE BLD-SCNC: 107 MMOL/L (ref 99–110)
CO2: 26 MMOL/L (ref 21–32)
CREAT SERPL-MCNC: 1.7 MG/DL (ref 0.8–1.3)
EOSINOPHILS ABSOLUTE: 0.3 K/UL (ref 0–0.6)
EOSINOPHILS RELATIVE PERCENT: 4.5 %
GFR SERPL CREATININE-BSD FRML MDRD: 42 ML/MIN/{1.73_M2}
GLUCOSE BLD-MCNC: 94 MG/DL (ref 70–99)
HCT VFR BLD CALC: 35.2 % (ref 40.5–52.5)
HEMOGLOBIN: 11.5 G/DL (ref 13.5–17.5)
LYMPHOCYTES ABSOLUTE: 0.3 K/UL (ref 1–5.1)
LYMPHOCYTES RELATIVE PERCENT: 4.9 %
MCH RBC QN AUTO: 28.2 PG (ref 26–34)
MCHC RBC AUTO-ENTMCNC: 32.6 G/DL (ref 31–36)
MCV RBC AUTO: 86.4 FL (ref 80–100)
MONOCYTES ABSOLUTE: 0.7 K/UL (ref 0–1.3)
MONOCYTES RELATIVE PERCENT: 10.1 %
NEUTROPHILS ABSOLUTE: 5.4 K/UL (ref 1.7–7.7)
NEUTROPHILS RELATIVE PERCENT: 79.8 %
PDW BLD-RTO: 16.1 % (ref 12.4–15.4)
PLATELET # BLD: 145 K/UL (ref 135–450)
PMV BLD AUTO: 8.4 FL (ref 5–10.5)
POTASSIUM REFLEX MAGNESIUM: 4.2 MMOL/L (ref 3.5–5.1)
RBC # BLD: 4.07 M/UL (ref 4.2–5.9)
SODIUM BLD-SCNC: 141 MMOL/L (ref 136–145)
WBC # BLD: 6.8 K/UL (ref 4–11)

## 2023-02-12 PROCEDURE — 36415 COLL VENOUS BLD VENIPUNCTURE: CPT

## 2023-02-12 PROCEDURE — 6370000000 HC RX 637 (ALT 250 FOR IP): Performed by: INTERNAL MEDICINE

## 2023-02-12 PROCEDURE — 2580000003 HC RX 258: Performed by: INTERNAL MEDICINE

## 2023-02-12 PROCEDURE — 6360000002 HC RX W HCPCS: Performed by: INTERNAL MEDICINE

## 2023-02-12 PROCEDURE — 94761 N-INVAS EAR/PLS OXIMETRY MLT: CPT

## 2023-02-12 PROCEDURE — 80048 BASIC METABOLIC PNL TOTAL CA: CPT

## 2023-02-12 PROCEDURE — 85025 COMPLETE CBC W/AUTO DIFF WBC: CPT

## 2023-02-12 PROCEDURE — 2700000000 HC OXYGEN THERAPY PER DAY

## 2023-02-12 PROCEDURE — 1200000000 HC SEMI PRIVATE

## 2023-02-12 RX ORDER — PANTOPRAZOLE SODIUM 40 MG/1
40 TABLET, DELAYED RELEASE ORAL
Status: DISCONTINUED | OUTPATIENT
Start: 2023-02-13 | End: 2023-02-14 | Stop reason: HOSPADM

## 2023-02-12 RX ADMIN — ENOXAPARIN SODIUM 40 MG: 100 INJECTION SUBCUTANEOUS at 08:04

## 2023-02-12 RX ADMIN — ATORVASTATIN CALCIUM 80 MG: 80 TABLET, FILM COATED ORAL at 20:44

## 2023-02-12 RX ADMIN — ENOXAPARIN SODIUM 40 MG: 100 INJECTION SUBCUTANEOUS at 20:45

## 2023-02-12 RX ADMIN — SODIUM CHLORIDE: 9 INJECTION, SOLUTION INTRAVENOUS at 21:20

## 2023-02-12 RX ADMIN — PIPERACILLIN AND TAZOBACTAM 3375 MG: 3; .375 INJECTION, POWDER, LYOPHILIZED, FOR SOLUTION INTRAVENOUS at 02:31

## 2023-02-12 RX ADMIN — PIPERACILLIN AND TAZOBACTAM 3375 MG: 3; .375 INJECTION, POWDER, LYOPHILIZED, FOR SOLUTION INTRAVENOUS at 17:29

## 2023-02-12 RX ADMIN — SODIUM CHLORIDE: 9 INJECTION, SOLUTION INTRAVENOUS at 12:44

## 2023-02-12 RX ADMIN — PIPERACILLIN AND TAZOBACTAM 3375 MG: 3; .375 INJECTION, POWDER, LYOPHILIZED, FOR SOLUTION INTRAVENOUS at 10:28

## 2023-02-12 RX ADMIN — ASPIRIN 81 MG: 81 TABLET, COATED ORAL at 08:04

## 2023-02-12 NOTE — PLAN OF CARE
Problem: Discharge Planning  Goal: Discharge to home or other facility with appropriate resources  Outcome: Progressing     Problem: Chronic Conditions and Co-morbidities  Goal: Patient's chronic conditions and co-morbidity symptoms are monitored and maintained or improved  Outcome: Progressing     Problem: Skin/Tissue Integrity  Goal: Absence of new skin breakdown  Description: 1. Monitor for areas of redness and/or skin breakdown  2. Assess vascular access sites hourly  3. Every 4-6 hours minimum:  Change oxygen saturation probe site  4. Every 4-6 hours:  If on nasal continuous positive airway pressure, respiratory therapy assess nares and determine need for appliance change or resting period.   Outcome: Progressing     Problem: Safety - Adult  Goal: Free from fall injury  Outcome: Progressing  Flowsheets (Taken 2/12/2023 0202)  Free From Fall Injury: Instruct family/caregiver on patient safety  Note: Standard safety precautions in place     Problem: Neurosensory - Adult  Goal: Achieves stable or improved neurological status  Outcome: Progressing  Goal: Achieves maximal functionality and self care  Outcome: Progressing     Problem: Respiratory - Adult  Goal: Achieves optimal ventilation and oxygenation  Outcome: Progressing     Problem: Musculoskeletal - Adult  Goal: Return mobility to safest level of function  Outcome: Progressing  Goal: Return ADL status to a safe level of function  Outcome: Progressing     Problem: Gastrointestinal - Adult  Goal: Minimal or absence of nausea and vomiting  Outcome: Progressing     Problem: Genitourinary - Adult  Goal: Absence of urinary retention  Outcome: Progressing  Goal: Urinary catheter remains patent  Outcome: Progressing  Flowsheets (Taken 2/12/2023 0202)  Urinary catheter remains patent: Assess patency of urinary catheter  Note: Draining     Problem: Cardiovascular - Adult  Goal: Absence of cardiac dysrhythmias or at baseline  Outcome: Progressing  Flowsheets (Taken 2/12/2023 0202)  Absence of cardiac dysrhythmias or at baseline: Monitor cardiac rate and rhythm  Note: On cardiac monitoring     Problem: Skin/Tissue Integrity - Adult  Goal: Skin integrity remains intact  Outcome: Progressing     Problem: Infection - Adult  Goal: Absence of infection at discharge  Outcome: Progressing  Goal: Absence of infection during hospitalization  Outcome: Progressing     Problem: Metabolic/Fluid and Electrolytes - Adult  Goal: Electrolytes maintained within normal limits  Outcome: Progressing  Goal: Glucose maintained within prescribed range  Outcome: Progressing  Flowsheets (Taken 2/12/2023 0202)  Glucose maintained within prescribed range: Monitor blood glucose as ordered  Note: Pt no longer NPO

## 2023-02-12 NOTE — PLAN OF CARE
Problem: Discharge Planning  Goal: Discharge to home or other facility with appropriate resources  2/12/2023 1046 by Theron De La Rosa RN  Outcome: Progressing  2/12/2023 0202 by Michelle Gómez RN  Outcome: Progressing     Problem: Chronic Conditions and Co-morbidities  Goal: Patient's chronic conditions and co-morbidity symptoms are monitored and maintained or improved  2/12/2023 1046 by Theron De La Rosa RN  Outcome: Progressing  2/12/2023 0202 by Michelle Gómez RN  Outcome: Progressing     Problem: Skin/Tissue Integrity  Goal: Absence of new skin breakdown  Description: 1. Monitor for areas of redness and/or skin breakdown  2. Assess vascular access sites hourly  3. Every 4-6 hours minimum:  Change oxygen saturation probe site  4. Every 4-6 hours:  If on nasal continuous positive airway pressure, respiratory therapy assess nares and determine need for appliance change or resting period.   2/12/2023 1046 by Theron De La Rosa RN  Outcome: Progressing  2/12/2023 0202 by Michelle Gómez RN  Outcome: Progressing     Problem: Safety - Adult  Goal: Free from fall injury  2/12/2023 1046 by Theron De La Rosa RN  Outcome: Progressing  2/12/2023 0202 by Michelle Gómez RN  Outcome: Progressing  Flowsheets (Taken 2/12/2023 0202)  Free From Fall Injury: Instruct family/caregiver on patient safety  Note: Standard safety precautions in place     Problem: Neurosensory - Adult  Goal: Achieves stable or improved neurological status  2/12/2023 1046 by Theron De La Rosa RN  Outcome: Progressing  2/12/2023 0202 by Michelle Gómez RN  Outcome: Progressing  Goal: Achieves maximal functionality and self care  2/12/2023 1046 by Theron De La Rosa RN  Outcome: Progressing  2/12/2023 0202 by Michelle Gómez RN  Outcome: Progressing     Problem: Respiratory - Adult  Goal: Achieves optimal ventilation and oxygenation  2/12/2023 1046 by Theron De La Rosa RN  Outcome: Progressing  2/12/2023 0202 by Michelle Gómez RN  Outcome: Progressing     Problem: Musculoskeletal - Adult  Goal: Return mobility to safest level of function  2/12/2023 1046 by Herminio Maxwell RN  Outcome: Progressing  2/12/2023 0202 by Rehana Smallwood RN  Outcome: Progressing  Goal: Return ADL status to a safe level of function  2/12/2023 1046 by Herminio Maxwell RN  Outcome: Progressing  2/12/2023 0202 by Rehana Smallwood RN  Outcome: Progressing     Problem: Gastrointestinal - Adult  Goal: Minimal or absence of nausea and vomiting  2/12/2023 1046 by Herminio Maxwell RN  Outcome: Progressing  2/12/2023 0202 by Rehana Smallwood RN  Outcome: Progressing     Problem: Genitourinary - Adult  Goal: Absence of urinary retention  2/12/2023 1046 by Herminio Maxwell RN  Outcome: Progressing  2/12/2023 0202 by Rehana Smallwood RN  Outcome: Progressing  Goal: Urinary catheter remains patent  2/12/2023 1046 by Herminio Maxwell RN  Outcome: Progressing  2/12/2023 0202 by Rehana Smallwood RN  Outcome: Progressing  Flowsheets (Taken 2/12/2023 0202)  Urinary catheter remains patent: Assess patency of urinary catheter  Note: Draining     Problem: Cardiovascular - Adult  Goal: Absence of cardiac dysrhythmias or at baseline  2/12/2023 1046 by Herminio Maxwell RN  Outcome: Progressing  2/12/2023 0202 by Rehana Smallwood RN  Outcome: Progressing  Flowsheets (Taken 2/12/2023 0202)  Absence of cardiac dysrhythmias or at baseline: Monitor cardiac rate and rhythm  Note: On cardiac monitoring     Problem: Skin/Tissue Integrity - Adult  Goal: Skin integrity remains intact  2/12/2023 1046 by Herminio Maxwell RN  Outcome: Progressing  2/12/2023 0202 by Rehana Smallwood RN  Outcome: Progressing     Problem: Infection - Adult  Goal: Absence of infection at discharge  2/12/2023 1046 by Herminio Maxwell RN  Outcome: Progressing  2/12/2023 0202 by Rehana Smallwood RN  Outcome: Progressing  Goal: Absence of infection during hospitalization  2/12/2023 1046 by Herminio Maxwell RN  Outcome: Progressing  2/12/2023 0202 by Rehana Smallwood RN  Outcome: Progressing     Problem: Metabolic/Fluid and Electrolytes - Adult  Goal: Electrolytes maintained within normal limits  2/12/2023 1046 by Ruben Bhagat RN  Outcome: Progressing  2/12/2023 0202 by Minda Nicholas RN  Outcome: Progressing  Goal: Glucose maintained within prescribed range  2/12/2023 1046 by Ruben Bhagat RN  Outcome: Progressing  2/12/2023 0202 by Minda Nicholas RN  Outcome: Progressing  Flowsheets (Taken 2/12/2023 0202)  Glucose maintained within prescribed range: Monitor blood glucose as ordered  Note: Pt no longer NPO

## 2023-02-12 NOTE — PROGRESS NOTES
TODAYS DATE:  2/12/2023    Discussed personal risk factors for Stroke /TIA with patient/family, and ways to reduce the risk for a recurrent stroke. Patient's personal risk factors which were identified are:     [] Alcohol Abuse: check with your physician before any alcohol consumption. [x] Atrial fibrillation: may cause blood clots. [] Drug Abuse: Seek help, talk with your doctor  [] Clotting Disorder  [] Diabetes  [] Family history of stroke or heart disease  [x] High Blood Pressure/Hypertension: work with your physician. [x] High cholesterol: monitor cholesterol levels with your physician. [x] Overweight/Obesity: work with your physician for your ideal body weight. [x] Physical Inactivity: get regular exercise as directed by your physician. [] Personal history of previous TIA or stroke  [x] Poor Diet; decrease salt (sodium) in your diet, follow diet directed by physician. [] Smoking: Cigarette/Cigar: stop smoking. Reviewed the Following Education with Patient and/or Family:   -Signs and Symptoms of Stroke:     (facial droop, weakness/numbness especially on one side, speech difficulty, sudden confusion, sudden loss of vision, sudden severe headache,       sudden loss of balance or having difficulty walking, syncope or seizure)  -How to activate EMS (911)   -Importance of Follow Up Appointment at Discharge   -Importance of Compliance with Medications Prescribed at Discharge     Pt verbalized understanding.      Family Present during Education: yes     Stroke Education Booklet given to patient/family which includes above education: yes     Electronically signed by Leanna Cruz RN on 2/12/2023 at 10:56 AM

## 2023-02-12 NOTE — PROGRESS NOTES
Assessment complete and documented. Pt A&Ox4. Pt denies pain at this time. Safety precautions in place. Bed locked, alarmed, and in lowest position. Call light and bedside table within reach. Will continue to monitor.

## 2023-02-12 NOTE — PROGRESS NOTES
C.Diff sample collected. Pt does not meet criteria according to C. Diff collection form. MD notified. Awaiting response.

## 2023-02-12 NOTE — PROGRESS NOTES
Hospitalist Progress Note      PCP: Mumtaz Krause MD    Date of Admission: 2/9/2023    Chief Complaint:  Dysarthria and left-sided facial droop    Hospital Course: h and p reviewed     Subjective: no Feveror  shortness of breath,  On o2  Looks better      Medications:  Reviewed    Infusion Medications    sodium chloride 125 mL/hr at 02/12/23 0647    dextrose       Scheduled Medications    piperacillin-tazobactam  3,375 mg IntraVENous Q8H    enoxaparin  40 mg SubCUTAneous BID    aspirin  81 mg Oral Daily    Or    aspirin  300 mg Rectal Daily    atorvastatin  80 mg Oral Nightly     PRN Meds: diphenoxylate-atropine, glucose, dextrose bolus **OR** dextrose bolus, glucagon (rDNA), dextrose, ondansetron **OR** ondansetron, polyethylene glycol, perflutren lipid microspheres      Intake/Output Summary (Last 24 hours) at 2/12/2023 1158  Last data filed at 2/12/2023 0900  Gross per 24 hour   Intake 4615.27 ml   Output 1100 ml   Net 3515.27 ml       Physical Exam Performed:    BP (!) 107/54   Pulse 65   Temp 97.9 °F (36.6 °C) (Oral)   Resp 18   Ht 5' 4\" (1.626 m)   Wt (!) 341 lb 8 oz (154.9 kg)   SpO2 95%   BMI 58.62 kg/m²     General appearance: On oxygen,  appears stated age and cooperative. Morbidly obese   HEENT:  Normal cephalic, atraumatic without obvious deformity. .  Extra ocular muscles intact. Conjunctivae/corneas clear. Neck: Supple, with full range of motion. No jugular venous distention. Trachea midline. Respiratory:  Normal respiratory effort. Reduced entry, bilaterally without Rales/Wheezes/Rhonchi. On oxygen no wheezing  Cardiovascular:  irRegular rate and rhythm with normal S1/S2 without murmurs, rubs or gallops. Abdomen: Soft, non-tender, non-distended with normal bowel sounds. Morbidly obese hernia no signs of obstruction has chronic Mills,   Musculoskeletal:  No clubbing, cyanosis , chronic edema bilaterally.   Right knee movement limited secondary to chronic right knee pain skin: Skin color, texture, turgor normal.  .  Neurologic:   left-sided facial droop and dysarthria. Psychiatric:  Alert and oriented,   Capillary Refill: Brisk,3 seconds, normal  Peripheral Pulses: +2 palpable, equal bilaterally        Labs:   Recent Labs     02/10/23  0614 02/11/23  1019 02/12/23  0825   WBC 6.1 13.0* 6.8   HGB 12.6* 12.5* 11.5*   HCT 38.4* 38.4* 35.2*    169 145     Recent Labs     02/10/23  0614 02/11/23  1019 02/12/23  0825    135* 141   K 4.8 3.8 4.2    100 107   CO2 25 22 26   BUN 24* 21* 22*   CREATININE 1.6* 1.6* 1.7*   CALCIUM 9.0 8.5 8.2*     No results for input(s): AST, ALT, BILIDIR, BILITOT, ALKPHOS in the last 72 hours. No results for input(s): INR in the last 72 hours. Recent Labs     02/09/23  1621 02/09/23 2038   TROPONINI 0.04* 0.04*       Urinalysis:      Lab Results   Component Value Date/Time    NITRU Negative 02/09/2023 08:35 AM    WBCUA 10-20 02/09/2023 08:35 AM    BACTERIA 2+ 02/09/2023 08:35 AM    RBCUA 11-20 02/09/2023 08:35 AM    BLOODU MODERATE 02/09/2023 08:35 AM    SPECGRAV 1.010 02/09/2023 08:35 AM    GLUCOSEU Negative 02/09/2023 08:35 AM       Radiology:  XR CHEST (2 VW)   Final Result   Suggestion of left lower lobe infiltrate and small left effusion. Cardiomegaly and congestion but no pulmonary edema. The right lung is clear. XR ABDOMEN (KUB) (SINGLE AP VIEW)   Final Result   Appropriate positioning of enteric tube. MRI BRAIN WO CONTRAST   Final Result   Acute right MCA infarct within the right frontal lobe involving the right   precentral gyrus extending to the superior margin of the right insula. There   is no corresponding FLAIR abnormality. No acute intracranial hemorrhage identified. The above findings were discussed with Dr. Awilda Schneider at 12:15 p.m. on   02/09/2023.          XR CHEST PORTABLE   Final Result   Mild cardiomegaly with mild interstitial pulmonary edema         CTA HEAD NECK W CONTRAST Final Result   1. Atherosclerosis contributes to moderate stenosis at the origin of the left   vertebral artery. 2. Otherwise, no significant stenosis seen of the cervical carotid/vertebral   arteries. 3. No significant stenosis or large vessel occlusion of the [id number]. 4. Small bilateral pleural effusions. 5. Nonspecific borderline prominent mediastinal lymph nodes. CT HEAD WO CONTRAST   Final Result   Motion limited. No hemorrhage or mass identified. Mild periventricular small vessel ischemic change, similar to prior      Mild paranasal sinus disease      Results discussed with [address] [place] [address] by Cherry Dorsey.  Red Wall MD at 9:30 am on   2/9/2023             IP CONSULT TO PHARMACY  PHARMACY TO CHANGE BASE FLUIDS  IP CONSULT TO HOSPITALIST  IP CONSULT TO NEUROLOGY  IP CONSULT TO SPIRITUAL SERVICES  IP CONSULT TO GI    Assessment/Plan:    Active Hospital Problems    Diagnosis     Acute cerebrovascular accident (CVA) ([facility] [I63.9]      Priority: Medium     Right frontal lobe CVA with left-sided facial droop and dysarthria-  neurochecks, aspirin, statin, neurology evaluation appreciated, echocardiogram pending PT OT speech   Speech recommended regular diet   GERD- GI was consulted per request by speech- input appreciated       Fever leukocytosis-left lower lobe pneumonia possible aspiration-started on Zosyn, breathing treatment as needed, out of bed to chair   Blood culture urine culture pending  Clinically improving     Acute hypoxic respiratory failure-secondary to above currently on 2 L oxygen     chronic kidney disease-creatinine normally runs 1.3 currently 1.7-gentle hydration   Urine dark urine IV fluids increased to 125 cc/h normal saline 2/11  Currently 75 cc/ hr       New onset of atrial fibrillation-controlled ventricular rate-echo is pending, TSH,- WNL given acute CVA , history of perinephric hemorrhage in the past ,not a candidate for anticoagulation, telemetry monitoring  Echocardiogram pending, troponin flat      History of perinephric hemorrhage in 2020-/? GIBcurrently no bleeding- hb stable      Nonischemic cardiomyopathy ejection fraction 50%-repeat echocardiogram pending     Chronic Mills-history of retention     History of DVT has IVC filter placed in 2020 following retroperitoneal hemorrhage     Morbid obesity with a abdominal hernia no evidence of obstruction     Abnormal UA-collected on 9-urine culture negative    DVT Prophylaxis: Lovenox   Diet: N.p.o. evaluated by speech  Code Status: Full Code  PT/OT Eval Status: on going     Dispo -pending progress    Appropriate for A1 Discharge Unit: Linda Delcid MD

## 2023-02-12 NOTE — PROGRESS NOTES
PROGRESS NOTE  S:72 yrs Patient  admitted on 2/9/2023 with [facility] [[facility]] . Today he feels better. He is more alert. Tolerating modified diet in upright position. Denies nausea, vomiting or abd pain    Exam:   Vitals:    02/12/23 0746   BP: (!) 105/55   Pulse: 67   Resp: 18   Temp: 98.4 °F (36.9 °C)   SpO2: 97%      General appearance: alert, appears stated age, aphasic  HEENT: Neck supple with midline trachea  Neck: supple  Heart: regular rate and rhythm  Abdomen: soft, non-tender; bowel sounds normal  Extremities: edema 2+     Medications: Reviewed    Labs:  CBC:   Recent Labs     02/10/23  0614 02/11/23  1019 02/12/23  0825   WBC 6.1 13.0* 6.8   HGB 12.6* 12.5* 11.5*   HCT 38.4* 38.4* 35.2*   MCV 85.4 84.5 86.4    169 145     BMP:   Recent Labs     02/10/23  0614 02/11/23  1019 02/12/23  0825    135* 141   K 4.8 3.8 4.2    100 107   CO2 25 22 26   BUN 24* 21* 22*   CREATININE 1.6* 1.6* 1.7*       Impression:67year-old male with history of diabetes, hypertension, hyperlipidemia, congestive heart failure, DVT status post IVC filter Admitted with CVA.   His dysphagia and regurgitation are most likely secondary to oropharyngeal etiology in light of CVA    Recommendation:  Continue supportive care  Continue PPI daily  Aggressive speech therapy  Upright position during meals and 4 hours after  PT/OT  Consider barium esophagram if symptoms do not improve      Christiane Ch MD, MD  10:43 AM 2/12/2023

## 2023-02-13 LAB
ANION GAP SERPL CALCULATED.3IONS-SCNC: 10 MMOL/L (ref 3–16)
BUN BLDV-MCNC: 19 MG/DL (ref 7–20)
CALCIUM SERPL-MCNC: 8.5 MG/DL (ref 8.3–10.6)
CHLORIDE BLD-SCNC: 103 MMOL/L (ref 99–110)
CO2: 23 MMOL/L (ref 21–32)
CREAT SERPL-MCNC: 1.7 MG/DL (ref 0.8–1.3)
GFR SERPL CREATININE-BSD FRML MDRD: 42 ML/MIN/{1.73_M2}
GLUCOSE BLD-MCNC: 101 MG/DL (ref 70–99)
HCT VFR BLD CALC: 36.6 % (ref 40.5–52.5)
HEMOGLOBIN: 11.5 G/DL (ref 13.5–17.5)
LV EF: 50 %
LVEF MODALITY: NORMAL
MCH RBC QN AUTO: 27.2 PG (ref 26–34)
MCHC RBC AUTO-ENTMCNC: 31.6 G/DL (ref 31–36)
MCV RBC AUTO: 86 FL (ref 80–100)
ORGANISM: ABNORMAL
ORGANISM: ABNORMAL
PDW BLD-RTO: 15.8 % (ref 12.4–15.4)
PLATELET # BLD: 155 K/UL (ref 135–450)
PMV BLD AUTO: 8.7 FL (ref 5–10.5)
POTASSIUM REFLEX MAGNESIUM: 3.7 MMOL/L (ref 3.5–5.1)
PROCALCITONIN: 2.95 NG/ML (ref 0–0.15)
RBC # BLD: 4.25 M/UL (ref 4.2–5.9)
SODIUM BLD-SCNC: 136 MMOL/L (ref 136–145)
URINE CULTURE, ROUTINE: ABNORMAL
WBC # BLD: 6.1 K/UL (ref 4–11)

## 2023-02-13 PROCEDURE — 87324 CLOSTRIDIUM AG IA: CPT

## 2023-02-13 PROCEDURE — 97110 THERAPEUTIC EXERCISES: CPT

## 2023-02-13 PROCEDURE — 85027 COMPLETE CBC AUTOMATED: CPT

## 2023-02-13 PROCEDURE — 97530 THERAPEUTIC ACTIVITIES: CPT

## 2023-02-13 PROCEDURE — C8929 TTE W OR WO FOL WCON,DOPPLER: HCPCS

## 2023-02-13 PROCEDURE — 2580000003 HC RX 258: Performed by: INTERNAL MEDICINE

## 2023-02-13 PROCEDURE — 36415 COLL VENOUS BLD VENIPUNCTURE: CPT

## 2023-02-13 PROCEDURE — 80048 BASIC METABOLIC PNL TOTAL CA: CPT

## 2023-02-13 PROCEDURE — 2700000000 HC OXYGEN THERAPY PER DAY

## 2023-02-13 PROCEDURE — 6360000002 HC RX W HCPCS: Performed by: INTERNAL MEDICINE

## 2023-02-13 PROCEDURE — 94761 N-INVAS EAR/PLS OXIMETRY MLT: CPT

## 2023-02-13 PROCEDURE — 6370000000 HC RX 637 (ALT 250 FOR IP): Performed by: NURSE PRACTITIONER

## 2023-02-13 PROCEDURE — 51702 INSERT TEMP BLADDER CATH: CPT

## 2023-02-13 PROCEDURE — 84145 PROCALCITONIN (PCT): CPT

## 2023-02-13 PROCEDURE — 6370000000 HC RX 637 (ALT 250 FOR IP): Performed by: INTERNAL MEDICINE

## 2023-02-13 PROCEDURE — 99223 1ST HOSP IP/OBS HIGH 75: CPT | Performed by: INTERNAL MEDICINE

## 2023-02-13 PROCEDURE — 97116 GAIT TRAINING THERAPY: CPT

## 2023-02-13 PROCEDURE — 6370000000 HC RX 637 (ALT 250 FOR IP): Performed by: HOSPITALIST

## 2023-02-13 PROCEDURE — 1200000000 HC SEMI PRIVATE

## 2023-02-13 PROCEDURE — 87449 NOS EACH ORGANISM AG IA: CPT

## 2023-02-13 RX ORDER — AMOXICILLIN AND CLAVULANATE POTASSIUM 875; 125 MG/1; MG/1
1 TABLET, FILM COATED ORAL EVERY 12 HOURS SCHEDULED
Status: DISCONTINUED | OUTPATIENT
Start: 2023-02-13 | End: 2023-02-14 | Stop reason: HOSPADM

## 2023-02-13 RX ADMIN — PIPERACILLIN AND TAZOBACTAM 3375 MG: 3; .375 INJECTION, POWDER, LYOPHILIZED, FOR SOLUTION INTRAVENOUS at 02:50

## 2023-02-13 RX ADMIN — ASPIRIN 81 MG: 81 TABLET, COATED ORAL at 09:53

## 2023-02-13 RX ADMIN — AMOXICILLIN AND CLAVULANATE POTASSIUM 1 TABLET: 875; 125 TABLET, FILM COATED ORAL at 20:13

## 2023-02-13 RX ADMIN — PANTOPRAZOLE SODIUM 40 MG: 40 TABLET, DELAYED RELEASE ORAL at 06:27

## 2023-02-13 RX ADMIN — DIPHENOXYLATE HYDROCHLORIDE AND ATROPINE SULFATE 1 TABLET: 2.5; .025 TABLET ORAL at 10:22

## 2023-02-13 RX ADMIN — SODIUM CHLORIDE: 9 INJECTION, SOLUTION INTRAVENOUS at 14:01

## 2023-02-13 RX ADMIN — ENOXAPARIN SODIUM 40 MG: 100 INJECTION SUBCUTANEOUS at 20:13

## 2023-02-13 RX ADMIN — ENOXAPARIN SODIUM 40 MG: 100 INJECTION SUBCUTANEOUS at 09:53

## 2023-02-13 RX ADMIN — ATORVASTATIN CALCIUM 80 MG: 80 TABLET, FILM COATED ORAL at 20:13

## 2023-02-13 RX ADMIN — PIPERACILLIN AND TAZOBACTAM 3375 MG: 3; .375 INJECTION, POWDER, LYOPHILIZED, FOR SOLUTION INTRAVENOUS at 09:56

## 2023-02-13 NOTE — PROGRESS NOTES
Hospitalist Progress Note      PCP: Compa Lund MD    Date of Admission: 2/9/2023    Chief Complaint: Dysarthria and left-sided facial droop    Hospital Course: H&P reviewed     Subjective:  Resting in bed, no voiced complaints, patient and spouse updated on plan of care. On 1 lpm n/c-continue to wean off. Medications:  Reviewed    Infusion Medications    dextrose       Scheduled Medications    pantoprazole  40 mg Oral QAM AC    piperacillin-tazobactam  3,375 mg IntraVENous Q8H    enoxaparin  40 mg SubCUTAneous BID    aspirin  81 mg Oral Daily    Or    aspirin  300 mg Rectal Daily    atorvastatin  80 mg Oral Nightly     PRN Meds: diphenoxylate-atropine, glucose, dextrose bolus **OR** dextrose bolus, glucagon (rDNA), dextrose, ondansetron **OR** ondansetron, polyethylene glycol, perflutren lipid microspheres      Intake/Output Summary (Last 24 hours) at 2/13/2023 1544  Last data filed at 2/13/2023 1540  Gross per 24 hour   Intake 4352.01 ml   Output 1300 ml   Net 3052.01 ml       Physical Exam Performed:    BP (!) 144/71   Pulse 86   Temp 97.5 °F (36.4 °C) (Oral)   Resp 18   Ht 5' 4\" (1.626 m)   Wt (!) 341 lb 8 oz (154.9 kg)   SpO2 96%   BMI 58.62 kg/m²     General appearance: Obese, No apparent distress, appears stated age and cooperative. HEENT: Pupils equal, round, and reactive to light. Conjunctivae/corneas clear. Neck: Supple, with full range of motion. No jugular venous distention. Trachea midline. Respiratory:  Normal respiratory effort. Clear, diminished  Cardiovascular: Irregular rate and rhythm with normal S1/S2 without murmurs, rubs or gallops. Abdomen: Soft, non-tender, non-distended with normal bowel sounds. Musculoskeletal: No clubbing/cyanosis, chronic LE edema bilaterally. Full range of motion without deformity. Skin: Skin color, texture, turgor normal.  No rashes or lesions. Neurologic:  Neurovascularly intact without any focal sensory/motor deficits.  Cranial nerves: II-XII intact, grossly non-focal.  Psychiatric: Alert and oriented, thought content appropriate, normal insight  Capillary Refill: Brisk, 3 seconds, normal   Peripheral Pulses: +2 palpable, equal bilaterally       Labs:   Recent Labs     02/11/23  1019 02/12/23  0825 02/13/23  1419   WBC 13.0* 6.8 6.1   HGB 12.5* 11.5* 11.5*   HCT 38.4* 35.2* 36.6*    145 155     Recent Labs     02/11/23  1019 02/12/23  0825 02/13/23  1419   * 141 136   K 3.8 4.2 3.7    107 103   CO2 22 26 23   BUN 21* 22* 19   CREATININE 1.6* 1.7* 1.7*   CALCIUM 8.5 8.2* 8.5     No results for input(s): AST, ALT, BILIDIR, BILITOT, ALKPHOS in the last 72 hours.  No results for input(s): INR in the last 72 hours.  No results for input(s): CKTOTAL, TROPONINI in the last 72 hours.    Urinalysis:      Lab Results   Component Value Date/Time    NITRU Negative 02/09/2023 08:35 AM    WBCUA 10-20 02/09/2023 08:35 AM    BACTERIA 2+ 02/09/2023 08:35 AM    RBCUA 11-20 02/09/2023 08:35 AM    BLOODU MODERATE 02/09/2023 08:35 AM    SPECGRAV 1.010 02/09/2023 08:35 AM    GLUCOSEU Negative 02/09/2023 08:35 AM       Radiology:  XR CHEST (2 VW)   Final Result   Suggestion of left lower lobe infiltrate and small left effusion.   Cardiomegaly and congestion but no pulmonary edema.  The right lung is clear.         XR ABDOMEN (KUB) (SINGLE AP VIEW)   Final Result   Appropriate positioning of enteric tube.         MRI BRAIN WO CONTRAST   Final Result   Acute right MCA infarct within the right frontal lobe involving the right   precentral gyrus extending to the superior margin of the right insula.  There   is no corresponding FLAIR abnormality.      No acute intracranial hemorrhage identified.      The above findings were discussed with Dr. Alison Oseguera at 12:15 p.m. on   02/09/2023.         XR CHEST PORTABLE   Final Result   Mild cardiomegaly with mild interstitial pulmonary edema         CTA HEAD NECK W CONTRAST   Final Result   1.  Atherosclerosis contributes to moderate stenosis at the origin of the left   vertebral artery. 2. Otherwise, no significant stenosis seen of the cervical carotid/vertebral   arteries. 3. No significant stenosis or large vessel occlusion of the [id number]. 4. Small bilateral pleural effusions. 5. Nonspecific borderline prominent mediastinal lymph nodes. CT HEAD WO CONTRAST   Final Result   Motion limited. No hemorrhage or mass identified. Mild periventricular small vessel ischemic change, similar to prior      Mild paranasal sinus disease      Results discussed with [address] [place] [address] by Bc Sanders. Roger Hill MD at 9:30 am on   2/9/2023             IP CONSULT TO PHARMACY  PHARMACY TO CHANGE BASE FLUIDS  IP CONSULT TO HOSPITALIST  IP CONSULT TO NEUROLOGY  IP CONSULT TO SPIRITUAL SERVICES  IP CONSULT TO GI  IP CONSULT TO CARDIOLOGY    Assessment/Plan:    Active Hospital Problems    Diagnosis     Acute cerebrovascular accident (CVA) ([facility] [I63.9]      Priority: Medium     Acute right ischemic MCA stroke-neuro checks, aspirin, statin, neurology consulted, echocardiogram reviewed, PT/OT/speech   Speech recommended regular diet   GERD- GI was consulted per request by speech- input appreciated-daily PPI     Fever, leukocytosis-left lower lobe pneumonia possible aspiration-started on Zosyn day3->changed to augmentin, breathing treatments as needed, out of bed to chair   Blood culture ngtd  urine culture: Aerococcus/Citrobacter  Clinically improving      Acute hypoxic respiratory failure-secondary to above currently on 1 L oxygen     Chronic kidney disease-creatinine normally runs 1.3 currently 1.7. trial off IVF     New onset of atrial fibrillation-controlled ventricular rate- TSH,- WNL given acute CVA , history of perinephric hemorrhage in the past ,not a candidate for anticoagulation, telemetry monitoring troponin flat. Cardiology consult-eval if watchman candidate.      History of perinephric hemorrhage in 2020-/? GIB currently no bleeding- hgb stable      Nonischemic cardiomyopathy ejection fraction 50%     Chronic Mills-history of retention     History of DVT has IVC filter placed in 2020 following retroperitoneal hemorrhage. Morbid obesity with a abdominal hernia no evidence of obstruction    DVT Prophylaxis: lovenox  Diet: ADULT DIET;  Regular  Code Status: Full Code  PT/OT Eval Status: consulted rec SNF-declined    Dispo - 1-2 days    Appropriate for A1 Discharge Unit: No      JOEY Herrera - CNP

## 2023-02-13 NOTE — PROGRESS NOTES
Occupational Therapy  Facility/Department: [facility] B3 - MED SURG  Daily Treatment Note  NAME: Kory Pulliam Sr.  : 1950  MRN: [id number]    Date of Service: 2023    Discharge Recommendations:  Subacute/Skilled Nursing Facility  OT Equipment Recommendations  Equipment Needed: No  Other: defer to next level of care      Patient Diagnosis(es): The primary encounter diagnosis was Acute cerebrovascular accident (CVA) due to thrombosis of right middle cerebral artery ([address] A diagnosis of New onset atrial fibrillation (HCC) was also pertinent to this visit. Assessment    Assessment: Co-tx collaboration this date to safely meet goals and will have better occupational performance outcomes with in a co-treatment than 1:1 session. Pt tolerated treatment session fairly. Pt is mod Ax2 for supine>sit, max Ax2 for sit>supine, min Ax1-2 for STS and ambulatory transfers. Pt most limited by decreased activity tolerance however motivated to participate in therapy session. Engaged pt in EOB UE exercises with pt requiring rest breaks between sets. OT continues to recommend SNF at discharge to maximize safety and IND prior to pt returning home. Activity Tolerance: Patient limited by fatigue;Treatment limited secondary to medical complications; Patient limited by endurance  Discharge Recommendations: Subacute/Skilled Nursing Facility  Equipment Needed: No  Other: defer to next level of care      Plan   Occupational Therapy Plan  Times Per Week: 3-5x/wk  Current Treatment Recommendations: Strengthening;ROM;Balance training;Functional mobility training; Endurance training;Gait training;Patient/Caregiver education & training; Safety education & training;Equipment evaluation, education, & procurement;Positioning;Self-Care / ADL     Restrictions  Restrictions/Precautions  Restrictions/Precautions: Fall Risk;General Precautions  Position Activity Restriction  Other position/activity restrictions: tele, catheter    Subjective Subjective  Subjective: Pt presented in bed and agreeable to OT/PT session  Pain: Pt endorsing chronic R knee pain, does not formally rate  Orientation  Overall Orientation Status: Within Normal Limits  Orientation Level: Oriented X4  Pain: Pt endorsing chronic R knee pain  Cognition  Overall Cognitive Status: WNL        Objective    Vitals  Vitals  Heart Rate: 86  Heart Rate Source: Monitor  BP: (!) 144/71  BP Location: Right upper arm  BP Method: Automatic  Patient Position: Semi fowlers  MAP (Calculated): 95  SpO2: 96 %  O2 Device: Nasal cannula (.5L)  Bed Mobility Training  Bed Mobility Training: Yes  Overall Level of Assistance: Moderate assistance;Assist X2  Interventions: Verbal cues; Safety awareness training;Weight shifting training/pressure relief  Rolling: Minimum assistance;Assist X1 (to L/R)  Supine to Sit: Moderate assistance;Assist X2 (HOB elevated, use of BR)  Sit to Supine: Maximum assistance;Assist X2;Adaptive equipment  Scooting: Maximum assistance;Assist X2  Balance  Sitting: Intact  Standing: Impaired (min A with RW)  Standing - Static: Constant support;Good  Standing - Dynamic: Constant support; Fair  Transfer Training  Overall Level of Assistance: Assist X2;Adaptive equipment; Additional time;Minimum assistance (w/ RW)  Interventions: Weight shifting training/pressure relief; Safety awareness training;Verbal cues  Sit to Stand: Minimum assistance;Assist X2;Adaptive equipment (w/ RW)  Stand to Sit: Minimum assistance;Assist X1;Adaptive equipment (w/ RW)  Stand Pivot Transfers: Minimum assistance;Assist X2;Adaptive equipment (w/ RW)  Bed to Chair: Minimum assistance;Assist X2;Adaptive equipment (w/ RW)     ADL  LE Dressing: Dependent/Total  LE Dressing Skilled Clinical Factors: doff socks at bed level  Toileting: Dependent/Total  Toileting Skilled Clinical Factors: nilson  Additional Comments: Pt declining further ADLs  OT Exercises  A/AROM Exercises: x10 shoulder flexion and chest press at EOB  Static Sitting Balance Exercises: ~8 mins seated at EOB during UE/LE exercises  Static Standing Balance Exercises: ~2x1 min stance at EOB with min Ax2 in RW  Dynamic Standing Balance Exercises: ~2 mins mobility in hallway with min Ax1-2 in RW  Breathing Techniques: PLB     Safety Devices  Type of Devices: All fall risk precautions in place; Bed alarm in place;Call light within reach;Gait belt; Heels elevated for pressure relief;Patient at risk for falls; Left in bed;Nurse notified (wife at bedside at [address] [place] [address])  Restraints  Restraints Initially in Place: No     Patient Education  Education Given To: Patient; Family  Education Provided: Role of Therapy; Energy Conservation; Fall Prevention Strategies; Plan of Care;Equipment;Transfer Training  Education Provided Comments: Disease Specific Education: Pt educated on importance of OOB mobility, prevention of complications of bedrest, and general safety during hospitalization. Pt verbalized understanding  Education Method: Demonstration;Verbal  Barriers to Learning: None  Education Outcome: Verbalized understanding;Demonstrated understanding;Continued education needed    AM-PAC score  AM-PAC Inpatient Daily Activity Raw Score: 14 (02/13/23 1451)  AM-PAC Inpatient ADL T-Scale Score : 33.39 (02/13/23 1451)  ADL Inpatient CMS 0-100% Score: 59.67 (02/13/23 1451)  ADL Inpatient CMS G-Code Modifier : CK (02/13/23 1451)    Goals  Short Term Goals  Time Frame for Short Term Goals: 1 wk 2/17/2023 unless otherwise noted  Short Term Goal 1: pt will complete EOB grooming ADL setup 2/14/2023  Short Term Goal 2: pt will complete LB dressing Quyen  Short Term Goal 3: pt will complete bed<>BSC transfer Quyen and LRAD       Therapy Time   Individual Concurrent Group Co-treatment   Time In 1311         Time Out 1349         Minutes 38         Timed Code Treatment Minutes: 38 Minutes     If pt is unable to be seen after this session, please let this note serve as discharge summary.   Please see case management note for discharge disposition. Thank you.       Riya Cooney, OT

## 2023-02-13 NOTE — PROGRESS NOTES
PROGRESS NOTE  S:72 yrs Patient  admitted on 2/9/2023 with [facility] [I63.9] . Today he feels better. He is more alert. Tolerating modified diet in upright position. Denies nausea, vomiting or abd pain    Exam:   Vitals:    02/13/23 0432   BP: 105/65   Pulse: 88   Resp: 18   Temp: 97.6 °F (36.4 °C)   SpO2: 97%      General appearance: alert, appears stated age, aphasic  HEENT: Neck supple with midline trachea  Neck: supple  Heart: regular rate and rhythm  Abdomen: soft, non-tender; bowel sounds normal  Extremities: edema 2+     Medications: Reviewed    Labs:  CBC:   Recent Labs     02/11/23  1019 02/12/23  0825   WBC 13.0* 6.8   HGB 12.5* 11.5*   HCT 38.4* 35.2*   MCV 84.5 86.4    145       BMP:   Recent Labs     02/11/23  1019 02/12/23  0825   * 141   K 3.8 4.2    107   CO2 22 26   BUN 21* 22*   CREATININE 1.6* 1.7*         Impression:67year-old male with history of diabetes, hypertension, hyperlipidemia, congestive heart failure, DVT status post IVC filter Admitted with CVA. His dysphagia and regurgitation are most likely secondary to oropharyngeal etiology in light of CVA and is rapidly improving.     Recommendation:  Continue supportive care  Continue PPI daily  Aggressive speech therapy  Upright position during meals and 4 hours after  PT/OT  Will follow      Teagan Vázquez MD, MD  6:23 AM 2/13/2023

## 2023-02-13 NOTE — PROGRESS NOTES
Physical Therapy  Facility/Department: [facility] B3 - MED SURG  Daily Treatment Note  NAME: Ricky Miranda Sr.  : 1950  MRN: [id number]    Date of Service: 2023    Discharge Recommendations:  Subacute/Skilled Nursing Facility   PT Equipment Recommendations  Equipment Needed: No  Other: Pt owns RW    Patient Diagnosis(es): The primary encounter diagnosis was Acute cerebrovascular accident (CVA) due to thrombosis of right middle cerebral artery ([address] A diagnosis of New onset atrial fibrillation (HCC) was also pertinent to this visit. Assessment   Assessment: Pt tolerated treatment session well this date, was seen as co-treatment with OT to maximize functional mobility and safety with transfers and OOB activity. Pt progressed to mod Ax2 for supine > sit, but max Ax2 for sit > supine requiring use of [place] sheets to scoot up in bed. Pt was min Ax2 for sit to stand up to RW, and CGA-min Ax1 for gait up to 50 ft with RW. Pt would continue to benefit from skilled therapy to progress functional mobility. Continue to recommend SNF at d/c. Activity Tolerance: Patient limited by fatigue;Patient limited by endurance; Patient tolerated treatment well  Equipment Needed: No  Other: Pt owns RW     Plan    Physcial Therapy Plan  General Plan: 3-5 times per week  Specific Instructions for Next Treatment: progress functional mobility  Current Treatment Recommendations: Strengthening;ROM;Balance training;Functional mobility training; Endurance training;Gait training;Home exercise program;Safety education & training; Therapeutic activities;Transfer training;Equipment evaluation, education, & procurement;Patient/Caregiver education & training     Restrictions  Restrictions/Precautions  Restrictions/Precautions: Fall Risk, General Precautions  Position Activity Restriction  Other position/activity restrictions: tele, catheter     Subjective    Subjective  Subjective: pt found in bed, agreeable to therapy  Pain: pt reports chronic R knee pain, does not rate  Orientation  Overall Orientation Status: Within Normal Limits  Orientation Level: Oriented X4  Cognition  Overall Cognitive Status: WNL     Objective   Vitals    Upon entry: 86 bpm, 144/71; 96% on 0.5L O2   SpO2 after ambulation with no supplemental O2: 89%, supplemental O2 given back to pt and cues for breathing techniques, returned to ~94%      Bed Mobility Training  Bed Mobility Training: Yes  Overall Level of Assistance: Moderate assistance;Assist X2  Interventions: Verbal cues; Safety awareness training;Weight shifting training/pressure relief  Rolling: Minimum assistance;Assist X1 (to L/R)  Supine to Sit: Moderate assistance;Assist X2 (HOB elevated, use of BR)  Sit to Supine: Maximum assistance;Assist X2;Adaptive equipment  Scooting: Maximum assistance;Assist X2  Balance  Sitting: Intact  Standing: Impaired (min A with RW)  Standing - Static: Constant support;Good  Standing - Dynamic: Constant support; Fair  Transfer Training  Overall Level of Assistance: Assist X2;Adaptive equipment; Additional time;Minimum assistance (w/ RW)  Interventions: Weight shifting training/pressure relief; Safety awareness training;Verbal cues  Sit to Stand: Minimum assistance;Assist X2;Adaptive equipment (w/ RW)  Stand to Sit: Minimum assistance;Assist X1;Adaptive equipment (w/ RW)  Stand Pivot Transfers: Minimum assistance;Assist X2;Adaptive equipment (w/ RW)  Bed to Chair: Minimum assistance;Assist X2;Adaptive equipment (w/ RW)  Gait Training  Gait Training: Yes  Gait  Overall Level of Assistance: Contact-guard assistance;Minimum assistance  Interventions: Visual cues; Verbal cues; Safety awareness training;Manual cues  Base of Support: Widened  Speed/Kaylin: Slow;Pace decreased (< 100 feet/min)  Step Length: Right shortened;Left shortened  Gait Abnormalities: Decreased step clearance; Other (comment); Trunk sway increased (forward flexed posture)  Distance (ft): 50 Feet (in hallway)  Assistive Device: Walker, rolling;Gait belt (would benefit from bariatric walker)     PT Exercises  Exercise Treatment: seated therex: BLE AP, LAQ x10 each; standing marches x5 each     Safety Devices  Type of Devices: All fall risk precautions in place; Bed alarm in place;Call light within reach;Gait belt; Heels elevated for pressure relief;Patient at risk for falls; Left in bed;Nurse notified (wife at bedside at [address] [place] [address] Street)  Restraints  Restraints Initially in Place: No     [facility] 6 Clicks Inpatient Mobility:  AM-PAC Basic Mobility - Inpatient   How much help is needed turning from your back to your side while in a flat bed without using bedrails?: A Little  How much help is needed moving from lying on your back to sitting on the side of a flat bed without using bedrails?: A Lot  How much help is needed moving to and from a bed to a chair?: A Lot  How much help is needed standing up from a chair using your arms?: A Lot  How much help is needed walking in hospital room?: A Little  How much help is needed climbing 3-5 steps with a railing?: Total  AM-PAC Inpatient Mobility Raw Score : 13  AM-PAC Inpatient T-Scale Score : 36.74  Mobility Inpatient CMS 0-100% Score: 64.91  Mobility Inpatient CMS G-Code Modifier : CL    Goals  Short Term Goals  Time Frame for Short Term Goals: 7 days (2/17/23)  Short Term Goal 1: Pt will perform bed mobility with ModAx1  Short Term Goal 2: Pt will perform sit to stand transfer with RW and ModAx1  Short Term Goal 3: Pt will perform 12-15 reps BLE exercises by 2/14/23  Short Term Goal 4: new goal: pt will ambulate 75 ft with LRAD and SBA  Patient Goals   Patient Goals : \"to go home\"    Education  Patient Education  Education Given To: Patient; Family  Education Provided: Role of Therapy;Plan of Care;Transfer Training; Fall Prevention Strategies; Energy Conservation  Education Provided Comments: use of AD, importance of mobility  Education Method: Verbal  Barriers to Learning: None  Education Outcome: Verbalized understanding;Continued education needed    Therapy Time   Individual Concurrent Group Co-treatment   Time In 1311         Time Out 1349         Minutes 38         Timed Code Treatment Minutes: 45 Minutes     If pt is unable to be seen after this session, please let this note serve as discharge summary. Please see case management note for discharge disposition. Thank you.     Deejay Whiteside, PT, DPT

## 2023-02-13 NOTE — CONSULTS
Consult placed    Who:CARDIOLOGY, [facility]  Date:2/13/2023,  Time:7:36 AM        Electronically signed by Clotilde Pate on 2/13/2023 at 7:36 AM

## 2023-02-13 NOTE — PLAN OF CARE
Problem: Discharge Planning  Goal: Discharge to home or other facility with appropriate resources  Outcome: Progressing     Problem: Chronic Conditions and Co-morbidities  Goal: Patient's chronic conditions and co-morbidity symptoms are monitored and maintained or improved  Outcome: Progressing     Problem: Skin/Tissue Integrity  Goal: Absence of new skin breakdown  Outcome: Progressing     Problem: Safety - Adult  Goal: Free from fall injury  Outcome: Progressing  Note: Standard safety measures in place     Problem: Neurosensory - Adult  Goal: Achieves stable or improved neurological status  Outcome: Progressing  Goal: Achieves maximal functionality and self care  Outcome: Progressing     Problem: Respiratory - Adult  Goal: Achieves optimal ventilation and oxygenation  Outcome: Progressing     Problem: Musculoskeletal - Adult  Goal: Return mobility to safest level of function  Outcome: Progressing  Goal: Return ADL status to a safe level of function  Outcome: Progressing     Problem: Gastrointestinal - Adult  Goal: Minimal or absence of nausea and vomiting  Outcome: Progressing     Problem: Genitourinary - Adult  Goal: Absence of urinary retention  Outcome: Progressing  Goal: Urinary catheter remains patent  Outcome: Progressing  Note: Draining     Problem: Cardiovascular - Adult  Goal: Absence of cardiac dysrhythmias or at baseline  Outcome: Progressing     Problem: Skin/Tissue Integrity - Adult  Goal: Skin integrity remains intact  Outcome: Progressing     Problem: Infection - Adult  Goal: Absence of infection at discharge  Outcome: Progressing  Goal: Absence of infection during hospitalization  Outcome: Progressing     Problem: Metabolic/Fluid and Electrolytes - Adult  Goal: Electrolytes maintained within normal limits  Outcome: Progressing  Goal: Glucose maintained within prescribed range  Outcome: Progressing

## 2023-02-13 NOTE — CARE COORDINATION
Chart reviewed. Acute CVA. Management per neuro and IM. EP consulted for assistance with anticoagulation vs watchman device. Pt from home with wife. PTOT rec SNF. Pt and wife refusing. Plan to return home. Active with [facility] for nursing for care of chronic devine catheter ([phone]), would want Nsg/PT/OT/SLP. Will need order faxed to [facility] @ [phone]. Will need updated PTOT notes. CM will continue to follow.  Johana Greer RN

## 2023-02-13 NOTE — CONSULTS
[facility] [address]                                  CONSULTATION    PATIENT NAME: Travis Webster                      :        1950  MED REC NO:   [id number]                          ROOM:       [id number]  ACCOUNT NO:   [de-identified]                           ADMIT DATE: 2023  PROVIDER:     Rodrick Friedman MD    CONSULT DATE:  2023    CARDIAC ELECTROPHYSIOLOGY CONSULTATION    REASON FOR CONSULTATION:  Atrial fibrillation. HISTORY OF PRESENT ILLNESS:  The patient is a 70-year-old man with  history of hypertension, diabetes, nonischemic cardiomyopathy and  recurring DVTs who is admitted with left-sided facial droop and  dysarthria. He is a left-handed gentleman and the symptoms were first  noticed on 2023. MRI demonstrates acute right frontal lobe  infarct. ECG at the time of admission demonstrates atrial fibrillation  with adequately controlled heart rates. The patient was unaware of  palpitations. He has a long history of recurring GI bleeds, one of which was quite  severe and resulted in a prolonged hospitalization. Anticoagulation was  felt unmanageable at that time and an IVC filter was placed in 10/2020. He had no known history of atrial arrhythmias. Most recent echo from  10/09/2020 demonstrates abnormal left ventricular function with ejection  fraction of 50%. Mild septal hypertrophy was identified. PAST MEDICAL HISTORY:  1. Hypertension. 2.  Diabetes. 3.  Nonischemic cardiomyopathy. 4.  Recurring DVTs  5. Recurrent GI bleeding. 6.  Status post IVC filter. MEDICATIONS:  At the time of admission include, amlodipine 5 mg p.o.  daily, folic acid 1 mg p.o. daily, lisinopril 40 mg p.o. daily,  magnesium oxide 400 mg p.o. daily, Protonix 40 mg p.o. daily, potassium  chloride 20 mEq p.o. b.i.d., Flomax 0.4 mg p.o. at bedtime, and vitamin  supplements.     ALLERGIES:  None known.    SOCIAL HISTORY:  The patient is a nonsmoker. Does not consume alcohol  at this time. FAMILY HISTORY:  Remarkable for atrial fibrillation and breast cancer in  the mother, history of arthritis in the father and one female sibling. REVIEW OF SYSTEMS:  Demonstrates no recent change in appetite or change  in weight. The patient has not had recent fever or chills. He does not  describe palpitations. He has not had near-syncope or syncope. Does  not describe chest pain. His functional status is compromised by his  lower extremity edema. All other components of the 14-system review are  negative. PHYSICAL EXAMINATION:  VITAL SIGNS:  Blood pressure is 122/60, heart is 84 beats per minute and  irregular. GENERAL:  The patient is awake, alert, oriented and in no discomfort. HEENT:  Exam demonstrates normocephalic and atraumatic head. There is  no scleral icterus. Pupils are round and reactive. NECK:  Supple without thyromegaly. LUNGS:  Demonstrate diminished breath sounds, there is no consolidation. There is no wheezing. CARDIOVASCULAR:  Exam reveals an irregular rhythm. The apical impulse  is discrete. S1 and S2 are normal.  There is no audible murmur or  gallop. The jugular venous pressure is difficult to assess. ABDOMEN:  Very obese, soft and nontender. EXTREMITIES:  Demonstrate bilateral lower extremity edema, which is not  pitting. There are dermatologic changes consistent with chronic venous  stasis. SKIN:  Otherwise warm and dry without skin rash. DIAGNOSTIC DATA:  A 12-lead ECG from 02/09/2023 demonstrates atrial  fibrillation with average ventricular rate of 89 beats per minute. There is right bundle-branch block with an inferior QRS axis. IMPRESSIONS:  1. Acute right hemispheric CVA. 2.  Atrial fibrillation of unknown onset. 4.  Nonischemic cardiomyopathy. 5.  Hypertension. 6.  Diabetes. 7.  Recurring GI bleeds. 8.  Status post IVC filter.     The patient is admitted with acute right hemispheric CVA and atrial  fibrillation. It is likely that the atrial fibrillation was associated  with the CVA. He has a history of unmanageable GI bleeding went on  anticoagulation. His recurring DVTs were managed with IVC filter. He  will require KOMAL to exclude ongoing left atrial appendage thrombus. If  this can be ascertain, he would be a candidate for left atrial appendage  occlusion for stroke prophylaxis. RECOMMENDATIONS:  1. Transesophageal echocardiography to exclude ongoing left atrial  appendage thrombus. 2.  If thrombus can be excluded, would pursue left atrial appendage  occlusion for stroke prophylaxis. Thanks for the opportunity to assist in the care of the patient. Please  contact me, if you have any questions regarding his evaluation.         Michelle Goddard MD    D: 02/13/2023 12:47:54       T: 02/13/2023 12:50:16     [email or web address]  Job#: [id number]     Doc#: [id number]    CC:

## 2023-02-14 VITALS
OXYGEN SATURATION: 91 % | HEART RATE: 72 BPM | HEIGHT: 64 IN | BODY MASS INDEX: 53.78 KG/M2 | WEIGHT: 315 LBS | RESPIRATION RATE: 19 BRPM | TEMPERATURE: 97.5 F | DIASTOLIC BLOOD PRESSURE: 67 MMHG | SYSTOLIC BLOOD PRESSURE: 121 MMHG

## 2023-02-14 LAB
ANION GAP SERPL CALCULATED.3IONS-SCNC: 9 MMOL/L (ref 3–16)
BUN BLDV-MCNC: 17 MG/DL (ref 7–20)
CALCIUM SERPL-MCNC: 8.4 MG/DL (ref 8.3–10.6)
CHLORIDE BLD-SCNC: 107 MMOL/L (ref 99–110)
CO2: 21 MMOL/L (ref 21–32)
CREAT SERPL-MCNC: 1.6 MG/DL (ref 0.8–1.3)
GFR SERPL CREATININE-BSD FRML MDRD: 45 ML/MIN/{1.73_M2}
GLUCOSE BLD-MCNC: 108 MG/DL (ref 70–99)
GLUCOSE BLD-MCNC: 109 MG/DL (ref 70–99)
GLUCOSE BLD-MCNC: 83 MG/DL (ref 70–99)
GLUCOSE BLD-MCNC: 88 MG/DL (ref 70–99)
HCT VFR BLD CALC: 36.2 % (ref 40.5–52.5)
HEMOGLOBIN: 11.5 G/DL (ref 13.5–17.5)
MCH RBC QN AUTO: 27.3 PG (ref 26–34)
MCHC RBC AUTO-ENTMCNC: 31.8 G/DL (ref 31–36)
MCV RBC AUTO: 85.7 FL (ref 80–100)
PDW BLD-RTO: 16.1 % (ref 12.4–15.4)
PERFORMED ON: ABNORMAL
PERFORMED ON: ABNORMAL
PERFORMED ON: NORMAL
PLATELET # BLD: 146 K/UL (ref 135–450)
PMV BLD AUTO: 9 FL (ref 5–10.5)
POTASSIUM REFLEX MAGNESIUM: 4.1 MMOL/L (ref 3.5–5.1)
PROCALCITONIN: 1.49 NG/ML (ref 0–0.15)
PROCALCITONIN: 1.89 NG/ML (ref 0–0.15)
RBC # BLD: 4.22 M/UL (ref 4.2–5.9)
SODIUM BLD-SCNC: 137 MMOL/L (ref 136–145)
WBC # BLD: 5.1 K/UL (ref 4–11)

## 2023-02-14 PROCEDURE — 94761 N-INVAS EAR/PLS OXIMETRY MLT: CPT

## 2023-02-14 PROCEDURE — 2700000000 HC OXYGEN THERAPY PER DAY

## 2023-02-14 PROCEDURE — 84145 PROCALCITONIN (PCT): CPT

## 2023-02-14 PROCEDURE — 99233 SBSQ HOSP IP/OBS HIGH 50: CPT

## 2023-02-14 PROCEDURE — 6370000000 HC RX 637 (ALT 250 FOR IP): Performed by: NURSE PRACTITIONER

## 2023-02-14 PROCEDURE — 6370000000 HC RX 637 (ALT 250 FOR IP): Performed by: INTERNAL MEDICINE

## 2023-02-14 PROCEDURE — 6360000002 HC RX W HCPCS: Performed by: INTERNAL MEDICINE

## 2023-02-14 PROCEDURE — 36415 COLL VENOUS BLD VENIPUNCTURE: CPT

## 2023-02-14 PROCEDURE — 85027 COMPLETE CBC AUTOMATED: CPT

## 2023-02-14 PROCEDURE — 80048 BASIC METABOLIC PNL TOTAL CA: CPT

## 2023-02-14 RX ORDER — ASPIRIN 81 MG/1
81 TABLET ORAL DAILY
Qty: 30 TABLET | Refills: 3 | Status: SHIPPED | OUTPATIENT
Start: 2023-02-15

## 2023-02-14 RX ORDER — AMOXICILLIN AND CLAVULANATE POTASSIUM 875; 125 MG/1; MG/1
1 TABLET, FILM COATED ORAL 2 TIMES DAILY
Qty: 8 TABLET | Refills: 0 | Status: SHIPPED | OUTPATIENT
Start: 2023-02-14 | End: 2023-02-18

## 2023-02-14 RX ORDER — ATORVASTATIN CALCIUM 80 MG/1
80 TABLET, FILM COATED ORAL NIGHTLY
Qty: 30 TABLET | Refills: 3 | Status: SHIPPED | OUTPATIENT
Start: 2023-02-14

## 2023-02-14 RX ADMIN — ENOXAPARIN SODIUM 40 MG: 100 INJECTION SUBCUTANEOUS at 08:19

## 2023-02-14 RX ADMIN — PANTOPRAZOLE SODIUM 40 MG: 40 TABLET, DELAYED RELEASE ORAL at 05:07

## 2023-02-14 RX ADMIN — AMOXICILLIN AND CLAVULANATE POTASSIUM 1 TABLET: 875; 125 TABLET, FILM COATED ORAL at 08:19

## 2023-02-14 RX ADMIN — ASPIRIN 81 MG: 81 TABLET, COATED ORAL at 08:19

## 2023-02-14 NOTE — DISCHARGE INSTRUCTIONS
Monitor blood pressure, keep a log for PCP. Watchman referral placed.  Sleep medicine referral recommended

## 2023-02-14 NOTE — PLAN OF CARE
Problem: Discharge Planning  Goal: Discharge to home or other facility with appropriate resources  2/14/2023 1749 by Sophia Pacheco RN  Outcome: Completed  2/14/2023 1749 by Sophia Pacheco RN  Outcome: Progressing  2/14/2023 1718 by Sophia Pacheco RN  Outcome: Progressing     Problem: Chronic Conditions and Co-morbidities  Goal: Patient's chronic conditions and co-morbidity symptoms are monitored and maintained or improved  2/14/2023 1749 by Sophia Pacheco RN  Outcome: Completed  2/14/2023 1749 by Sophia Pacheco RN  Outcome: Progressing  2/14/2023 1718 by Sophia Pacheco RN  Outcome: Progressing     Problem: Skin/Tissue Integrity  Goal: Absence of new skin breakdown  Description: 1. Monitor for areas of redness and/or skin breakdown  2. Assess vascular access sites hourly  3. Every 4-6 hours minimum:  Change oxygen saturation probe site  4. Every 4-6 hours:  If on nasal continuous positive airway pressure, respiratory therapy assess nares and determine need for appliance change or resting period.   2/14/2023 1749 by Sophia Pacheco RN  Outcome: Completed  2/14/2023 1749 by Sophia Pacheco RN  Outcome: Progressing  2/14/2023 1718 by Sophia Pacheco RN  Outcome: Progressing     Problem: Safety - Adult  Goal: Free from fall injury  2/14/2023 1749 by Sophia Pacheco RN  Outcome: Completed  2/14/2023 1749 by Sophia Pacheco RN  Outcome: Progressing  2/14/2023 1718 by Sophia Pacheco RN  Outcome: Progressing     Problem: Neurosensory - Adult  Goal: Achieves stable or improved neurological status  2/14/2023 1749 by Sophia Pacheco RN  Outcome: Completed  2/14/2023 1749 by Sophia Pacheco RN  Outcome: Progressing  2/14/2023 1718 by Sophia Pacheco RN  Outcome: Progressing  Goal: Achieves maximal functionality and self care  2/14/2023 1749 by Sophia Pacheco RN  Outcome: Completed  2/14/2023 1749 by Sophia Pacheco RN  Outcome: Progressing  2/14/2023 1718 by Sophia Pacheco RN  Outcome: Progressing     Problem: Respiratory - Adult  Goal: Achieves optimal ventilation and oxygenation  2/14/2023 1749 by Jaquelin Saldivar RN  Outcome: Completed  2/14/2023 1749 by Jaquelin Saldivar RN  Outcome: Progressing  2/14/2023 1718 by Jaquelin Saldivar RN  Outcome: Progressing     Problem: Musculoskeletal - Adult  Goal: Return mobility to safest level of function  2/14/2023 1749 by Jaquelin Saldivar, RN  Outcome: Completed  2/14/2023 1749 by Jaquelin Saldivar, RN  Outcome: Progressing  2/14/2023 1718 by Jaquelin Saldivar, RN  Outcome: Progressing  Goal: Return ADL status to a safe level of function  2/14/2023 1749 by Jaquelin Saldivar, RN  Outcome: Completed  2/14/2023 1749 by Jaquelin Saldivar, RN  Outcome: Progressing  2/14/2023 1718 by Jaquelin Saldivar RN  Outcome: Progressing     Problem: Gastrointestinal - Adult  Goal: Minimal or absence of nausea and vomiting  2/14/2023 1749 by Jaquelin Saldivar, RN  Outcome: Completed  2/14/2023 1749 by Jaquelin Saldivar, RN  Outcome: Progressing  2/14/2023 1718 by Jaquelin Saldivar RN  Outcome: Progressing     Problem: Genitourinary - Adult  Goal: Absence of urinary retention  2/14/2023 1749 by Jaquelin Saldivar, RN  Outcome: Completed  2/14/2023 1749 by Jaquelin Saldivar, RN  Outcome: Progressing  2/14/2023 1718 by Jaquelin Saldivar, RN  Outcome: Progressing  Goal: Urinary catheter remains patent  2/14/2023 1749 by Jaquelin Saldivar, RN  Outcome: Completed  2/14/2023 1749 by Jaquelin Saldivar, RN  Outcome: Progressing  2/14/2023 1718 by Jaquelin Saldivar, RN  Outcome: Progressing     Problem: Cardiovascular - Adult  Goal: Absence of cardiac dysrhythmias or at baseline  2/14/2023 1749 by Jaquelin Saldivar, RN  Outcome: Completed  2/14/2023 1749 by Jaquelin Saldivar, RN  Outcome: Progressing  2/14/2023 1718 by Jaquelin Saldivar RN  Outcome: Progressing     Problem: Skin/Tissue Integrity - Adult  Goal: Skin integrity remains intact  2/14/2023 1749 by Jaquelin Saldivar, RN  Outcome: Completed  2/14/2023 1749 by Jaquelin Saldivar RN  Outcome: Progressing  2/14/2023 1718 by Jim Hurtado RN  Outcome: Progressing     Problem: Infection - Adult  Goal: Absence of infection at discharge  2/14/2023 1749 by Jim Hurtado RN  Outcome: Completed  2/14/2023 1749 by Jim Hurtado RN  Outcome: Progressing  2/14/2023 1718 by Jim Hurtado RN  Outcome: Progressing  Goal: Absence of infection during hospitalization  2/14/2023 1749 by Jim Hurtado RN  Outcome: Completed  2/14/2023 1749 by Jim Hurtado RN  Outcome: Progressing  2/14/2023 1718 by Jim Hurtado RN  Outcome: Progressing     Problem: Metabolic/Fluid and Electrolytes - Adult  Goal: Electrolytes maintained within normal limits  2/14/2023 1749 by Jim Hurtado RN  Outcome: Completed  2/14/2023 1749 by Jim Hurtado RN  Outcome: Progressing  2/14/2023 1718 by Jim Hurtado RN  Outcome: Progressing  Goal: Glucose maintained within prescribed range  2/14/2023 1749 by Jim Hurtado RN  Outcome: Completed  2/14/2023 1749 by Jim Hurtado RN  Outcome: Progressing  2/14/2023 1718 by Jim Hurtado RN  Outcome: Progressing

## 2023-02-14 NOTE — PROGRESS NOTES
[facility]     Electrophysiology                                     Progress Note    Admission date:  2023    Reason for follow up visit: AF    HPI/CC: Flaquita Cain Sr. was admitted on 2023 with left sided facial droop and dysarthria. MRI showed acute right frontal lobe infarct. EKG showed AF. Has also been treated for pneumonia and acute hypoxic respiratory failure. Rhythm has been AF with rates in the 60's-70's. Subjective: He is seen resting in bed with spouse at bedside. He denies chest pain, palpitations, shortness of breath, and dizziness. Vitals:  Blood pressure 130/71, pulse 78, temperature 97.7 °F (36.5 °C), temperature source Oral, resp. rate 19, height 5' 4\" (1.626 m), weight (!) 341 lb 8 oz (154.9 kg), SpO2 96 %.   Temp  Av.7 °F (36.5 °C)  Min: 97.4 °F (36.3 °C)  Max: 98.1 °F (36.7 °C)  Pulse  Av.3  Min: 61  Max: 86  BP  Min: 114/65  Max: 144/71  SpO2  Av.4 %  Min: 88 %  Max: 97 %    24 hour I/O    Intake/Output Summary (Last 24 hours) at 2023 0918  Last data filed at 2023 7233  Gross per 24 hour   Intake 780 ml   Output 1375 ml   Net -595 ml     Current Facility-Administered Medications   Medication Dose Route Frequency Provider Last Rate Last Admin    amoxicillin-clavulanate (AUGMENTIN) 875-125 MG per tablet 1 tablet  1 tablet Oral 2 times per day JOEY Holt - CNP   1 tablet at 23 0819    pantoprazole (PROTONIX) tablet 40 mg  40 mg Oral QAM AC Nigel Choudhary MD   40 mg at 23 0507    diphenoxylate-atropine (LOMOTIL) 2.5-0.025 MG per tablet 1 tablet  1 tablet Oral 4x Daily PRN Maria E Ferrell MD   1 tablet at 23 1022    glucose chewable tablet 16 g  4 tablet Oral PRN Freddy Wilks MD        dextrose bolus 10% 125 mL  125 mL IntraVENous PRN Freddy Wilks MD        Or    dextrose bolus 10% 250 mL  250 mL IntraVENous PRN Freddy Wilks MD        glucagon (rDNA) injection 1 mg  1 mg SubCUTAneous PRN Patsy Pierre Terrell Goodwin MD        dextrose 10 % infusion   IntraVENous Continuous PRN Eliane Alanis MD        ondansetron (ZOFRAN-ODT) disintegrating tablet 4 mg  4 mg Oral Q8H PRN Eliane Alanis MD        Or    ondansetron (ZOFRAN) injection 4 mg  4 mg IntraVENous Q6H PRN Eliane Alanis MD        polyethylene glycol (GLYCOLAX) packet 17 g  17 g Oral Daily PRN Eliane Alanis MD        enoxaparin (LOVENOX) injection 40 mg  40 mg SubCUTAneous BID Eliane Alanis MD   40 mg at 02/14/23 4710    aspirin EC tablet 81 mg  81 mg Oral Daily Eliane Alanis MD   81 mg at 02/14/23 3977    Or    aspirin suppository 300 mg  300 mg Rectal Daily Eliane Alanis MD        perflutren lipid microspheres (DEFINITY) injection 1.5 mL  1.5 mL IntraVENous ONCE PRN Eliane Alanis MD        atorvastatin (LIPITOR) tablet 80 mg  80 mg Oral Nightly lEiane Alanis MD   80 mg at 02/13/23 2013       Objective:     Telemetry monitor: AF    Physical Exam:  Constitutional and general appearance: alert, cooperative, no distress, and appears older than stated age  [de-identified]: Normal oral mucosa  Respiratory:  Normal excursion and expansion without use of accessory muscles  Resp auscultation: Normal breath sounds without wheezing, rhonchi, and rales  Cardiovascular:  Heart tones are crisp and normal. irregular S1 and S2.  Jugular venous pulsation not assessed  Peripheral pulses are symmetrical and full   Abdomen:  No masses or tenderness  Bowel sounds present  Extremities:   No cyanosis or clubbing   Yes, nonpitting bilateral lower extremity edema, venous stasis    Skin: warm and dry  Neurological:  Alert and oriented    Data  Echo 2/2023:  Technically difficult study due to body surface area and poor acoustic   window. Difficult to accurately to assess left ventricular systolic function due to   arrhythmia but appears grossly low normal with an ejection fraction of 50 %. There is mild hypokinesis of the inferior and inferolateral walls. Normal left ventricular size with mild concentric left ventricular   hypertrophy. Left ventricular diastolic filling pressure is elevated septal E/e\" is 12 . Mild mitral and tricuspid regurgitation. Moderate Bi-atrial enlargement. A bubble study was performed and fails to show evidence of shunting. The right ventricle is mildly enlarged. Right ventricular systolic function is mildly reduced . Systolic pulmonic artery pressure (SPAP) is estimated at 46 mmHg consistent   with mild pulmonary hypertension (Right atrial pressure of 8 mmHg). Echo 10/2020:   Summary   Technically difficult examination due to body habitus, patient immobility. Definity® used for myocardial border enhancement. Left ventricular systolic function is low normal with a visually estimated   ejection fraction of 50%. EF calculated by Menendez's method at 54%. The left ventricle is at the upper limits of normal in size with mild septal   hypertrophy. Hypokinesis of the inferior and lateral walls. Normal left ventricular diastolic function. The right ventricle is not well visualized but appears dilated in size with   normal function. The left atrium appears severely enlarged. All labs and testing reviewed.   Lab Review     Renal Profile:   Lab Results   Component Value Date/Time    CREATININE 1.6 02/14/2023 06:42 AM    BUN 17 02/14/2023 06:42 AM     02/14/2023 06:42 AM    K 4.1 02/14/2023 06:42 AM     02/14/2023 06:42 AM    CO2 21 02/14/2023 06:42 AM     CBC:    Lab Results   Component Value Date/Time    WBC 5.1 02/14/2023 06:42 AM    RBC 4.22 02/14/2023 06:42 AM    HGB 11.5 02/14/2023 06:42 AM    HCT 36.2 02/14/2023 06:42 AM    MCV 85.7 02/14/2023 06:42 AM    RDW 16.1 02/14/2023 06:42 AM     02/14/2023 06:42 AM     BNP:  No results found for: BNP  Fasting Lipid Panel:    Lab Results   Component Value Date/Time    CHOL 93 02/10/2023 06:14 AM    HDL 34 02/10/2023 06:14 AM    TRIG 53 02/10/2023 06:14 AM     Cardiac Enzymes:  CK/MbTroponin  Lab Results   Component Value Date/Time    CKTOTAL 44 09/24/2018 11:35 AM    TROPONINI 0.04 02/09/2023 08:38 PM     PT/ INR   Lab Results   Component Value Date/Time    INR 1.09 02/09/2023 09:15 AM    INR 1.69 10/22/2020 06:00 AM    INR 1.32 10/19/2020 05:20 AM    PROTIME 14.0 02/09/2023 09:15 AM    PROTIME 19.7 10/22/2020 06:00 AM    PROTIME 15.3 10/19/2020 05:20 AM     PTT No results found for: PTT   Lab Results   Component Value Date/Time    MG 1.50 12/15/2020 02:30 PM    No results found for: TSH    Assessment:  Atrial fibrillation of unknown duration: ongoing    -[id number] score 5 (age, CVA/DVT, HTN, DM)   -TSH normal 2/09/2023  Acute CVA:   -right frontal lobe infarct  Nonischemic cardiomyopathy   -EF 50% on echo 2/2023, unchanged from 2020  Pneumonia: possible aspiration   -on IV antibiotics   Acute hypoxic respiratory failure: remains on 1 L NC  CKD  HTN  DM  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 No current indication for a KOMAL this admission  2. Watchman referral made due to history of significant life threatening GI bleeding. Risks of starting anticoagulation currently outweigh the benefit. I personally spoke with Esequiel Haywood coordinator who will be reaching out to patient to get office visit arranged  3. We discussed the relationship between COLT and atrial arrhythmias. Sleep medicine referral recommended. The patients son has COLT and wife would like to discuss this with him. She will call the office if they are interested in a referral to a [facility]) pulmonologist  4. EP appointment to be arranged by cardiology office   5. Nothing further to add from an EP standpoint. EP will sign off.  Please reach out if any needs arise prior to discharge      Discussed with Dr. Tanisha Anaya, APRN-CNP  [facility]  [phone]

## 2023-02-14 NOTE — PROGRESS NOTES
Pt. Assessment done and recorded. Vital signs stable upon assessment. Safety precautions in place. Bed locked, alarmed and placed in lowest position. Bedside table and call light within reach. Will continue monitoring pt.

## 2023-02-14 NOTE — DISCHARGE INSTR - COC
Continuity of Care Form    Patient Name: Flavia Aleman   :  1950  MRN:  [id number]    Admit date:  2023  Discharge date:  2023    Code Status Order: Full Code   Advance Directives:     Admitting Physician:  Roseann Santos MD  PCP: Natalia Virk MD    Discharging Nurse: Bella Vilchis [facility] Unit/Room#: 0050/7299-10  Discharging Unit Phone Number: [phone]    Emergency Contact:   Extended Emergency Contact Information  Primary Emergency Contact: Nyasia Cooney  Address: [address]           [place], [address] [facility],[address] Phone: [phone]  Mobile Phone: [phone]  Relation: Spouse    Past Surgical History:  Past Surgical History:   Procedure Laterality Date    CARDIAC CATHETERIZATION  2019    COLONOSCOPY N/A 2019    COLONOSCOPY WITH BIOPSY performed by Marisol Saul MD at [address]  10/19/2020    CT BONE MARROW BIOPSY 10/19/2020 [facility] CT SCAN    IVC FILTER INSERTION  10/13/2020    KIDNEY SURGERY  2014    removal of tumor (not total nephrectomy)    CT EGD TRANSORAL BIOPSY SINGLE/MULTIPLE  2018    Shatzki's ring and gastritis    SIGMOIDOSCOPY      UPPER GASTROINTESTINAL ENDOSCOPY  2018    bleeding duodenal ulcer    UPPER GASTROINTESTINAL ENDOSCOPY N/A 2019    EGD WITH ANESTHESIA performed by Marisol Saul MD at [address] [place] [address] N/A 2019    ESOPHAGEAL CAPSULE ENDOSCOPY performed by Marisol Saul MD at [address] [place] [address]       Immunization History:   Immunization History   Administered Date(s) Administered    Influenza, FLUARIX, Verdia Lose (age 10 mo+) AND AFLURIA, (age 1 y+), PF, 0.5mL 10/18/2020       Active Problems:  Patient Active Problem List   Diagnosis Code    Class 3 severe obesity due to excess calories with serious comorbidity and body mass index (BMI) of 50.0 to 59.9 in adult ([address] E66.01, V00.92 Benign hypertension I10    Ventral hernia K43.9    Normocytic anemia D64.9    Candidal intertrigo B37.2    Lymphedema I89.0    Suspected sleep apnea R29.818    Non-ischemic cardiomyopathy (HCC) EF 40% I42.8    Pressure injury of buttock, stage 1 L89.301    Vitamin D deficiency E55.9    Presence of IVC filter Z95.828    Mass of left kidney  N28.89    Unable to ambulate R26.2    Muscular deconditioning R29.898    Acute cerebrovascular accident (CVA) ([facility] [address] I63.9       Isolation/Infection:   Isolation            C Diff Contact          Patient Infection Status       Infection Onset Added Last Indicated Last Indicated By Review Planned Expiration Resolved Resolved By    C-diff Rule Out 02/11/23 02/11/23 02/13/23 Clostridium difficile toxin/antigen (Ordered) 02/18/23 02/21/23      Resolved    COVID-19 (Rule Out) 02/11/23 02/11/23 02/11/23 COVID-19, Rapid (Ordered)   02/11/23 Rule-Out Test Resulted    COVID-19 (Rule Out) 10/22/20 10/22/20 10/22/20 COVID-19 (Ordered)   10/22/20 Rule-Out Test Resulted            Nurse Assessment:  Last Vital Signs: /63   Pulse 71   Temp 98.5 °F (36.9 °C) (Axillary)   Resp 19   Ht 5' 4\" (1.626 m)   Wt (!) 341 lb 8 oz (154.9 kg)   SpO2 97%   BMI 58.62 kg/m²     Last documented pain score (0-10 scale):    Last Weight:   Wt Readings from Last 1 Encounters:   02/09/23 (!) 341 lb 8 oz (154.9 kg)     Mental Status:  oriented and alert    IV Access:  - None    Nursing Mobility/ADLs:  Walking   Assisted  Transfer  Assisted  Bathing  Assisted  Dressing  Assisted  Toileting  Assisted  Feeding  Assisted  Med Admin  Assisted  Med Delivery   whole and crushed for larger pills     Wound Care Documentation and Therapy:        Elimination:  Continence: Bowel: No  Bladder: No  Urinary Catheter:  Insertion Date: 2/10/2023    Colostomy/Ileostomy/Ileal Conduit: No       Date of Last BM: 2/14/2023    Intake/Output Summary (Last 24 hours) at 2/14/2023 1427  Last data filed at 2/14/2023 1307  Gross per 24 hour   Intake 1260 ml   Output 1375 ml   Net -115 ml     I/O last 3 completed shifts: In: 3789 [P.O.:1740; I.V.:3002; IV Piggyback:150]  Out: 1925 [Urine:1925]    Safety Concerns: At Risk for Falls    Impairments/Disabilities:      None    Nutrition Therapy:  Current Nutrition Therapy: General diet     Routes of Feeding: Oral  Liquids: Thin Liquids  Daily Fluid Restriction: no  Last Modified Barium Swallow with Video (Video Swallowing Test): not done    Treatments at the Time of Hospital Discharge:   Respiratory Treatments: 0  Oxygen Therapy:  is not on home oxygen therapy. Ventilator:    - No ventilator support    Rehab Therapies: Physical Therapy and Occupational Therapy  Weight Bearing Status/Restrictions: No weight bearing restrictions  Other Medical Equipment (for information only, NOT a DME order):  wheelchair and walker  Other Treatments: 0    Patient's personal belongings (please select all that are sent with patient):  None    RN SIGNATURE:  Electronically signed by Quin Valente RN on 2/14/23 at 5:58 PM EST    CASE MANAGEMENT/SOCIAL WORK SECTION    Inpatient Status Date: 2/9/23    Readmission Risk Assessment Score:  Readmission Risk              Risk of Unplanned Readmission:  13           Discharging to Facility/ [address] [place] [address] Road. [place] [phone]     / signature: Electronically signed by Juana Smith RN on 2/14/23 at 2:28 PM EST    PHYSICIAN SECTION    Prognosis: Fair    Condition at Discharge: Stable    Recommended Labs or Other Treatments After Discharge: Home care, PT/OT/Speech, Follow up with EP/PCP    Physician Certification: I certify the above information and transfer of Lacey Cali Sr.  is necessary for the continuing treatment of the diagnosis listed and that he requires [address] for less 30 days.      Update Admission H&P: No change in H&P    PHYSICIAN SIGNATURE:  Electronically signed by JOEY Younger CNP on 2/14/23 at 4:06 PM EST

## 2023-02-14 NOTE — PROGRESS NOTES
PROGRESS NOTE    HPI: Kaila Cedeno Sr. is a(n)72 y.o. male admitted for work-up and treatment for Acute cerebrovascular accident (CVA) ([facility] [address] [I63.9]. We are following for dysphagia. Subjective:     No new complaints. Denies dysphagia. Tolerating diet without dysphagia like symptoms. Objective:     I/O last 3 completed shifts: In: 6224 [P.O.:1740; I.V.:3002; IV Piggyback:150]  Out: 1925 [Urine:1925]      /71   Pulse 78   Temp 97.7 °F (36.5 °C) (Oral)   Resp 19   Ht 5' 4\" (1.626 m)   Wt (!) 341 lb 8 oz (154.9 kg)   SpO2 96%   BMI 58.62 kg/m²     Physical Exam:  HEENT: anicteric sclera, oropharyngeal membranes pink and moist.  Cor: RRR  Lungs: non-labored, no respiratory distress  Abdomen: obese, soft, NT, large hernia. Extremities: no edema  Neuro: alert and oriented x 3, no asterixis      Results:   Lab Results   Component Value Date    ALT 6 (L) 02/09/2023    AST 10 (L) 02/09/2023    ALKPHOS 84 02/09/2023    BILIDIR 0.3 10/17/2020    PROT 7.4 02/09/2023    LABALBU 4.0 02/09/2023    INR 1.09 02/09/2023    LIPASE 12.0 (L) 09/23/2018     Lab Results   Component Value Date    WBC 5.1 02/14/2023    HGB 11.5 (L) 02/14/2023    HCT 36.2 (L) 02/14/2023    MCV 85.7 02/14/2023     02/14/2023     BUN/Cr/glu/ALT/AST/amyl/lip:  17/1.6/--/--/--/--/-- (02/14 4867)  XR CHEST (2 VW)    Result Date: 2/11/2023  Suggestion of left lower lobe infiltrate and small left effusion. Cardiomegaly and congestion but no pulmonary edema. The right lung is clear. XR ABDOMEN (KUB) (SINGLE AP VIEW)    Result Date: 2/9/2023  Appropriate positioning of enteric tube. CT HEAD WO CONTRAST    Result Date: 2/9/2023  Motion limited. No hemorrhage or mass identified. Mild periventricular small vessel ischemic change, similar to prior Mild paranasal sinus disease Results discussed with [address] [place] [address] Mcclellan by Jacob Virk.  Laureano Ortiz MD at 9:30 am on 2/9/2023     XR CHEST PORTABLE    Result Date: 2/9/2023  Mild cardiomegaly with mild interstitial pulmonary edema     CTA HEAD NECK W CONTRAST    Result Date: 2/9/2023  1. Atherosclerosis contributes to moderate stenosis at the origin of the left vertebral artery. 2. Otherwise, no significant stenosis seen of the cervical carotid/vertebral arteries. 3. No significant stenosis or large vessel occlusion of the [id number]. 4. Small bilateral pleural effusions. 5. Nonspecific borderline prominent mediastinal lymph nodes.     MRI BRAIN WO CONTRAST    Result Date: 2/9/2023  Acute right MCA infarct within the right frontal lobe involving the right precentral gyrus extending to the superior margin of the right insula.  There is no corresponding FLAIR abnormality. No acute intracranial hemorrhage identified. The above findings were discussed with Dr. Alison Oseguera at 12:15 p.m. on 02/09/2023.         Impression:  72-year-old male with history of diabetes, hypertension, hyperlipidemia, GIB 2019, congestive heart failure, DVT status post IVC filter Admitted with CVA.     1. Dysphagia. Likely oropharyngeal. Symptoms improved and he is tolerating diet.    2. Reflux.    Plan:  Continue PPI daily and reflux precautions. Nothing further to add from GI standpoint at this time. Follow-up in office if symptoms reoccur.     Please do not hesitate to call with questions or concerns.      Electronically signed by: JOEY Walker 2/14/2023 10:02 AM      Discussed case with JOEY and agree with assessment and plan.     Shashank Ryan MD

## 2023-02-14 NOTE — PLAN OF CARE
Problem: Discharge Planning  Goal: Discharge to home or other facility with appropriate resources  2/14/2023 1749 by Ritika Melchor RN  Outcome: Progressing  2/14/2023 1718 by Ritika Melchor RN  Outcome: Progressing     Problem: Chronic Conditions and Co-morbidities  Goal: Patient's chronic conditions and co-morbidity symptoms are monitored and maintained or improved  2/14/2023 1749 by Ritika Melchor RN  Outcome: Progressing  2/14/2023 1718 by Ritkia Melchor RN  Outcome: Progressing     Problem: Skin/Tissue Integrity  Goal: Absence of new skin breakdown  Description: 1.  Monitor for areas of redness and/or skin breakdown  2.  Assess vascular access sites hourly  3.  Every 4-6 hours minimum:  Change oxygen saturation probe site  4.  Every 4-6 hours:  If on nasal continuous positive airway pressure, respiratory therapy assess nares and determine need for appliance change or resting period.  2/14/2023 1749 by Ritika Melchor RN  Outcome: Progressing  2/14/2023 1718 by Ritika Melchor RN  Outcome: Progressing     Problem: Safety - Adult  Goal: Free from fall injury  2/14/2023 1749 by Ritika Melchor RN  Outcome: Progressing  2/14/2023 1718 by Ritika Melchor RN  Outcome: Progressing     Problem: Neurosensory - Adult  Goal: Achieves stable or improved neurological status  2/14/2023 1749 by Ritika Melchor RN  Outcome: Progressing  2/14/2023 1718 by Ritika Melchor RN  Outcome: Progressing  Goal: Achieves maximal functionality and self care  2/14/2023 1749 by Ritika Melchor RN  Outcome: Progressing  2/14/2023 1718 by Ritika Melchor RN  Outcome: Progressing     Problem: Respiratory - Adult  Goal: Achieves optimal ventilation and oxygenation  2/14/2023 1749 by Ritika Melchor RN  Outcome: Progressing  2/14/2023 1718 by Ritika Melchor RN  Outcome: Progressing     Problem: Musculoskeletal - Adult  Goal: Return mobility to safest level of function  2/14/2023 1749 by Ritika Melchor RN  Outcome: Progressing  2/14/2023  1718 by Amanda Perry RN  Outcome: Progressing  Goal: Return ADL status to a safe level of function  2/14/2023 1749 by Amanda Perry RN  Outcome: Progressing  2/14/2023 1718 by Amanda Perry RN  Outcome: Progressing     Problem: Gastrointestinal - Adult  Goal: Minimal or absence of nausea and vomiting  2/14/2023 1749 by Amanda Perry RN  Outcome: Progressing  2/14/2023 1718 by Amanda Perry RN  Outcome: Progressing     Problem: Genitourinary - Adult  Goal: Absence of urinary retention  2/14/2023 1749 by Amanda Perry RN  Outcome: Progressing  2/14/2023 1718 by Amanda Perry RN  Outcome: Progressing  Goal: Urinary catheter remains patent  2/14/2023 1749 by Amanda Perry RN  Outcome: Progressing  2/14/2023 1718 by Amanda Perry RN  Outcome: Progressing     Problem: Cardiovascular - Adult  Goal: Absence of cardiac dysrhythmias or at baseline  2/14/2023 1749 by Amanda Perry RN  Outcome: Progressing  2/14/2023 1718 by Amanda Perry RN  Outcome: Progressing     Problem: Skin/Tissue Integrity - Adult  Goal: Skin integrity remains intact  2/14/2023 1749 by Amanda Perry RN  Outcome: Progressing  2/14/2023 1718 by Amanda Perry RN  Outcome: Progressing     Problem: Infection - Adult  Goal: Absence of infection at discharge  2/14/2023 1749 by Amanda Perry RN  Outcome: Progressing  2/14/2023 1718 by Amanda Perry RN  Outcome: Progressing  Goal: Absence of infection during hospitalization  2/14/2023 1749 by Amanda Perry RN  Outcome: Progressing  2/14/2023 1718 by Amanda Perry RN  Outcome: Progressing     Problem: Metabolic/Fluid and Electrolytes - Adult  Goal: Electrolytes maintained within normal limits  2/14/2023 1749 by Amanda Perry RN  Outcome: Progressing  2/14/2023 1718 by Amanda Perry RN  Outcome: Progressing  Goal: Glucose maintained within prescribed range  2/14/2023 1749 by Amanda Perry RN  Outcome: Progressing  2/14/2023 1718 by Amanda Perry RN  Outcome: Progressing

## 2023-02-14 NOTE — PLAN OF CARE
Problem: Discharge Planning  Goal: Discharge to home or other facility with appropriate resources  Outcome: Progressing     Problem: Chronic Conditions and Co-morbidities  Goal: Patient's chronic conditions and co-morbidity symptoms are monitored and maintained or improved  Outcome: Progressing     Problem: Skin/Tissue Integrity  Goal: Absence of new skin breakdown  Description: 1.  Monitor for areas of redness and/or skin breakdown  2.  Assess vascular access sites hourly  3.  Every 4-6 hours minimum:  Change oxygen saturation probe site  4.  Every 4-6 hours:  If on nasal continuous positive airway pressure, respiratory therapy assess nares and determine need for appliance change or resting period.  Outcome: Progressing     Problem: Safety - Adult  Goal: Free from fall injury  Outcome: Progressing     Problem: Neurosensory - Adult  Goal: Achieves stable or improved neurological status  Outcome: Progressing  Goal: Achieves maximal functionality and self care  Outcome: Progressing     Problem: Respiratory - Adult  Goal: Achieves optimal ventilation and oxygenation  Outcome: Progressing     Problem: Musculoskeletal - Adult  Goal: Return mobility to safest level of function  Outcome: Progressing  Goal: Return ADL status to a safe level of function  Outcome: Progressing     Problem: Gastrointestinal - Adult  Goal: Minimal or absence of nausea and vomiting  Outcome: Progressing     Problem: Genitourinary - Adult  Goal: Absence of urinary retention  Outcome: Progressing  Goal: Urinary catheter remains patent  Outcome: Progressing     Problem: Cardiovascular - Adult  Goal: Absence of cardiac dysrhythmias or at baseline  Outcome: Progressing     Problem: Skin/Tissue Integrity - Adult  Goal: Skin integrity remains intact  Outcome: Progressing     Problem: Infection - Adult  Goal: Absence of infection at discharge  Outcome: Progressing  Goal: Absence of infection during hospitalization  Outcome: Progressing     Problem:  Metabolic/Fluid and Electrolytes - Adult  Goal: Electrolytes maintained within normal limits  Outcome: Progressing  Goal: Glucose maintained within prescribed range  Outcome: Progressing

## 2023-02-14 NOTE — CARE COORDINATION
CASE MANAGEMENT DISCHARGE SUMMARY      Discharge to: Home with Nilson Little      IMM given: (date) 2/14/23    New Durable Medical Equipment ordered/agency:     Transportation:    Family/car: yes     Confirmed discharge plan with:     Patient: yes     Family:  yes/Moni     Facility/Agency, name:  [facility] faxed        RN, name: Ernestoroseanne Johnston    Note: Discharging nurse to complete [facility], reconcile [facility], and place final copy with patient's discharge packet. RN to ensure that written prescriptions for  Level II medications are sent with patient to the facility as per protocol.

## 2023-02-14 NOTE — CARE COORDINATION
Chart reviewed. Acute CVA. Management per neuro and IM. EP consulted for assistance with anticoagulation vs watchman device. Pt from home with wife. PTOT rec SNF. Pt and wife refusing. Plan to return home. Active with [facility] for nursing for care of chronic devine catheter. Pt will return home with wife and additional [facility], PT/OT/SLP. [facility]. CM continues to follow.  Johnaa Roberts, RN

## 2023-02-15 ENCOUNTER — TELEPHONE (OUTPATIENT)
Dept: CARDIOLOGY CLINIC | Age: 73
End: 2023-02-15

## 2023-02-15 LAB
CULTURE, BLOOD 2: NORMAL
CULTURE, BLOOD 2: NORMAL

## 2023-02-15 NOTE — DISCHARGE SUMMARY
Hospital Medicine Discharge Summary    Patient ID: Jagdish Garnett      Patient's PCP: Mellissa Young MD    Admit Date: 2/9/2023     Discharge Date: 2/14/2023      Admitting Provider: Pat Sanchez MD     Discharge Provider: JOEY Vidal - CNP     Discharge Diagnoses: Active Hospital Problems    Diagnosis     Acute cerebrovascular accident (CVA) [facility]) [I63.9]      Priority: Medium       The patient was seen and examined on day of discharge and this discharge summary is in conjunction with any daily progress note from day of discharge. Hospital Course:     67 y.o. male who presented to [facility] with dysarthria and left-sided facial droop. History is from patient and EMR. He was last seen normal at midnight. He woke up with above complaint. Currently he does not have dizziness, change in mental status ,headache, weakness, or numbness over the extremities. He denies shortness of breath cough, fever, nausea, vomiting, diarrhea ,or worsening edema. He has chronic Mills. He has a history of GI bleed years ago and currently he does not have any symptoms. Admitted for further work up.     Acute right ischemic MCA stroke-neuro checks, aspirin, statin, neurology consulted, echocardiogram reviewed, PT/OT/speech   Speech recommended regular diet   GERD- GI was consulted per request by speech- input appreciated-daily PPI     Fever, leukocytosis-left lower lobe pneumonia possible aspiration-started on Zosyn day3->changed to augmentin, breathing treatments as needed, out of bed to chair   Blood culture ngtd  urine culture: Aerococcus/Citrobacter  Clinically improving      Acute hypoxic respiratory failure-secondary to above currently on 1 L oxygen     Chronic kidney disease-creatinine ~1.3-1.4, currently 1.6. trial off IVF     New onset of atrial fibrillation-controlled ventricular rate- TSH,- WNL given acute CVA , history of perinephric hemorrhage in the past ,not a candidate for anticoagulation, telemetry monitoring troponin flat. Cardiology consult-eval if watchman candidate. F/u appt arranged outpatient. Suspect COLT-Sleep medicine referral recommended. History of perinephric hemorrhage in 2020-/? GIB currently no bleeding- hgb stable      Nonischemic cardiomyopathy ejection fraction 50%     Chronic Mills-history of retention     History of DVT has IVC filter placed in 2020 following retroperitoneal hemorrhage. Morbid obesity with a abdominal hernia no evidence of obstruction    Dc plan: Monitor blood pressure, keep a log for PCP. Watchman referral placed. Sleep medicine referral recommended. Continue ASA/Statin. Home care arranged. Physical Exam Performed:     /67   Pulse 72   Temp 97.5 °F (36.4 °C) (Oral)   Resp 19   Ht 5' 4\" (1.626 m)   Wt (!) 341 lb 8 oz (154.9 kg)   SpO2 91%   BMI 58.62 kg/m²     General appearance: Obese, No apparent distress, appears stated age and cooperative. HEENT: Pupils equal, round, and reactive to light. Conjunctivae/corneas clear. Neck: Supple, with full range of motion. No jugular venous distention. Trachea midline. Respiratory:  Normal respiratory effort. Clear, diminished  Cardiovascular: Irregular rate and rhythm with normal S1/S2 without murmurs, rubs or gallops. Abdomen: Soft, non-tender, non-distended with normal bowel sounds. Musculoskeletal: No clubbing/cyanosis, chronic LE edema bilaterally. Full range of motion without deformity. Skin: Skin color, texture, turgor normal.  No rashes or lesions. Neurologic:  Neurovascularly intact without any focal sensory/motor deficits. Cranial nerves: II-XII intact, grossly non-focal.  Psychiatric: Alert and oriented, thought content appropriate, normal insight  Capillary Refill: Brisk, 3 seconds, normal   Peripheral Pulses: +2 palpable, equal bilaterally          Labs:  For convenience and continuity at follow-up the following most recent labs are provided:      CBC:    Lab Results   Component Value Date/Time    WBC 5.1 02/14/2023 06:42 AM    HGB 11.5 02/14/2023 06:42 AM    HCT 36.2 02/14/2023 06:42 AM     02/14/2023 06:42 AM       Renal:    Lab Results   Component Value Date/Time     02/14/2023 06:42 AM    K 4.1 02/14/2023 06:42 AM     02/14/2023 06:42 AM    CO2 21 02/14/2023 06:42 AM    BUN 17 02/14/2023 06:42 AM    CREATININE 1.6 02/14/2023 06:42 AM    CALCIUM 8.4 02/14/2023 06:42 AM    PHOS 2.5 10/22/2020 06:00 AM         Significant Diagnostic Studies    Radiology:   XR CHEST (2 VW)   Final Result   Suggestion of left lower lobe infiltrate and small left effusion. Cardiomegaly and congestion but no pulmonary edema. The right lung is clear. XR ABDOMEN (KUB) (SINGLE AP VIEW)   Final Result   Appropriate positioning of enteric tube. MRI BRAIN WO CONTRAST   Final Result   Acute right MCA infarct within the right frontal lobe involving the right   precentral gyrus extending to the superior margin of the right insula. There   is no corresponding FLAIR abnormality. No acute intracranial hemorrhage identified. The above findings were discussed with Dr. Starla Gitelman at 12:15 p.m. on   02/09/2023. XR CHEST PORTABLE   Final Result   Mild cardiomegaly with mild interstitial pulmonary edema         CTA HEAD NECK W CONTRAST   Final Result   1. Atherosclerosis contributes to moderate stenosis at the origin of the left   vertebral artery. 2. Otherwise, no significant stenosis seen of the cervical carotid/vertebral   arteries. 3. No significant stenosis or large vessel occlusion of the htbkzh-oh-Elptam. 4. Small bilateral pleural effusions. 5. Nonspecific borderline prominent mediastinal lymph nodes. CT HEAD WO CONTRAST   Final Result   Motion limited. No hemorrhage or mass identified.       Mild periventricular small vessel ischemic change, similar to prior      Mild paranasal sinus disease      Results discussed with [address] [place] [address] Roberta by Darshan Caldwell MD at 9:30 am on   2/9/2023                Consults:     IP CONSULT TO PHARMACY  PHARMACY TO CHANGE BASE FLUIDS  IP CONSULT TO HOSPITALIST  IP CONSULT TO NEUROLOGY  IP CONSULT TO SPIRITUAL SERVICES  IP CONSULT TO GI  IP CONSULT TO CARDIOLOGY  IP CONSULT TO HOME CARE NEEDS    Disposition:  Home     Condition at Discharge: Stable    Discharge Instructions/Follow-up:  See AVS/SOLITARIO    Code Status:  Prior     Activity: activity as tolerated    Diet: cardiac diet      Discharge Medications:     Discharge Medication List as of 2/14/2023  6:01 PM             Details   aspirin 81 MG EC tablet Take 1 tablet by mouth daily, Disp-30 tablet, R-3Normal      atorvastatin (LIPITOR) 80 MG tablet Take 1 tablet by mouth nightly, Disp-30 tablet, R-3Normal      amoxicillin-clavulanate (AUGMENTIN) 875-125 MG per tablet Take 1 tablet by mouth 2 times daily for 4 days, Disp-8 tablet, R-0Normal                Details   tamsulosin (FLOMAX) 0.4 MG capsule Take 0.4 mg by mouth at bedtimeHistorical Med      amLODIPine (NORVASC) 5 MG tablet Take 5 mg by mouth dailyHistorical Med      magnesium oxide (MAG-OX) 400 MG tablet Take 400 mg by mouth dailyHistorical Med      ketoconazole (NIZORAL) 2 % cream APPLY TO RASH IN SKIN FOLDS TOPICALLY TWICE A DAY AS NEEDED, Disp-60 g, R-2, Normal      lisinopril (PRINIVIL;ZESTRIL) 20 MG tablet Take 40 mg by mouth dailyHistorical Med      potassium chloride (MICRO-K) 10 MEQ extended release capsule Take 20 mEq by mouth 2 times dailyHistorical Med      pantoprazole (PROTONIX) 40 MG tablet Take 40 mg by mouth dailyHistorical Med      vitamin D (CHOLECALCIFEROL) 1000 UNIT TABS tablet Take 3,000 Units by mouth dailyHistorical Med      folic acid (FOLVITE) 1 MG tablet Take 1 tablet by mouth daily, Disp-30 tablet, R-3Normal             Time Spent on discharge: 35min in the examination, evaluation, counseling and review of medications and discharge plan.       Signed:    Lazarus Archuleta APRN - CNP   2/15/2023      Thank you Karo Neumann MD for the opportunity to be involved in this patient's care. If you have any questions or concerns, please feel free to contact me at [phone].

## 2023-03-02 ENCOUNTER — OFFICE VISIT (OUTPATIENT)
Dept: CARDIOLOGY CLINIC | Age: 73
End: 2023-03-02

## 2023-03-02 VITALS
BODY MASS INDEX: 53.78 KG/M2 | WEIGHT: 315 LBS | DIASTOLIC BLOOD PRESSURE: 59 MMHG | SYSTOLIC BLOOD PRESSURE: 145 MMHG | OXYGEN SATURATION: 88 % | HEART RATE: 75 BPM | HEIGHT: 64 IN

## 2023-03-02 DIAGNOSIS — E78.2 MIXED HYPERLIPIDEMIA: ICD-10-CM

## 2023-03-02 DIAGNOSIS — I48.0 PAF (PAROXYSMAL ATRIAL FIBRILLATION) (HCC): Primary | ICD-10-CM

## 2023-03-02 DIAGNOSIS — I89.0 LYMPHEDEMA: ICD-10-CM

## 2023-03-02 DIAGNOSIS — I10 ESSENTIAL HYPERTENSION: ICD-10-CM

## 2023-03-02 DIAGNOSIS — I50.22 CHRONIC SYSTOLIC HEART FAILURE (HCC): ICD-10-CM

## 2023-03-02 RX ORDER — FINASTERIDE 5 MG/1
TABLET, FILM COATED ORAL
COMMUNITY
Start: 2022-12-18

## 2023-03-02 RX ORDER — FUROSEMIDE 40 MG/1
TABLET ORAL
COMMUNITY
Start: 2023-03-01

## 2023-03-02 NOTE — PROGRESS NOTES
[facility]  Cardiology Consult    Trisha Cedeno Sr.  1950 March 2, 2023    Primary Cardiologist: Cory Vilchis MD  Referring Physician: TATA Mcrae    Reason for Referral: Major bleeding complication and Left atrial appendage closure    CC: \"I have a lot going on. \"      Subjective:     History of Present Illness:    Trisha Cedeno Sr. is a 67 y.o. patient with a PMH significant for diabetes, hypertension, hyperlipidemia, DVT s/p IVC filter 10/2020, GI bleed 2019, CVA, and paroxsymal atrial fibrillation. He has a history of perinephric hemorrhage and cannot be started on anticoagulation therapy which prompted the [employer] consult. Patient is being referred for Left Atrial Appendage Closure with WATCHMAN device for management of stroke risk resulting from non-valvular atrial fibrillation. Based on their past history, it has been determined that they are poor candidates for long-term oral-anticoagulation, however may be tolerant of short term treatment with [facility] as necessary. Today, he is here to discuss Watchman procedure accompanied by family. He is in a wheelchair and has a devine catheter. He is having shortness of breath. Past Medical History:   has a past medical history of Acute respiratory failure with hypoxia and hypercapnia ([address] Anemia due to blood loss, acute, Bacteremia due to Escherichia coli, Calculus of gallbladder with acute cholecystitis without obstruction, CHF (congestive heart failure) ([address] Choledecholithiasis, DVT (deep venous thrombosis) ([address] GI bleed from duodenal ulcer, Malignant neoplasm of kidney excluding renal pelvis ([address] Retroperitoneal bleed, and UTI. Surgical History:   has a past surgical history that includes Kidney surgery (04/07/2014); sigmoidoscopy; Upper gastrointestinal endoscopy (08/06/2018); pr egd transoral biopsy single/multiple (09/28/2018); Upper gastrointestinal endoscopy (N/A, 04/08/2019);  Colonoscopy (N/A, 04/09/2019); Upper gastrointestinal endoscopy (N/A, 04/12/2019); Cardiac catheterization (09/06/2019); CT BIOPSY BONE MARROW (10/19/2020); and IVC filter insertion (10/13/2020). Social History:   reports that he has never smoked. He has never used smokeless tobacco. He reports that he does not drink alcohol and does not use drugs. Family History:  family history includes Anemia in his father; Arthritis in his father, maternal aunt, maternal grandmother, mother, and sister; Atrial Fibrillation in his mother; Breast Cancer in his mother; Cancer (age of onset: 80) in his father; Depression in his maternal uncle; Diabetes in his maternal aunt; Early Death in his maternal uncle; Hearing Loss in his father; Heart Disease in his paternal aunt; High Blood Pressure in his mother; Other in his maternal uncle. Home Medications:  Were reviewed and are listed in nursing record and/or below  Prior to Admission medications    Medication Sig Start Date End Date Taking?  Authorizing Provider   aspirin 81 MG EC tablet Take 1 tablet by mouth daily 2/15/23  Yes JOEY Adrian CNP   atorvastatin (LIPITOR) 80 MG tablet Take 1 tablet by mouth nightly 2/14/23  Yes JOEY Adrian CNP   tamsulosin (FLOMAX) 0.4 MG capsule Take 0.4 mg by mouth at bedtime   Yes Historical Provider, MD   amLODIPine (NORVASC) 5 MG tablet Take 5 mg by mouth daily   Yes Historical Provider, MD   magnesium oxide (MAG-OX) 400 MG tablet Take 400 mg by mouth daily   Yes Historical Provider, MD   lisinopril (PRINIVIL;ZESTRIL) 20 MG tablet Take 40 mg by mouth daily   Yes Historical Provider, MD   potassium chloride (MICRO-K) 10 MEQ extended release capsule Take 20 mEq by mouth 2 times daily   Yes Historical Provider, MD   pantoprazole (PROTONIX) 40 MG tablet Take 40 mg by mouth daily   Yes Historical Provider, MD   vitamin D (CHOLECALCIFEROL) 1000 UNIT TABS tablet Take 3,000 Units by mouth daily   Yes Historical Provider, MD   folic acid (FOLVITE) 1 MG tablet Take 1 tablet by mouth daily 10/1/18  Yes JOEY Tate - CNP   finasteride (PROSCAR) 5 MG tablet  12/18/22   Historical Provider, MD   furosemide (LASIX) 40 MG tablet  3/1/23   Historical Provider, MD   ketoconazole (NIZORAL) 2 % cream APPLY TO RASH IN SKIN FOLDS TOPICALLY TWICE A DAY AS NEEDED  Patient not taking: Reported on 3/2/2023 7/15/21   Faith Garza MD        CURRENT Medications:  No current facility-administered medications for this visit. Allergies:  Patient has no known allergies. Review of Systems: All reviewed and refer to HPI  Constitutional: no unanticipated weight loss. There's been no change in energy level, sleep pattern, or activity level. No fevers, chills. Eyes: No visual changes or diplopia. No scleral icterus. ENT: No Headaches, hearing loss or vertigo. No mouth sores or sore throat. Cardiovascular: No Chest pain, tightness or discomfort. No Shortness of breath. No Dyspnea on exertion, Orthopnea, Paroxysmal nocturnal dyspnea or breathlessness at rest.  No Palpitations. No Syncope ('blackouts', 'faints', 'collapse') or dizziness. Respiratory: No cough or wheezing, no sputum production. No hematemesis. Gastrointestinal: No abdominal pain, appetite loss, blood in stools. No change in bowel or bladder habits. Genitourinary: No dysuria, trouble voiding, or hematuria. Musculoskeletal:  No gait disturbance, no joint complaints. Integumentary: No rash or pruritis. Neurological: No headache, diplopia, change in muscle strength, numbness or tingling. Psychiatric: No anxiety or depression. Endocrine: No temperature intolerance. No excessive thirst, fluid intake, or urination. No tremor. Hematologic/Lymphatic: No abnormal bruising or bleeding, blood clots or swollen lymph nodes. Allergic/Immunologic: No nasal congestion or hives.       Objective: all reveiwed      PHYSICAL EXAM:      Vitals:    03/02/23 1308   BP: (!) 145/59   Pulse: 75   SpO2: (!) 88%    Weight: (!) 341 lb (154.7 kg)       General Appearance:  Alert, cooperative, no distress, appears stated age. Head:  Normocephalic, without obvious abnormality, atraumatic. Eyes:  Pupils equal and round. No scleral icterus. Mouth: Moist mucosa, no pharyngeal erythema. Nose: Nares normal. No drainage or sinus tenderness. Neck: Supple, symmetrical, trachea midline. No adenopathy. No tenderness/mass/nodules. No carotid bruit or elevated JVD. Lungs:   Respiratory Effort: Normal   Auscultation: Clear to auscultation bilaterally, respirations unlabored. No wheeze, rales   Chest Wall:  No tenderness or deformity. Cardiovascular:    Pulses  Palpation: normal   Ascultation: Regular rate, S1/ S2 normal. No murmur, rub, or gallop. 2+ radial and pedal pulses, symmetric  Carotid  Femoral   Abdomen and Gastrointestinal:   Soft, non-tender, bowel sounds active. Liver and Spleen  Masses   Musculoskeletal: No muscle wasting  Back  Gait   Extremities: Extremities normal, atraumatic. No cyanosis or edema. No cyanosis clubbing       Skin: Inspection and palpation performed, no rashes or lesions. Pysch: Normal mood and affect.  Alert and oriented to time place person   Neurologic: Normal gross motor and sensory exam.       Labs: all labs have been reviewed      Lab Results   Component Value Date/Time    WBC 5.1 02/14/2023 06:42 AM    RBC 4.22 02/14/2023 06:42 AM    HGB 11.5 02/14/2023 06:42 AM    HCT 36.2 02/14/2023 06:42 AM    MCV 85.7 02/14/2023 06:42 AM    RDW 16.1 02/14/2023 06:42 AM     02/14/2023 06:42 AM     Lab Results   Component Value Date/Time     02/14/2023 06:42 AM    K 4.1 02/14/2023 06:42 AM     02/14/2023 06:42 AM    CO2 21 02/14/2023 06:42 AM    BUN 17 02/14/2023 06:42 AM    CREATININE 1.6 02/14/2023 06:42 AM    GFRAA >60 09/30/2021 06:45 PM    AGRATIO 1.2 02/09/2023 09:15 AM    LABGLOM 45 02/14/2023 06:42 AM    GLUCOSE 88 02/14/2023 06:42 AM    PROT 7.4 02/09/2023 09:15 AM CALCIUM 8.4 02/14/2023 06:42 AM    BILITOT 1.4 02/09/2023 09:15 AM    ALKPHOS 84 02/09/2023 09:15 AM    AST 10 02/09/2023 09:15 AM    ALT 6 02/09/2023 09:15 AM     No results found for: PTINR  Lab Results   Component Value Date    LABA1C 4.8 02/10/2023     Lab Results   Component Value Date    CKTOTAL 44 09/24/2018    TROPONINI 0.04 (H) 02/09/2023       Cardiac, Vascular and Imaging Data: All Personally Reviewed in Detail by Myself      EKG:     Echocardiogram:   ECHO 2/13/2023  Technically difficult study due to body surface area and poor acoustic  window. Difficult to accurately to assess left ventricular systolic function due to  arrhythmia but appears grossly low normal with an ejection fraction of 50 %. There is mild hypokinesis of the inferior and inferolateral walls. Normal left ventricular size with mild concentric left ventricular  hypertrophy. Left ventricular diastolic filling pressure is elevated septal E/e\" is 12 . Mild mitral and tricuspid regurgitation. Moderate Bi-atrial enlargement. A bubble study was performed and fails to show evidence of shunting. The right ventricle is mildly enlarged. Right ventricular systolic function is mildly reduced . Systolic pulmonic artery pressure (SPAP) is estimated at 46 mmHg consistent  with mild pulmonary hypertension (Right atrial pressure of 8 mmHg). ECHO 10/12/2020  Technically difficult examination due to body habitus, patient immobility. Definity® used for myocardial border enhancement. Left ventricular systolic function is low normal with a visually estimated  ejection fraction of 50%. EF calculated by Menendez's method at 54%. The left ventricle is at the upper limits of normal in size with mild septal  hypertrophy. Hypokinesis of the inferior and lateral walls. Normal left ventricular diastolic function. The right ventricle is not well visualized but appears dilated in size with  normal function.   The left atrium appears severely enlarged. Stress Test:     Cath: Other imaging:     Assessment and Plan     Atrial Fibrillation, Paroxysmal      Referring reason: Perinephric hemorrhage/GI bleed    Currently not on anticoagulation. He is a poor candidate for chronic anticoagulation with his history of GI bleed. He would be an appropriate candidate for the watchman device. Patient is agreeable to proceed with the Watchman procedure and instructed Armando Torres, the coordinator will get in contact to facilitate scheduling. Lolita Khanna is at least 5 (Age,HTN,CHF,CVA)   HASbled is at least 4       Specifically regarding risk of anticoagulation they have demonstrated:  History of bleeding (eg. Intracerebral, subdural, GI, retro-peritoneal)  Intolerance oral anticoagulation  Increased bleeding risk (e.g. Thrombocytopenia, anemia)  High risk of recurrent falls      Discussed left atrial appendage closure at length with patient and family members. I have discussed their unique stroke and bleeding risk both on and off oral-anticoagulation, and the rationale for this referral.  Based on both stroke and bleeding risk, a shared decision has been made to pursue closure of the left atrial appendage as an alternative to oral anticoagulant therapy for stroke prophylaxis and to reduce their long term risk of incidence of bleeding. I had a detail discussion with the patient and family regarding the risk and benefit of the procedures. I explained to them the details of the procedure. I explained to them that the procedure will be performed under general anesthesia using KOMAL and fluoroscopic guidance. I explained any risk of bleeding, pericardial effusion and tamponade, perforation of the vessel, stroke, myocardial infarction or death. The patient and family understood the risk and benefits and the details of procedure and would like to go ahead with the procedure. The patient gave informed consent. All questions and concerns answered at this time.      Chronic systolic heart failure  Increased edema and dyspnea on exam. Continue Asa and Ace-I. Lymphedema  Severe lower extremity edema  R>L. Recently started on Lasix. DVT  S/p IVC filter 10/2020. Will order CT venogram.     Hypertension  Continue current medical management. Hyperlipidemia  Continue high intensity statin therapy. Follow up after testing is completed. Thank you for allowing us to participate in the care of Kyler Lawton Sr. .  Please do not hesitate to contact me if you have any questions. Sam Boyce MD, MPH    [facility], [address] [place], [place] [address]Arya Salazar [address]  Ph: [phone]  Fax: [phone]      This note was scribed in the presence of Dr Jazzy Shepherd, by Gabbi Reyes RN    Physician Attestation:  The scribes documentation has been prepared under my direction and personally reviewed by me in its entirety. I confirm that the note above accurately reflects all work, treatment, procedures, and medical decision making performed by me.

## 2023-03-03 ENCOUNTER — HOSPITAL ENCOUNTER (OUTPATIENT)
Dept: CT IMAGING | Age: 73
Discharge: HOME | End: 2023-03-03
Payer: MEDICARE

## 2023-03-03 DIAGNOSIS — I89.0 LYMPHEDEMA: ICD-10-CM

## 2023-03-03 PROCEDURE — 74174 CTA ABD&PLVS W/CONTRAST: CPT

## 2023-03-03 PROCEDURE — 6360000004 HC RX CONTRAST MEDICATION: Performed by: INTERNAL MEDICINE

## 2023-03-03 RX ADMIN — IOPAMIDOL 100 ML: 755 INJECTION, SOLUTION INTRAVENOUS at 09:58

## 2023-03-04 ENCOUNTER — APPOINTMENT (OUTPATIENT)
Dept: GENERAL RADIOLOGY | Age: 73
End: 2023-03-04
Payer: MEDICARE

## 2023-03-04 ENCOUNTER — TELEPHONE (OUTPATIENT)
Dept: OTHER | Facility: CLINIC | Age: 73
End: 2023-03-04

## 2023-03-04 ENCOUNTER — HOSPITAL ENCOUNTER (EMERGENCY)
Age: 73
Discharge: HOME | End: 2023-03-04
Attending: EMERGENCY MEDICINE
Payer: MEDICARE

## 2023-03-04 VITALS
TEMPERATURE: 98.7 F | SYSTOLIC BLOOD PRESSURE: 154 MMHG | OXYGEN SATURATION: 97 % | HEART RATE: 86 BPM | DIASTOLIC BLOOD PRESSURE: 73 MMHG | RESPIRATION RATE: 16 BRPM | WEIGHT: 315 LBS | BODY MASS INDEX: 53.78 KG/M2 | HEIGHT: 64 IN

## 2023-03-04 DIAGNOSIS — R79.89 ELEVATED BRAIN NATRIURETIC PEPTIDE (BNP) LEVEL: ICD-10-CM

## 2023-03-04 DIAGNOSIS — K83.8 DILATED CBD, ACQUIRED: ICD-10-CM

## 2023-03-04 DIAGNOSIS — N28.89 LEFT RENAL MASS: ICD-10-CM

## 2023-03-04 DIAGNOSIS — R60.0 BILATERAL LOWER EXTREMITY EDEMA: ICD-10-CM

## 2023-03-04 DIAGNOSIS — T83.9XXA PROBLEM WITH FOLEY CATHETER, INITIAL ENCOUNTER (HCC): Primary | ICD-10-CM

## 2023-03-04 LAB
A/G RATIO: 1.3 (ref 1.1–2.2)
ALBUMIN SERPL-MCNC: 3.4 G/DL (ref 3.4–5)
ALP BLD-CCNC: 77 U/L (ref 40–129)
ALT SERPL-CCNC: 7 U/L (ref 10–40)
ANION GAP SERPL CALCULATED.3IONS-SCNC: 10 MMOL/L (ref 3–16)
AST SERPL-CCNC: 12 U/L (ref 15–37)
BASOPHILS ABSOLUTE: 0.1 K/UL (ref 0–0.2)
BASOPHILS RELATIVE PERCENT: 1 %
BILIRUB SERPL-MCNC: 2.3 MG/DL (ref 0–1)
BUN BLDV-MCNC: 18 MG/DL (ref 7–20)
CALCIUM SERPL-MCNC: 8.8 MG/DL (ref 8.3–10.6)
CHLORIDE BLD-SCNC: 96 MMOL/L (ref 99–110)
CO2: 29 MMOL/L (ref 21–32)
CREAT SERPL-MCNC: 1.5 MG/DL (ref 0.8–1.3)
EOSINOPHILS ABSOLUTE: 0.3 K/UL (ref 0–0.6)
EOSINOPHILS RELATIVE PERCENT: 4.6 %
GFR SERPL CREATININE-BSD FRML MDRD: 49 ML/MIN/{1.73_M2}
GLUCOSE BLD-MCNC: 114 MG/DL (ref 70–99)
HCT VFR BLD CALC: 39.6 % (ref 40.5–52.5)
HEMOGLOBIN: 12.9 G/DL (ref 13.5–17.5)
LYMPHOCYTES ABSOLUTE: 0.3 K/UL (ref 1–5.1)
LYMPHOCYTES RELATIVE PERCENT: 5.1 %
MCH RBC QN AUTO: 27.3 PG (ref 26–34)
MCHC RBC AUTO-ENTMCNC: 32.5 G/DL (ref 31–36)
MCV RBC AUTO: 84.1 FL (ref 80–100)
MONOCYTES ABSOLUTE: 0.4 K/UL (ref 0–1.3)
MONOCYTES RELATIVE PERCENT: 6.8 %
NEUTROPHILS ABSOLUTE: 5.4 K/UL (ref 1.7–7.7)
NEUTROPHILS RELATIVE PERCENT: 82.5 %
PDW BLD-RTO: 16.3 % (ref 12.4–15.4)
PLATELET # BLD: 149 K/UL (ref 135–450)
PMV BLD AUTO: 8.4 FL (ref 5–10.5)
POTASSIUM REFLEX MAGNESIUM: 4.2 MMOL/L (ref 3.5–5.1)
PRO-BNP: 4378 PG/ML (ref 0–124)
RBC # BLD: 4.71 M/UL (ref 4.2–5.9)
SODIUM BLD-SCNC: 135 MMOL/L (ref 136–145)
TOTAL PROTEIN: 6.1 G/DL (ref 6.4–8.2)
TROPONIN: 0.03 NG/ML
WBC # BLD: 6.6 K/UL (ref 4–11)

## 2023-03-04 PROCEDURE — 93005 ELECTROCARDIOGRAM TRACING: CPT | Performed by: EMERGENCY MEDICINE

## 2023-03-04 PROCEDURE — 51702 INSERT TEMP BLADDER CATH: CPT

## 2023-03-04 PROCEDURE — 85025 COMPLETE CBC W/AUTO DIFF WBC: CPT

## 2023-03-04 PROCEDURE — 84484 ASSAY OF TROPONIN QUANT: CPT

## 2023-03-04 PROCEDURE — 99285 EMERGENCY DEPT VISIT HI MDM: CPT

## 2023-03-04 PROCEDURE — 83880 ASSAY OF NATRIURETIC PEPTIDE: CPT

## 2023-03-04 PROCEDURE — 80053 COMPREHEN METABOLIC PANEL: CPT

## 2023-03-04 PROCEDURE — 71045 X-RAY EXAM CHEST 1 VIEW: CPT

## 2023-03-04 ASSESSMENT — PAIN - FUNCTIONAL ASSESSMENT: PAIN_FUNCTIONAL_ASSESSMENT: NONE - DENIES PAIN

## 2023-03-04 NOTE — ED TRIAGE NOTES
Urinary cath problem. Pt arrives via EMS for increased edema reports the home health nurse was unable to replace the devine cath because of the swelling. Denies chest pain or SOB.

## 2023-03-04 NOTE — TELEPHONE ENCOUNTER
Writer contacted Dr. Vickie Kayser to inform of 30 day readmission risk. Dr. Vickie Kayser informed writer of intention to discharge and recommended follow up with primary care physician and urology.

## 2023-03-05 LAB
EKG ATRIAL RATE: 87 BPM
EKG DIAGNOSIS: NORMAL
EKG P AXIS: 34 DEGREES
EKG P-R INTERVAL: 100 MS
EKG Q-T INTERVAL: 474 MS
EKG QRS DURATION: 118 MS
EKG QTC CALCULATION (BAZETT): 570 MS
EKG R AXIS: 103 DEGREES
EKG T AXIS: 1 DEGREES
EKG VENTRICULAR RATE: 87 BPM

## 2023-03-05 PROCEDURE — 93010 ELECTROCARDIOGRAM REPORT: CPT | Performed by: INTERNAL MEDICINE

## 2023-03-05 NOTE — DISCHARGE INSTRUCTIONS
I am not saying you have confirmed kidney cancer; only wanted to give you information on kidney masses. Will need to see further biopsies or MRI for confirmation.

## 2023-03-05 NOTE — ED NOTES
[id number] Stephanie Bass for d/c. Stable, by wheelchair. Per wife, has son at home to help w/ ambulation from car to home and will be ok. All questions asked and answered at d/c. No further questions at this time. Pt left w/ wife and all personal belongings and devine in place.       Jayshree Nolasco, MAILE  03/04/23 [id number]

## 2023-03-07 ASSESSMENT — ENCOUNTER SYMPTOMS
COUGH: 0
BACK PAIN: 0
NAUSEA: 0
SHORTNESS OF BREATH: 0
VOMITING: 0

## 2023-03-07 NOTE — ED PROVIDER NOTES
Emergency Department Attending Physician Note  Location: [facility]  ED  3/4/2023       Pt Name: Leia Ann Sr. MRN: [id number]  Birthdate 1950    Date of evaluation: 3/4/2023  Provider: Juan Luis Scherer DO  PCP: Anisha Thayer MD    Note Started: 5:05 AM EST 3/7/23    CHIEF COMPLAINT  Chief Complaint   Patient presents with    Other     Urinary cath problem. Pt arrives via EMS for increased edema reports the home health nurse was unable to replace the devine cath because of the swelling. Denies chest pain or SOB. HISTORY OF PRESENT ILLNESS:  History obtained by patient and spouse/SO. Limitations to history : None. Leia Ann Sr. is a 67 y.o. male with a significant PMHx of CHF, DVTs, history of renal cell carcinoma, who wears a chronic dwelling Devine catheter for urinary retention, presenting emergency department today with inability to get the Devine catheter back in at home. Home health care nurse says that patient is too swollen and she could not get it in, prompting them to come to the ER. Wife is bedside, who is very involved in patient's medical care, and says that vascular had performed a CTA abdomen pelvis yesterday to make sure that there were no blood clots around his IVC filter. They are planning Watchman device. They are concerned that he is having edema in his legs and abdomen and wanted make sure it was not a blood clot. Otherwise, patient is not significantly more short of breath than he usually is. He denies any chest pain. His biggest concern is getting the Devine catheter in. No falls or injuries. Does have a history of choledocholithiasis, no nausea vomiting or abdominal pain. Normal bowel movements. Nursing Notes were all reviewed and agreed with or any disagreements were addressed in the HPI.     MEDICAL HISTORY  Past Medical History:   Diagnosis Date    Acute respiratory failure with hypoxia and hypercapnia (HCC) 09/24/2018    Anemia due to blood loss, acute 10/12/2020    Bacteremia due to Escherichia coli 10/12/2020    Calculus of gallbladder with acute cholecystitis without obstruction 09/24/2018    CHF (congestive heart failure) ([facility] 08/2019    Choledecholithiasis 09/24/2018    DVT (deep venous thrombosis) ([facility] 07/14/2018    GI bleed from duodenal ulcer 08/06/2018    Malignant neoplasm of kidney excluding renal pelvis [facility] 04/08/2014    Retroperitoneal bleed 10/09/2020    UTI 10/12/2020        SURGICAL HISTORY  Past Surgical History:   Procedure Laterality Date    CARDIAC CATHETERIZATION  09/06/2019    COLONOSCOPY N/A 04/09/2019    COLONOSCOPY WITH BIOPSY performed by Esperanza Gee MD at [address]  10/19/2020    CT BONE MARROW BIOPSY 10/19/2020 [address] [facility] CT SCAN    IVC FILTER INSERTION  10/13/2020    KIDNEY SURGERY  04/07/2014    removal of tumor (not total nephrectomy)    [place] EGD TRANSORAL BIOPSY SINGLE/MULTIPLE  09/28/2018    Shatzki's ring and gastritis    SIGMOIDOSCOPY      UPPER GASTROINTESTINAL ENDOSCOPY  08/06/2018    bleeding duodenal ulcer    UPPER GASTROINTESTINAL ENDOSCOPY N/A 04/08/2019    EGD WITH ANESTHESIA performed by Esperanza Gee MD at [address] [facility] N/A 04/12/2019    ESOPHAGEAL CAPSULE ENDOSCOPY performed by Esperanza Gee MD at [address] [facility]  Discharge Medication List as of 3/4/2023  8:31 PM        CONTINUE these medications which have NOT CHANGED    Details   finasteride (PROSCAR) 5 MG tablet Historical Med      furosemide (LASIX) 40 MG tablet Historical Med      aspirin 81 MG EC tablet Take 1 tablet by mouth daily, Disp-30 tablet, R-3Normal      atorvastatin (LIPITOR) 80 MG tablet Take 1 tablet by mouth nightly, Disp-30 tablet, R-3Normal      tamsulosin (FLOMAX) 0.4 MG capsule Take 0.4 mg by mouth at bedtimeHistorical Med      amLODIPine (NORVASC) 5 MG tablet Take 5 mg by mouth dailyHistorical Med      magnesium oxide (MAG-OX) 400 MG tablet Take 400 mg by mouth dailyHistorical Med      ketoconazole (NIZORAL) 2 % cream APPLY TO RASH IN SKIN FOLDS TOPICALLY TWICE A DAY AS NEEDED, Disp-60 g, R-2, Normal      lisinopril (PRINIVIL;ZESTRIL) 20 MG tablet Take 40 mg by mouth dailyHistorical Med      potassium chloride (MICRO-K) 10 MEQ extended release capsule Take 20 mEq by mouth 2 times dailyHistorical Med      pantoprazole (PROTONIX) 40 MG tablet Take 40 mg by mouth dailyHistorical Med      vitamin D (CHOLECALCIFEROL) 1000 UNIT TABS tablet Take 3,000 Units by mouth dailyHistorical Med      folic acid (FOLVITE) 1 MG tablet Take 1 tablet by mouth daily, Disp-30 tablet, R-3Normal             ALLERGIES  Patient has no known allergies. FAMILYHISTORY  Family History   Problem Relation Age of Onset    Arthritis Mother     Atrial Fibrillation Mother     Breast Cancer Mother     High Blood Pressure Mother     Anemia Father     Arthritis Father     Cancer Father 80        Ear CA/ tumor removed     Hearing Loss Father     Arthritis Sister     Diabetes Maternal Aunt     Arthritis Maternal Aunt     Early Death Maternal Uncle          in early 42's     Depression Maternal Uncle     Other Maternal Uncle         Suicide    Heart Disease Paternal Aunt     Arthritis Maternal Grandmother        SOCIAL HISTORY  Social History     Tobacco Use    Smoking status: Never    Smokeless tobacco: Never   Vaping Use    Vaping Use: Never used   Substance Use Topics    Alcohol use: No    Drug use: No       SCREENINGS    Alyce Coma Scale  Eye Opening: Spontaneous  Best Verbal Response: Oriented  Best Motor Response: Obeys commands  Alyce Coma Scale Score: 15       CIWA Assessment  BP: (!) 154/73  Heart Rate: 86             REVIEW OF SYSTEMS:    Review of Systems   Constitutional:  Negative for activity change, appetite change and fever. HENT:  Negative for congestion and postnasal drip. Respiratory:  Negative for cough and shortness of breath. Cardiovascular:  Positive for leg swelling. Negative for chest pain. Gastrointestinal:  Negative for nausea and vomiting. Genitourinary:  Positive for decreased urine volume. Negative for dysuria and flank pain. Musculoskeletal:  Positive for gait problem (baseline). Negative for back pain. Skin:  Negative for rash and wound. Neurological:  Negative for dizziness and light-headedness. Positives and Pertinent negatives as per HPI. PHYSICAL EXAM:     Vitals:    03/04/23 1928 03/04/23 1957 03/04/23 1958 03/04/23 2029   BP: 127/69  (!) 148/91 (!) 154/73   Pulse: 77  85 86   Resp: 26  26 16   Temp:  98.7 °F (37.1 °C)     TempSrc:  Oral     SpO2: 96%  98% 97%   Weight:       Height:           Physical Exam  Constitutional:       Appearance: Normal appearance. He is normal weight. He is not ill-appearing or diaphoretic. HENT:      Head: Normocephalic and atraumatic. Right Ear: External ear normal.      Left Ear: External ear normal.      Nose: Nose normal.      Mouth/Throat:      Mouth: Mucous membranes are moist.      Pharynx: Oropharynx is clear. Eyes:      General:         Right eye: No discharge. Left eye: No discharge. Conjunctiva/sclera: Conjunctivae normal.   Cardiovascular:      Rate and Rhythm: Normal rate and regular rhythm. Pulmonary:      Effort: Pulmonary effort is normal. No respiratory distress. Comments: Decreased breath sounds bilaterally  Abdominal:      General: Abdomen is flat. There is no distension. Palpations: Abdomen is soft. Tenderness: There is no abdominal tenderness. Musculoskeletal:         General: No swelling or tenderness. Cervical back: Normal range of motion and neck supple. Right lower leg: Edema present. Left lower leg: Edema present. Skin:     General: Skin is warm and dry. Findings: No rash. Neurological:      General: No focal deficit present.       Mental Status: He is alert and oriented to person, place, and time. Psychiatric:         Mood and Affect: Mood normal.         Thought Content: Thought content normal.         DIAGNOSTIC RESULTS:  LABS:    Labs Reviewed   CBC WITH AUTO DIFFERENTIAL - Abnormal; Notable for the following components:       Result Value    Hemoglobin 12.9 (*)     Hematocrit 39.6 (*)     RDW 16.3 (*)     Lymphocytes Absolute 0.3 (*)     All other components within normal limits   COMPREHENSIVE METABOLIC PANEL W/ REFLEX TO MG FOR LOW K - Abnormal; Notable for the following components:    Sodium 135 (*)     Chloride 96 (*)     Glucose 114 (*)     Creatinine 1.5 (*)     Est, Glom Filt Rate 49 (*)     Total Protein 6.1 (*)     Total Bilirubin 2.3 (*)     ALT 7 (*)     AST 12 (*)     All other components within normal limits   BRAIN NATRIURETIC PEPTIDE - Abnormal; Notable for the following components:    Pro-BNP 4,378 (*)     All other components within normal limits   TROPONIN - Abnormal; Notable for the following components:    Troponin 0.03 (*)     All other components within normal limits     I call to have his CTA read that was ordered by Dr. Pablo Mason, performed yesterday:   Impression   1. No DVT identified on this exam.  IVC filter present. 2.  Slight interval enlargement of an exophytic left renal mass concerning   for renal cell carcinoma. 3.  Indeterminate fat attenuation perinephric lesions bilaterally within   infiltrative appearance, possibly related to fat necrosis and/or   retroperitoneal lipomatosis. Neoplasm such as retroperitoneal liposarcoma   considered less likely though not entirely excluded. 3.  Small bilateral pleural effusions. Nonspecific subcarinal adenopathy. Dedicated CT chest may be helpful as clinically warranted. 4.  Cholelithiasis with slight haziness of the pericholecystic fat. Dilated   CBD measuring up to 0.9 cm. Early acute cholecystitis and   choledocholithiasis not excluded. Sonographic correlation recommended.        5. Nonspecific urinary bladder wall thickening may reflect under distension   versus cystitis. 6.  Large stable appearing ventral abdominal wall hernia containing small and   large bowel loops, without obstruction. 7.  Additional nonemergent findings, as above. When ordered, only abnormal lab results are displayed. All other labs were within normal range or not returned as of this dictation. Independent EKG Interpretation: EKG with atrial fibrillation, consistent with previous EKGs. Right bundle branch block. No ST segment elevation, segment depression, hyperacute T waves. QTc 570. RADIOLOGY:    Non-plain film images such as CT, Ultrasound and MRI are read by the radiologist. Interpretation per the Radiologist below, if available at the time of this note:  XR CHEST PORTABLE   Final Result   Cardiomegaly with small bilateral pleural effusions and mild pulmonary edema. Left basilar parenchymal opacity likely representing atelectasis rather than   infiltrate though clinical correlation recommended. PROCEDURES:  Unless otherwise noted below, none. Procedures      MEDICATIONS:   Medications - No data to display  _____________________________________    MEDICAL DECISION MAKING:     Patient is a 67 y.o. male with complex medical history as above, presents emergency department today mostly for concerns of a Mills not being in place. Was trying to be changed at home today, was unable to get it in. Patient arrives emergency department today somewhat short of breath, but no worse than his baseline. His legs are quite swollen, also being investigated by vascular. Has an upcoming Watchman device planning. CTA pending outpatient from yesterday, will have it read today. Otherwise, not hypoxic, not tachycardic, and otherwise is conversational and denies any other concerns outside the Mills catheter today in the ER.     ED staff is able to place Mills catheter successfully today in the emergency department and patient feels much better. Laboratory evaluation today with no significant new anemia, CBC otherwise unremarkable. Baseline BUN and creatinine with a GFR of 49, CKD. BNP about 4300, is also baseline. Troponin 0.03. No chest pain. Chest x-ray with cardiomegaly smile bilateral pleural effusions and mild pulmonary edema. I discussed the findings with his wife at length and patient at length, and she would like a printed copy of all of them. Further, I have his CTA from outpatient read today, has multiple findings. Notably, there is an increase in the size of the renal mass, and I discussed this with wife. There is some concern for CBD dilatation, however he has no abdominal pain, nausea, vomiting, and has a history of choledocholithiasis. I do not necessarily feel this is an acute process today. Additionally, no significant DVT in the proximal  fems, IVC filter in place. Overall, family feels very comfortable being discharged home at this time. Has a cardiology appointment tomorrow. Will make an appointment with urology. Further, I provide them with nephrology. This is quite a complex patient who has many medical issues acute on chronic going on right now, and I invited them to return the emergency department should they have any concerns, but at this time, his acute concern about the Mills catheter being placed has been resolved today in the ER. They agree with discharge home. Strict return precaution provided at length. CONSULTS:   Jefry Reasons      Is this patient to be included in the SEP-1 Core Measure due to severe sepsis or septic shock? No   Exclusion criteria - the patient is NOT to be included for SEP-1 Core Measure due to: Infection is not suspected      CLINICAL IMPRESSION:     ICD-10-CM    1. Problem with Mills catheter, initial encounter ([facility]  T83. 9XXA       2. Left renal mass  N28.89       3.  Elevated brain natriuretic peptide (BNP) level  R79.89       4. Bilateral lower extremity edema  R60.0       5. Dilated cbd, acquired  K83.8             DISPOSITION:  I discussed the results, including any incidental findings, with patient and spouse/SO and through shared decision making. Disposition today from the ED will be: Discharge to home in stable condition. Questions answered. They are agreeable to plan and express understanding of plan. Social Determinants :    Heavy medical burden      DISCHARGE MEDICATIONS (if applicable):  Discharge Medication List as of 3/4/2023  8:31 PM          DISCONTINUED MEDICATIONS:  Discharge Medication List as of 3/4/2023  8:31 PM               DISPOSITION REFERRAL (if applicable):  [facility]  ED  [address] [facility]  [address]  [phone]    If symptoms worsen, As needed    Narayan Aguilar MD  [address] [facility] [address] [place] [address]  [phone]    Schedule an appointment as soon as possible for a visit       Robert Mansfield MD  [address] [facility] [address]  [phone]    Schedule an appointment as soon as possible for a visit   kidney doctor    Kevin Lugo MD  [place] [address]  [facility] [address] [place] [address]  [phone]    Schedule an appointment as soon as possible for a visit         __________________________________      Marleny Floyd DO, ([facility]) am the primary attending of record and contributed the majority of evaluation and treatment of emergent care for this encounter. This chart was generated in part by using Dragon Dictation system and may contain errors related to that system including errors in grammar, punctuation, and spelling, as well as words and phrases that may be inappropriate. If there are any questions or concerns please feel free to contact the dictating provider for clarification.      CHIQUITA FLOYD DO (electronically signed)    [facility] Mario Moon DO  03/07/23 5223

## 2023-03-23 ENCOUNTER — HOSPITAL ENCOUNTER (EMERGENCY)
Age: 73
Discharge: HOME | End: 2023-03-23
Payer: MEDICARE

## 2023-03-23 VITALS
DIASTOLIC BLOOD PRESSURE: 99 MMHG | HEART RATE: 101 BPM | RESPIRATION RATE: 35 BRPM | OXYGEN SATURATION: 95 % | SYSTOLIC BLOOD PRESSURE: 151 MMHG | TEMPERATURE: 97.6 F

## 2023-03-23 DIAGNOSIS — N39.0 URINARY TRACT INFECTION ASSOCIATED WITH INDWELLING URETHRAL CATHETER, INITIAL ENCOUNTER (HCC): ICD-10-CM

## 2023-03-23 DIAGNOSIS — N18.2 CHRONIC RENAL INSUFFICIENCY, STAGE 2 (MILD): ICD-10-CM

## 2023-03-23 DIAGNOSIS — T83.511A URINARY TRACT INFECTION ASSOCIATED WITH INDWELLING URETHRAL CATHETER, INITIAL ENCOUNTER (HCC): ICD-10-CM

## 2023-03-23 DIAGNOSIS — T83.9XXA PROBLEM WITH FOLEY CATHETER, INITIAL ENCOUNTER (HCC): Primary | ICD-10-CM

## 2023-03-23 DIAGNOSIS — R33.9 URINARY RETENTION: ICD-10-CM

## 2023-03-23 LAB
AMORPH SED URNS QL MICRO: ABNORMAL /HPF
ANION GAP SERPL CALCULATED.3IONS-SCNC: 11 MMOL/L (ref 3–16)
BACTERIA URNS QL MICRO: ABNORMAL /HPF
BASOPHILS # BLD: 0.1 K/UL (ref 0–0.2)
BASOPHILS NFR BLD: 0.8 %
BILIRUB UR QL STRIP.AUTO: NEGATIVE
BUN SERPL-MCNC: 31 MG/DL (ref 7–20)
CALCIUM SERPL-MCNC: 9 MG/DL (ref 8.3–10.6)
CASTS #/AREA URNS LPF: ABNORMAL /LPF
CHLORIDE SERPL-SCNC: 101 MMOL/L (ref 99–110)
CLARITY UR: ABNORMAL
CO2 SERPL-SCNC: 26 MMOL/L (ref 21–32)
COLOR UR: YELLOW
CREAT SERPL-MCNC: 1.6 MG/DL (ref 0.8–1.3)
DEPRECATED RDW RBC AUTO: 16.5 % (ref 12.4–15.4)
EOSINOPHIL # BLD: 0.1 K/UL (ref 0–0.6)
EOSINOPHIL NFR BLD: 2.1 %
GFR SERPLBLD CREATININE-BSD FMLA CKD-EPI: 45 ML/MIN/{1.73_M2}
GLUCOSE SERPL-MCNC: 160 MG/DL (ref 70–99)
GLUCOSE UR STRIP.AUTO-MCNC: NEGATIVE MG/DL
HCT VFR BLD AUTO: 40.1 % (ref 40.5–52.5)
HGB BLD-MCNC: 13.2 G/DL (ref 13.5–17.5)
HGB UR QL STRIP.AUTO: ABNORMAL
HYALINE CASTS #/AREA URNS LPF: ABNORMAL /LPF (ref 0–2)
KETONES UR STRIP.AUTO-MCNC: NEGATIVE MG/DL
LEUKOCYTE ESTERASE UR QL STRIP.AUTO: ABNORMAL
LYMPHOCYTES # BLD: 0.5 K/UL (ref 1–5.1)
LYMPHOCYTES NFR BLD: 6.6 %
MCH RBC QN AUTO: 27.4 PG (ref 26–34)
MCHC RBC AUTO-ENTMCNC: 32.8 G/DL (ref 31–36)
MCV RBC AUTO: 83.6 FL (ref 80–100)
MONOCYTES # BLD: 0.5 K/UL (ref 0–1.3)
MONOCYTES NFR BLD: 7.7 %
MUCOUS THREADS #/AREA URNS LPF: ABNORMAL /LPF
NEUTROPHILS # BLD: 5.9 K/UL (ref 1.7–7.7)
NEUTROPHILS NFR BLD: 82.8 %
NITRITE UR QL STRIP.AUTO: NEGATIVE
PH UR STRIP.AUTO: 5.5 [PH] (ref 5–8)
PLATELET # BLD AUTO: 176 K/UL (ref 135–450)
PMV BLD AUTO: 8.5 FL (ref 5–10.5)
POTASSIUM SERPL-SCNC: 4.3 MMOL/L (ref 3.5–5.1)
PROT UR STRIP.AUTO-MCNC: 30 MG/DL
RBC # BLD AUTO: 4.8 M/UL (ref 4.2–5.9)
RBC #/AREA URNS HPF: ABNORMAL /HPF (ref 0–4)
SODIUM SERPL-SCNC: 138 MMOL/L (ref 136–145)
SP GR UR STRIP.AUTO: >=1.03 (ref 1–1.03)
UA COMPLETE W REFLEX CULTURE PNL UR: YES
UA DIPSTICK W REFLEX MICRO PNL UR: YES
URN SPEC COLLECT METH UR: ABNORMAL
UROBILINOGEN UR STRIP-ACNC: 0.2 E.U./DL
WBC # BLD AUTO: 7.1 K/UL (ref 4–11)
WBC #/AREA URNS HPF: ABNORMAL /HPF (ref 0–5)

## 2023-03-23 PROCEDURE — 80048 BASIC METABOLIC PNL TOTAL CA: CPT

## 2023-03-23 PROCEDURE — 96365 THER/PROPH/DIAG IV INF INIT: CPT

## 2023-03-23 PROCEDURE — 2580000003 HC RX 258: Performed by: PHYSICIAN ASSISTANT

## 2023-03-23 PROCEDURE — 81001 URINALYSIS AUTO W/SCOPE: CPT

## 2023-03-23 PROCEDURE — 85025 COMPLETE CBC W/AUTO DIFF WBC: CPT

## 2023-03-23 PROCEDURE — 87077 CULTURE AEROBIC IDENTIFY: CPT

## 2023-03-23 PROCEDURE — 87186 SC STD MICRODIL/AGAR DIL: CPT

## 2023-03-23 PROCEDURE — 6360000002 HC RX W HCPCS: Performed by: PHYSICIAN ASSISTANT

## 2023-03-23 PROCEDURE — 99284 EMERGENCY DEPT VISIT MOD MDM: CPT

## 2023-03-23 PROCEDURE — 51702 INSERT TEMP BLADDER CATH: CPT

## 2023-03-23 PROCEDURE — 87086 URINE CULTURE/COLONY COUNT: CPT

## 2023-03-23 RX ORDER — 0.9 % SODIUM CHLORIDE 0.9 %
1000 INTRAVENOUS SOLUTION INTRAVENOUS ONCE
Status: COMPLETED | OUTPATIENT
Start: 2023-03-23 | End: 2023-03-23

## 2023-03-23 RX ORDER — CEFUROXIME AXETIL 500 MG/1
500 TABLET ORAL 2 TIMES DAILY
Qty: 20 TABLET | Refills: 0 | Status: SHIPPED | OUTPATIENT
Start: 2023-03-23 | End: 2023-04-02

## 2023-03-23 RX ADMIN — SODIUM CHLORIDE 1000 ML: 9 INJECTION, SOLUTION INTRAVENOUS at 18:12

## 2023-03-23 RX ADMIN — CEFTRIAXONE SODIUM 1000 MG: 1 INJECTION, POWDER, FOR SOLUTION INTRAMUSCULAR; INTRAVENOUS at 18:12

## 2023-03-23 ASSESSMENT — PAIN SCALES - GENERAL: PAINLEVEL_OUTOF10: 10

## 2023-03-23 ASSESSMENT — PAIN - FUNCTIONAL ASSESSMENT: PAIN_FUNCTIONAL_ASSESSMENT: 0-10

## 2023-03-23 NOTE — DISCHARGE INSTRUCTIONS
Urinalysis was positive UTI. Rocephin 1 g IVPB given. I did send prescription for continuation antibiotics Ceftin 500 mg take this twice daily x10 days. Increase your fluid intake. BUN slightly elevated at 31. NaCl 1 L given. I would like you to follow-up with Stefano Pineda next week. Return to the facility if any problems. Your Mills catheter was removed. A new Mills catheter was inserted. 700 mL of urine was removed. Your pain did subside.

## 2023-03-23 NOTE — ED PROVIDER NOTES
[address] [facility]  ED  EMERGENCY DEPARTMENT ENCOUNTER        Pt Name: Dustin Wolfe Sr. MRN: [id number]  Birthdate 1950  Date of evaluation: 3/23/2023  Provider: Roman Coronel PA-C  PCP: Miguelangel Anderson MD  Note Started: 5:09 PM EDT 3/23/23      FATMATA. I have evaluated this patient. My supervising physician was available for consultation. CHIEF COMPLAINT       Chief Complaint   Patient presents with    Urinary Retention     Has not been able to urinate since 12, has had a cath for 4 years and today it stopped draining, home health RN put a new one in and it would not drain so she took it out. HISTORY OF PRESENT ILLNESS: 1 or more Elements     History From: Patient    Limitations to history : None    Gracie Peters is a 67 y.o. male who presents to the emergency department with complaint of Mills catheter failure. Patient presents to the ED for evaluation and replacement of Mills catheter as his is nonfunctioning. Last output this morning. At noon he noted no output. He contacted health services. They came out to change out his Mills catheter. Not successful as he continues having no urine output. He does state he makes urine. He is not on dialysis. He has had Mills catheter in place x4 years due to unclear reason. It may be neurogenic bladder or BPH. He is not sure. He does see urology. He does state he has a new urologist Dr. Jorge Lincoln with whom he will see in about a month. In the meantime home health care manages the patient's Mills catheter. He reports no fevers or chills. He reports no flank pain. I did review the [facility] records of PCP Dr. Enrico Banks showing BPH.     Medical history: CVA, CHF, IVC filter, morbid obesity, lymphedema, DVT, left renal carcinoma, nonischemic cardiomyopathy EF 40% cholelithiasis, urinary retention, indwelling Mills catheter x4 years      Nursing Notes were all reviewed and agreed with or any disagreements (0 mLs IntraVENous Stopped 3/23/23 2027)             Is this patient to be included in the SEP-1 Core Measure due to severe sepsis or septic shock? No   Exclusion criteria - the patient is NOT to be included for SEP-1 Core Measure due to: Infection is not suspected    Chronic Conditions affecting care: Lymphedema, indwelling Mills catheter x4 years, BPH, left renal neoplasm   has a past medical history of Acute respiratory failure with hypoxia and hypercapnia ([facility] (09/24/2018), Anemia due to blood loss, acute (10/12/2020), Bacteremia due to Escherichia coli (10/12/2020), Calculus of gallbladder with acute cholecystitis without obstruction (09/24/2018), CHF (congestive heart failure) ([facility] (08/2019), Choledecholithiasis (09/24/2018), DVT (deep venous thrombosis) ([facility] (07/14/2018), GI bleed from duodenal ulcer (08/06/2018), Malignant neoplasm of kidney excluding renal pelvis ([facility] (04/08/2014), Retroperitoneal bleed (10/09/2020), and UTI (10/12/2020). CONSULTS: (Who and What was discussed)  None    Social Determinants Significantly Affecting Health : None    Records Reviewed (External and Source) none    CC/HPI Summary, DDx, ED Course, and Reassessment: This patient presenting by EMS with complaint of Mills catheter obstruction. Patient with indwelling Mills catheter x4 years. I do believe place due to BPH and chronic urinary retention. He is followed by urology. He will see Dr. Gila Gilmore next week, no urologist for the patient. The patient had catheter changed Monday. Working at about noon today with no further output. Home nurse agency contacted they came out and tried to replace it without success. In the emergency department staff places Mills catheter which drained 700 mL urine. UA shows evidence of UTI. Rocephin 1 g IVPB given. He is not septic. White count not elevated. Patient BUN elevated beyond his usual baseline at 31 otherwise GFR and creatinine were within baseline.   NaCl 1

## 2023-03-25 LAB
BACTERIA UR CULT: ABNORMAL
ORGANISM: ABNORMAL

## 2023-03-26 NOTE — RESULT ENCOUNTER NOTE
Culture reviewed, culture is positive but resistant to antibiotics prescribed at discharge.   Antibiotic needs to be changed to Macrobid 100 mg twice daily for 7 days

## 2023-06-08 NOTE — PROGRESS NOTES
CARDIOLOGY CONSULTATION         Patient Name: Erica Hines Sr.  Primary Care physician: Tad Smith MD    Reason for Referral/Chief Complaint: Marizol Murray is a 67 y.o. patient who is referred to cardiology clinic today for evaluation for candidacy for Watchman Procedure. History of Present Illness:   Marizol Ontiveros is a 67 y.o. male with a medical history notable for diabetes mellitus, hypertension, hyperlipidemia, deep vein thrombosis s/p IVC filter 10/2020, GI bleed 2019 requiring massive transfusion protocol, cerebrovascular accident, nonischemic cardiomyopathy, paroxsymal atrial fibrillation, and morbid obesity. He has a history of DVTs 20 years ago and was on coumadin per hematology. He was admitted in 4/2019 with INR 5, anemia. EGD and colonoscopy normal. Echo showed EF 40-45%. Stress test abnormal. [facility] 9/6/2019 showed no significant CAD. He was hospitalized 10/09-10/22/20 he was found to have supratheraputic INR 9.24. Suspected left perinephric hemorrhage. Required multiple blood transfusions. Echocardiogram 10/12/20 EF 50%. He was [facility] to [facility] 02/09-02/14/23 with left sided facial droop. Brain MRI 02/09/23 acute right MCA infarct. New onset atrial fibrillation. Not candidate for anticoagulation. Upon discharge referred to [facility]. Today, 06/09/23, he is here for Watchman shared decision. Per wife he has had 4 total bleeding events requiring hospitalization. History as outlined above. He states he does have IVC filter in place history of DVT. He has mild residual deficits following stroke but is largely doing well. Presents with a front wheel rolling walker. Has issues with balance. No memory issues. No dense hemiparesis. He has no aphasia. He has chronic swelling to bilateral lower extremities. Treated for lymphedema. He sleeps in recliner at approx 45 degrees. Reports weight loss. No rapid weight gain.   He reports shortness of breath

## 2023-06-09 ENCOUNTER — OFFICE VISIT (OUTPATIENT)
Dept: CARDIOLOGY CLINIC | Age: 73
End: 2023-06-09

## 2023-06-09 VITALS
WEIGHT: 315 LBS | HEART RATE: 96 BPM | BODY MASS INDEX: 54.24 KG/M2 | OXYGEN SATURATION: 95 % | SYSTOLIC BLOOD PRESSURE: 126 MMHG | DIASTOLIC BLOOD PRESSURE: 70 MMHG

## 2023-06-09 DIAGNOSIS — I10 BENIGN HYPERTENSION: Chronic | ICD-10-CM

## 2023-06-09 DIAGNOSIS — E66.01 CLASS 3 SEVERE OBESITY DUE TO EXCESS CALORIES WITH SERIOUS COMORBIDITY AND BODY MASS INDEX (BMI) OF 50.0 TO 59.9 IN ADULT (HCC): ICD-10-CM

## 2023-06-09 DIAGNOSIS — I48.91 ATRIAL FIBRILLATION, UNSPECIFIED TYPE (HCC): ICD-10-CM

## 2023-06-09 DIAGNOSIS — I63.9 ACUTE CEREBROVASCULAR ACCIDENT (CVA) (HCC): ICD-10-CM

## 2023-06-09 DIAGNOSIS — I42.8 NON-ISCHEMIC CARDIOMYOPATHY (HCC): Primary | ICD-10-CM

## 2023-06-09 RX ORDER — AMLODIPINE BESYLATE 2.5 MG/1
2.5 TABLET ORAL DAILY
Qty: 90 TABLET | Refills: 1 | Status: SHIPPED | OUTPATIENT
Start: 2023-06-09

## 2023-06-09 RX ORDER — CARVEDILOL 3.12 MG/1
3.12 TABLET ORAL 2 TIMES DAILY
Qty: 180 TABLET | Refills: 1 | Status: SHIPPED | OUTPATIENT
Start: 2023-06-09

## 2023-06-09 NOTE — PATIENT INSTRUCTIONS
PLAN:  Start carvedilol (Coreg) 3.125 mg twice daily  Recommend monitor blood pressure at home if less than 100/60's and symptomatic such as lightheaded, dizziness, or near syncope call office for further intervention  May proceed with Watchman   Refer to Electrophysiology ~  atrial fibrillation   Order EKG today ~ assess for atrial fibrillation  Decreased amlodipine 2.5 mg once daily ~ swelling to legs. Discussed possibly discontinuing medication, will re-evaluate at next visit.   Follow up with Amber Cortés CNP in 6 months

## 2023-06-12 ENCOUNTER — TELEPHONE (OUTPATIENT)
Dept: CARDIOLOGY CLINIC | Age: 73
End: 2023-06-12

## 2023-06-13 ENCOUNTER — TELEPHONE (OUTPATIENT)
Dept: CARDIOLOGY CLINIC | Age: 73
End: 2023-06-13

## 2023-06-13 DIAGNOSIS — I48.0 PAROXYSMAL A-FIB (HCC): Primary | ICD-10-CM

## 2023-06-13 DIAGNOSIS — Z95.818 PRESENCE OF WATCHMAN LEFT ATRIAL APPENDAGE CLOSURE DEVICE: ICD-10-CM

## 2023-06-13 DIAGNOSIS — Z01.818 PRE-OP TESTING: ICD-10-CM

## 2023-06-15 RX ORDER — CHLORHEXIDINE GLUCONATE 213 G/1000ML
SOLUTION TOPICAL
Qty: 1 EACH | Refills: 0 | Status: ON HOLD
Start: 2023-06-15 | End: 2023-06-19 | Stop reason: HOSPADM

## 2023-06-19 ENCOUNTER — ANESTHESIA (OUTPATIENT)
Dept: CARDIAC CATH/INVASIVE PROCEDURES | Age: 73
DRG: 274 | End: 2023-06-19
Payer: MEDICARE

## 2023-06-19 ENCOUNTER — ANESTHESIA EVENT (OUTPATIENT)
Dept: CARDIAC CATH/INVASIVE PROCEDURES | Age: 73
DRG: 274 | End: 2023-06-19
Payer: MEDICARE

## 2023-06-19 ENCOUNTER — HOSPITAL ENCOUNTER (INPATIENT)
Dept: CARDIAC CATH/INVASIVE PROCEDURES | Age: 73
LOS: 1 days | Discharge: HOME | DRG: 274 | End: 2023-06-19
Attending: INTERNAL MEDICINE | Admitting: INTERNAL MEDICINE
Payer: MEDICARE

## 2023-06-19 VITALS
HEART RATE: 94 BPM | HEIGHT: 64 IN | WEIGHT: 315 LBS | RESPIRATION RATE: 20 BRPM | OXYGEN SATURATION: 94 % | DIASTOLIC BLOOD PRESSURE: 66 MMHG | TEMPERATURE: 97.3 F | SYSTOLIC BLOOD PRESSURE: 123 MMHG | BODY MASS INDEX: 53.78 KG/M2

## 2023-06-19 PROBLEM — I48.0 PAF (PAROXYSMAL ATRIAL FIBRILLATION) (HCC): Status: ACTIVE | Noted: 2023-06-19

## 2023-06-19 LAB
ABO + RH BLD: NORMAL
ALBUMIN SERPL-MCNC: 3.5 G/DL (ref 3.4–5)
ALBUMIN/GLOB SERPL: 1.1 {RATIO} (ref 1.1–2.2)
ALP SERPL-CCNC: 79 U/L (ref 40–129)
ALT SERPL-CCNC: 11 U/L (ref 10–40)
ANION GAP SERPL CALCULATED.3IONS-SCNC: 7 MMOL/L (ref 3–16)
AST SERPL-CCNC: 10 U/L (ref 15–37)
BASOPHILS # BLD: 0 K/UL (ref 0–0.2)
BASOPHILS NFR BLD: 0.5 %
BILIRUB SERPL-MCNC: 1.4 MG/DL (ref 0–1)
BLD GP AB SCN SERPL QL: NORMAL
BUN SERPL-MCNC: 33 MG/DL (ref 7–20)
CALCIUM SERPL-MCNC: 9 MG/DL (ref 8.3–10.6)
CHLORIDE SERPL-SCNC: 108 MMOL/L (ref 99–110)
CO2 SERPL-SCNC: 26 MMOL/L (ref 21–32)
CREAT SERPL-MCNC: 1.7 MG/DL (ref 0.8–1.3)
DEPRECATED RDW RBC AUTO: 16.6 % (ref 12.4–15.4)
EKG ATRIAL RATE: 67 BPM
EKG DIAGNOSIS: NORMAL
EKG Q-T INTERVAL: 390 MS
EKG QRS DURATION: 120 MS
EKG QTC CALCULATION (BAZETT): 441 MS
EKG R AXIS: 110 DEGREES
EKG T AXIS: 9 DEGREES
EKG VENTRICULAR RATE: 77 BPM
EOSINOPHIL # BLD: 0.3 K/UL (ref 0–0.6)
EOSINOPHIL NFR BLD: 5.4 %
GFR SERPLBLD CREATININE-BSD FMLA CKD-EPI: 42 ML/MIN/{1.73_M2}
GLUCOSE BLD-MCNC: 99 MG/DL (ref 70–99)
GLUCOSE SERPL-MCNC: 117 MG/DL (ref 70–99)
HCT VFR BLD AUTO: 35.9 % (ref 40.5–52.5)
HGB BLD-MCNC: 11.5 G/DL (ref 13.5–17.5)
LYMPHOCYTES # BLD: 0.5 K/UL (ref 1–5.1)
LYMPHOCYTES NFR BLD: 8.3 %
MAGNESIUM SERPL-MCNC: 2.1 MG/DL (ref 1.8–2.4)
MCH RBC QN AUTO: 27.5 PG (ref 26–34)
MCHC RBC AUTO-ENTMCNC: 32 G/DL (ref 31–36)
MCV RBC AUTO: 86.1 FL (ref 80–100)
MONOCYTES # BLD: 0.5 K/UL (ref 0–1.3)
MONOCYTES NFR BLD: 7.5 %
NEUTROPHILS # BLD: 4.8 K/UL (ref 1.7–7.7)
NEUTROPHILS NFR BLD: 78.3 %
PERFORMED ON: NORMAL
PLATELET # BLD AUTO: 175 K/UL (ref 135–450)
PMV BLD AUTO: 8.6 FL (ref 5–10.5)
POC ACT LR: 319 SEC
POTASSIUM SERPL-SCNC: 5.3 MMOL/L (ref 3.5–5.1)
PROT SERPL-MCNC: 6.6 G/DL (ref 6.4–8.2)
RBC # BLD AUTO: 4.17 M/UL (ref 4.2–5.9)
SODIUM SERPL-SCNC: 141 MMOL/L (ref 136–145)
WBC # BLD AUTO: 6.1 K/UL (ref 4–11)

## 2023-06-19 PROCEDURE — 6360000002 HC RX W HCPCS

## 2023-06-19 PROCEDURE — 2500000003 HC RX 250 WO HCPCS

## 2023-06-19 PROCEDURE — B24BZZ4 ULTRASONOGRAPHY OF HEART WITH AORTA, TRANSESOPHAGEAL: ICD-10-PCS | Performed by: INTERNAL MEDICINE

## 2023-06-19 PROCEDURE — APPNB45 APP NON BILLABLE 31-45 MINUTES: Performed by: NURSE PRACTITIONER

## 2023-06-19 PROCEDURE — 3700000001 HC ADD 15 MINUTES (ANESTHESIA)

## 2023-06-19 PROCEDURE — 7100000010 HC PHASE II RECOVERY - FIRST 15 MIN

## 2023-06-19 PROCEDURE — 7100000011 HC PHASE II RECOVERY - ADDTL 15 MIN

## 2023-06-19 PROCEDURE — 85025 COMPLETE CBC W/AUTO DIFF WBC: CPT

## 2023-06-19 PROCEDURE — 36415 COLL VENOUS BLD VENIPUNCTURE: CPT

## 2023-06-19 PROCEDURE — C1760 CLOSURE DEV, VASC: HCPCS

## 2023-06-19 PROCEDURE — 3700000000 HC ANESTHESIA ATTENDED CARE

## 2023-06-19 PROCEDURE — 33340 PERQ CLSR TCAT L ATR APNDGE: CPT

## 2023-06-19 PROCEDURE — 83735 ASSAY OF MAGNESIUM: CPT

## 2023-06-19 PROCEDURE — 86850 RBC ANTIBODY SCREEN: CPT

## 2023-06-19 PROCEDURE — 6370000000 HC RX 637 (ALT 250 FOR IP): Performed by: NURSE PRACTITIONER

## 2023-06-19 PROCEDURE — 6360000004 HC RX CONTRAST MEDICATION: Performed by: INTERNAL MEDICINE

## 2023-06-19 PROCEDURE — C1889 IMPLANT/INSERT DEVICE, NOC: HCPCS

## 2023-06-19 PROCEDURE — 7100000000 HC PACU RECOVERY - FIRST 15 MIN

## 2023-06-19 PROCEDURE — 2709999900 HC NON-CHARGEABLE SUPPLY

## 2023-06-19 PROCEDURE — 2500000003 HC RX 250 WO HCPCS: Performed by: NURSE ANESTHETIST, CERTIFIED REGISTERED

## 2023-06-19 PROCEDURE — 85347 COAGULATION TIME ACTIVATED: CPT

## 2023-06-19 PROCEDURE — 1200000000 HC SEMI PRIVATE

## 2023-06-19 PROCEDURE — 93010 ELECTROCARDIOGRAM REPORT: CPT | Performed by: INTERNAL MEDICINE

## 2023-06-19 PROCEDURE — 2580000003 HC RX 258

## 2023-06-19 PROCEDURE — 80053 COMPREHEN METABOLIC PANEL: CPT

## 2023-06-19 PROCEDURE — 6360000002 HC RX W HCPCS: Performed by: NURSE ANESTHETIST, CERTIFIED REGISTERED

## 2023-06-19 PROCEDURE — 33340 PERQ CLSR TCAT L ATR APNDGE: CPT | Performed by: INTERNAL MEDICINE

## 2023-06-19 PROCEDURE — C1894 INTRO/SHEATH, NON-LASER: HCPCS

## 2023-06-19 PROCEDURE — 6360000002 HC RX W HCPCS: Performed by: INTERNAL MEDICINE

## 2023-06-19 PROCEDURE — 86901 BLOOD TYPING SEROLOGIC RH(D): CPT

## 2023-06-19 PROCEDURE — 93355 ECHO TRANSESOPHAGEAL (TEE): CPT

## 2023-06-19 PROCEDURE — 2580000003 HC RX 258: Performed by: NURSE ANESTHETIST, CERTIFIED REGISTERED

## 2023-06-19 PROCEDURE — 93005 ELECTROCARDIOGRAM TRACING: CPT | Performed by: INTERNAL MEDICINE

## 2023-06-19 PROCEDURE — 7100000001 HC PACU RECOVERY - ADDTL 15 MIN

## 2023-06-19 PROCEDURE — 02L73DK OCCLUSION OF LEFT ATRIAL APPENDAGE WITH INTRALUMINAL DEVICE, PERCUTANEOUS APPROACH: ICD-10-PCS | Performed by: INTERNAL MEDICINE

## 2023-06-19 PROCEDURE — 2580000003 HC RX 258: Performed by: INTERNAL MEDICINE

## 2023-06-19 PROCEDURE — 86900 BLOOD TYPING SEROLOGIC ABO: CPT

## 2023-06-19 PROCEDURE — C1769 GUIDE WIRE: HCPCS

## 2023-06-19 RX ORDER — LIDOCAINE HYDROCHLORIDE 10 MG/ML
1 INJECTION, SOLUTION EPIDURAL; INFILTRATION; INTRACAUDAL; PERINEURAL
Status: DISCONTINUED | OUTPATIENT
Start: 2023-06-19 | End: 2023-06-19 | Stop reason: HOSPADM

## 2023-06-19 RX ORDER — LIDOCAINE HYDROCHLORIDE 20 MG/ML
INJECTION, SOLUTION INTRAVENOUS PRN
Status: DISCONTINUED | OUTPATIENT
Start: 2023-06-19 | End: 2023-06-19 | Stop reason: SDUPTHER

## 2023-06-19 RX ORDER — CARVEDILOL 3.12 MG/1
3.12 TABLET ORAL 2 TIMES DAILY
Status: DISCONTINUED | OUTPATIENT
Start: 2023-06-19 | End: 2023-06-19 | Stop reason: HOSPADM

## 2023-06-19 RX ORDER — HEPARIN SODIUM 1000 [USP'U]/ML
INJECTION, SOLUTION INTRAVENOUS; SUBCUTANEOUS PRN
Status: DISCONTINUED | OUTPATIENT
Start: 2023-06-19 | End: 2023-06-19 | Stop reason: SDUPTHER

## 2023-06-19 RX ORDER — FOLIC ACID 1 MG/1
1 TABLET ORAL DAILY
Status: DISCONTINUED | OUTPATIENT
Start: 2023-06-19 | End: 2023-06-19 | Stop reason: HOSPADM

## 2023-06-19 RX ORDER — PROPOFOL 10 MG/ML
INJECTION, EMULSION INTRAVENOUS PRN
Status: DISCONTINUED | OUTPATIENT
Start: 2023-06-19 | End: 2023-06-19 | Stop reason: SDUPTHER

## 2023-06-19 RX ORDER — SODIUM CHLORIDE 0.9 % (FLUSH) 0.9 %
5-40 SYRINGE (ML) INJECTION PRN
Status: DISCONTINUED | OUTPATIENT
Start: 2023-06-19 | End: 2023-06-19 | Stop reason: HOSPADM

## 2023-06-19 RX ORDER — CLOPIDOGREL BISULFATE 75 MG/1
75 TABLET ORAL DAILY
Status: DISCONTINUED | OUTPATIENT
Start: 2023-06-20 | End: 2023-06-19 | Stop reason: HOSPADM

## 2023-06-19 RX ORDER — TAMSULOSIN HYDROCHLORIDE 0.4 MG/1
0.4 CAPSULE ORAL NIGHTLY
Status: DISCONTINUED | OUTPATIENT
Start: 2023-06-19 | End: 2023-06-19 | Stop reason: HOSPADM

## 2023-06-19 RX ORDER — ATORVASTATIN CALCIUM 80 MG/1
80 TABLET, FILM COATED ORAL NIGHTLY
Status: DISCONTINUED | OUTPATIENT
Start: 2023-06-19 | End: 2023-06-19 | Stop reason: HOSPADM

## 2023-06-19 RX ORDER — POTASSIUM CHLORIDE 750 MG/1
20 CAPSULE, EXTENDED RELEASE ORAL 2 TIMES DAILY
Status: DISCONTINUED | OUTPATIENT
Start: 2023-06-19 | End: 2023-06-19 | Stop reason: HOSPADM

## 2023-06-19 RX ORDER — ACETAMINOPHEN 325 MG/1
650 TABLET ORAL EVERY 4 HOURS PRN
Status: DISCONTINUED | OUTPATIENT
Start: 2023-06-19 | End: 2023-06-19 | Stop reason: HOSPADM

## 2023-06-19 RX ORDER — LABETALOL HYDROCHLORIDE 5 MG/ML
10 INJECTION, SOLUTION INTRAVENOUS
Status: DISCONTINUED | OUTPATIENT
Start: 2023-06-19 | End: 2023-06-19 | Stop reason: HOSPADM

## 2023-06-19 RX ORDER — OXYCODONE HYDROCHLORIDE 5 MG/1
5 TABLET ORAL
Status: DISCONTINUED | OUTPATIENT
Start: 2023-06-19 | End: 2023-06-19 | Stop reason: HOSPADM

## 2023-06-19 RX ORDER — CLOPIDOGREL BISULFATE 75 MG/1
75 TABLET ORAL DAILY
Qty: 90 TABLET | Refills: 1 | Status: SHIPPED | OUTPATIENT
Start: 2023-06-20

## 2023-06-19 RX ORDER — SUCCINYLCHOLINE/SOD CL,ISO/PF 200MG/10ML
SYRINGE (ML) INTRAVENOUS PRN
Status: DISCONTINUED | OUTPATIENT
Start: 2023-06-19 | End: 2023-06-19 | Stop reason: SDUPTHER

## 2023-06-19 RX ORDER — SODIUM CHLORIDE 0.9 % (FLUSH) 0.9 %
5-40 SYRINGE (ML) INJECTION EVERY 12 HOURS SCHEDULED
Status: DISCONTINUED | OUTPATIENT
Start: 2023-06-19 | End: 2023-06-19 | Stop reason: HOSPADM

## 2023-06-19 RX ORDER — ONDANSETRON 2 MG/ML
4 INJECTION INTRAMUSCULAR; INTRAVENOUS
Status: DISCONTINUED | OUTPATIENT
Start: 2023-06-19 | End: 2023-06-19 | Stop reason: HOSPADM

## 2023-06-19 RX ORDER — IODIXANOL 320 MG/ML
30 INJECTION, SOLUTION INTRAVASCULAR ONCE
Status: COMPLETED | OUTPATIENT
Start: 2023-06-19 | End: 2023-06-19

## 2023-06-19 RX ORDER — SODIUM CHLORIDE 9 MG/ML
INJECTION, SOLUTION INTRAVENOUS PRN
Status: DISCONTINUED | OUTPATIENT
Start: 2023-06-19 | End: 2023-06-19 | Stop reason: HOSPADM

## 2023-06-19 RX ORDER — ROCURONIUM BROMIDE 10 MG/ML
INJECTION, SOLUTION INTRAVENOUS PRN
Status: DISCONTINUED | OUTPATIENT
Start: 2023-06-19 | End: 2023-06-19 | Stop reason: SDUPTHER

## 2023-06-19 RX ORDER — DEXAMETHASONE SODIUM PHOSPHATE 4 MG/ML
INJECTION, SOLUTION INTRA-ARTICULAR; INTRALESIONAL; INTRAMUSCULAR; INTRAVENOUS; SOFT TISSUE PRN
Status: DISCONTINUED | OUTPATIENT
Start: 2023-06-19 | End: 2023-06-19 | Stop reason: SDUPTHER

## 2023-06-19 RX ORDER — HYDROMORPHONE HCL 110MG/55ML
0.25 PATIENT CONTROLLED ANALGESIA SYRINGE INTRAVENOUS EVERY 5 MIN PRN
Status: DISCONTINUED | OUTPATIENT
Start: 2023-06-19 | End: 2023-06-19 | Stop reason: HOSPADM

## 2023-06-19 RX ORDER — ASPIRIN 81 MG/1
81 TABLET ORAL DAILY
Status: DISCONTINUED | OUTPATIENT
Start: 2023-06-19 | End: 2023-06-19 | Stop reason: HOSPADM

## 2023-06-19 RX ORDER — PROCHLORPERAZINE EDISYLATE 5 MG/ML
5 INJECTION INTRAMUSCULAR; INTRAVENOUS
Status: DISCONTINUED | OUTPATIENT
Start: 2023-06-19 | End: 2023-06-19 | Stop reason: HOSPADM

## 2023-06-19 RX ORDER — CLOPIDOGREL BISULFATE 75 MG/1
75 TABLET ORAL ONCE
Status: COMPLETED | OUTPATIENT
Start: 2023-06-19 | End: 2023-06-19

## 2023-06-19 RX ORDER — PANTOPRAZOLE SODIUM 40 MG/1
40 TABLET, DELAYED RELEASE ORAL DAILY
Status: DISCONTINUED | OUTPATIENT
Start: 2023-06-19 | End: 2023-06-19 | Stop reason: HOSPADM

## 2023-06-19 RX ORDER — LISINOPRIL 20 MG/1
40 TABLET ORAL DAILY
Status: DISCONTINUED | OUTPATIENT
Start: 2023-06-19 | End: 2023-06-19 | Stop reason: HOSPADM

## 2023-06-19 RX ORDER — AMLODIPINE BESYLATE 5 MG/1
2.5 TABLET ORAL DAILY
Status: DISCONTINUED | OUTPATIENT
Start: 2023-06-19 | End: 2023-06-19 | Stop reason: HOSPADM

## 2023-06-19 RX ORDER — PROTAMINE SULFATE 10 MG/ML
INJECTION, SOLUTION INTRAVENOUS PRN
Status: DISCONTINUED | OUTPATIENT
Start: 2023-06-19 | End: 2023-06-19 | Stop reason: SDUPTHER

## 2023-06-19 RX ORDER — ONDANSETRON 2 MG/ML
INJECTION INTRAMUSCULAR; INTRAVENOUS PRN
Status: DISCONTINUED | OUTPATIENT
Start: 2023-06-19 | End: 2023-06-19 | Stop reason: SDUPTHER

## 2023-06-19 RX ORDER — SODIUM CHLORIDE 9 MG/ML
INJECTION, SOLUTION INTRAVENOUS CONTINUOUS
Status: DISCONTINUED | OUTPATIENT
Start: 2023-06-19 | End: 2023-06-19 | Stop reason: HOSPADM

## 2023-06-19 RX ORDER — MAGNESIUM OXIDE 400 MG/1
400 TABLET ORAL DAILY
Status: DISCONTINUED | OUTPATIENT
Start: 2023-06-19 | End: 2023-06-19 | Stop reason: HOSPADM

## 2023-06-19 RX ORDER — FENTANYL CITRATE 50 UG/ML
25 INJECTION, SOLUTION INTRAMUSCULAR; INTRAVENOUS EVERY 5 MIN PRN
Status: DISCONTINUED | OUTPATIENT
Start: 2023-06-19 | End: 2023-06-19 | Stop reason: HOSPADM

## 2023-06-19 RX ORDER — HYDRALAZINE HYDROCHLORIDE 20 MG/ML
10 INJECTION INTRAMUSCULAR; INTRAVENOUS
Status: DISCONTINUED | OUTPATIENT
Start: 2023-06-19 | End: 2023-06-19 | Stop reason: HOSPADM

## 2023-06-19 RX ADMIN — CEFAZOLIN 3000 MG: 10 INJECTION, POWDER, FOR SOLUTION INTRAVENOUS at 10:30

## 2023-06-19 RX ADMIN — Medication 100 MG: at 10:38

## 2023-06-19 RX ADMIN — LIDOCAINE HYDROCHLORIDE 100 MG: 20 INJECTION, SOLUTION INTRAVENOUS at 10:38

## 2023-06-19 RX ADMIN — SUGAMMADEX 200 MG: 100 INJECTION, SOLUTION INTRAVENOUS at 11:13

## 2023-06-19 RX ADMIN — IODIXANOL 30 ML: 320 INJECTION, SOLUTION INTRAVASCULAR at 11:21

## 2023-06-19 RX ADMIN — SODIUM CHLORIDE: 9 INJECTION, SOLUTION INTRAVENOUS at 10:23

## 2023-06-19 RX ADMIN — HEPARIN SODIUM 10000 UNITS: 1000 INJECTION INTRAVENOUS; SUBCUTANEOUS at 10:51

## 2023-06-19 RX ADMIN — CLOPIDOGREL BISULFATE 75 MG: 75 TABLET ORAL at 14:05

## 2023-06-19 RX ADMIN — ONDANSETRON 4 MG: 2 INJECTION INTRAMUSCULAR; INTRAVENOUS at 10:50

## 2023-06-19 RX ADMIN — PROTAMINE SULFATE 30 MG: 10 INJECTION, SOLUTION INTRAVENOUS at 11:10

## 2023-06-19 RX ADMIN — ROCURONIUM BROMIDE 10 MG: 10 INJECTION, SOLUTION INTRAVENOUS at 10:38

## 2023-06-19 RX ADMIN — PROTAMINE SULFATE 20 MG: 10 INJECTION, SOLUTION INTRAVENOUS at 11:14

## 2023-06-19 RX ADMIN — PHENYLEPHRINE HYDROCHLORIDE 30 MCG/MIN: 10 INJECTION INTRAVENOUS at 10:48

## 2023-06-19 RX ADMIN — DEXAMETHASONE SODIUM PHOSPHATE 4 MG: 4 INJECTION, SOLUTION INTRAMUSCULAR; INTRAVENOUS at 10:50

## 2023-06-19 RX ADMIN — ROCURONIUM BROMIDE 20 MG: 10 INJECTION, SOLUTION INTRAVENOUS at 10:50

## 2023-06-19 RX ADMIN — HEPARIN SODIUM 2000 UNITS: 1000 INJECTION INTRAVENOUS; SUBCUTANEOUS at 10:59

## 2023-06-19 RX ADMIN — PROPOFOL 150 MG: 10 INJECTION, EMULSION INTRAVENOUS at 10:38

## 2023-06-19 ASSESSMENT — ENCOUNTER SYMPTOMS: SHORTNESS OF BREATH: 0

## 2023-06-19 NOTE — PROGRESS NOTES
Carmen García at the bedside to speak with patient, wife, and sons    Discharge instructions reviewed, new medications discussed, paper copy and implant card given, all questions answered, and verbalized understanding    Patient out of bed to ambulate around the room, groin site remains CDI with no drainage, getting dressed for discharge    Prescriptions delivered by outpatient pharmacy.  Plavix dosage given before d/c per order

## 2023-06-19 NOTE — DISCHARGE INSTRUCTIONS
Watchman Discharge Instruction    Care of your puncture site:  Remove bandage 24 hours after the procedure. May shower in 24 hours but do not sit in a bathtub/pool of water for 5 days or until the wound is healed. Gently clean groin using soap and water. Dry thoroughly and apply a Band-Aid that covers the entire site. Use Band-Aid until skin heals over in about 3-5 days. Do not apply powder or lotion. Limit walking and stair climbing today. Normal Observations:  Soreness or tenderness which may last one week. Mild oozing from the incision site. Possible bruising that could last 2 weeks. Activity:  You may resume normal activity in 5 days or after the wound heals. Avoid lifting more than 10 pounds for 5 days or until the wound heals. Avoid strenuous exercise or activity for 1 week. You may return to work in 1 week  without restrictions       Call your doctor immediately if your condition worsens, for any other concerns, for a follow-up appointment or if you experience any of the following:  Increased swelling on the groin or leg. Unusual pain, numbness, or tingling of the groin or down the leg. Any signs of infection such as: redness, yellow drainage at the site, swelling or pain. IF GROIN STARTS BLEEDING SIGNIFICANTLY:   LAY FLAT, HOLD FIRM DIRECT PRESSURE, AND CALL 911     antibiotic prophylaxis (amoxicillin 2 g once 60 minutes prior to procedure) for 6 months for:  a. Dental procedures including cleanings  b. Gastrointestinal procedures  c.     Genitourinary procedures  d. Respiratory procedures involving incision or biopsy  e. Procedures on infected skin or muscle. SEDATION DISCHARGE INSTRUCTIONS    6/19/2023     Wear your seatbelt home. You are under the influence of drugs. Do not drink alcohol, drive, operate machinery, or make any important decisions or sign any legal documents for 24 hours  A responsible adult needs to be with you for 24 hours.   You may

## 2023-06-19 NOTE — ANESTHESIA PRE PROCEDURE
Department of Anesthesiology  Preprocedure Note       Name:  Caryn Angel Sr.   Age:  67 y.o.  :  1950                                          MRN:  [id number]         Date:  2023      Surgeon: * Surgery not found *    Procedure:     Medications prior to admission:   Prior to Admission medications    Medication Sig Start Date End Date Taking?  Authorizing Provider   chlorhexidine (HIBICLENS) 4 % external liquid Wash from neck down the morning of or the night before the procedure 6/15/23 6/29/23  Toyin Marcus MD   carvedilol (COREG) 3.125 MG tablet Take 1 tablet by mouth 2 times daily 23   Taylor Hanley MD   amLODIPine (NORVASC) 2.5 MG tablet Take 1 tablet by mouth daily 23   Taylor Hanley MD   finasteride (PROSCAR) 5 MG tablet  22   Historical Provider, MD   furosemide (LASIX) 40 MG tablet  3/1/23   Historical Provider, MD   aspirin 81 MG EC tablet Take 1 tablet by mouth daily 2/15/23   JOEY Arguello CNP   atorvastatin (LIPITOR) 80 MG tablet Take 1 tablet by mouth nightly 23   JOEY Arguello CNP   tamsulosin (FLOMAX) 0.4 MG capsule Take 1 capsule by mouth at bedtime    Historical Provider, MD   magnesium oxide (MAG-OX) 400 MG tablet Take 1 tablet by mouth daily    Historical Provider, MD   ketoconazole (NIZORAL) 2 % cream APPLY TO RASH IN SKIN FOLDS TOPICALLY TWICE A DAY AS NEEDED 7/15/21   John Desai MD   lisinopril (PRINIVIL;ZESTRIL) 20 MG tablet Take 2 tablets by mouth daily    Historical Provider, MD   potassium chloride (MICRO-K) 10 MEQ extended release capsule Take 2 capsules by mouth 2 times daily    Historical Provider, MD   pantoprazole (PROTONIX) 40 MG tablet Take 1 tablet by mouth daily    Historical Provider, MD   vitamin D (CHOLECALCIFEROL) 1000 UNIT TABS tablet Take 3 tablets by mouth daily    Historical Provider, MD   folic acid (FOLVITE) 1 MG tablet Take 1 tablet by mouth daily 10/1/18   JOEY Bourgeois CNP       Current medications:    Current

## 2023-06-19 NOTE — PROGRESS NOTES
Patient/report received from [facility] PACU RN, VSS, right groin site CDI with no drainage, pulses weak but palpable, denying numbess/tingling, denying other needs at this time, call light in reach    Prescription to delivered/picked up from outpatient pharmacy    [facility] raised with no change in groin drsg

## 2023-06-19 NOTE — H&P
[address]  Cardiology         Primary Cardiologist: Fortino Martini MD  Referring Physician: Ashia Nugent, APRN-CNP    Reason for Referral: Major bleeding complication and Left atrial appendage closure    CC: \"I have a lot going on. \"      Subjective:     History of Present Illness:    Bernadette Pratt Sr. is a 67 y.o. patient with a PMH significant for diabetes, hypertension, hyperlipidemia, DVT s/p IVC filter 10/2020, GI bleed 2019, CVA, and paroxsymal atrial fibrillation. He has a history of perinephric hemorrhage and cannot be started on anticoagulation therapy which prompted the [facility] consult. Patient is being referred for Left Atrial Appendage Closure with WATCHMAN device for management of stroke risk resulting from non-valvular atrial fibrillation. Based on their past history, it has been determined that they are poor candidates for long-term oral-anticoagulation, however may be tolerant of short term treatment with [facility] as necessary. Today, he is here for Watchman procedure accompanied by family. He is in a wheelchair and has a devine catheter. He is having shortness of breath. Past Medical History:   has a past medical history of Acute respiratory failure with hypoxia and hypercapnia ([address] Anemia due to blood loss, acute, Bacteremia due to Escherichia coli, Calculus of gallbladder with acute cholecystitis without obstruction, CHF (congestive heart failure) ([address] Choledecholithiasis, DVT (deep venous thrombosis) ([address] GI bleed from duodenal ulcer, Malignant neoplasm of kidney excluding renal pelvis ([address] Retroperitoneal bleed, and UTI. Surgical History:   has a past surgical history that includes Kidney surgery (04/07/2014); sigmoidoscopy; Upper gastrointestinal endoscopy (08/06/2018); pr egd transoral biopsy single/multiple (09/28/2018); Upper gastrointestinal endoscopy (N/A, 04/08/2019); Colonoscopy (N/A, 04/09/2019);  Upper gastrointestinal endoscopy (N/A,

## 2023-06-19 NOTE — ANESTHESIA POSTPROCEDURE EVALUATION
Department of Anesthesiology  Postprocedure Note    Patient: Bree Ingram Sr. MRN: [id number]  YOB: 1950  Date of evaluation: 6/19/2023      Procedure Summary     Date: 06/19/23 Room / Location: [facility] Cath Lab; [facility] Echocardiography    Anesthesia Start: 9188 Anesthesia Stop: 1140    Procedure: ECHO KOMAL IN CARDIAC CATH Diagnosis:       Paroxysmal atrial fibrillation      PAF (paroxysmal atrial fibrillation) ([facility]    Scheduled Providers:  Responsible Provider: Dolly Khan MD    Anesthesia Type: general ASA Status: 3          Anesthesia Type: No value filed.     Monroe Phase I: Monroe Score: 10    Monroe Phase II: Monroe Score: 10      Anesthesia Post Evaluation    Patient location during evaluation: PACU  Patient participation: complete - patient participated  Level of consciousness: awake and alert  Airway patency: patent  Nausea & Vomiting: no nausea and no vomiting  Complications: no  Cardiovascular status: hemodynamically stable  Respiratory status: acceptable  Hydration status: stable  Multimodal analgesia pain management approach

## 2023-06-19 NOTE — PROCEDURES
[address]   Procedure Note      Procedure Performed by: Maryann Varghese MD      Procedures performed:     Percutaneous transcatheter closure of the left atrial appendage with endocardial implant   Transeptal Puncture  KOMAL      Indication of procedure:  atrial fibrillation   High Risk for bleeding  Bleeding Episodes    Patient has been seen by General cardiology and shared decision making has been made for pursuing NAVIN closure. Patient here for NAVIN closure with Watchman device. Details of procedure: The patient was brought to the electrophysiology laboratory in stable condition. The patient was in a fasting and non-sedated state. The risks, benefits and alternatives of the procedure were discussed with the patient. The risks including, but not limited to, the risks of vascular injury, bleeding, infection, device malfunction, lead dislodgement, radiation exposure, injury to cardiac and surrounding structures (including pneumothorax), stroke, myocardial infarction and death were discussed in detail. The patient opted to proceed with the device implantation. Written informed consent was signed and placed in the chart. Prophylactic antibiotic was given. The patient was prepped and draped in a sterile fashion. A timeout protocol was completed to identify the patient and the procedure being performed. Patient underwent general anesthesia by anesthesiology team.       Transesophageal echocardiography was performed and measurements of left atrial appendage, including ostium size and depth were obtained for selection of appropriate watchman device. Decision was made to use a size 31 Watchman device based on KOMAL measurement. Both groins were prepped in a sterile fashion. We gained access in both femoral veins. Right femoral access was obtained using modified using modified seldinger technique. Patient received a bolus of heparin prior transseptal puncture.  Transseptal punctures through intact

## 2023-06-19 NOTE — PROGRESS NOTES
Patient awake, alert, VSS, right groin unchanged, phase I discharge criteria met, will transfer to [facility].

## 2023-06-19 NOTE — PROGRESS NOTES
Patient transferred from cath lab to pacu, responds to voice, VSS, right groin soft, dressing CDI, RLE pedal pulse +1.

## 2023-06-19 NOTE — DISCHARGE SUMMARY
[address]    DISCHARGE SUMMARY      Patient ID:  Paola Fink  [id number] 47 y.o. 1950    Discharge date:  06/19/23    Admitting Physician: Tania Plummer MD     Discharge NP: JOEY Garcia - CNP    Admission Diagnoses: Paroxysmal atrial fibrillation [I48.0]  PAF (paroxysmal atrial fibrillation) ([facility] [I48.0]    Discharge Diagnoses:   Patient Active Problem List   Diagnosis    Class 3 severe obesity due to excess calories with serious comorbidity and body mass index (BMI) of 50.0 to 59.9 in adult [facility])    Benign hypertension    Ventral hernia    Normocytic anemia    Candidal intertrigo    Lymphedema    Suspected sleep apnea    Non-ischemic cardiomyopathy (HCC) EF 40%    Pressure injury of buttock, stage 1    Vitamin D deficiency    Presence of IVC filter    Mass of left kidney     Unable to ambulate    Muscular deconditioning    Acute cerebrovascular accident (CVA) ([facility]    Atrial fibrillation ([facility]    PAF (paroxysmal atrial fibrillation) [facility])         Discharged Condition: good    Hospital Course: Bree Ingram Sr. is a 67 y.o. male patient  with a PMH significant for diabetes, hypertension, hyperlipidemia, DVT s/p IVC filter 10/2020, GI bleed 2019, CVA, and paroxsymal atrial fibrillation. He has a history of perinephric hemorrhage and cannot be started on anticoagulation therapy which prompted the [facility] consult.    (per H&P)    Patient presented today for Watchman Device implantation. Impression     1. Atrial fibrillation (non-valvular) s/p successful percutaneous left atrial appendage occlusion    Recommendations     Treatment with dual antiplatelet therapy   2. KOMAL at 45 days         -  Anticoagulation recommendations:   Anticoagulation with for 6 weeks with aspirin and Plavix. KOMAL after 45 days. If Watchman device is well seated with adequate seal and residual leak < 5 mm, then: cont ASA 81 mg + Plavix for total of 6 months post implantation date.

## 2023-06-29 ENCOUNTER — HOSPITAL ENCOUNTER (OUTPATIENT)
Age: 73
Setting detail: SPECIMEN
Discharge: HOME | End: 2023-06-29
Payer: MEDICARE

## 2023-06-29 LAB
ANION GAP SERPL CALCULATED.3IONS-SCNC: 11 MMOL/L (ref 3–16)
BASOPHILS # BLD: 0 K/UL (ref 0–0.2)
BASOPHILS NFR BLD: 0.6 %
BUN SERPL-MCNC: 38 MG/DL (ref 7–20)
CALCIUM SERPL-MCNC: 8.6 MG/DL (ref 8.3–10.6)
CHLORIDE SERPL-SCNC: 106 MMOL/L (ref 99–110)
CO2 SERPL-SCNC: 27 MMOL/L (ref 21–32)
CREAT SERPL-MCNC: 1.9 MG/DL (ref 0.8–1.3)
DEPRECATED RDW RBC AUTO: 17 % (ref 12.4–15.4)
EOSINOPHIL # BLD: 0.2 K/UL (ref 0–0.6)
EOSINOPHIL NFR BLD: 4 %
GFR SERPLBLD CREATININE-BSD FMLA CKD-EPI: 37 ML/MIN/{1.73_M2}
GLUCOSE SERPL-MCNC: 102 MG/DL (ref 70–99)
HCT VFR BLD AUTO: 32.3 % (ref 40.5–52.5)
HGB BLD-MCNC: 10.7 G/DL (ref 13.5–17.5)
LYMPHOCYTES # BLD: 0.5 K/UL (ref 1–5.1)
LYMPHOCYTES NFR BLD: 8.5 %
MCH RBC QN AUTO: 27.9 PG (ref 26–34)
MCHC RBC AUTO-ENTMCNC: 33 G/DL (ref 31–36)
MCV RBC AUTO: 84.6 FL (ref 80–100)
MONOCYTES # BLD: 0.4 K/UL (ref 0–1.3)
MONOCYTES NFR BLD: 6.9 %
NEUTROPHILS # BLD: 4.4 K/UL (ref 1.7–7.7)
NEUTROPHILS NFR BLD: 80 %
PLATELET # BLD AUTO: 154 K/UL (ref 135–450)
PMV BLD AUTO: 8.9 FL (ref 5–10.5)
POTASSIUM SERPL-SCNC: 4.5 MMOL/L (ref 3.5–5.1)
RBC # BLD AUTO: 3.82 M/UL (ref 4.2–5.9)
SODIUM SERPL-SCNC: 144 MMOL/L (ref 136–145)
WBC # BLD AUTO: 5.5 K/UL (ref 4–11)

## 2023-06-29 PROCEDURE — 80048 BASIC METABOLIC PNL TOTAL CA: CPT

## 2023-06-29 PROCEDURE — 85025 COMPLETE CBC W/AUTO DIFF WBC: CPT

## 2023-06-29 PROCEDURE — 36415 COLL VENOUS BLD VENIPUNCTURE: CPT

## 2023-07-11 NOTE — PROGRESS NOTES
elevated septal E/e\" is 12 . Mild mitral and tricuspid regurgitation. Moderate Bi-atrial enlargement. A bubble study was performed and fails to show evidence of shunting. The right ventricle is mildly enlarged. Right ventricular systolic function is mildly reduced . Systolic pulmonic artery pressure (SPAP) is estimated at 46 mmHg consistent   with mild pulmonary hypertension (Right atrial pressure of 8 mmHg). STRESS TEST 7/23/2019   Summary   Moderate sized inferior and mid/basal lateral wall partial reversibility   defects consistent with ischemia and infarction in the territory of the mid   and distal RCA and/or CCx. LV function is lower normal with inferior   hypokinesis and ejection fraction of 51 %. Intermediate risk abnormal study. Objective:  Physical Exam   Constitutional: He is oriented to person, place, and time. He appears well-developed and well-nourished. HENT:   Head: Normocephalic and atraumatic. Eyes: Pupils are equal, round, and reactive to light. Neck: Normal range of motion. Cardiovascular: Normal rate, irregular rhythm and heart sounds. Pulmonary/Chest: Effort normal and breath sounds normal.   Abdominal: Soft. No tenderness. Musculoskeletal: Normal range of motion. He exhibits no edema. Neurological: He is alert and oriented to person, place, and time. Skin: Skin is warm and dry. Psychiatric: He has a normal mood and affect. Assessment:  AF- per ECG 2/2023. Watchman placed 6/19/2023. Follow up KOMAL scheduled 8/4/2023. Recommend CV if KOMAL shows no leak around the device. Patient agreeable to CV and would like to proceed.    CVA 2/2023      Plan:  Discussed options for treatment of atrial fibrillation   -continue current treatment course  -cardioversion (literature provided)  Proceed with KOMAL as scheduled 8/4/2023  After KOMAL would recommended proceeding with cardioversion   -patient is agreeable and would like to proceed with this option  Follow up after

## 2023-07-12 ENCOUNTER — TELEPHONE (OUTPATIENT)
Dept: CARDIOLOGY CLINIC | Age: 73
End: 2023-07-12

## 2023-07-12 ENCOUNTER — OFFICE VISIT (OUTPATIENT)
Dept: CARDIOLOGY CLINIC | Age: 73
End: 2023-07-12
Payer: MEDICARE

## 2023-07-12 VITALS
HEIGHT: 64 IN | HEART RATE: 79 BPM | WEIGHT: 315 LBS | OXYGEN SATURATION: 96 % | BODY MASS INDEX: 53.78 KG/M2 | DIASTOLIC BLOOD PRESSURE: 70 MMHG | SYSTOLIC BLOOD PRESSURE: 126 MMHG

## 2023-07-12 DIAGNOSIS — I48.91 ATRIAL FIBRILLATION, UNSPECIFIED TYPE (HCC): Primary | ICD-10-CM

## 2023-07-12 DIAGNOSIS — I42.8 NON-ISCHEMIC CARDIOMYOPATHY (HCC): ICD-10-CM

## 2023-07-12 DIAGNOSIS — I48.0 PAF (PAROXYSMAL ATRIAL FIBRILLATION) (HCC): ICD-10-CM

## 2023-07-12 PROCEDURE — G8427 DOCREV CUR MEDS BY ELIG CLIN: HCPCS | Performed by: INTERNAL MEDICINE

## 2023-07-12 PROCEDURE — 1123F ACP DISCUSS/DSCN MKR DOCD: CPT | Performed by: INTERNAL MEDICINE

## 2023-07-12 PROCEDURE — 3078F DIAST BP <80 MM HG: CPT | Performed by: INTERNAL MEDICINE

## 2023-07-12 PROCEDURE — 1036F TOBACCO NON-USER: CPT | Performed by: INTERNAL MEDICINE

## 2023-07-12 PROCEDURE — 99214 OFFICE O/P EST MOD 30 MIN: CPT | Performed by: INTERNAL MEDICINE

## 2023-07-12 PROCEDURE — 1111F DSCHRG MED/CURRENT MED MERGE: CPT | Performed by: INTERNAL MEDICINE

## 2023-07-12 PROCEDURE — 93000 ELECTROCARDIOGRAM COMPLETE: CPT | Performed by: INTERNAL MEDICINE

## 2023-07-12 PROCEDURE — 3074F SYST BP LT 130 MM HG: CPT | Performed by: INTERNAL MEDICINE

## 2023-07-12 PROCEDURE — G8417 CALC BMI ABV UP PARAM F/U: HCPCS | Performed by: INTERNAL MEDICINE

## 2023-07-12 PROCEDURE — 3017F COLORECTAL CA SCREEN DOC REV: CPT | Performed by: INTERNAL MEDICINE

## 2023-07-12 NOTE — PATIENT INSTRUCTIONS
Plan:  Discussed options for treatment of atrial fibrillation   -continue current treatment course  -cardioversion (literature provided)  Proceed with KOMAL as scheduled 8/4/2023  After KOMAL would recommended proceeding with cardioversion   -patient is agreeable and would like to proceed with this option  Follow up after procedure

## 2023-07-12 NOTE — TELEPHONE ENCOUNTER
Patient was seen in office 7/12/2023. Cardioversion discussed and agreed upon by [facility] and patient. Medications NOT discussed. Patient will NOT need covid testing prior. Thank you! Note-patient had Watchman placed 6/14/2023. Has repeat KOMAL scheduled on 8/4/2023 to ensure proper placement. CV will need scheduled after 8/4/2023 once KOMAL confirms Watchman.      MEDS TO HOLD:  -folic acid, furosemide, magnesium, pantoprazole, potassium and vitamin d the morning of the procedure

## 2023-08-04 ENCOUNTER — HOSPITAL ENCOUNTER (OUTPATIENT)
Dept: CARDIAC CATH/INVASIVE PROCEDURES | Age: 73
Discharge: HOME | End: 2023-08-04
Attending: INTERNAL MEDICINE | Admitting: INTERNAL MEDICINE
Payer: MEDICARE

## 2023-08-04 ENCOUNTER — HOSPITAL ENCOUNTER (EMERGENCY)
Age: 73
Discharge: HOME | End: 2023-08-05
Payer: MEDICARE

## 2023-08-04 VITALS
WEIGHT: 315 LBS | HEART RATE: 95 BPM | HEIGHT: 64 IN | RESPIRATION RATE: 20 BRPM | OXYGEN SATURATION: 99 % | SYSTOLIC BLOOD PRESSURE: 139 MMHG | BODY MASS INDEX: 53.78 KG/M2 | DIASTOLIC BLOOD PRESSURE: 92 MMHG | TEMPERATURE: 99.2 F

## 2023-08-04 VITALS
DIASTOLIC BLOOD PRESSURE: 100 MMHG | SYSTOLIC BLOOD PRESSURE: 166 MMHG | HEART RATE: 79 BPM | BODY MASS INDEX: 53.78 KG/M2 | RESPIRATION RATE: 16 BRPM | HEIGHT: 64 IN | WEIGHT: 315 LBS | OXYGEN SATURATION: 96 %

## 2023-08-04 DIAGNOSIS — N30.01 ACUTE CYSTITIS WITH HEMATURIA: Primary | ICD-10-CM

## 2023-08-04 DIAGNOSIS — R10.30 LOWER ABDOMINAL PAIN: ICD-10-CM

## 2023-08-04 DIAGNOSIS — R33.9 URINARY RETENTION: ICD-10-CM

## 2023-08-04 LAB
ALBUMIN SERPL-MCNC: 4 G/DL (ref 3.4–5)
ALBUMIN/GLOB SERPL: 1.2 {RATIO} (ref 1.1–2.2)
ALP SERPL-CCNC: 95 U/L (ref 40–129)
ALT SERPL-CCNC: 8 U/L (ref 10–40)
ANION GAP SERPL CALCULATED.3IONS-SCNC: 12 MMOL/L (ref 3–16)
AST SERPL-CCNC: 9 U/L (ref 15–37)
BACTERIA URNS QL MICRO: ABNORMAL /HPF
BASOPHILS # BLD: 0.1 K/UL (ref 0–0.2)
BASOPHILS NFR BLD: 0.6 %
BILIRUB SERPL-MCNC: 1.8 MG/DL (ref 0–1)
BILIRUB UR QL STRIP.AUTO: NEGATIVE
BUN SERPL-MCNC: 31 MG/DL (ref 7–20)
CALCIUM SERPL-MCNC: 9 MG/DL (ref 8.3–10.6)
CHLORIDE SERPL-SCNC: 99 MMOL/L (ref 99–110)
CLARITY UR: ABNORMAL
CO2 SERPL-SCNC: 24 MMOL/L (ref 21–32)
COLOR UR: ABNORMAL
CREAT SERPL-MCNC: 1.5 MG/DL (ref 0.8–1.3)
DEPRECATED RDW RBC AUTO: 16 % (ref 12.4–15.4)
DEPRECATED RDW RBC AUTO: 16 % (ref 12.4–15.4)
EKG ATRIAL RATE: 110 BPM
EKG ATRIAL RATE: 88 BPM
EKG DIAGNOSIS: NORMAL
EKG DIAGNOSIS: NORMAL
EKG P-R INTERVAL: 146 MS
EKG Q-T INTERVAL: 362 MS
EKG Q-T INTERVAL: 366 MS
EKG QRS DURATION: 124 MS
EKG QRS DURATION: 88 MS
EKG QTC CALCULATION (BAZETT): 438 MS
EKG QTC CALCULATION (BAZETT): 502 MS
EKG R AXIS: 122 DEGREES
EKG R AXIS: 197 DEGREES
EKG T AXIS: -81 DEGREES
EKG T AXIS: 0 DEGREES
EKG VENTRICULAR RATE: 113 BPM
EKG VENTRICULAR RATE: 88 BPM
EOSINOPHIL # BLD: 0 K/UL (ref 0–0.6)
EOSINOPHIL NFR BLD: 0.5 %
GFR SERPLBLD CREATININE-BSD FMLA CKD-EPI: 49 ML/MIN/{1.73_M2}
GLUCOSE SERPL-MCNC: 142 MG/DL (ref 70–99)
GLUCOSE UR STRIP.AUTO-MCNC: NEGATIVE MG/DL
HCT VFR BLD AUTO: 37.6 % (ref 40.5–52.5)
HCT VFR BLD AUTO: 37.8 % (ref 40.5–52.5)
HGB BLD-MCNC: 12.2 G/DL (ref 13.5–17.5)
HGB BLD-MCNC: 12.7 G/DL (ref 13.5–17.5)
HGB UR QL STRIP.AUTO: ABNORMAL
KETONES UR STRIP.AUTO-MCNC: NEGATIVE MG/DL
LACTATE BLDV-SCNC: 1 MMOL/L (ref 0.4–1.9)
LEUKOCYTE ESTERASE UR QL STRIP.AUTO: ABNORMAL
LV EF: 50 %
LVEF MODALITY: NORMAL
LYMPHOCYTES # BLD: 0.5 K/UL (ref 1–5.1)
LYMPHOCYTES NFR BLD: 4.3 %
MCH RBC QN AUTO: 27.8 PG (ref 26–34)
MCH RBC QN AUTO: 28.7 PG (ref 26–34)
MCHC RBC AUTO-ENTMCNC: 32.2 G/DL (ref 31–36)
MCHC RBC AUTO-ENTMCNC: 33.8 G/DL (ref 31–36)
MCV RBC AUTO: 85 FL (ref 80–100)
MCV RBC AUTO: 86.3 FL (ref 80–100)
MONOCYTES # BLD: 0.6 K/UL (ref 0–1.3)
MONOCYTES NFR BLD: 5.6 %
NEUTROPHILS # BLD: 9.7 K/UL (ref 1.7–7.7)
NEUTROPHILS NFR BLD: 89 %
NITRITE UR QL STRIP.AUTO: POSITIVE
PH UR STRIP.AUTO: 6.5 [PH] (ref 5–8)
PLATELET # BLD AUTO: 211 K/UL (ref 135–450)
PLATELET # BLD AUTO: 211 K/UL (ref 135–450)
PMV BLD AUTO: 8.7 FL (ref 5–10.5)
PMV BLD AUTO: 8.7 FL (ref 5–10.5)
POTASSIUM SERPL-SCNC: 4.6 MMOL/L (ref 3.5–5.1)
PROT SERPL-MCNC: 7.3 G/DL (ref 6.4–8.2)
PROT UR STRIP.AUTO-MCNC: 100 MG/DL
RBC # BLD AUTO: 4.38 M/UL (ref 4.2–5.9)
RBC # BLD AUTO: 4.43 M/UL (ref 4.2–5.9)
RBC #/AREA URNS HPF: ABNORMAL /HPF (ref 0–4)
SODIUM SERPL-SCNC: 135 MMOL/L (ref 136–145)
SP GR UR STRIP.AUTO: 1.01 (ref 1–1.03)
TROPONIN, HIGH SENSITIVITY: 57 NG/L (ref 0–22)
TROPONIN, HIGH SENSITIVITY: 57 NG/L (ref 0–22)
UA DIPSTICK W REFLEX MICRO PNL UR: YES
URN SPEC COLLECT METH UR: ABNORMAL
UROBILINOGEN UR STRIP-ACNC: 0.2 E.U./DL
WBC # BLD AUTO: 10.9 K/UL (ref 4–11)
WBC # BLD AUTO: 6.6 K/UL (ref 4–11)
WBC #/AREA URNS HPF: >100 /HPF (ref 0–5)

## 2023-08-04 PROCEDURE — 99284 EMERGENCY DEPT VISIT MOD MDM: CPT

## 2023-08-04 PROCEDURE — 93312 ECHO TRANSESOPHAGEAL: CPT

## 2023-08-04 PROCEDURE — 84484 ASSAY OF TROPONIN QUANT: CPT

## 2023-08-04 PROCEDURE — 93325 DOPPLER ECHO COLOR FLOW MAPG: CPT

## 2023-08-04 PROCEDURE — 93005 ELECTROCARDIOGRAM TRACING: CPT

## 2023-08-04 PROCEDURE — 2500000003 HC RX 250 WO HCPCS

## 2023-08-04 PROCEDURE — 80053 COMPREHEN METABOLIC PANEL: CPT

## 2023-08-04 PROCEDURE — 93010 ELECTROCARDIOGRAM REPORT: CPT | Performed by: INTERNAL MEDICINE

## 2023-08-04 PROCEDURE — 87150 DNA/RNA AMPLIFIED PROBE: CPT

## 2023-08-04 PROCEDURE — 92960 CARDIOVERSION ELECTRIC EXT: CPT

## 2023-08-04 PROCEDURE — 6360000002 HC RX W HCPCS: Performed by: NURSE PRACTITIONER

## 2023-08-04 PROCEDURE — 93320 DOPPLER ECHO COMPLETE: CPT

## 2023-08-04 PROCEDURE — 85025 COMPLETE CBC W/AUTO DIFF WBC: CPT

## 2023-08-04 PROCEDURE — 83605 ASSAY OF LACTIC ACID: CPT

## 2023-08-04 PROCEDURE — 93005 ELECTROCARDIOGRAM TRACING: CPT | Performed by: NURSE PRACTITIONER

## 2023-08-04 PROCEDURE — 6370000000 HC RX 637 (ALT 250 FOR IP): Performed by: NURSE PRACTITIONER

## 2023-08-04 PROCEDURE — 85027 COMPLETE CBC AUTOMATED: CPT

## 2023-08-04 PROCEDURE — 36415 COLL VENOUS BLD VENIPUNCTURE: CPT

## 2023-08-04 PROCEDURE — 51798 US URINE CAPACITY MEASURE: CPT

## 2023-08-04 PROCEDURE — 87040 BLOOD CULTURE FOR BACTERIA: CPT

## 2023-08-04 PROCEDURE — 99152 MOD SED SAME PHYS/QHP 5/>YRS: CPT

## 2023-08-04 PROCEDURE — 96365 THER/PROPH/DIAG IV INF INIT: CPT

## 2023-08-04 PROCEDURE — 6370000000 HC RX 637 (ALT 250 FOR IP)

## 2023-08-04 PROCEDURE — 81001 URINALYSIS AUTO W/SCOPE: CPT

## 2023-08-04 PROCEDURE — 7100000010 HC PHASE II RECOVERY - FIRST 15 MIN

## 2023-08-04 PROCEDURE — 96375 TX/PRO/DX INJ NEW DRUG ADDON: CPT

## 2023-08-04 PROCEDURE — 2580000003 HC RX 258: Performed by: NURSE PRACTITIONER

## 2023-08-04 PROCEDURE — 87086 URINE CULTURE/COLONY COUNT: CPT

## 2023-08-04 RX ORDER — SODIUM CHLORIDE 0.9 % (FLUSH) 0.9 %
5-40 SYRINGE (ML) INJECTION PRN
Status: DISCONTINUED | OUTPATIENT
Start: 2023-08-04 | End: 2023-08-04 | Stop reason: HOSPADM

## 2023-08-04 RX ORDER — LIDOCAINE HYDROCHLORIDE 20 MG/ML
JELLY TOPICAL
Status: COMPLETED
Start: 2023-08-04 | End: 2023-08-04

## 2023-08-04 RX ORDER — MORPHINE SULFATE 4 MG/ML
4 INJECTION, SOLUTION INTRAMUSCULAR; INTRAVENOUS EVERY 30 MIN PRN
Status: DISCONTINUED | OUTPATIENT
Start: 2023-08-04 | End: 2023-08-05 | Stop reason: HOSPADM

## 2023-08-04 RX ORDER — ONDANSETRON 2 MG/ML
4 INJECTION INTRAMUSCULAR; INTRAVENOUS EVERY 30 MIN PRN
Status: DISCONTINUED | OUTPATIENT
Start: 2023-08-04 | End: 2023-08-05 | Stop reason: HOSPADM

## 2023-08-04 RX ORDER — CARVEDILOL 3.12 MG/1
3.12 TABLET ORAL ONCE
Status: COMPLETED | OUTPATIENT
Start: 2023-08-04 | End: 2023-08-04

## 2023-08-04 RX ADMIN — LIDOCAINE HYDROCHLORIDE: 20 JELLY TOPICAL at 23:47

## 2023-08-04 RX ADMIN — CARVEDILOL 3.12 MG: 3.12 TABLET, FILM COATED ORAL at 23:46

## 2023-08-04 RX ADMIN — CEFTRIAXONE SODIUM 1000 MG: 1 INJECTION, POWDER, FOR SOLUTION INTRAMUSCULAR; INTRAVENOUS at 22:01

## 2023-08-04 RX ADMIN — ONDANSETRON 4 MG: 2 INJECTION INTRAMUSCULAR; INTRAVENOUS at 18:54

## 2023-08-04 RX ADMIN — MORPHINE SULFATE 4 MG: 4 INJECTION, SOLUTION INTRAMUSCULAR; INTRAVENOUS at 18:54

## 2023-08-04 ASSESSMENT — PAIN SCALES - GENERAL
PAINLEVEL_OUTOF10: 8
PAINLEVEL_OUTOF10: 6

## 2023-08-04 NOTE — PROCEDURES
[facility]     Electrophysiology Procedure Note       Date of Procedure: 8/4/2023  Patient's Name: Nicki Nicholson Sr. YOB: 1950   Medical Record Number: [id number]  Procedure Performed by: Nila Foster MD    Procedures performed:  IV sedation. Trans-esophageal echocardiography  External Electrical cardioversion   Mallampati3  ASA 3    Indication of the procedure: Persistent atrial fibrillation      Details of procedure: The patient was brought to the cath lab area in a fasting and non-sedated state. The risks, benefits and alternatives of the procedure were discussed with the patient. The patient opted to proceed with the procedure. Written informed consent was signed and placed in the chart. A timeout protocol was completed to identify the patient and the procedure being performed. IV sedation was provided with IV Versed, Fentanyl initially and KOMAL was performed which did not show any  /LA clot/thrombus. Full KOMAL reports will be dictated. I pushed 30+30 mg Brevital.An independent trained observer pushed medications  at my direction. We monitored the patient's level of consciousness and vital signs/physiologic status throughout the procedure duration (see start and stop times below). Sedation:  2 mg Versed,    Sedation start: 1135  Sedation stop: 1200     Patient is on chronic anticoagulation therapy. Then we used Brevital for sedation and electrical DC cardioversion was perfomred using 200J, synchronized shock. Patient did not convert. A second shock with 360 J was done. This seemed to convert him to what appeared to be atrial flutter. The third shock with moving of the patches was done using 360 J. Patient was converted to sinus rhythm at 91 bpm. The patient tolerated the procedure well and there were no complications. Conclusion:   Successful external DC cardioversion of atrial   flutter    Watchman is well-seated with no leak.     Plan:   The patient can be discharged if remains stable. Will continue with medical therapy. Continue aspirin and Plavix for 4-1/2 months and then continue with aspirin alone after that.

## 2023-08-04 NOTE — DISCHARGE INSTRUCTIONS
KOMAL DISCHARGE INSTRUCTIONS    No driving for 24 hours. We strongly recommend that a responsible adult stay with you for the next 24 hours. Continue Medications. Advance diet as tolerated    Contact physician office  for following symptoms:  Fever  Difficulty swallowing  Chest pain  Difficulty breathing  Bleeding    CARDIOVERSION DISCHARGE INSTRUCTIONS    No driving for 24 hours. We strongly recommend that a responsible adult stay with you for the next 24 hours. Hydrocortisone 1% cream to reddened areas as needed.

## 2023-08-04 NOTE — ED NOTES
RN bladder scanned pt, pt had 569mL in bladder.  RN notified provider     Tiffany Alberts RN  08/04/23 0782

## 2023-08-04 NOTE — ED PROVIDER NOTES
I independently reviewed the ECG as follows:    Atrial fibrillation with a ventricular rate of 113. Extreme rightward axis. Right bundle branch block  prolonged QTc 502 no evidence of acute ischemia and EKG is not significantly changed when compared to prior dated August 4, 2023 and also unchanged when compared to July 20, 2023. Please reference my attending note if I had further involvement in the care of this patient otherwise I did not participate in the care of this patient beyond evaluation of this ECG. Please reference the FATMATA's documentation for further details.         Ankita Carter MD  08/04/23 4824
DISPOSITION Decision To Discharge 08/05/2023 12:01:59 AM      PATIENT REFERRED TO:  Agueda Mckeon MD  [facility]  [address]  [phone]    Call in 2 days  For further evaluation    Mary Looney MD  [address]  [phone]    Call on 8/7/2023  For further evaluation    [facility]  ED  [place]  [phone]  Go to   If symptoms worsen      DISCHARGE MEDICATIONS:  New Prescriptions    CEFDINIR (OMNICEF) 300 MG CAPSULE    Take 1 capsule by mouth 2 times daily for 7 days       DISCONTINUED MEDICATIONS:  Discontinued Medications    No medications on file              (Please note that portions of this note were completed with a voice recognition program.  Efforts were made to edit the dictations but occasionally words are mis-transcribed.)    JOEY Macedo CNP (electronically signed)        JOEY Macedo CNP  08/05/23 7400 JOEY Ramos CNP  08/05/23 0050

## 2023-08-04 NOTE — H&P
[facility]  Cardiology         Primary Cardiologist: Shaneka Spencer MD  Referring Physician: Rell Moreira, APRN-CNP    Reason for Referral: Major bleeding complication and Left atrial appendage closure    CC: \"I have a lot going on. \"      Subjective:     History of Present Illness:    Conrad Bartlett Sr. is a 67 y.o. patient with a PMH significant for diabetes, hypertension, hyperlipidemia, DVT s/p IVC filter 10/2020, GI bleed 2019, CVA, and paroxsymal atrial fibrillation. He has a history of perinephric hemorrhage and cannot be started on anticoagulation therapy which prompted the [facility] consult. Patient is being referred for Left Atrial Appendage Closure with WATCHMAN device for management of stroke risk resulting from non-valvular atrial fibrillation. Based on their past history, it has been determined that they are poor candidates for long-term oral-anticoagulation, however may be tolerant of short term treatment with [facility] as necessary. Post Watchman     Past Medical History:   has a past medical history of Acute respiratory failure with hypoxia and hypercapnia ([address]), Anemia due to blood loss, acute, Bacteremia due to Escherichia coli, Calculus of gallbladder with acute cholecystitis without obstruction, CHF (congestive heart failure) ([address]), Choledecholithiasis, DVT (deep venous thrombosis) ([address]), GI bleed from duodenal ulcer, Malignant neoplasm of kidney excluding renal pelvis ([address]), Retroperitoneal bleed, and UTI. Surgical History:   has a past surgical history that includes Kidney surgery (04/07/2014); sigmoidoscopy; Upper gastrointestinal endoscopy (08/06/2018); pr egd transoral biopsy single/multiple (09/28/2018); Upper gastrointestinal endoscopy (N/A, 04/08/2019); Colonoscopy (N/A, 04/09/2019); Upper gastrointestinal endoscopy (N/A, 04/12/2019); Cardiac catheterization (09/06/2019); CT BIOPSY BONE MARROW (10/19/2020); and IVC filter insertion (10/13/2020).      Social History:   reports that he has never smoked. He has never used smokeless tobacco. He reports that he does not drink alcohol and does not use drugs. Family History:  family history includes Anemia in his father; Arthritis in his father, maternal aunt, maternal grandmother, mother, and sister; Atrial Fibrillation in his mother; Breast Cancer in his mother; Cancer (age of onset: 80) in his father; Depression in his maternal uncle; Diabetes in his maternal aunt; Early Death in his maternal uncle; Hearing Loss in his father; Heart Disease in his paternal aunt; High Blood Pressure in his mother; Other in his maternal uncle. Home Medications:  Were reviewed and are listed in nursing record and/or below  Prior to Admission medications    Medication Sig Start Date End Date Taking?  Authorizing Provider   aspirin 81 MG EC tablet Take 1 tablet by mouth daily 2/15/23  Yes JOEY Soto CNP   atorvastatin (LIPITOR) 80 MG tablet Take 1 tablet by mouth nightly 2/14/23  Yes JOEY Soto CNP   tamsulosin (FLOMAX) 0.4 MG capsule Take 0.4 mg by mouth at bedtime   Yes Historical Provider, MD   amLODIPine (NORVASC) 5 MG tablet Take 5 mg by mouth daily   Yes Historical Provider, MD   magnesium oxide (MAG-OX) 400 MG tablet Take 400 mg by mouth daily   Yes Historical Provider, MD   lisinopril (PRINIVIL;ZESTRIL) 20 MG tablet Take 40 mg by mouth daily   Yes Historical Provider, MD   potassium chloride (MICRO-K) 10 MEQ extended release capsule Take 20 mEq by mouth 2 times daily   Yes Historical Provider, MD   pantoprazole (PROTONIX) 40 MG tablet Take 40 mg by mouth daily   Yes Historical Provider, MD   vitamin D (CHOLECALCIFEROL) 1000 UNIT TABS tablet Take 3,000 Units by mouth daily   Yes Historical Provider, MD   folic acid (FOLVITE) 1 MG tablet Take 1 tablet by mouth daily 10/1/18  Yes JOEY Mckeon CNP   finasteride (PROSCAR) 5 MG tablet  12/18/22   Historical Provider, MD   furosemide (LASIX) 40 MG tablet

## 2023-08-04 NOTE — ED NOTES
Rn attempted to insert devine catheter in patient, pt was very retracted around the scrotum     Isreal Packer RN  08/04/23 Froylan Traylor RN  08/04/23 2038

## 2023-08-05 RX ORDER — CEFDINIR 300 MG/1
300 CAPSULE ORAL 2 TIMES DAILY
Qty: 14 CAPSULE | Refills: 0 | Status: SHIPPED | OUTPATIENT
Start: 2023-08-05 | End: 2023-08-12

## 2023-08-05 NOTE — PROGRESS NOTES
[facility] Procedure Note  [facility]    Provider: Taniya Waddell MD MD Patient ID:  Admission Date: 2023 Name: Praveena Turner Date: n/a MRN: [id number]   Patient Location:  : 1950  Attending: No att. providers found Date of Service: 2023  PCP: Mary De La Paz MD     Diagnoses:  Chronic urinary retention, inability to place devine    Procedure:   1. Insertion of devine catheter - complicated    Findings:   large volume retention of urine: 500mL    Indication for Procedure:  Nursing had attempted to pass a catheter and was unsuccessful. The patient was informed of the need for bladder drainage based on chronic retention. We had a discussion of the procedure. He had a chance to ask questions which were answered prior to the catheterization. Procedure Details: The urethral meatus was buried but was prepped and draped in the usual fashion. Viscous lidocaine jelly (2%) was instilled retrograde via the urethra. A 16 Fr Devine catheter was then passed into the bladder. He had return of 500mL of cloudy urine. 10mL of sterile water was instilled into the Devine balloon. A stat lock was applied to the thigh. He tolerated the procedure well.     EBL:   1 mL    Plan:  See Progress Notes for urology plan regarding Devine    Taniya Waddell MD MD  2023

## 2023-08-05 NOTE — CONSULTS
[facility] [facility] urology  Per Marko Gallardo APRN-CNP  Re urine retention  0595- Dr. Hannah Rutherford returned page

## 2023-08-06 LAB
BACTERIA BLD CULT ORG #2: NORMAL
BACTERIA BLD CULT: ABNORMAL
BACTERIA UR CULT: NORMAL
EKG ATRIAL RATE: 97 BPM
EKG DIAGNOSIS: NORMAL
EKG Q-T INTERVAL: 448 MS
EKG QRS DURATION: 72 MS
EKG QTC CALCULATION (BAZETT): 568 MS
EKG R AXIS: 194 DEGREES
EKG T AXIS: -70 DEGREES
EKG VENTRICULAR RATE: 97 BPM
ORGANISM: ABNORMAL
ORGANISM: ABNORMAL
REPORT: NORMAL

## 2023-08-06 PROCEDURE — 99152 MOD SED SAME PHYS/QHP 5/>YRS: CPT | Performed by: INTERNAL MEDICINE

## 2023-08-06 PROCEDURE — 92960 CARDIOVERSION ELECTRIC EXT: CPT | Performed by: INTERNAL MEDICINE

## 2023-08-06 NOTE — RESULT ENCOUNTER NOTE
Culture reviewed, blood cultures positive for Staphylococcus epidermis this is most likely a skin contaminant, but please contact the patient to evaluate his symptomatology to see if he is having any worsening symptoms and needs further evaluation.
Culture reviewed, culture shows no growth, no further action needed.
none

## 2023-08-06 NOTE — ED NOTES
Pt called regarding positive blood culture after review per Dr Ria Ortiz. Pt states he is feeling good with no fever or chills.  Pt instructed to follow up with pmd and urology this week and return to ER if any fever or chills emerge     Maribel Bhatia RN  08/06/23 1123

## 2023-08-09 LAB
BACTERIA BLD CULT: ABNORMAL
ORGANISM: ABNORMAL

## 2023-08-10 LAB
BACTERIA BLD CULT ORG #2: ABNORMAL
ORGANISM: ABNORMAL

## 2023-09-20 DIAGNOSIS — I48.91 ATRIAL FIBRILLATION, UNSPECIFIED TYPE (HCC): ICD-10-CM

## 2023-09-20 DIAGNOSIS — I42.8 NON-ISCHEMIC CARDIOMYOPATHY (HCC): ICD-10-CM

## 2023-09-20 DIAGNOSIS — I10 BENIGN HYPERTENSION: Chronic | ICD-10-CM

## 2023-09-20 RX ORDER — CHLORHEXIDINE GLUCONATE 213 G/1000ML
SOLUTION TOPICAL
OUTPATIENT
Start: 2023-09-20

## 2023-09-20 RX ORDER — AMLODIPINE BESYLATE 2.5 MG/1
2.5 TABLET ORAL DAILY
Qty: 90 TABLET | Refills: 3 | Status: SHIPPED | OUTPATIENT
Start: 2023-09-20

## 2023-09-20 RX ORDER — CARVEDILOL 3.12 MG/1
3.12 TABLET ORAL 2 TIMES DAILY
Qty: 180 TABLET | Refills: 3 | Status: SHIPPED | OUTPATIENT
Start: 2023-09-20

## 2023-09-21 RX ORDER — CLOPIDOGREL BISULFATE 75 MG/1
75 TABLET ORAL DAILY
Qty: 90 TABLET | Refills: 0 | Status: SHIPPED | OUTPATIENT
Start: 2023-09-21

## 2023-09-23 DIAGNOSIS — I48.91 ATRIAL FIBRILLATION, UNSPECIFIED TYPE (HCC): ICD-10-CM

## 2023-09-23 DIAGNOSIS — I42.8 NON-ISCHEMIC CARDIOMYOPATHY (HCC): ICD-10-CM

## 2023-09-25 DIAGNOSIS — I42.8 NON-ISCHEMIC CARDIOMYOPATHY (HCC): ICD-10-CM

## 2023-09-25 DIAGNOSIS — I48.91 ATRIAL FIBRILLATION, UNSPECIFIED TYPE (HCC): ICD-10-CM

## 2023-09-25 RX ORDER — CARVEDILOL 3.12 MG/1
3.12 TABLET ORAL 2 TIMES DAILY
Qty: 180 TABLET | Refills: 3 | OUTPATIENT
Start: 2023-09-25

## 2023-09-26 ENCOUNTER — HOSPITAL ENCOUNTER (OUTPATIENT)
Age: 73
Setting detail: SPECIMEN
Discharge: HOME | End: 2023-09-26
Payer: MEDICARE

## 2023-09-26 LAB
BILIRUB UR QL STRIP.AUTO: NEGATIVE
CLARITY UR: ABNORMAL
COLOR UR: ABNORMAL
EPI CELLS #/AREA URNS HPF: ABNORMAL /HPF (ref 0–5)
GLUCOSE UR STRIP.AUTO-MCNC: NEGATIVE MG/DL
HGB UR QL STRIP.AUTO: ABNORMAL
KETONES UR STRIP.AUTO-MCNC: NEGATIVE MG/DL
LEUKOCYTE ESTERASE UR QL STRIP.AUTO: ABNORMAL
NITRITE UR QL STRIP.AUTO: NEGATIVE
PH UR STRIP.AUTO: 5.5 [PH] (ref 5–8)
PROT UR STRIP.AUTO-MCNC: 30 MG/DL
RBC #/AREA URNS HPF: >100 /HPF (ref 0–4)
SP GR UR STRIP.AUTO: 1.01 (ref 1–1.03)
UA DIPSTICK W REFLEX MICRO PNL UR: YES
URN SPEC COLLECT METH UR: ABNORMAL
UROBILINOGEN UR STRIP-ACNC: 0.2 E.U./DL
WBC #/AREA URNS HPF: ABNORMAL /HPF (ref 0–5)

## 2023-09-26 PROCEDURE — 81001 URINALYSIS AUTO W/SCOPE: CPT

## 2023-09-26 PROCEDURE — 87086 URINE CULTURE/COLONY COUNT: CPT

## 2023-09-26 PROCEDURE — 87186 SC STD MICRODIL/AGAR DIL: CPT

## 2023-09-26 PROCEDURE — 87077 CULTURE AEROBIC IDENTIFY: CPT

## 2023-09-27 ENCOUNTER — APPOINTMENT (OUTPATIENT)
Dept: CT IMAGING | Age: 73
End: 2023-09-27
Payer: MEDICARE

## 2023-09-27 ENCOUNTER — HOSPITAL ENCOUNTER (EMERGENCY)
Age: 73
Discharge: HOME | End: 2023-09-27
Attending: EMERGENCY MEDICINE
Payer: MEDICARE

## 2023-09-27 VITALS
WEIGHT: 310 LBS | BODY MASS INDEX: 52.92 KG/M2 | OXYGEN SATURATION: 98 % | DIASTOLIC BLOOD PRESSURE: 74 MMHG | RESPIRATION RATE: 16 BRPM | SYSTOLIC BLOOD PRESSURE: 112 MMHG | HEIGHT: 64 IN | TEMPERATURE: 98.7 F | HEART RATE: 87 BPM

## 2023-09-27 DIAGNOSIS — N18.9 CHRONIC KIDNEY DISEASE, UNSPECIFIED CKD STAGE: ICD-10-CM

## 2023-09-27 DIAGNOSIS — R33.8 ACUTE URINARY RETENTION: ICD-10-CM

## 2023-09-27 DIAGNOSIS — T83.9XXA PROBLEM WITH FOLEY CATHETER, INITIAL ENCOUNTER (HCC): ICD-10-CM

## 2023-09-27 DIAGNOSIS — N39.0 URINARY TRACT INFECTION ASSOCIATED WITH INDWELLING URETHRAL CATHETER, INITIAL ENCOUNTER (HCC): Primary | ICD-10-CM

## 2023-09-27 DIAGNOSIS — T83.511A URINARY TRACT INFECTION ASSOCIATED WITH INDWELLING URETHRAL CATHETER, INITIAL ENCOUNTER (HCC): Primary | ICD-10-CM

## 2023-09-27 LAB
AMORPH SED URNS QL MICRO: ABNORMAL /HPF
ANION GAP SERPL CALCULATED.3IONS-SCNC: 11 MMOL/L (ref 3–16)
BACTERIA URNS QL MICRO: ABNORMAL /HPF
BASOPHILS # BLD: 0 K/UL (ref 0–0.2)
BASOPHILS NFR BLD: 0.4 %
BILIRUB UR QL STRIP.AUTO: NEGATIVE
BUN SERPL-MCNC: 41 MG/DL (ref 7–20)
CALCIUM SERPL-MCNC: 8.6 MG/DL (ref 8.3–10.6)
CHLORIDE SERPL-SCNC: 103 MMOL/L (ref 99–110)
CK SERPL-CCNC: 25 U/L (ref 39–308)
CLARITY UR: ABNORMAL
CO2 SERPL-SCNC: 24 MMOL/L (ref 21–32)
COLOR UR: ABNORMAL
CREAT SERPL-MCNC: 1.8 MG/DL (ref 0.8–1.3)
DEPRECATED RDW RBC AUTO: 16.8 % (ref 12.4–15.4)
EOSINOPHIL # BLD: 0.2 K/UL (ref 0–0.6)
EOSINOPHIL NFR BLD: 2.4 %
GFR SERPLBLD CREATININE-BSD FMLA CKD-EPI: 39 ML/MIN/{1.73_M2}
GLUCOSE SERPL-MCNC: 156 MG/DL (ref 70–99)
GLUCOSE UR STRIP.AUTO-MCNC: NEGATIVE MG/DL
HCT VFR BLD AUTO: 35.6 % (ref 40.5–52.5)
HGB BLD-MCNC: 11.9 G/DL (ref 13.5–17.5)
HGB UR QL STRIP.AUTO: ABNORMAL
KETONES UR STRIP.AUTO-MCNC: NEGATIVE MG/DL
LEUKOCYTE ESTERASE UR QL STRIP.AUTO: ABNORMAL
LYMPHOCYTES # BLD: 0.5 K/UL (ref 1–5.1)
LYMPHOCYTES NFR BLD: 5.4 %
MAGNESIUM SERPL-MCNC: 2.1 MG/DL (ref 1.8–2.4)
MCH RBC QN AUTO: 28.9 PG (ref 26–34)
MCHC RBC AUTO-ENTMCNC: 33.5 G/DL (ref 31–36)
MCV RBC AUTO: 86.2 FL (ref 80–100)
MONOCYTES # BLD: 0.5 K/UL (ref 0–1.3)
MONOCYTES NFR BLD: 5.5 %
NEUTROPHILS # BLD: 8.5 K/UL (ref 1.7–7.7)
NEUTROPHILS NFR BLD: 86.3 %
NITRITE UR QL STRIP.AUTO: POSITIVE
PH UR STRIP.AUTO: 5.5 [PH] (ref 5–8)
PLATELET # BLD AUTO: 189 K/UL (ref 135–450)
PMV BLD AUTO: 8.3 FL (ref 5–10.5)
POTASSIUM SERPL-SCNC: 4.8 MMOL/L (ref 3.5–5.1)
PROT UR STRIP.AUTO-MCNC: 30 MG/DL
RBC # BLD AUTO: 4.13 M/UL (ref 4.2–5.9)
RBC #/AREA URNS HPF: >100 /HPF (ref 0–4)
SODIUM SERPL-SCNC: 138 MMOL/L (ref 136–145)
SP GR UR STRIP.AUTO: 1.02 (ref 1–1.03)
UA DIPSTICK W REFLEX MICRO PNL UR: YES
URN SPEC COLLECT METH UR: ABNORMAL
UROBILINOGEN UR STRIP-ACNC: 0.2 E.U./DL
WBC # BLD AUTO: 9.8 K/UL (ref 4–11)
WBC #/AREA URNS HPF: ABNORMAL /HPF (ref 0–5)

## 2023-09-27 PROCEDURE — 74176 CT ABD & PELVIS W/O CONTRAST: CPT

## 2023-09-27 PROCEDURE — 96375 TX/PRO/DX INJ NEW DRUG ADDON: CPT

## 2023-09-27 PROCEDURE — 96365 THER/PROPH/DIAG IV INF INIT: CPT

## 2023-09-27 PROCEDURE — 85025 COMPLETE CBC W/AUTO DIFF WBC: CPT

## 2023-09-27 PROCEDURE — 87086 URINE CULTURE/COLONY COUNT: CPT

## 2023-09-27 PROCEDURE — 51798 US URINE CAPACITY MEASURE: CPT

## 2023-09-27 PROCEDURE — 83735 ASSAY OF MAGNESIUM: CPT

## 2023-09-27 PROCEDURE — 87088 URINE BACTERIA CULTURE: CPT

## 2023-09-27 PROCEDURE — 81001 URINALYSIS AUTO W/SCOPE: CPT

## 2023-09-27 PROCEDURE — 80048 BASIC METABOLIC PNL TOTAL CA: CPT

## 2023-09-27 PROCEDURE — 99284 EMERGENCY DEPT VISIT MOD MDM: CPT

## 2023-09-27 PROCEDURE — 6360000002 HC RX W HCPCS: Performed by: EMERGENCY MEDICINE

## 2023-09-27 PROCEDURE — 2580000003 HC RX 258: Performed by: EMERGENCY MEDICINE

## 2023-09-27 PROCEDURE — 82550 ASSAY OF CK (CPK): CPT

## 2023-09-27 PROCEDURE — 6370000000 HC RX 637 (ALT 250 FOR IP): Performed by: EMERGENCY MEDICINE

## 2023-09-27 PROCEDURE — 87186 SC STD MICRODIL/AGAR DIL: CPT

## 2023-09-27 RX ORDER — AMOXICILLIN 250 MG/1
1000 CAPSULE ORAL ONCE
Status: COMPLETED | OUTPATIENT
Start: 2023-09-27 | End: 2023-09-27

## 2023-09-27 RX ORDER — MORPHINE SULFATE 4 MG/ML
4 INJECTION, SOLUTION INTRAMUSCULAR; INTRAVENOUS ONCE
Status: COMPLETED | OUTPATIENT
Start: 2023-09-27 | End: 2023-09-27

## 2023-09-27 RX ORDER — 0.9 % SODIUM CHLORIDE 0.9 %
500 INTRAVENOUS SOLUTION INTRAVENOUS ONCE
Status: COMPLETED | OUTPATIENT
Start: 2023-09-27 | End: 2023-09-27

## 2023-09-27 RX ORDER — AMOXICILLIN 500 MG/1
500 CAPSULE ORAL 3 TIMES DAILY
Qty: 30 CAPSULE | Refills: 0 | Status: SHIPPED | OUTPATIENT
Start: 2023-09-27 | End: 2023-10-07

## 2023-09-27 RX ORDER — ONDANSETRON 2 MG/ML
4 INJECTION INTRAMUSCULAR; INTRAVENOUS ONCE
Status: COMPLETED | OUTPATIENT
Start: 2023-09-27 | End: 2023-09-27

## 2023-09-27 RX ADMIN — SODIUM CHLORIDE 500 ML: 9 INJECTION, SOLUTION INTRAVENOUS at 06:32

## 2023-09-27 RX ADMIN — CEFTRIAXONE SODIUM 1000 MG: 1 INJECTION, POWDER, FOR SOLUTION INTRAMUSCULAR; INTRAVENOUS at 06:46

## 2023-09-27 RX ADMIN — AMOXICILLIN 1000 MG: 250 CAPSULE ORAL at 10:44

## 2023-09-27 RX ADMIN — MORPHINE SULFATE 4 MG: 4 INJECTION, SOLUTION INTRAMUSCULAR; INTRAVENOUS at 06:39

## 2023-09-27 RX ADMIN — ONDANSETRON 4 MG: 2 INJECTION INTRAMUSCULAR; INTRAVENOUS at 06:39

## 2023-09-27 ASSESSMENT — PAIN - FUNCTIONAL ASSESSMENT
PAIN_FUNCTIONAL_ASSESSMENT: NONE - DENIES PAIN
PAIN_FUNCTIONAL_ASSESSMENT: 0-10
PAIN_FUNCTIONAL_ASSESSMENT: NONE - DENIES PAIN

## 2023-09-27 ASSESSMENT — PAIN SCALES - GENERAL
PAINLEVEL_OUTOF10: 4
PAINLEVEL_OUTOF10: 10

## 2023-09-27 ASSESSMENT — ENCOUNTER SYMPTOMS
ABDOMINAL PAIN: 1
NAUSEA: 0
SHORTNESS OF BREATH: 0
DIARRHEA: 1
BACK PAIN: 0
VOMITING: 0

## 2023-09-27 NOTE — ED NOTES
Irrigated pt devine with total of 500 mls of irrigation water. Irrigated 60 mls at a time and received 60 mls of tea colored urine each time. Pt states he did feel pressure with each irrigation, but tolerated well.  Physician notified of results of irrigation     Jade Davalos RN  09/27/23 9302

## 2023-09-27 NOTE — ED NOTES
PT CATHETER IRRIGATED WITH 500 ML IRRIGATION FLUID. PT WITH NO DISCOMFORT WITH INSERTION OR REMOVAL OF FLUID.      Jaret Taylor RN  09/27/23 9515

## 2023-09-27 NOTE — ED PROVIDER NOTES
catheter in the proximal urethra just distal to the prostate gland. Recommend repositioning. Interval decrease in bilateral pleural effusions. No other acute abdominal or pelvic finding identified. Again noted is stable   left renal mass, perinephric stranding/fat necrosis, abdominal hernia and   cholelithiasis. PROCEDURES   Unless otherwise noted below, none     Procedures    CRITICAL CARE TIME   N/A    CONSULTS: Spoke to physician assistant Marilu Nuñez and she reported that since we were able to advance the catheter, he can follow-up as an outpatient from their standpoint. Spoke to Dr. Kell Lynn with infectious disease and since his most recent urine culture was Enterococcus, she stated that if there is concern for UTI, and since it was sensitive to ampicillin, we can treat him with amoxicillin and that should cover the infection and no need for anything stronger or any IV antibiotics at this point. Spoke to pharmacist about this as well and they also stated that amoxicillin is reasonable and recommended 500 mg 3 times daily related to UTI.   IP CONSULT TO UROLOGY  IP CONSULT TO INFECTIOUS DISEASES    EMERGENCY DEPARTMENT COURSE and DIFFERENTIAL DIAGNOSIS/MDM:   Vitals:    Vitals:    09/27/23 0840 09/27/23 0847 09/27/23 0950 09/27/23 1053   BP: 110/62 110/62 113/72 112/74   Pulse: (!) 107 98 94 87   Resp: 16 16 16 16   Temp:       TempSrc:       SpO2: 91% 99% 99% 98%   Weight:       Height:           Patient was given the following medications:  Medications   morphine sulfate (PF) injection 4 mg (4 mg IntraVENous Given 9/27/23 0639)   ondansetron (ZOFRAN) injection 4 mg (4 mg IntraVENous Given 9/27/23 0639)   sodium chloride 0.9 % bolus 500 mL (0 mLs IntraVENous Stopped 9/27/23 0800)   cefTRIAXone (ROCEPHIN) 1,000 mg in sodium chloride 0.9 % 50 mL IVPB (mini-bag) (0 mg IntraVENous Stopped 9/27/23 0800)   amoxicillin (AMOXIL) capsule 1,000 mg (1,000 mg Oral Given 9/27/23 1044)       Patient

## 2023-09-27 NOTE — ED NOTES
Bladder scanned pt , 104 mls showed 3 times on consecutive scans. Pt was difficult to scan due to body habitus.  Provider notified of results     Dick Cline RN  09/27/23 7490

## 2023-09-27 NOTE — DISCHARGE INSTRUCTIONS
Take amoxicillin as prescribed due to concern for urinary tract infection based on infectious disease recommendation. Follow-up with your urologist over the next few days for repeat assessment and urinalysis to make sure it is improving. Return to the emergency department for any worsening trouble with urination, development of significant abdominal pain with vomiting, fever, or any other issues over the next 12 to 24 hours.

## 2023-09-27 NOTE — ED NOTES
ED UROLOGY CONSULT  RE-devine balloon inflate in prostatic urethra as an outpatient  9198-PAGED [facility]  0818-DEA BENZ RETURNED PAGE- TRANSFERRED TO DR. TESSA Blanchard  09/27/23 0948

## 2023-09-27 NOTE — ED NOTES
ED INFECTIOUS DISEASE CONSULT   RE- UTI  0842-PAGED  THROUGH [facility]  0866-DR. VALENCIA RETURNED PAGE- TRANSFERRED TO DR. TESSA Enamorado Alexander  09/27/23 7726

## 2023-09-28 LAB
BACTERIA UR CULT: ABNORMAL
ORGANISM: ABNORMAL

## 2023-09-29 LAB
BACTERIA UR CULT: ABNORMAL
ORGANISM: ABNORMAL

## 2023-10-19 ENCOUNTER — HOSPITAL ENCOUNTER (OUTPATIENT)
Age: 73
Setting detail: SPECIMEN
Discharge: HOME | End: 2023-10-19
Payer: MEDICARE

## 2023-10-19 LAB
BILIRUB UR QL STRIP.AUTO: NEGATIVE
CLARITY UR: CLEAR
COLOR UR: YELLOW
EPI CELLS #/AREA URNS HPF: ABNORMAL /HPF (ref 0–5)
GLUCOSE UR STRIP.AUTO-MCNC: NEGATIVE MG/DL
HGB UR QL STRIP.AUTO: ABNORMAL
KETONES UR STRIP.AUTO-MCNC: NEGATIVE MG/DL
LEUKOCYTE ESTERASE UR QL STRIP.AUTO: ABNORMAL
NITRITE UR QL STRIP.AUTO: NEGATIVE
PH UR STRIP.AUTO: 6.5 [PH] (ref 5–8)
PROT UR STRIP.AUTO-MCNC: NEGATIVE MG/DL
RBC #/AREA URNS HPF: ABNORMAL /HPF (ref 0–4)
SP GR UR STRIP.AUTO: 1.01 (ref 1–1.03)
UA DIPSTICK W REFLEX MICRO PNL UR: YES
URN SPEC COLLECT METH UR: ABNORMAL
UROBILINOGEN UR STRIP-ACNC: 0.2 E.U./DL
WBC #/AREA URNS HPF: ABNORMAL /HPF (ref 0–5)

## 2023-10-19 PROCEDURE — 87086 URINE CULTURE/COLONY COUNT: CPT

## 2023-10-19 PROCEDURE — 87181 SC STD AGAR DILUTION PER AGT: CPT

## 2023-10-19 PROCEDURE — 87077 CULTURE AEROBIC IDENTIFY: CPT

## 2023-10-19 PROCEDURE — 81001 URINALYSIS AUTO W/SCOPE: CPT

## 2023-10-19 PROCEDURE — 87186 SC STD MICRODIL/AGAR DIL: CPT

## 2023-10-21 LAB
BACTERIA UR CULT: ABNORMAL
ORGANISM: ABNORMAL

## 2023-10-30 ENCOUNTER — OFFICE VISIT (OUTPATIENT)
Dept: CARDIOLOGY CLINIC | Age: 73
End: 2023-10-30
Payer: MEDICARE

## 2023-10-30 VITALS
BODY MASS INDEX: 52.65 KG/M2 | WEIGHT: 308.4 LBS | HEIGHT: 64 IN | SYSTOLIC BLOOD PRESSURE: 96 MMHG | HEART RATE: 89 BPM | DIASTOLIC BLOOD PRESSURE: 72 MMHG

## 2023-10-30 DIAGNOSIS — I48.0 PAF (PAROXYSMAL ATRIAL FIBRILLATION) (HCC): Primary | ICD-10-CM

## 2023-10-30 DIAGNOSIS — I89.0 LYMPHEDEMA: ICD-10-CM

## 2023-10-30 DIAGNOSIS — I42.8 NON-ISCHEMIC CARDIOMYOPATHY (HCC): ICD-10-CM

## 2023-10-30 PROCEDURE — 3074F SYST BP LT 130 MM HG: CPT | Performed by: NURSE PRACTITIONER

## 2023-10-30 PROCEDURE — 99214 OFFICE O/P EST MOD 30 MIN: CPT | Performed by: NURSE PRACTITIONER

## 2023-10-30 PROCEDURE — 3078F DIAST BP <80 MM HG: CPT | Performed by: NURSE PRACTITIONER

## 2023-10-30 PROCEDURE — G8484 FLU IMMUNIZE NO ADMIN: HCPCS | Performed by: NURSE PRACTITIONER

## 2023-10-30 PROCEDURE — 3017F COLORECTAL CA SCREEN DOC REV: CPT | Performed by: NURSE PRACTITIONER

## 2023-10-30 PROCEDURE — 1036F TOBACCO NON-USER: CPT | Performed by: NURSE PRACTITIONER

## 2023-10-30 PROCEDURE — G8417 CALC BMI ABV UP PARAM F/U: HCPCS | Performed by: NURSE PRACTITIONER

## 2023-10-30 PROCEDURE — 1123F ACP DISCUSS/DSCN MKR DOCD: CPT | Performed by: NURSE PRACTITIONER

## 2023-10-30 PROCEDURE — G8427 DOCREV CUR MEDS BY ELIG CLIN: HCPCS | Performed by: NURSE PRACTITIONER

## 2023-10-30 PROCEDURE — 93000 ELECTROCARDIOGRAM COMPLETE: CPT | Performed by: NURSE PRACTITIONER

## 2023-10-30 RX ORDER — LEVOFLOXACIN 500 MG/1
TABLET, FILM COATED ORAL
COMMUNITY
Start: 2023-10-23

## 2023-10-30 ASSESSMENT — ENCOUNTER SYMPTOMS
RESPIRATORY NEGATIVE: 1
GASTROINTESTINAL NEGATIVE: 1

## 2023-10-30 NOTE — PATIENT INSTRUCTIONS
Plan to stop plavix after 12/19/2023, will be continuing aspirin 81 mg daily  Continue other medications  Keep 12/2023 follow up for now  Follow up in 6 months with Dr. Marci Hernandez and EP

## 2023-10-30 NOTE — PROGRESS NOTES
[facility]   Cardiology Note              Date:  October 30, 2023  Patientname: Jannie Logan Sr. YOB: 1950    Primary Care physician: Cory Colunga MD    HISTORY OF PRESENT ILLNESS: Jannie Logan Sr. is a 67 y.o. male with a history of recovered nonischemic cardiomyopathy, atrial fibrillation s/p Watchman, HTN, DVT s/p IVC filter 2020, recurrent GI bleed, CVA, renal carcinoma. He has a history of DVTs 20 years ago and is on coumadin lifelong per hematology. He was admitted in 4/2019 with INR 5, anemia. EGD and colonoscopy normal. Echo showed EF 40-45%. Stress test abnormal. [facility] 9/6/2019 showed no significant CAD. He was admitted 2/2023 for acute CVA and found to have atrial fibrillation. Echo showed EF 50%. On 6/19/2023 he had watchman placed. On 8/4/2023 KOMAL that showed Watchman device well-seated and had DCCV. Today he presents for hospital follow-up for atrial fibrillation s/p watchman and DCCV. EKG shows regular atrial rhythm rate 89, though interpretation difficult due to baseline artifact (patient in chair and unable to lay on table). Overall he feels well. He denies chest pain, shortness of breath, syncope, palpitations. He states symptoms have been improving since June and not necessarily since cardioversion in 8/2023. He has occasional dizziness that improves with snacks and feels could be due to low blood sugar. He follows with urology, has a devine, has a CT scheduled in November to re evaluate renal mass, planning resection per family.      Cardiologist: Prior saw Dr. Marizol Messina; now sees Dr. Sheryl Nguyen and Dr. Erika Halsted    Past Medical History:   has a past medical history of Acute respiratory failure with hypoxia and hypercapnia ([address]), Anemia due to blood loss, acute, Bacteremia due to Escherichia coli, Calculus of gallbladder with acute cholecystitis without obstruction, CHF (congestive heart failure) ([address]), Choledecholithiasis, DVT (deep venous thrombosis)

## 2023-11-15 ENCOUNTER — HOSPITAL ENCOUNTER (OUTPATIENT)
Age: 73
Setting detail: SPECIMEN
Discharge: HOME | End: 2023-11-15
Payer: MEDICARE

## 2023-11-15 LAB
BACTERIA URNS QL MICRO: ABNORMAL /HPF
BILIRUB UR QL STRIP.AUTO: NEGATIVE
CLARITY UR: CLEAR
COLOR UR: YELLOW
EPI CELLS #/AREA URNS HPF: ABNORMAL /HPF (ref 0–5)
GLUCOSE UR STRIP.AUTO-MCNC: NEGATIVE MG/DL
HGB UR QL STRIP.AUTO: ABNORMAL
KETONES UR STRIP.AUTO-MCNC: NEGATIVE MG/DL
LEUKOCYTE ESTERASE UR QL STRIP.AUTO: ABNORMAL
NITRITE UR QL STRIP.AUTO: NEGATIVE
PH UR STRIP.AUTO: 6.5 [PH] (ref 5–8)
PROT UR STRIP.AUTO-MCNC: NEGATIVE MG/DL
RBC #/AREA URNS HPF: ABNORMAL /HPF (ref 0–4)
SP GR UR STRIP.AUTO: 1.01 (ref 1–1.03)
UA DIPSTICK W REFLEX MICRO PNL UR: YES
URN SPEC COLLECT METH UR: ABNORMAL
UROBILINOGEN UR STRIP-ACNC: 0.2 E.U./DL
WBC #/AREA URNS HPF: ABNORMAL /HPF (ref 0–5)

## 2023-11-15 PROCEDURE — 87186 SC STD MICRODIL/AGAR DIL: CPT

## 2023-11-15 PROCEDURE — 87077 CULTURE AEROBIC IDENTIFY: CPT

## 2023-11-15 PROCEDURE — 81001 URINALYSIS AUTO W/SCOPE: CPT

## 2023-11-15 PROCEDURE — 87086 URINE CULTURE/COLONY COUNT: CPT

## 2023-11-17 LAB
BACTERIA UR CULT: ABNORMAL
ORGANISM: ABNORMAL

## 2023-11-20 ENCOUNTER — TELEPHONE (OUTPATIENT)
Dept: CARDIOLOGY CLINIC | Age: 73
End: 2023-11-20

## 2023-11-20 NOTE — TELEPHONE ENCOUNTER
Spoke with wife today reminded her to have patient go to his 6 month appointment on 12/15/2023 and at that appointment we will be able to stop taking Plavix.

## 2023-12-14 ENCOUNTER — TELEPHONE (OUTPATIENT)
Dept: CARDIOLOGY CLINIC | Age: 73
End: 2023-12-14

## 2023-12-14 NOTE — TELEPHONE ENCOUNTER
He can stop plavix after last dose on 12/19/23. He should continue with the ASA 81 mg daily indefinitely. He will need to be rescheduled for his year follow up appt from his Watchman device.

## 2023-12-14 NOTE — TELEPHONE ENCOUNTER
I spoke with patient's wife. She will make follow up appointment when she sees Tyler Starr in January.

## 2023-12-14 NOTE — TELEPHONE ENCOUNTER
Pt spouse called to rs appt with npde for 12/15/23 due to being positive for COVID. Pt just had Watchman done and was supposed to be coming in for follow-up for this procedure with NPDE. Spouse stated that pt was supposed to be stopping blood thinner, that is reason for follow-up appt. Please contact pt to advise if blood thinner should be stopped.

## 2023-12-26 ENCOUNTER — HOSPITAL ENCOUNTER (OUTPATIENT)
Age: 73
Setting detail: SPECIMEN
Discharge: HOME | End: 2023-12-26
Payer: MEDICARE

## 2023-12-26 LAB
BILIRUB UR QL STRIP.AUTO: NEGATIVE
CLARITY UR: CLEAR
COLOR UR: ABNORMAL
EPI CELLS #/AREA URNS HPF: ABNORMAL /HPF (ref 0–5)
GLUCOSE UR STRIP.AUTO-MCNC: NEGATIVE MG/DL
HGB UR QL STRIP.AUTO: ABNORMAL
KETONES UR STRIP.AUTO-MCNC: NEGATIVE MG/DL
LEUKOCYTE ESTERASE UR QL STRIP.AUTO: ABNORMAL
NITRITE UR QL STRIP.AUTO: NEGATIVE
PH UR STRIP.AUTO: 6 [PH] (ref 5–8)
PROT UR STRIP.AUTO-MCNC: 30 MG/DL
RBC #/AREA URNS HPF: ABNORMAL /HPF (ref 0–4)
SP GR UR STRIP.AUTO: <=1.005 (ref 1–1.03)
UA DIPSTICK W REFLEX MICRO PNL UR: YES
URN SPEC COLLECT METH UR: ABNORMAL
UROBILINOGEN UR STRIP-ACNC: 0.2 E.U./DL
WBC #/AREA URNS HPF: ABNORMAL /HPF (ref 0–5)

## 2023-12-26 PROCEDURE — 87186 SC STD MICRODIL/AGAR DIL: CPT

## 2023-12-26 PROCEDURE — 81001 URINALYSIS AUTO W/SCOPE: CPT

## 2023-12-26 PROCEDURE — 87181 SC STD AGAR DILUTION PER AGT: CPT

## 2023-12-26 PROCEDURE — 87077 CULTURE AEROBIC IDENTIFY: CPT

## 2023-12-26 PROCEDURE — 87086 URINE CULTURE/COLONY COUNT: CPT

## 2023-12-27 ENCOUNTER — HOSPITAL ENCOUNTER (EMERGENCY)
Age: 73
Discharge: HOME | End: 2023-12-27
Attending: STUDENT IN AN ORGANIZED HEALTH CARE EDUCATION/TRAINING PROGRAM
Payer: MEDICARE

## 2023-12-27 ENCOUNTER — APPOINTMENT (OUTPATIENT)
Dept: CT IMAGING | Age: 73
End: 2023-12-27
Attending: STUDENT IN AN ORGANIZED HEALTH CARE EDUCATION/TRAINING PROGRAM
Payer: MEDICARE

## 2023-12-27 VITALS
BODY MASS INDEX: 52.75 KG/M2 | WEIGHT: 309 LBS | OXYGEN SATURATION: 100 % | HEIGHT: 64 IN | TEMPERATURE: 98 F | DIASTOLIC BLOOD PRESSURE: 68 MMHG | HEART RATE: 70 BPM | RESPIRATION RATE: 16 BRPM | SYSTOLIC BLOOD PRESSURE: 128 MMHG

## 2023-12-27 DIAGNOSIS — T83.511A URINARY TRACT INFECTION ASSOCIATED WITH INDWELLING URETHRAL CATHETER, INITIAL ENCOUNTER (HCC): Primary | ICD-10-CM

## 2023-12-27 DIAGNOSIS — T83.9XXA PROBLEM WITH FOLEY CATHETER, INITIAL ENCOUNTER (HCC): ICD-10-CM

## 2023-12-27 DIAGNOSIS — N39.0 URINARY TRACT INFECTION ASSOCIATED WITH INDWELLING URETHRAL CATHETER, INITIAL ENCOUNTER (HCC): Primary | ICD-10-CM

## 2023-12-27 LAB
ALBUMIN SERPL-MCNC: 3.5 G/DL (ref 3.4–5)
ALBUMIN/GLOB SERPL: 1.1 {RATIO} (ref 1.1–2.2)
ALP SERPL-CCNC: 98 U/L (ref 40–129)
ALT SERPL-CCNC: 6 U/L (ref 10–40)
ANION GAP SERPL CALCULATED.3IONS-SCNC: 9 MMOL/L (ref 3–16)
AST SERPL-CCNC: 10 U/L (ref 15–37)
BACTERIA URNS QL MICRO: ABNORMAL /HPF
BASOPHILS # BLD: 0 K/UL (ref 0–0.2)
BASOPHILS NFR BLD: 0.4 %
BILIRUB SERPL-MCNC: 1.1 MG/DL (ref 0–1)
BILIRUB UR QL STRIP.AUTO: NEGATIVE
BUN SERPL-MCNC: 35 MG/DL (ref 7–20)
CALCIUM SERPL-MCNC: 8.7 MG/DL (ref 8.3–10.6)
CHLORIDE SERPL-SCNC: 104 MMOL/L (ref 99–110)
CLARITY UR: ABNORMAL
CO2 SERPL-SCNC: 26 MMOL/L (ref 21–32)
COLOR UR: YELLOW
CREAT SERPL-MCNC: 1.8 MG/DL (ref 0.8–1.3)
DEPRECATED RDW RBC AUTO: 15.8 % (ref 12.4–15.4)
EOSINOPHIL # BLD: 0.2 K/UL (ref 0–0.6)
EOSINOPHIL NFR BLD: 2.3 %
EPI CELLS #/AREA URNS HPF: ABNORMAL /HPF (ref 0–5)
GFR SERPLBLD CREATININE-BSD FMLA CKD-EPI: 39 ML/MIN/{1.73_M2}
GLUCOSE SERPL-MCNC: 115 MG/DL (ref 70–99)
GLUCOSE UR STRIP.AUTO-MCNC: NEGATIVE MG/DL
HCT VFR BLD AUTO: 32.5 % (ref 40.5–52.5)
HGB BLD-MCNC: 10.7 G/DL (ref 13.5–17.5)
HGB UR QL STRIP.AUTO: ABNORMAL
KETONES UR STRIP.AUTO-MCNC: NEGATIVE MG/DL
LEUKOCYTE ESTERASE UR QL STRIP.AUTO: ABNORMAL
LYMPHOCYTES # BLD: 0.4 K/UL (ref 1–5.1)
LYMPHOCYTES NFR BLD: 5.9 %
MCH RBC QN AUTO: 29.1 PG (ref 26–34)
MCHC RBC AUTO-ENTMCNC: 33 G/DL (ref 31–36)
MCV RBC AUTO: 88.2 FL (ref 80–100)
MONOCYTES # BLD: 0.4 K/UL (ref 0–1.3)
MONOCYTES NFR BLD: 5.7 %
NEUTROPHILS # BLD: 5.9 K/UL (ref 1.7–7.7)
NEUTROPHILS NFR BLD: 85.7 %
NITRITE UR QL STRIP.AUTO: POSITIVE
PH UR STRIP.AUTO: 7 [PH] (ref 5–8)
PLATELET # BLD AUTO: 156 K/UL (ref 135–450)
PMV BLD AUTO: 8.7 FL (ref 5–10.5)
POTASSIUM SERPL-SCNC: 4.9 MMOL/L (ref 3.5–5.1)
PROT SERPL-MCNC: 6.6 G/DL (ref 6.4–8.2)
PROT UR STRIP.AUTO-MCNC: 30 MG/DL
RBC # BLD AUTO: 3.68 M/UL (ref 4.2–5.9)
RBC #/AREA URNS HPF: ABNORMAL /HPF (ref 0–4)
SODIUM SERPL-SCNC: 139 MMOL/L (ref 136–145)
SP GR UR STRIP.AUTO: 1.01 (ref 1–1.03)
UA COMPLETE W REFLEX CULTURE PNL UR: YES
UA DIPSTICK W REFLEX MICRO PNL UR: YES
URN SPEC COLLECT METH UR: ABNORMAL
UROBILINOGEN UR STRIP-ACNC: 0.2 E.U./DL
WBC # BLD AUTO: 6.8 K/UL (ref 4–11)
WBC #/AREA URNS HPF: ABNORMAL /HPF (ref 0–5)

## 2023-12-27 PROCEDURE — 74176 CT ABD & PELVIS W/O CONTRAST: CPT

## 2023-12-27 PROCEDURE — 51798 US URINE CAPACITY MEASURE: CPT

## 2023-12-27 PROCEDURE — 96365 THER/PROPH/DIAG IV INF INIT: CPT

## 2023-12-27 PROCEDURE — 81001 URINALYSIS AUTO W/SCOPE: CPT

## 2023-12-27 PROCEDURE — 85025 COMPLETE CBC W/AUTO DIFF WBC: CPT

## 2023-12-27 PROCEDURE — 99284 EMERGENCY DEPT VISIT MOD MDM: CPT

## 2023-12-27 PROCEDURE — 2580000003 HC RX 258: Performed by: STUDENT IN AN ORGANIZED HEALTH CARE EDUCATION/TRAINING PROGRAM

## 2023-12-27 PROCEDURE — 87077 CULTURE AEROBIC IDENTIFY: CPT

## 2023-12-27 PROCEDURE — 51702 INSERT TEMP BLADDER CATH: CPT

## 2023-12-27 PROCEDURE — 87086 URINE CULTURE/COLONY COUNT: CPT

## 2023-12-27 PROCEDURE — 80053 COMPREHEN METABOLIC PANEL: CPT

## 2023-12-27 PROCEDURE — 6360000002 HC RX W HCPCS: Performed by: STUDENT IN AN ORGANIZED HEALTH CARE EDUCATION/TRAINING PROGRAM

## 2023-12-27 RX ORDER — CIPROFLOXACIN 500 MG/1
500 TABLET, FILM COATED ORAL 2 TIMES DAILY
Qty: 20 TABLET | Refills: 0 | Status: SHIPPED | OUTPATIENT
Start: 2023-12-27 | End: 2024-01-06

## 2023-12-27 RX ORDER — CIPROFLOXACIN 500 MG/1
500 TABLET, FILM COATED ORAL 2 TIMES DAILY
Qty: 20 TABLET | Refills: 0 | Status: SHIPPED | OUTPATIENT
Start: 2023-12-27 | End: 2023-12-27

## 2023-12-27 RX ADMIN — CEFTRIAXONE SODIUM 1000 MG: 1 INJECTION, POWDER, FOR SOLUTION INTRAMUSCULAR; INTRAVENOUS at 09:04

## 2023-12-27 NOTE — CARE COORDINATION
Referred to patient for transport home. Spoke to patient. He feels family can transport. Reports he can get in and out of car. Uses a walker for mobility. Call placed to wife. Family will be here to transport home in 30 minutes. RN and patient updated.   Electronically signed by SUHAS Baig on 12/27/2023 at 11:06 AM

## 2023-12-27 NOTE — ED NOTES
Bladder Scan Pt. Pt had 626ml of urine retained with previous devine in. Rupesh Knott RN And Graham Sandoval aware.

## 2023-12-29 LAB
BACTERIA UR CULT: ABNORMAL
ORGANISM: ABNORMAL
ORGANISM: ABNORMAL

## 2024-01-25 ENCOUNTER — TELEPHONE (OUTPATIENT)
Dept: CARDIOLOGY CLINIC | Age: 74
End: 2024-01-25

## 2024-01-25 NOTE — TELEPHONE ENCOUNTER
----- Message from Reese Barber RN sent at 1/24/2024  4:44 PM EST -----  Please reschedule patients 6 month follow up must be done between   NCDR window: 11/16/2023 to 02/14/2024.  Patient wife called and cancelled the appointment on 1/24/2024 states was sick.

## 2024-01-25 NOTE — TELEPHONE ENCOUNTER
Pt needs to be rescheduled with any NP prior to 2/14/24 in order to stay on Watchman registry. Pt cancelled 1/24/24 with npde for this followup bc he is sick. Please contact pt today to rs with any NP at [facility] for something prior to 2/14/24. Please inform pt that this has to be scheduled in order for them to remain on the Watchman registry.

## 2024-01-26 ENCOUNTER — HOSPITAL ENCOUNTER (OUTPATIENT)
Age: 74
Setting detail: SPECIMEN
Discharge: HOME | End: 2024-01-26
Payer: MEDICARE

## 2024-01-26 LAB
AMORPH SED URNS QL MICRO: ABNORMAL /HPF
BACTERIA URNS QL MICRO: ABNORMAL /HPF
BILIRUB UR QL STRIP.AUTO: NEGATIVE
CLARITY UR: CLEAR
COLOR UR: YELLOW
EPI CELLS #/AREA URNS HPF: ABNORMAL /HPF (ref 0–5)
GLUCOSE UR STRIP.AUTO-MCNC: NEGATIVE MG/DL
HGB UR QL STRIP.AUTO: ABNORMAL
KETONES UR STRIP.AUTO-MCNC: NEGATIVE MG/DL
LEUKOCYTE ESTERASE UR QL STRIP.AUTO: ABNORMAL
NITRITE UR QL STRIP.AUTO: NEGATIVE
PH UR STRIP.AUTO: 6.5 [PH] (ref 5–8)
PROT UR STRIP.AUTO-MCNC: NEGATIVE MG/DL
RBC #/AREA URNS HPF: ABNORMAL /HPF (ref 0–4)
SP GR UR STRIP.AUTO: 1.01 (ref 1–1.03)
UA DIPSTICK W REFLEX MICRO PNL UR: YES
URN SPEC COLLECT METH UR: ABNORMAL
UROBILINOGEN UR STRIP-ACNC: 0.2 E.U./DL
WBC #/AREA URNS HPF: ABNORMAL /HPF (ref 0–5)

## 2024-01-26 PROCEDURE — 87077 CULTURE AEROBIC IDENTIFY: CPT

## 2024-01-26 PROCEDURE — 81001 URINALYSIS AUTO W/SCOPE: CPT

## 2024-01-26 PROCEDURE — 87086 URINE CULTURE/COLONY COUNT: CPT

## 2024-01-26 PROCEDURE — 87186 SC STD MICRODIL/AGAR DIL: CPT

## 2024-01-28 LAB
BACTERIA UR CULT: ABNORMAL
BACTERIA UR CULT: ABNORMAL
ORGANISM: ABNORMAL
ORGANISM: ABNORMAL

## 2024-01-29 LAB
BACTERIA UR CULT: ABNORMAL
ORGANISM: ABNORMAL
ORGANISM: ABNORMAL

## 2024-02-07 NOTE — PROGRESS NOTES
with Dr. Etienne and Dr. Yeung.     Thanks for allowing me to participate in the care of this patient.      Diane Enzweiler, APRN-CNP  [facility]  [address]  [place] [address]  Office: [phone]  Fax: [phone]      Electronically signed by DIANE M ENZWEILER, APRN - CNP on 2/8/2024 at 10:07 AM

## 2024-02-08 ENCOUNTER — TELEPHONE (OUTPATIENT)
Dept: CARDIOLOGY CLINIC | Age: 74
End: 2024-02-08

## 2024-02-08 ENCOUNTER — OFFICE VISIT (OUTPATIENT)
Dept: CARDIOLOGY CLINIC | Age: 74
End: 2024-02-08
Payer: MEDICARE

## 2024-02-08 VITALS
WEIGHT: 294 LBS | HEART RATE: 74 BPM | SYSTOLIC BLOOD PRESSURE: 98 MMHG | HEIGHT: 64 IN | DIASTOLIC BLOOD PRESSURE: 60 MMHG | BODY MASS INDEX: 50.19 KG/M2 | OXYGEN SATURATION: 97 %

## 2024-02-08 DIAGNOSIS — I48.0 PAF (PAROXYSMAL ATRIAL FIBRILLATION) (HCC): ICD-10-CM

## 2024-02-08 DIAGNOSIS — I25.10 CORONARY ARTERY DISEASE INVOLVING NATIVE CORONARY ARTERY OF NATIVE HEART WITHOUT ANGINA PECTORIS: ICD-10-CM

## 2024-02-08 DIAGNOSIS — I42.8 NON-ISCHEMIC CARDIOMYOPATHY (HCC): ICD-10-CM

## 2024-02-08 DIAGNOSIS — Z95.818 PRESENCE OF WATCHMAN LEFT ATRIAL APPENDAGE CLOSURE DEVICE: Primary | ICD-10-CM

## 2024-02-08 DIAGNOSIS — I10 ESSENTIAL HYPERTENSION: ICD-10-CM

## 2024-02-08 PROCEDURE — 1036F TOBACCO NON-USER: CPT | Performed by: NURSE PRACTITIONER

## 2024-02-08 PROCEDURE — G8427 DOCREV CUR MEDS BY ELIG CLIN: HCPCS | Performed by: NURSE PRACTITIONER

## 2024-02-08 PROCEDURE — 99214 OFFICE O/P EST MOD 30 MIN: CPT | Performed by: NURSE PRACTITIONER

## 2024-02-08 PROCEDURE — G8417 CALC BMI ABV UP PARAM F/U: HCPCS | Performed by: NURSE PRACTITIONER

## 2024-02-08 PROCEDURE — 3017F COLORECTAL CA SCREEN DOC REV: CPT | Performed by: NURSE PRACTITIONER

## 2024-02-08 PROCEDURE — 3078F DIAST BP <80 MM HG: CPT | Performed by: NURSE PRACTITIONER

## 2024-02-08 PROCEDURE — G8484 FLU IMMUNIZE NO ADMIN: HCPCS | Performed by: NURSE PRACTITIONER

## 2024-02-08 PROCEDURE — 3074F SYST BP LT 130 MM HG: CPT | Performed by: NURSE PRACTITIONER

## 2024-02-08 PROCEDURE — 1123F ACP DISCUSS/DSCN MKR DOCD: CPT | Performed by: NURSE PRACTITIONER

## 2024-02-08 RX ORDER — DIPHENOXYLATE HYDROCHLORIDE AND ATROPINE SULFATE 2.5; .025 MG/1; MG/1
1 TABLET ORAL 4 TIMES DAILY PRN
COMMUNITY
Start: 2023-12-18

## 2024-03-13 ENCOUNTER — HOSPITAL ENCOUNTER (OUTPATIENT)
Age: 74
Setting detail: SPECIMEN
Discharge: HOME | End: 2024-03-13
Payer: MEDICARE

## 2024-03-13 LAB
BACTERIA URNS QL MICRO: ABNORMAL /HPF
BILIRUB UR QL STRIP.AUTO: NEGATIVE
CLARITY UR: ABNORMAL
COLOR UR: ABNORMAL
EPI CELLS #/AREA URNS HPF: ABNORMAL /HPF (ref 0–5)
GLUCOSE UR STRIP.AUTO-MCNC: NEGATIVE MG/DL
HGB UR QL STRIP.AUTO: ABNORMAL
KETONES UR STRIP.AUTO-MCNC: NEGATIVE MG/DL
LEUKOCYTE ESTERASE UR QL STRIP.AUTO: ABNORMAL
NITRITE UR QL STRIP.AUTO: NEGATIVE
PH UR STRIP.AUTO: 6 [PH] (ref 5–8)
PROT UR STRIP.AUTO-MCNC: ABNORMAL MG/DL
RBC #/AREA URNS HPF: ABNORMAL /HPF (ref 0–4)
SP GR UR STRIP.AUTO: 1.01 (ref 1–1.03)
UA DIPSTICK W REFLEX MICRO PNL UR: YES
URN SPEC COLLECT METH UR: ABNORMAL
UROBILINOGEN UR STRIP-ACNC: 0.2 E.U./DL
WBC #/AREA URNS HPF: ABNORMAL /HPF (ref 0–5)

## 2024-03-13 PROCEDURE — 87077 CULTURE AEROBIC IDENTIFY: CPT

## 2024-03-13 PROCEDURE — 87086 URINE CULTURE/COLONY COUNT: CPT

## 2024-03-13 PROCEDURE — 87186 SC STD MICRODIL/AGAR DIL: CPT

## 2024-03-13 PROCEDURE — 81001 URINALYSIS AUTO W/SCOPE: CPT

## 2024-03-15 LAB
BACTERIA UR CULT: ABNORMAL
BACTERIA UR CULT: ABNORMAL
ORGANISM: ABNORMAL

## 2024-03-18 ENCOUNTER — HOSPITAL ENCOUNTER (EMERGENCY)
Age: 74
Discharge: HOME | End: 2024-03-18
Attending: STUDENT IN AN ORGANIZED HEALTH CARE EDUCATION/TRAINING PROGRAM
Payer: MEDICARE

## 2024-03-18 VITALS
TEMPERATURE: 97.7 F | HEART RATE: 73 BPM | SYSTOLIC BLOOD PRESSURE: 153 MMHG | OXYGEN SATURATION: 100 % | RESPIRATION RATE: 18 BRPM | DIASTOLIC BLOOD PRESSURE: 81 MMHG

## 2024-03-18 DIAGNOSIS — T83.511A URINARY TRACT INFECTION ASSOCIATED WITH INDWELLING URETHRAL CATHETER, INITIAL ENCOUNTER (HCC): ICD-10-CM

## 2024-03-18 DIAGNOSIS — T83.9XXA FOLEY CATHETER PROBLEM, INITIAL ENCOUNTER (HCC): Primary | ICD-10-CM

## 2024-03-18 DIAGNOSIS — N39.0 URINARY TRACT INFECTION ASSOCIATED WITH INDWELLING URETHRAL CATHETER, INITIAL ENCOUNTER (HCC): ICD-10-CM

## 2024-03-18 LAB
BACTERIA URNS QL MICRO: ABNORMAL /HPF
BILIRUB UR QL STRIP.AUTO: NEGATIVE
CLARITY UR: CLEAR
COLOR UR: ABNORMAL
GLUCOSE UR STRIP.AUTO-MCNC: NEGATIVE MG/DL
HGB UR QL STRIP.AUTO: ABNORMAL
KETONES UR STRIP.AUTO-MCNC: NEGATIVE MG/DL
LEUKOCYTE ESTERASE UR QL STRIP.AUTO: ABNORMAL
NITRITE UR QL STRIP.AUTO: NEGATIVE
PH UR STRIP.AUTO: 6 [PH] (ref 5–8)
PROT UR STRIP.AUTO-MCNC: NEGATIVE MG/DL
RBC #/AREA URNS HPF: ABNORMAL /HPF (ref 0–4)
SP GR UR STRIP.AUTO: 1.01 (ref 1–1.03)
UA COMPLETE W REFLEX CULTURE PNL UR: ABNORMAL
UA DIPSTICK W REFLEX MICRO PNL UR: YES
URN SPEC COLLECT METH UR: ABNORMAL
UROBILINOGEN UR STRIP-ACNC: 0.2 E.U./DL
WBC #/AREA URNS HPF: ABNORMAL /HPF (ref 0–5)

## 2024-03-18 PROCEDURE — 51702 INSERT TEMP BLADDER CATH: CPT

## 2024-03-18 PROCEDURE — 51798 US URINE CAPACITY MEASURE: CPT

## 2024-03-18 PROCEDURE — 6370000000 HC RX 637 (ALT 250 FOR IP): Performed by: STUDENT IN AN ORGANIZED HEALTH CARE EDUCATION/TRAINING PROGRAM

## 2024-03-18 PROCEDURE — 81001 URINALYSIS AUTO W/SCOPE: CPT

## 2024-03-18 PROCEDURE — 99283 EMERGENCY DEPT VISIT LOW MDM: CPT

## 2024-03-18 RX ORDER — NITROFURANTOIN 25; 75 MG/1; MG/1
100 CAPSULE ORAL ONCE
Status: COMPLETED | OUTPATIENT
Start: 2024-03-18 | End: 2024-03-18

## 2024-03-18 RX ORDER — NITROFURANTOIN 25; 75 MG/1; MG/1
100 CAPSULE ORAL 2 TIMES DAILY
Qty: 20 CAPSULE | Refills: 0 | Status: SHIPPED | OUTPATIENT
Start: 2024-03-18 | End: 2024-03-28

## 2024-03-18 RX ADMIN — NITROFURANTOIN (MONOHYDRATE/MACROCRYSTALS) 100 MG: 75; 25 CAPSULE ORAL at 05:16

## 2024-03-18 ASSESSMENT — PAIN - FUNCTIONAL ASSESSMENT: PAIN_FUNCTIONAL_ASSESSMENT: 0-10

## 2024-03-18 ASSESSMENT — PAIN SCALES - GENERAL: PAINLEVEL_OUTOF10: 8

## 2024-03-18 ASSESSMENT — PAIN DESCRIPTION - LOCATION: LOCATION: ABDOMEN

## 2024-03-18 NOTE — ED PROVIDER NOTES
[facility]  ED  EMERGENCY DEPARTMENT ENCOUNTER        Patient Name: Erick Mahoney Sr.  MRN: [id number]  Birthdate 1950  Date of evaluation: 3/18/2024  Provider: Thelma Tanner MD  PCP: Gwyn Weaver MD  Note Started: 7:20 AM EDT 3/18/24      CHIEF COMPLAINT  catheter problem         HISTORY & PHYSICAL     HISTORY OF PRESENT ILLNESS  History from : Patient    Limitations to history : None    Erick Mahoney Sr. is a 73 y.o. male  has a past medical history of Acute respiratory failure with hypoxia and hypercapnia (formerly [facility]) (09/24/2018), Anemia due to blood loss, acute (10/12/2020), Bacteremia due to Escherichia coli (10/12/2020), Calculus of gallbladder with acute cholecystitis without obstruction (09/24/2018), CHF (congestive heart failure) (formerly [facility]) (08/2019), Choledecholithiasis (09/24/2018), DVT (deep venous thrombosis) (formerly [facility]) (07/14/2018), GI bleed from duodenal ulcer (08/06/2018), Malignant neoplasm of kidney excluding renal pelvis (formerly [facility]) (04/08/2014), Presence of Watchman left atrial appendage closure device, Retroperitoneal bleed (10/09/2020), and UTI (10/12/2020)., who presents to the ED complaining of catheter problem.  Patient states that he woke up this morning and felt that his catheter was not draining.  He reports a feeling of suprapubic fullness, otherwise denies any pain.  He denies any nausea or vomiting, diarrhea or constipation, fever.  He states that the [place] was most recently placed on Wednesday.    Old records reviewed: No pertinent information noted.    No other complaints, modifying factors or associated symptoms.  Nursing Notes were all reviewed and agreed with or any disagreements were addressed in the HPI.    I have reviewed the following from the nursing documentation.    Past Medical History:   Diagnosis Date    Acute respiratory failure with hypoxia and hypercapnia (formerly [facility]) 09/24/2018    Anemia due to blood loss, acute 10/12/2020    Bacteremia due to Escherichia

## 2024-03-18 NOTE — ED NOTES
Discharge paperwork given to and reviewed with pt. Pt verbalized understanding and all questions answered. Pt encouraged to return if having worsening symptoms or new symptoms discussed in discharge paperwork.  Pt to follow up with Urology  Rx x 1 given and medications reviewed with pt.    Pt in NAD, RR even and unlabored. Pt off unit via EMS for transport back home

## 2024-04-08 ENCOUNTER — HOSPITAL ENCOUNTER (OUTPATIENT)
Age: 74
Setting detail: SPECIMEN
Discharge: HOME | End: 2024-04-08
Payer: MEDICARE

## 2024-04-08 LAB
BILIRUB UR QL STRIP.AUTO: NEGATIVE
CLARITY UR: ABNORMAL
COLOR UR: ABNORMAL
EPI CELLS #/AREA URNS HPF: ABNORMAL /HPF (ref 0–5)
GLUCOSE UR STRIP.AUTO-MCNC: NEGATIVE MG/DL
HGB UR QL STRIP.AUTO: ABNORMAL
KETONES UR STRIP.AUTO-MCNC: NEGATIVE MG/DL
LEUKOCYTE ESTERASE UR QL STRIP.AUTO: ABNORMAL
NITRITE UR QL STRIP.AUTO: NEGATIVE
PH UR STRIP.AUTO: 6.5 [PH] (ref 5–8)
PROT UR STRIP.AUTO-MCNC: 100 MG/DL
RBC #/AREA URNS HPF: >100 /HPF (ref 0–4)
SP GR UR STRIP.AUTO: 1.02 (ref 1–1.03)
UA DIPSTICK W REFLEX MICRO PNL UR: YES
URN SPEC COLLECT METH UR: ABNORMAL
UROBILINOGEN UR STRIP-ACNC: 0.2 E.U./DL
WBC #/AREA URNS HPF: ABNORMAL /HPF (ref 0–5)

## 2024-04-08 PROCEDURE — 87186 SC STD MICRODIL/AGAR DIL: CPT

## 2024-04-08 PROCEDURE — 87077 CULTURE AEROBIC IDENTIFY: CPT

## 2024-04-08 PROCEDURE — 81001 URINALYSIS AUTO W/SCOPE: CPT

## 2024-04-08 PROCEDURE — 87086 URINE CULTURE/COLONY COUNT: CPT

## 2024-04-08 NOTE — PROGRESS NOTES
ventricle is mildly enlarged.   Right ventricular systolic function is mildly reduced .   Mild tricuspid regurgitation.   PASP 50 mmHg    KOMAL 6/19/2023   Summary   There is a small, localized pericardial effusion at the start of the   procedure.   There is no evidence of thrombus within the left atrium or left atrial   appendage. NAVIN ostial measurements as follows   0 degrees 2.03cm   58 degrees 1.79cm   105 degrees 2.02cm   225 degrees 1.61cm   A 31mm Watchman device is successfully deployed into the left atrial   appendage. The device appears adequately compressed and there is no evidence   of leak by color flow.   There is no change in the effusion upon completion of the procedure.    ECHO 2/13/2023  Summary   Technically difficult study due to body surface area and poor acoustic   window.   Difficult to accurately to assess left ventricular systolic function due to   arrhythmia but appears grossly low normal with an ejection fraction of 50 %.   There is mild hypokinesis of the inferior and inferolateral walls.   Normal left ventricular size with mild concentric left ventricular   hypertrophy.   Left ventricular diastolic filling pressure is elevated septal E/e\" is 12 .   Mild mitral and tricuspid regurgitation.   Moderate Bi-atrial enlargement.   A bubble study was performed and fails to show evidence of shunting.   The right ventricle is mildly enlarged.   Right ventricular systolic function is mildly reduced .   Systolic pulmonic artery pressure (SPAP) is estimated at 46 mmHg consistent   with mild pulmonary hypertension (Right atrial pressure of 8 mmHg).  LA Dimension: 4.8cm    STRESS TEST 7/23/2019   Summary   Moderate sized inferior and mid/basal lateral wall partial reversibility   defects consistent with ischemia and infarction in the territory of the mid   and distal RCA and/or CCx. LV function is lower normal with inferior   hypokinesis and ejection fraction of 51 %. Intermediate risk abnormal study.

## 2024-04-08 NOTE — PROGRESS NOTES
unable to position on table to fully pan camera  LM: luminals  LAD: luminals  LCX: large vessel, luminals  RCA: dominant, luminals with sluggish MALORIE-2 flow  LVEDP: 19  LVEF: 35%  1. Nonischemic cardiomyopathy: LVEF 35%  2. No significant CAD                - eval for other causes of cardiomyopathy    Additional studies:     CTA Head and Neck 02/09/23:  CTA NECK:     AORTIC ARCH/ARCH VESSELS: Scattered atherosclerosis of the aortic arch and  arch vessels.  No flow limiting stenosis of the innominate or subclavian  arteries.     CAROTID ARTERIES: There is no significant stenosis of the common carotid  arteries.  No flow limiting stenosis of the internal carotid arteries by  NASCET criteria.  There is no evidence of a dissection.     VERTEBRAL ARTERIES: Atherosclerosis may contribute to moderate stenosis at  the origin of the left vertebral artery.  No focal stenosis otherwise seen of  the vertebral arteries.  No evidence of a dissection.     SOFT TISSUES: Small bilateral pleural effusions are seen.  No focal  consolidation.  Nonspecific borderline prominent mediastinal lymph nodes are  seen.     BONES: The osseous structures appear somewhat osteopenic.  No convincing  acute osseous abnormality is seen within the limitations of beam attenuation.        CTA HEAD:     ANTERIOR CIRCULATION: There is scattered atherosclerosis of the internal  carotid arteries without evidence of a focal stenosis.  No significant  stenosis seen of the anterior cerebral or middle cerebral arteries.  No  aneurysm identified.     POSTERIOR CIRCULATION: Atherosclerosis is seen of the left V4 segment without  significant stenosis.  No significant stenosis of the basilar or vertebral  arteries.  No significant stenosis of the posterior cerebral arteries.  No  aneurysm identified.     OTHER: No dural venous sinus thrombosis on this non-dedicated study.     BRAIN: No evidence of mass effect or midline shift.     IMPRESSION:  1. Atherosclerosis

## 2024-04-10 ENCOUNTER — OFFICE VISIT (OUTPATIENT)
Dept: CARDIOLOGY CLINIC | Age: 74
End: 2024-04-10
Payer: MEDICARE

## 2024-04-10 VITALS
HEART RATE: 77 BPM | SYSTOLIC BLOOD PRESSURE: 110 MMHG | WEIGHT: 290.4 LBS | HEIGHT: 64 IN | BODY MASS INDEX: 49.58 KG/M2 | DIASTOLIC BLOOD PRESSURE: 60 MMHG | OXYGEN SATURATION: 99 %

## 2024-04-10 DIAGNOSIS — I48.0 PAF (PAROXYSMAL ATRIAL FIBRILLATION) (HCC): ICD-10-CM

## 2024-04-10 DIAGNOSIS — I48.91 ATRIAL FIBRILLATION, UNSPECIFIED TYPE (HCC): Primary | ICD-10-CM

## 2024-04-10 LAB
BACTERIA UR CULT: ABNORMAL
ORGANISM: ABNORMAL

## 2024-04-10 PROCEDURE — 93000 ELECTROCARDIOGRAM COMPLETE: CPT | Performed by: INTERNAL MEDICINE

## 2024-04-10 PROCEDURE — 1123F ACP DISCUSS/DSCN MKR DOCD: CPT | Performed by: INTERNAL MEDICINE

## 2024-04-10 PROCEDURE — 99214 OFFICE O/P EST MOD 30 MIN: CPT | Performed by: INTERNAL MEDICINE

## 2024-04-10 PROCEDURE — G8427 DOCREV CUR MEDS BY ELIG CLIN: HCPCS | Performed by: INTERNAL MEDICINE

## 2024-04-10 PROCEDURE — 3074F SYST BP LT 130 MM HG: CPT | Performed by: INTERNAL MEDICINE

## 2024-04-10 PROCEDURE — 1036F TOBACCO NON-USER: CPT | Performed by: INTERNAL MEDICINE

## 2024-04-10 PROCEDURE — G8417 CALC BMI ABV UP PARAM F/U: HCPCS | Performed by: INTERNAL MEDICINE

## 2024-04-10 PROCEDURE — 3078F DIAST BP <80 MM HG: CPT | Performed by: INTERNAL MEDICINE

## 2024-04-10 PROCEDURE — 3017F COLORECTAL CA SCREEN DOC REV: CPT | Performed by: INTERNAL MEDICINE

## 2024-04-10 RX ORDER — CLOPIDOGREL BISULFATE 75 MG/1
75 TABLET ORAL DAILY
COMMUNITY
End: 2024-04-12

## 2024-04-10 NOTE — PATIENT INSTRUCTIONS
Plan:  Discuss alternative atrial fibrillation/atrial tachycardia treatment options  -maintain current treatment plan (stay in atrial fibrillation)   -start antiarrythmic medication with possible cardioversion   -catheter ablation   Consider your options and let us know how you would like to proceed  Plan repeat 2 week monitor in the future (~ one month) after surgery or sooner if surgery is going to be postponed  -please call us to schedule this  Continue current medications as prescribed  Follow up with PEEWEE in 3 months

## 2024-04-12 ENCOUNTER — TELEPHONE (OUTPATIENT)
Dept: CARDIOLOGY CLINIC | Age: 74
End: 2024-04-12

## 2024-04-12 ENCOUNTER — OFFICE VISIT (OUTPATIENT)
Dept: CARDIOLOGY CLINIC | Age: 74
End: 2024-04-12

## 2024-04-12 VITALS
DIASTOLIC BLOOD PRESSURE: 60 MMHG | HEIGHT: 64 IN | WEIGHT: 290 LBS | SYSTOLIC BLOOD PRESSURE: 102 MMHG | HEART RATE: 88 BPM | OXYGEN SATURATION: 97 % | BODY MASS INDEX: 49.51 KG/M2

## 2024-04-12 DIAGNOSIS — Z01.818 PRE-OPERATIVE CLEARANCE: Primary | ICD-10-CM

## 2024-04-12 DIAGNOSIS — E78.2 MIXED HYPERLIPIDEMIA: ICD-10-CM

## 2024-04-12 DIAGNOSIS — I50.32 CHRONIC HEART FAILURE WITH PRESERVED EJECTION FRACTION (HCC): ICD-10-CM

## 2024-04-12 DIAGNOSIS — I10 ESSENTIAL HYPERTENSION: ICD-10-CM

## 2024-04-12 NOTE — TELEPHONE ENCOUNTER
CARDIAC CLEARANCE REQUEST    What type of procedure are you having: LEFT OPEN PARTIAL NEPHRECTOMY     Are you taking any blood thinners: ASPRIN    Type on anesthesia: GENERAL     When is your procedure scheduled for: 04/17/24    What physician is performing your procedure: DR. MELO LEO     Phone Number: [phone]    Fax number to send the letter: [phone]

## 2024-04-12 NOTE — TELEPHONE ENCOUNTER
Last OV - 10/30/2023 EDMUNDO    Please advise on cardiac clearance and holding Aspirin.     EDMUNDO is OOT. Routing to [facility] as he has also seen patient.

## 2024-04-12 NOTE — PATIENT INSTRUCTIONS
Nonischemic cardiomyopathy with mild LV dysfunction  History of congestive heart failure, compensated      PLAN:  Continue current cardiac medications; reviewed in office.   Stop taking Lisinopril   Continue to take carvedilol throughout surgery; okay to hold Aspirin 1 week prior to surgery; okay to hold lasix as requested.   No cardiac testing warranted today  No refills warranted today     Follow up with me in 6 months

## 2024-04-15 ENCOUNTER — TRANSCRIBE ORDERS (OUTPATIENT)
Dept: ADMINISTRATIVE | Age: 74
End: 2024-04-15

## 2024-04-15 DIAGNOSIS — C64.1 MALIGNANT NEOPLASM OF RIGHT KIDNEY, EXCEPT RENAL PELVIS (HCC): ICD-10-CM

## 2024-04-15 DIAGNOSIS — N40.1 BENIGN PROSTATIC HYPERPLASIA WITH LOWER URINARY TRACT SYMPTOMS, SYMPTOM DETAILS UNSPECIFIED: Primary | ICD-10-CM

## 2024-04-15 DIAGNOSIS — D41.02 NEOPLASM OF UNCERTAIN BEHAVIOR OF LEFT KIDNEY: ICD-10-CM

## 2024-04-16 ENCOUNTER — HOSPITAL ENCOUNTER (OUTPATIENT)
Dept: CT IMAGING | Age: 74
Discharge: HOME | End: 2024-04-16
Attending: UROLOGY
Payer: MEDICARE

## 2024-04-16 DIAGNOSIS — N40.1 BENIGN PROSTATIC HYPERPLASIA WITH LOWER URINARY TRACT SYMPTOMS, SYMPTOM DETAILS UNSPECIFIED: ICD-10-CM

## 2024-04-16 DIAGNOSIS — C64.1 MALIGNANT NEOPLASM OF RIGHT KIDNEY, EXCEPT RENAL PELVIS (HCC): ICD-10-CM

## 2024-04-16 DIAGNOSIS — D41.02 NEOPLASM OF UNCERTAIN BEHAVIOR OF LEFT KIDNEY: ICD-10-CM

## 2024-04-16 PROCEDURE — 74178 CT ABD&PLV WO CNTR FLWD CNTR: CPT

## 2024-04-16 PROCEDURE — 6360000004 HC RX CONTRAST MEDICATION: Performed by: UROLOGY

## 2024-04-16 RX ADMIN — DIATRIZOATE MEGLUMINE AND DIATRIZOATE SODIUM 12 ML: 600; 100 SOLUTION ORAL; RECTAL at 14:03

## 2024-04-16 RX ADMIN — IOPAMIDOL 75 ML: 755 INJECTION, SOLUTION INTRAVENOUS at 14:03
